# Patient Record
Sex: FEMALE | Race: WHITE | NOT HISPANIC OR LATINO | Employment: OTHER | URBAN - METROPOLITAN AREA
[De-identification: names, ages, dates, MRNs, and addresses within clinical notes are randomized per-mention and may not be internally consistent; named-entity substitution may affect disease eponyms.]

---

## 2017-01-19 ENCOUNTER — HOSPITAL ENCOUNTER (OUTPATIENT)
Dept: RADIOLOGY | Facility: MEDICAL CENTER | Age: 77
End: 2017-01-19
Attending: NEUROLOGICAL SURGERY
Payer: MEDICARE

## 2017-01-19 DIAGNOSIS — C79.31 SECONDARY MALIGNANT NEOPLASM OF BRAIN AND SPINAL CORD (HCC): ICD-10-CM

## 2017-01-19 DIAGNOSIS — C79.49 SECONDARY MALIGNANT NEOPLASM OF BRAIN AND SPINAL CORD (HCC): ICD-10-CM

## 2017-01-19 PROCEDURE — 700117 HCHG RX CONTRAST REV CODE 255: Performed by: NEUROLOGICAL SURGERY

## 2017-01-19 PROCEDURE — 70553 MRI BRAIN STEM W/O & W/DYE: CPT

## 2017-01-19 PROCEDURE — A9579 GAD-BASE MR CONTRAST NOS,1ML: HCPCS | Performed by: NEUROLOGICAL SURGERY

## 2017-01-19 RX ADMIN — GADODIAMIDE 15 ML: 287 INJECTION INTRAVENOUS at 10:26

## 2017-02-27 ENCOUNTER — HOSPITAL ENCOUNTER (OUTPATIENT)
Dept: RADIOLOGY | Facility: MEDICAL CENTER | Age: 77
End: 2017-02-27
Attending: RADIOLOGY
Payer: MEDICARE

## 2017-02-27 DIAGNOSIS — C34.11 MALIGNANT NEOPLASM OF UPPER LOBE OF RIGHT LUNG (HCC): ICD-10-CM

## 2017-02-27 DIAGNOSIS — C79.31 SECONDARY MALIGNANT NEOPLASM OF BRAIN AND SPINAL CORD (HCC): ICD-10-CM

## 2017-02-27 DIAGNOSIS — C79.49 SECONDARY MALIGNANT NEOPLASM OF BRAIN AND SPINAL CORD (HCC): ICD-10-CM

## 2017-02-27 PROCEDURE — 71260 CT THORAX DX C+: CPT

## 2017-02-27 PROCEDURE — 700117 HCHG RX CONTRAST REV CODE 255: Performed by: RADIOLOGY

## 2017-02-27 RX ADMIN — IOHEXOL 100 ML: 350 INJECTION, SOLUTION INTRAVENOUS at 11:07

## 2017-03-03 ENCOUNTER — HOSPITAL ENCOUNTER (OUTPATIENT)
Dept: RADIATION ONCOLOGY | Facility: MEDICAL CENTER | Age: 77
End: 2017-03-31
Attending: RADIOLOGY
Payer: MEDICARE

## 2017-03-03 VITALS
DIASTOLIC BLOOD PRESSURE: 67 MMHG | BODY MASS INDEX: 24.32 KG/M2 | HEART RATE: 69 BPM | OXYGEN SATURATION: 97 % | TEMPERATURE: 97 F | SYSTOLIC BLOOD PRESSURE: 125 MMHG | WEIGHT: 133 LBS

## 2017-03-03 DIAGNOSIS — C79.31 METASTASIS TO BRAIN (HCC): ICD-10-CM

## 2017-03-03 DIAGNOSIS — C34.11 MALIGNANT NEOPLASM OF UPPER LOBE OF RIGHT LUNG (HCC): ICD-10-CM

## 2017-03-03 PROCEDURE — 99214 OFFICE O/P EST MOD 30 MIN: CPT | Performed by: RADIOLOGY

## 2017-03-03 PROCEDURE — 99212 OFFICE O/P EST SF 10 MIN: CPT | Performed by: RADIOLOGY

## 2017-03-03 NOTE — PROGRESS NOTES
RADIATION ONCOLOGY FOLLOW-UP    DATE OF SERVICE: 3/3/2017    IDENTIFICATION:   A 77 y.o. female with history of stage IV lung cancer. She is status post resection of right occipital lobe metastasis followed by stereotactic radiotherapy to the surgical bed receiving 27 Negron in 3 fractions. This was followed by systemic therapy consisting of carboplatin and Alimta with reduction noted in a right upper lobe mass; however, with persistent disease. She subsequently received concurrent carboplatin and radiotherapy 60 gray in 25 fractions completed 5/6/2016. Follow-up CT chest showed possible 2nd lesion adjacent to the manubrium on the right. Plans were made for stereotactic therapy however, there were aborted after PET/CT showed no evidence of metabolic activity in the 2nd nodule. Patient has since been followed..      HISTORY OF PRESENT ILLNESS:   She returns today for follow-up feeling well no significant respiratory complaints. She reports good appetite energy level and exercises daily.    CURRENT MEDICATIONS:  Current Outpatient Prescriptions   Medication Sig Dispense Refill   • TURMERIC PO Take  by mouth.     • cetirizine (ZYRTEC) 10 MG Tab Take 10 mg by mouth every day.     • sodium chloride (OCEAN) 0.65 % Solution Spray 2 Sprays in nose as needed for Congestion.     • Omega-3 Fatty Acids (FISH OIL) 1200 MG Cap Take 1 Cap by mouth every day.     • multivitamin (THERAGRAN) Tab Take 1 Tab by mouth every day.     • Glucosamine-Chondroitin (GLUCOSAMINE CHONDR COMPLEX PO) Take 1 Tab by mouth every day.       No current facility-administered medications for this encounter.       ALLERGIES:  Penicillins    PHYSICAL EXAM:   /67 mmHg  Pulse 69  Temp(Src) 36.1 °C (97 °F)  Wt 60.328 kg (133 lb)  SpO2 97%  GENERAL: Alert and oriented no acute distress  HEENT:  Pupils are equal, round, and reactive to light.  Extraocular muscles   are intact. Sclerae nonicteric.  Conjunctivae pink.  Oral cavity, tongue   protrudes  midline.   NECK:  Supple without evidence of thyromegaly.  NODES:  No peripheral adenopathy of the neck, supraclavicular fossa or axillae   bilaterally.  LUNGS:  Clear to ascultation and resonant to percussion.  HEART:  Regular rate and rhythm.  No murmur appreciated  ABDOMEN:  Soft. No evidence of hepatosplenomegaly.  Positive bowel sounds.  EXTREMITIES:  Without Edema.  NEUROLOGIC:  Cranial nerves II through XII were intact.  Strength is 5/5 in   lower extremities bilaterally.  DTRs were symmetrical.  There was no focal   sensory deficit appreciated.    Pain Scale: 0-10  Pain Assessement: Patient denies pain  Pain Location, Orientation and Scale: 0    RADIOLOGY DATA:  CT-CHEST (THORAX) WITH  2/27/2017  1.  Interval decrease in size of the dominant lobulated right upper lobe mass. 2.  Stable mass along the anterior superior right lung apex and posterior medial right lung apex. 3.  Slight increase density of the groundglass opacity seen posterior to the dominant mass. It is uncertain if this could be related to radiation pneumonitis or disease progression. 4.  Interval increase in thickened linear areas of opacity extending to the anterior lateral right upper lobe pleural surfaces with stable subpleural interstitial opacities. This could be related to pneumonitis as well. 5.  There is underlying emphysema. There are no new suspicious nodules. 6.  There are no new findings in the upper abdomen.     Results for orders placed during the hospital encounter of 01/19/17   MR-BRAIN-WITH & W/O    Impression 1.  Postoperative right occipital craniotomy.  2.  Right occipital encephalomalacic cavity with hemosiderin deposition. Slightly decreased in size from the previous exam. However, there has been increase in peripheral wall enhancement with some nodular thickening. 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 There has also been increase in surrounding FLAIR hyperintensity in the white matter most   consistent with postradiation white matter changes in combination with microcystic encephalomalacic change and/or vasogenic edema.   Results for orders placed during the hospital encounter of 10/19/16   MR-BRAIN-WITH & W/O    Impression 1.  Stable surgical cavity in the right occipital lobe without any tumor recurrence. However there is mild increased surrounding T2 hyperintensity likely representing gliosis. Continued follow-up is recommended.  2.  Mild cerebral atrophy.  3.  Right occipital craniotomy with minimal subdural fluid collection. There has been no significant interval change.   Results for orders placed during the hospital encounter of 06/17/16   MR-BRAIN-WITH & W/O    Impression 1.  Postoperative right parietal-occipital craniotomy.  2.  Stable postoperative cystic encephalomalacic cavity with hemosiderin rim and thin rim enhancement, stable finding. No local tumor recurrence.  3.  No evidence of new enhancing metastasis elsewhere in the cerebral hemispheres.  4.  Minimal supratentorial white matter disease most consistent with microvascular ischemic change. Stable findings.  5.  Tiny chronic CSF signal extra-axial fluid collection underlying the craniotomy site, no significant change.   Results for orders placed during the hospital encounter of 01/30/16   MR-BRAIN-WITH & W/O    Impression 1.  Interval decrease in size of the hematoma in the RIGHT parieto-occipital surgical resection bed without evidence of recurrent or residual tumor  2.  No new intracranial lesions  3.  Remote LEFT basal ganglia lacune   Results for orders placed during the hospital encounter of 12/10/15   MR-BRAIN-WITH & W/O    Impression 1.  Hemorrhage within the right parieto-occipital surgical cavity. Due to the inherent T1 hyperintensity of diffuse hemorrhage, it is difficult evaluate enhancing residual  metastasis. Followup study is recommended.  2.  There are no other enhancing lesions.   Results for orders placed during the hospital encounter of 11/18/15   MR-BRAIN-WITH & W/O    Impression 1.  Status post recent parieto-occipital craniotomy for tumor resection.    2.  Moderate-sized postoperative cavity in the right parietal-occipital region containing internal fluid and hemorrhage and a small nondependent air collection.    3.  No definite MRI evidence of residual neoplasm.    4.  There is a moderate amount of vasogenic edema about the postsurgical cavity which effaces the right occipital horn and causes minimal right to left midline shift the ventricular system.   MR-BRAIN-WITH & W/O    Impression 1.  3.9 cm partially cystic/solid peripherally enhancing mass in the right parietal-occipital region with marked amount of surrounding vasogenic edema. This mass is not changed significantly in size or appearance since previous exam.   MR-BRAIN-WITH & W/O    Impression There is an approximately 3.7x4.4x2.1 cm sized predominantly cystic lesion with solid component noted in the right occipital lobe. Abnormal enhancement and hemorrhage are noted in the solid component. The differential diagnosis includes glioblastoma   multiforme and metastasis. There are no other enhancing lesions in the brain parenchyma.         IMPRESSION:    A 77 y.o. with stage IV lung cancer status post stereotactic radiotherapy surgical bed right occipital lobe brain and hypo-fractionated radiotherapy to right upper lobe lung mass given concurrently with carboplatin.    RECOMMENDATIONS:   Reviewed imaging studies with the patient. The CT chest shows continued regression of the right upper lobe mass. She also shows radiation related lung changes which are more prominent. Brain MRI shows increased thickening of the surgical bed which may also be radiation effect. Will need to follow closely. Recommended repeat CT chest abdomen pelvis, and MRI brain  end of May with follow-up in early June.    25 minutes was spent face-to-face with patient in the office and more than half of that time was spent counseling patient or coordinating care as described above.      Thank you for the opportunity to participate in her care.  If any questions or comments, please do not hesitate in calling.    Cande DE SANTIAGO M.D.  Electronically signed by: Cande Caro V, 3/3/2017 9:04 AM  [phone]

## 2017-03-15 ENCOUNTER — HOSPITAL ENCOUNTER (OUTPATIENT)
Dept: LAB | Facility: MEDICAL CENTER | Age: 77
End: 2017-03-15
Attending: SPECIALIST
Payer: MEDICARE

## 2017-03-15 LAB
BUN SERPL-MCNC: 17 MG/DL (ref 8–22)
CREAT SERPL-MCNC: 0.6 MG/DL (ref 0.5–1.4)

## 2017-03-15 PROCEDURE — 84520 ASSAY OF UREA NITROGEN: CPT

## 2017-03-15 PROCEDURE — 82565 ASSAY OF CREATININE: CPT

## 2017-03-15 PROCEDURE — 36415 COLL VENOUS BLD VENIPUNCTURE: CPT

## 2017-03-16 ENCOUNTER — HOSPITAL ENCOUNTER (OUTPATIENT)
Dept: RADIOLOGY | Facility: MEDICAL CENTER | Age: 77
End: 2017-03-16
Attending: NEUROLOGICAL SURGERY
Payer: MEDICARE

## 2017-03-16 DIAGNOSIS — C79.49 SECONDARY MALIGNANT NEOPLASM OF BRAIN AND SPINAL CORD (HCC): ICD-10-CM

## 2017-03-16 DIAGNOSIS — C79.31 SECONDARY MALIGNANT NEOPLASM OF BRAIN AND SPINAL CORD (HCC): ICD-10-CM

## 2017-03-16 PROCEDURE — 70553 MRI BRAIN STEM W/O & W/DYE: CPT

## 2017-03-16 PROCEDURE — A9579 GAD-BASE MR CONTRAST NOS,1ML: HCPCS | Performed by: NEUROLOGICAL SURGERY

## 2017-03-16 PROCEDURE — 700117 HCHG RX CONTRAST REV CODE 255: Performed by: NEUROLOGICAL SURGERY

## 2017-03-16 RX ADMIN — GADODIAMIDE 15 ML: 287 INJECTION INTRAVENOUS at 13:15

## 2017-05-05 ENCOUNTER — HOSPITAL ENCOUNTER (OUTPATIENT)
Dept: LAB | Facility: MEDICAL CENTER | Age: 77
End: 2017-05-05
Attending: SPECIALIST
Payer: MEDICARE

## 2017-05-05 LAB
ALBUMIN SERPL BCP-MCNC: 4.1 G/DL (ref 3.2–4.9)
ALBUMIN/GLOB SERPL: 1.5 G/DL
ALP SERPL-CCNC: 92 U/L (ref 30–99)
ALT SERPL-CCNC: 19 U/L (ref 2–50)
ANION GAP SERPL CALC-SCNC: 7 MMOL/L (ref 0–11.9)
AST SERPL-CCNC: 20 U/L (ref 12–45)
BASOPHILS # BLD AUTO: 0.8 % (ref 0–1.8)
BASOPHILS # BLD: 0.04 K/UL (ref 0–0.12)
BILIRUB SERPL-MCNC: 0.6 MG/DL (ref 0.1–1.5)
BUN SERPL-MCNC: 21 MG/DL (ref 8–22)
CALCIUM SERPL-MCNC: 9.3 MG/DL (ref 8.5–10.5)
CHLORIDE SERPL-SCNC: 107 MMOL/L (ref 96–112)
CO2 SERPL-SCNC: 25 MMOL/L (ref 20–33)
CREAT SERPL-MCNC: 0.68 MG/DL (ref 0.5–1.4)
EOSINOPHIL # BLD AUTO: 0.2 K/UL (ref 0–0.51)
EOSINOPHIL NFR BLD: 3.8 % (ref 0–6.9)
ERYTHROCYTE [DISTWIDTH] IN BLOOD BY AUTOMATED COUNT: 49.6 FL (ref 35.9–50)
GFR SERPL CREATININE-BSD FRML MDRD: >60 ML/MIN/1.73 M 2
GLOBULIN SER CALC-MCNC: 2.8 G/DL (ref 1.9–3.5)
GLUCOSE SERPL-MCNC: 89 MG/DL (ref 65–99)
HCT VFR BLD AUTO: 42.5 % (ref 37–47)
HGB BLD-MCNC: 14 G/DL (ref 12–16)
IMM GRANULOCYTES # BLD AUTO: 0.01 K/UL (ref 0–0.11)
IMM GRANULOCYTES NFR BLD AUTO: 0.2 % (ref 0–0.9)
LYMPHOCYTES # BLD AUTO: 1.41 K/UL (ref 1–4.8)
LYMPHOCYTES NFR BLD: 27.1 % (ref 22–41)
MCH RBC QN AUTO: 33.6 PG (ref 27–33)
MCHC RBC AUTO-ENTMCNC: 32.9 G/DL (ref 33.6–35)
MCV RBC AUTO: 101.9 FL (ref 81.4–97.8)
MONOCYTES # BLD AUTO: 0.64 K/UL (ref 0–0.85)
MONOCYTES NFR BLD AUTO: 12.3 % (ref 0–13.4)
NEUTROPHILS # BLD AUTO: 2.91 K/UL (ref 2–7.15)
NEUTROPHILS NFR BLD: 55.8 % (ref 44–72)
NRBC # BLD AUTO: 0 K/UL
NRBC BLD AUTO-RTO: 0 /100 WBC
PLATELET # BLD AUTO: 246 K/UL (ref 164–446)
PMV BLD AUTO: 9.2 FL (ref 9–12.9)
POTASSIUM SERPL-SCNC: 4.5 MMOL/L (ref 3.6–5.5)
PROT SERPL-MCNC: 6.9 G/DL (ref 6–8.2)
RBC # BLD AUTO: 4.17 M/UL (ref 4.2–5.4)
SODIUM SERPL-SCNC: 139 MMOL/L (ref 135–145)
WBC # BLD AUTO: 5.2 K/UL (ref 4.8–10.8)

## 2017-05-05 PROCEDURE — 36415 COLL VENOUS BLD VENIPUNCTURE: CPT

## 2017-05-05 PROCEDURE — 80053 COMPREHEN METABOLIC PANEL: CPT

## 2017-05-05 PROCEDURE — 85025 COMPLETE CBC W/AUTO DIFF WBC: CPT

## 2017-05-11 ENCOUNTER — HOSPITAL ENCOUNTER (OUTPATIENT)
Dept: RADIOLOGY | Facility: MEDICAL CENTER | Age: 77
End: 2017-05-11
Attending: RADIOLOGY
Payer: MEDICARE

## 2017-05-11 DIAGNOSIS — M51.16 NEURITIS OR RADICULITIS DUE TO RUPTURE OF LUMBAR INTERVERTEBRAL DISC: ICD-10-CM

## 2017-05-11 DIAGNOSIS — C79.31 METASTASIS TO BRAIN (HCC): ICD-10-CM

## 2017-05-11 PROCEDURE — 70553 MRI BRAIN STEM W/O & W/DYE: CPT

## 2017-05-25 ENCOUNTER — HOSPITAL ENCOUNTER (OUTPATIENT)
Dept: RADIOLOGY | Facility: MEDICAL CENTER | Age: 77
End: 2017-05-25
Attending: RADIOLOGY
Payer: MEDICARE

## 2017-05-25 DIAGNOSIS — C34.11 MALIGNANT NEOPLASM OF UPPER LOBE OF RIGHT LUNG (HCC): ICD-10-CM

## 2017-05-25 PROCEDURE — 700117 HCHG RX CONTRAST REV CODE 255: Performed by: RADIOLOGY

## 2017-05-25 PROCEDURE — 71260 CT THORAX DX C+: CPT

## 2017-05-25 RX ADMIN — IOHEXOL 100 ML: 350 INJECTION, SOLUTION INTRAVENOUS at 10:18

## 2017-06-02 ENCOUNTER — HOSPITAL ENCOUNTER (OUTPATIENT)
Dept: RADIATION ONCOLOGY | Facility: MEDICAL CENTER | Age: 77
End: 2017-06-30
Attending: RADIOLOGY
Payer: MEDICARE

## 2017-06-02 VITALS
DIASTOLIC BLOOD PRESSURE: 67 MMHG | TEMPERATURE: 97.7 F | HEART RATE: 67 BPM | WEIGHT: 133 LBS | BODY MASS INDEX: 24.48 KG/M2 | SYSTOLIC BLOOD PRESSURE: 147 MMHG | HEIGHT: 62 IN | OXYGEN SATURATION: 98 %

## 2017-06-02 DIAGNOSIS — C79.31 METASTASIS TO BRAIN (HCC): ICD-10-CM

## 2017-06-02 DIAGNOSIS — G93.6 CEREBRAL EDEMA (HCC): ICD-10-CM

## 2017-06-02 PROCEDURE — 99212 OFFICE O/P EST SF 10 MIN: CPT | Performed by: RADIOLOGY

## 2017-06-02 PROCEDURE — 99214 OFFICE O/P EST MOD 30 MIN: CPT | Performed by: RADIOLOGY

## 2017-06-02 RX ORDER — DEXAMETHASONE 4 MG/1
4 TABLET ORAL 2 TIMES DAILY
Qty: 20 TAB | Refills: 0 | Status: SHIPPED | OUTPATIENT
Start: 2017-06-02 | End: 2017-06-12

## 2017-06-02 RX ORDER — BUTALBITAL, ACETAMINOPHEN AND CAFFEINE 50; 325; 40 MG/1; MG/1; MG/1
1 TABLET ORAL EVERY 4 HOURS PRN
Refills: 0 | COMMUNITY
Start: 2017-04-05 | End: 2017-11-29

## 2017-06-02 RX ORDER — CYANOCOBALAMIN (VITAMIN B-12) 1000 MCG
5000 TABLET, EXTENDED RELEASE ORAL
COMMUNITY
End: 2018-05-30

## 2017-06-02 ASSESSMENT — PAIN SCALES - GENERAL: PAINLEVEL: NO PAIN

## 2017-06-02 NOTE — PROGRESS NOTES
"RADIATION ONCOLOGY FOLLOW-UP    DATE OF SERVICE: 6/2/2017    IDENTIFICATION:   A 77 y.o. female with history of stage IV lung cancer adenocarcinoma. She is status post resection of a right occipital lobe brain metastasis followed by stereotactic radiotherapy to surgical bed receiving 2700 cGy in 3 fractions completed 12/18/2015. This was followed by systemic therapy consisting of carboplatin and Alimta with reduction of a right upper lobe lung mass however disease was still persistent. She subsequently received concurrent carboplatin and radiotherapy 6000 cGy in 25 fractions completed 5/6/2016..      HISTORY OF PRESENT ILLNESS:   Returns for follow-up after undergoing staging CT chest abdomen pelvis. Her only complaint is been intermittent headaches which began approximately 2-3 months ago. Headaches are described as being significant dull ache located on the right side frontally. She was given medication for migraines and states they did help resolve her headaches.    CURRENT MEDICATIONS:  Current Outpatient Prescriptions   Medication Sig Dispense Refill   • TURMERIC PO Take  by mouth.     • sodium chloride (OCEAN) 0.65 % Solution Spray 2 Sprays in nose as needed for Congestion.     • Omega-3 Fatty Acids (FISH OIL) 1200 MG Cap Take 1 Cap by mouth every day.     • multivitamin (THERAGRAN) Tab Take 1 Tab by mouth every day.     • Glucosamine-Chondroitin (GLUCOSAMINE CHONDR COMPLEX PO) Take 1 Tab by mouth every day.     • vitamin B-12 CR (CYANOCOBALAMIN) 1000 MCG Tab CR tablet Take 5,000 mg by mouth.     • acetaminophen/caffeine/butalbital 325-40-50 mg (FIORICET) -40 MG Tab Take 1 Tab by mouth every four hours as needed.  0   • cetirizine (ZYRTEC) 10 MG Tab Take 10 mg by mouth every day.       No current facility-administered medications for this encounter.       ALLERGIES:  Penicillins    PHYSICAL EXAM:   /67 mmHg  Pulse 67  Temp(Src) 36.5 °C (97.7 °F)  Ht 1.575 m (5' 2\")  Wt 60.328 kg (133 lb)  BMI " 24.32 kg/m2  SpO2 98%  Breastfeeding? No  GENERAL: Alert and oriented no acute distress  HEENT:  Pupils are equal, round, and reactive to light.  Extraocular muscles   are intact. Sclerae nonicteric.  Conjunctivae pink.  Oral cavity, tongue   protrudes midline.   NECK:  Supple without evidence of thyromegaly.  NODES:  No peripheral adenopathy of the neck, supraclavicular fossa or axillae   bilaterally.  LUNGS:  Clear to ascultation and resonant to percussion.  HEART:  Regular rate and rhythm.  No murmur appreciated  ABDOMEN:  Soft. No evidence of hepatosplenomegaly.  Positive bowel sounds.  EXTREMITIES:  Without Edema.  NEUROLOGIC:  Cranial nerves II through XII were intact.  Strength is 5/5 in   lower extremities bilaterally.  DTRs were symmetrical. Some difficulty performing tandem gait. There was no focal   sensory deficit appreciated.      Pain Scale: 0-10  Pain Assessement: 0  Pain Location, Orientation and Scale: no complaints of pain.     LABORATORY DATA:   Lab Results   Component Value Date/Time    SODIUM 139 05/05/2017 10:24 AM    POTASSIUM 4.5 05/05/2017 10:24 AM    CHLORIDE 107 05/05/2017 10:24 AM    CO2 25 05/05/2017 10:24 AM    GLUCOSE 89 05/05/2017 10:24 AM    BUN 21 05/05/2017 10:24 AM    CREATININE 0.68 05/05/2017 10:24 AM     Lab Results   Component Value Date/Time    ALKALINE PHOSPHATASE 92 05/05/2017 10:24 AM    AST(SGOT) 20 05/05/2017 10:24 AM    ALT(SGPT) 19 05/05/2017 10:24 AM    TOTAL BILIRUBIN 0.6 05/05/2017 10:24 AM      Lab Results   Component Value Date/Time    WBC 5.2 05/05/2017 10:24 AM    RBC 4.17* 05/05/2017 10:24 AM    HEMOGLOBIN 14.0 05/05/2017 10:24 AM    HEMATOCRIT 42.5 05/05/2017 10:24 AM    .9* 05/05/2017 10:24 AM    MCH 33.6* 05/05/2017 10:24 AM    MCHC 32.9* 05/05/2017 10:24 AM    MPV 9.2 05/05/2017 10:24 AM    NEUTROPHILS-POLYS 55.80 05/05/2017 10:24 AM    LYMPHOCYTES 27.10 05/05/2017 10:24 AM    MONOCYTES 12.30 05/05/2017 10:24 AM    EOSINOPHILS 3.80 05/05/2017 10:24  AM    BASOPHILS 0.80 05/05/2017 10:24 AM        RADIOLOGY DATA:  CT-CHEST, ABDOMEN, PELVIS WITH  5/25/2017  1.  There is essentially stable disease with no significant internal change in the residual ovoid right upper lobe lung mass or the adjacent anterosuperior and medial subpleural nodules. 2.  Stable areas of pleural-parenchymal scarring in the right upper lobe. 3.  There is underlying centrilobular emphysema. 4.  No change in a tiny subpleural left upper lobe lung nodule which is likely postinflammatory. 5.  There is no evidence of metastatic disease in the abdomen or pelvis. 6.  There are stable hepatic cysts and probable bilateral renal cysts.    MR.-BRAIN WITH & WITHOUT  5/11/2017  1.  Postoperative right occipital-parietal craniotomy, by history resection of lung metastasis. Encephalomalacic cavity underlying the craniotomy site. 2.  Progressive geographic area of enhancement in the adjacent right occipital lobe, right parietal lobe, and right posterior temporal lobe with increasing mass effect and increasing vasogenic edema now effacing the right trigone which is also displaced anteriorly. 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      IMPRESSION:    A 77 y.o. with stage IV lung cancer clinically without evidence of disease. 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    RECOMMENDATIONS:   Reviewed imaging with patient. CT scan shows stable findings there is no evidence of extracranial metastatic disease. The MRI shows fairly large area of radiation necrosis, which would explain patient's headaches. I did recommend 2 week course of dexamethasone with a follow-up in 4 weeks.    25 minutes was spent face-to-face with patient in the office and more than half of that time was spent counseling patient or coordinating care as described  above.    Thank you for the opportunity to participate in her care.  If any questions or comments, please do not hesitate in calling.  Cande DE SANTIAGO M.D.  Electronically signed by: Cande Caro V, 6/2/2017 2:27 PM  [phone]

## 2017-06-02 NOTE — NON-PROVIDER
"Patient was seen today in clinic with Dr. Caro for Follow up.  Vitals signs and weight were obtained and pain assessment was completed.  Allergies and medications were reviewed with the patient.  Review of systems completed.     Vitals/Pain:  Filed Vitals:    06/02/17 1313   BP: 147/67   Pulse: 67   Temp: 36.5 °C (97.7 °F)   Height: 1.575 m (5' 2\")   Weight: 60.328 kg (133 lb)   SpO2: 98%   Pain Score: No pain    Allergies:   Penicillins    Current Medications:  Current Outpatient Prescriptions   Medication Sig Dispense Refill   • TURMERIC PO Take  by mouth.     • sodium chloride (OCEAN) 0.65 % Solution Spray 2 Sprays in nose as needed for Congestion.     • Omega-3 Fatty Acids (FISH OIL) 1200 MG Cap Take 1 Cap by mouth every day.     • multivitamin (THERAGRAN) Tab Take 1 Tab by mouth every day.     • Glucosamine-Chondroitin (GLUCOSAMINE CHONDR COMPLEX PO) Take 1 Tab by mouth every day.     • vitamin B-12 CR (CYANOCOBALAMIN) 1000 MCG Tab CR tablet Take 5,000 mg by mouth.     • acetaminophen/caffeine/butalbital 325-40-50 mg (FIORICET) -40 MG Tab Take 1 Tab by mouth every four hours as needed.  0   • cetirizine (ZYRTEC) 10 MG Tab Take 10 mg by mouth every day.       No current facility-administered medications for this encounter.       Review of Systems:      PCP:  Jay Barrera, Med Ass't  6/2/2017  1:19 PM      "

## 2017-06-27 ENCOUNTER — HOSPITAL ENCOUNTER (OUTPATIENT)
Dept: LAB | Facility: MEDICAL CENTER | Age: 77
End: 2017-06-27
Attending: NEUROLOGICAL SURGERY
Payer: MEDICARE

## 2017-06-27 LAB
BUN SERPL-MCNC: 18 MG/DL (ref 8–22)
CREAT SERPL-MCNC: 0.69 MG/DL (ref 0.5–1.4)
GFR SERPL CREATININE-BSD FRML MDRD: >60 ML/MIN/1.73 M 2

## 2017-06-27 PROCEDURE — 36415 COLL VENOUS BLD VENIPUNCTURE: CPT

## 2017-06-27 PROCEDURE — 82565 ASSAY OF CREATININE: CPT

## 2017-06-27 PROCEDURE — 84520 ASSAY OF UREA NITROGEN: CPT

## 2017-07-06 ENCOUNTER — HOSPITAL ENCOUNTER (OUTPATIENT)
Dept: RADIOLOGY | Facility: MEDICAL CENTER | Age: 77
End: 2017-07-06
Attending: NEUROLOGICAL SURGERY
Payer: MEDICARE

## 2017-07-06 DIAGNOSIS — C79.31 SECONDARY MALIGNANT NEOPLASM OF BRAIN AND SPINAL CORD (HCC): ICD-10-CM

## 2017-07-06 DIAGNOSIS — C79.49 SECONDARY MALIGNANT NEOPLASM OF BRAIN AND SPINAL CORD (HCC): ICD-10-CM

## 2017-07-06 PROCEDURE — 70553 MRI BRAIN STEM W/O & W/DYE: CPT

## 2017-07-06 PROCEDURE — A9579 GAD-BASE MR CONTRAST NOS,1ML: HCPCS | Performed by: NEUROLOGICAL SURGERY

## 2017-07-06 PROCEDURE — 700117 HCHG RX CONTRAST REV CODE 255: Performed by: NEUROLOGICAL SURGERY

## 2017-07-06 RX ADMIN — GADODIAMIDE 10 ML: 287 INJECTION INTRAVENOUS at 11:01

## 2017-07-20 ENCOUNTER — HOSPITAL ENCOUNTER (OUTPATIENT)
Dept: RADIOLOGY | Facility: MEDICAL CENTER | Age: 77
End: 2017-07-20
Attending: SPECIALIST
Payer: MEDICARE

## 2017-07-20 DIAGNOSIS — C34.11 MALIGNANT NEOPLASM OF UPPER LOBE OF RIGHT LUNG (HCC): ICD-10-CM

## 2017-07-20 PROCEDURE — A9503 TC99M MEDRONATE: HCPCS

## 2017-07-28 ENCOUNTER — HOSPITAL ENCOUNTER (OUTPATIENT)
Dept: RADIATION ONCOLOGY | Facility: MEDICAL CENTER | Age: 77
End: 2017-07-31
Attending: RADIOLOGY
Payer: MEDICARE

## 2017-07-28 VITALS
BODY MASS INDEX: 24.5 KG/M2 | OXYGEN SATURATION: 97 % | WEIGHT: 134 LBS | TEMPERATURE: 98.3 F | DIASTOLIC BLOOD PRESSURE: 84 MMHG | SYSTOLIC BLOOD PRESSURE: 133 MMHG | HEART RATE: 62 BPM

## 2017-07-28 DIAGNOSIS — C79.31 METASTASIS TO BRAIN (HCC): ICD-10-CM

## 2017-07-28 DIAGNOSIS — R91.8 LUNG MASS: ICD-10-CM

## 2017-07-28 DIAGNOSIS — C34.11 MALIGNANT NEOPLASM OF UPPER LOBE OF RIGHT LUNG (HCC): ICD-10-CM

## 2017-07-28 PROCEDURE — 99214 OFFICE O/P EST MOD 30 MIN: CPT | Performed by: RADIOLOGY

## 2017-07-28 PROCEDURE — 99212 OFFICE O/P EST SF 10 MIN: CPT | Performed by: RADIOLOGY

## 2017-07-28 NOTE — PROGRESS NOTES
RADIATION ONCOLOGY FOLLOW-UP    DATE OF SERVICE: 7/28/2017    IDENTIFICATION:   A 77 y.o. female with history of stage IV lung cancer adenocarcinoma. She is status post resection of a right occipital lobe brain metastasis followed by stereotactic radiotherapy to surgical bed receiving 2700 cGy in 3 fractions completed 12/18/2015. This was followed by systemic therapy consisting of carboplatin and Alimta with reduction of a right upper lobe lung mass however disease was still persistent. She subsequently received concurrent carboplatin and radiotherapy 6000 cGy in 25 fractions completed 5/6/2016..     HISTORY OF PRESENT ILLNESS:   When last seen June 2 in follow-up she was complaining of headaches and ataxia. She was started on short course of dexamethasone and repeat MRI brain was ordered. Patient is presently off dexamethasone states ataxia and headaches of improved she still has some frontal headaches approximately once per week she went she treats with Fioricet with improvement. MRI brain demonstrates no new metastasis previously treated area shows some radiation necrosis.    She also complained of some rib discomfort to Dr. Thacker. The pain is now resolved. A bone scan ordered by Dr. Thacker demonstrates focal area of uptake involving the 12th rib with no CT correlate.    CURRENT MEDICATIONS:  Current Outpatient Prescriptions   Medication Sig Dispense Refill   • vitamin B-12 CR (CYANOCOBALAMIN) 1000 MCG Tab CR tablet Take 5,000 mg by mouth.     • acetaminophen/caffeine/butalbital 325-40-50 mg (FIORICET) -40 MG Tab Take 1 Tab by mouth every four hours as needed.  0   • TURMERIC PO Take  by mouth.     • cetirizine (ZYRTEC) 10 MG Tab Take 10 mg by mouth every day.     • sodium chloride (OCEAN) 0.65 % Solution Spray 2 Sprays in nose as needed for Congestion.     • Omega-3 Fatty Acids (FISH OIL) 1200 MG Cap Take 1 Cap by mouth every day.     • multivitamin (THERAGRAN) Tab Take 1 Tab by mouth every day.     •  Glucosamine-Chondroitin (GLUCOSAMINE CHONDR COMPLEX PO) Take 1 Tab by mouth every day.       No current facility-administered medications for this encounter.       ALLERGIES:  Penicillins    PHYSICAL EXAM:   /84 mmHg  Pulse 62  Temp(Src) 36.8 °C (98.3 °F)  Wt 60.782 kg (134 lb)  SpO2 97%  GENERAL: Alert and oriented no acute distress  HEENT:  Pupils are equal, round, and reactive to light.  Extraocular muscles   are intact. Sclerae nonicteric.  Conjunctivae pink.  Oral cavity, tongue   protrudes midline.   NECK:  Supple without evidence of thyromegaly.  NODES:  No peripheral adenopathy of the neck, supraclavicular fossa or axillae   bilaterally.  LUNGS:  Clear to ascultation and resonant to percussion.  HEART:  Regular rate and rhythm.  No murmur appreciated  ABDOMEN:  Soft. No evidence of hepatosplenomegaly.  Positive bowel sounds.  EXTREMITIES:  Without Edema.  NEUROLOGIC:  Cranial nerves II through XII were intact.  Strength is 5/5 in   lower extremities bilaterally.  DTRs were symmetrical.  There was no focal   sensory deficit appreciated.    Pain Scale: 0-10  Pain Assessement: 0  Pain Location, Orientation and Scale: no complaints at this time.  (last headache was 7/22)    LABORATORY DATA:   Lab Results   Component Value Date/Time    SODIUM 139 05/05/2017 10:24 AM    POTASSIUM 4.5 05/05/2017 10:24 AM    CHLORIDE 107 05/05/2017 10:24 AM    CO2 25 05/05/2017 10:24 AM    GLUCOSE 89 05/05/2017 10:24 AM    BUN 18 06/27/2017 02:20 PM    CREATININE 0.69 06/27/2017 02:20 PM     Lab Results   Component Value Date/Time    ALKALINE PHOSPHATASE 92 05/05/2017 10:24 AM    AST(SGOT) 20 05/05/2017 10:24 AM    ALT(SGPT) 19 05/05/2017 10:24 AM    TOTAL BILIRUBIN 0.6 05/05/2017 10:24 AM      Lab Results   Component Value Date/Time    WBC 5.2 05/05/2017 10:24 AM    RBC 4.17* 05/05/2017 10:24 AM    HEMOGLOBIN 14.0 05/05/2017 10:24 AM    HEMATOCRIT 42.5 05/05/2017 10:24 AM    .9* 05/05/2017 10:24 AM    MCH 33.6*  05/05/2017 10:24 AM    MCHC 32.9* 05/05/2017 10:24 AM    MPV 9.2 05/05/2017 10:24 AM    NEUTROPHILS-POLYS 55.80 05/05/2017 10:24 AM    LYMPHOCYTES 27.10 05/05/2017 10:24 AM    MONOCYTES 12.30 05/05/2017 10:24 AM    EOSINOPHILS 3.80 05/05/2017 10:24 AM    BASOPHILS 0.80 05/05/2017 10:24 AM        RADIOLOGY DATA:  MR.-BRAIN WITH & WITHOUT  7/6/2017  1.  Findings consistent with radiation necrosis at the margins of operative bed in the RIGHT occipital lobe, with overlying craniotomy defect.  Surrounding vasogenic edema again noted, similar to prior exam. 2.  No new focal abnormal enhancement within the operative bed to indicate recurrent tumor. 3.  No new enhancing mass within the brain.    NM BONE SCAN WHOLE BODY  7/20/2017  1.  There is focal uptake in the left posterior approximate 12th rib without a definite CT correlate. Conceivably this could be due to interval trauma. 2.  There is otherwise no definite evidence of skeletal metastases.      IMPRESSION:    A 77 y.o. with stage IV lung cancer clinical data evidence of disease.     RECOMMENDATIONS:   Reassured her. Recommended follow-up in 6 months with repeat MRI brain and CT of the chest abdomen pelvis.     25 minutes was spent face-to-face with patient in the office and more than half of that time was spent counseling patient or coordinating care as described above.    Thank you for the opportunity to participate in her care.  If any questions or comments, please do not hesitate in calling.  Cande DE SANTIAGO M.D.  Electronically signed by: Cande Caro V, 7/28/2017 1:00 PM  [phone]

## 2017-08-21 DIAGNOSIS — I67.89 RADIATION THERAPY INDUCED BRAIN NECROSIS: ICD-10-CM
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
## 2017-08-21 RX ORDER — DEXAMETHASONE 2 MG/1
2 TABLET ORAL 2 TIMES DAILY
Qty: 100 TAB | Refills: 0 | Status: SHIPPED | OUTPATIENT
Start: 2017-08-21 | End: 2017-11-29

## 2017-09-14 ENCOUNTER — HOSPITAL ENCOUNTER (OUTPATIENT)
Dept: LAB | Facility: MEDICAL CENTER | Age: 77
End: 2017-09-14
Attending: NEUROLOGICAL SURGERY
Payer: MEDICARE

## 2017-09-14 ENCOUNTER — HOSPITAL ENCOUNTER (OUTPATIENT)
Dept: LAB | Facility: MEDICAL CENTER | Age: 77
End: 2017-09-14
Attending: SPECIALIST
Payer: MEDICARE

## 2017-09-14 LAB
ALBUMIN SERPL BCP-MCNC: 3.7 G/DL (ref 3.2–4.9)
ALBUMIN/GLOB SERPL: 1.6 G/DL
ALP SERPL-CCNC: 66 U/L (ref 30–99)
ALT SERPL-CCNC: 32 U/L (ref 2–50)
ANION GAP SERPL CALC-SCNC: 7 MMOL/L (ref 0–11.9)
AST SERPL-CCNC: 17 U/L (ref 12–45)
BASOPHILS # BLD AUTO: 0.1 % (ref 0–1.8)
BASOPHILS # BLD: 0.01 K/UL (ref 0–0.12)
BILIRUB SERPL-MCNC: 0.7 MG/DL (ref 0.1–1.5)
BUN SERPL-MCNC: 31 MG/DL (ref 8–22)
BUN SERPL-MCNC: 31 MG/DL (ref 8–22)
CALCIUM SERPL-MCNC: 9 MG/DL (ref 8.5–10.5)
CHLORIDE SERPL-SCNC: 102 MMOL/L (ref 96–112)
CO2 SERPL-SCNC: 27 MMOL/L (ref 20–33)
CREAT SERPL-MCNC: 0.45 MG/DL (ref 0.5–1.4)
CREAT SERPL-MCNC: 0.48 MG/DL (ref 0.5–1.4)
EOSINOPHIL # BLD AUTO: 0 K/UL (ref 0–0.51)
EOSINOPHIL NFR BLD: 0 % (ref 0–6.9)
ERYTHROCYTE [DISTWIDTH] IN BLOOD BY AUTOMATED COUNT: 49.8 FL (ref 35.9–50)
GFR SERPL CREATININE-BSD FRML MDRD: >60 ML/MIN/1.73 M 2
GFR SERPL CREATININE-BSD FRML MDRD: >60 ML/MIN/1.73 M 2
GLOBULIN SER CALC-MCNC: 2.3 G/DL (ref 1.9–3.5)
GLUCOSE SERPL-MCNC: 122 MG/DL (ref 65–99)
HCT VFR BLD AUTO: 43.1 % (ref 37–47)
HGB BLD-MCNC: 14 G/DL (ref 12–16)
IMM GRANULOCYTES # BLD AUTO: 0.03 K/UL (ref 0–0.11)
IMM GRANULOCYTES NFR BLD AUTO: 0.4 % (ref 0–0.9)
LYMPHOCYTES # BLD AUTO: 0.59 K/UL (ref 1–4.8)
LYMPHOCYTES NFR BLD: 8.4 % (ref 22–41)
MCH RBC QN AUTO: 33 PG (ref 27–33)
MCHC RBC AUTO-ENTMCNC: 32.5 G/DL (ref 33.6–35)
MCV RBC AUTO: 101.7 FL (ref 81.4–97.8)
MONOCYTES # BLD AUTO: 0.39 K/UL (ref 0–0.85)
MONOCYTES NFR BLD AUTO: 5.6 % (ref 0–13.4)
NEUTROPHILS # BLD AUTO: 5.98 K/UL (ref 2–7.15)
NEUTROPHILS NFR BLD: 85.5 % (ref 44–72)
NRBC # BLD AUTO: 0 K/UL
NRBC BLD AUTO-RTO: 0 /100 WBC
PLATELET # BLD AUTO: 206 K/UL (ref 164–446)
PMV BLD AUTO: 8.7 FL (ref 9–12.9)
POTASSIUM SERPL-SCNC: 4.1 MMOL/L (ref 3.6–5.5)
PROT SERPL-MCNC: 6 G/DL (ref 6–8.2)
RBC # BLD AUTO: 4.24 M/UL (ref 4.2–5.4)
SODIUM SERPL-SCNC: 136 MMOL/L (ref 135–145)
WBC # BLD AUTO: 7 K/UL (ref 4.8–10.8)

## 2017-09-14 PROCEDURE — 85025 COMPLETE CBC W/AUTO DIFF WBC: CPT

## 2017-09-14 PROCEDURE — 84520 ASSAY OF UREA NITROGEN: CPT

## 2017-09-14 PROCEDURE — 80053 COMPREHEN METABOLIC PANEL: CPT

## 2017-09-14 PROCEDURE — 36415 COLL VENOUS BLD VENIPUNCTURE: CPT

## 2017-09-14 PROCEDURE — 82565 ASSAY OF CREATININE: CPT

## 2017-09-18 ENCOUNTER — HOSPITAL ENCOUNTER (OUTPATIENT)
Dept: RADIOLOGY | Facility: MEDICAL CENTER | Age: 77
End: 2017-09-18
Attending: SPECIALIST
Payer: MEDICARE

## 2017-09-18 DIAGNOSIS — C34.11 MALIGNANT NEOPLASM OF UPPER LOBE OF RIGHT LUNG (HCC): ICD-10-CM

## 2017-09-18 PROCEDURE — 700117 HCHG RX CONTRAST REV CODE 255: Performed by: SPECIALIST

## 2017-09-18 PROCEDURE — A9579 GAD-BASE MR CONTRAST NOS,1ML: HCPCS | Performed by: SPECIALIST

## 2017-09-18 PROCEDURE — 70553 MRI BRAIN STEM W/O & W/DYE: CPT

## 2017-09-18 RX ADMIN — GADODIAMIDE 15 ML: 287 INJECTION INTRAVENOUS at 15:23

## 2017-10-26 ENCOUNTER — HOSPITAL ENCOUNTER (OUTPATIENT)
Dept: LAB | Facility: MEDICAL CENTER | Age: 77
End: 2017-10-26
Attending: RADIOLOGY
Payer: MEDICARE

## 2017-10-26 DIAGNOSIS — R91.8 LUNG MASS: ICD-10-CM

## 2017-10-26 DIAGNOSIS — C79.31 METASTASIS TO BRAIN (HCC): ICD-10-CM

## 2017-10-26 LAB
ANION GAP SERPL CALC-SCNC: 6 MMOL/L (ref 0–11.9)
BUN SERPL-MCNC: 17 MG/DL (ref 8–22)
CALCIUM SERPL-MCNC: 9.7 MG/DL (ref 8.5–10.5)
CHLORIDE SERPL-SCNC: 106 MMOL/L (ref 96–112)
CO2 SERPL-SCNC: 27 MMOL/L (ref 20–33)
CREAT SERPL-MCNC: 0.44 MG/DL (ref 0.5–1.4)
GFR SERPL CREATININE-BSD FRML MDRD: >60 ML/MIN/1.73 M 2
GLUCOSE SERPL-MCNC: 84 MG/DL (ref 65–99)
POTASSIUM SERPL-SCNC: 4.1 MMOL/L (ref 3.6–5.5)
SODIUM SERPL-SCNC: 139 MMOL/L (ref 135–145)

## 2017-10-26 PROCEDURE — 36415 COLL VENOUS BLD VENIPUNCTURE: CPT

## 2017-10-26 PROCEDURE — 80048 BASIC METABOLIC PNL TOTAL CA: CPT

## 2017-11-03 ENCOUNTER — HOSPITAL ENCOUNTER (OUTPATIENT)
Dept: RADIOLOGY | Facility: MEDICAL CENTER | Age: 77
End: 2017-11-03
Attending: RADIOLOGY
Payer: MEDICARE

## 2017-11-03 DIAGNOSIS — C34.11 MALIGNANT NEOPLASM OF UPPER LOBE OF RIGHT LUNG (HCC): ICD-10-CM

## 2017-11-03 PROCEDURE — 71260 CT THORAX DX C+: CPT

## 2017-11-03 PROCEDURE — 700117 HCHG RX CONTRAST REV CODE 255: Performed by: RADIOLOGY

## 2017-11-03 RX ADMIN — IOHEXOL 50 ML: 240 INJECTION, SOLUTION INTRATHECAL; INTRAVASCULAR; INTRAVENOUS; ORAL at 10:26

## 2017-11-03 RX ADMIN — IOHEXOL 100 ML: 350 INJECTION, SOLUTION INTRAVENOUS at 10:26

## 2017-11-29 ENCOUNTER — RESOLUTE PROFESSIONAL BILLING HOSPITAL PROF FEE (OUTPATIENT)
Dept: HOSPITALIST | Facility: MEDICAL CENTER | Age: 77
End: 2017-11-29
Payer: MEDICARE

## 2017-11-29 ENCOUNTER — HOSPITAL ENCOUNTER (INPATIENT)
Facility: MEDICAL CENTER | Age: 77
LOS: 9 days | DRG: 054 | End: 2017-12-08
Attending: EMERGENCY MEDICINE | Admitting: HOSPITALIST
Payer: MEDICARE

## 2017-11-29 ENCOUNTER — HOSPITAL ENCOUNTER (OUTPATIENT)
Dept: RADIOLOGY | Facility: MEDICAL CENTER | Age: 77
End: 2017-11-29

## 2017-11-29 DIAGNOSIS — C79.31 METASTASIS TO BRAIN (HCC): ICD-10-CM

## 2017-11-29 DIAGNOSIS — R53.1 LEFT-SIDED WEAKNESS: ICD-10-CM

## 2017-11-29 PROBLEM — I63.9 CVA (CEREBRAL VASCULAR ACCIDENT) (HCC): Status: ACTIVE | Noted: 2017-11-29

## 2017-11-29 LAB
ALBUMIN SERPL BCP-MCNC: 3.7 G/DL (ref 3.2–4.9)
ALBUMIN/GLOB SERPL: 1.4 G/DL
ALP SERPL-CCNC: 74 U/L (ref 30–99)
ALT SERPL-CCNC: 19 U/L (ref 2–50)
ANION GAP SERPL CALC-SCNC: 6 MMOL/L (ref 0–11.9)
APPEARANCE UR: CLEAR
APTT PPP: 26.5 SEC (ref 24.7–36)
AST SERPL-CCNC: 19 U/L (ref 12–45)
BACTERIA #/AREA URNS HPF: NEGATIVE /HPF
BASOPHILS # BLD AUTO: 0.4 % (ref 0–1.8)
BASOPHILS # BLD: 0.03 K/UL (ref 0–0.12)
BILIRUB SERPL-MCNC: 0.5 MG/DL (ref 0.1–1.5)
BILIRUB UR QL STRIP.AUTO: NEGATIVE
BUN SERPL-MCNC: 12 MG/DL (ref 8–22)
CALCIUM SERPL-MCNC: 9.4 MG/DL (ref 8.5–10.5)
CHLORIDE SERPL-SCNC: 107 MMOL/L (ref 96–112)
CO2 SERPL-SCNC: 23 MMOL/L (ref 20–33)
COLOR UR: YELLOW
CREAT SERPL-MCNC: 0.52 MG/DL (ref 0.5–1.4)
CULTURE IF INDICATED INDCX: NO UA CULTURE
EOSINOPHIL # BLD AUTO: 0.12 K/UL (ref 0–0.51)
EOSINOPHIL NFR BLD: 1.6 % (ref 0–6.9)
EPI CELLS #/AREA URNS HPF: NEGATIVE /HPF
ERYTHROCYTE [DISTWIDTH] IN BLOOD BY AUTOMATED COUNT: 46.8 FL (ref 35.9–50)
GFR SERPL CREATININE-BSD FRML MDRD: >60 ML/MIN/1.73 M 2
GLOBULIN SER CALC-MCNC: 2.7 G/DL (ref 1.9–3.5)
GLUCOSE SERPL-MCNC: 105 MG/DL (ref 65–99)
GLUCOSE UR STRIP.AUTO-MCNC: NEGATIVE MG/DL
HCT VFR BLD AUTO: 40.4 % (ref 37–47)
HGB BLD-MCNC: 13.1 G/DL (ref 12–16)
HYALINE CASTS #/AREA URNS LPF: ABNORMAL /LPF
IMM GRANULOCYTES # BLD AUTO: 0.01 K/UL (ref 0–0.11)
IMM GRANULOCYTES NFR BLD AUTO: 0.1 % (ref 0–0.9)
INR PPP: 1.09 (ref 0.87–1.13)
KETONES UR STRIP.AUTO-MCNC: 15 MG/DL
LEUKOCYTE ESTERASE UR QL STRIP.AUTO: NEGATIVE
LYMPHOCYTES # BLD AUTO: 0.88 K/UL (ref 1–4.8)
LYMPHOCYTES NFR BLD: 11.7 % (ref 22–41)
MCH RBC QN AUTO: 33 PG (ref 27–33)
MCHC RBC AUTO-ENTMCNC: 32.4 G/DL (ref 33.6–35)
MCV RBC AUTO: 101.8 FL (ref 81.4–97.8)
MICRO URNS: ABNORMAL
MONOCYTES # BLD AUTO: 0.89 K/UL (ref 0–0.85)
MONOCYTES NFR BLD AUTO: 11.9 % (ref 0–13.4)
NEUTROPHILS # BLD AUTO: 5.56 K/UL (ref 2–7.15)
NEUTROPHILS NFR BLD: 74.3 % (ref 44–72)
NITRITE UR QL STRIP.AUTO: NEGATIVE
NRBC # BLD AUTO: 0 K/UL
NRBC BLD AUTO-RTO: 0 /100 WBC
PH UR STRIP.AUTO: 7 [PH]
PLATELET # BLD AUTO: 222 K/UL (ref 164–446)
PMV BLD AUTO: 9 FL (ref 9–12.9)
POTASSIUM SERPL-SCNC: 3.7 MMOL/L (ref 3.6–5.5)
PROT SERPL-MCNC: 6.4 G/DL (ref 6–8.2)
PROT UR QL STRIP: NEGATIVE MG/DL
PROTHROMBIN TIME: 13.8 SEC (ref 12–14.6)
RBC # BLD AUTO: 3.97 M/UL (ref 4.2–5.4)
RBC # URNS HPF: ABNORMAL /HPF
RBC UR QL AUTO: ABNORMAL
SODIUM SERPL-SCNC: 136 MMOL/L (ref 135–145)
SP GR UR REFRACTOMETRY: >1.045
UROBILINOGEN UR STRIP.AUTO-MCNC: 0.2 MG/DL
WBC # BLD AUTO: 7.5 K/UL (ref 4.8–10.8)
WBC #/AREA URNS HPF: ABNORMAL /HPF

## 2017-11-29 PROCEDURE — 85025 COMPLETE CBC W/AUTO DIFF WBC: CPT

## 2017-11-29 PROCEDURE — 93005 ELECTROCARDIOGRAM TRACING: CPT | Performed by: EMERGENCY MEDICINE

## 2017-11-29 PROCEDURE — 770006 HCHG ROOM/CARE - MED/SURG/GYN SEMI*

## 2017-11-29 PROCEDURE — 85610 PROTHROMBIN TIME: CPT

## 2017-11-29 PROCEDURE — 99285 EMERGENCY DEPT VISIT HI MDM: CPT

## 2017-11-29 PROCEDURE — 81001 URINALYSIS AUTO W/SCOPE: CPT

## 2017-11-29 PROCEDURE — 99223 1ST HOSP IP/OBS HIGH 75: CPT | Mod: AI | Performed by: HOSPITALIST

## 2017-11-29 PROCEDURE — 85730 THROMBOPLASTIN TIME PARTIAL: CPT

## 2017-11-29 PROCEDURE — 80053 COMPREHEN METABOLIC PANEL: CPT

## 2017-11-29 RX ORDER — ASPIRIN 325 MG
325 TABLET ORAL DAILY
Status: DISCONTINUED | OUTPATIENT
Start: 2017-11-30 | End: 2017-12-08 | Stop reason: HOSPADM

## 2017-11-29 RX ORDER — CHLORAL HYDRATE 500 MG
1000 CAPSULE ORAL DAILY
Status: DISCONTINUED | OUTPATIENT
Start: 2017-11-30 | End: 2017-12-08 | Stop reason: HOSPADM

## 2017-11-29 RX ORDER — POLYETHYLENE GLYCOL 3350 17 G/17G
1 POWDER, FOR SOLUTION ORAL
Status: DISCONTINUED | OUTPATIENT
Start: 2017-11-29 | End: 2017-12-08 | Stop reason: HOSPADM

## 2017-11-29 RX ORDER — HYDRALAZINE HYDROCHLORIDE 20 MG/ML
20 INJECTION INTRAMUSCULAR; INTRAVENOUS EVERY 4 HOURS PRN
Status: DISCONTINUED | OUTPATIENT
Start: 2017-11-29 | End: 2017-11-29

## 2017-11-29 RX ORDER — HYDRALAZINE HYDROCHLORIDE 20 MG/ML
10 INJECTION INTRAMUSCULAR; INTRAVENOUS
Status: DISCONTINUED | OUTPATIENT
Start: 2017-11-29 | End: 2017-12-08 | Stop reason: HOSPADM

## 2017-11-29 RX ORDER — ACETAMINOPHEN 325 MG/1
650 TABLET ORAL EVERY 6 HOURS PRN
Status: DISCONTINUED | OUTPATIENT
Start: 2017-11-29 | End: 2017-12-08 | Stop reason: HOSPADM

## 2017-11-29 RX ORDER — SODIUM CHLORIDE 9 MG/ML
INJECTION, SOLUTION INTRAVENOUS CONTINUOUS
Status: DISCONTINUED | OUTPATIENT
Start: 2017-11-29 | End: 2017-12-01

## 2017-11-29 RX ORDER — ASPIRIN 300 MG/1
300 SUPPOSITORY RECTAL DAILY
Status: DISCONTINUED | OUTPATIENT
Start: 2017-11-30 | End: 2017-12-08 | Stop reason: HOSPADM

## 2017-11-29 RX ORDER — LABETALOL HYDROCHLORIDE 5 MG/ML
10 INJECTION, SOLUTION INTRAVENOUS
Status: DISCONTINUED | OUTPATIENT
Start: 2017-11-29 | End: 2017-11-29

## 2017-11-29 RX ORDER — ASPIRIN 81 MG/1
324 TABLET, CHEWABLE ORAL DAILY
Status: DISCONTINUED | OUTPATIENT
Start: 2017-11-30 | End: 2017-12-08 | Stop reason: HOSPADM

## 2017-11-29 RX ORDER — AMOXICILLIN 250 MG
2 CAPSULE ORAL 2 TIMES DAILY
Status: DISCONTINUED | OUTPATIENT
Start: 2017-11-29 | End: 2017-12-08 | Stop reason: HOSPADM

## 2017-11-29 RX ORDER — ONDANSETRON 2 MG/ML
4 INJECTION INTRAMUSCULAR; INTRAVENOUS EVERY 4 HOURS PRN
Status: DISCONTINUED | OUTPATIENT
Start: 2017-11-29 | End: 2017-12-08 | Stop reason: HOSPADM

## 2017-11-29 RX ORDER — ONDANSETRON 4 MG/1
4 TABLET, ORALLY DISINTEGRATING ORAL EVERY 4 HOURS PRN
Status: DISCONTINUED | OUTPATIENT
Start: 2017-11-29 | End: 2017-12-08 | Stop reason: HOSPADM

## 2017-11-29 RX ORDER — LABETALOL HYDROCHLORIDE 5 MG/ML
10 INJECTION, SOLUTION INTRAVENOUS EVERY 4 HOURS PRN
Status: DISCONTINUED | OUTPATIENT
Start: 2017-11-29 | End: 2017-12-08 | Stop reason: HOSPADM

## 2017-11-29 RX ORDER — BISACODYL 10 MG
10 SUPPOSITORY, RECTAL RECTAL
Status: DISCONTINUED | OUTPATIENT
Start: 2017-11-29 | End: 2017-12-08 | Stop reason: HOSPADM

## 2017-11-29 RX ORDER — SODIUM CHLORIDE 9 MG/ML
INJECTION, SOLUTION INTRAVENOUS
Status: DISCONTINUED | OUTPATIENT
Start: 2017-11-29 | End: 2017-11-29

## 2017-11-29 RX ORDER — ATORVASTATIN CALCIUM 40 MG/1
40 TABLET, FILM COATED ORAL EVERY EVENING
Status: DISCONTINUED | OUTPATIENT
Start: 2017-11-29 | End: 2017-12-08 | Stop reason: HOSPADM

## 2017-11-29 ASSESSMENT — PAIN SCALES - GENERAL: PAINLEVEL_OUTOF10: 2

## 2017-11-30 ENCOUNTER — APPOINTMENT (OUTPATIENT)
Dept: RADIOLOGY | Facility: MEDICAL CENTER | Age: 77
DRG: 054 | End: 2017-11-30
Attending: PSYCHIATRY & NEUROLOGY
Payer: MEDICARE

## 2017-11-30 LAB
CHOLEST SERPL-MCNC: 133 MG/DL (ref 100–199)
EST. AVERAGE GLUCOSE BLD GHB EST-MCNC: 105 MG/DL
HBA1C MFR BLD: 5.3 % (ref 0–5.6)
HDLC SERPL-MCNC: 43 MG/DL
LDLC SERPL CALC-MCNC: 78 MG/DL
LV EJECT FRACT  99904: 70
LV EJECT FRACT MOD 2C 99903: 74.86
LV EJECT FRACT MOD 4C 99902: 75.74
LV EJECT FRACT MOD BP 99901: 75.77
TRIGL SERPL-MCNC: 58 MG/DL (ref 0–149)

## 2017-11-30 PROCEDURE — 99233 SBSQ HOSP IP/OBS HIGH 50: CPT | Performed by: HOSPITALIST

## 2017-11-30 PROCEDURE — 83036 HEMOGLOBIN GLYCOSYLATED A1C: CPT

## 2017-11-30 PROCEDURE — G8988 SELF CARE GOAL STATUS: HCPCS | Mod: CI

## 2017-11-30 PROCEDURE — 700102 HCHG RX REV CODE 250 W/ 637 OVERRIDE(OP): Performed by: HOSPITALIST

## 2017-11-30 PROCEDURE — 80061 LIPID PANEL: CPT

## 2017-11-30 PROCEDURE — A9579 GAD-BASE MR CONTRAST NOS,1ML: HCPCS | Performed by: PSYCHIATRY & NEUROLOGY

## 2017-11-30 PROCEDURE — 700105 HCHG RX REV CODE 258: Performed by: PSYCHIATRY & NEUROLOGY

## 2017-11-30 PROCEDURE — 93306 TTE W/DOPPLER COMPLETE: CPT

## 2017-11-30 PROCEDURE — 700117 HCHG RX CONTRAST REV CODE 255: Performed by: PSYCHIATRY & NEUROLOGY

## 2017-11-30 PROCEDURE — G8987 SELF CARE CURRENT STATUS: HCPCS | Mod: CK

## 2017-11-30 PROCEDURE — 302135 SEQUENTIAL COMPRESSION MACHINE: Performed by: HOSPITALIST

## 2017-11-30 PROCEDURE — 97166 OT EVAL MOD COMPLEX 45 MIN: CPT

## 2017-11-30 PROCEDURE — 770020 HCHG ROOM/CARE - TELE (206)

## 2017-11-30 PROCEDURE — 700105 HCHG RX REV CODE 258: Performed by: HOSPITALIST

## 2017-11-30 PROCEDURE — 70553 MRI BRAIN STEM W/O & W/DYE: CPT

## 2017-11-30 PROCEDURE — A9270 NON-COVERED ITEM OR SERVICE: HCPCS | Performed by: HOSPITALIST

## 2017-11-30 PROCEDURE — 700111 HCHG RX REV CODE 636 W/ 250 OVERRIDE (IP): Performed by: PSYCHIATRY & NEUROLOGY

## 2017-11-30 PROCEDURE — 93306 TTE W/DOPPLER COMPLETE: CPT | Mod: 26 | Performed by: INTERNAL MEDICINE

## 2017-11-30 PROCEDURE — 36415 COLL VENOUS BLD VENIPUNCTURE: CPT

## 2017-11-30 RX ADMIN — GADODIAMIDE 15 ML: 287 INJECTION INTRAVENOUS at 20:32

## 2017-11-30 RX ADMIN — STANDARDIZED SENNA CONCENTRATE AND DOCUSATE SODIUM 2 TABLET: 8.6; 5 TABLET, FILM COATED ORAL at 08:19

## 2017-11-30 RX ADMIN — BENZOCAINE AND MENTHOL 1 LOZENGE: 15; 3.6 LOZENGE ORAL at 12:01

## 2017-11-30 RX ADMIN — BENZOCAINE AND MENTHOL 1 LOZENGE: 15; 3.6 LOZENGE ORAL at 20:05

## 2017-11-30 RX ADMIN — ASPIRIN 325 MG: 325 TABLET, COATED ORAL at 08:19

## 2017-11-30 RX ADMIN — SODIUM CHLORIDE: 9 INJECTION, SOLUTION INTRAVENOUS at 01:58

## 2017-11-30 RX ADMIN — OMEGA-3 FATTY ACIDS CAP 1000 MG 1000 MG: 1000 CAP at 08:19

## 2017-11-30 RX ADMIN — THERA TABS 1 TABLET: TAB at 08:19

## 2017-11-30 RX ADMIN — STANDARDIZED SENNA CONCENTRATE AND DOCUSATE SODIUM 2 TABLET: 8.6; 5 TABLET, FILM COATED ORAL at 20:05

## 2017-11-30 RX ADMIN — STANDARDIZED SENNA CONCENTRATE AND DOCUSATE SODIUM 2 TABLET: 8.6; 5 TABLET, FILM COATED ORAL at 01:53

## 2017-11-30 RX ADMIN — DEXTROSE MONOHYDRATE 500 MG: 50 INJECTION, SOLUTION INTRAVENOUS at 10:21

## 2017-11-30 RX ADMIN — ATORVASTATIN CALCIUM 40 MG: 40 TABLET, FILM COATED ORAL at 01:53

## 2017-11-30 RX ADMIN — ATORVASTATIN CALCIUM 40 MG: 40 TABLET, FILM COATED ORAL at 20:05

## 2017-11-30 ASSESSMENT — COGNITIVE AND FUNCTIONAL STATUS - GENERAL
SUGGESTED CMS G CODE MODIFIER DAILY ACTIVITY: CK
HELP NEEDED FOR BATHING: A LITTLE
DRESSING REGULAR LOWER BODY CLOTHING: A LITTLE
SUGGESTED CMS G CODE MODIFIER MOBILITY: CK
EATING MEALS: A LITTLE
CLIMB 3 TO 5 STEPS WITH RAILING: A LITTLE
DAILY ACTIVITIY SCORE: 18
SUGGESTED CMS G CODE MODIFIER DAILY ACTIVITY: CK
PERSONAL GROOMING: A LITTLE
STANDING UP FROM CHAIR USING ARMS: A LITTLE
DRESSING REGULAR UPPER BODY CLOTHING: A LITTLE
DAILY ACTIVITIY SCORE: 15
TURNING FROM BACK TO SIDE WHILE IN FLAT BAD: A LITTLE
MOBILITY SCORE: 19
MOVING FROM LYING ON BACK TO SITTING ON SIDE OF FLAT BED: A LITTLE
PERSONAL GROOMING: A LITTLE
HELP NEEDED FOR BATHING: A LOT
TOILETING: A LITTLE
EATING MEALS: A LITTLE
TOILETING: A LOT
WALKING IN HOSPITAL ROOM: A LITTLE
DRESSING REGULAR LOWER BODY CLOTHING: A LOT
DRESSING REGULAR UPPER BODY CLOTHING: A LITTLE

## 2017-11-30 ASSESSMENT — ENCOUNTER SYMPTOMS
FLANK PAIN: 0
NERVOUS/ANXIOUS: 0
GASTROINTESTINAL NEGATIVE: 1
BRUISES/BLEEDS EASILY: 0
SHORTNESS OF BREATH: 0
NAUSEA: 0
CONSTITUTIONAL NEGATIVE: 1
ABDOMINAL PAIN: 0
MUSCULOSKELETAL NEGATIVE: 1
HALLUCINATIONS: 0
DIZZINESS: 0
EYES NEGATIVE: 1
BLURRED VISION: 0
PALPITATIONS: 0
WEIGHT LOSS: 0
DOUBLE VISION: 0
FOCAL WEAKNESS: 1
TINGLING: 0
LOSS OF CONSCIOUSNESS: 0
SENSORY CHANGE: 0
NECK PAIN: 0
BACK PAIN: 0
DEPRESSION: 0
HEADACHES: 0
PSYCHIATRIC NEGATIVE: 1
CHILLS: 0
CARDIOVASCULAR NEGATIVE: 1
SEIZURES: 0
FEVER: 0
MYALGIAS: 0
RESPIRATORY NEGATIVE: 1
COUGH: 0
TREMORS: 0
WEAKNESS: 0
VOMITING: 0
PHOTOPHOBIA: 0
MEMORY LOSS: 0
SPEECH CHANGE: 0

## 2017-11-30 ASSESSMENT — LIFESTYLE VARIABLES
TOTAL SCORE: 0
AVERAGE NUMBER OF DAYS PER WEEK YOU HAVE A DRINK CONTAINING ALCOHOL: 0
HOW MANY TIMES IN THE PAST YEAR HAVE YOU HAD 5 OR MORE DRINKS IN A DAY: 0
EVER FELT BAD OR GUILTY ABOUT YOUR DRINKING: NO
TOTAL SCORE: 0
HAVE PEOPLE ANNOYED YOU BY CRITICIZING YOUR DRINKING: NO
ALCOHOL_USE: YES
ON A TYPICAL DAY WHEN YOU DRINK ALCOHOL HOW MANY DRINKS DO YOU HAVE: 1
CONSUMPTION TOTAL: NEGATIVE
HAVE YOU EVER FELT YOU SHOULD CUT DOWN ON YOUR DRINKING: NO
EVER HAD A DRINK FIRST THING IN THE MORNING TO STEADY YOUR NERVES TO GET RID OF A HANGOVER: NO
TOTAL SCORE: 0
EVER_SMOKED: YES
SUBSTANCE_ABUSE: 0

## 2017-11-30 ASSESSMENT — PATIENT HEALTH QUESTIONNAIRE - PHQ9
2. FEELING DOWN, DEPRESSED, IRRITABLE, OR HOPELESS: NOT AT ALL
SUM OF ALL RESPONSES TO PHQ9 QUESTIONS 1 AND 2: 0
SUM OF ALL RESPONSES TO PHQ QUESTIONS 1-9: 0
1. LITTLE INTEREST OR PLEASURE IN DOING THINGS: NOT AT ALL

## 2017-11-30 ASSESSMENT — PAIN SCALES - GENERAL
PAINLEVEL_OUTOF10: 0

## 2017-11-30 ASSESSMENT — ACTIVITIES OF DAILY LIVING (ADL): TOILETING: INDEPENDENT

## 2017-11-30 NOTE — CARE PLAN
Problem: Safety  Goal: Will remain free from falls  Outcome: PROGRESSING AS EXPECTED  Staff continues to answer call light promptly and patient continues to call for assistance utilizing her call light. Bed alarm on. Will continue to monitor.    Problem: Pain Management  Goal: Pain level will decrease to patient's comfort goal  Outcome: PROGRESSING AS EXPECTED  Patient denies pain. This morning pain level 0/10. No facial grimacing noted. Repositions self when in bed. Patient calls appropriately. Will continue to monitor for s/sx of pain.

## 2017-11-30 NOTE — DISCHARGE PLANNING
[facility] referral received from Dr. Charlse.  [facility] is shown for her medical provider.  MRI is pending.  Would welcome ST/PT evals to further assess need for post acute services.  Dr. López to consult. I do appreciate the referral.

## 2017-11-30 NOTE — PROGRESS NOTES
[place] Fall Risk Assessment:     Last Known Fall: Within the last month  Mobility: Immobilized/requires assist of one person  Medications: No meds  Mental Status/LOC/Awareness: Awake, alert, and oriented to date, place, and person  Toileting Needs: No needs  Volume/Electrolyte Status: Use of IV fluids/tube feeds  Communication/Sensory: Visual (Glasses)/hearing deficit  Behavior: Appropriate behavior  [place] Fall Risk Total: 11  Fall Risk Level: MODERATE RISK    Universal Fall Precautions:  call light/belongings in reach, bed in low position and locked, siderails up x 2, wheelchairs and assistive devices out of sight, clutter free and spill free environment, use non-slip footwear, adequate lighting, educate on level of risk, educate to call for assistance    Fall Risk Level Interventions:    TRIAL (TELE 8, NEURO, MED RONALDO 5) Moderate Fall Risk Interventions  Place yellow fall risk ID band on patient: completed  Provide patient/family education based on risk assessment : completed  Educate patient/family to call staff for assistance when getting out of bed: completed  Place fall precaution signage outside patient door: completed  Utilize bed/chair fall alarm: completed     Patient Specific Interventions:     Medication: review medications with patient and family  Mental Status/LOC/Awareness: reinforce falls education, check on patient hourly, utilize bed/chair fall alarm, reinforce the use of call light and provide activity  Toileting: provide frquent toileting, monitor intake and output/use of appropriate interventions, instruct patient/family on the use of grab bars, instruct patient/family on the need to call for assistance when toileting and do not leave patient unattended in bathroom/refer to toileting scripting  Volume/Electrolyte Status: ensure patient remains hydrated and ensure IV fluids are appropriate  Communication/Sensory: update plan of care on whiteboard and ensure proper positioning when  transferrng/ambulating  Behavioral: encourage patient to voice feelings, engage patient in daily activities, administer medication as ordered and instruct/reinforce fall program rationale  Mobility: schedule physical activity throughout the day, utilize bed/chair fall alarm, ensure bed is locked and in lowest position, provide appropriate assistive device and instruct patient to exit bed on their strongest side

## 2017-11-30 NOTE — ED NOTES
".  Chief Complaint   Patient presents with   • Possible Stroke     Transfer from [facility] for parietal stroke with 12 mm shift and edema, confirmed on CT.  Patient has left sided weakness with facial droop.  Hx of brain cancer and bells palsy (with facial droop).       NIH 7 upon arrival.     ./49   Pulse 86   Resp 20   Ht 1.575 m (5' 2\")   Wt 65 kg (143 lb 4.8 oz)   BMI 26.21 kg/m²         "

## 2017-11-30 NOTE — ED PROVIDER NOTES
ED Provider Note    ER PROVIDER NOTE    Scribed for David Agee M.D.  by David Agee. 11/29/2017 at 8:51 PM.    Primary Care Provider: Sandee Thompson D.O.  Means of Arrival: ems  History obtained from: pt   History limited by: none     CHIEF COMPLAINT  Chief Complaint   Patient presents with   • Possible Stroke       HPI  Ml Chavira is a 77 y.o. female who presents to the emergency department complaining of left-sided weakness. Patient awoke Tuesday morning, 36 hours ago and was feeling weak on her left arm and leg, then while standing at the freezer she had a syncopal event. She describes feeling lightheaded, and then collapsed to the floor, brief LOC, when she awoke she states she was not confused, no tongue biting or incontinence. She does endorse some mild headache but unsure how long this has been present she denies any chest pain or difficulty breathing. She denies any other focal weakness numbness or tingling, other than some right-sided facial droop which she says is chronic from Bell's palsy as well as some visual field deficits which she also states are chronic.  She went to [facility] and was transferred here as a stroke patient    Is history of metastatic breast cancer with brain resection 2 years ago, not undergoing any current treatment, but does see Dr. Thacker.    REVIEW OF SYSTEMS  Pertinent positives include left-sided weakness. Pertinent negatives include no chest pain. See HPI for details. All other systems reviewed and are negative.    PAST MEDICAL HISTORY   breast cancer, neurosurgery    SURGICAL HISTORY   has a past surgical history that includes craniotomy stealth (Right, 11/23/2015).    FAMILY HISTORY  History reviewed. No pertinent family history.    SOCIAL HISTORY  Social History     Social History   • Marital status:      Spouse name: N/A   • Number of children: N/A   • Years of education: N/A     Social History Main Topics   • Smoking status: Former  "Smoker   • Smokeless tobacco: Never Used   • Alcohol use Yes   • Drug use: No   • Sexual activity: Not on file     Other Topics Concern   • Not on file     Social History Narrative   • No narrative on file      History   Drug Use No       CURRENT MEDICATIONS  Home Medications     Reviewed by Lamar Izaguirre (Pharmacy Tech) on 11/29/17 at 2223  Med List Status: Complete   Medication Last Dose Status   Glucosamine-Chondroitin (GLUCOSAMINE CHONDR COMPLEX PO) 11/28/2017 Active   multivitamin (THERAGRAN) Tab 11/28/2017 Active   Omega-3 Fatty Acids (FISH OIL) 1200 MG Cap 11/28/2017 Active   TURMERIC PO 11/28/2017 Active   vitamin B-12 CR (CYANOCOBALAMIN) 1000 MCG Tab CR tablet 11/28/2017 Active                ALLERGIES  Allergies   Allergen Reactions   • Penicillins Itching and Rash       PHYSICAL EXAM  VITAL SIGNS: /49   Pulse 82   Temp 37.8 °C (100.1 °F)   Resp (!) 24   Ht 1.575 m (5' 2\")   Wt 65 kg (143 lb 4.8 oz)   SpO2 99%   BMI 26.21 kg/m²   Pulse ox interpretation: I interpret this pulse ox as normal.    Constitutional: Alert in no apparent distress.  HENT: No signs of trauma, Bilateral external ears normal, Nose normal.   Eyes: Pupils are equal and reactive, Conjunctiva normal, Non-icteric.   Neck: Normal range of motion, No tenderness, Supple, No stridor.   Lymphatic: No lymphadenopathy noted.   Cardiovascular: Regular rate and rhythm, no murmurs.   Thorax & Lungs: Normal breath sounds, No respiratory distress, No wheezing, No chest tenderness.   Abdomen: Bowel sounds normal, Soft, No tenderness, No masses, No pulsatile masses. No peritoneal signs.  Skin: Warm, Dry, No erythema, No rash.   Back: No bony tenderness, No CVA tenderness.   Extremities: Intact distal pulses, No edema, No tenderness, No cyanosis,Negative Katarina's sign.  Musculoskeletal: Good range of motion in all major joints. No tenderness to palpation or major deformities noted.   Neurologic: Alert and oriented ×3, extraocular " movement is intact, she has apparent left-sided visual field cut, right sided facial droop, decreased sensation to right face, right upper extremity strength and sensation are intact, biceps, triceps, , shoulder flexion, extension, abduction and abduction, mild drift with left upper extremity and decreased strength throughout, intact sensation to light touch, dysmetria on left, no dysmetria on right, right lower extremity full strength, no drift, sensation intact to light touch, left lower extremity with mild drift, decreased strength with hip flexion, extension, knee flexion, extension, dorsiflexion, plantarflexion, clonus noted on left  Psychiatric: Affect normal, Judgment normal, Mood normal.     DIAGNOSTIC STUDIES / PROCEDURES    EKG  Interpreted by me    Rhythm:  Normal sinus rhythm   Rate: 82  Axis: normal  Intervals: normal  Ectopy: none  Conduction: normal  ST Segments: no acute change  T Waves: no acute change  Q Waves: none      LABS  Labs Reviewed   CBC WITH DIFFERENTIAL - Abnormal; Notable for the following:        Result Value    RBC 3.97 (*)     .8 (*)     MCHC 32.4 (*)     Neutrophils-Polys 74.30 (*)     Lymphocytes 11.70 (*)     Lymphs (Absolute) 0.88 (*)     Monos (Absolute) 0.89 (*)     All other components within normal limits    Narrative:     Indicate which anticoagulants the patient is on:->NONE   COMP METABOLIC PANEL - Abnormal; Notable for the following:     Glucose 105 (*)     All other components within normal limits    Narrative:     Indicate which anticoagulants the patient is on:->NONE   URINALYSIS,CULTURE IF INDICATED - Abnormal; Notable for the following:     Ketones 15 (*)     Occult Blood Trace (*)     All other components within normal limits   URINE MICROSCOPIC (W/UA) - Abnormal; Notable for the following:     RBC 10-20 (*)     All other components within normal limits   PROTHROMBIN TIME   APTT   REFRACTOMETER SG   ESTIMATED GFR    Narrative:     Indicate which  anticoagulants the patient is on:->NONE   LIPID PROFILE   HEMOGLOBIN A1C       All labs reviewed by me.    RADIOLOGY  CT-FOREIGN FILM CAT SCAN   Final Result      Echocardiogram Comp W/O cont    (Results Pending)     The radiologist's interpretation of all radiological studies have been reviewed by me.    COURSE & MEDICAL DECISION MAKING  Nursing notes, VS, PMSFHx reviewed in chart.    8:51 PM Patient seen and examined at bedside. The total critical care time spent on this patient was 40 minutes, resuscitating patient, speaking with admitting physician, and interpreting test results. This 40 minutes is exclusive of separately billable procedures.      9:13 PM  Discussed case with Dr. Fernandez. He has reviewed case, she is SNG patient, we will contact them    Discussed case with on-call radiology, given her onset of symptoms and unclear etiology not a candidate for interventional radiology    9:36 PM  Discussed case with Dr Corrales, he does not indication for antiepileptics at this time, recommend MRI, will evaluate images and need for further's neurosurgical intervention tomorrow    Discussed case with on-call neurology, the recommend typical post stroke care    10:04 PM  Discussed case with Dr. Fay will admit the patient    The total critical care time spent on this patient was 50 minutes, resuscitating patient, speaking with admitting physician, and interpreting test results. This 50 minutes is exclusive of separately billable procedures.      Decision Making:  This is a 77 y.o. female presenting with left-sided weakness. Unclear exact etiology whether this is subacute CVA from 36 hours ago or potentially related to her remote history of metastatic disease in the brain. Neurosurgical and neurology consultation as above without further intervention tonight.  She has been made nothing by mouth, per post CVA protocol, as well as when necessary orders for blood pressure although it is well controlled currently, and  admitted to the ICU for further care  Patient is not a candidate for alteplase given her onset of symptoms 36 hours ago, and unclear if this is actually stroke versus metastatic disease      Patient admitted in guarded condition    FINAL IMPRESSION  1. Left-sided weakness         The note accurately reflects work and decisions made by me.  David Agee  11/29/2017  11:46 PM

## 2017-11-30 NOTE — PROGRESS NOTES
Progress Note               Author: Carter Holley Date & Time created: 2017  9:46 PM     Interval History:  New onset Lt sided weakness.  CTA c/w Rt MCA stroke.  No abnormal enhancement to suggest neoplastic disease.  Pt had stable MRI brain s/c Gd 17.    Review of Systems:  ROS    Physical Exam:  Physical Exam  Per ER awake, conversant and FC.    Labs:        Invalid input(s): [id number]      No results for input(s): SODIUM, POTASSIUM, CHLORIDE, CO2, BUN, CREATININE, MAGNESIUM, PHOSPHORUS, CALCIUM in the last 72 hours.  No results for input(s): ALTSGPT, ASTSGOT, ALKPHOSPHAT, TBILIRUBIN, DBILIRUBIN, GAMMAGT, AMYLASE, LIPASE, ALB, PREALBUMIN, GLUCOSE in the last 72 hours.  No results for input(s): RBC, HEMOGLOBIN, HEMATOCRIT, PLATELETCT, PROTHROMBTM, APTT, INR, IRON, FERRITIN, TOTIRONBC in the last 72 hours.      Hemodynamics:  Temp (24hrs), Av.8 °C (100.1 °F), Min:37.8 °C (100.1 °F), Max:37.8 °C (100.1 °F)  Temperature: 37.8 °C (100.1 °F)  Pulse  Av  Min: 86  Max: 86   Blood Pressure : 125/49     Respiratory:    Respiration: 20           Fluids:  No intake or output data in the 24 hours ending 17  Weight: 65 kg (143 lb 4.8 oz)  GI/Nutrition:  No orders of the defined types were placed in this encounter.    Medical Decision Making, by Problem:  There are no active hospital problems to display for this patient.      Plan:  Rt MCA infarction.  Mass effect Rt hemisphere.  Rec:  Neurology consultation.  If pt a candidate for life saving craniotomy  in case of neurologic deterioration due to progressive edema/mass effect, then ICU observation.    Quality-Core Measures

## 2017-11-30 NOTE — PROGRESS NOTES
[facility] Hospitalist Progress Note    Date of Service: 2017    Chief Complaint  77 y.o. female admitted 2017 with acute L sided weakness    Interval Problem Update  States her left sided weakness has improved, no headache, tolerating diet. Mild non productive cough which has been present for several days, denies sob.  ROS otherwise negative.    Consultants/Specialty  Brown, neurology  Leydi, neurosurgery    Disposition  Continue neuro monitoring      Review of Systems   Neurological: Positive for focal weakness (mild L sided).      Physical Exam  Laboratory/Imaging   Hemodynamics  Temp (24hrs), Av.1 °C (98.7 °F), Min:36.6 °C (97.8 °F), Max:37.8 °C (100.1 °F)   Temperature: 37 °C (98.6 °F)  Pulse  Av.1  Min: 74  Max: 89 Heart Rate (Monitored): 85  Blood Pressure : 114/55, NIBP: 111/44      Respiratory      Respiration: 16, Pulse Oximetry: 98 %        RUL Breath Sounds: (P) Clear, RML Breath Sounds: (P) Clear, RLL Breath Sounds: (P) Clear, WILLIAMS Breath Sounds: (P) Clear, LLL Breath Sounds: (P) Clear    Fluids  No intake or output data in the 24 hours ending 17 1120    Nutrition  Orders Placed This Encounter   Procedures   • DIET ORDER     Standing Status:   Standing     Number of Occurrences:   1     Order Specific Question:   Diet:     Answer:   Regular [1]     Physical Exam   Constitutional: She is oriented to person, place, and time. She appears well-developed and well-nourished. No distress.   HENT:   Head: Normocephalic and atraumatic.   Mouth/Throat: Oropharynx is clear and moist. No oropharyngeal exudate.   Eyes: Conjunctivae and EOM are normal. Pupils are equal, round, and reactive to light. Right eye exhibits no discharge. Left eye exhibits no discharge. No scleral icterus.   Neck: Normal range of motion. Neck supple. No tracheal deviation present. No thyromegaly present.   Cardiovascular: Normal rate, normal heart sounds and intact distal pulses.  Exam reveals no gallop and no friction  rub.    No murmur heard.  Pulmonary/Chest: Effort normal and breath sounds normal. No stridor. No respiratory distress. She has no wheezes. She has no rales. She exhibits no tenderness.   Abdominal: Soft. Bowel sounds are normal. She exhibits no distension and no mass. There is no tenderness. There is no rebound and no guarding.   Musculoskeletal: Normal range of motion. She exhibits no edema or tenderness.   Lymphadenopathy:     She has no cervical adenopathy.   Neurological: She is alert and oriented to person, place, and time. She has normal reflexes. No cranial nerve deficit. She exhibits normal muscle tone. Coordination normal.   L sided pronator drift with mild left ue weakness   Skin: Skin is warm and dry. No rash noted. She is not diaphoretic. No erythema. No pallor.   Psychiatric: She has a normal mood and affect. Her behavior is normal. Judgment and thought content normal.   Nursing note and vitals reviewed.      Recent Labs      11/29/17 2129   WBC  7.5   RBC  3.97*   HEMOGLOBIN  13.1   HEMATOCRIT  40.4   MCV  101.8*   MCH  33.0   MCHC  32.4*   RDW  46.8   PLATELETCT  222   MPV  9.0     Recent Labs      11/29/17 2129   SODIUM  136   POTASSIUM  3.7   CHLORIDE  107   CO2  23   GLUCOSE  105*   BUN  12   CREATININE  0.52   CALCIUM  9.4     Recent Labs      11/29/17   2202   APTT  26.5   INR  1.09         Recent Labs      11/30/17   0338   TRIGLYCERIDE  58   HDL  43   LDL  78          Assessment/Plan     CVA (cerebral vascular accident) (CMS-HCC)- (present on admission)   Assessment & Plan    Right MCA distribution, per Dr. Holley he does not agree that she has significant midline shift or edema.  Neurology, Dr Nolan consulted  Continue empiric atorvastatin 40 mg daily.  No significant findings on telemetry overnight   Echocardiogram pending, CTA done no carotid duplex needed.  PT/OT/speech evaluations pending  MRI pending  Chronic L facial droop due to Bell's palsy            Metastatic cancer (CMS-HCC)-  (present on admission)   Assessment & Plan    History of breast cancer with mets to brain, prior resection.  Per neurosurgeon he does not think this is a recurrence of tumor but a new stroke.          Quality-Core Measures

## 2017-11-30 NOTE — PROGRESS NOTES
Patient alert and oriented at baseline. Able to make complex needs known. Patient currently up in her chair, talking to boyfriend Silvano at bedside. No c/o pain. Patient reports left sided numbness and RUE tremors. Patient also reports her left eye has no peripheral vision. MD aware. Upon coming on shift patient was on 02 @ 1.5l via nasal cannula. Patient now tolerate of being on room air, 02 94%. No SOB or resp distress. FWW used for mobility. Continent of B/B. Skin kept clean warm and dry. Dr. Charles in house with verbal orders for Cepacol q2hrs for cough. Patient currently eating lunch. No delayed swallowing noted. All needs met, call light within reach, will continue to monitor.

## 2017-11-30 NOTE — PROGRESS NOTES
Subjective:  Pt ambulating with PT.  Denies HA.  States L sided weakness improved.  Ambulating- states previously could not ambulate.  Obj:  Ambulating, squeezes hand with good strength on the left.  PT notes that patient is slightly dragging left side. AAOx4. Tongue ML. Standing in room with FWW at time of exam.         Temp (24hrs), Av.1 °C (98.7 °F), Min:36.6 °C (97.8 °F), Max:37.8 °C (100.1 °F)    Pulse: 79, Heart Rate (Monitored): 85, Respiration: 16, NIBP: 111/44, Blood Pressure : 114/55, Weight: 60 kg (132 lb 4.4 oz), Pulse Oximetry: 98 %, O2 (LPM): 1.5          No intake or output data in the 24 hours ending 17 1211         • Levetiracetam (KEPPRA) IV  500 mg     • benzocaine-menthol  1 Lozenge     • fish oil  1,000 mg     • multivitamin  1 Tab     • Pharmacy       • senna-docusate  2 Tab      And   • polyethylene glycol/lytes  1 Packet      And   • magnesium hydroxide  30 mL      And   • bisacodyl  10 mg     • NS   50 mL/hr at 17 0158   • acetaminophen  650 mg     • labetalol  10 mg      Or   • hydrALAZINE  10 mg     • atorvastatin  40 mg     • aspirin  325 mg      Or   • aspirin  324 mg      Or   • aspirin  300 mg     • ondansetron  4 mg     • ondansetron  4 mg         Recent Labs      17   WBC  7.5   RBC  3.97*   HEMOGLOBIN  13.1   HEMATOCRIT  40.4   MCV  101.8*   MCH  33.0   PLATELETCT  222     Recent Labs      17   SODIUM  136   POTASSIUM  3.7   CHLORIDE  107   CO2  23   GLUCOSE  105*   BUN  12   CREATININE  0.52   CALCIUM  9.4     Recent Labs      17   2202   APTT  26.5   INR  1.09       Assessment:  R MCA stroke    Plan:  DW Dr Holley  Recommends Neuro consult  Patient is at risk for rapid deterioration consider ICU observation with Q2 neuro checks  Please see Dr Clifford consult

## 2017-11-30 NOTE — PROGRESS NOTES
Pt A&Ox4, pt denies pain,  N/V, baseline numbness on left side. Pt states having left side hemianopia and 'blind spots' in center of vision. Per pt, has chronic left lower facial droop. Pt ambulates with 1 person assist with FWW, needs verbal cues, has left side neglect. Pt passed bedside dysphagia. IVF started. SCDs in place. Seizure precautions in place. Bed alarm is on, call light and personal belongings within reach.

## 2017-11-30 NOTE — CONSULTS
Physical Medicine and Rehabilitation Consultation    Date of Consultation: 11/30/2017  Consulting provider: Margarito López M.D./Ph.D.  Reason for consultation: assess for acute inpatient rehab appropriateness  Chief complaint: new left sided weakness    HPI: The patient is a 77 y.o. female with a past medical history of metastatic breast cancer and chronic left facial droop from bell's palsy, admitted on 11/29/2017  8:50 PM with new left sided weakness.  Reportedly she woke up Tuesday, the day prior to admission, and had left arm weakness and leg.  She also had a syncopal event in the freezer. She reportedly had brief LOC without post-ictal symptoms.  Patient reportedly went to [facility] where the CT showed right MCA stroke with prior craniotomy signs.  There was questionable midline shift so patient transferred to [facility].   MRI is currently pending. NSG suggested conservative management as disagreement on amount of midline shift on CT. Echo with EF of 70%.     Patient was evaluated by OT on 11/30/17 and found to be modA for ADLs.  Patient previously in 1 story home with 1 step to enter.  She is still to be evaluated by PT.     The patient currently reports she has no problems swallowing. She reports that she was previously active with [employer].  Patient reports she lives by herself and cannot drive.  She reports she was previously independent otherwise. Reports she has baseline vision loss due to previous metastatic tumor and resection.  She reports recent cough. She reports she has bilateral tremor which has been slowly worsening over months period. Patient denies N/V/D. Denies SOB. Denies syncope prior to this last event.      ROS:   Comprehensive 12 point ROS was reviewed and all were negative except as noted elsewhere in this document.     PMH:  History reviewed. No pertinent past medical history.    PSH:  Past Surgical History:   Procedure Laterality Date   • CRANIOTOMY STEALTH Right 11/23/2015  "   Procedure: CRANIOTOMY STEALTH-right occipital;  Surgeon: Suyapa Schumacher M.D.;  Location: SURGERY [facility];  Service:        FHX:  History reviewed. No pertinent family history.    Medications:  Current Facility-Administered Medications   Medication Dose   • levETIRAcetam (KEPPRA) 500 mg in D5W 40 mL IV syringe  500 mg   • benzocaine-menthol (CEPACOL) lozenge 1 Lozenge  1 Lozenge   • fish oil capsule 1,000 mg  1,000 mg   • multivitamin (THERAGRAN) tablet 1 Tab  1 Tab   • Pharmacy Consult Request     • senna-docusate (PERICOLACE or SENOKOT S) 8.6-50 MG per tablet 2 Tab  2 Tab    And   • polyethylene glycol/lytes (MIRALAX) PACKET 1 Packet  1 Packet    And   • magnesium hydroxide (MILK OF MAGNESIA) suspension 30 mL  30 mL    And   • bisacodyl (DULCOLAX) suppository 10 mg  10 mg   • NS infusion     • acetaminophen (TYLENOL) tablet 650 mg  650 mg   • labetalol (NORMODYNE,TRANDATE) injection 10 mg  10 mg    Or   • hydrALAZINE (APRESOLINE) injection 10 mg  10 mg   • atorvastatin (LIPITOR) tablet 40 mg  40 mg   • aspirin (ASA) tablet 325 mg  325 mg    Or   • aspirin (ASA) chewable tab 324 mg  324 mg    Or   • aspirin (ASA) suppository 300 mg  300 mg   • ondansetron (ZOFRAN) syringe/vial injection 4 mg  4 mg   • ondansetron (ZOFRAN ODT) dispertab 4 mg  4 mg       Allergies:  Allergies   Allergen Reactions   • Penicillins Itching and Rash       Social Hx:  Pre-morbidly, this patient lived in a single level home with One steps to enter, alone and able to care for self.   Employment: Retired  Tobacco: Denies  Drugs: Denies    Prior level of function:   Independent, but cannot     Current level of function:  The patient was evaluated by acute care Occupational Therapy; currently requiring not tested for mobility and moderate assistance for ADLs, also with ongoing cognitive deficits.    Physical Exam:  Vitals: Blood pressure 114/55, pulse 79, temperature 37 °C (98.6 °F), resp. rate 16, height 1.575 m (5' 2\"), " weight 60 kg (132 lb 4.4 oz), SpO2 98 %, not currently breastfeeding.  Gen: NAD  HEENT: NC/AT, PERRLA, moist mucous membranes  Cardio: RRR, no mumurs  Pulm: CTAB, with normal respiratory effort  Abd: Soft NTND, active bowel sounds  Ext: No peripheral edema. No calf tenderness.    Mental status:  A&Ox4 (person, place, date, situation) answers questions appropriately follows commands; may be some neglect  Speech: fluent, no aphasia or dysarthria    CRANIAL NERVES:  2,3: left visual field PERRL  3,4,6: EOMI bilaterally, no nystagmus or diplopia  5: sensation intact to light touch bilaterally and symmetric  7: l facial droop  8: hearing grossly intact  9,10: NT  11: SCM/Trapezius strength 5/5 bilaterally  12: NT    Motor: Bilateral tremor  Patient with poor motor planning and some tone/inability to release fingers when checking finger flexors. When try to test hip flexors at seated position patient continues to try to stand instead of raise knees.  May also be component of left neglect vs field deficits  Right side 5/5 strength  Left side 4+/5 in UE  LE 4/5 in hip flexors and 4+/5 knee extensors, otherwise 5/5     Mild left pronator drift.    Sensory:   intact to light touch through out    DTRs: 2+ in bilateral biceps, triceps, brachioradialis, 2+ in bilateral patellar and achilles tendons    Tone:  no cogwheeling noted, tone after flexing muscles    Coordination:   intact finger to nose bilaterally but weaker and slower on left      Labs:  Recent Labs      11/29/17 2129 11/29/17 2202   RBC  3.97*   --    HEMOGLOBIN  13.1   --    HEMATOCRIT  40.4   --    PLATELETCT  222   --    PROTHROMBTM   --   13.8   APTT   --   26.5   INR   --   1.09     Recent Labs      11/29/17 2129   SODIUM  136   POTASSIUM  3.7   CHLORIDE  107   CO2  23   GLUCOSE  105*   BUN  12   CREATININE  0.52   CALCIUM  9.4     Recent Results (from the past 24 hour(s))   CBC WITH DIFFERENTIAL    Collection Time: 11/29/17  9:29 PM   Result Value Ref  Range    WBC 7.5 4.8 - 10.8 K/uL    RBC 3.97 (L) 4.20 - 5.40 M/uL    Hemoglobin 13.1 12.0 - 16.0 g/dL    Hematocrit 40.4 37.0 - 47.0 %    .8 (H) 81.4 - 97.8 fL    MCH 33.0 27.0 - 33.0 pg    MCHC 32.4 (L) 33.6 - 35.0 g/dL    RDW 46.8 35.9 - 50.0 fL    Platelet Count 222 164 - 446 K/uL    MPV 9.0 9.0 - 12.9 fL    Neutrophils-Polys 74.30 (H) 44.00 - 72.00 %    Lymphocytes 11.70 (L) 22.00 - 41.00 %    Monocytes 11.90 0.00 - 13.40 %    Eosinophils 1.60 0.00 - 6.90 %    Basophils 0.40 0.00 - 1.80 %    Immature Granulocytes 0.10 0.00 - 0.90 %    Nucleated RBC 0.00 /100 WBC    Neutrophils (Absolute) 5.56 2.00 - 7.15 K/uL    Lymphs (Absolute) 0.88 (L) 1.00 - 4.80 K/uL    Monos (Absolute) 0.89 (H) 0.00 - 0.85 K/uL    Eos (Absolute) 0.12 0.00 - 0.51 K/uL    Baso (Absolute) 0.03 0.00 - 0.12 K/uL    Immature Granulocytes (abs) 0.01 0.00 - 0.11 K/uL    NRBC (Absolute) 0.00 K/uL   COMP METABOLIC PANEL    Collection Time: 11/29/17  9:29 PM   Result Value Ref Range    Sodium 136 135 - 145 mmol/L    Potassium 3.7 3.6 - 5.5 mmol/L    Chloride 107 96 - 112 mmol/L    Co2 23 20 - 33 mmol/L    Anion Gap 6.0 0.0 - 11.9    Glucose 105 (H) 65 - 99 mg/dL    Bun 12 8 - 22 mg/dL    Creatinine 0.52 0.50 - 1.40 mg/dL    Calcium 9.4 8.5 - 10.5 mg/dL    AST(SGOT) 19 12 - 45 U/L    ALT(SGPT) 19 2 - 50 U/L    Alkaline Phosphatase 74 30 - 99 U/L    Total Bilirubin 0.5 0.1 - 1.5 mg/dL    Albumin 3.7 3.2 - 4.9 g/dL    Total Protein 6.4 6.0 - 8.2 g/dL    Globulin 2.7 1.9 - 3.5 g/dL    A-G Ratio 1.4 g/dL   URINALYSIS,CULTURE IF INDICATED    Collection Time: 11/29/17  9:29 PM   Result Value Ref Range    Micro Urine Req Microscopic     Color Yellow     Character Clear     Ph 7.0 5.0 - 8.0    Glucose Negative Negative mg/dL    Ketones 15 (A) Negative mg/dL    Protein Negative Negative mg/dL    Bilirubin Negative Negative    Urobilinogen, Urine 0.2 Negative    Nitrite Negative Negative    Leukocyte Esterase Negative Negative    Occult Blood Trace (A)  Negative    Culture Indicated No UA Culture   REFRACTOMETER SG    Collection Time: 17  9:29 PM   Result Value Ref Range    Specific Gravity >1.045    URINE MICROSCOPIC (W/UA)    Collection Time: 17  9:29 PM   Result Value Ref Range    WBC 0-2 /hpf    RBC 10-20 (A) /hpf    Bacteria Negative None /hpf    Epithelial Cells Negative /hpf    Hyaline Cast 0-2 /lpf   ESTIMATED GFR    Collection Time: 17  9:29 PM   Result Value Ref Range    GFR If African American >60 >60 mL/min/1.73 m 2    GFR If Non African American >60 >60 mL/min/1.73 m 2   EKG (NOW)    Collection Time: 17  9:41 PM   Result Value Ref Range    Report       [facility] Emergency Dept[facility]    Test Date:  2017  Pt Name:    SIMRAN BLAS            Department: ER  MRN:        [id number]                      Room:       [facility]  Gender:     F                            Technician: [id number]  :        [id number]                   Requested By:MARY GUZMAN  Order #:    [id number]                    Reading MD:    Measurements  Intervals                                Axis  Rate:       82                           P:          53  MT:         140                          QRS:        30  QRSD:       72                           T:          20  QT:         352  QTc:        411    Interpretive Statements  SINUS RHYTHM  Compared to ECG 2015 15:33:31  T-wave abnormality no longer present     PROTHROMBIN TIME    Collection Time: 17 10:02 PM   Result Value Ref Range    PT 13.8 12.0 - 14.6 sec    INR 1.09 0.87 - 1.13   APTT    Collection Time: 17 10:02 PM   Result Value Ref Range    APTT 26.5 24.7 - 36.0 sec   Lipid Profile    Collection Time: 17  3:38 AM   Result Value Ref Range    Cholesterol,Tot 133 100 - 199 mg/dL    Triglycerides 58 0 - 149 mg/dL    HDL 43 >=40 mg/dL    LDL 78 <100 mg/dL   Echocardiogram Comp W/O cont    Collection Time: 17  9:22 AM   Result Value Ref Range    Eject.Frac. MOD  BP 75.77     Eject.Frac. MOD 4C 75.74     Eject.Frac. MOD 2C 74.86     Left Ventrical Ejection Fraction 70        ASSESSMENT:    Patient is a 77 y.o. female admitted with left sided weakness with concern for R MCA stroke.  MRI Pending     Patient with multiple co-morbidities(including but not limited to metastatic cancer w brain resection); with cognitive/swallowing/speech deficits and functional deficits in mobility/self-care, and Moderate de-conditioning.     Pre-morbidly, this patient lived in a single level home with One steps to enter, alone and able to care for self. The patient was evaluated by acute care Occupational Therapy; currently requiring maximal assistance for mobility and moderate assistance for ADLs, also with ongoing cognitive deficits.     Patient awaiting MRI for further work-up.  Patient needs to be assessed by PT.  As long as there are PT needs once medically cleared, the patient is a(n) Very Good candidate for an acute inpatient rehabilitation program with a coordinated program of care at an intensity and frequency not available at a lower level of care.  Patient lives alone and will need to perform 1 step to get into home.     Note: if patient continues to progress while waiting for medical clearance, and no longer requires 2 of of 3 therapy services (PT/OT/SLP) at a CGA/Minimal assistance level, patient will no longer need acute inpatient rehabilitation.    This recommendation is substantiated by the patient's current medical condition with intervention and assessment of medical issues requiring an acute level of care for patient's safety and maximum outcome. A coordinated program of care will be provided by an interdisciplinary team including physical therapy, occupational therapy, speech language pathology, hospitalist, physiatry, rehab nursing and rehab psychology. Rehab goals include improved cognition, mobility, self-care management, strength and conditioning/endurance, pain  management, bowel and bladder management, mood and affect, and safety with independent home management including caregiver training.     Estimated length of stay is approximately 14-21 days. Rehab potential: Very Good. Disposition: to pre-morbid independent living setting with supportive care of patient's community resources. We will continue to follow with you in anticipation of discharge to acute inpatient rehabilitation when medically stable to do so at the discretion of her  attending physician. Thank you for allowing us to participate in her care. Please call with any questions regarding this recommendation.

## 2017-11-30 NOTE — CARE PLAN
Problem: Safety  Goal: Will remain free from falls    Intervention: Assess risk factors for falls  Assessed pt mobility, ambulated with FWW, pt tolerated with verbal cues      Problem: Knowledge Deficit  Goal: Knowledge of disease process/condition, treatment plan, diagnostic tests, and medications will improve    Intervention: Assess knowledge level of disease process/condition, treatment plan, diagnostic tests, and medications  Provided stroke edu, stroke book, reviewed POC

## 2017-11-30 NOTE — PROGRESS NOTES
[place] Fall Risk Assessment:     Last Known Fall: Within the last month  Mobility: Use of assistive device/requires assist of two people  Medications: Cardiovascular and central nervous system meds  Mental Status/LOC/Awareness: Awake, alert, and oriented to date, place, and person  Toileting Needs: No needs  Volume/Electrolyte Status: Use of IV fluids/tube feeds  Communication/Sensory: Visual (Glasses)/hearing deficit  Behavior: Appropriate behavior  [place] Fall Risk Total: 14  Fall Risk Level: MODERATE RISK    Universal Fall Precautions:  call light/belongings in reach, bed in low position and locked, wheelchairs and assistive devices out of sight, use non-slip footwear, adequate lighting, clutter free and spill free environment, educate on level of risk, educate to call for assistance    Fall Risk Level Interventions:    TRIAL (TELE 8, NEURO, MED RONALDO 5) Moderate Fall Risk Interventions  Place yellow fall risk ID band on patient: verified  Provide patient/family education based on risk assessment : verified  Educate patient/family to call staff for assistance when getting out of bed: verified  Place fall precaution signage outside patient door: verified  Utilize bed/chair fall alarm: verified     Patient Specific Interventions:     Medication: review medications with patient and family  Mental Status/LOC/Awareness: check on patient hourly, utilize bed/chair fall alarm and reinforce the use of call light  Toileting: provide frquent toileting  Volume/Electrolyte Status: ensure patient remains hydrated  Communication/Sensory: update plan of care on whiteboard  Behavioral: not applicable  Mobility: utilize bed/chair fall alarm, ensure bed is locked and in lowest position, provide appropriate assistive device and instruct patient to exit bed on their strongest side

## 2017-11-30 NOTE — H&P
Hospital Medicine History and Physical    Date of Service  11/30/2017    Chief Complaint  Chief Complaint   Patient presents with   • Possible Stroke       History of Presenting Illness  77 y.o. female who presented 11/29/2017 withLeft sided weakness. The patient initially presented to [facility] complaining of 36 hours of left arm and leg weakness. She also passed out while standing in her kitchen. She denies any dizziness or lightheadedness, she wasn't noted to have any twitching or seizure-like activity during the episode and did not lose bowel or bladder control. She has a history of Bell's palsy and has a chronic facial droop that she says is unchanged. The patient has never had a history of stroke.    Primary Care Physician  Sandee Thompson D.O.    Consultants  Neurosurgery    Code Status  Full code    Review of Systems  Review of Systems   Constitutional: Negative.  Negative for chills, fever, malaise/fatigue and weight loss.   HENT: Negative.  Negative for hearing loss and tinnitus.    Eyes: Negative.  Negative for blurred vision, double vision and photophobia.   Respiratory: Negative.  Negative for cough and shortness of breath.    Cardiovascular: Negative.  Negative for chest pain, palpitations and leg swelling.   Gastrointestinal: Negative.  Negative for abdominal pain, nausea and vomiting.   Genitourinary: Negative.  Negative for dysuria and flank pain.   Musculoskeletal: Negative.  Negative for back pain, myalgias and neck pain.   Neurological: Positive for focal weakness. Negative for dizziness, tingling, tremors, sensory change, speech change, seizures, loss of consciousness, weakness and headaches.   Endo/Heme/Allergies: Negative.  Does not bruise/bleed easily.   Psychiatric/Behavioral: Negative.  Negative for depression, hallucinations, memory loss and substance abuse. The patient is not nervous/anxious.    All other systems reviewed and are negative.       Past Medical  History  No past medical history on file.    Surgical History  Past Surgical History:   Procedure Laterality Date   • CRANIOTOMY STEALTH Right 2015    Procedure: CRANIOTOMY STEALTH-right occipital;  Surgeon: Suyapa Schumacher M.D.;  Location: SURGERY [facility];  Service:        Medications  No current facility-administered medications on file prior to encounter.      Current Outpatient Prescriptions on File Prior to Encounter   Medication Sig Dispense Refill   • vitamin B-12 CR (CYANOCOBALAMIN) 1000 MCG Tab CR tablet Take 5,000 mg by mouth.     • TURMERIC PO Take 1 Tab by mouth every day.     • Omega-3 Fatty Acids (FISH OIL) 1200 MG Cap Take 1 Cap by mouth every day.     • multivitamin (THERAGRAN) Tab Take 1 Tab by mouth every day.     • Glucosamine-Chondroitin (GLUCOSAMINE CHONDR COMPLEX PO) Take 1 Tab by mouth every day.         Family History  History reviewed. No pertinent family history.    Social History  Social History   Substance Use Topics   • Smoking status: Former Smoker   • Smokeless tobacco: Never Used   • Alcohol use Yes       Allergies  Allergies   Allergen Reactions   • Penicillins Itching and Rash        Physical Exam  Laboratory   Hemodynamics  Temp (24hrs), Av.8 °C (100.1 °F), Min:37.8 °C (100.1 °F), Max:37.8 °C (100.1 °F)   Temperature: 37.8 °C (100.1 °F)  Pulse  Av.6  Min: 80  Max: 89 Heart Rate (Monitored): 85  Blood Pressure : 125/49, NIBP: 111/44      Respiratory      Respiration: (!) 21, Pulse Oximetry: 99 %             Physical Exam   Constitutional: She appears well-developed and well-nourished. No distress.   HENT:   Nose: Nose normal.   Mouth/Throat: Oropharynx is clear and moist. No oropharyngeal exudate.   Eyes: Conjunctivae are normal. Right eye exhibits no discharge. Left eye exhibits no discharge. No scleral icterus.   Neck: No JVD present. No tracheal deviation present.   Cardiovascular: Normal rate, regular rhythm and normal heart sounds.    Pulmonary/Chest:  Effort normal and breath sounds normal. No stridor. No respiratory distress. She has no wheezes. She has no rales. She exhibits no tenderness.   Abdominal: Soft. Bowel sounds are normal. She exhibits no distension. There is no tenderness.   Musculoskeletal: She exhibits no edema or tenderness.   Neurological: She is alert. A cranial nerve deficit (left facial droop that the patient states is at her baseline.) is present. She exhibits abnormal muscle tone (left pronator drift). Coordination abnormal.   Skin: Skin is warm and dry. She is not diaphoretic. No pallor.   Psychiatric: She has a normal mood and affect. Her behavior is normal.   Nursing note and vitals reviewed.      Recent Labs      11/29/17 2129   WBC  7.5   RBC  3.97*   HEMOGLOBIN  13.1   HEMATOCRIT  40.4   MCV  101.8*   MCH  33.0   MCHC  32.4*   RDW  46.8   PLATELETCT  222   MPV  9.0     Recent Labs      11/29/17 2129   SODIUM  136   POTASSIUM  3.7   CHLORIDE  107   CO2  23   GLUCOSE  105*   BUN  12   CREATININE  0.52   CALCIUM  9.4     Recent Labs      11/29/17 2129   ALTSGPT  19   ASTSGOT  19   ALKPHOSPHAT  74   TBILIRUBIN  0.5   GLUCOSE  105*     Recent Labs      11/29/17 2202   APTT  26.5   INR  1.09             Lab Results   Component Value Date    TROPONINI <0.01 11/18/2015     Urinalysis:    Lab Results  Component Value Date/Time   SPECGRAVITY >1.045 11/29/2017 2129   GLUCOSEUR Negative 11/29/2017 2129   KETONES 15 (A) 11/29/2017 2129   NITRITE Negative 11/29/2017 2129   WBCURINE 0-2 11/29/2017 2129   RBCURINE 10-20 (A) 11/29/2017 2129   BACTERIA Negative 11/29/2017 2129   EPITHELCELL Negative 11/29/2017 2129        Imaging  EKG shows normal sinus rhythm     CT head and CT angiogram report from [facility] state that the patient has a right MCA distribution posterior stroke and evidence of prior craniotomy. The findings show subacute infarction. The radiologist stated that there was 12 mm of midline shift and edema which the  neurosurgeon is in disagreement.    Assessment/Plan     I anticipate this patient will require at least two midnights for appropriate medical management, necessitating inpatient admission.    CVA (cerebral vascular accident) (CMS-[facility])- (present on admission)   Assessment & Plan    Right MCA distribution, per Dr. Holley he does not agree that she has significant midline shift or edema.  Admit to neurology, stroke protocol.  Neurology consult in the morning.  Decadron not recommended she did receive a dose at [facility].  Allow permissive hypertension, hydralazine as needed if blood pressure systolic over 190.  Lipid panel in the morning, and empiric atorvastatin 40 mg daily.  Telemetry monitor per stroke protocol x 48 hours.  Echocardiogram, CTA done no carotid duplex needed.  PT/OT/speech evaluations.  NPO until bedside swallow passed.            Metastatic cancer (CMS-HCC)- (present on admission)   Assessment & Plan    History of breast cancer with mets to brain, prior resection.  Per neurosurgeon he does not think this is a recurrence of tumor but a new stroke.            VTE prophylaxis:Lovenox .

## 2017-11-30 NOTE — ED NOTES
Recollect blue top sent to lab. Patient given mouth swab and lip chap. VSS.  No changes in neuro status.

## 2017-11-30 NOTE — THERAPY
"Occupational Therapy Evaluation completed.   Functional Status:  Pt mod A supine>sit EOB, mod A for LB dressing, mod A for mobility with FWW requiring physical assist at hips and verbal cues to move LLE. Min A for toilet txf and managing hygiene, mod A for managing clothing for toileting. Pt demonstrates functional strength in BUE but demonstrates impaired coordination with finger to finger/finger to nose, delayed initiation, and decreased sensation with LUE. Also has visible BUE tremor. Pt tends to neglect L side of body during tasks and mobility such as leaving L foot behind, leaving L hand off walker, and only donning/doffing pants on R side. Pt has baseline visual deficits such as decreased L peripheral vision and blind spots that could be hindering her as well. Pt is friendly but hyper verbose and tangential with conversation. Left up in chair with chair alarm on, call light and tray table in reach and RN in room.  Plan of Care: Will benefit from Occupational Therapy 5 times per week  Discharge Recommendations:  Equipment: Will Continue to Assess for Equipment Needs. Post-acute therapy Discharge to a transitional care facility for continued skilled therapy services. Pt motivated and can tolerate daily intensive therapy.    76 y/o female admitted for L weakness and neglect. Pt has PMH of Bell's Palsy with residual R facial droop, metastatic breast cancer and a brain resection, leaving her with baseline visual impairments. Pt demonstrates decreased balance, L sided inattention, and impaired initiation and UE coordination on L side. Pt's deficits currently limiting independent completion of functional mobility, transfers, and ADLs. Recommend acute OT and post-acute services prior to d/c home as pt lives alone.    See \"Rehab Therapy-Acute\" Patient Summary Report for complete documentation.    "

## 2017-11-30 NOTE — ASSESSMENT & PLAN NOTE
History of breast cancer with mets to brain  MRI consistent with progression of neoplastic disease or possible radiation necrosis  Continue decadron  Radiation oncology consulted and will follow the patient after discharge for continued treatments

## 2017-12-01 ENCOUNTER — HOSPITAL ENCOUNTER (INPATIENT)
Facility: REHABILITATION | Age: 77
End: 2017-12-01
Attending: PHYSICAL MEDICINE & REHABILITATION | Admitting: PHYSICAL MEDICINE & REHABILITATION
Payer: MEDICARE

## 2017-12-01 PROCEDURE — 97161 PT EVAL LOW COMPLEX 20 MIN: CPT

## 2017-12-01 PROCEDURE — 770020 HCHG ROOM/CARE - TELE (206)

## 2017-12-01 PROCEDURE — 700102 HCHG RX REV CODE 250 W/ 637 OVERRIDE(OP): Performed by: HOSPITALIST

## 2017-12-01 PROCEDURE — G8979 MOBILITY GOAL STATUS: HCPCS | Mod: CI

## 2017-12-01 PROCEDURE — A9270 NON-COVERED ITEM OR SERVICE: HCPCS | Performed by: HOSPITALIST

## 2017-12-01 PROCEDURE — 99233 SBSQ HOSP IP/OBS HIGH 50: CPT | Performed by: HOSPITALIST

## 2017-12-01 PROCEDURE — 700111 HCHG RX REV CODE 636 W/ 250 OVERRIDE (IP): Performed by: RADIOLOGY

## 2017-12-01 PROCEDURE — G8978 MOBILITY CURRENT STATUS: HCPCS | Mod: CJ

## 2017-12-01 RX ORDER — DEXAMETHASONE SODIUM PHOSPHATE 4 MG/ML
4 INJECTION, SOLUTION INTRA-ARTICULAR; INTRALESIONAL; INTRAMUSCULAR; INTRAVENOUS; SOFT TISSUE EVERY 6 HOURS
Status: DISCONTINUED | OUTPATIENT
Start: 2017-12-01 | End: 2017-12-01

## 2017-12-01 RX ORDER — OMEPRAZOLE 20 MG/1
20 CAPSULE, DELAYED RELEASE ORAL DAILY
Status: DISCONTINUED | OUTPATIENT
Start: 2017-12-01 | End: 2017-12-04

## 2017-12-01 RX ORDER — DEXAMETHASONE SODIUM PHOSPHATE 4 MG/ML
4 INJECTION, SOLUTION INTRA-ARTICULAR; INTRALESIONAL; INTRAMUSCULAR; INTRAVENOUS; SOFT TISSUE ONCE
Status: DISCONTINUED | OUTPATIENT
Start: 2017-12-01 | End: 2017-12-01

## 2017-12-01 RX ORDER — DEXAMETHASONE SODIUM PHOSPHATE 10 MG/ML
10 INJECTION, SOLUTION INTRAMUSCULAR; INTRAVENOUS ONCE
Status: DISCONTINUED | OUTPATIENT
Start: 2017-12-01 | End: 2017-12-01

## 2017-12-01 RX ORDER — DEXAMETHASONE SODIUM PHOSPHATE 4 MG/ML
4 INJECTION, SOLUTION INTRA-ARTICULAR; INTRALESIONAL; INTRAMUSCULAR; INTRAVENOUS; SOFT TISSUE DAILY
Status: DISCONTINUED | OUTPATIENT
Start: 2017-12-01 | End: 2017-12-04

## 2017-12-01 RX ADMIN — STANDARDIZED SENNA CONCENTRATE AND DOCUSATE SODIUM 2 TABLET: 8.6; 5 TABLET, FILM COATED ORAL at 07:48

## 2017-12-01 RX ADMIN — BENZOCAINE AND MENTHOL 1 LOZENGE: 15; 3.6 LOZENGE ORAL at 17:37

## 2017-12-01 RX ADMIN — ASPIRIN 325 MG: 325 TABLET, COATED ORAL at 07:48

## 2017-12-01 RX ADMIN — OMEPRAZOLE 20 MG: 20 CAPSULE, DELAYED RELEASE ORAL at 17:37

## 2017-12-01 RX ADMIN — BENZOCAINE AND MENTHOL 1 LOZENGE: 15; 3.6 LOZENGE ORAL at 07:48

## 2017-12-01 RX ADMIN — DEXAMETHASONE SODIUM PHOSPHATE 4 MG: 4 INJECTION, SOLUTION INTRAMUSCULAR; INTRAVENOUS at 17:37

## 2017-12-01 RX ADMIN — OMEGA-3 FATTY ACIDS CAP 1000 MG 1000 MG: 1000 CAP at 07:48

## 2017-12-01 RX ADMIN — THERA TABS 1 TABLET: TAB at 07:48

## 2017-12-01 RX ADMIN — STANDARDIZED SENNA CONCENTRATE AND DOCUSATE SODIUM 2 TABLET: 8.6; 5 TABLET, FILM COATED ORAL at 20:09

## 2017-12-01 RX ADMIN — ATORVASTATIN CALCIUM 40 MG: 40 TABLET, FILM COATED ORAL at 20:09

## 2017-12-01 ASSESSMENT — ENCOUNTER SYMPTOMS
MUSCULOSKELETAL NEGATIVE: 1
LOSS OF CONSCIOUSNESS: 0
BACK PAIN: 0
WEAKNESS: 0
SPEECH CHANGE: 0
ORTHOPNEA: 0
HEARTBURN: 0
DIAPHORESIS: 0
CONSTIPATION: 0
MYALGIAS: 0
DIARRHEA: 0
FLANK PAIN: 0
CONSTITUTIONAL NEGATIVE: 1
EYE REDNESS: 0
GASTROINTESTINAL NEGATIVE: 1
BLOOD IN STOOL: 0
DEPRESSION: 0
SHORTNESS OF BREATH: 0
WHEEZING: 0
INSOMNIA: 0
SEIZURES: 0
TREMORS: 0
NERVOUS/ANXIOUS: 0
SORE THROAT: 0
NECK PAIN: 0
NAUSEA: 0
VOMITING: 0
BLURRED VISION: 0
STRIDOR: 0
PSYCHIATRIC NEGATIVE: 1
MEMORY LOSS: 0
EYE PAIN: 0
COUGH: 0
RESPIRATORY NEGATIVE: 1
HEMOPTYSIS: 0
HALLUCINATIONS: 0
SINUS PAIN: 0
WEIGHT LOSS: 0
FALLS: 0
EYES NEGATIVE: 1
EYE DISCHARGE: 0
DOUBLE VISION: 0
PHOTOPHOBIA: 0
FEVER: 0
HEADACHES: 0
DIZZINESS: 0
BRUISES/BLEEDS EASILY: 0
POLYDIPSIA: 0
TINGLING: 0
CARDIOVASCULAR NEGATIVE: 1
PALPITATIONS: 0
CLAUDICATION: 0
PND: 0
SPUTUM PRODUCTION: 0
SENSORY CHANGE: 0
ABDOMINAL PAIN: 0
CHILLS: 0

## 2017-12-01 ASSESSMENT — GAIT ASSESSMENTS
DISTANCE (FEET): 40
GAIT LEVEL OF ASSIST: CONTACT GUARD ASSIST
DEVIATION: INCREASED BASE OF SUPPORT;DECREASED HEEL STRIKE

## 2017-12-01 ASSESSMENT — PAIN SCALES - GENERAL
PAINLEVEL_OUTOF10: 2
PAINLEVEL_OUTOF10: 0

## 2017-12-01 ASSESSMENT — LIFESTYLE VARIABLES: SUBSTANCE_ABUSE: 0

## 2017-12-01 NOTE — DISCHARGE PLANNING
Medical Social Worker    RAMÓN notes that there is a possibility of IRF for pt. Pt is pending PT/SLP lusi.

## 2017-12-01 NOTE — CONSULTS
"       NEUROLOGY CONSULT         CHIEF COMPLAINT:  Chief Complaint   Patient presents with   • Possible Stroke         Date of Service:  11/30/17    REQUESTING PHYSICIAN: Hanna Charles M.D.,       HISTORY OF PRESENT ILLNESS:  Ml Chavira is a 77 y.o. Female with history of  Metastatic breast cancer with brain mets to the right parietal lobe s-p craniotomy who was transferred from [facility] for left sided weakness.  Pt at baseline can walk independently and has almost no residual deficits from her brain tumor except for some peripheral visual field loss on her left.  She flow home from a trip this Monday, and when she woke-up the next day, she was unable to get up because of left sided weakness.  She went to the kitchen and felt lightheaded and collapsed to the floor with LOC but without any tongue biting or urinary incontinence.  She has had no headache, no numbness and no change in speech.  She went to [facility] where a CT of the head showed a hypodensity throughout the right parietal and occipital lobe. However, this seems similar to her last MRI of the brain done in September 2017 which showed, \" Stable appearance of the right parietal resection cavity with persistent gliosis and/or vasogenic edema and ill-defined peripheral enhancement along the margins of the resection cavity. No definite focal enhancing lesion is identified to suggest residual or recurrent neoplasm.\" CTA showed no obvious  large vessel occlusion except there might be attenuation of her distal right MCA branches.  She was transferred for possible stroke.            ROS:  Positive review of systems are in BOLD  Constitutional: no fevers, chills, night sweats, weight loss  HEENT: no headache, blurred vision, loss of vision, double vision, congestion, sore throat, rhinorrhea, dyssphagia, edema   Chest: no SOB, cough, wheezing,  Heart: no chest pain, palpitation, orthopnea,  Abd: no abdominal pain, " nausea, vomiting, diarrrhea, constipation,   Genitourinary: no hematuria, urinary frequency or urgency, bladder or bowel incontinence  Extremities: no joint pain, muscle pain, back pain, neck pain,   Hematological: no epistaxis, easy bruising, petechia  Neurological: please see history of present illness  Psychiatric: no anxiety, depression  Extremities: no swelling, joint pain,   Endocrine: no polyuria, polydipsea, heat or cold intolerance, weight gain, weight loss  Skin: no lesions, rashes.       PAST MEDICAL HISTORY:  History reviewed. No pertinent past medical history.      ALLERGIES:  Allergies   Allergen Reactions   • Penicillins Itching and Rash         MEDICATIONS:  No current facility-administered medications on file prior to encounter.      Current Outpatient Prescriptions on File Prior to Encounter   Medication Sig Dispense Refill   • vitamin B-12 CR (CYANOCOBALAMIN) 1000 MCG Tab CR tablet Take 5,000 mg by mouth.     • TURMERIC PO Take 1 Tab by mouth every day.     • Omega-3 Fatty Acids (FISH OIL) 1200 MG Cap Take 1 Cap by mouth every day.     • multivitamin (THERAGRAN) Tab Take 1 Tab by mouth every day.     • Glucosamine-Chondroitin (GLUCOSAMINE CHONDR COMPLEX PO) Take 1 Tab by mouth every day.         MEDICATIONS:    Current Facility-Administered Medications:   •  benzocaine-menthol (CEPACOL) lozenge 1 Lozenge, 1 Lozenge, Mouth/Throat, Q2HRS PRN, Hanna Charles M.D., 1 Lozenge at 11/30/17 1201  •  fish oil capsule 1,000 mg, 1,000 mg, Oral, DAILY, Darshana Fay M.D., 1,000 mg at 11/30/17 0819  •  multivitamin (THERAGRAN) tablet 1 Tab, 1 Tab, Oral, DAILY, Darshana Fay M.D., 1 Tab at 11/30/17 0819  •  Pharmacy Consult Request, , Other, PRN, Darshana Fay M.D.  •  senna-docusate (PERICOLACE or SENOKOT S) 8.6-50 MG per tablet 2 Tab, 2 Tab, Oral, BID, 2 Tab at 11/30/17 0819 **AND** polyethylene glycol/lytes (MIRALAX) PACKET 1 Packet, 1 Packet, Oral, QDAY PRN **AND** magnesium hydroxide (MILK OF MAGNESIA)  suspension 30 mL, 30 mL, Oral, QDAY PRN **AND** bisacodyl (DULCOLAX) suppository 10 mg, 10 mg, Rectal, QDAY PRN, Darshana Fay M.D.  •  NS infusion, , Intravenous, Continuous, Darshana Fay M.D., Last Rate: 50 mL/hr at 11/30/17 0158  •  acetaminophen (TYLENOL) tablet 650 mg, 650 mg, Oral, Q6HRS PRN, Darshana Fay M.D.  •  labetalol (NORMODYNE,TRANDATE) injection 10 mg, 10 mg, Intravenous, Q4HRS PRN **OR** hydrALAZINE (APRESOLINE) injection 10 mg, 10 mg, Intravenous, Q2HRS PRN, Darshana Fay M.D.  •  atorvastatin (LIPITOR) tablet 40 mg, 40 mg, Oral, Q EVENING, Darshana Fay M.D., 40 mg at 11/30/17 0153  •  aspirin (ASA) tablet 325 mg, 325 mg, Oral, DAILY, 325 mg at 11/30/17 0819 **OR** aspirin (ASA) chewable tab 324 mg, 324 mg, Oral, DAILY **OR** aspirin (ASA) suppository 300 mg, 300 mg, Rectal, DAILY, Darshana Fay M.D.  •  ondansetron (ZOFRAN) syringe/vial injection 4 mg, 4 mg, Intravenous, Q4HRS PRN, Darshana Fay M.D.  •  ondansetron (ZOFRAN ODT) dispertab 4 mg, 4 mg, Oral, Q4HRS PRN, Darshana Fay M.D.    FAMILY HISTORY:  No family history of stroke.     SOCIAL HISTORY:  Social History     Social History   • Marital status:      Spouse name: N/A   • Number of children: N/A   • Years of education: N/A     Occupational History   • Not on file.     Social History Main Topics   • Smoking status: Former Smoker   • Smokeless tobacco: Never Used   • Alcohol use Yes   • Drug use: No   • Sexual activity: Not on file     Other Topics Concern   • Not on file     Social History Narrative   • No narrative on file       EXAM:  Vitals:    11/30/17 0400 11/30/17 0800 11/30/17 1100 11/30/17 1600   BP: 122/41 114/55 110/46 108/51   Pulse: 74 79 77 77   Resp: 19 16 16 16   Temp: 36.9 °C (98.4 °F) 37 °C (98.6 °F) 36.9 °C (98.5 °F) 36.9 °C (98.5 °F)   SpO2: 99% 98% 95% 95%   Weight:       Height:         General: pt is lying in bed, NAD  HEENT: normocephalic, atraumatic, normal moist oral mucosa  EYes: anicteric,  "PERRLA, pupils midline  Neck: supple, no carotid bruits  Chest: CTA b/l no wheezing or rales  Heart: regular rate and rhythm, no murmurs  Abd: soft, non-tender, non distended  Extremtiies: no edema  Skin: no rashes or lesions  Neuro  General: pt is alert, oriented x 3, there is no aphasia, no dyarthria, no gross evidence of neglect, she answers questions appropriately  CN: visual fields are full to confrotation, EOMI, PERRLA, normal fundoscopic exam with no papilledma, facial sensation is intact b/l, there is no facial asymmetry, symmetrical palatal elevation, tongue is midline, sternocleidomastoid and trapezius strength are intact  Motor: normal tone. There is no drift in all 4 extremities.  Strength is 5/5 throughout in all 4 extremities except she might have some subtle weakness on the left side in the arm and leg. FIne motor movements are intact b/l.   DTR: 1+ throughout  Plantar reflexes: downgoing b/l   Coordination: finger to nose and heel shin show no signs of ataxia b/l  Sensation: intact to soft touch in all 4 extremities with no extinction to double simultaneous stimuli.       LABORATORY TESTING  Lab Results   Component Value Date/Time    WBC 7.5 11/29/2017 09:29 PM    RBC 3.97 (L) 11/29/2017 09:29 PM    HEMOGLOBIN 13.1 11/29/2017 09:29 PM    HEMATOCRIT 40.4 11/29/2017 09:29 PM    .8 (H) 11/29/2017 09:29 PM    MCH 33.0 11/29/2017 09:29 PM    MCHC 32.4 (L) 11/29/2017 09:29 PM    MPV 9.0 11/29/2017 09:29 PM    NEUTSPOLYS 74.30 (H) 11/29/2017 09:29 PM    LYMPHOCYTES 11.70 (L) 11/29/2017 09:29 PM    MONOCYTES 11.90 11/29/2017 09:29 PM    EOSINOPHILS 1.60 11/29/2017 09:29 PM    BASOPHILS 0.40 11/29/2017 09:29 PM        IMAGING:  CT of the head showed a vasogenic edema throughout the right parietal and occipital lobe mainly involving her right occipital and parietal lobe white matter. However, this seems similar to her last MRI of the brain done in September 2017 which showed, \" Stable appearance of the " "right parietal resection cavity with persistent gliosis and/or vasogenic edema and ill-defined peripheral enhancement along the margins of the resection cavity. No definite focal enhancing lesion is identified to suggest residual or recurrent neoplasm.\"     CTA showed no obvious  large vessel occlusion except there might be attenuation of her distal right MCA branches.         IMPRESSION AND PLAN: Ml Chavira is a 77 y.o. Female with history of  Metastatic breast cancer with brain mets to the right parietal lobe s-p craniotomy who was transferred from [facility] for left sided weakness.   T of the head showed a hypodensity throguhout the right parietal and occipital lobe. However, this seems similar to her last MRI of the brain done in September 2017 which showed, \" Stable appearance of the right parietal resection cavity with persistent gliosis and/or vasogenic edema and ill-defined peripheral enhancement along the margins of the resection cavity. No definite focal enhancing lesion is identified to suggest residual or recurrent neoplasm.\"  I do not think this represents an acute ischemic stroke as the findings on the CT of the brain are out of proportion to her exam although I cannot rule out a smaller stroke in addition to the known brain metastasis.     Recommend  --I have ordered an MRI of the brain with and without contrast to r/o tumor reoccurrence vs stroke.  --continue  mg daily for now  --continue statin   --will place on Keppra 500 mg PO BID for now.     Will continue to follow.     Rakel Nolan M.D.    Neurology               "

## 2017-12-01 NOTE — CONSULTS
DATE OF SERVICE:  11/30/2017    REFERRING PHYSICIAN:  [facility] hospitalist.    HISTORY OF PRESENT ILLNESS:  A 77-year-old female had previous craniotomy for   metastatic disease by our former associate, Dr. Schumacher.  Patient presented to   the ER with history of progressive left-sided weakness.  She had a CTA done,   which was reviewed, which shows large MCA infarction, posterior division with   edema, mass effect, and midline shift.  There is no abnormal enhancement to   suggest neoplastic disease.  The patient had an MRI of the brain without and   with contrast in mid September, which did not show any evidence of any   recurrent metastatic disease.    REVIEW OF SYSTEMS:  Currently, the patient denies any headache.    PHYSICAL EXAMINATION:  VITAL SIGNS:  As recorded.  GENERAL:  Patient is awake, alert and conversant.  NEUROLOGIC:  She has right-sided weakness, but can hold up her arm.    IMPRESSION:  Sizable right middle cerebral artery infarction with mass effect   and midline shift.  The patient is at risk for neurologic deterioration.  If   that progresses, if the patient is felt to be a candidate for surgical   intervention, should herniation syndrome occur, patient would ideally be   observed in the intensive care unit with every 2-hour neuro checks.  I would   also recommend neurology consultation.  As long the patient is awake, talking,   following commands, there is no need of surgical intervention.       ____________________________________     MD LISA TERESA / STEPHANIE    DD:  11/30/2017 13:41:38  DT:  11/30/2017 16:14:14    D#:  [id number]  Job#:  [id number]

## 2017-12-01 NOTE — CARE PLAN
Problem: Discharge Barriers/Planning  Goal: Patient's continuum of care needs will be met  Outcome: PROGRESSING AS EXPECTED  Patient is a candidate for rehabilitation and is excited to build her strength and endurance. Physical therapy, occupational therapy and neurology team is following.     Problem: Mobility  Goal: Risk for activity intolerance will decrease  Outcome: PROGRESSING AS EXPECTED  Patient progressing towards goals. Patient now on room air. 02 >90% after ambulation to the restroom. Patient excited about getting up OOB today to walk in the halls.

## 2017-12-01 NOTE — PROGRESS NOTES
[facility]    Date of Service: 12/1/2017    Chief Complaint  77 y.o. female admitted 11/29/2017 with acute L sided weakness    Interval Problem Update  L sided weakness continues to improve, no headache, suspected progression of   Neoplastic disease discussed with patient    Consultants/Specialty  Ovidio, neurology  Leydi, neurosurgery    Disposition  Starting decadron, monitoring      Review of Systems   Constitutional: Negative.  Negative for chills, diaphoresis, fever, malaise/fatigue and weight loss.   HENT: Negative.  Negative for congestion, ear discharge, ear pain, hearing loss, nosebleeds, sinus pain, sore throat and tinnitus.    Eyes: Negative.  Negative for blurred vision, double vision, photophobia, pain, discharge and redness.   Respiratory: Negative.  Negative for cough, hemoptysis, sputum production, shortness of breath, wheezing and stridor.    Cardiovascular: Negative.  Negative for chest pain, palpitations, orthopnea, claudication, leg swelling and PND.   Gastrointestinal: Negative.  Negative for abdominal pain, blood in stool, constipation, diarrhea, heartburn, melena, nausea and vomiting.   Genitourinary: Negative.  Negative for dysuria, flank pain, frequency, hematuria and urgency.   Musculoskeletal: Negative.  Negative for back pain, falls, joint pain, myalgias and neck pain.   Skin: Negative.  Negative for itching and rash.   Neurological: Negative for dizziness, tingling, tremors, sensory change, speech change, seizures, loss of consciousness, weakness and headaches. Focal weakness: mild L sided.   Endo/Heme/Allergies: Negative.  Negative for environmental allergies and polydipsia. Does not bruise/bleed easily.   Psychiatric/Behavioral: Negative.  Negative for depression, hallucinations, memory loss, substance abuse and suicidal ideas. The patient is not nervous/anxious and does not have insomnia.    All other systems reviewed and are negative.     Physical Exam   Laboratory/Imaging   Hemodynamics  Temp (24hrs), Av.8 °C (98.2 °F), Min:36.2 °C (97.2 °F), Max:37.3 °C (99.1 °F)   Temperature: 36.6 °C (97.9 °F)  Pulse  Av.5  Min: 73  Max: 89    Blood Pressure : 120/51 (RN notified)      Respiratory      Respiration: 15, Pulse Oximetry: 93 %        RUL Breath Sounds: Clear, RML Breath Sounds: Clear, RLL Breath Sounds: Clear, WILLIAMS Breath Sounds: Clear, LLL Breath Sounds: Clear    Fluids  No intake or output data in the 24 hours ending 17 1506    Nutrition  Orders Placed This Encounter   Procedures   • DIET ORDER     Standing Status:   Standing     Number of Occurrences:   1     Order Specific Question:   Diet:     Answer:   Regular [1]     Physical Exam   Constitutional: She is oriented to person, place, and time. She appears well-developed and well-nourished. No distress.   HENT:   Head: Normocephalic and atraumatic.   Mouth/Throat: Oropharynx is clear and moist. No oropharyngeal exudate.   Eyes: Conjunctivae and EOM are normal. Pupils are equal, round, and reactive to light. Right eye exhibits no discharge. Left eye exhibits no discharge. No scleral icterus.   Neck: Normal range of motion. Neck supple. No tracheal deviation present. No thyromegaly present.   Cardiovascular: Normal rate, normal heart sounds and intact distal pulses.  Exam reveals no gallop and no friction rub.    No murmur heard.  Pulmonary/Chest: Effort normal and breath sounds normal. No stridor. No respiratory distress. She has no wheezes. She has no rales. She exhibits no tenderness.   Abdominal: Soft. Bowel sounds are normal. She exhibits no distension and no mass. There is no tenderness. There is no rebound and no guarding.   Musculoskeletal: Normal range of motion. She exhibits no edema or tenderness.   Lymphadenopathy:     She has no cervical adenopathy.   Neurological: She is alert and oriented to person, place, and time. She has normal reflexes. No cranial nerve deficit. She exhibits normal  muscle tone. Coordination normal.   L sided pronator drift with mild left ue weakness   Skin: Skin is warm and dry. No rash noted. She is not diaphoretic. No erythema. No pallor.   Psychiatric: She has a normal mood and affect. Her behavior is normal. Judgment and thought content normal.   Nursing note and vitals reviewed.      Recent Labs      11/29/17 2129   WBC  7.5   RBC  3.97*   HEMOGLOBIN  13.1   HEMATOCRIT  40.4   MCV  101.8*   MCH  33.0   MCHC  32.4*   RDW  46.8   PLATELETCT  222   MPV  9.0     Recent Labs      11/29/17 2129   SODIUM  136   POTASSIUM  3.7   CHLORIDE  107   CO2  23   GLUCOSE  105*   BUN  12   CREATININE  0.52   CALCIUM  9.4     Recent Labs      11/29/17 2202   APTT  26.5   INR  1.09         Recent Labs      11/30/17   0338   TRIGLYCERIDE  58   HDL  43   LDL  78          Assessment/Plan     CVA (cerebral vascular accident) (CMS-HCC)- (present on admission)   Assessment & Plan    No evidence of stroke, suspect due to neoplasm and vasogenic edema            Metastatic cancer (CMS-HCC)- (present on admission)   Assessment & Plan    History of breast cancer with mets to brain  Mri consistent with progression of neoplastic disease or possible radiation necrosis  Will start her on decadron  Discussed with dr Daniel radiation oncology who will see patient, possible candidate for clinical trial  Also discussed with neurology and patient's oncologist dr Thacker          Quality-Core Measures

## 2017-12-01 NOTE — PROGRESS NOTES
[place] Fall Risk Assessment:     Last Known Fall: Within the last month  Mobility: Use of assistive device/requires assist of two people  Medications: No meds  Mental Status/LOC/Awareness: Awake, alert, and oriented to date, place, and person  Toileting Needs: No needs  Volume/Electrolyte Status: Use of IV fluids/tube feeds  Communication/Sensory: Visual (Glasses)/hearing deficit  Behavior: Appropriate behavior  [place] Fall Risk Total: 12  Fall Risk Level: MODERATE RISK    Universal Fall Precautions:  call light/belongings in reach, bed in low position and locked, wheelchairs and assistive devices out of sight, siderails up x 2, use non-slip footwear, adequate lighting, educate on level of risk, clutter free and spill free environment, educate to call for assistance    Fall Risk Level Interventions:    TRIAL (TELE 8, NEURO, MED RONALDO 5) Moderate Fall Risk Interventions  Place yellow fall risk ID band on patient: verified  Provide patient/family education based on risk assessment : verified  Educate patient/family to call staff for assistance when getting out of bed: verified  Place fall precaution signage outside patient door: verified  Utilize bed/chair fall alarm: verified     Patient Specific Interventions:     Medication: review medications with patient and family  Mental Status/LOC/Awareness: reinforce falls education and encourage family to stay with patient  Toileting: provide frquent toileting, instruct patient/family on the use of grab bars, instruct patient/family on the need to call for assistance when toileting and do not leave patient unattended in bathroom/refer to toileting scripting  Volume/Electrolyte Status: ensure patient remains hydrated and ensure IV fluids are appropriate  Communication/Sensory: update plan of care on whiteboard and ensure proper positioning when transferrng/ambulating  Behavioral: encourage patient to voice feelings and engage patient in daily  activities  Mobility: dangle prior to standing, utilize bed/chair fall alarm and ensure bed is locked and in lowest position

## 2017-12-01 NOTE — PROGRESS NOTES
Monitor Summary: SR 62-84, CA .16, QRS .08, QT .36 with 1.3 second pause per strip from monitor room

## 2017-12-01 NOTE — PROGRESS NOTES
Pt A&Ox4, denies pain, denies neurological changes from baseline. Pt was able to have MRI done. Family at bedside. Ambulates with FWW and one person assist. Bed alarm is on, call light and personal belongings within reach.

## 2017-12-01 NOTE — PROGRESS NOTES
Patient is alert and oriented x4 at baseline with some forgetfulness. Able to make complex needs known. Currently resting in bed. SCDs in place. Meds given whole one at a time with ease. Son at bedside. HOB up at 90 degrees for meals to decrease the risk of aspiration. MD at bedside with orders to D/C IV fluids. Orders noted and carried out. Patient woke up with a stiff neck. Left side. Warm pack applied. No need for pharmacological intervention. Patient walks with FWW. Steady gait noted. 560 ft ambulated today within. No SOB or resp distress. Lungs CTA. Breathing on room air. Even and unlabored. 02 >92%. PT at bedside evaluating patient. All needs met, call light within reach, will continue to monitor.

## 2017-12-01 NOTE — CARE PLAN
Progressing as expected    Problem: Safety  Goal: Will remain free from falls  Intervention: Assess risk factors for falls  Assessed pt mobility, ambulated with FWW, pt tolerated with verbal cues    Problem: Knowledge Deficit  Goal: Knowledge of disease process/condition, treatment plan, diagnostic tests, and medications will improve  Intervention: Assess knowledge level of disease process/condition, treatment plan, diagnostic tests, and medications  Provided stroke edu, stroke book, reviewed POC

## 2017-12-01 NOTE — PROGRESS NOTES
Progress Note               Author: Carter Holley Date & Time created: 2017  3:12 PM     Interval History:  No HA.  New MRI--No stroke.  Recurrent tumor vs Radiation necrosis    Review of Systems:  ROS    Physical Exam:  Physical Exam  GCS 15  Mild weal Lt arm    Labs:        Invalid input(s): [id number]      Recent Labs      17   SODIUM  136   POTASSIUM  3.7   CHLORIDE  107   CO2  23   BUN  12   CREATININE  0.52   CALCIUM  9.4     Recent Labs      17   ALTSGPT  19   ASTSGOT  19   ALKPHOSPHAT  74   TBILIRUBIN  0.5   GLUCOSE  105*     Recent Labs      17   RBC  3.97*   --    HEMOGLOBIN  13.1   --    HEMATOCRIT  40.4   --    PLATELETCT  222   --    PROTHROMBTM   --   13.8   APTT   --   26.5   INR   --   1.09     Recent Labs      17   WBC  7.5   NEUTSPOLYS  74.30*   LYMPHOCYTES  11.70*   MONOCYTES  11.90   EOSINOPHILS  1.60   BASOPHILS  0.40   ASTSGOT  19   ALTSGPT  19   ALKPHOSPHAT  74   TBILIRUBIN  0.5     Hemodynamics:  Temp (24hrs), Av.8 °C (98.2 °F), Min:36.2 °C (97.2 °F), Max:37.3 °C (99.1 °F)  Temperature: 36.6 °C (97.9 °F)  Pulse  Av.5  Min: 73  Max: 89   Blood Pressure : 120/51 (RN notified)     Respiratory:    Respiration: 15, Pulse Oximetry: 93 %        RUL Breath Sounds: Clear, RML Breath Sounds: Clear, RLL Breath Sounds: Clear, WILLIAMS Breath Sounds: Clear, LLL Breath Sounds: Clear  Fluids:  No intake or output data in the 24 hours ending 17 1512     GI/Nutrition:  Orders Placed This Encounter   Procedures   • DIET ORDER     Standing Status:   Standing     Number of Occurrences:   1     Order Specific Question:   Diet:     Answer:   Regular [1]     Medical Decision Making, by Problem:  Active Hospital Problems    Diagnosis   • CVA (cerebral vascular accident) (CMS-HCC) [I63.9]   • Metastatic cancer (CMS-HCC) [C79.9]       Plan:  Dr Caro to review studies.  I suspect Rad necrosis as tumor recurrence 2 years later  would be unusual.  ? PET    Quality-Core Measures

## 2017-12-02 PROCEDURE — 700102 HCHG RX REV CODE 250 W/ 637 OVERRIDE(OP): Performed by: HOSPITALIST

## 2017-12-02 PROCEDURE — A9270 NON-COVERED ITEM OR SERVICE: HCPCS | Performed by: PSYCHIATRY & NEUROLOGY

## 2017-12-02 PROCEDURE — 700102 HCHG RX REV CODE 250 W/ 637 OVERRIDE(OP): Performed by: PSYCHIATRY & NEUROLOGY

## 2017-12-02 PROCEDURE — 700111 HCHG RX REV CODE 636 W/ 250 OVERRIDE (IP): Performed by: RADIOLOGY

## 2017-12-02 PROCEDURE — 99232 SBSQ HOSP IP/OBS MODERATE 35: CPT | Performed by: HOSPITALIST

## 2017-12-02 PROCEDURE — 770006 HCHG ROOM/CARE - MED/SURG/GYN SEMI*

## 2017-12-02 PROCEDURE — A9270 NON-COVERED ITEM OR SERVICE: HCPCS | Performed by: HOSPITALIST

## 2017-12-02 RX ORDER — LEVETIRACETAM 500 MG/1
500 TABLET ORAL 2 TIMES DAILY
Status: DISCONTINUED | OUTPATIENT
Start: 2017-12-02 | End: 2017-12-08 | Stop reason: HOSPADM

## 2017-12-02 RX ADMIN — OMEGA-3 FATTY ACIDS CAP 1000 MG 1000 MG: 1000 CAP at 09:26

## 2017-12-02 RX ADMIN — ATORVASTATIN CALCIUM 40 MG: 40 TABLET, FILM COATED ORAL at 19:51

## 2017-12-02 RX ADMIN — LEVETIRACETAM 500 MG: 500 TABLET, FILM COATED ORAL at 19:50

## 2017-12-02 RX ADMIN — THERA TABS 1 TABLET: TAB at 09:26

## 2017-12-02 RX ADMIN — LEVETIRACETAM 500 MG: 500 TABLET, FILM COATED ORAL at 09:26

## 2017-12-02 RX ADMIN — DEXAMETHASONE SODIUM PHOSPHATE 4 MG: 4 INJECTION, SOLUTION INTRAMUSCULAR; INTRAVENOUS at 09:31

## 2017-12-02 RX ADMIN — OMEPRAZOLE 20 MG: 20 CAPSULE, DELAYED RELEASE ORAL at 09:26

## 2017-12-02 RX ADMIN — STANDARDIZED SENNA CONCENTRATE AND DOCUSATE SODIUM 2 TABLET: 8.6; 5 TABLET, FILM COATED ORAL at 09:26

## 2017-12-02 RX ADMIN — ASPIRIN 325 MG: 325 TABLET, COATED ORAL at 09:26

## 2017-12-02 ASSESSMENT — ENCOUNTER SYMPTOMS
EYE REDNESS: 0
WEIGHT LOSS: 0
ORTHOPNEA: 0
BACK PAIN: 0
CLAUDICATION: 0
TREMORS: 0
EYE DISCHARGE: 0
MEMORY LOSS: 0
COUGH: 0
CONSTIPATION: 0
RESPIRATORY NEGATIVE: 1
NERVOUS/ANXIOUS: 0
FLANK PAIN: 0
SORE THROAT: 0
DIARRHEA: 0
HEMOPTYSIS: 0
PSYCHIATRIC NEGATIVE: 1
SPEECH CHANGE: 0
POLYDIPSIA: 0
STRIDOR: 0
DEPRESSION: 0
VOMITING: 0
SENSORY CHANGE: 0
CONSTITUTIONAL NEGATIVE: 1
TINGLING: 0
EYES NEGATIVE: 1
BRUISES/BLEEDS EASILY: 0
ABDOMINAL PAIN: 0
SPUTUM PRODUCTION: 0
BLURRED VISION: 0
PND: 0
LOSS OF CONSCIOUSNESS: 0
MUSCULOSKELETAL NEGATIVE: 1
SHORTNESS OF BREATH: 0
FALLS: 0
HEARTBURN: 0
NAUSEA: 0
EYE PAIN: 0
CARDIOVASCULAR NEGATIVE: 1
MYALGIAS: 0
PALPITATIONS: 0
WEAKNESS: 0
DOUBLE VISION: 0
SINUS PAIN: 0
GASTROINTESTINAL NEGATIVE: 1
PHOTOPHOBIA: 0
BLOOD IN STOOL: 0
HALLUCINATIONS: 0
CHILLS: 0
INSOMNIA: 0
WHEEZING: 0
DIAPHORESIS: 0
NECK PAIN: 0
FEVER: 0
DIZZINESS: 0
SEIZURES: 0
HEADACHES: 0

## 2017-12-02 ASSESSMENT — PAIN SCALES - GENERAL
PAINLEVEL_OUTOF10: 0

## 2017-12-02 ASSESSMENT — LIFESTYLE VARIABLES: SUBSTANCE_ABUSE: 0

## 2017-12-02 NOTE — PROGRESS NOTES
NEUROLOGY INPATIENT PROGRESS NOTE      CHIEF COMPLAINT: Left sided weakness    DOS  Pt was seen and examined on 12/1/2017    INTERVAL EVENTS:  MRI of the brain done which showed an increase in the enhancement and vasogenic edema from in the right parietal and temporal lobe. 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      MEDICATIONS    Current Facility-Administered Medications:   •  omeprazole (PRILOSEC) capsule 20 mg, 20 mg, Oral, DAILY, Hanna Charles M.D., 20 mg at 12/01/17 1737  •  dexamethasone (DECADRON) injection 4 mg, 4 mg, Intravenous, DAILY, Cande DE SANTIAGO M.D., 4 mg at 12/01/17 1737  •  benzocaine-menthol (CEPACOL) lozenge 1 Lozenge, 1 Lozenge, Mouth/Throat, Q2HRS PRN, Hanna Charles M.D., 1 Lozenge at 12/01/17 1737  •  fish oil capsule 1,000 mg, 1,000 mg, Oral, DAILY, Darshana Fay M.D., 1,000 mg at 12/01/17 0748  •  multivitamin (THERAGRAN) tablet 1 Tab, 1 Tab, Oral, DAILY, Darshana Fay M.D., 1 Tab at 12/01/17 0748  •  senna-docusate (PERICOLACE or SENOKOT S) 8.6-50 MG per tablet 2 Tab, 2 Tab, Oral, BID, 2 Tab at 12/01/17 2009 **AND** polyethylene glycol/lytes (MIRALAX) PACKET 1 Packet, 1 Packet, Oral, QDAY PRN **AND** magnesium hydroxide (MILK OF MAGNESIA) suspension 30 mL, 30 mL, Oral, QDAY PRN **AND** bisacodyl (DULCOLAX) suppository 10 mg, 10 mg, Rectal, QDAY PRN, Darshana Fay M.D.  •  acetaminophen (TYLENOL) tablet 650 mg, 650 mg, Oral, Q6HRS PRN, Darshana Fay M.D.  •  labetalol (NORMODYNE,TRANDATE) injection 10 mg, 10 mg, Intravenous, Q4HRS PRN **OR** hydrALAZINE (APRESOLINE) injection 10 mg, 10 mg, Intravenous, Q2HRS PRN, Darshana Fay M.D.  •  atorvastatin (LIPITOR) tablet 40 mg, 40 mg, Oral, Q EVENING, Darshana Fay M.D., 40 mg at 12/01/17 2009  •  aspirin (ASA) tablet 325 mg, 325 mg, Oral, DAILY, 325 mg at 12/01/17 0748 **OR** aspirin (ASA) chewable tab 324 mg, 324 mg, Oral, DAILY **OR** aspirin (ASA) suppository 300 mg, 300 mg, Rectal, DAILY, Haven  JESUS Fay  •  ondansetron (ZOFRAN) syringe/vial injection 4 mg, 4 mg, Intravenous, Q4HRS PRN, Darshana Fay M.D.  •  ondansetron (ZOFRAN ODT) dispertab 4 mg, 4 mg, Oral, Q4HRS PRN, Darshana Fay M.D.      PHYSICAL EXAM  Vitals:    12/01/17 1600 12/01/17 2000 12/02/17 0000 12/02/17 0400   BP: 130/67 120/50 145/48 131/64   Pulse: 87 82 73 71   Resp: 15 16 16 17   Temp: 36.3 °C (97.3 °F) 36.2 °C (97.1 °F) 36.3 °C (97.4 °F) 36.8 °C (98.2 °F)   SpO2: 94% 93% 93% 94%   Weight:       Height:         PT is alert, lying in bed, NAD  Neuro: pt is alert, answers questions appropriately. There is no aphasia or dysarthria  CN: PERRLA, EOMI, no facial asymemtry  Motor: mild left sided weakness about 4/5 trhoughout.   Coordination: FNF is intact b/l    Sensation: intact to soft touch in all 4 extremities    LABS:    Lab Results   Component Value Date/Time    WBC 7.5 11/29/2017 09:29 PM    RBC 3.97 (L) 11/29/2017 09:29 PM    HEMOGLOBIN 13.1 11/29/2017 09:29 PM    HEMATOCRIT 40.4 11/29/2017 09:29 PM    .8 (H) 11/29/2017 09:29 PM    MCH 33.0 11/29/2017 09:29 PM    MCHC 32.4 (L) 11/29/2017 09:29 PM    MPV 9.0 11/29/2017 09:29 PM    NEUTSPOLYS 74.30 (H) 11/29/2017 09:29 PM    LYMPHOCYTES 11.70 (L) 11/29/2017 09:29 PM    MONOCYTES 11.90 11/29/2017 09:29 PM    EOSINOPHILS 1.60 11/29/2017 09:29 PM    BASOPHILS 0.40 11/29/2017 09:29 PM        Lab Results   Component Value Date/Time    SODIUM 136 11/29/2017 09:29 PM    POTASSIUM 3.7 11/29/2017 09:29 PM    CHLORIDE 107 11/29/2017 09:29 PM    CO2 23 11/29/2017 09:29 PM    GLUCOSE 105 (H) 11/29/2017 09:29 PM    BUN 12 11/29/2017 09:29 PM    CREATININE 0.52 11/29/2017 09:29 PM        Lab Results   Component Value Date/Time    CHOLSTRLTOT 133 11/30/2017 03:38 AM    LDL 78 11/30/2017 03:38 AM    HDL 43 11/30/2017 03:38 AM    TRIGLYCERIDE 58 11/30/2017 03:38 AM         Lab Results   Component Value Date/Time    ALKPHOSPHAT 74 11/29/2017 09:29 PM    ASTSGOT 19 11/29/2017 09:29 PM     ALTSGPT 19 11/29/2017 09:29 PM    TBILIRUBIN 0.5 11/29/2017 09:29 PM          IMPRESSION: Ml Chavira is a 77 y.o. Female with history of  Metastatic breast cancer with brain mets to the right parietal lobe s-p craniotomy who was transferred from [facility] for left sided weakness. MRI of the brain done which showed an increase in the enhancement and vasogenic edema from in the right parietal and temporal lobe. 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  PT has been seen by radiation oncology.  --continue Decadron  --will start her on Keppra 500 mg PO BID.     Alicia Nolan M.D.    Neurology

## 2017-12-02 NOTE — PROGRESS NOTES
CHIEF COMPLAINT: Left sided weakness     DOS  Pt was seen and examined on 12/1/2017     INTERVAL EVENTS:  MRI of the brain done which showed an increase in the enhancement and vasogenic edema from in the right parietal and temporal lobe. 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       MEDICATIONS     Current Facility-Administered Medications:   •  omeprazole (PRILOSEC) capsule 20 mg, 20 mg, Oral, DAILY, Hanna Charles M.D., 20 mg at 12/01/17 1737  •  dexamethasone (DECADRON) injection 4 mg, 4 mg, Intravenous, DAILY, Cande DE SANTIAGO M.D., 4 mg at 12/01/17 1737  •  benzocaine-menthol (CEPACOL) lozenge 1 Lozenge, 1 Lozenge, Mouth/Throat, Q2HRS PRN, Hanna Charles M.D., 1 Lozenge at 12/01/17 1737  •  fish oil capsule 1,000 mg, 1,000 mg, Oral, DAILY, Darshana Fay M.D., 1,000 mg at 12/01/17 0748  •  multivitamin (THERAGRAN) tablet 1 Tab, 1 Tab, Oral, DAILY, Darshana Fay M.D., 1 Tab at 12/01/17 0748  •  senna-docusate (PERICOLACE or SENOKOT S) 8.6-50 MG per tablet 2 Tab, 2 Tab, Oral, BID, 2 Tab at 12/01/17 2009 **AND** polyethylene glycol/lytes (MIRALAX) PACKET 1 Packet, 1 Packet, Oral, QDAY PRN **AND** magnesium hydroxide (MILK OF MAGNESIA) suspension 30 mL, 30 mL, Oral, QDAY PRN **AND** bisacodyl (DULCOLAX) suppository 10 mg, 10 mg, Rectal, QDAY PRN, Darshana Fay M.D.  •  acetaminophen (TYLENOL) tablet 650 mg, 650 mg, Oral, Q6HRS PRN, Darshana Fay M.D.  •  labetalol (NORMODYNE,TRANDATE) injection 10 mg, 10 mg, Intravenous, Q4HRS PRN **OR** hydrALAZINE (APRESOLINE) injection 10 mg, 10 mg, Intravenous, Q2HRS PRN, Darshana Fay M.D.  •  atorvastatin (LIPITOR) tablet 40 mg, 40 mg, Oral, Q EVENING, Darshana Fay M.D., 40 mg at 12/01/17 2009  •  aspirin (ASA) tablet 325 mg, 325 mg, Oral, DAILY, 325 mg at 12/01/17 0748 **OR** aspirin (ASA) chewable tab 324 mg, 324 mg, Oral, DAILY **OR** aspirin (ASA) suppository 300 mg, 300 mg, Rectal, DAILY, Darshana Fay M.D.  •  ondansetron (ZOFRAN)  syringe/vial injection 4 mg, 4 mg, Intravenous, Q4HRS PRN, Darshana Fay M.D.  •  ondansetron (ZOFRAN ODT) dispertab 4 mg, 4 mg, Oral, Q4HRS PRN, Darshana Fay M.D.        PHYSICAL EXAM  Vitals   Vitals:     12/01/17 1600 12/01/17 2000 12/02/17 0000 12/02/17 0400   BP: 130/67 120/50 145/48 131/64   Pulse: 87 82 73 71   Resp: 15 16 16 17   Temp: 36.3 °C (97.3 °F) 36.2 °C (97.1 °F) 36.3 °C (97.4 °F) 36.8 °C (98.2 °F)   SpO2: 94% 93% 93% 94%   Weight:           Height:                 PT is alert, lying in bed, NAD  Neuro: pt is alert, answers questions appropriately. There is no aphasia or dysarthria  CN: PERRLA, EOMI, no facial asymemtry  Motor: mild left sided weakness about 4/5 trhoughout.   Coordination: FNF is intact b/l    Sensation: intact to soft touch in all 4 extremities     LABS:     Lab Results   Component Value Date/Time     WBC 7.5 11/29/2017 09:29 PM     RBC 3.97 (L) 11/29/2017 09:29 PM     HEMOGLOBIN 13.1 11/29/2017 09:29 PM     HEMATOCRIT 40.4 11/29/2017 09:29 PM     .8 (H) 11/29/2017 09:29 PM     MCH 33.0 11/29/2017 09:29 PM     MCHC 32.4 (L) 11/29/2017 09:29 PM     MPV 9.0 11/29/2017 09:29 PM     NEUTSPOLYS 74.30 (H) 11/29/2017 09:29 PM     LYMPHOCYTES 11.70 (L) 11/29/2017 09:29 PM     MONOCYTES 11.90 11/29/2017 09:29 PM     EOSINOPHILS 1.60 11/29/2017 09:29 PM     BASOPHILS 0.40 11/29/2017 09:29 PM               Lab Results   Component Value Date/Time     SODIUM 136 11/29/2017 09:29 PM     POTASSIUM 3.7 11/29/2017 09:29 PM     CHLORIDE 107 11/29/2017 09:29 PM     CO2 23 11/29/2017 09:29 PM     GLUCOSE 105 (H) 11/29/2017 09:29 PM     BUN 12 11/29/2017 09:29 PM     CREATININE 0.52 11/29/2017 09:29 PM               Lab Results   Component Value Date/Time     CHOLSTRLTOT 133 11/30/2017 03:38 AM     LDL 78 11/30/2017 03:38 AM     HDL 43 11/30/2017 03:38 AM     TRIGLYCERIDE 58 11/30/2017 03:38 AM                Lab Results   Component Value Date/Time     ALKPHOSPHAT 74 11/29/2017 09:29 PM      ASTSGOT 19 11/29/2017 09:29 PM     ALTSGPT 19 11/29/2017 09:29 PM     TBILIRUBIN 0.5 11/29/2017 09:29 PM            IMPRESSION: Ml Chavira is a 77 y.o. Female with history of  Metastatic breast cancer with brain mets to the right parietal lobe s-p craniotomy who was transferred from [facility] for left sided weakness. MRI of the brain done which showed an increase in the enhancement and vasogenic edema from in the right parietal and temporal lobe. 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  PT has been seen by radiation oncology.  --continue Decadron  --will start her on Keppra 500 mg PO BID.   --will sign off.  Please reconsult with any questions.      Alicia Nolan M.D.

## 2017-12-02 NOTE — PROGRESS NOTES
Subjective:  States her strength continues to improve.  Slight HA.     Obj:  AAOx4. Tongue ML. No drfit. FCx4        Temp (24hrs), Av.4 °C (97.6 °F), Min:36.2 °C (97.1 °F), Max:36.8 °C (98.2 °F)    Pulse: 68, Respiration: 16, Blood Pressure : 115/62, Pulse Oximetry: 95 %, O2 (LPM): 0          No intake or output data in the 24 hours ending 17 1211         • levetiracetam  500 mg     • omeprazole  20 mg     • dexamethasone  4 mg     • benzocaine-menthol  1 Lozenge     • fish oil  1,000 mg     • multivitamin  1 Tab     • senna-docusate  2 Tab      And   • polyethylene glycol/lytes  1 Packet      And   • magnesium hydroxide  30 mL      And   • bisacodyl  10 mg     • acetaminophen  650 mg     • labetalol  10 mg      Or   • hydrALAZINE  10 mg     • atorvastatin  40 mg     • aspirin  325 mg      Or   • aspirin  324 mg      Or   • aspirin  300 mg     • ondansetron  4 mg     • ondansetron  4 mg         Recent Labs      17   WBC  7.5   RBC  3.97*   HEMOGLOBIN  13.1   HEMATOCRIT  40.4   MCV  101.8*   MCH  33.0   PLATELETCT  222     Recent Labs      17   SODIUM  136   POTASSIUM  3.7   CHLORIDE  107   CO2  23   GLUCOSE  105*   BUN  12   CREATININE  0.52   CALCIUM  9.4     Recent Labs      17   APTT  26.5   INR  1.09       Assessment:  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

## 2017-12-02 NOTE — CONSULTS
RADIATION ONCOLOGY FOLLOW-UP    DATE OF SERVICE: 12/1/2017    IDENTIFICATION:   A 77 y.o. female with history of stage IV lung cancer adenocarcinoma. She is status post resection of a right occipital lobe brain metastasis followed by stereotactic radiotherapy to surgical bed receiving 2700 cGy in 3 fractions completed 12/18/2015. This was followed by systemic therapy consisting of carboplatin and Alimta with reduction of a right upper lobe lung mass however disease was still persistent. She subsequently received concurrent carboplatin and radiotherapy 6000 cGy in 25 fractions completed 5/6/2016.    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 She's been treated with steroids with improvement in her headaches. Patient states that she has been off steroids now for approximately 4 weeks. She returned from a trip to [place] earlier this week and noticed while on her return that her left foot began to drag. At home earlier in the week she began to have increased difficulty walking because of weakness on the left side and sustained a fall on Wednesday. She was able to use [facility] and contact her daughter who is able to then get help per patient. She was brought to normal in a bad initially and then transferred to [facility].    MRI scan demonstrates marked interval increase in enhancement and vasogenic edema. Differential includes recurrent tumor versus radiation necrosis.    Present time she is not started dexamethasone yet. Her headaches appear to lessened.     CURRENT MEDICATIONS:  Current Facility-Administered Medications   Medication Dose Route Frequency Provider Last Rate Last Dose   • omeprazole (PRILOSEC) capsule 20 mg  20 mg Oral DAILY Hanna Charles M.D.       • dexamethasone (DECADRON) injection 4 mg  4 mg Intravenous DAILY Cande DE SANTIAGO M.D.       • benzocaine-menthol (CEPACOL) lozenge 1 Lozenge  1 Lozenge Mouth/Throat Q2HRS PRN  "Hanna Charles M.D.   1 Lozenge at 12/01/17 0748   • fish oil capsule 1,000 mg  1,000 mg Oral DAILY Darshana Fay M.D.   1,000 mg at 12/01/17 0748   • multivitamin (THERAGRAN) tablet 1 Tab  1 Tab Oral DAILY Darshana Fay M.D.   1 Tab at 12/01/17 0748   • senna-docusate (PERICOLACE or SENOKOT S) 8.6-50 MG per tablet 2 Tab  2 Tab Oral BID Darshana Fay M.D.   2 Tab at 12/01/17 0748    And   • polyethylene glycol/lytes (MIRALAX) PACKET 1 Packet  1 Packet Oral QDAY PRN Darshana Fay M.D.        And   • magnesium hydroxide (MILK OF MAGNESIA) suspension 30 mL  30 mL Oral QDAY PRN Darshana Fay M.D.        And   • bisacodyl (DULCOLAX) suppository 10 mg  10 mg Rectal QDAY PRN Darshana Fay M.D.       • acetaminophen (TYLENOL) tablet 650 mg  650 mg Oral Q6HRS PRN Darshana Fay M.D.       • labetalol (NORMODYNE,TRANDATE) injection 10 mg  10 mg Intravenous Q4HRS PRN Darshana Fay M.D.        Or   • hydrALAZINE (APRESOLINE) injection 10 mg  10 mg Intravenous Q2HRS PRN Darshana Fay M.D.       • atorvastatin (LIPITOR) tablet 40 mg  40 mg Oral Q EVENING Darshana Fay M.D.   40 mg at 11/30/17 2005   • aspirin (ASA) tablet 325 mg  325 mg Oral DAILY Darshana Fay M.D.   325 mg at 12/01/17 0748    Or   • aspirin (ASA) chewable tab 324 mg  324 mg Oral DAILY Darshana Fay M.D.        Or   • aspirin (ASA) suppository 300 mg  300 mg Rectal DAILY Darshana Fay M.D.       • ondansetron (ZOFRAN) syringe/vial injection 4 mg  4 mg Intravenous Q4HRS PRN Darshana Fay M.D.       • ondansetron (ZOFRAN ODT) dispertab 4 mg  4 mg Oral Q4HRS PRN Darshana Fay M.D.           ALLERGIES:  Penicillins    PHYSICAL EXAM:   /51 Comment: RN notified  Pulse 78   Temp 36.6 °C (97.9 °F)   Resp 15   Ht 1.575 m (5' 2\")   Wt 60 kg (132 lb 4.4 oz)   SpO2 93%   Breastfeeding? No   BMI 24.19 kg/m²   GENERAL: Alert and oriented no acute distress  HEENT:  Pupils are equal, round, and reactive to light.  Extraocular muscles   are intact. " Sclerae nonicteric.  Conjunctivae pink.  Oral cavity, tongue   protrudes midline.   NECK:  Supple without evidence of thyromegaly.  NODES:  No peripheral adenopathy of the neck, supraclavicular fossa or axillae   bilaterally.  LUNGS:  Clear to ascultation and resonant to percussion.  HEART:  Regular rate and rhythm.  No murmur appreciated  ABDOMEN:  Soft. No evidence of hepatosplenomegaly.  Positive bowel sounds.  EXTREMITIES:  Without Edema.  NEUROLOGIC:  Cranial nerves II through XII were intact.  Strength is 4/5 in   lower left extremity.   There was no focal  sensory deficit appreciated. Has a shuffling gait. Somewhat unsteady.    LABORATORY DATA:   Lab Results   Component Value Date/Time    SODIUM 136 11/29/2017 09:29 PM    POTASSIUM 3.7 11/29/2017 09:29 PM    CHLORIDE 107 11/29/2017 09:29 PM    CO2 23 11/29/2017 09:29 PM    GLUCOSE 105 (H) 11/29/2017 09:29 PM    BUN 12 11/29/2017 09:29 PM    CREATININE 0.52 11/29/2017 09:29 PM     Lab Results   Component Value Date/Time    ALKPHOSPHAT 74 11/29/2017 09:29 PM    ASTSGOT 19 11/29/2017 09:29 PM    ALTSGPT 19 11/29/2017 09:29 PM    TBILIRUBIN 0.5 11/29/2017 09:29 PM      Lab Results   Component Value Date/Time    WBC 7.5 11/29/2017 09:29 PM    RBC 3.97 (L) 11/29/2017 09:29 PM    HEMOGLOBIN 13.1 11/29/2017 09:29 PM    HEMATOCRIT 40.4 11/29/2017 09:29 PM    .8 (H) 11/29/2017 09:29 PM    MCH 33.0 11/29/2017 09:29 PM    MCHC 32.4 (L) 11/29/2017 09:29 PM    MPV 9.0 11/29/2017 09:29 PM    NEUTSPOLYS 74.30 (H) 11/29/2017 09:29 PM    LYMPHOCYTES 11.70 (L) 11/29/2017 09:29 PM    MONOCYTES 11.90 11/29/2017 09:29 PM    EOSINOPHILS 1.60 11/29/2017 09:29 PM    BASOPHILS 0.40 11/29/2017 09:29 PM        RADIOLOGY DATA:  Ct-chest,abdomen,pelvis With Result Date: 11/3/2017  Right upper lobe mass appears minimally decreased in size compared to prior. Surrounding posttreatment changes are seen.. Nodule in the medial right upper lobe is unchanged. Area of nodularity associated  with linear scarring in the anterior right lung apex is noted with increase in size of the area of nodularity. This may be related to scarring but malignancy is not excluded. Parenchymal scarring/nodularity deep to the first costochondral junction appears improved. Underlying emphysematous changes. Stable subpleural left upper lobe nodule. Atherosclerotic plaque. Hepatic cysts are again seen. Hypodense left renal lesion likely representing a hemorrhagic/proteinaceous cyst seen on prior MRI. Small right renal lesions likely represent small cysts. Hepatic steatosis. Colonic diverticulosis. Bilateral thyroid nodules.     Mr-brain-with & W/o Result Date: 12/1/2017  1.  Previous right occipital craniotomy with underlying irregular postsurgical defect. 2.  Significant interval increase in the amount of peripheral enhancement about the postsurgical cavity involving the right occipital and parietal occipital lobes. Further there is marked interval increase in the amount of vasogenic edema throughout the right frontal, parietal, temporal, and occipital lobes causing marked effacement of the right lateral ventricle especially its posterior body, atrium, and occipital horn with resultant marked right to left midline shift the ventricular system. These findings are consistent with progressive spread of neoplasm versus radiation necrosis. Neuro PET scan may be useful for further evaluation.        IMPRESSION:    A 77 y.o. with stage IV right lung cancer secondary to solitary metastasis involving the brain right occipital lobe. She is status post resection of the occipital lobe metastasis followed by stereotactic radiosurgery to the surgical bed completed 12/18/2015. She also received concurrent chemoradiotherapy to the right upper lobe lung mass 6000 cGy in 25 fractions completed 5/6/2016.    RECOMMENDATIONS:   Reviewed MRI imaging with the patient. 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 I would like to enroll the patient in a clinical trial being offered here at [facility] randomizing patients with radionecrosis to Avastin versus placebo.    To be eligible for the trial, patient has to be symptomatic while on week of dexamethasone 4 mg per day. At that point randomization can start.    I have contacted Haven Pollack clinical trials coordinator. She'll be in touch with patient. Changed  dexamethasone dosing to 4 mg per day. Patient may be discharged and go to rehab whenever hospitalist sees fit. We'll follow up with patient in 1 week's time.    One hour was spent face-to-face with patient in the office and more than half of that time was spent counseling patient or coordinating care as described above.      Cande DE SANTIAGO M.D.  Electronically signed by: Cande Caro V, 12/1/2017 5:06 PM  [phone]

## 2017-12-02 NOTE — PROGRESS NOTES
Pt A&Ox4, denies pain, N/V, or N/t from baseline. Pt ambulates with SBA with one person assist. Bed alarm is on, call light and personal belongings within reach

## 2017-12-02 NOTE — PROGRESS NOTES
[facility]    Date of Service: 12/2/2017    Chief Complaint  77 y.o. female admitted 11/29/2017 with acute L sided weakness due to progression of metastatic disease    Interval Problem Update  Mild L sided weakness, unsteady with ambulation. Agreeable to rehab. Awaiting PT/OT eval. Rehab consult in progress.    Continue decadron per radiation oncology regimen, clinical trial enrollment in process    Consultants/Specialty  Brown, neurology  Leydi, neurosurgery    Disposition  Acute rehab      Review of Systems   Constitutional: Negative.  Negative for chills, diaphoresis, fever, malaise/fatigue and weight loss.   HENT: Negative.  Negative for congestion, ear discharge, ear pain, hearing loss, nosebleeds, sinus pain, sore throat and tinnitus.    Eyes: Negative.  Negative for blurred vision, double vision, photophobia, pain, discharge and redness.   Respiratory: Negative.  Negative for cough, hemoptysis, sputum production, shortness of breath, wheezing and stridor.    Cardiovascular: Negative.  Negative for chest pain, palpitations, orthopnea, claudication, leg swelling and PND.   Gastrointestinal: Negative.  Negative for abdominal pain, blood in stool, constipation, diarrhea, heartburn, melena, nausea and vomiting.   Genitourinary: Negative.  Negative for dysuria, flank pain, frequency, hematuria and urgency.   Musculoskeletal: Negative.  Negative for back pain, falls, joint pain, myalgias and neck pain.   Skin: Negative.  Negative for itching and rash.   Neurological: Negative for dizziness, tingling, tremors, sensory change, speech change, seizures, loss of consciousness, weakness and headaches. Focal weakness: mild L sided.   Endo/Heme/Allergies: Negative.  Negative for environmental allergies and polydipsia. Does not bruise/bleed easily.   Psychiatric/Behavioral: Negative.  Negative for depression, hallucinations, memory loss, substance abuse and suicidal ideas. The patient is not  nervous/anxious and does not have insomnia.    All other systems reviewed and are negative.     Physical Exam  Laboratory/Imaging   Hemodynamics  Temp (24hrs), Av.4 °C (97.6 °F), Min:36.2 °C (97.1 °F), Max:36.8 °C (98.2 °F)   Temperature: 36.7 °C (98 °F)  Pulse  Av.3  Min: 68  Max: 89    Blood Pressure : 115/62      Respiratory      Respiration: 16, Pulse Oximetry: 95 %        RUL Breath Sounds: Clear, RML Breath Sounds: Clear, RLL Breath Sounds: Clear, WILLIAMS Breath Sounds: Clear, LLL Breath Sounds: Clear    Fluids  No intake or output data in the 24 hours ending 17 1206    Nutrition  Orders Placed This Encounter   Procedures   • DIET ORDER     Standing Status:   Standing     Number of Occurrences:   1     Order Specific Question:   Diet:     Answer:   Regular [1]     Physical Exam   Constitutional: She is oriented to person, place, and time. She appears well-developed and well-nourished. No distress.   HENT:   Head: Normocephalic and atraumatic.   Mouth/Throat: Oropharynx is clear and moist. No oropharyngeal exudate.   Eyes: Conjunctivae and EOM are normal. Pupils are equal, round, and reactive to light. Right eye exhibits no discharge. Left eye exhibits no discharge. No scleral icterus.   Neck: Normal range of motion. Neck supple. No tracheal deviation present. No thyromegaly present.   Cardiovascular: Normal rate, normal heart sounds and intact distal pulses.  Exam reveals no gallop and no friction rub.    No murmur heard.  Pulmonary/Chest: Effort normal and breath sounds normal. No stridor. No respiratory distress. She has no wheezes. She has no rales. She exhibits no tenderness.   Abdominal: Soft. Bowel sounds are normal. She exhibits no distension and no mass. There is no tenderness. There is no rebound and no guarding.   Musculoskeletal: Normal range of motion. She exhibits no edema or tenderness.   Lymphadenopathy:     She has no cervical adenopathy.   Neurological: She is alert and oriented to  person, place, and time. She has normal reflexes. No cranial nerve deficit. She exhibits normal muscle tone. Coordination normal.   L sided pronator drift with mild left ue weakness   Skin: Skin is warm and dry. No rash noted. She is not diaphoretic. No erythema. No pallor.   Psychiatric: She has a normal mood and affect. Her behavior is normal. Judgment and thought content normal.   Nursing note and vitals reviewed.      Recent Labs      11/29/17 2129   WBC  7.5   RBC  3.97*   HEMOGLOBIN  13.1   HEMATOCRIT  40.4   MCV  101.8*   MCH  33.0   MCHC  32.4*   RDW  46.8   PLATELETCT  222   MPV  9.0     Recent Labs      11/29/17 2129   SODIUM  136   POTASSIUM  3.7   CHLORIDE  107   CO2  23   GLUCOSE  105*   BUN  12   CREATININE  0.52   CALCIUM  9.4     Recent Labs      11/29/17 2202   APTT  26.5   INR  1.09         Recent Labs      11/30/17   0338   TRIGLYCERIDE  58   HDL  43   LDL  78          Assessment/Plan     CVA (cerebral vascular accident) (CMS-HCC)- (present on admission)   Assessment & Plan    No evidence of stroke, suspect due to neoplasm and vasogenic edema            Metastatic cancer (CMS-HCC)- (present on admission)   Assessment & Plan    History of breast cancer with mets to brain  Mri consistent with progression of neoplastic disease or possible radiation necrosis  Will start her on decadron  Discussed with dr Daniel radiation oncology who will see patient, possible candidate for clinical trial  Also discussed with neurology and patient's oncologist dr Thacker          Quality-Core Measures

## 2017-12-03 PROCEDURE — 700111 HCHG RX REV CODE 636 W/ 250 OVERRIDE (IP): Performed by: RADIOLOGY

## 2017-12-03 PROCEDURE — 99232 SBSQ HOSP IP/OBS MODERATE 35: CPT | Performed by: HOSPITALIST

## 2017-12-03 PROCEDURE — A9270 NON-COVERED ITEM OR SERVICE: HCPCS | Performed by: HOSPITALIST

## 2017-12-03 PROCEDURE — 700102 HCHG RX REV CODE 250 W/ 637 OVERRIDE(OP): Performed by: HOSPITALIST

## 2017-12-03 PROCEDURE — 700102 HCHG RX REV CODE 250 W/ 637 OVERRIDE(OP): Performed by: PSYCHIATRY & NEUROLOGY

## 2017-12-03 PROCEDURE — A9270 NON-COVERED ITEM OR SERVICE: HCPCS | Performed by: PSYCHIATRY & NEUROLOGY

## 2017-12-03 PROCEDURE — 770006 HCHG ROOM/CARE - MED/SURG/GYN SEMI*

## 2017-12-03 RX ADMIN — ASPIRIN 325 MG: 325 TABLET, COATED ORAL at 08:05

## 2017-12-03 RX ADMIN — OMEGA-3 FATTY ACIDS CAP 1000 MG 1000 MG: 1000 CAP at 08:05

## 2017-12-03 RX ADMIN — ATORVASTATIN CALCIUM 40 MG: 40 TABLET, FILM COATED ORAL at 20:50

## 2017-12-03 RX ADMIN — OMEPRAZOLE 20 MG: 20 CAPSULE, DELAYED RELEASE ORAL at 08:05

## 2017-12-03 RX ADMIN — LEVETIRACETAM 500 MG: 500 TABLET, FILM COATED ORAL at 20:51

## 2017-12-03 RX ADMIN — DEXAMETHASONE SODIUM PHOSPHATE 4 MG: 4 INJECTION, SOLUTION INTRAMUSCULAR; INTRAVENOUS at 08:05

## 2017-12-03 RX ADMIN — THERA TABS 1 TABLET: TAB at 08:05

## 2017-12-03 RX ADMIN — LEVETIRACETAM 500 MG: 500 TABLET, FILM COATED ORAL at 08:05

## 2017-12-03 ASSESSMENT — COGNITIVE AND FUNCTIONAL STATUS - GENERAL
DAILY ACTIVITIY SCORE: 15
DRESSING REGULAR LOWER BODY CLOTHING: A LOT
DRESSING REGULAR UPPER BODY CLOTHING: A LITTLE
MOVING FROM LYING ON BACK TO SITTING ON SIDE OF FLAT BED: A LITTLE
PERSONAL GROOMING: A LITTLE
HELP NEEDED FOR BATHING: A LOT
EATING MEALS: A LITTLE
WALKING IN HOSPITAL ROOM: A LITTLE
CLIMB 3 TO 5 STEPS WITH RAILING: A LITTLE
TOILETING: A LOT
STANDING UP FROM CHAIR USING ARMS: A LITTLE
TURNING FROM BACK TO SIDE WHILE IN FLAT BAD: A LITTLE
SUGGESTED CMS G CODE MODIFIER DAILY ACTIVITY: CK
SUGGESTED CMS G CODE MODIFIER MOBILITY: CK
MOBILITY SCORE: 19

## 2017-12-03 ASSESSMENT — ENCOUNTER SYMPTOMS
CARDIOVASCULAR NEGATIVE: 1
PSYCHIATRIC NEGATIVE: 1
WHEEZING: 0
LOSS OF CONSCIOUSNESS: 0
PND: 0
WEAKNESS: 0
TREMORS: 0
WEIGHT LOSS: 0
FALLS: 0
HALLUCINATIONS: 0
DEPRESSION: 0
NAUSEA: 0
SHORTNESS OF BREATH: 0
STRIDOR: 0
GASTROINTESTINAL NEGATIVE: 1
VOMITING: 0
PHOTOPHOBIA: 0
NECK PAIN: 0
MEMORY LOSS: 0
HEADACHES: 0
DIARRHEA: 0
DOUBLE VISION: 0
HEMOPTYSIS: 0
PALPITATIONS: 0
CLAUDICATION: 0
BACK PAIN: 0
COUGH: 0
DIZZINESS: 0
SEIZURES: 0
BRUISES/BLEEDS EASILY: 0
NERVOUS/ANXIOUS: 0
EYE PAIN: 0
INSOMNIA: 0
CONSTITUTIONAL NEGATIVE: 1
ABDOMINAL PAIN: 0
RESPIRATORY NEGATIVE: 1
TINGLING: 0
ORTHOPNEA: 0
SENSORY CHANGE: 0
DIAPHORESIS: 0
POLYDIPSIA: 0
SPUTUM PRODUCTION: 0
MUSCULOSKELETAL NEGATIVE: 1
FLANK PAIN: 0
EYE REDNESS: 0
EYE DISCHARGE: 0
SINUS PAIN: 0
SPEECH CHANGE: 0
BLOOD IN STOOL: 0
SORE THROAT: 0
BLURRED VISION: 0
FEVER: 0
EYES NEGATIVE: 1
CONSTIPATION: 0
HEARTBURN: 0
MYALGIAS: 0
CHILLS: 0

## 2017-12-03 ASSESSMENT — PAIN SCALES - GENERAL
PAINLEVEL_OUTOF10: 0

## 2017-12-03 ASSESSMENT — LIFESTYLE VARIABLES: SUBSTANCE_ABUSE: 0

## 2017-12-03 NOTE — PROGRESS NOTES
[place] Fall Risk Assessment:     Last Known Fall: Within the last month  Mobility: Use of assistive device/requires assist of two people  Medications: No meds  Mental Status/LOC/Awareness: Awake, alert, and oriented to date, place, and person  Toileting Needs: No needs  Volume/Electrolyte Status: No problems  Communication/Sensory: Visual (Glasses)/hearing deficit  Behavior: Appropriate behavior  [place] Fall Risk Total: 10  Fall Risk Level: LOW RISK    Universal Fall Precautions:  call light/belongings in reach, bed in low position and locked, wheelchairs and assistive devices out of sight, siderails up x 2, use non-slip footwear, adequate lighting, clutter free and spill free environment, educate on level of risk, educate to call for assistance    Fall Risk Level Interventions:   TRIAL ([employer], NEURO, MED RONALDO 5) Low Fall Risk Interventions  Place yellow fall risk ID band on patient: verified  Provide patient/family education based on risk assessment: completed  Educate patient/family to call staff for assistance when getting out of bed: completed  Place fall precaution signage outside patient door: verifiedTRIAL ([employer], NEURO, MED RONALDO 5) Moderate Fall Risk Interventions  Place yellow fall risk ID band on patient: verified  Provide patient/family education based on risk assessment : verified  Educate patient/family to call staff for assistance when getting out of bed: verified  Place fall precaution signage outside patient door: verified  Utilize bed/chair fall alarm: verified     Patient Specific Interventions:     Medication: review medications with patient and family  Mental Status/LOC/Awareness: reorient patient  Toileting: provide frquent toileting  Volume/Electrolyte Status: ensure patient remains hydrated  Communication/Sensory: update plan of care on whiteboard  Behavioral: administer medication as ordered and instruct/reinforce fall program rationale  Mobility: utilize bed/chair fall  alarm and ensure bed is locked and in lowest position

## 2017-12-03 NOTE — PROGRESS NOTES
Pt AAOx4, Berenice. Up with FFW and SBA to ambulate, steady gait. Eating and voiding. Denies pain. VSS

## 2017-12-03 NOTE — CARE PLAN
Problem: Safety  Goal: Will remain free from injury  Outcome: PROGRESSING AS EXPECTED  Bed alarm in place    Problem: Mobility  Goal: Risk for activity intolerance will decrease  Outcome: PROGRESSING AS EXPECTED  Up with FWW and SBA to ambulate

## 2017-12-03 NOTE — CARE PLAN
Problem: Safety  Goal: Will remain free from injury  Outcome: PROGRESSING AS EXPECTED  Reviewed patient's mobility status, discussed with care team of patient needs, verifying appropriate safety precautions in place, providing patient education, ensuring call lights are within reach, non-slip socks in use, evaluating needs alarms & monitoring every shift, continuing with current plan of care.    Problem: Knowledge Deficit  Goal: Knowledge of disease process/condition, treatment plan, diagnostic tests, and medications will improve  Outcome: PROGRESSING AS EXPECTED  Educated patient about POC, activities, encouraging patient to ask questions, providing answers to patient's questions, educating patient about medications, encouraging patient involvement in care process. Continuing with current POC.

## 2017-12-03 NOTE — PROGRESS NOTES
[facility] Progress Note    Date of Service: 12/3/2017    Chief Complaint  77 y.o. female admitted 11/29/2017 with acute L sided weakness due to progression of metastatic disease    Interval Problem Update  Mild L sided weakness and unsteady gait continue to improve, Mild headache this am L temporal 2/10, no visual changes no dizziness. Awaiting rehab    Continue decadron per radiation oncology regimen, clinical trial enrollment in process    Consultants/Specialty  Brown, neurology  Leydi, neurosurgery    Disposition  Acute rehab      Review of Systems   Constitutional: Negative.  Negative for chills, diaphoresis, fever, malaise/fatigue and weight loss.   HENT: Negative.  Negative for congestion, ear discharge, ear pain, hearing loss, nosebleeds, sinus pain, sore throat and tinnitus.    Eyes: Negative.  Negative for blurred vision, double vision, photophobia, pain, discharge and redness.   Respiratory: Negative.  Negative for cough, hemoptysis, sputum production, shortness of breath, wheezing and stridor.    Cardiovascular: Negative.  Negative for chest pain, palpitations, orthopnea, claudication, leg swelling and PND.   Gastrointestinal: Negative.  Negative for abdominal pain, blood in stool, constipation, diarrhea, heartburn, melena, nausea and vomiting.   Genitourinary: Negative.  Negative for dysuria, flank pain, frequency, hematuria and urgency.   Musculoskeletal: Negative.  Negative for back pain, falls, joint pain, myalgias and neck pain.   Skin: Negative.  Negative for itching and rash.   Neurological: Negative for dizziness, tingling, tremors, sensory change, speech change, seizures, loss of consciousness, weakness and headaches. Focal weakness: mild L sided.   Endo/Heme/Allergies: Negative.  Negative for environmental allergies and polydipsia. Does not bruise/bleed easily.   Psychiatric/Behavioral: Negative.  Negative for depression, hallucinations, memory loss, substance abuse and suicidal ideas.  The patient is not nervous/anxious and does not have insomnia.    All other systems reviewed and are negative.     Physical Exam  Laboratory/Imaging   Hemodynamics  Temp (24hrs), Av.3 °C (97.3 °F), Min:36.1 °C (97 °F), Max:36.4 °C (97.6 °F)   Temperature: 36.4 °C (97.6 °F)  Pulse  Av.9  Min: 63  Max: 89    Blood Pressure : 127/61      Respiratory      Respiration: 16, Pulse Oximetry: 92 %        RUL Breath Sounds: Clear, RML Breath Sounds: Clear, RLL Breath Sounds: Diminished, WILLIAMS Breath Sounds: Clear, LLL Breath Sounds: Diminished    Fluids  No intake or output data in the 24 hours ending 17 1159    Nutrition  Orders Placed This Encounter   Procedures   • DIET ORDER     Standing Status:   Standing     Number of Occurrences:   1     Order Specific Question:   Diet:     Answer:   Regular [1]     Physical Exam   Constitutional: She is oriented to person, place, and time. She appears well-developed and well-nourished. No distress.   HENT:   Head: Normocephalic and atraumatic.   Mouth/Throat: Oropharynx is clear and moist. No oropharyngeal exudate.   Eyes: Conjunctivae and EOM are normal. Pupils are equal, round, and reactive to light. Right eye exhibits no discharge. Left eye exhibits no discharge. No scleral icterus.   Neck: Normal range of motion. Neck supple. No tracheal deviation present. No thyromegaly present.   Cardiovascular: Normal rate, normal heart sounds and intact distal pulses.  Exam reveals no gallop and no friction rub.    No murmur heard.  Pulmonary/Chest: Effort normal and breath sounds normal. No stridor. No respiratory distress. She has no wheezes. She has no rales. She exhibits no tenderness.   Abdominal: Soft. Bowel sounds are normal. She exhibits no distension and no mass. There is no tenderness. There is no rebound and no guarding.   Musculoskeletal: Normal range of motion. She exhibits no edema or tenderness.   Lymphadenopathy:     She has no cervical adenopathy.   Neurological:  She is alert and oriented to person, place, and time. She has normal reflexes. No cranial nerve deficit. She exhibits normal muscle tone. Coordination normal.   L sided pronator drift with mild left ue weakness   Skin: Skin is warm and dry. No rash noted. She is not diaphoretic. No erythema. No pallor.   Psychiatric: She has a normal mood and affect. Her behavior is normal. Judgment and thought content normal.   Nursing note and vitals reviewed.                               Assessment/Plan     CVA (cerebral vascular accident) (CMS-HCC)- (present on admission)   Assessment & Plan    No evidence of stroke, suspect due to neoplasm and vasogenic edema            Metastatic cancer (CMS-HCC)- (present on admission)   Assessment & Plan    History of breast cancer with mets to brain  Mri consistent with progression of neoplastic disease or possible radiation necrosis  Continue decadron  Seen by Dr. Daniel radiation oncology who is enrolling her in a clinical trial  Awaiting acute rehab        Metastasis to brain (CMS-[facility])- (present on admission)   Assessment & Plan    Secondary mild L weakness continues to improve on steroids          Quality-Core Measures

## 2017-12-03 NOTE — PROGRESS NOTES
Subjective:  States her strength continues to improve and feels much better today.  Slight HA. Denies dizziness    Obj:  AAOx4. Tongue ML. No drfit. FCx4        Temp (24hrs), Av.3 °C (97.3 °F), Min:36.1 °C (97 °F), Max:36.4 °C (97.6 °F)    Pulse: 69, Respiration: 16, Blood Pressure : 127/61, Weight: 60.2 kg (132 lb 11.5 oz), Pulse Oximetry: 92 %, O2 (LPM): 0          No intake or output data in the 24 hours ending 17 1211         • levetiracetam  500 mg     • omeprazole  20 mg     • dexamethasone  4 mg     • benzocaine-menthol  1 Lozenge     • fish oil  1,000 mg     • multivitamin  1 Tab     • senna-docusate  2 Tab      And   • polyethylene glycol/lytes  1 Packet      And   • magnesium hydroxide  30 mL      And   • bisacodyl  10 mg     • acetaminophen  650 mg     • labetalol  10 mg      Or   • hydrALAZINE  10 mg     • atorvastatin  40 mg     • aspirin  325 mg      Or   • aspirin  324 mg      Or   • aspirin  300 mg     • ondansetron  4 mg     • ondansetron  4 mg         No results for input(s): WBC, RBC, HEMOGLOBIN, ISTATHGB, HEMATOCRIT, ISTATHEMCRT, MCV, MCH, PLATELETCT in the last 72 hours.  No results for input(s): SODIUM, ISTATSODIUM, POTASSIUM, ISTATPOTASS, CHLORIDE, ISTATCHLORI, CO2, ISTATCO2, GLUCOSE, ISTATGLUCOS, BUN, ISTATBUN, CREATININE, ISTATCREAT, CALCIUM in the last 72 hours.        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 Saldana later this month

## 2017-12-03 NOTE — PROGRESS NOTES
AOx4, left-sided peripheral vision impairment present, pain is absent. BP was WNL at beginning of shift. Pt remains compliant about using call light and FWW to ambulate to BR. Cough is present; RN educated to perform CDB exercises and pt gives strong effort.

## 2017-12-04 PROCEDURE — 700102 HCHG RX REV CODE 250 W/ 637 OVERRIDE(OP): Performed by: PSYCHIATRY & NEUROLOGY

## 2017-12-04 PROCEDURE — A9270 NON-COVERED ITEM OR SERVICE: HCPCS | Performed by: PSYCHIATRY & NEUROLOGY

## 2017-12-04 PROCEDURE — A9270 NON-COVERED ITEM OR SERVICE: HCPCS | Performed by: HOSPITALIST

## 2017-12-04 PROCEDURE — 700111 HCHG RX REV CODE 636 W/ 250 OVERRIDE (IP): Performed by: RADIOLOGY

## 2017-12-04 PROCEDURE — 700102 HCHG RX REV CODE 250 W/ 637 OVERRIDE(OP): Performed by: HOSPITALIST

## 2017-12-04 PROCEDURE — 770006 HCHG ROOM/CARE - MED/SURG/GYN SEMI*

## 2017-12-04 PROCEDURE — 99232 SBSQ HOSP IP/OBS MODERATE 35: CPT | Performed by: HOSPITALIST

## 2017-12-04 RX ORDER — DEXAMETHASONE 4 MG/1
4 TABLET ORAL DAILY
Status: DISCONTINUED | OUTPATIENT
Start: 2017-12-04 | End: 2017-12-07

## 2017-12-04 RX ADMIN — BENZOCAINE AND MENTHOL 1 LOZENGE: 15; 3.6 LOZENGE ORAL at 14:06

## 2017-12-04 RX ADMIN — OMEPRAZOLE 20 MG: 20 CAPSULE, DELAYED RELEASE ORAL at 08:24

## 2017-12-04 RX ADMIN — ASPIRIN 325 MG: 325 TABLET, COATED ORAL at 08:24

## 2017-12-04 RX ADMIN — DEXAMETHASONE 4 MG: 4 TABLET ORAL at 14:06

## 2017-12-04 RX ADMIN — LEVETIRACETAM 500 MG: 500 TABLET, FILM COATED ORAL at 08:24

## 2017-12-04 RX ADMIN — STANDARDIZED SENNA CONCENTRATE AND DOCUSATE SODIUM 2 TABLET: 8.6; 5 TABLET, FILM COATED ORAL at 08:24

## 2017-12-04 RX ADMIN — OMEGA-3 FATTY ACIDS CAP 1000 MG 1000 MG: 1000 CAP at 08:24

## 2017-12-04 RX ADMIN — LEVETIRACETAM 500 MG: 500 TABLET, FILM COATED ORAL at 20:16

## 2017-12-04 RX ADMIN — ATORVASTATIN CALCIUM 40 MG: 40 TABLET, FILM COATED ORAL at 20:16

## 2017-12-04 RX ADMIN — THERA TABS 1 TABLET: TAB at 08:24

## 2017-12-04 ASSESSMENT — PAIN SCALES - GENERAL
PAINLEVEL_OUTOF10: 0

## 2017-12-04 ASSESSMENT — COGNITIVE AND FUNCTIONAL STATUS - GENERAL
SUGGESTED CMS G CODE MODIFIER MOBILITY: CK
CLIMB 3 TO 5 STEPS WITH RAILING: A LITTLE
WALKING IN HOSPITAL ROOM: A LITTLE
MOBILITY SCORE: 19
DRESSING REGULAR UPPER BODY CLOTHING: A LITTLE
STANDING UP FROM CHAIR USING ARMS: A LITTLE
DAILY ACTIVITIY SCORE: 15
SUGGESTED CMS G CODE MODIFIER DAILY ACTIVITY: CK
EATING MEALS: A LITTLE
MOVING FROM LYING ON BACK TO SITTING ON SIDE OF FLAT BED: A LITTLE
TURNING FROM BACK TO SIDE WHILE IN FLAT BAD: A LITTLE
HELP NEEDED FOR BATHING: A LOT
PERSONAL GROOMING: A LITTLE
TOILETING: A LOT
DRESSING REGULAR LOWER BODY CLOTHING: A LOT

## 2017-12-04 ASSESSMENT — ENCOUNTER SYMPTOMS
HALLUCINATIONS: 0
WEIGHT LOSS: 0
PND: 0
POLYDIPSIA: 0
LOSS OF CONSCIOUSNESS: 0
GASTROINTESTINAL NEGATIVE: 1
DEPRESSION: 0
MYALGIAS: 0
ABDOMINAL PAIN: 0
BLOOD IN STOOL: 0
WEAKNESS: 0
PALPITATIONS: 0
SHORTNESS OF BREATH: 0
SINUS PAIN: 0
NAUSEA: 0
SEIZURES: 0
EYE REDNESS: 0
HEADACHES: 0
CHILLS: 0
PHOTOPHOBIA: 0
TINGLING: 0
FALLS: 0
CLAUDICATION: 0
STRIDOR: 0
EYE PAIN: 0
COUGH: 0
DIARRHEA: 0
SENSORY CHANGE: 0
DIZZINESS: 0
BRUISES/BLEEDS EASILY: 0
PSYCHIATRIC NEGATIVE: 1
FLANK PAIN: 0
TREMORS: 0
BLURRED VISION: 0
INSOMNIA: 0
ORTHOPNEA: 0
RESPIRATORY NEGATIVE: 1
SPEECH CHANGE: 0
HEMOPTYSIS: 0
SORE THROAT: 0
BACK PAIN: 0
SPUTUM PRODUCTION: 0
NERVOUS/ANXIOUS: 0
EYE DISCHARGE: 0
MEMORY LOSS: 0
CONSTITUTIONAL NEGATIVE: 1
HEARTBURN: 0
CARDIOVASCULAR NEGATIVE: 1
WHEEZING: 0
EYES NEGATIVE: 1
VOMITING: 0
CONSTIPATION: 0
DIAPHORESIS: 0
FEVER: 0
MUSCULOSKELETAL NEGATIVE: 1
NECK PAIN: 0
DOUBLE VISION: 0

## 2017-12-04 ASSESSMENT — LIFESTYLE VARIABLES: SUBSTANCE_ABUSE: 0

## 2017-12-04 NOTE — PROGRESS NOTES
"AOx4, pt is very compliant and motivated to use FWW correctly. Pleasant, slept for most of shift. Absent of pain, numbness, tingling. Refused stool softeners b/c stated \"I have been going regularly\".  "

## 2017-12-04 NOTE — PROGRESS NOTES
Patient comfortable all day. Received a shower and ate all meals. Walked the deleon today with standby assist and walker. Reports feeling better. Hope to DC to rehab tomorrow.

## 2017-12-04 NOTE — PROGRESS NOTES
Subjective:  States her strength continues to improve and feels much better today.  Slight HA. Denies dizziness    Obj:  AAOx4. Tongue ML. No drfit. FCx4        Temp (24hrs), Av.4 °C (97.5 °F), Min:36.2 °C (97.1 °F), Max:36.6 °C (97.9 °F)    Pulse: (!) 57 (RN notified), Respiration: 14, Blood Pressure : 108/49 (RN notified), Pulse Oximetry: 95 %, O2 (LPM): 0          No intake or output data in the 24 hours ending 17 1211         • dexamethasone  4 mg     • benzocaine-menthol  1 Lozenge     • levetiracetam  500 mg     • benzocaine-menthol  1 Lozenge     • fish oil  1,000 mg     • multivitamin  1 Tab     • senna-docusate  2 Tab      And   • polyethylene glycol/lytes  1 Packet      And   • magnesium hydroxide  30 mL      And   • bisacodyl  10 mg     • acetaminophen  650 mg     • labetalol  10 mg      Or   • hydrALAZINE  10 mg     • atorvastatin  40 mg     • aspirin  325 mg      Or   • aspirin  324 mg      Or   • aspirin  300 mg     • ondansetron  4 mg     • ondansetron  4 mg         No results for input(s): WBC, RBC, HEMOGLOBIN, ISTATHGB, HEMATOCRIT, ISTATHEMCRT, MCV, MCH, PLATELETCT in the last 72 hours.  No results for input(s): SODIUM, ISTATSODIUM, POTASSIUM, ISTATPOTASS, CHLORIDE, ISTATCHLORI, CO2, ISTATCO2, GLUCOSE, ISTATGLUCOS, BUN, ISTATBUN, CREATININE, ISTATCREAT, CALCIUM in the last 72 hours.        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 Saldana later this month

## 2017-12-04 NOTE — PROGRESS NOTES
[facility] Progress Note    Date of Service: 12/4/2017    Chief Complaint  77 y.o. female admitted 11/29/2017 with acute L sided weakness due to progression of metastatic disease    Interval Problem Update  Mild L sided weakness resolved and unsteady gait continues. No headache today, no visual changes no dizziness. Awaiting rehab    Continue decadron per radiation oncology regimen, clinical trial enrollment in process    Consultants/Specialty  Brown, neurology  Leydi, neurosurgery    Disposition  Acute rehab      Review of Systems   Constitutional: Negative.  Negative for chills, diaphoresis, fever, malaise/fatigue and weight loss.   HENT: Negative.  Negative for congestion, ear discharge, ear pain, hearing loss, nosebleeds, sinus pain, sore throat and tinnitus.    Eyes: Negative.  Negative for blurred vision, double vision, photophobia, pain, discharge and redness.   Respiratory: Negative.  Negative for cough, hemoptysis, sputum production, shortness of breath, wheezing and stridor.    Cardiovascular: Negative.  Negative for chest pain, palpitations, orthopnea, claudication, leg swelling and PND.   Gastrointestinal: Negative.  Negative for abdominal pain, blood in stool, constipation, diarrhea, heartburn, melena, nausea and vomiting.   Genitourinary: Negative.  Negative for dysuria, flank pain, frequency, hematuria and urgency.   Musculoskeletal: Negative.  Negative for back pain, falls, joint pain, myalgias and neck pain.   Skin: Negative.  Negative for itching and rash.   Neurological: Negative for dizziness, tingling, tremors, sensory change, speech change, seizures, loss of consciousness, weakness and headaches. Focal weakness: mild L sided.   Endo/Heme/Allergies: Negative.  Negative for environmental allergies and polydipsia. Does not bruise/bleed easily.   Psychiatric/Behavioral: Negative.  Negative for depression, hallucinations, memory loss, substance abuse and suicidal ideas. The patient is not  nervous/anxious and does not have insomnia.    All other systems reviewed and are negative.     Physical Exam  Laboratory/Imaging   Hemodynamics  Temp (24hrs), Av.4 °C (97.5 °F), Min:36.2 °C (97.1 °F), Max:36.6 °C (97.9 °F)   Temperature: 36.2 °C (97.2 °F)  Pulse  Av.1  Min: 57  Max: 89    Blood Pressure : 108/49 (RN notified)      Respiratory      Respiration: 14, Pulse Oximetry: 95 %        RUL Breath Sounds: Clear, RML Breath Sounds: Clear, RLL Breath Sounds: Diminished, WILLIAMS Breath Sounds: Clear, LLL Breath Sounds: Diminished    Fluids  No intake or output data in the 24 hours ending 17 1138    Nutrition  Orders Placed This Encounter   Procedures   • DIET ORDER     Standing Status:   Standing     Number of Occurrences:   1     Order Specific Question:   Diet:     Answer:   Regular [1]     Physical Exam   Constitutional: She is oriented to person, place, and time. She appears well-developed and well-nourished. No distress.   HENT:   Head: Normocephalic and atraumatic.   Mouth/Throat: Oropharynx is clear and moist. No oropharyngeal exudate.   Eyes: Conjunctivae and EOM are normal. Pupils are equal, round, and reactive to light. Right eye exhibits no discharge. Left eye exhibits no discharge. No scleral icterus.   Neck: Normal range of motion. Neck supple. No tracheal deviation present. No thyromegaly present.   Cardiovascular: Normal rate, normal heart sounds and intact distal pulses.  Exam reveals no gallop and no friction rub.    No murmur heard.  Pulmonary/Chest: Effort normal and breath sounds normal. No stridor. No respiratory distress. She has no wheezes. She has no rales. She exhibits no tenderness.   Abdominal: Soft. Bowel sounds are normal. She exhibits no distension and no mass. There is no tenderness. There is no rebound and no guarding.   Musculoskeletal: Normal range of motion. She exhibits no edema or tenderness.   Lymphadenopathy:     She has no cervical adenopathy.   Neurological: She  is alert and oriented to person, place, and time. She has normal reflexes. No cranial nerve deficit. She exhibits normal muscle tone. Coordination normal.   Skin: Skin is warm and dry. No rash noted. She is not diaphoretic. No erythema. No pallor.   Psychiatric: She has a normal mood and affect. Her behavior is normal. Judgment and thought content normal.   Nursing note and vitals reviewed.                               Assessment/Plan     CVA (cerebral vascular accident) (CMS-HCC)- (present on admission)   Assessment & Plan    No evidence of stroke, suspect due to neoplasm and vasogenic edema            Metastatic cancer (CMS-HCC)- (present on admission)   Assessment & Plan    History of breast cancer with mets to brain  Mri consistent with progression of neoplastic disease or possible radiation necrosis  Continue decadron  Seen by Dr. Daniel radiation oncology who is enrolling her in a clinical trial  Awaiting acute rehab        Metastasis to brain (CMS-HCC)- (present on admission)   Assessment & Plan    Secondary mild L weakness resolved          Quality-Core Measures

## 2017-12-04 NOTE — DISCHARGE PLANNING
Medical Social Worker    RAMÓN left message for [facility] Malaika informing her that per rounds, pt is medically cleared to DC to [facility].

## 2017-12-04 NOTE — CARE PLAN
Problem: Mobility  Goal: Risk for activity intolerance will decrease  Outcome: PROGRESSING AS EXPECTED  Patient mobilized around the deleon. Did very well with a walker.

## 2017-12-04 NOTE — CARE PLAN
Problem: Safety  Goal: Will remain free from injury  Outcome: PROGRESSING AS EXPECTED  Patient rings appropriately for assistance.

## 2017-12-04 NOTE — DISCHARGE PLANNING
Received update from RAMÓN Mckeon regarding medical clearance for IRF level care. TCC reviewed post acute benefits with [facility]. Pt has Medicare/[employer] plan. [facility] is out-of-network for  Ml. If she chooses [facility], she would be responsible for paying $1316.00 copay for IRF as well as any any additional costs incurred during admission that are not covered by Medicare. Recommend referral to [facility] to assist with d/c planning, as believe facility is in-network for pt. RAMÓN Mckeon aware. TCC will follow and remains available to assist as needed. Will monitor for pt choice.

## 2017-12-05 PROCEDURE — A9270 NON-COVERED ITEM OR SERVICE: HCPCS | Performed by: HOSPITALIST

## 2017-12-05 PROCEDURE — 700111 HCHG RX REV CODE 636 W/ 250 OVERRIDE (IP): Performed by: HOSPITALIST

## 2017-12-05 PROCEDURE — 700102 HCHG RX REV CODE 250 W/ 637 OVERRIDE(OP): Performed by: HOSPITALIST

## 2017-12-05 PROCEDURE — 97535 SELF CARE MNGMENT TRAINING: CPT

## 2017-12-05 PROCEDURE — 97530 THERAPEUTIC ACTIVITIES: CPT

## 2017-12-05 PROCEDURE — 700102 HCHG RX REV CODE 250 W/ 637 OVERRIDE(OP): Performed by: PSYCHIATRY & NEUROLOGY

## 2017-12-05 PROCEDURE — 770006 HCHG ROOM/CARE - MED/SURG/GYN SEMI*

## 2017-12-05 PROCEDURE — 99232 SBSQ HOSP IP/OBS MODERATE 35: CPT | Performed by: INTERNAL MEDICINE

## 2017-12-05 PROCEDURE — A9270 NON-COVERED ITEM OR SERVICE: HCPCS | Performed by: PSYCHIATRY & NEUROLOGY

## 2017-12-05 RX ADMIN — LEVETIRACETAM 500 MG: 500 TABLET, FILM COATED ORAL at 21:01

## 2017-12-05 RX ADMIN — ATORVASTATIN CALCIUM 40 MG: 40 TABLET, FILM COATED ORAL at 21:01

## 2017-12-05 RX ADMIN — ONDANSETRON 4 MG: 4 TABLET, ORALLY DISINTEGRATING ORAL at 08:25

## 2017-12-05 RX ADMIN — THERA TABS 1 TABLET: TAB at 08:23

## 2017-12-05 RX ADMIN — OMEGA-3 FATTY ACIDS CAP 1000 MG 1000 MG: 1000 CAP at 08:22

## 2017-12-05 RX ADMIN — ASPIRIN 324 MG: 81 TABLET, CHEWABLE ORAL at 08:21

## 2017-12-05 RX ADMIN — LEVETIRACETAM 500 MG: 500 TABLET, FILM COATED ORAL at 08:22

## 2017-12-05 RX ADMIN — DEXAMETHASONE 4 MG: 4 TABLET ORAL at 08:23

## 2017-12-05 RX ADMIN — STANDARDIZED SENNA CONCENTRATE AND DOCUSATE SODIUM 2 TABLET: 8.6; 5 TABLET, FILM COATED ORAL at 21:01

## 2017-12-05 ASSESSMENT — PAIN SCALES - GENERAL
PAINLEVEL_OUTOF10: 0

## 2017-12-05 ASSESSMENT — ENCOUNTER SYMPTOMS
DIZZINESS: 0
HEADACHES: 0
TINGLING: 0
SHORTNESS OF BREATH: 0
FEVER: 0
CHILLS: 0
CONSTIPATION: 0
ABDOMINAL PAIN: 0
TREMORS: 0
DIAPHORESIS: 0
VOMITING: 0
COUGH: 0

## 2017-12-05 ASSESSMENT — COGNITIVE AND FUNCTIONAL STATUS - GENERAL
TOILETING: A LITTLE
DRESSING REGULAR UPPER BODY CLOTHING: A LITTLE
PERSONAL GROOMING: A LITTLE
HELP NEEDED FOR BATHING: A LOT
DAILY ACTIVITIY SCORE: 17
EATING MEALS: A LITTLE
SUGGESTED CMS G CODE MODIFIER DAILY ACTIVITY: CK
DRESSING REGULAR LOWER BODY CLOTHING: A LITTLE

## 2017-12-05 ASSESSMENT — PAIN SCALES - WONG BAKER: WONGBAKER_NUMERICALRESPONSE: DOESN'T HURT AT ALL

## 2017-12-05 NOTE — CARE PLAN
Problem: Pain Management  Goal: Pain level will decrease to patient's comfort goal  Patient pain being managed well

## 2017-12-05 NOTE — PROGRESS NOTES
Subjective:  States her strength continues to improve and feels much better today.  Slight HA. Denies dizziness    Obj:  AAOx4. Tongue ML. No drfit. FCx4        Temp (24hrs), Av.3 °C (97.4 °F), Min:36.2 °C (97.1 °F), Max:36.6 °C (97.8 °F)    Pulse: 61, Respiration: 14, Blood Pressure : 119/64, Pulse Oximetry: 93 %, O2 (LPM): 0          No intake or output data in the 24 hours ending 17 1211         • dexamethasone  4 mg     • benzocaine-menthol  1 Lozenge     • levetiracetam  500 mg     • benzocaine-menthol  1 Lozenge     • fish oil  1,000 mg     • multivitamin  1 Tab     • senna-docusate  2 Tab      And   • polyethylene glycol/lytes  1 Packet      And   • magnesium hydroxide  30 mL      And   • bisacodyl  10 mg     • acetaminophen  650 mg     • labetalol  10 mg      Or   • hydrALAZINE  10 mg     • atorvastatin  40 mg     • aspirin  325 mg      Or   • aspirin  324 mg      Or   • aspirin  300 mg     • ondansetron  4 mg     • ondansetron  4 mg         No results for input(s): WBC, RBC, HEMOGLOBIN, ISTATHGB, HEMATOCRIT, ISTATHEMCRT, MCV, MCH, PLATELETCT in the last 72 hours.  No results for input(s): SODIUM, ISTATSODIUM, POTASSIUM, ISTATPOTASS, CHLORIDE, ISTATCHLORI, CO2, ISTATCO2, GLUCOSE, ISTATGLUCOS, BUN, ISTATBUN, CREATININE, ISTATCREAT, CALCIUM in the last 72 hours.        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 Saldana later this month

## 2017-12-05 NOTE — PROGRESS NOTES
[facility] Hospitalist Progress Note    Date of Service: 2017    Chief Complaint  77 y.o. female admitted 2017 with difficulty ambulating and has known metastatic breast cancer.    Interval Problem Update  Stroke has been ruled out  Decadron was started for brain edema and her mobility is more stable on decadron therapy    Consultants/Specialty  Neurosurgery  Radiation oncology  neurology    Disposition  Rehab vs snf        Review of Systems   Constitutional: Negative for chills, diaphoresis and fever.   Respiratory: Negative for cough and shortness of breath.    Cardiovascular: Negative for chest pain and leg swelling.   Gastrointestinal: Negative for abdominal pain, constipation and vomiting.   Genitourinary: Negative for dysuria and urgency.   Skin: Negative for rash.   Neurological: Negative for dizziness, tingling, tremors and headaches.      Physical Exam  Laboratory/Imaging   Hemodynamics  Temp (24hrs), Av.3 °C (97.4 °F), Min:36.2 °C (97.1 °F), Max:36.6 °C (97.8 °F)   Temperature: 36.2 °C (97.2 °F)  Pulse  Av  Min: 57  Max: 89    Blood Pressure : 119/64      Respiratory      Respiration: 14, Pulse Oximetry: 93 %        RUL Breath Sounds: Clear, RML Breath Sounds: Clear, RLL Breath Sounds: Diminished, WILLIAMS Breath Sounds: Clear, LLL Breath Sounds: Diminished    Fluids  No intake or output data in the 24 hours ending 17 1101    Nutrition  Orders Placed This Encounter   Procedures   • DIET ORDER     Standing Status:   Standing     Number of Occurrences:   1     Order Specific Question:   Diet:     Answer:   Regular [1]     Physical Exam   Constitutional: No distress.   HENT:   Mouth/Throat: Oropharynx is clear and moist.   Eyes: Conjunctivae are normal.   Cardiovascular: Normal rate and regular rhythm.    Pulmonary/Chest: Effort normal and breath sounds normal.   Abdominal: Soft. Bowel sounds are normal.   Musculoskeletal: She exhibits no edema.   Neurological: She is alert. Coordination  abnormal.   Skin: Skin is warm and dry. She is not diaphoretic.   Nursing note and vitals reviewed.                               Assessment/Plan     CVA (cerebral vascular accident) (CMS-HCC)- (present on admission)   Assessment & Plan    Ruled out            Metastatic cancer (CMS-HCC)- (present on admission)   Assessment & Plan    History of breast cancer with mets to brain  Mri consistent with progression of neoplastic disease or possible radiation necrosis  Continue decadron  Radiation oncology consulted and will follow the patient after discharge        Metastasis to brain (CMS-HCC)- (present on admission)   Assessment & Plan    Weakness related to this is currently improved  She does have some coordination difficulty            Reviewed items::  Labs reviewed and Medications reviewed  Van catheter::  No Van  DVT prophylaxis pharmacological::  Not indicated at this time, ambulatory  DVT prophylaxis - mechanical:  Not indicated at this time, ambulatory  Ulcer Prophylaxis::  Not indicated

## 2017-12-05 NOTE — PROGRESS NOTES
aox4 doing well. Having little trouble moving to bathroom. Awaiting placement with rehab. Patient ambulated without trouble today. No longer has IV steroids. Everything now PO

## 2017-12-05 NOTE — DISCHARGE PLANNING
Transitional Care Navigator:    TCN met with patient to discuss transitional care services for discharge planning.  IRF level of care has been recommended. TCN explained that [facility] is not contracted with patient's secondary insurance.  Although pt would prefer [facility] she is in agreement with sending a referral to [facility] due to insurance.  Choice form completed and faxed to [facility]. IMM also completed.  SW aware.  TCN will follow-up as needed.

## 2017-12-05 NOTE — CARE PLAN
Problem: Safety  Goal: Will remain free from injury  Outcome: PROGRESSING AS EXPECTED  Patient rings appropriately when needing to move from the bed.

## 2017-12-06 PROCEDURE — A9270 NON-COVERED ITEM OR SERVICE: HCPCS | Performed by: PSYCHIATRY & NEUROLOGY

## 2017-12-06 PROCEDURE — A9270 NON-COVERED ITEM OR SERVICE: HCPCS | Performed by: HOSPITALIST

## 2017-12-06 PROCEDURE — 700102 HCHG RX REV CODE 250 W/ 637 OVERRIDE(OP): Performed by: HOSPITALIST

## 2017-12-06 PROCEDURE — 99231 SBSQ HOSP IP/OBS SF/LOW 25: CPT | Performed by: INTERNAL MEDICINE

## 2017-12-06 PROCEDURE — 700102 HCHG RX REV CODE 250 W/ 637 OVERRIDE(OP): Performed by: PSYCHIATRY & NEUROLOGY

## 2017-12-06 PROCEDURE — 770006 HCHG ROOM/CARE - MED/SURG/GYN SEMI*

## 2017-12-06 PROCEDURE — 97116 GAIT TRAINING THERAPY: CPT

## 2017-12-06 RX ADMIN — LEVETIRACETAM 500 MG: 500 TABLET, FILM COATED ORAL at 08:00

## 2017-12-06 RX ADMIN — STANDARDIZED SENNA CONCENTRATE AND DOCUSATE SODIUM 2 TABLET: 8.6; 5 TABLET, FILM COATED ORAL at 20:49

## 2017-12-06 RX ADMIN — ASPIRIN 325 MG: 325 TABLET, COATED ORAL at 08:00

## 2017-12-06 RX ADMIN — ATORVASTATIN CALCIUM 40 MG: 40 TABLET, FILM COATED ORAL at 20:48

## 2017-12-06 RX ADMIN — OMEGA-3 FATTY ACIDS CAP 1000 MG 1000 MG: 1000 CAP at 08:00

## 2017-12-06 RX ADMIN — STANDARDIZED SENNA CONCENTRATE AND DOCUSATE SODIUM 2 TABLET: 8.6; 5 TABLET, FILM COATED ORAL at 08:00

## 2017-12-06 RX ADMIN — LEVETIRACETAM 500 MG: 500 TABLET, FILM COATED ORAL at 20:49

## 2017-12-06 RX ADMIN — DEXAMETHASONE 4 MG: 4 TABLET ORAL at 08:00

## 2017-12-06 RX ADMIN — THERA TABS 1 TABLET: TAB at 08:00

## 2017-12-06 ASSESSMENT — ENCOUNTER SYMPTOMS
SHORTNESS OF BREATH: 0
VOMITING: 0
TREMORS: 0
DIAPHORESIS: 0
DIZZINESS: 0
WHEEZING: 0
NAUSEA: 0
TINGLING: 0
PALPITATIONS: 0
COUGH: 0
FEVER: 0
HEADACHES: 0
ABDOMINAL PAIN: 0
CONSTIPATION: 0

## 2017-12-06 ASSESSMENT — COGNITIVE AND FUNCTIONAL STATUS - GENERAL
SUGGESTED CMS G CODE MODIFIER MOBILITY: CK
MOBILITY SCORE: 18
MOVING TO AND FROM BED TO CHAIR: A LITTLE
WALKING IN HOSPITAL ROOM: A LITTLE
MOVING FROM LYING ON BACK TO SITTING ON SIDE OF FLAT BED: A LITTLE
TURNING FROM BACK TO SIDE WHILE IN FLAT BAD: A LITTLE
STANDING UP FROM CHAIR USING ARMS: A LITTLE
CLIMB 3 TO 5 STEPS WITH RAILING: A LITTLE

## 2017-12-06 ASSESSMENT — PAIN SCALES - GENERAL
PAINLEVEL_OUTOF10: 0

## 2017-12-06 ASSESSMENT — GAIT ASSESSMENTS
ASSISTIVE DEVICE: FRONT WHEEL WALKER
DISTANCE (FEET): 250
DEVIATION: INCREASED BASE OF SUPPORT;DECREASED HEEL STRIKE
GAIT LEVEL OF ASSIST: SUPERVISED

## 2017-12-06 NOTE — CARE PLAN
Problem: Pain Management  Goal: Pain level will decrease to patient's comfort goal  Patient denies pain today. Will continue to assess.

## 2017-12-06 NOTE — DISCHARGE PLANNING
Medical Social Worker    RAMÓN faxed Acute Rehab Referral to  Rehab.    P: [phone]  F: [phone]

## 2017-12-06 NOTE — PROGRESS NOTES
Assumed pt care at 1900. Received bedside report. Call light and all personal belongings within reach. Pt alert and oriented X 4.

## 2017-12-06 NOTE — PROGRESS NOTES
Pt with no IV access and no order to have no IV access. Discussed with the patient the importance of IV access in case of emergency situation. Pt declined and does not want to have an IV placed at this time despite education. Gave pt options to have IV placed in the morning and pt declined.

## 2017-12-06 NOTE — THERAPY
"Pt agreeable to tx today. Pt gait has improved and is more functional. Pt demonstrated L sided deviation and poor safety awarenss, 2/2 L visual deficits. Discussed deficits w/pt and safety awareness. When an obstcle is placed in front of pt, pt able to process and demonstrate ability to navigate it. Pt cont o demonstrate funcitonal deficits and would benefit from further acute PT txs to progress towards goals and independence.    Physical Therapy Treatment completed.   Bed Mobility:  Supine to Sit:  (found up in chair)  Transfers: Sit to Stand: Supervised  Gait: Level Of Assist: Supervised with Front-Wheel Walker       Plan of Care: Will benefit from Physical Therapy 3 times per week      See \"Rehab Therapy-Acute\" Patient Summary Report for complete documentation.       "

## 2017-12-06 NOTE — PROGRESS NOTES
Subjective:  States her strength continues to improve and feels much better today. Eating, amb, voiding    Obj:  AAOx4. Tongue ML. Unable to perform drift testing as she gets dizzy with eyes closed , left arm 4/5- improved per patient, RUE and BLE 5/5,  FCx4, has superior and left lateral visual field cuts        Temp (24hrs), Av.5 °C (97.7 °F), Min:36.2 °C (97.1 °F), Max:36.7 °C (98 °F)    Pulse: 69, Respiration: 18, Blood Pressure : 126/62, Pulse Oximetry: 95 %, O2 (LPM): 0          No intake or output data in the 24 hours ending 17 1211         • dexamethasone  4 mg     • benzocaine-menthol  1 Lozenge     • levetiracetam  500 mg     • benzocaine-menthol  1 Lozenge     • fish oil  1,000 mg     • multivitamin  1 Tab     • senna-docusate  2 Tab      And   • polyethylene glycol/lytes  1 Packet      And   • magnesium hydroxide  30 mL      And   • bisacodyl  10 mg     • acetaminophen  650 mg     • labetalol  10 mg      Or   • hydrALAZINE  10 mg     • atorvastatin  40 mg     • aspirin  325 mg      Or   • aspirin  324 mg      Or   • aspirin  300 mg     • ondansetron  4 mg     • ondansetron  4 mg         No results for input(s): WBC, RBC, HEMOGLOBIN, ISTATHGB, HEMATOCRIT, ISTATHEMCRT, MCV, MCH, PLATELETCT in the last 72 hours.  No results for input(s): SODIUM, ISTATSODIUM, POTASSIUM, ISTATPOTASS, CHLORIDE, ISTATCHLORI, CO2, ISTATCO2, GLUCOSE, ISTATGLUCOS, BUN, ISTATBUN, CREATININE, ISTATCREAT, CALCIUM in the last 72 hours.        Assessment:  Tumor vs radiation nec  Doing well    Plan:  Neuro and XRT seeing  Pt to be treated in clincial trial- dex  Ok to Rehab from Nsx perspective  Has appointment with Dr. Saldana later this month

## 2017-12-06 NOTE — DISCHARGE PLANNING
Medical Social Worker    RAMÓN spoke with [facility] who stated they never received the referral. RAMÓN asked [facility] to please resend the referral.

## 2017-12-06 NOTE — CARE PLAN
Problem: Communication  Goal: The ability to communicate needs accurately and effectively will improve    Intervention: [place] patient and significant other/support system to call light to alert staff of needs  Able to make needs known, instructed to call for assistance       Problem: Mobility  Goal: Risk for activity intolerance will decrease    Intervention: Assess and monitor signs of activity intolerance  1 assist with FWW, steady gait

## 2017-12-06 NOTE — CARE PLAN
Problem: Safety  Goal: Will remain free from falls  Outcome: PROGRESSING AS EXPECTED      Problem: Mobility  Goal: Risk for activity intolerance will decrease  Outcome: PROGRESSING AS EXPECTED

## 2017-12-06 NOTE — PROGRESS NOTES
[place] Fall Risk Assessment:     Last Known Fall: Within the last month  Mobility: Immobilized/requires assist of one person  Medications: Cardiovascular or central nervous system meds  Mental Status/LOC/Awareness: Awake, alert, and oriented to date, place, and person  Toileting Needs: No needs  Volume/Electrolyte Status: No problems  Communication/Sensory: Visual (Glasses)/hearing deficit  Behavior: Appropriate behavior  [place] Fall Risk Total: 10  Fall Risk Level: LOW RISK    Universal Fall Precautions:  call light/belongings in reach, bed in low position and locked, wheelchairs and assistive devices out of sight, siderails up x 2, use non-slip footwear, adequate lighting, clutter free and spill free environment, educate on level of risk, educate to call for assistance    Fall Risk Level Interventions:   TRIAL ([employer], NEURO, MED RONALDO 5) Low Fall Risk Interventions  Place yellow fall risk ID band on patient: completed  Provide patient/family education based on risk assessment: completed  Educate patient/family to call staff for assistance when getting out of bed: completed  Place fall precaution signage outside patient door: verifiedTRIAL ([employer], NEURO, MED RONALDO 5) Moderate Fall Risk Interventions  Place yellow fall risk ID band on patient: verified  Provide patient/family education based on risk assessment : verified  Educate patient/family to call staff for assistance when getting out of bed: verified  Place fall precaution signage outside patient door: verified  Utilize bed/chair fall alarm: verified     Patient Specific Interventions:     Medication: review medications with patient and family  Mental Status/LOC/Awareness: reinforce falls education, check on patient hourly, utilize bed/chair fall alarm and reinforce the use of call light  Toileting: provide frquent toileting  Volume/Electrolyte Status: ensure patient remains hydrated  Communication/Sensory: update plan of care on  whiteboard  Behavioral: engage patient in daily activities  Mobility: utilize bed/chair fall alarm, ensure bed is locked and in lowest position and provide appropriate assistive device

## 2017-12-06 NOTE — PROGRESS NOTES
[place] Fall Risk Assessment:     Last Known Fall: Within the last month  Mobility: Use of assistive device/requires assist of two people  Medications: No meds  Mental Status/LOC/Awareness: Awake, alert, and oriented to date, place, and person  Toileting Needs: No needs  Volume/Electrolyte Status: No problems  Communication/Sensory: Visual (Glasses)/hearing deficit  Behavior: Appropriate behavior  [place] Fall Risk Total: 10  Fall Risk Level: LOW RISK    Universal Fall Precautions:  call light/belongings in reach, bed in low position and locked, wheelchairs and assistive devices out of sight, siderails up x 2, use non-slip footwear, adequate lighting, clutter free and spill free environment, educate on level of risk, educate to call for assistance    Fall Risk Level Interventions:   TRIAL ([employer], NEURO, MED RONALDO 5) Low Fall Risk Interventions  Place yellow fall risk ID band on patient: completed  Provide patient/family education based on risk assessment: completed  Educate patient/family to call staff for assistance when getting out of bed: completed  Place fall precaution signage outside patient door: completedTRIAL ([employer], NEURO, MED RONALDO 5) Moderate Fall Risk Interventions  Place yellow fall risk ID band on patient: verified  Provide patient/family education based on risk assessment : verified  Educate patient/family to call staff for assistance when getting out of bed: verified  Place fall precaution signage outside patient door: verified  Utilize bed/chair fall alarm: verified     Patient Specific Interventions:     Medication: review medications with patient and family  Mental Status/LOC/Awareness: reinforce falls education, check on patient hourly, utilize bed/chair fall alarm, reinforce the use of call light and provide activity  Toileting: provide frquent toileting and instruct patient/family on the use of grab bars  Volume/Electrolyte Status: ensure patient remains  hydrated  Communication/Sensory: update plan of care on whiteboard  Behavioral: encourage patient to voice feelings, engage patient in daily activities and instruct/reinforce fall program rationale  Mobility: schedule physical activity throughout the day, dangle prior to standing, utilize bed/chair fall alarm, ensure bed is locked and in lowest position, provide appropriate assistive device and instruct patient to exit bed on their strongest side

## 2017-12-06 NOTE — PROGRESS NOTES
[place] Fall Risk Assessment:     Last Known Fall: Within the last month  Mobility: Immobilized/requires assist of one person  Medications: Cardiovascular or central nervous system meds  Mental Status/LOC/Awareness: Awake, alert, and oriented to date, place, and person  Toileting Needs: No needs  Volume/Electrolyte Status: No problems  Communication/Sensory: Visual (Glasses)/hearing deficit  Behavior: Appropriate behavior  [place] Fall Risk Total: 10  Fall Risk Level: LOW RISK    Universal Fall Precautions:  call light/belongings in reach, bed in low position and locked, wheelchairs and assistive devices out of sight, siderails up x 2, use non-slip footwear, adequate lighting, clutter free and spill free environment, educate on level of risk, educate to call for assistance    Fall Risk Level Interventions:   TRIAL ("[employer] NEURO, MED RONALDO 5) Low Fall Risk Interventions  Place yellow fall risk ID band on patient: completed  Provide patient/family education based on risk assessment: completed  Educate patient/family to call staff for assistance when getting out of bed: completed  Place fall precaution signage outside patient door: verifiedTRIAL ("[employer] NEURO, MED RONALDO 5) Moderate Fall Risk Interventions  Place yellow fall risk ID band on patient: verified  Provide patient/family education based on risk assessment : verified  Educate patient/family to call staff for assistance when getting out of bed: verified  Place fall precaution signage outside patient door: verified  Utilize bed/chair fall alarm: verified     Patient Specific Interventions:     Medication: review medications with patient and family  Mental Status/LOC/Awareness: check on patient hourly, utilize bed/chair fall alarm and reinforce the use of call light  Toileting: provide frquent toileting  Volume/Electrolyte Status: ensure patient remains hydrated  Communication/Sensory: update plan of care on whiteboard  Behavioral: engage patient in  daily activities  Mobility: dangle prior to standing, utilize bed/chair fall alarm, ensure bed is locked and in lowest position and provide appropriate assistive device

## 2017-12-06 NOTE — DISCHARGE PLANNING
Medical Social Worker    RAMÓN spoke to [facility]. They stated they need updated pt/ot notes. RAMÓN faxed updated OT note. Pt is still pending updated PT.     Add:  RAMÓN faxed updated PT note to  Rehab.

## 2017-12-06 NOTE — PROGRESS NOTES
Assumed care of pt. A/Ox4. Able to make needs known. 1 assist with FWW. Regular diet. POC pending rehab placement. Bed alarm in use. Call light in reach, bed in low position, will continue hourly rounding.

## 2017-12-06 NOTE — PROGRESS NOTES
Pt alert and oriented X 4. Pt denies chest pain, sob, numbness and tingling, nausea and vomiting, headache, and blurry or double vision. Pt declines pain. Pt ambulating stand by assist with a walker. Pt declines use of SCD's and is ambulating frequently, education provided but okay per Dr. Campos. POC discussed and education provided on administered medications. All questions and concerns addressed. Fall precautions, hourly rounding and Q4 hour neuro checks in place.

## 2017-12-06 NOTE — THERAPY
"Occupational Therapy Treatment completed with focus on ADLs, ADL transfers, patient education and upper extremity function.  Functional Status:  Pt was seen for Occupational Therapy treatment today, see Therapy Kardex for details. Treatment included education in breath control with activity and at rest, self pacing techs and energy conservation for pain management. Educated pt in safety awareness techs as well. Fall prevention education reviewed. Pt c/o blind spots in both eyes and Left peripheral vision is \"gone\". Pt encouraged to visually scan with ambulating ADL's with FWW. Pt did run into the door frame and some objects not seen with ambulation while distracted with talking. Pt required CGA for sit to stand from chair and toilet with FWW. Toilet transfers CGA with verbal cues for safety. Toilet hygiene seated base with set up. Pt demonstrated SBA for UB dressing, SBA/CGA for LB dressing standing unaware that pants had falling down around feet pt tried to begin an ambulation step. Verbal cues needed stop and pull up pants, tie them before walking. Pt laughed. \"Oh of course'; 'I didn't feel them fall down\". Pt stood at sink for oral hygiene and grooming with SBA/CGA. Pt's LUE is increasing in strength as demo with ADL's. Slight left neglect is still present.  Pt stated she does the cooking at home.Discussed and educated pt in the need for a walker tray for FWW at home for safety in the kitchen with hot meal prep.  Pt will need to be more Indep to safely return home to Indep living.  Pt also demonstrated  CGA/Min A for Ambulating ADL's with FWW. Pt is very motivated but will need assist for seated showers, shower transfers and other self care, I ADL's. Pt was left up in a chair , call light in reach, neighbor visiting and nursing is aware. RN updated on OT treatment findings and recommendations.  Pt gives good effort, is motivated and wants further therapy before returning home to return to PLOF and Indep living. . " "Continue Occupational Therapy services as per plan.    Plan of Care: Will benefit from Occupational Therapy 5 times per week  Discharge Recommendations:  Equipment Will Continue to Assess for Equipment Needs. Post-acute therapy Discharge to a transitional care facility for continued skilled therapy services.    See \"Rehab Therapy-Acute\" Patient Summary Report for complete documentation.   "

## 2017-12-06 NOTE — CARE PLAN
Problem: Safety  Goal: Will remain free from injury  Outcome: PROGRESSING AS EXPECTED  Patient remains safe up walking with walker with one SBA. Will continue to assess.

## 2017-12-06 NOTE — PROGRESS NOTES
[facility] Hospitalist Progress Note    Date of Service: 2017    Chief Complaint  77 y.o. female admitted 2017 with difficulty ambulating and has known metastatic breast cancer.    Interval Problem Update  Stroke has been ruled out  Decadron was started for brain edema and her mobility is more stable on decadron therapy  Today she is waiting for insurance approval for rehab vs skilled level of care  Consultants/Specialty  Neurosurgery  Radiation oncology  neurology    Disposition  Rehab vs snf        Review of Systems   Constitutional: Negative for diaphoresis and fever.   Respiratory: Negative for cough, shortness of breath and wheezing.    Cardiovascular: Negative for chest pain and palpitations.   Gastrointestinal: Negative for abdominal pain, constipation, nausea and vomiting.   Genitourinary: Negative for dysuria and urgency.   Skin: Negative for rash.   Neurological: Negative for dizziness, tingling, tremors and headaches.      Physical Exam  Laboratory/Imaging   Hemodynamics  Temp (24hrs), Av.5 °C (97.7 °F), Min:36.2 °C (97.1 °F), Max:36.7 °C (98 °F)   Temperature: 36.5 °C (97.7 °F)  Pulse  Av.9  Min: 57  Max: 89    Blood Pressure : 146/71      Respiratory      Respiration: 16, Pulse Oximetry: 95 %        RUL Breath Sounds: Clear, RML Breath Sounds: Clear, RLL Breath Sounds: Diminished, WILLIAMS Breath Sounds: Clear, LLL Breath Sounds: Diminished    Fluids    Intake/Output Summary (Last 24 hours) at 17 0937  Last data filed at 17 0400   Gross per 24 hour   Intake              120 ml   Output                0 ml   Net              120 ml       Nutrition  Orders Placed This Encounter   Procedures   • DIET ORDER     Standing Status:   Standing     Number of Occurrences:   1     Order Specific Question:   Diet:     Answer:   Regular [1]     Physical Exam   Constitutional: No distress.   Eyes: Conjunctivae are normal. No scleral icterus.   Neck: No JVD present.   Cardiovascular: Normal rate  and regular rhythm.    Pulmonary/Chest: Effort normal and breath sounds normal.   Abdominal: Soft. There is no tenderness.   Musculoskeletal: She exhibits no edema.   Neurological: She is alert. Coordination abnormal.   Skin: Skin is warm. She is not diaphoretic.   Nursing note and vitals reviewed.                               Assessment/Plan     CVA (cerebral vascular accident) (CMS-HCC)- (present on admission)   Assessment & Plan    Ruled out            Metastatic cancer (CMS-HCC)- (present on admission)   Assessment & Plan    History of breast cancer with mets to brain  MRI consistent with progression of neoplastic disease or possible radiation necrosis  Continue decadron  Radiation oncology consulted and will follow the patient after discharge        Metastasis to brain (CMS-HCC)- (present on admission)   Assessment & Plan    Weakness related to this is improved  She does have some coordination difficulty            Reviewed items::  Labs reviewed and Medications reviewed  Van catheter::  No Van  DVT prophylaxis pharmacological::  Not indicated at this time, ambulatory  DVT prophylaxis - mechanical:  Not indicated at this time, ambulatory  Ulcer Prophylaxis::  Not indicated

## 2017-12-07 PROCEDURE — 700102 HCHG RX REV CODE 250 W/ 637 OVERRIDE(OP): Performed by: PSYCHIATRY & NEUROLOGY

## 2017-12-07 PROCEDURE — 700102 HCHG RX REV CODE 250 W/ 637 OVERRIDE(OP): Performed by: HOSPITALIST

## 2017-12-07 PROCEDURE — 97535 SELF CARE MNGMENT TRAINING: CPT

## 2017-12-07 PROCEDURE — 770006 HCHG ROOM/CARE - MED/SURG/GYN SEMI*

## 2017-12-07 PROCEDURE — 99232 SBSQ HOSP IP/OBS MODERATE 35: CPT | Performed by: INTERNAL MEDICINE

## 2017-12-07 PROCEDURE — A9270 NON-COVERED ITEM OR SERVICE: HCPCS | Performed by: HOSPITALIST

## 2017-12-07 PROCEDURE — A9270 NON-COVERED ITEM OR SERVICE: HCPCS | Performed by: PSYCHIATRY & NEUROLOGY

## 2017-12-07 RX ORDER — DEXAMETHASONE 1 MG
2 TABLET ORAL DAILY
Status: DISCONTINUED | OUTPATIENT
Start: 2017-12-08 | End: 2017-12-07

## 2017-12-07 RX ORDER — DEXAMETHASONE 4 MG/1
4 TABLET ORAL DAILY
Status: DISCONTINUED | OUTPATIENT
Start: 2017-12-08 | End: 2017-12-08 | Stop reason: HOSPADM

## 2017-12-07 RX ADMIN — THERA TABS 1 TABLET: TAB at 07:50

## 2017-12-07 RX ADMIN — DEXAMETHASONE 4 MG: 4 TABLET ORAL at 07:49

## 2017-12-07 RX ADMIN — LEVETIRACETAM 500 MG: 500 TABLET, FILM COATED ORAL at 07:50

## 2017-12-07 RX ADMIN — LEVETIRACETAM 500 MG: 500 TABLET, FILM COATED ORAL at 22:12

## 2017-12-07 RX ADMIN — ASPIRIN 325 MG: 325 TABLET, COATED ORAL at 07:49

## 2017-12-07 RX ADMIN — ATORVASTATIN CALCIUM 40 MG: 40 TABLET, FILM COATED ORAL at 22:12

## 2017-12-07 RX ADMIN — OMEGA-3 FATTY ACIDS CAP 1000 MG 1000 MG: 1000 CAP at 07:49

## 2017-12-07 RX ADMIN — STANDARDIZED SENNA CONCENTRATE AND DOCUSATE SODIUM 2 TABLET: 8.6; 5 TABLET, FILM COATED ORAL at 22:13

## 2017-12-07 ASSESSMENT — COGNITIVE AND FUNCTIONAL STATUS - GENERAL
EATING MEALS: A LITTLE
DAILY ACTIVITIY SCORE: 18
SUGGESTED CMS G CODE MODIFIER DAILY ACTIVITY: CK
TOILETING: A LITTLE
DRESSING REGULAR UPPER BODY CLOTHING: A LITTLE
PERSONAL GROOMING: A LITTLE
DRESSING REGULAR LOWER BODY CLOTHING: A LITTLE
HELP NEEDED FOR BATHING: A LITTLE

## 2017-12-07 ASSESSMENT — ENCOUNTER SYMPTOMS
NAUSEA: 0
COUGH: 0
TREMORS: 0
FOCAL WEAKNESS: 0
VOMITING: 0
SPEECH CHANGE: 0
DIAPHORESIS: 0
MYALGIAS: 0
DIZZINESS: 0
CONSTIPATION: 0
SHORTNESS OF BREATH: 0
TINGLING: 0
SORE THROAT: 0
ABDOMINAL PAIN: 0
CHILLS: 0
HEADACHES: 0
FEVER: 0

## 2017-12-07 ASSESSMENT — PAIN SCALES - GENERAL
PAINLEVEL_OUTOF10: 0
PAINLEVEL_OUTOF10: 0

## 2017-12-07 NOTE — DISCHARGE PLANNING
Medical Social Worker    SW spoke to [facility] admissions. The director is still pending a decision. SW asked to please inform CCS and SW once there is a decision.

## 2017-12-07 NOTE — PROGRESS NOTES
Assumed care of pt. A/Ox4. Able to make needs known. No IV access. Regular diet. SBA with FWW due to vision loss, some left side weakness. POC [facility] rehab pending. Bed alarm in place. Call light in reach, bed in low position, will continue hourly rounding.

## 2017-12-07 NOTE — THERAPY
"Occupational Therapy Treatment completed with focus on ADLs, ADL transfers and patient education.  Functional Status:  Pt seen for OT tx. Pt up in chair at beginning of session. Pt continues to have L side inattention and requires cueing for scanning to attend to L side. Pt completed tolieting and toilet transfer w/ SBA. Pt able to complete standing at the sink w/ SBA and setup of ADL tools on L side. Pt required extra time to find objects on L side w/ min cueing to scan thoroughly. Pt educated on keeping objects on L side to increase attention. Pt accepting to education. She is motivated to regain strength and endurance to regain independence in ADLs and ADL transfers.   Plan of Care: Will benefit from Occupational Therapy 5 times per week  Discharge Recommendations:  Equipment Will Continue to Assess for Equipment Needs.     See \"Rehab Therapy-Acute\" Patient Summary Report for complete documentation.   "

## 2017-12-07 NOTE — PROGRESS NOTES
[place] Fall Risk Assessment:     Last Known Fall: Within the last month  Mobility: Immobilized/requires assist of one person  Medications: Cardiovascular or central nervous system meds  Mental Status/LOC/Awareness: Awake, alert, and oriented to date, place, and person  Toileting Needs: No needs  Volume/Electrolyte Status: No problems  Communication/Sensory: Visual (Glasses)/hearing deficit  Behavior: Appropriate behavior  [place] Fall Risk Total: 10  Fall Risk Level: LOW RISK    Universal Fall Precautions:  call light/belongings in reach, bed in low position and locked, wheelchairs and assistive devices out of sight, siderails up x 2, use non-slip footwear, adequate lighting, clutter free and spill free environment, educate on level of risk, educate to call for assistance    Fall Risk Level Interventions:   TRIAL ([email or web address], NEURO, MED RONALDO 5) Low Fall Risk Interventions  Place yellow fall risk ID band on patient: completed  Provide patient/family education based on risk assessment: completed  Educate patient/family to call staff for assistance when getting out of bed: completed  Place fall precaution signage outside patient door: verifiedTRIAL ([email or web address], NEURO, MED RONALDO 5) Moderate Fall Risk Interventions  Place yellow fall risk ID band on patient: verified  Provide patient/family education based on risk assessment : verified  Educate patient/family to call staff for assistance when getting out of bed: verified  Place fall precaution signage outside patient door: verified  Utilize bed/chair fall alarm: verified     Patient Specific Interventions:     Medication: review medications with patient and family  Mental Status/LOC/Awareness: reinforce falls education, check on patient hourly, utilize bed/chair fall alarm and reinforce the use of call light  Toileting: provide frquent toileting, instruct patient/family on the use of grab bars, instruct patient/family on the need to call for assistance when  toileting and do not leave patient unattended in bathroom/refer to toileting scripting  Volume/Electrolyte Status: monitor abnormal lab values  Communication/Sensory: update plan of care on whiteboard and ensure proper positioning when transferrng/ambulating  Behavioral: encourage patient to voice feelings and administer medication as ordered  Mobility: dangle prior to standing, utilize bed/chair fall alarm and ensure bed is locked and in lowest position

## 2017-12-07 NOTE — PROGRESS NOTES
Irena [place] Fall Risk Assessment:     Last Known Fall: Within the last month  Mobility: Immobilized/requires assist of one person  Medications: Cardiovascular or central nervous system meds  Mental Status/LOC/Awareness: Awake, alert, and oriented to date, place, and person  Toileting Needs: No needs  Volume/Electrolyte Status: No problems  Communication/Sensory: Visual (Glasses)/hearing deficit  Behavior: Appropriate behavior  [place]rena [place] Fall Risk Total: 10  Fall Risk Level: LOW RISK    Universal Fall Precautions:  call light/belongings in reach, bed in low position and locked, wheelchairs and assistive devices out of sight, siderails up x 2, use non-slip footwear, adequate lighting, clutter free and spill free environment, educate on level of risk, educate to call for assistance    Fall Risk Level Interventions:   TRIAL ([employer], NEURO, MED RONALDO 5) Low Fall Risk Interventions  Place yellow fall risk ID band on patient: verified  Provide patient/family education based on risk assessment: completed  Educate patient/family to call staff for assistance when getting out of bed: completed  Place fall precaution signage outside patient door: verifiedTRIAL ([employer], NEURO, MED RONALDO 5) Moderate Fall Risk Interventions  Place yellow fall risk ID band on patient: verified  Provide patient/family education based on risk assessment : verified  Educate patient/family to call staff for assistance when getting out of bed: verified  Place fall precaution signage outside patient door: verified  Utilize bed/chair fall alarm: verified     Patient Specific Interventions:     Medication: review medications with patient and family  Mental Status/LOC/Awareness: check on patient hourly, utilize bed/chair fall alarm and reinforce the use of call light  Toileting: provide frquent toileting  Volume/Electrolyte Status: ensure patient remains hydrated  Communication/Sensory: update plan of care on whiteboard  Behavioral: engage patient in  daily activities  Mobility: dangle prior to standing, utilize bed/chair fall alarm, ensure bed is locked and in lowest position and provide appropriate assistive device

## 2017-12-07 NOTE — CARE PLAN
Problem: Communication  Goal: The ability to communicate needs accurately and effectively will improve    Intervention: [place] patient and significant other/support system to call light to alert staff of needs  Able to make needs known, instructed to call for assistance      Problem: Safety  Goal: Will remain free from falls    Intervention: Assess risk factors for falls  Risk to fall, bed alarm in place, ambulates with FWW

## 2017-12-07 NOTE — PROGRESS NOTES
[facility] Hospitalist Progress Note    Date of Service: 2017    Chief Complaint  77 y.o. female admitted 2017 with difficulty ambulating and has known metastatic breast cancer.    Interval Problem Update  Stroke has been ruled out  Decadron was started for brain edema and her mobility is more stable on decadron therapy  Today she is waiting for insurance approval for discharge for ongoing therapies    Consultants/Specialty  Neurosurgery  Radiation oncology  neurology    Disposition  Rehab vs snf        Review of Systems   Constitutional: Negative for chills, diaphoresis and fever.   HENT: Negative for congestion and sore throat.    Respiratory: Negative for cough and shortness of breath.    Cardiovascular: Negative for chest pain.   Gastrointestinal: Negative for abdominal pain, constipation, nausea and vomiting.   Genitourinary: Negative for dysuria, frequency, hematuria and urgency.   Musculoskeletal: Negative for myalgias.   Neurological: Negative for dizziness, tingling, tremors, speech change, focal weakness and headaches.      Physical Exam  Laboratory/Imaging   Hemodynamics  Temp (24hrs), Av.4 °C (97.5 °F), Min:36.2 °C (97.1 °F), Max:36.5 °C (97.7 °F)   Temperature: 36.4 °C (97.5 °F)  Pulse  Av  Min: 57  Max: 89    Blood Pressure : 114/60      Respiratory      Respiration: 16, Pulse Oximetry: 94 %        RUL Breath Sounds: Clear, RML Breath Sounds: Clear, RLL Breath Sounds: Diminished, WILLIAMS Breath Sounds: Clear, LLL Breath Sounds: Diminished    Fluids  No intake or output data in the 24 hours ending 17 1400    Nutrition  Orders Placed This Encounter   Procedures   • DIET ORDER     Standing Status:   Standing     Number of Occurrences:   1     Order Specific Question:   Diet:     Answer:   Regular [1]     Physical Exam   Constitutional: No distress.   HENT:   Mouth/Throat: Oropharynx is clear and moist.   Eyes: No scleral icterus.   Neck: Neck supple. No JVD present.   Cardiovascular:  Normal rate and regular rhythm.    Pulmonary/Chest: Effort normal and breath sounds normal.   Abdominal: Soft. Bowel sounds are normal. She exhibits no distension. There is no tenderness.   Musculoskeletal: She exhibits no edema.   Neurological: She is alert. Coordination abnormal.   Skin: Skin is warm and dry. She is not diaphoretic.   Nursing note and vitals reviewed.                               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

## 2017-12-08 VITALS
HEART RATE: 77 BPM | HEIGHT: 62 IN | SYSTOLIC BLOOD PRESSURE: 113 MMHG | BODY MASS INDEX: 24.42 KG/M2 | DIASTOLIC BLOOD PRESSURE: 51 MMHG | OXYGEN SATURATION: 95 % | RESPIRATION RATE: 16 BRPM | WEIGHT: 132.72 LBS | TEMPERATURE: 98.6 F

## 2017-12-08 PROCEDURE — 99239 HOSP IP/OBS DSCHRG MGMT >30: CPT | Performed by: INTERNAL MEDICINE

## 2017-12-08 PROCEDURE — 700102 HCHG RX REV CODE 250 W/ 637 OVERRIDE(OP): Performed by: INTERNAL MEDICINE

## 2017-12-08 PROCEDURE — A9270 NON-COVERED ITEM OR SERVICE: HCPCS | Performed by: HOSPITALIST

## 2017-12-08 PROCEDURE — A9270 NON-COVERED ITEM OR SERVICE: HCPCS | Performed by: PSYCHIATRY & NEUROLOGY

## 2017-12-08 PROCEDURE — 700102 HCHG RX REV CODE 250 W/ 637 OVERRIDE(OP): Performed by: HOSPITALIST

## 2017-12-08 PROCEDURE — 700102 HCHG RX REV CODE 250 W/ 637 OVERRIDE(OP): Performed by: PSYCHIATRY & NEUROLOGY

## 2017-12-08 PROCEDURE — A9270 NON-COVERED ITEM OR SERVICE: HCPCS | Performed by: INTERNAL MEDICINE

## 2017-12-08 RX ORDER — ASPIRIN 325 MG
325 TABLET ORAL DAILY
Qty: 100 TAB
Start: 2017-12-09 | End: 2018-05-30

## 2017-12-08 RX ORDER — ATORVASTATIN CALCIUM 40 MG/1
40 TABLET, FILM COATED ORAL EVERY EVENING
Qty: 30 TAB
Start: 2017-12-08 | End: 2018-05-25

## 2017-12-08 RX ORDER — DEXAMETHASONE 4 MG/1
4 TABLET ORAL DAILY
Qty: 30 TAB | Refills: 0
Start: 2017-12-09 | End: 2017-12-27 | Stop reason: SDUPTHER

## 2017-12-08 RX ORDER — LEVETIRACETAM 500 MG/1
500 TABLET ORAL 2 TIMES DAILY
Qty: 60 TAB
Start: 2017-12-08 | End: 2018-09-05 | Stop reason: SDUPTHER

## 2017-12-08 RX ADMIN — OMEGA-3 FATTY ACIDS CAP 1000 MG 1000 MG: 1000 CAP at 08:22

## 2017-12-08 RX ADMIN — LEVETIRACETAM 500 MG: 500 TABLET, FILM COATED ORAL at 08:22

## 2017-12-08 RX ADMIN — ASPIRIN 325 MG: 325 TABLET, COATED ORAL at 08:22

## 2017-12-08 RX ADMIN — DEXAMETHASONE 4 MG: 4 TABLET ORAL at 08:22

## 2017-12-08 RX ADMIN — THERA TABS 1 TABLET: TAB at 08:22

## 2017-12-08 ASSESSMENT — PAIN SCALES - GENERAL: PAINLEVEL_OUTOF10: 0

## 2017-12-08 NOTE — DISCHARGE PLANNING
Received call from Kanika with [facility]. Per Kanika, they are unable to provide transport for patient until tomorrow due to van issues. Transport through [facility] van canceled.    Contacted [employer] and received quote of $40. Received Approved Services Form from Mike(RAMÓN). Form faxed to [employer]. Arranged patient's transport to [facility] at 1600 via [facility]. Mike() and Cherrie(Rehab) notified of transport time.

## 2017-12-08 NOTE — PROGRESS NOTES
Irena [place] Fall Risk Assessment:     Last Known Fall: Within the last month  Mobility: Immobilized/requires assist of one person  Medications: Cardiovascular or central nervous system meds  Mental Status/LOC/Awareness: Awake, alert, and oriented to date, place, and person  Toileting Needs: No needs  Volume/Electrolyte Status: No problems  Communication/Sensory: Visual (Glasses)/hearing deficit  Behavior: Appropriate behavior  [place]rena [place] Fall Risk Total: 10  Fall Risk Level: LOW RISK    Universal Fall Precautions:  call light/belongings in reach, bed in low position and locked, wheelchairs and assistive devices out of sight, siderails up x 2, use non-slip footwear, adequate lighting, clutter free and spill free environment, educate on level of risk, educate to call for assistance    Fall Risk Level Interventions:   TRIAL ([employer], NEURO, MED RONALDO 5) Low Fall Risk Interventions  Place yellow fall risk ID band on patient: verified  Provide patient/family education based on risk assessment: completed  Educate patient/family to call staff for assistance when getting out of bed: completed  Place fall precaution signage outside patient door: verifiedTRIAL ([employer]brain, NEURO, MED RONALDO 5) Moderate Fall Risk Interventions  Place yellow fall risk ID band on patient: verified  Provide patient/family education based on risk assessment : verified  Educate patient/family to call staff for assistance when getting out of bed: verified  Place fall precaution signage outside patient door: verified  Utilize bed/chair fall alarm: verified     Patient Specific Interventions:     Medication: review medications with patient and family  Mental Status/LOC/Awareness: reinforce falls education, check on patient hourly, utilize bed/chair fall alarm and reinforce the use of call light  Toileting: instruct patient/family on the use of grab bars, instruct patient/family on the need to call for assistance when toileting and do not leave patient  unattended in bathroom/refer to toileting scripting  Volume/Electrolyte Status: monitor abnormal lab values  Communication/Sensory: update plan of care on whiteboard  Behavioral: encourage patient to voice feelings and administer medication as ordered  Mobility: dangle prior to standing, utilize bed/chair fall alarm, ensure bed is locked and in lowest position, provide appropriate assistive device and instruct patient to exit bed on their strongest side

## 2017-12-08 NOTE — PROGRESS NOTES
DC instructions reviewed with pt and Daisy Brito, all questions addressed. Pt is ready for DC to [facility].      1430 Transport delayed until 1600.     1550 Pt Dc with Billy from [facility] via wheelchair with all belongings.

## 2017-12-08 NOTE — DISCHARGE PLANNING
[facility] has accepted patient and has bed available today. Mike(RAMÓN) notified.

## 2017-12-08 NOTE — DISCHARGE PLANNING
Received transport form from Mike(RAÓMN). Arranged patient's transport to [facility] at 1430 via [facility] van. Mike() and Cherrie(Rehab) notified of transport time.

## 2017-12-08 NOTE — DISCHARGE PLANNING
Medical Social Worker    Pt has [facility]

## 2017-12-08 NOTE — CARE PLAN
Problem: Safety  Goal: Will remain free from injury    Intervention: Provide assistance with mobility  One person stand by assist to chair and bathroom.

## 2017-12-08 NOTE — DISCHARGE PLANNING
Contacted [facility] for update on referral status, however, no answer. Voicemail left.

## 2017-12-08 NOTE — DISCHARGE SUMMARY
CHIEF COMPLAINT ON ADMISSION  Chief Complaint   Patient presents with   • Possible Stroke       CODE STATUS  Full Code    HPI & HOSPITAL COURSE  This is a 77 y.o. year old female here with increased difficulty walking due to imbalance. She has no stroke on MRI and has known metastatic squamous cell cancer. Oncology was consulted as well as neurology who agreed that it was likely a paraneoplastic process. Radiation oncology agreed to enroll the patient in a treatment trial and she was started on decadron 4mg daily for this. Her mobility improved but she continues to require physical therapy.    Therefore, she is discharged in fair and stable condition for further post-acute management.     SPECIFIC OUTPATIENT FOLLOW-UP  Radiation oncology in one week    DISCHARGE PROBLEM LIST  Active Problems:    Metastasis to brain (CMS-HCC) POA: Yes    Metastatic cancer (CMS-HCC) POA: Yes    CVA (cerebral vascular accident) (CMS-HCC) POA: Yes  Resolved Problems:    * No resolved hospital problems. *      FOLLOW UP  Future Appointments  Date Time Provider Department Center   12/14/2017 10:00 AM [facility]   12/21/2017 2:00 PM Cande DE SANTIAGO M.D. RADT None   1/9/2018 9:30 AM [facility]ISTA CT 1 [facility]     Sandee Thompson D.O.  [address]  [facility] [address]  [phone]    Schedule an appointment as soon as possible for a visit in 1 week  Hospital follow-up appointment with PCP      MEDICATIONS ON DISCHARGE   Ml Chavira   Home Medication Instructions [id number]    Printed on:12/08/17 1034   Medication Information                      aspirin (ASA) 325 MG Tab  Take 1 Tab by mouth every day.             atorvastatin (LIPITOR) 40 MG Tab  Take 1 Tab by mouth every evening.             dexamethasone (DECADRON) 4 MG Tab  Take 1 Tab by mouth every day.             levetiracetam (KEPPRA) 500 MG Tab  Take 1 Tab by mouth 2 Times a Day.             multivitamin (THERAGRAN) Tab  Take 1 Tab by mouth every day.              Omega-3 Fatty Acids (FISH OIL) 1200 MG Cap  Take 1 Cap by mouth every day.             vitamin B-12 CR (CYANOCOBALAMIN) 1000 MCG Tab CR tablet  Take 5,000 mg by mouth.                 DIET  Orders Placed This Encounter   Procedures   • DIET ORDER     Standing Status:   Standing     Number of Occurrences:   1     Order Specific Question:   Diet:     Answer:   Regular [1]       ACTIVITY  As tolerated and directed by skilled nursing.  Class 1 - no symptoms of any kind, and for whom ordinary physical activity does not cause fatigue, palpitations, dyspnea, or anginal pain.    LINES, DRAINS, AND WOUNDS  This is an automated list. Peripheral IVs will be removed prior to discharge.                     MENTAL STATUS ON TRANSFER  Level of Consciousness: Alert  Orientation : Oriented x 4  Speech: Speech Clear    CONSULTATIONS  Neurosurgery Dr. Holley  Radiation oncology Dr. Caro  Neurology Dr. Nolan    PROCEDURES  none    LABORATORY  Lab Results   Component Value Date/Time    SODIUM 136 11/29/2017 09:29 PM    POTASSIUM 3.7 11/29/2017 09:29 PM    CHLORIDE 107 11/29/2017 09:29 PM    CO2 23 11/29/2017 09:29 PM    GLUCOSE 105 (H) 11/29/2017 09:29 PM    BUN 12 11/29/2017 09:29 PM    CREATININE 0.52 11/29/2017 09:29 PM        Lab Results   Component Value Date/Time    WBC 7.5 11/29/2017 09:29 PM    HEMOGLOBIN 13.1 11/29/2017 09:29 PM    HEMATOCRIT 40.4 11/29/2017 09:29 PM    PLATELETCT 222 11/29/2017 09:29 PM        Total time of the discharge process exceeds 45 minutes.

## 2017-12-08 NOTE — CARE PLAN
Problem: Discharge Barriers/Planning  Goal: Patient's continuum of care needs will be met    Intervention: Involve patient and significant other/support system in setting and prioritizing goals for hospital stay and discharge  Pt informed of all updates for DC plan.

## 2017-12-08 NOTE — DISCHARGE PLANNING
Medical Social Worker    RAMÓN informed pt and bedside/charge RN that pt will DC to  Rehab by [facility] at 1430. RAMÓN gave completed DC Pack to bedside RN.

## 2017-12-08 NOTE — DISCHARGE INSTRUCTIONS
Discharge Instructions    Discharged to other by [facility] with escort. Discharged via wheelchair, hospital escort: Refused.  Special equipment needed: Not Applicable    Be sure to schedule a follow-up appointment with your primary care doctor or any specialists as instructed.     Discharge Plan:   Influenza Vaccine Indication: Not indicated: Previously immunized this influenza season and > 8 years of age    I understand that a diet low in cholesterol, fat, and sodium is recommended for good health. Unless I have been given specific instructions below for another diet, I accept this instruction as my diet prescription.   Other diet: Regular    Special Instructions: None    · Is patient discharged on Warfarin / Coumadin?   No     · Is patient Post Blood Transfusion?  No    Depression / Suicide Risk    As you are discharged from this [facility], it is important to learn how to keep safe from harming yourself.    Recognize the warning signs:  · Abrupt changes in personality, positive or negative- including increase in energy   · Giving away possessions  · Change in eating patterns- significant weight changes-  positive or negative  · Change in sleeping patterns- unable to sleep or sleeping all the time   · Unwillingness or inability to communicate  · Depression  · Unusual sadness, discouragement and loneliness  · Talk of wanting to die  · Neglect of personal appearance   · Rebelliousness- reckless behavior  · Withdrawal from people/activities they love  · Confusion- inability to concentrate     If you or a loved one observes any of these behaviors or has concerns about self-harm, here's what you can do:  · Talk about it- your feelings and reasons for harming yourself  · Remove any means that you might use to hurt yourself (examples: pills, rope, extension cords, firearm)  · Get professional help from the community (Mental Health, Substance Abuse, psychological counseling)  · Do not be alone:Call your Safe  Contact- someone whom you trust who will be there for you.  · Call your local CRISIS HOTLINE 626-8900 or 910-368-3064  · Call your local Children's Mobile Crisis Response Team [place] (332) 750-1101 or www.TOBESOFT  · Call the toll free National Suicide Prevention Hotlines   · National Suicide Prevention Lifeline 242-139-VSHO (7149)  · National [employer] Line Network 800-SUICIDE (950-1573)

## 2017-12-14 ENCOUNTER — APPOINTMENT (OUTPATIENT)
Dept: RADIOLOGY | Facility: MEDICAL CENTER | Age: 77
End: 2017-12-14
Attending: RADIOLOGY
Payer: MEDICARE

## 2017-12-14 NOTE — DOCUMENTATION QUERY
"DOCUMENTATION QUERY    PROVIDERS: Please select “Cosign w/ note”to reply to query.    To better represent the severity of illness of your patient, please review the following information and exercise your independent professional judgment in responding to this query.     Patient presented with stroke-like symptoms. There was some confusion as to whether the patient had a large infarct int he R MCA or radiation necrosis vs metastatic process.    Neurology consult originally documented stroke with edema, then it seems they ruled it out.    Discharge Summary documents, \"She has no stroke on MRI and has known metastatic squamous cell cancer. Oncology was consulted as well as neurology who agreed that it was likely a paraneoplastic process.\" Patient was enrolled in a clinical trial and began Decadron.    Discharge Diagnoses include CVA.    Please select all that apply:    1. Patient's symptoms related to stroke.  2. Patient's symptoms related to re-occurrence of brain metastasis.  3. Patient's symtpoms related to radiation necrosis.  4. Patient's symptoms related to a combination of the above (please document the combination)  5. Other explanation of clinical presentation (please document)  6. Unable to determine      The medical record reflects the following:   Clinical Findings  CVA   metastatic cancer   radiation necrosis   vasogenic edema   Treatment  Decadron   Risk Factors     Location within medical record  History and Physical, Progress Notes, Discharge Summary and Consult Notes     Thank you,   Lizzy Chamberlain          "

## 2017-12-18 NOTE — ADDENDUM NOTE
Encounter addended by: Daisy Pantoja R.N. on: 12/18/2017  2:42 PM<BR>    Actions taken: Visit diagnoses modified, Order list changed, Diagnosis association updated

## 2017-12-22 ENCOUNTER — HOSPITAL ENCOUNTER (OUTPATIENT)
Dept: RADIOLOGY | Facility: MEDICAL CENTER | Age: 77
End: 2017-12-22
Attending: RADIOLOGY
Payer: MEDICARE

## 2017-12-22 DIAGNOSIS — C79.31 METASTASIS TO BRAIN (HCC): ICD-10-CM

## 2017-12-22 PROCEDURE — 700117 HCHG RX CONTRAST REV CODE 255: Performed by: RADIOLOGY

## 2017-12-22 PROCEDURE — A9577 INJ MULTIHANCE: HCPCS | Performed by: RADIOLOGY

## 2017-12-22 PROCEDURE — 70553 MRI BRAIN STEM W/O & W/DYE: CPT

## 2017-12-22 RX ADMIN — GADOBENATE DIMEGLUMINE 30 ML: 529 INJECTION, SOLUTION INTRAVENOUS at 18:54

## 2017-12-27 ENCOUNTER — HOSPITAL ENCOUNTER (OUTPATIENT)
Dept: RADIATION ONCOLOGY | Facility: MEDICAL CENTER | Age: 77
End: 2017-12-31
Attending: RADIOLOGY
Payer: MEDICARE

## 2017-12-27 VITALS
BODY MASS INDEX: 25.35 KG/M2 | OXYGEN SATURATION: 95 % | DIASTOLIC BLOOD PRESSURE: 63 MMHG | TEMPERATURE: 97.7 F | HEART RATE: 80 BPM | SYSTOLIC BLOOD PRESSURE: 133 MMHG | WEIGHT: 138.6 LBS

## 2017-12-27 DIAGNOSIS — I67.89 RADIATION THERAPY INDUCED BRAIN NECROSIS: ICD-10-CM
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
## 2017-12-27 DIAGNOSIS — K29.60 STEROID-INDUCED GASTRITIS: ICD-10-CM

## 2017-12-27 DIAGNOSIS — T38.0X5A STEROID-INDUCED GASTRITIS: ICD-10-CM

## 2017-12-27 RX ORDER — DEXAMETHASONE 4 MG/1
4 TABLET ORAL DAILY
Qty: 30 TAB | Refills: 1 | Status: SHIPPED | OUTPATIENT
Start: 2017-12-27 | End: 2018-03-27

## 2017-12-27 RX ORDER — FAMOTIDINE 40 MG/1
40 TABLET, FILM COATED ORAL DAILY
Qty: 30 TAB | Refills: 1 | Status: SHIPPED | OUTPATIENT
Start: 2017-12-27 | End: 2018-05-25

## 2017-12-27 NOTE — NON-PROVIDER
Patient was seen today in clinic with Dr. Caro for follow up.  Vitals signs and weight were obtained and pain assessment was completed.  Allergies and medications were reviewed with the patient.  Review of systems completed.     Vitals/Pain:  Vitals:    12/27/17 1304   BP: 133/63   Pulse: 80   Temp: 36.5 °C (97.7 °F)   SpO2: 95%   Weight: 62.9 kg (138 lb 9.6 oz)            Allergies:   Penicillins    Current Medications:  Current Outpatient Prescriptions   Medication Sig Dispense Refill   • levetiracetam (KEPPRA) 500 MG Tab Take 1 Tab by mouth 2 Times a Day. 60 Tab    • atorvastatin (LIPITOR) 40 MG Tab Take 1 Tab by mouth every evening. 30 Tab    • aspirin (ASA) 325 MG Tab Take 1 Tab by mouth every day. 100 Tab    • dexamethasone (DECADRON) 4 MG Tab Take 1 Tab by mouth every day. 30 Tab 0   • vitamin B-12 CR (CYANOCOBALAMIN) 1000 MCG Tab CR tablet Take 5,000 mg by mouth.     • Omega-3 Fatty Acids (FISH OIL) 1200 MG Cap Take 1 Cap by mouth every day.     • multivitamin (THERAGRAN) Tab Take 1 Tab by mouth every day.       No current facility-administered medications for this encounter.          PCP:  Jay Owen, Med Ass't

## 2017-12-27 NOTE — PROGRESS NOTES
RADIATION ONCOLOGY FOLLOW-UP    DATE OF SERVICE: 12/27/2017    IDENTIFICATION:   A 77 y.o. female with history of stage IV lung cancer adenocarcinoma. She is status post resection of a right occipital lobe brain metastasis followed by stereotactic radiotherapy to surgical bed receiving 2700 cGy in 3 fractions completed 12/18/2015. This was followed by systemic therapy consisting of carboplatin and Alimta with reduction of a right upper lobe lung mass however disease was still persistent. She subsequently received concurrent carboplatin and radiotherapy 6000 cGy in 25 fractions completed 5/6/2016.    HISTORY OF PRESENT ILLNESS:   Admitted to the hospital early December with increasing ataxia. 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 No other site of disease was noted in the brain.    Patient started on 4 mg of dexamethasone with improvement but persistent headaches. She underwent physical therapy.    She returns today after completing physical therapy and undergoing special MRI sequence and consideration of clinical trial using Avastin for radiation necrosis. Her primary complaint at this time is mild headaches. She is requesting additional dexamethasone.    CURRENT MEDICATIONS:  Current Outpatient Prescriptions   Medication Sig Dispense Refill   • levetiracetam (KEPPRA) 500 MG Tab Take 1 Tab by mouth 2 Times a Day. 60 Tab    • atorvastatin (LIPITOR) 40 MG Tab Take 1 Tab by mouth every evening. 30 Tab    • aspirin (ASA) 325 MG Tab Take 1 Tab by mouth every day. 100 Tab    • dexamethasone (DECADRON) 4 MG Tab Take 1 Tab by mouth every day. 30 Tab 0   • vitamin B-12 CR (CYANOCOBALAMIN) 1000 MCG Tab CR tablet Take 5,000 mg by mouth.     • Omega-3 Fatty Acids (FISH OIL) 1200 MG Cap Take 1 Cap by mouth every day.     • multivitamin (THERAGRAN) Tab Take 1 Tab by mouth every day.       No current facility-administered medications for this encounter.         ALLERGIES:  Penicillins    REVIEW OF SYSTEMS:  A review of systems for today's date of service was reviewed and uploaded into the electronic medical record.    PHYSICAL EXAM:   /63   Pulse 80   Temp 36.5 °C (97.7 °F)   Wt 62.9 kg (138 lb 9.6 oz)   SpO2 95%   BMI 25.35 kg/m²   GENERAL: Alert and oriented no acute distress  NEUROLOGIC:  Cranial nerves II through XII were intact.  Strength is 5/5 in   lower extremities bilaterally.  There was no focal   sensory deficit appreciated.        LABORATORY DATA:   Lab Results   Component Value Date/Time    SODIUM 136 11/29/2017 09:29 PM    POTASSIUM 3.7 11/29/2017 09:29 PM    CHLORIDE 107 11/29/2017 09:29 PM    CO2 23 11/29/2017 09:29 PM    GLUCOSE 105 (H) 11/29/2017 09:29 PM    BUN 12 11/29/2017 09:29 PM    CREATININE 0.52 11/29/2017 09:29 PM     Lab Results   Component Value Date/Time    ALKPHOSPHAT 74 11/29/2017 09:29 PM    ASTSGOT 19 11/29/2017 09:29 PM    ALTSGPT 19 11/29/2017 09:29 PM    TBILIRUBIN 0.5 11/29/2017 09:29 PM      Lab Results   Component Value Date/Time    WBC 7.5 11/29/2017 09:29 PM    RBC 3.97 (L) 11/29/2017 09:29 PM    HEMOGLOBIN 13.1 11/29/2017 09:29 PM    HEMATOCRIT 40.4 11/29/2017 09:29 PM    .8 (H) 11/29/2017 09:29 PM    MCH 33.0 11/29/2017 09:29 PM    MCHC 32.4 (L) 11/29/2017 09:29 PM    MPV 9.0 11/29/2017 09:29 PM    NEUTSPOLYS 74.30 (H) 11/29/2017 09:29 PM    LYMPHOCYTES 11.70 (L) 11/29/2017 09:29 PM    MONOCYTES 11.90 11/29/2017 09:29 PM    EOSINOPHILS 1.60 11/29/2017 09:29 PM    BASOPHILS 0.40 11/29/2017 09:29 PM        RADIOLOGY DATA:  Mr-brain-with & W/o    Result Date: 12/26/2017 12/22/2017 5:06 PM HISTORY/REASON FOR EXAM:  Brain metastasis. Lung adenocarcinoma. Scott [id number] radiation necrosis study protocol. TECHNIQUE/EXAM DESCRIPTION:   T1-weighted 3-D TFE sagittal, T1-weighted 3-D TFE axial, T2 axial postcontrast, FLAIR axial, and post contrast 3-D TFE sagittal, axial, and coronal images were obtained. DCE and  DSC perfusion imaging was performed. The study was performed on a [employer]io 3.0 Phyllis MRI scanner. 15 mL MultiHance gadolinium contrast was administered intravenously x2. COMPARISON:  MRI brain 11/30/2017 FINDINGS: Again noted, patient is status post right occipital-parietal craniotomy. There is a negligible tiny rim of extra-axial fluid underlying craniotomy site which is unchanged. The right posterior temporal lobe, occipital lobe, parietal lobe, there is a large  geographic and gyriform area of enhancement with central hypoenhancement. Again seen is marked vasogenic edema which extends anteriorly into the temporal lobe, subinsular white matter, and posterior limb right internal capsule. FLAIR hyperintense edema signal also extends across the splenium of corpus callosum. There is anterior displacement of the right trigone which is partially effaced. There is also mass effect in the posterior body right lateral ventricle. Overall vasogenic edema and mass effect has considerably decreased since the previous exam. In the right high frontal lobe at the superior frontal gyrus, there is a 3 mm punctate focus of FLAIR hyperintensity. (FLAIR axial image 49, series 501). There is no corresponding enhancement. There are similar punctate foci in the left frontal lobe (FLAIR axial image 47, series 501). There are no new enhancing lesions. Incidentally noted are prominent perivascular spaces in the basal ganglia especially on the left. The brainstem and cerebellum are unremarkable. No evidence of metastatic lesion in the posterior fossa. The major vessels including the dural venous sinuses appear intact. The distal left vertebral artery is dominant. The right transverse -- sigmoid sinus and right internal jugular vein are dominant.     1.  Postoperative right occipital-parietal craniotomy. 2.  Extensive geographic and gyriform enhancing lesion with central necrosis involving the right occipital lobe, parietal lobe, and posterior  temporal lobe most consistent with radiation necrosis. 3.  Definite interval decrease in mass effect and vasogenic edema consistent with favorable response to treatment. 4.  No evidence of new enhancing metastatic lesion.    Mr-brain-with & W/o    Result Date: 12/1/2017 11/30/2017 8:25 PM HISTORY/REASON FOR EXAM:  Status post resection of right occipital brain metastasis and radiation therapy. TECHNIQUE/EXAM DESCRIPTION:   MRI of the brain without and with contrast. T1 sagittal, T2 fast spin-echo axial, T1 coronal, FLAIR coronal, diffusion-weighted and apparent diffusion coefficient (ADC map) axial images were obtained of the whole brain. T1 postcontrast axial and T1 postcontrast coronal images were obtained. The study was performed on a [employer] 1.5 Phyllis MRI scanner. 13 mL Omniscan contrast was administered intravenously. COMPARISON:  9/18/2017 FINDINGS: Previous right occipital craniotomy for resection of large cystic brain metastasis. There is an irregular postsurgical cavity in the right occipital lobe adjacent to the craniotomy site. There is been significant interval increase in the amount of irregular enhancement about the periphery of the surgical cavity in the right occipital lobe which also extends into the right parietal-occipital region. Significantly there is marked increased T2 signal intensity throughout the right posterior frontal, parietal, temporal, and occipital white matter and there is also marked increasing effacement of the right lateral ventricle including the right occipital horn and atrium. Further there is marked increase in right to left midline shift of the ventricular  system which measures approximately 11 mm. There are no extra-axial fluid collections. There are no hemorrhagic lesions. The diffusion-weighted axial images show no evidence of acute cerebral infarction. The brainstem and posterior fossa structures are unremarkable. Vascular flow voids in the vertebrobasilar and  carotid arteries, [place], and dural venous sinuses are intact. The paranasal sinuses in the field of view demonstrate mild mucosal inflammatory change.     1.  Previous right occipital craniotomy with underlying irregular postsurgical defect. 2.  Significant interval increase in the amount of peripheral enhancement about the postsurgical cavity involving the right occipital and parietal occipital lobes. Further there is marked interval increase in the amount of vasogenic edema throughout the right frontal, parietal, temporal, and occipital lobes causing marked effacement of the right lateral ventricle especially its posterior body, atrium, and occipital horn with resultant marked right to left midline shift the ventricular system. These findings are consistent with progressive spread of neoplasm versus radiation necrosis. Neuro PET scan may be useful for further evaluation.    IMPRESSION:    A 77 y.o. with stage IV lung cancer with solitary brain metastasis status post resection of brain metastasis followed by stereotactic radiotherapy. 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    RECOMMENDATIONS:   Reviewed MRI patient. Got word from study Center after patient left that she did not qualify for clinical trial based on the perfusion criteria. At this point will discuss with Dr. Thacker to see if he'll consider Avastin off label as there are several small trials of traction benefit in the setting of radiation necrosis. We'll see patient back in 1 month's time.    25 minutes was spent face-to-face with patient in the office and more than half of that time was spent counseling patient or coordinating care as described above.    Thank you for the opportunity to participate in her care.  If any questions or comments, please do not hesitate in calling.    Cande DE SANTIAGO M.D.  Electronically signed by: Cande Caro V, 12/27/2017 4:09 PM  [phone]

## 2017-12-29 LAB — EKG IMPRESSION: NORMAL

## 2018-01-08 ENCOUNTER — HOSPITAL ENCOUNTER (OUTPATIENT)
Dept: LAB | Facility: MEDICAL CENTER | Age: 78
End: 2018-01-08
Attending: SPECIALIST
Payer: MEDICARE

## 2018-01-08 LAB
ALBUMIN SERPL BCP-MCNC: 3.7 G/DL (ref 3.2–4.9)
ALBUMIN/GLOB SERPL: 1.4 G/DL
ALP SERPL-CCNC: 72 U/L (ref 30–99)
ALT SERPL-CCNC: 37 U/L (ref 2–50)
ANION GAP SERPL CALC-SCNC: 7 MMOL/L (ref 0–11.9)
AST SERPL-CCNC: 19 U/L (ref 12–45)
BASOPHILS # BLD AUTO: 0.2 % (ref 0–1.8)
BASOPHILS # BLD: 0.02 K/UL (ref 0–0.12)
BILIRUB SERPL-MCNC: 0.6 MG/DL (ref 0.1–1.5)
BUN SERPL-MCNC: 23 MG/DL (ref 8–22)
CALCIUM SERPL-MCNC: 9.2 MG/DL (ref 8.5–10.5)
CHLORIDE SERPL-SCNC: 103 MMOL/L (ref 96–112)
CO2 SERPL-SCNC: 27 MMOL/L (ref 20–33)
CREAT SERPL-MCNC: 0.49 MG/DL (ref 0.5–1.4)
EOSINOPHIL # BLD AUTO: 0.01 K/UL (ref 0–0.51)
EOSINOPHIL NFR BLD: 0.1 % (ref 0–6.9)
ERYTHROCYTE [DISTWIDTH] IN BLOOD BY AUTOMATED COUNT: 50.3 FL (ref 35.9–50)
GLOBULIN SER CALC-MCNC: 2.7 G/DL (ref 1.9–3.5)
GLUCOSE SERPL-MCNC: 83 MG/DL (ref 65–99)
HCT VFR BLD AUTO: 44 % (ref 37–47)
HGB BLD-MCNC: 14.3 G/DL (ref 12–16)
IMM GRANULOCYTES # BLD AUTO: 0.11 K/UL (ref 0–0.11)
IMM GRANULOCYTES NFR BLD AUTO: 1.1 % (ref 0–0.9)
LYMPHOCYTES # BLD AUTO: 0.68 K/UL (ref 1–4.8)
LYMPHOCYTES NFR BLD: 6.7 % (ref 22–41)
MCH RBC QN AUTO: 32.3 PG (ref 27–33)
MCHC RBC AUTO-ENTMCNC: 32.5 G/DL (ref 33.6–35)
MCV RBC AUTO: 99.3 FL (ref 81.4–97.8)
MONOCYTES # BLD AUTO: 0.7 K/UL (ref 0–0.85)
MONOCYTES NFR BLD AUTO: 6.9 % (ref 0–13.4)
NEUTROPHILS # BLD AUTO: 8.57 K/UL (ref 2–7.15)
NEUTROPHILS NFR BLD: 85 % (ref 44–72)
NRBC # BLD AUTO: 0 K/UL
NRBC BLD-RTO: 0 /100 WBC
PLATELET # BLD AUTO: 204 K/UL (ref 164–446)
PMV BLD AUTO: 8.9 FL (ref 9–12.9)
POTASSIUM SERPL-SCNC: 3.9 MMOL/L (ref 3.6–5.5)
PROT SERPL-MCNC: 6.4 G/DL (ref 6–8.2)
RBC # BLD AUTO: 4.43 M/UL (ref 4.2–5.4)
SODIUM SERPL-SCNC: 137 MMOL/L (ref 135–145)
WBC # BLD AUTO: 10.1 K/UL (ref 4.8–10.8)

## 2018-01-08 PROCEDURE — 80053 COMPREHEN METABOLIC PANEL: CPT

## 2018-01-08 PROCEDURE — 85025 COMPLETE CBC W/AUTO DIFF WBC: CPT

## 2018-01-08 PROCEDURE — 36415 COLL VENOUS BLD VENIPUNCTURE: CPT

## 2018-01-09 ENCOUNTER — HOSPITAL ENCOUNTER (OUTPATIENT)
Dept: RADIOLOGY | Facility: MEDICAL CENTER | Age: 78
End: 2018-01-09
Attending: SPECIALIST
Payer: MEDICARE

## 2018-01-09 DIAGNOSIS — C34.11 MALIGNANT NEOPLASM OF UPPER LOBE OF RIGHT LUNG (HCC): ICD-10-CM

## 2018-01-09 PROCEDURE — 71260 CT THORAX DX C+: CPT

## 2018-01-09 PROCEDURE — 700117 HCHG RX CONTRAST REV CODE 255: Performed by: SPECIALIST

## 2018-01-09 RX ADMIN — IOHEXOL 75 ML: 350 INJECTION, SOLUTION INTRAVENOUS at 09:50

## 2018-01-11 ENCOUNTER — HOSPITAL ENCOUNTER (OUTPATIENT)
Dept: LAB | Facility: MEDICAL CENTER | Age: 78
End: 2018-01-11
Attending: FAMILY MEDICINE
Payer: MEDICARE

## 2018-01-11 LAB
ALBUMIN SERPL BCP-MCNC: 3.9 G/DL (ref 3.2–4.9)
ALBUMIN/GLOB SERPL: 1.4 G/DL
ALP SERPL-CCNC: 90 U/L (ref 30–99)
ALT SERPL-CCNC: 41 U/L (ref 2–50)
ANION GAP SERPL CALC-SCNC: 8 MMOL/L (ref 0–11.9)
AST SERPL-CCNC: 17 U/L (ref 12–45)
BILIRUB SERPL-MCNC: 0.5 MG/DL (ref 0.1–1.5)
BUN SERPL-MCNC: 18 MG/DL (ref 8–22)
CALCIUM SERPL-MCNC: 9.1 MG/DL (ref 8.5–10.5)
CHLORIDE SERPL-SCNC: 104 MMOL/L (ref 96–112)
CHOLEST SERPL-MCNC: 120 MG/DL (ref 100–199)
CO2 SERPL-SCNC: 29 MMOL/L (ref 20–33)
CREAT SERPL-MCNC: 0.47 MG/DL (ref 0.5–1.4)
GLOBULIN SER CALC-MCNC: 2.7 G/DL (ref 1.9–3.5)
GLUCOSE SERPL-MCNC: 149 MG/DL (ref 65–99)
HDLC SERPL-MCNC: 76 MG/DL
LDLC SERPL CALC-MCNC: 34 MG/DL
POTASSIUM SERPL-SCNC: 4.3 MMOL/L (ref 3.6–5.5)
PROT SERPL-MCNC: 6.6 G/DL (ref 6–8.2)
SODIUM SERPL-SCNC: 141 MMOL/L (ref 135–145)
TRIGL SERPL-MCNC: 48 MG/DL (ref 0–149)

## 2018-01-11 PROCEDURE — 80053 COMPREHEN METABOLIC PANEL: CPT

## 2018-01-11 PROCEDURE — 80061 LIPID PANEL: CPT

## 2018-01-11 PROCEDURE — 36415 COLL VENOUS BLD VENIPUNCTURE: CPT

## 2018-02-08 ENCOUNTER — HOSPITAL ENCOUNTER (OUTPATIENT)
Dept: RADIATION ONCOLOGY | Facility: MEDICAL CENTER | Age: 78
End: 2018-02-28
Attending: RADIOLOGY
Payer: MEDICARE

## 2018-02-08 VITALS
TEMPERATURE: 98 F | HEART RATE: 80 BPM | OXYGEN SATURATION: 95 % | BODY MASS INDEX: 25.99 KG/M2 | SYSTOLIC BLOOD PRESSURE: 146 MMHG | WEIGHT: 142.1 LBS | DIASTOLIC BLOOD PRESSURE: 64 MMHG

## 2018-02-08 DIAGNOSIS — C79.31 METASTASIS TO BRAIN (HCC): ICD-10-CM

## 2018-02-08 PROCEDURE — 99212 OFFICE O/P EST SF 10 MIN: CPT | Performed by: RADIOLOGY

## 2018-02-08 PROCEDURE — 99214 OFFICE O/P EST MOD 30 MIN: CPT | Performed by: RADIOLOGY

## 2018-02-08 ASSESSMENT — PAIN SCALES - GENERAL: PAINLEVEL: NO PAIN

## 2018-02-08 NOTE — PROGRESS NOTES
RADIATION ONCOLOGY FOLLOW-UP    DATE OF SERVICE: 2/8/2018    IDENTIFICATION:   A 78 y.o. female with history of stage IV lung cancer adenocarcinoma. She is status post resection of a right occipital lobe brain metastasis followed by stereotactic radiotherapy to surgical bed receiving 2700 cGy in 3 fractions completed 12/18/2015. This was followed by systemic therapy consisting of carboplatin and Alimta with reduction of a right upper lobe lung mass however disease was still persistent. She subsequently received concurrent carboplatin and radiotherapy 6000 cGy in 25 fractions completed 5/6/2016.       HISTORY OF PRESENT ILLNESS:   Returns today for follow-up presently on 2 mg of dexamethasone for radiation necrosis. We attempted to enroll her in a trial using Avastin for radiation necrosis that patient failed to qualify for the strict trial criteria.    At this time she still complains of visual symptoms secondary to dexamethasone. She also appears quite cushingoid. However, she doesn't have the neurologic complaints of ataxia and confusion, which developed after discontinuation of dexamethasone.    CURRENT MEDICATIONS:  Current Outpatient Prescriptions   Medication Sig Dispense Refill   • dexamethasone (DECADRON) 4 MG Tab Take 1 Tab by mouth every day. 30 Tab 1   • famotidine (PEPCID) 40 MG Tab Take 1 Tab by mouth every day. 30 Tab 1   • levetiracetam (KEPPRA) 500 MG Tab Take 1 Tab by mouth 2 Times a Day. 60 Tab    • atorvastatin (LIPITOR) 40 MG Tab Take 1 Tab by mouth every evening. 30 Tab    • aspirin (ASA) 325 MG Tab Take 1 Tab by mouth every day. 100 Tab    • vitamin B-12 CR (CYANOCOBALAMIN) 1000 MCG Tab CR tablet Take 5,000 mg by mouth.     • Omega-3 Fatty Acids (FISH OIL) 1200 MG Cap Take 1 Cap by mouth every day.     • multivitamin (THERAGRAN) Tab Take 1 Tab by mouth every day.       No current facility-administered medications for this encounter.        ALLERGIES:  Penicillins    PHYSICAL EXAM:   /64    Pulse 80   Temp 36.7 °C (98 °F)   Wt 64.5 kg (142 lb 1.6 oz)   SpO2 95%   BMI 25.99 kg/m²   GENERAL: Alert and oriented no acute distress  HEENT:  Pupils are equal, round, and reactive to light.  Extraocular muscles   are intact. Sclerae nonicteric.  Conjunctivae pink.  Oral cavity, tongue   protrudes midline.   NECK:  Supple without evidence of thyromegaly.  NODES:  No peripheral adenopathy of the neck, supraclavicular fossa or axillae   bilaterally.  NEUROLOGIC:  Cranial nerves II through XII were intact.  Strength is 5/5 in   lower extremities bilaterally.  DTRs were symmetrical.  There was no focal   sensory deficit appreciated. Unable to perform tandem gait.    LABORATORY DATA:   Lab Results   Component Value Date/Time    SODIUM 141 01/11/2018 07:40 AM    POTASSIUM 4.3 01/11/2018 07:40 AM    CHLORIDE 104 01/11/2018 07:40 AM    CO2 29 01/11/2018 07:40 AM    GLUCOSE 149 (H) 01/11/2018 07:40 AM    BUN 18 01/11/2018 07:40 AM    CREATININE 0.47 (L) 01/11/2018 07:40 AM     Lab Results   Component Value Date/Time    ALKPHOSPHAT 90 01/11/2018 07:40 AM    ASTSGOT 17 01/11/2018 07:40 AM    ALTSGPT 41 01/11/2018 07:40 AM    TBILIRUBIN 0.5 01/11/2018 07:40 AM      Lab Results   Component Value Date/Time    WBC 10.1 01/08/2018 01:06 PM    RBC 4.43 01/08/2018 01:06 PM    HEMOGLOBIN 14.3 01/08/2018 01:06 PM    HEMATOCRIT 44.0 01/08/2018 01:06 PM    MCV 99.3 (H) 01/08/2018 01:06 PM    MCH 32.3 01/08/2018 01:06 PM    MCHC 32.5 (L) 01/08/2018 01:06 PM    MPV 8.9 (L) 01/08/2018 01:06 PM    NEUTSPOLYS 85.00 (H) 01/08/2018 01:06 PM    LYMPHOCYTES 6.70 (L) 01/08/2018 01:06 PM    MONOCYTES 6.90 01/08/2018 01:06 PM    EOSINOPHILS 0.10 01/08/2018 01:06 PM    BASOPHILS 0.20 01/08/2018 01:06 PM        RADIOLOGY DATA:  Results for orders placed during the hospital encounter of 12/22/17   MR-BRAIN-WITH & W/O    Impression 1.  Postoperative right occipital-parietal craniotomy.  2.  Extensive geographic and gyriform enhancing lesion  with central necrosis involving the right occipital lobe, parietal lobe, and posterior temporal lobe most consistent with radiation necrosis.  3.  Definite interval decrease in mass effect and vasogenic edema consistent with favorable response to treatment.  4.  No evidence of new enhancing metastatic lesion.     IMPRESSION:    A 78 y.o. with stage IV lung cancer. She is free of disease now approaching almost 2 years. 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    RECOMMENDATIONS:   Still feel the most appropriate treatment for this patient at this point is course of Avastin. Response rates are in excess of 60% with both clinical and radiographic improvement as well as steroid withdrawal. Will refer her to Dr. Aguilar for Avastin therapy. Discussed with Dr. Thacker and he is agreement.    25 minutes was spent face-to-face with patient in the office and more than half of that time was spent counseling patient or coordinating care as described above.    Thank you for the opportunity to participate in her care.  If any questions or comments, please do not hesitate in calling.    Cande DE SANTIAGO M.D.  Electronically signed by: Cande Caro V, 2/8/2018 2:32 PM  [phone]

## 2018-02-08 NOTE — NON-PROVIDER
Patient was seen today in clinic with Dr. Caro for follow up.  Vitals signs and weight were obtained and pain assessment was completed.  Allergies and medications were reviewed with the patient.    Vitals/Pain:  Vitals:    02/08/18 1343   BP: 146/64   Pulse: 80   Temp: 36.7 °C (98 °F)   SpO2: 95%   Weight: 64.5 kg (142 lb 1.6 oz)   Pain Score: No pain        Allergies:   Penicillins    Current Medications:  Current Outpatient Prescriptions   Medication Sig Dispense Refill   • dexamethasone (DECADRON) 4 MG Tab Take 1 Tab by mouth every day. 30 Tab 1   • famotidine (PEPCID) 40 MG Tab Take 1 Tab by mouth every day. 30 Tab 1   • levetiracetam (KEPPRA) 500 MG Tab Take 1 Tab by mouth 2 Times a Day. 60 Tab    • atorvastatin (LIPITOR) 40 MG Tab Take 1 Tab by mouth every evening. 30 Tab    • aspirin (ASA) 325 MG Tab Take 1 Tab by mouth every day. 100 Tab    • vitamin B-12 CR (CYANOCOBALAMIN) 1000 MCG Tab CR tablet Take 5,000 mg by mouth.     • Omega-3 Fatty Acids (FISH OIL) 1200 MG Cap Take 1 Cap by mouth every day.     • multivitamin (THERAGRAN) Tab Take 1 Tab by mouth every day.       No current facility-administered medications for this encounter.          PCP:  Jay Owen, Med Ass't

## 2018-02-09 ENCOUNTER — PATIENT OUTREACH (OUTPATIENT)
Dept: OTHER | Facility: MEDICAL CENTER | Age: 78
End: 2018-02-09

## 2018-02-09 NOTE — LETTER
[facility]   [address]  [place] [address]  Phone: [phone] - Fax: [phone]              Ml Chavira  [address] [address]     Date: 02/09/18      Dear Ml,      I am your Cancer Nurse Navigator, a certified oncology nurse.  I am sending this letter as a reminder of our available services. My role is to address any needs you may have with education, guidance and support. I am available to you and your family through treatment.       I am available to address your needs during your journey with the following services:     Care Coordination  I can assist you in facilitating communication between your cancer care treatment team to ensure timely treatment and follow-up.  I can also assist with transition of care back to your primary care provider, or other specialist, as needed.  My goal is to bridge gaps for you throughout the course of your active treatment.       Education Services  Understanding the recommended treatment course by your physician is key. I can provide educational resources personalized to your cancer diagnosis to help you understand treatment. Please let me know if you would like to receive information about your diagnosis and treatment plan.  I am here to help.     Support Services/Resource Information  [facility] we offer a full scope of support services.  I can assist you with referral information to:  Cancer Clinical Trials & Research  Nutrition counseling  Support groups  Complementary Therapies such as Healing Touch and Mind-Body Techniques Meditation  Patient Financial Advocates    [facility], an [employer] affiliate office, our volunteers can assist you with accessing our [place] library, support services information, head coverings and comfort items  Community and national resources, included eligibility based joan assistance and pharmaceutical access programs,  if you are in need of additional information.     [facility] offers services that include:  Behavioral Health  Genetic counseling & testing  Acupuncture  Lymphedema prevention/treatment program  Palliative care services.       Please contact me if you have questions, barriers or needs.     I hope you have an excellent patient experience.  Please feel free to share with me your comments regarding the care you have received- we value your feedback.    Sincerely,      Hilary Summers R.N.  Cancer Nurse Navigator    Main: [phone]   Office: [phone]  Email:  [email or web address]

## 2018-02-14 ENCOUNTER — OFFICE VISIT (OUTPATIENT)
Dept: HEMATOLOGY ONCOLOGY | Facility: MEDICAL CENTER | Age: 78
End: 2018-02-14
Payer: MEDICARE

## 2018-02-14 VITALS
HEIGHT: 62 IN | OXYGEN SATURATION: 94 % | TEMPERATURE: 99.2 F | SYSTOLIC BLOOD PRESSURE: 138 MMHG | WEIGHT: 141.76 LBS | RESPIRATION RATE: 14 BRPM | DIASTOLIC BLOOD PRESSURE: 74 MMHG | BODY MASS INDEX: 26.09 KG/M2 | HEART RATE: 78 BPM

## 2018-02-14 DIAGNOSIS — C79.31 METASTASIS TO BRAIN (HCC): ICD-10-CM

## 2018-02-14 PROCEDURE — 99203 OFFICE O/P NEW LOW 30 MIN: CPT | Performed by: INTERNAL MEDICINE

## 2018-02-14 ASSESSMENT — PAIN SCALES - GENERAL: PAINLEVEL: 2=MINIMAL-SLIGHT

## 2018-02-14 NOTE — PROGRESS NOTES
Consult Note: Hematology    Date of consultation: 2/14/2018 10:11 AM    Referring provider: Cande Caro     Reason for consultation:     History of presenting illness:     Dear   Ab,    Thank you very much for allowing me to see  Ml Chavira today . As you know she  is a 78 y.o. year old female     Past Medical History:  No past medical history on file.    Past surgical history:    Past Surgical History:   Procedure Laterality Date   • CRANIOTOMY STEALTH Right 11/23/2015    Procedure: CRANIOTOMY STEALTH-right occipital;  Surgeon: Suyapa Schumacher M.D.;  Location: SURGERY [facility];  Service:        Allergies:  Penicillins    Medications:    Current Outpatient Prescriptions   Medication Sig Dispense Refill   • dexamethasone (DECADRON) 4 MG Tab Take 1 Tab by mouth every day. 30 Tab 1   • famotidine (PEPCID) 40 MG Tab Take 1 Tab by mouth every day. 30 Tab 1   • levetiracetam (KEPPRA) 500 MG Tab Take 1 Tab by mouth 2 Times a Day. 60 Tab    • aspirin (ASA) 325 MG Tab Take 1 Tab by mouth every day. 100 Tab    • Omega-3 Fatty Acids (FISH OIL) 1200 MG Cap Take 1 Cap by mouth every day.     • multivitamin (THERAGRAN) Tab Take 1 Tab by mouth every day.     • atorvastatin (LIPITOR) 40 MG Tab Take 1 Tab by mouth every evening. 30 Tab    • vitamin B-12 CR (CYANOCOBALAMIN) 1000 MCG Tab CR tablet Take 5,000 mg by mouth.       No current facility-administered medications for this visit.        Social History:     Social History     Social History   • Marital status:      Spouse name: N/A   • Number of children: N/A   • Years of education: N/A     Occupational History   • Not on file.     Social History Main Topics   • Smoking status: Former Smoker   • Smokeless tobacco: Never Used   • Alcohol use Yes   • Drug use: No   • Sexual activity: Not on file     Other Topics Concern   • Not on file     Social History Narrative   • No narrative on file       Family History:   No family history on file.    Review of  "Systems:  All other review of systems are negative except what was mentioned above in the HPI.    Constitutional: No fever, chills, weight loss ,malaise/fatigue.    HEENT: No new auditory or visual complaints. No sore throat and neck pain.     Respiratory:No new cough, sputum production, shortness of breath and wheezing.    Cardiovascular: No new chest pain, palpitations, orthopnea and leg swelling.    Gastrointestinal: No heartburn, nausea, vomiting ,abdominal pain, hematochezia or melena     Genitourinary: Negative for dysuria, hematuria    Musculoskeletal: No new arthralgias or myalgias   Skin: Negative for rash and itching.    Neurological: Negative for focal weakness or headaches.    Endo/Heme/Allergies: No abnormal bleed/bruise.    Psychiatric/Behavioral: No new depression/anxiety.    Physical Exam:  Vitals:   /74   Pulse 78   Temp 37.3 °C (99.2 °F)   Resp 14   Ht 1.575 m (5' 2\")   Wt 64.3 kg (141 lb 12.1 oz)   SpO2 94%   BMI 25.93 kg/m²     General: Not in acute distress, alert and oriented x 3  HEENT: No pallor, icterus. Oropharynx clear.   Neck: Supple, no palpable masses.  Lymph nodes: No palpable cervical, supraclavicular, axillary or inguinal lymphadenopathy.    CVS: regular rate and rhythm, no rubs or gallops  RESP: Clear to auscultate bilaterally, no wheezing or crackles.   ABD: Soft, non tender, non distended, positive bowel sounds, no palpable organomegaly  EXT: No edema or cyanosis  CNS: Alert and oriented x3, No focal deficits.  Skin- No rash      Labs:   No results for input(s): RBC, HEMOGLOBIN, HEMATOCRIT, PLATELETCT, PROTHROMBTM, APTT, INR, IRON, FERRITIN, TOTIRONBC in the last 72 hours.  Lab Results   Component Value Date/Time    SODIUM 141 01/11/2018 07:40 AM    POTASSIUM 4.3 01/11/2018 07:40 AM    CHLORIDE 104 01/11/2018 07:40 AM    CO2 29 01/11/2018 07:40 AM    GLUCOSE 149 (H) 01/11/2018 07:40 AM    BUN 18 01/11/2018 07:40 AM    CREATININE 0.47 (L) 01/11/2018 07:40 AM    "     Assessment and Plan:      She agreed and verbalized her agreement and understanding with the current plan.  I answered all questions and concerns she has at this time.              Thank you for allowing me to participate in her care.    Please note that this dictation was created using voice recognition software. I have made every reasonable attempt to correct obvious errors, but I expect that there are errors of grammar and possibly content that I did not discover before finalizing the note.      SIGNATURES:  Mariano Aguilar    CC:  ALINA Hart, TALAT Turpin*

## 2018-02-14 NOTE — PROGRESS NOTES
Consult Note: Oncology    Date of consultation: 2/14/2018 10:23 AM    Referring provider: Cande Caro M.     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  History of presenting illness:    -According to available records-  11/21/15: 5.4cm RUL lung mass with brain mets? 11/18/15: Craniotomy: squamous cell ca 12/18/15:  XRT to brain [place]? R shoulder skin lesion squamous cell 1/6/16: Carbo and Abraxane x 4 4/19/16: low dose carbo with lung XRT 5/24/16: last chemo 11/3/16: stable PET? RUL activity probably radiation related?  2/27/17: chest CT: Interval decrease in size of RUL mass? stable mass along R lung apex? increase density of groundglass opacity posterior to dominant mass (?radiation effect) 5/11/17: MRI brain: increasing mass effect and displacement? Start Decadron by Vania 5/25/17: Chest and abdomen CAT: stable   7/6/17: MRI brain? unchanged from previous 7/7/17:   7/20/17: bone scan: uptake left posterior 12th rib without a definite CT correlate. Conceivably this could be due to interval trauma.   9/18/17: MRI: stable R parietal area with probable edema 9/20/17: Previous dizziness and headache have gone away with decadron?started taper, had to be restarted for worsening neuroo symptoms 11/3/17: Chest and abdomen CAT scan: RUL mass minimally decreased in size? increase in size of nodularity at R lung apex ? scarring or malignancy? no SOB, no pain or neurologic problems   12/22/17: Brain MRI? radiation necrosis with decrease in mass effect and necrosis  1/9/18: Chest CT stable 1/15/18: apparently can't get on study for Avastin and radiation necrosis?    . She is here to see whether she can get off label Avastin for her radionecrosis. She is currently on dexamethasone    Past Medical History: Past Medical History: Breast cancer 20 years ago? lumpectomy and XRT with tamoxifen Hx of Osteoarthritis Past Surgical History: Appendectomy  Left knee surgery R lumpectomy Craniotomy     11/23/15 Tobacco Use and Cessation Counseling Former smoker. Smoking Cessation Intervention Cessation  Not applicable. Social History Patient is . Number of children: 3. Patient advises social use of alcohol.    No past medical history on file.    Past surgical history:    Past Surgical History:   Procedure Laterality Date   • CRANIOTOMY STEALTH Right 11/23/2015    Procedure: CRANIOTOMY STEALTH-right occipital;  Surgeon: Suyapa Schumacher M.D.;  Location: SURGERY [facility];  Service:        Allergies:  Penicillins    Medications:    Current Outpatient Prescriptions   Medication Sig Dispense Refill   • dexamethasone (DECADRON) 4 MG Tab Take 1 Tab by mouth every day. 30 Tab 1   • famotidine (PEPCID) 40 MG Tab Take 1 Tab by mouth every day. 30 Tab 1   • levetiracetam (KEPPRA) 500 MG Tab Take 1 Tab by mouth 2 Times a Day. 60 Tab    • aspirin (ASA) 325 MG Tab Take 1 Tab by mouth every day. 100 Tab    • Omega-3 Fatty Acids (FISH OIL) 1200 MG Cap Take 1 Cap by mouth every day.     • multivitamin (THERAGRAN) Tab Take 1 Tab by mouth every day.     • atorvastatin (LIPITOR) 40 MG Tab Take 1 Tab by mouth every evening. 30 Tab    • vitamin B-12 CR (CYANOCOBALAMIN) 1000 MCG Tab CR tablet Take 5,000 mg by mouth.       No current facility-administered medications for this visit.        Social History:     Social History     Social History   • Marital status:      Spouse name: N/A   • Number of children: N/A   • Years of education: N/A     Occupational History   • Not on file.     Social History Main Topics   • Smoking status: Former Smoker   • Smokeless tobacco: Never Used   • Alcohol use Yes   • Drug use: No   • Sexual activity: Not on file     Other Topics Concern   • Not on file     Social History Narrative   • No narrative on file       Family History:   No family history on file.    Review of Systems:  All other review of systems are negative except what was  "mentioned above in the HPI.    Constitutional: No fever, chills, weight loss ,malaise/fatigue.   steroid associated weight gain  HEENT: No new auditory. She has chronic visual complaints. No sore throat and neck pain.     Respiratory:No new cough, sputum production, shortness of breath and wheezing.    Cardiovascular: No new chest pain, palpitations, orthopnea and leg swelling.    Gastrointestinal: No heartburn, nausea, vomiting ,abdominal pain, hematochezia or melena     Genitourinary: Negative for dysuria, hematuria    Musculoskeletal: No new arthralgias or myalgias   Skin: Negative for rash and itching.    Neurological: Negative for focal weakness or headaches. . She has generalized wobbliness    Endo/Heme/Allergies: No abnormal bleed/bruise.    Psychiatric/Behavioral: No new depression/anxiety.    Physical Exam:  Vitals:   /74   Pulse 78   Temp 37.3 °C (99.2 °F)   Resp 14   Ht 1.575 m (5' 2\")   Wt 64.3 kg (141 lb 12.1 oz)   SpO2 94%   BMI 25.93 kg/m²     General: Not in acute distress, alert and oriented x 3, puffy facies   HEENT: No pallor, icterus. Oropharynx clear.   Neck: Supple, no palpable masses.  Lymph nodes: No palpable cervical, supraclavicular, axillary or inguinal lymphadenopathy.    CVS: regular rate and rhythm, no rubs or gallops  RESP: Clear to auscultate bilaterally, no wheezing or crackles.   ABD: Soft, non tender, non distended, positive bowel sounds, no palpable organomegaly  EXT: No edema or cyanosis  CNS: Alert and oriented x3, No focal deficits.  Skin- No rash      Labs:   No results for input(s): RBC, HEMOGLOBIN, HEMATOCRIT, PLATELETCT, PROTHROMBTM, APTT, INR, IRON, FERRITIN, TOTIRONBC in the last 72 hours.  Lab Results   Component Value Date/Time    SODIUM 141 01/11/2018 07:40 AM    POTASSIUM 4.3 01/11/2018 07:40 AM    CHLORIDE 104 01/11/2018 07:40 AM    CO2 29 01/11/2018 07:40 AM    GLUCOSE 149 (H) 01/11/2018 07:40 AM    BUN 18 01/11/2018 07:40 AM    CREATININE 0.47 (L) " 01/11/2018 07:40 AM        Assessment and Plan:    Metastatic squamous cell lung carcinoma-. She was treated with carboplatin and Abraxane and received chemoradiation afterwards. She has done very well since then with no clear-cut evidence of relapse or progression. Recent images with the patient. We will continue restaging studies every 4-6 months. Hopefully, she will have long-term remission/cure.      Radionecrosis-she has been dealing with this for the past year. She has been on and off of steroids and could never go off of it completely for any extended period of time. She is currently on steroid taper. However, she still has some wobbliness. Review the last MRI, which shows significant radionecrosis with surrounding edema despite improving from before.    Apparently, she does not qualify for the Avastin trial for radionecrosis. We will try to see whether Avastin can be approved off label for her. She will receive Avastin at 10 mg/kg every 2 weeks. Informed her that Avastin is usually well tolerated, however, patients can develop hypertension, bleeding and clotting, proteinuria, thrombotic events like stroke, heart attack, rare case of bowel perforation, etc. She voiced understanding. Informed her that this is an off label use and she has concerns about having to pay out of pocket.    Instructed her to continue Decadron 4 mg daily. After 2 doses of Avastin. We will order to 2 mg daily and subsequently taper off of it. Informed her that some people can develop rebound symptoms, which needs to be monitored closely. She will continue continue Keppra.      Complex patient requiring complex decision making. Reviewed the laboratory data and images with the patient. Extensively reviewed her prior medical records and clinical course and formulated the plan.      Please note that this dictation was created using voice recognition software. I have made every reasonable attempt to correct obvious errors, but I expect that  there are errors of grammar and possibly content that I did not discover before finalizing the note.      SIGNATURES:  Mariano Aguilar    CC:  ALINA Hart, TALAT Turpin*

## 2018-02-23 DIAGNOSIS — C79.9 METASTATIC CANCER (HCC): ICD-10-CM

## 2018-02-23 DIAGNOSIS — R91.8 LUNG MASS: ICD-10-CM

## 2018-02-23 DIAGNOSIS — C79.31 METASTASIS TO BRAIN (HCC): ICD-10-CM

## 2018-02-24 ENCOUNTER — HOSPITAL ENCOUNTER (OUTPATIENT)
Dept: LAB | Facility: MEDICAL CENTER | Age: 78
End: 2018-02-24
Attending: INTERNAL MEDICINE
Payer: MEDICARE

## 2018-02-24 ENCOUNTER — HOSPITAL ENCOUNTER (OUTPATIENT)
Dept: LAB | Facility: MEDICAL CENTER | Age: 78
End: 2018-02-24
Attending: SPECIALIST
Payer: MEDICARE

## 2018-02-24 DIAGNOSIS — C79.31 METASTASIS TO BRAIN (HCC): ICD-10-CM

## 2018-02-24 DIAGNOSIS — C79.9 METASTATIC CANCER (HCC): ICD-10-CM

## 2018-02-24 DIAGNOSIS — R91.8 LUNG MASS: ICD-10-CM

## 2018-02-24 LAB
ALBUMIN SERPL BCP-MCNC: 4.1 G/DL (ref 3.2–4.9)
ALBUMIN SERPL BCP-MCNC: 4.3 G/DL (ref 3.2–4.9)
ALBUMIN/GLOB SERPL: 1.6 G/DL
ALBUMIN/GLOB SERPL: 1.7 G/DL
ALP SERPL-CCNC: 50 U/L (ref 30–99)
ALP SERPL-CCNC: 54 U/L (ref 30–99)
ALT SERPL-CCNC: 33 U/L (ref 2–50)
ALT SERPL-CCNC: 33 U/L (ref 2–50)
ANION GAP SERPL CALC-SCNC: 8 MMOL/L (ref 0–11.9)
ANION GAP SERPL CALC-SCNC: 9 MMOL/L (ref 0–11.9)
APPEARANCE UR: CLEAR
AST SERPL-CCNC: 18 U/L (ref 12–45)
AST SERPL-CCNC: 18 U/L (ref 12–45)
BACTERIA #/AREA URNS HPF: NEGATIVE /HPF
BASOPHILS # BLD AUTO: 0.4 % (ref 0–1.8)
BASOPHILS # BLD AUTO: 0.5 % (ref 0–1.8)
BASOPHILS # BLD: 0.04 K/UL (ref 0–0.12)
BASOPHILS # BLD: 0.05 K/UL (ref 0–0.12)
BILIRUB SERPL-MCNC: 0.6 MG/DL (ref 0.1–1.5)
BILIRUB SERPL-MCNC: 0.6 MG/DL (ref 0.1–1.5)
BILIRUB UR QL STRIP.AUTO: NEGATIVE
BUN SERPL-MCNC: 19 MG/DL (ref 8–22)
BUN SERPL-MCNC: 19 MG/DL (ref 8–22)
CALCIUM SERPL-MCNC: 9.8 MG/DL (ref 8.5–10.5)
CALCIUM SERPL-MCNC: 9.8 MG/DL (ref 8.5–10.5)
CHLORIDE SERPL-SCNC: 104 MMOL/L (ref 96–112)
CHLORIDE SERPL-SCNC: 105 MMOL/L (ref 96–112)
CO2 SERPL-SCNC: 24 MMOL/L (ref 20–33)
CO2 SERPL-SCNC: 25 MMOL/L (ref 20–33)
COLOR UR: YELLOW
CREAT SERPL-MCNC: 0.48 MG/DL (ref 0.5–1.4)
CREAT SERPL-MCNC: 0.54 MG/DL (ref 0.5–1.4)
EOSINOPHIL # BLD AUTO: 0 K/UL (ref 0–0.51)
EOSINOPHIL # BLD AUTO: 0.02 K/UL (ref 0–0.51)
EOSINOPHIL NFR BLD: 0 % (ref 0–6.9)
EOSINOPHIL NFR BLD: 0.2 % (ref 0–6.9)
EPI CELLS #/AREA URNS HPF: NEGATIVE /HPF
ERYTHROCYTE [DISTWIDTH] IN BLOOD BY AUTOMATED COUNT: 62.8 FL (ref 35.9–50)
ERYTHROCYTE [DISTWIDTH] IN BLOOD BY AUTOMATED COUNT: 62.9 FL (ref 35.9–50)
GLOBULIN SER CALC-MCNC: 2.6 G/DL (ref 1.9–3.5)
GLOBULIN SER CALC-MCNC: 2.6 G/DL (ref 1.9–3.5)
GLUCOSE SERPL-MCNC: 89 MG/DL (ref 65–99)
GLUCOSE SERPL-MCNC: 91 MG/DL (ref 65–99)
GLUCOSE UR STRIP.AUTO-MCNC: NEGATIVE MG/DL
HCT VFR BLD AUTO: 41.7 % (ref 37–47)
HCT VFR BLD AUTO: 42.4 % (ref 37–47)
HGB BLD-MCNC: 13.6 G/DL (ref 12–16)
HGB BLD-MCNC: 13.7 G/DL (ref 12–16)
HYALINE CASTS #/AREA URNS LPF: ABNORMAL /LPF
IMM GRANULOCYTES # BLD AUTO: 0.07 K/UL (ref 0–0.11)
IMM GRANULOCYTES # BLD AUTO: 0.11 K/UL (ref 0–0.11)
IMM GRANULOCYTES NFR BLD AUTO: 0.7 % (ref 0–0.9)
IMM GRANULOCYTES NFR BLD AUTO: 1.1 % (ref 0–0.9)
KETONES UR STRIP.AUTO-MCNC: NEGATIVE MG/DL
LEUKOCYTE ESTERASE UR QL STRIP.AUTO: NEGATIVE
LYMPHOCYTES # BLD AUTO: 0.95 K/UL (ref 1–4.8)
LYMPHOCYTES # BLD AUTO: 1 K/UL (ref 1–4.8)
LYMPHOCYTES NFR BLD: 9.2 % (ref 22–41)
LYMPHOCYTES NFR BLD: 9.9 % (ref 22–41)
MCH RBC QN AUTO: 32.8 PG (ref 27–33)
MCH RBC QN AUTO: 33.2 PG (ref 27–33)
MCHC RBC AUTO-ENTMCNC: 32.3 G/DL (ref 33.6–35)
MCHC RBC AUTO-ENTMCNC: 32.6 G/DL (ref 33.6–35)
MCV RBC AUTO: 101.4 FL (ref 81.4–97.8)
MCV RBC AUTO: 101.7 FL (ref 81.4–97.8)
MICRO URNS: ABNORMAL
MONOCYTES # BLD AUTO: 0.87 K/UL (ref 0–0.85)
MONOCYTES # BLD AUTO: 0.95 K/UL (ref 0–0.85)
MONOCYTES NFR BLD AUTO: 8.4 % (ref 0–13.4)
MONOCYTES NFR BLD AUTO: 9.4 % (ref 0–13.4)
NEUTROPHILS # BLD AUTO: 8.08 K/UL (ref 2–7.15)
NEUTROPHILS # BLD AUTO: 8.38 K/UL (ref 2–7.15)
NEUTROPHILS NFR BLD: 79.6 % (ref 44–72)
NEUTROPHILS NFR BLD: 80.6 % (ref 44–72)
NITRITE UR QL STRIP.AUTO: NEGATIVE
NRBC # BLD AUTO: 0 K/UL
NRBC # BLD AUTO: 0 K/UL
NRBC BLD-RTO: 0 /100 WBC
NRBC BLD-RTO: 0 /100 WBC
PH UR STRIP.AUTO: 5 [PH]
PLATELET # BLD AUTO: 267 K/UL (ref 164–446)
PLATELET # BLD AUTO: 268 K/UL (ref 164–446)
PMV BLD AUTO: 8.8 FL (ref 9–12.9)
PMV BLD AUTO: 9 FL (ref 9–12.9)
POTASSIUM SERPL-SCNC: 3.8 MMOL/L (ref 3.6–5.5)
POTASSIUM SERPL-SCNC: 3.8 MMOL/L (ref 3.6–5.5)
PROT SERPL-MCNC: 6.7 G/DL (ref 6–8.2)
PROT SERPL-MCNC: 6.9 G/DL (ref 6–8.2)
PROT UR QL STRIP: NEGATIVE MG/DL
RBC # BLD AUTO: 4.1 M/UL (ref 4.2–5.4)
RBC # BLD AUTO: 4.18 M/UL (ref 4.2–5.4)
RBC # URNS HPF: ABNORMAL /HPF
RBC UR QL AUTO: ABNORMAL
SODIUM SERPL-SCNC: 137 MMOL/L (ref 135–145)
SODIUM SERPL-SCNC: 138 MMOL/L (ref 135–145)
SP GR UR STRIP.AUTO: 1.02
UROBILINOGEN UR STRIP.AUTO-MCNC: 0.2 MG/DL
WBC # BLD AUTO: 10.1 K/UL (ref 4.8–10.8)
WBC # BLD AUTO: 10.4 K/UL (ref 4.8–10.8)
WBC #/AREA URNS HPF: ABNORMAL /HPF

## 2018-02-24 PROCEDURE — 80053 COMPREHEN METABOLIC PANEL: CPT | Mod: 91

## 2018-02-24 PROCEDURE — 85025 COMPLETE CBC W/AUTO DIFF WBC: CPT | Mod: 91

## 2018-02-24 PROCEDURE — 36415 COLL VENOUS BLD VENIPUNCTURE: CPT

## 2018-02-24 PROCEDURE — 80053 COMPREHEN METABOLIC PANEL: CPT

## 2018-02-24 PROCEDURE — 85025 COMPLETE CBC W/AUTO DIFF WBC: CPT

## 2018-02-24 PROCEDURE — 81001 URINALYSIS AUTO W/SCOPE: CPT

## 2018-02-26 ENCOUNTER — OUTPATIENT INFUSION SERVICES (OUTPATIENT)
Dept: ONCOLOGY | Facility: MEDICAL CENTER | Age: 78
End: 2018-02-26
Attending: INTERNAL MEDICINE
Payer: MEDICARE

## 2018-02-26 ENCOUNTER — DOCUMENTATION (OUTPATIENT)
Dept: HEMATOLOGY ONCOLOGY | Facility: MEDICAL CENTER | Age: 78
End: 2018-02-26

## 2018-02-26 VITALS
HEIGHT: 61 IN | WEIGHT: 141.76 LBS | RESPIRATION RATE: 18 BRPM | BODY MASS INDEX: 26.76 KG/M2 | SYSTOLIC BLOOD PRESSURE: 145 MMHG | HEART RATE: 73 BPM | OXYGEN SATURATION: 98 % | DIASTOLIC BLOOD PRESSURE: 74 MMHG | TEMPERATURE: 98.4 F

## 2018-02-26 PROCEDURE — 96415 CHEMO IV INFUSION ADDL HR: CPT

## 2018-02-26 PROCEDURE — 700111 HCHG RX REV CODE 636 W/ 250 OVERRIDE (IP): Mod: JG

## 2018-02-26 PROCEDURE — 96413 CHEMO IV INFUSION 1 HR: CPT

## 2018-02-26 PROCEDURE — 700105 HCHG RX REV CODE 258: Performed by: INTERNAL MEDICINE

## 2018-02-26 PROCEDURE — 700105 HCHG RX REV CODE 258

## 2018-02-26 RX ADMIN — SODIUM CHLORIDE 600 MG: 9 INJECTION, SOLUTION INTRAVENOUS at 10:50

## 2018-02-26 ASSESSMENT — PAIN SCALES - GENERAL: PAINLEVEL: NO PAIN

## 2018-02-26 NOTE — PROGRESS NOTES
"Pharmacy Chemotherapy calculation:    DX: NSCLC - radionecrosis   Cycle 1- PAPER ORDERS IN CHART  Previous treatment = gemcitabine/abraxane followd by XRT /carboplatin 2016    Regimen: bevacizumab  bevacizumab 5-10mg/kg q2 weeks until DP/UT  -- 10mg/kg per Dr. Lauren MCKEON, et al - Neuro-Oncology 15(9): 5821-4558     /74   Pulse 73   Temp 36.9 °C (98.4 °F)   Resp 18   Ht 1.55 m (5' 1.02\")   Wt 64.3 kg (141 lb 12.1 oz)   SpO2 98%   BMI 26.76 kg/m²   Body surface area is 1.66 meters squared.     2/24/18-  ANC~ 8380 Plt = 267k   Hgb = 13.7     SCr = 0.48mg/dL CrCl ~ 65mL/min   LFT's = WNl TBili = 0.6   Urine protein- negative      bevacizumab 10mg/kg x 64.3kg = 643mg    Rounded to vial size(within 10%) per dose rounding protocol.    ok to treat with final dose = 600mg IV over 90 min, 1st dose      JAKE Little, Pharm.D.      "

## 2018-02-26 NOTE — PROGRESS NOTES
"Pharmacy chemotherapy calculation verification:    Patient Name: Ml Chavira   Dx: NSCLC Stage IV/Radionecrosis        Protocol: Bevacizumab        *Dosing Reference*  Bevacizumab 5-10 mg/kg   Q2 weeks until disease progression or unacceptable toxicity  Ramiro MCKEON et al. Bevacizumab as a treatment for radiation necrosis of brain metastases post stereotactic radiosurgery. Neuro-Oncology. 2013;15(9):8583-9840.     Allergies:  Penicillins       /74   Pulse 73   Temp 36.9 °C (98.4 °F)   Resp 18   Ht 1.55 m (5' 1.02\")   Wt 64.3 kg (141 lb 12.1 oz)   SpO2 98%   BMI 26.76 kg/m²  Body surface area is 1.66 meters squared.    Labs 02/24/18  ANC~ 8400 Plt = 267 k Hgb = 13.7 SCr = 0.48 mg/dL CrCl = 65 ml/min (min SCr 0.7 mg/dL) LFTs = WNL  Tbili = 0.6 Urine protein = Negative      Drug Order   (Drug name, dose, route, IV Fluid & volume, frequency, number of doses) Cycle 1      Previous treatment: s/p occipital craniotomy, XRT to brain followed by Carbo/Abraxane x 4 cycles in 2015; low dose Carbo +XRT to lung in 2016, Decadron in 2017 for worsening neuro symptoms      Medication = Bevacizumab (Avastin)  Base Dose= 10 mg/kg  Calc Dose: Base Dose x 64.3 kg = 643 mg  Final Dose = 600 mg   Route = IV  Fluid & Volume =  mL  Admin Duration = Over 90 min           <10% difference, dose rounded to nearest vial size (within 10%) per protocol      By my signature below, I confirm this process was performed independently with the BSA and all final chemotherapy dosing calculations congruent. I have reviewed the above chemotherapy order and that my calculation of the final dose and BSA (when applicable) corroborate those calculations of the  pharmacist. Discrepancies of 5% or greater in the written dose have been addressed and documented within the [facility] Progress notes.    Shawn Noel, PharmD, BCOP        "

## 2018-02-26 NOTE — PROGRESS NOTES
Chemotherapy Verification - PRIMARY RN      Height = 155cm  Weight = 64.3kg  BSA = 1.66m2       Medication: Avastin  Dose: 10mg/kg  Calculated Dose: 643mg (rounded to nearest vial size per pharmacy 600mg)                             (In mg/m2, AUC, mg/kg)       I confirm this process was performed independently with the BSA and all final chemotherapy dosing calculations congruent.  Any discrepancies of 5% or greater have been addressed with the chemotherapy pharmacist. The resolution of the discrepancy has been documented in the EPIC progress notes.

## 2018-02-26 NOTE — PROGRESS NOTES
Chemotherapy Verification - SECONDARY RN       Height = 61.02 inches  Weight = 64.3 kg  BSA = 1.66 m2       Medication: Avastin  Dose: 10 mg/kg  Calculated Dose: 643 mg (rounded to 600 mg)                               (In mg/m2, AUC, mg/kg)     I confirm that this process was performed independently.

## 2018-02-26 NOTE — PROGRESS NOTES
Pt presents to infusion center for first time Avastin.  Pt with history of metastatic lung cancer and had carboplatin and alimta at Dr. Thacker's office in 2015.  Pt oriented to infusion services and plan of care for the day.  PIV established, brisk blood return noted.  Pt tolerated Avastin infusion over 90 minutes with no reaction noted.  PIV flushed with saline per policy, removed, gauze dressing placed.  Pt left infusion center ambulatory and in good condition.  Returns in two weeks, appointment scheduled.  Appointment printout also given.

## 2018-02-26 NOTE — PROGRESS NOTES
"Nutrition services: RD consultation/new chemo start  Ml Chavira is a 78 y.o. female with diagnosis of lung mass w/ mets to brain is a new chemo start this date w/ no significant past medical hx at this time per H&P. Therefore, met w/ pt this date to discuss current intake and appetite. Pt reports that she has \"gained\" wt and her appetite is \"too\" good w/ no current c/o GI distress, taste changes, nor swallowing difficulty at this time. Pt states that she has been attempting to \"lose\" weight; therefore, attempted to follow the \"ketogenic diet\" as well as reduce her intake of carbohydrates. Pt has been unsuccessful with her attempts 2' her increased appetite which is d/t the current steroid treatment she is currently on.     PMHx: n/a  Labs (2/24): glucose: 91 Na: 137 K: 3.8 albumin: 4.1     Assessment:  Ht: 62\" Weight: 64.6kg (142#) BMI: 26  Recent wt change: 8# (6%) wt gain x 4 months     Recommendations/Plan:  1. Provided pt w/ tips to ensure adequate intake throughout the course of her treatment.    2. Also, provided pt w/ healthy snack ideas that are high in kcals/protein to promote wt maintenance and preservation of lean body mass. As well as general healthy nutrition guidelines for wt maintenance.   3. Pt to benefit from referral to [facility] wt management counseling to further induce healthy wt loss post cancer treatment.     Pt receptive and reports understanding, RD to continue to monitor PO adequacy and wt status to leave further recommendations accordingly.     Nutrition Needs Assessment:     Total Daily Calorie Needs per MSJ x1.4 1100 1500   Total Daily Protein Needs (1.2-1.4 g/kg) 78 90   Daily Fluids 30 mL per Act BW) 2000          "

## 2018-02-27 ENCOUNTER — PATIENT OUTREACH (OUTPATIENT)
Dept: OTHER | Facility: MEDICAL CENTER | Age: 78
End: 2018-02-27

## 2018-02-27 NOTE — PROGRESS NOTES
Pt reported was on another call and would call navigator back    Pt never returned call.  Will reach back out

## 2018-02-28 DIAGNOSIS — C79.9 METASTATIC CANCER (HCC): ICD-10-CM

## 2018-02-28 DIAGNOSIS — R91.8 LUNG MASS: ICD-10-CM

## 2018-03-01 ENCOUNTER — APPOINTMENT (OUTPATIENT)
Dept: HEMATOLOGY ONCOLOGY | Facility: MEDICAL CENTER | Age: 78
End: 2018-03-01
Payer: MEDICARE

## 2018-03-01 ENCOUNTER — HOSPITAL ENCOUNTER (OUTPATIENT)
Facility: MEDICAL CENTER | Age: 78
End: 2018-03-01
Attending: INTERNAL MEDICINE
Payer: MEDICARE

## 2018-03-01 DIAGNOSIS — R91.8 LUNG MASS: ICD-10-CM

## 2018-03-01 DIAGNOSIS — C79.9 METASTATIC CANCER (HCC): ICD-10-CM

## 2018-03-02 DIAGNOSIS — C79.31 METASTASIS TO BRAIN (HCC): ICD-10-CM

## 2018-03-02 DIAGNOSIS — R91.8 LUNG MASS: ICD-10-CM

## 2018-03-05 ENCOUNTER — NON-PROVIDER VISIT (OUTPATIENT)
Dept: HEMATOLOGY ONCOLOGY | Facility: MEDICAL CENTER | Age: 78
End: 2018-03-05
Payer: MEDICARE

## 2018-03-05 ENCOUNTER — HOSPITAL ENCOUNTER (OUTPATIENT)
Facility: MEDICAL CENTER | Age: 78
End: 2018-03-05
Attending: INTERNAL MEDICINE
Payer: MEDICARE

## 2018-03-05 ENCOUNTER — OFFICE VISIT (OUTPATIENT)
Dept: HEMATOLOGY ONCOLOGY | Facility: MEDICAL CENTER | Age: 78
End: 2018-03-05
Payer: MEDICARE

## 2018-03-05 VITALS
HEART RATE: 70 BPM | SYSTOLIC BLOOD PRESSURE: 122 MMHG | TEMPERATURE: 98.2 F | WEIGHT: 143.85 LBS | BODY MASS INDEX: 27.16 KG/M2 | DIASTOLIC BLOOD PRESSURE: 68 MMHG | OXYGEN SATURATION: 94 % | HEIGHT: 61 IN | RESPIRATION RATE: 18 BRPM

## 2018-03-05 VITALS
DIASTOLIC BLOOD PRESSURE: 68 MMHG | BODY MASS INDEX: 27 KG/M2 | RESPIRATION RATE: 18 BRPM | WEIGHT: 143 LBS | TEMPERATURE: 98.2 F | HEIGHT: 61 IN | OXYGEN SATURATION: 94 % | SYSTOLIC BLOOD PRESSURE: 122 MMHG | HEART RATE: 70 BPM

## 2018-03-05 DIAGNOSIS — R91.8 LUNG MASS: ICD-10-CM

## 2018-03-05 DIAGNOSIS — C34.11 MALIGNANT NEOPLASM OF UPPER LOBE OF RIGHT LUNG (HCC): ICD-10-CM

## 2018-03-05 DIAGNOSIS — C79.31 METASTASIS TO BRAIN (HCC): ICD-10-CM

## 2018-03-05 DIAGNOSIS — C79.9 METASTATIC CANCER (HCC): ICD-10-CM

## 2018-03-05 LAB
ALBUMIN SERPL BCP-MCNC: 4 G/DL (ref 3.2–4.9)
ALBUMIN/GLOB SERPL: 1.6 G/DL
ALP SERPL-CCNC: 72 U/L (ref 30–99)
ALT SERPL-CCNC: 34 U/L (ref 2–50)
ANION GAP SERPL CALC-SCNC: 7 MMOL/L (ref 0–11.9)
AST SERPL-CCNC: 18 U/L (ref 12–45)
BASOPHILS # BLD AUTO: 0.3 % (ref 0–1.8)
BASOPHILS # BLD: 0.03 K/UL (ref 0–0.12)
BILIRUB SERPL-MCNC: 0.5 MG/DL (ref 0.1–1.5)
BUN SERPL-MCNC: 20 MG/DL (ref 8–22)
CALCIUM SERPL-MCNC: 9.1 MG/DL (ref 8.5–10.5)
CHLORIDE SERPL-SCNC: 106 MMOL/L (ref 96–112)
CO2 SERPL-SCNC: 26 MMOL/L (ref 20–33)
CREAT SERPL-MCNC: 0.53 MG/DL (ref 0.5–1.4)
EOSINOPHIL # BLD AUTO: 0.02 K/UL (ref 0–0.51)
EOSINOPHIL NFR BLD: 0.2 % (ref 0–6.9)
ERYTHROCYTE [DISTWIDTH] IN BLOOD BY AUTOMATED COUNT: 59.4 FL (ref 35.9–50)
GLOBULIN SER CALC-MCNC: 2.5 G/DL (ref 1.9–3.5)
GLUCOSE SERPL-MCNC: 80 MG/DL (ref 65–99)
HCT VFR BLD AUTO: 41.3 % (ref 37–47)
HGB BLD-MCNC: 13.3 G/DL (ref 12–16)
IMM GRANULOCYTES # BLD AUTO: 0.1 K/UL (ref 0–0.11)
IMM GRANULOCYTES NFR BLD AUTO: 1.1 % (ref 0–0.9)
LYMPHOCYTES # BLD AUTO: 1.48 K/UL (ref 1–4.8)
LYMPHOCYTES NFR BLD: 16.7 % (ref 22–41)
MCH RBC QN AUTO: 32.7 PG (ref 27–33)
MCHC RBC AUTO-ENTMCNC: 32.2 G/DL (ref 33.6–35)
MCV RBC AUTO: 101.5 FL (ref 81.4–97.8)
MONOCYTES # BLD AUTO: 0.9 K/UL (ref 0–0.85)
MONOCYTES NFR BLD AUTO: 10.2 % (ref 0–13.4)
NEUTROPHILS # BLD AUTO: 6.32 K/UL (ref 2–7.15)
NEUTROPHILS NFR BLD: 71.5 % (ref 44–72)
NRBC # BLD AUTO: 0 K/UL
NRBC BLD-RTO: 0 /100 WBC
PLATELET # BLD AUTO: 250 K/UL (ref 164–446)
PMV BLD AUTO: 8.3 FL (ref 9–12.9)
POTASSIUM SERPL-SCNC: 4 MMOL/L (ref 3.6–5.5)
PROT SERPL-MCNC: 6.5 G/DL (ref 6–8.2)
RBC # BLD AUTO: 4.07 M/UL (ref 4.2–5.4)
SODIUM SERPL-SCNC: 139 MMOL/L (ref 135–145)
WBC # BLD AUTO: 8.9 K/UL (ref 4.8–10.8)

## 2018-03-05 PROCEDURE — 85025 COMPLETE CBC W/AUTO DIFF WBC: CPT

## 2018-03-05 PROCEDURE — 80053 COMPREHEN METABOLIC PANEL: CPT

## 2018-03-05 PROCEDURE — 36415 COLL VENOUS BLD VENIPUNCTURE: CPT | Performed by: INTERNAL MEDICINE

## 2018-03-05 PROCEDURE — 99213 OFFICE O/P EST LOW 20 MIN: CPT | Performed by: NURSE PRACTITIONER

## 2018-03-05 ASSESSMENT — ENCOUNTER SYMPTOMS
WEIGHT LOSS: 0
DIZZINESS: 1
PALPITATIONS: 0
VOMITING: 0
INSOMNIA: 0
DIARRHEA: 0
MYALGIAS: 0
WHEEZING: 0
CONSTIPATION: 0
SHORTNESS OF BREATH: 0
HEADACHES: 0
NAUSEA: 0
FEVER: 0
BLURRED VISION: 1
CHILLS: 0
COUGH: 0

## 2018-03-05 ASSESSMENT — PAIN SCALES - GENERAL
PAINLEVEL: NO PAIN
PAINLEVEL: NO PAIN

## 2018-03-05 NOTE — PROGRESS NOTES
Ml Chavira is a 78 y.o. female here for a non-provider visit for: Lab Draws  on 3/5/2018 at 12:44 PM    Procedure Performed: Venipuncture     Anatomical site: Right Antecubital Area (AC)    Equipment used: 21g    Labs drawn: CBC w/diff and CMP    Ordering Provider: Dr. Mariano Portillo By: Heath Rouse, Med Ass't    Dr. Duvall in office,    Successful lab draw, no complications.

## 2018-03-05 NOTE — PROGRESS NOTES
Subjective:      Ml Chavira is a 78 y.o. female who presents for a Follow-Up (Tox check (Lauren)) for metastatic cancer to brain.         HPI    Patient seen today in follow-up for metastatic cancer to brain. She is status post cycle #1, day #8 of Avastin. She is here for a toxicity check and presents unaccompanied for today's visit.    Patient tolerated her first dose of Avastin very well. She stated for the first few days after treatment she experienced some fatigue but that has resolved. She denies any fevers, chills or weight loss. Appetite has been stable. Patient did state that she does have blurred vision intermittently and it is slightly worse on steroids. She currently is on dexamethasone 1 mg at night only. I did review Dr. Lauren Alfaroone he stated he would like to taper off after her first 2 doses of Avastin. She denies any coughing, wheezing or shortness of breath. She denies any chest pain, heart palpitations or swelling in her legs. She denies any nausea, vomiting, diarrhea or constipation. Her last bowel movement was today. She is voiding without difficulty and denies any pain. She denies any headaches, but does state she has some mild dizziness at times. She does not always need to use a cane for walking but does have it with her at all times for stability. She stated she has not had a headache for the last few weeks.    Patient stated she is undergoing physical therapy approximately 2 times per week and stating it is helping her a lot.    Allergies   Allergen Reactions   • Penicillins Itching and Rash     Current Outpatient Prescriptions on File Prior to Visit   Medication Sig Dispense Refill   • dexamethasone (DECADRON) 4 MG Tab Take 1 Tab by mouth every day. 30 Tab 1   • famotidine (PEPCID) 40 MG Tab Take 1 Tab by mouth every day. 30 Tab 1   • levetiracetam (KEPPRA) 500 MG Tab Take 1 Tab by mouth 2 Times a Day. 60 Tab    • atorvastatin (LIPITOR) 40 MG Tab Take 1 Tab by mouth every  "evening. 30 Tab    • aspirin (ASA) 325 MG Tab Take 1 Tab by mouth every day. 100 Tab    • vitamin B-12 CR (CYANOCOBALAMIN) 1000 MCG Tab CR tablet Take 5,000 mg by mouth.     • Omega-3 Fatty Acids (FISH OIL) 1200 MG Cap Take 1 Cap by mouth every day.     • multivitamin (THERAGRAN) Tab Take 1 Tab by mouth every day.       No current facility-administered medications on file prior to visit.        Review of Systems   Constitutional: Positive for malaise/fatigue (for the first few days after treatment). Negative for chills, fever and weight loss.   Eyes: Positive for blurred vision (intermittently and slightly worse with steroids).   Respiratory: Negative for cough, shortness of breath and wheezing.    Cardiovascular: Negative for chest pain, palpitations and leg swelling.   Gastrointestinal: Negative for constipation, diarrhea, nausea and vomiting.        Last BM was today   Genitourinary: Negative for dysuria.   Musculoskeletal: Negative for myalgias.   Skin: Negative for itching and rash.   Neurological: Positive for dizziness (every once in a while. Ambulates with a cane for stability.). Negative for headaches (not for a few weeks).   Psychiatric/Behavioral: The patient does not have insomnia.           Objective:     /68   Pulse 70   Temp 36.8 °C (98.2 °F)   Resp 18   Ht 1.55 m (5' 1.02\")   Wt 64.9 kg (143 lb)   SpO2 94%   Breastfeeding? No   BMI 27.00 kg/m²      Physical Exam   Constitutional: She appears well-developed and well-nourished. No distress.   HENT:   Head: Normocephalic and atraumatic.   Mouth/Throat: Oropharynx is clear and moist. No oropharyngeal exudate.   Eyes: Conjunctivae and EOM are normal. Pupils are equal, round, and reactive to light. Right eye exhibits no discharge. Left eye exhibits no discharge. No scleral icterus.   Cardiovascular: Normal rate, regular rhythm and normal heart sounds.  Exam reveals no gallop and no friction rub.    No murmur heard.  Pulmonary/Chest: Effort " normal and breath sounds normal. No respiratory distress. She has no wheezes.   Abdominal: Soft. Bowel sounds are normal. She exhibits no distension. There is no tenderness.   Musculoskeletal: She exhibits no edema or tenderness.   Mild instability at times, but ambulates with a cane as needed.   Neurological: She is alert.   Skin: Skin is warm and dry. No rash noted. She is not diaphoretic. No erythema. No pallor.   Psychiatric: She has a normal mood and affect. Her behavior is normal.   Vitals reviewed.      Hospital Outpatient Visit on 03/05/2018   Component Date Value Ref Range Status   • WBC 03/05/2018 8.9  4.8 - 10.8 K/uL Final   • RBC 03/05/2018 4.07* 4.20 - 5.40 M/uL Final   • Hemoglobin 03/05/2018 13.3  12.0 - 16.0 g/dL Final   • Hematocrit 03/05/2018 41.3  37.0 - 47.0 % Final   • MCV 03/05/2018 101.5* 81.4 - 97.8 fL Final   • MCH 03/05/2018 32.7  27.0 - 33.0 pg Final   • MCHC 03/05/2018 32.2* 33.6 - 35.0 g/dL Final   • RDW 03/05/2018 59.4* 35.9 - 50.0 fL Final   • Platelet Count 03/05/2018 250  164 - 446 K/uL Final   • MPV 03/05/2018 8.3* 9.0 - 12.9 fL Final   • Neutrophils-Polys 03/05/2018 71.50  44.00 - 72.00 % Final   • Lymphocytes 03/05/2018 16.70* 22.00 - 41.00 % Final   • Monocytes 03/05/2018 10.20  0.00 - 13.40 % Final   • Eosinophils 03/05/2018 0.20  0.00 - 6.90 % Final   • Basophils 03/05/2018 0.30  0.00 - 1.80 % Final   • Immature Granulocytes 03/05/2018 1.10* 0.00 - 0.90 % Final   • Nucleated RBC 03/05/2018 0.00  /100 WBC Final   • Neutrophils (Absolute) 03/05/2018 6.32  2.00 - 7.15 K/uL Final   • Lymphs (Absolute) 03/05/2018 1.48  1.00 - 4.80 K/uL Final   • Monos (Absolute) 03/05/2018 0.90* 0.00 - 0.85 K/uL Final   • Eos (Absolute) 03/05/2018 0.02  0.00 - 0.51 K/uL Final   • Baso (Absolute) 03/05/2018 0.03  0.00 - 0.12 K/uL Final   • Immature Granulocytes (abs) 03/05/2018 0.10  0.00 - 0.11 K/uL Final   • NRBC (Absolute) 03/05/2018 0.00  K/uL Final   • Sodium 03/05/2018 139  135 - 145 mmol/L  Final   • Potassium 03/05/2018 4.0  3.6 - 5.5 mmol/L Final   • Chloride 03/05/2018 106  96 - 112 mmol/L Final   • Co2 03/05/2018 26  20 - 33 mmol/L Final   • Anion Gap 03/05/2018 7.0  0.0 - 11.9 Final   • Glucose 03/05/2018 80  65 - 99 mg/dL Final   • Bun 03/05/2018 20  8 - 22 mg/dL Final   • Creatinine 03/05/2018 0.53  0.50 - 1.40 mg/dL Final   • Calcium 03/05/2018 9.1  8.5 - 10.5 mg/dL Final   • AST(SGOT) 03/05/2018 18  12 - 45 U/L Final   • ALT(SGPT) 03/05/2018 34  2 - 50 U/L Final   • Alkaline Phosphatase 03/05/2018 72  30 - 99 U/L Final   • Total Bilirubin 03/05/2018 0.5  0.1 - 1.5 mg/dL Final   • Albumin 03/05/2018 4.0  3.2 - 4.9 g/dL Final   • Total Protein 03/05/2018 6.5  6.0 - 8.2 g/dL Final   • Globulin 03/05/2018 2.5  1.9 - 3.5 g/dL Final   • A-G Ratio 03/05/2018 1.6  g/dL Final   • GFR If  03/05/2018 >60  >60 mL/min/1.73 m 2 Final   • GFR If Non  03/05/2018 >60  >60 mL/min/1.73 m 2 Final          Assessment/Plan:       1. Malignant neoplasm of upper lobe of right lung (CMS-HCC)     2. Metastasis to brain (CMS-HCC)       Plan  1. Patient with metastatic lung cancer to brain currently started on single agent Avastin and is status post day #8, cycle 1. She is here for toxicity check. She is doing very well and tolerated her first dose well. Her blood pressure is stable today. She did have some mild fatigue after the first day after treatment but that has resolved. Overall she is doing very well.    2. Patient does have metastatic cancer to her brain. She is currently on dexamethasone 1 mg at night and tolerating it well. I did review Dr. Aguilar's last note he would like to start tapering off the steroid after 2 cycles of Avastin. We'll continue on current dose as prescribed.    3. Patient is to follow-up again in one week or sooner if needed prior cycle #2 of Avastin.

## 2018-03-09 DIAGNOSIS — C34.11 MALIGNANT NEOPLASM OF UPPER LOBE OF RIGHT LUNG (HCC): ICD-10-CM

## 2018-03-09 DIAGNOSIS — C79.31 METASTASIS TO BRAIN (HCC): ICD-10-CM

## 2018-03-11 NOTE — PROGRESS NOTES
"Pharmacy Chemotherapy calculation:    DX: NSCLC - radionecrosis   Cycle 2- PAPER ORDERS IN CHART  Previous treatment = 2/26/18    Regimen: bevacizumab  bevacizumab 5-10mg/kg q2 weeks until DP/UT  -- 10mg/kg per Dr. Lauren MCKEON, et al - Neuro-Oncology 15(9): 1757-5176     /72   Pulse 80   Temp 36.8 °C (98.2 °F)   Resp 18   Ht 1.55 m (5' 1.02\")   Wt 67.1 kg (147 lb 14.9 oz)   SpO2 93%   BMI 27.93 kg/m²   Body surface area is 1.7 meters squared.     3/5/18  ANC~ 6320 Plt = 250k   Hgb = 13.3    3/12/18- q4 weeks  SCr = 0.55 mg/dL CrCl ~ 68 mL/min (min Cr 0.7)  LFT's = WNL TBili = 0.5    3/12/18 - [id number]  Urine protein = negative    Bevacizumab 10mg/kg x 67.1 kg = 671mg    Rounded to vial size(within 10%) per dose rounding protocol.    ok to treat with final dose = 700 mg IV over 60 min, second dose    Dom Robledo, PharmD    "

## 2018-03-12 ENCOUNTER — NON-PROVIDER VISIT (OUTPATIENT)
Dept: HEMATOLOGY ONCOLOGY | Facility: MEDICAL CENTER | Age: 78
End: 2018-03-12
Payer: MEDICARE

## 2018-03-12 ENCOUNTER — OFFICE VISIT (OUTPATIENT)
Dept: HEMATOLOGY ONCOLOGY | Facility: MEDICAL CENTER | Age: 78
End: 2018-03-12
Payer: MEDICARE

## 2018-03-12 ENCOUNTER — HOSPITAL ENCOUNTER (OUTPATIENT)
Facility: MEDICAL CENTER | Age: 78
End: 2018-03-12
Attending: NURSE PRACTITIONER
Payer: MEDICARE

## 2018-03-12 ENCOUNTER — TELEPHONE (OUTPATIENT)
Dept: HEMATOLOGY ONCOLOGY | Facility: MEDICAL CENTER | Age: 78
End: 2018-03-12

## 2018-03-12 ENCOUNTER — DOCUMENTATION (OUTPATIENT)
Dept: HEMATOLOGY ONCOLOGY | Facility: MEDICAL CENTER | Age: 78
End: 2018-03-12

## 2018-03-12 ENCOUNTER — APPOINTMENT (OUTPATIENT)
Dept: HEMATOLOGY ONCOLOGY | Facility: MEDICAL CENTER | Age: 78
End: 2018-03-12
Payer: MEDICARE

## 2018-03-12 ENCOUNTER — OUTPATIENT INFUSION SERVICES (OUTPATIENT)
Dept: ONCOLOGY | Facility: MEDICAL CENTER | Age: 78
End: 2018-03-12
Attending: INTERNAL MEDICINE
Payer: MEDICARE

## 2018-03-12 VITALS
BODY MASS INDEX: 27.87 KG/M2 | DIASTOLIC BLOOD PRESSURE: 76 MMHG | HEIGHT: 61 IN | SYSTOLIC BLOOD PRESSURE: 122 MMHG | TEMPERATURE: 98.1 F | HEART RATE: 79 BPM | WEIGHT: 147.6 LBS | RESPIRATION RATE: 14 BRPM | OXYGEN SATURATION: 92 %

## 2018-03-12 VITALS
OXYGEN SATURATION: 93 % | SYSTOLIC BLOOD PRESSURE: 122 MMHG | DIASTOLIC BLOOD PRESSURE: 56 MMHG | TEMPERATURE: 98.2 F | HEIGHT: 61 IN | BODY MASS INDEX: 27.93 KG/M2 | RESPIRATION RATE: 18 BRPM | WEIGHT: 147.93 LBS | HEART RATE: 80 BPM

## 2018-03-12 VITALS
BODY MASS INDEX: 27.89 KG/M2 | WEIGHT: 147.71 LBS | DIASTOLIC BLOOD PRESSURE: 76 MMHG | RESPIRATION RATE: 14 BRPM | SYSTOLIC BLOOD PRESSURE: 122 MMHG | OXYGEN SATURATION: 92 % | TEMPERATURE: 98.1 F | HEART RATE: 79 BPM | HEIGHT: 61 IN

## 2018-03-12 DIAGNOSIS — C79.31 METASTASIS TO BRAIN (HCC): ICD-10-CM

## 2018-03-12 DIAGNOSIS — C34.11 MALIGNANT NEOPLASM OF UPPER LOBE OF RIGHT LUNG (HCC): ICD-10-CM

## 2018-03-12 DIAGNOSIS — R91.8 LUNG MASS: ICD-10-CM

## 2018-03-12 LAB
ALBUMIN SERPL BCP-MCNC: 4 G/DL (ref 3.2–4.9)
ALBUMIN/GLOB SERPL: 1.6 G/DL
ALP SERPL-CCNC: 92 U/L (ref 30–99)
ALT SERPL-CCNC: 40 U/L (ref 2–50)
ANION GAP SERPL CALC-SCNC: 5 MMOL/L (ref 0–11.9)
APPEARANCE UR: CLEAR
AST SERPL-CCNC: 20 U/L (ref 12–45)
BACTERIA #/AREA URNS HPF: NEGATIVE /HPF
BILIRUB SERPL-MCNC: 0.5 MG/DL (ref 0.1–1.5)
BILIRUB UR QL STRIP.AUTO: NEGATIVE
BUN SERPL-MCNC: 21 MG/DL (ref 8–22)
CALCIUM SERPL-MCNC: 9.3 MG/DL (ref 8.5–10.5)
CHLORIDE SERPL-SCNC: 105 MMOL/L (ref 96–112)
CO2 SERPL-SCNC: 26 MMOL/L (ref 20–33)
COLOR UR: ABNORMAL
CREAT SERPL-MCNC: 0.55 MG/DL (ref 0.5–1.4)
EPI CELLS #/AREA URNS HPF: NEGATIVE /HPF
GLOBULIN SER CALC-MCNC: 2.5 G/DL (ref 1.9–3.5)
GLUCOSE SERPL-MCNC: 85 MG/DL (ref 65–99)
GLUCOSE UR STRIP.AUTO-MCNC: NEGATIVE MG/DL
HYALINE CASTS #/AREA URNS LPF: ABNORMAL /LPF
KETONES UR STRIP.AUTO-MCNC: NEGATIVE MG/DL
LEUKOCYTE ESTERASE UR QL STRIP.AUTO: NEGATIVE
MICRO URNS: ABNORMAL
NITRITE UR QL STRIP.AUTO: NEGATIVE
PH UR STRIP.AUTO: 5 [PH]
POTASSIUM SERPL-SCNC: 4 MMOL/L (ref 3.6–5.5)
PROT SERPL-MCNC: 6.5 G/DL (ref 6–8.2)
PROT UR QL STRIP: NEGATIVE MG/DL
RBC # URNS HPF: ABNORMAL /HPF
RBC UR QL AUTO: ABNORMAL
SODIUM SERPL-SCNC: 136 MMOL/L (ref 135–145)
SP GR UR STRIP.AUTO: 1.02
UROBILINOGEN UR STRIP.AUTO-MCNC: 0.2 MG/DL
WBC #/AREA URNS HPF: ABNORMAL /HPF

## 2018-03-12 PROCEDURE — 700105 HCHG RX REV CODE 258

## 2018-03-12 PROCEDURE — 81001 URINALYSIS AUTO W/SCOPE: CPT

## 2018-03-12 PROCEDURE — 80053 COMPREHEN METABOLIC PANEL: CPT

## 2018-03-12 PROCEDURE — 700105 HCHG RX REV CODE 258: Performed by: INTERNAL MEDICINE

## 2018-03-12 PROCEDURE — 36000 PLACE NEEDLE IN VEIN: CPT | Performed by: NURSE PRACTITIONER

## 2018-03-12 PROCEDURE — 99213 OFFICE O/P EST LOW 20 MIN: CPT | Performed by: NURSE PRACTITIONER

## 2018-03-12 PROCEDURE — 700111 HCHG RX REV CODE 636 W/ 250 OVERRIDE (IP): Mod: JW,JG

## 2018-03-12 PROCEDURE — 96413 CHEMO IV INFUSION 1 HR: CPT

## 2018-03-12 RX ORDER — FAMOTIDINE 20 MG/1
TABLET, FILM COATED ORAL
COMMUNITY
Start: 2018-01-02 | End: 2018-03-27

## 2018-03-12 RX ADMIN — SODIUM CHLORIDE 700 MG: 9 INJECTION, SOLUTION INTRAVENOUS at 14:13

## 2018-03-12 ASSESSMENT — ENCOUNTER SYMPTOMS
WHEEZING: 0
CONSTIPATION: 0
FEVER: 0
INSOMNIA: 0
DIZZINESS: 1
BLURRED VISION: 1
WEIGHT LOSS: 0
DIARRHEA: 0
SINUS PAIN: 1
COUGH: 0
HEADACHES: 0
SHORTNESS OF BREATH: 0
VOMITING: 0
MYALGIAS: 0
PALPITATIONS: 0
CHILLS: 0
NAUSEA: 0

## 2018-03-12 ASSESSMENT — PAIN SCALES - GENERAL
PAINLEVEL: NO PAIN
PAINLEVEL: 4=SLIGHT-MODERATE PAIN
PAINLEVEL: NO PAIN

## 2018-03-12 NOTE — PROGRESS NOTES
"Pharmacy chemotherapy calculation verification:    Patient Name: Ml Chavira   Dx: NSCLC Stage IV/Radionecrosis        Protocol: Bevacizumab        *Dosing Reference*  Bevacizumab 5-10 mg/kg   Q2 weeks until disease progression or unacceptable toxicity  Ramiro MCKEON et al. Bevacizumab as a treatment for radiation necrosis of brain metastases post stereotactic radiosurgery. Neuro-Oncology. 2013;15(9):1936-0196.     Allergies:  Penicillins       /72   Pulse 80   Temp 36.8 °C (98.2 °F)   Resp 18   Ht 1.55 m (5' 1.02\")   Wt 67.1 kg (147 lb 14.9 oz)   SpO2 93%   BMI 27.93 kg/m²  Body surface area is 1.7 meters squared.    SCr = 0.55mg/dL CrCl ~ 68mL/min (min Scr = 0.7)   LFT's = WNL TBili = 0.5   Urine protein = negative     Drug Order   (Drug name, dose, route, IV Fluid & volume, frequency, number of doses) Cycle 2  - PAPER ORDERS IN CHART  Previous treatment: 2/26/18   Medication = Bevacizumab (Avastin)  Base Dose= 10 mg/kg  Calc Dose: Base Dose x 67.1kg = 671mg  Final Dose = 700 mg   Route = IV  Fluid & Volume =  mL  Admin Duration = Over 60 min           Rounded to vial size(within 10%) per dose rounding protocol.   ok to treat with final dose     By my signature below, I confirm this process was performed independently with the BSA and all final chemotherapy dosing calculations congruent. I have reviewed the above chemotherapy order and that my calculation of the final dose and BSA (when applicable) corroborate those calculations of the  pharmacist. Discrepancies of 5% or greater in the written dose have been addressed and documented within the [facility] Progress notes.    JAKE Little Pharm.D.          "

## 2018-03-12 NOTE — PROGRESS NOTES
Chemotherapy Verification - PRIMARY RN      Height = 155 cm  Weight = 67.1 kg  BSA = 1.7 m2       Medication: Avastin  Dose: 10 mg/kg  Calculated Dose: 670.1 mg  (round to vial)                            (In mg/m2, AUC, mg/kg)     I confirm this process was performed independently with the BSA and all final chemotherapy dosing calculations congruent.  Any discrepancies of 5% or greater have been addressed with the chemotherapy pharmacist. The resolution of the discrepancy has been documented in the EPIC progress notes.

## 2018-03-12 NOTE — PROGRESS NOTES
Chemotherapy Verification - SECONDARY RN       Height = 155cm  Weight = 67.1kg  BSA = 1.7m2       Medication: avastin  Dose: 10mg/m2  Calculated Dose: 671mg    I confirm that this process was performed independently.

## 2018-03-12 NOTE — PROGRESS NOTES
Subjective:      Ml Chavira is a 78 y.o. female who presents with Follow-Up (prechemo) for metastatic lung cancer to brain.         HPI    Patient seen today in follow-up for metastatic lung cancer to brain. She presents unaccompanied for today's visit. Patient is scheduled to receive cycle #2 of Avastin today.    Patient did very well with her first cycle of treatment. She denies any significant symptoms including fevers, chills fatigue or weight loss. She stated she did experience some fatigue for the first few days after treatment but that has resolved. Patient denies any chest pain, heart palpitations or swelling in her legs. She denies any coughing, wheezing or shortness of breath. She denies any vomiting, however she does states she has very mild nausea at times when she takes her steroid, she also denies any diarrhea or constipation. Her last bowel movement was today. She is voiding without difficulty and denies any pain. Patient did mention that she expresses dizziness sometimes when she stands up too fast or when she is standing in one place for a long period of time. However patient stated she denies any headaches most recently.    Patient is currently on dexamethasone 1 mg at night only. She does complain of some blurred vision which is intermittent that time. She states that she notices her vision to be more blurred while on dexamethasone.    Patient had been seeing physical therapy but according to the patient she stated the physical therapist informed her that she was not making much progress with her balance. He has held the physical therapy at this time and stated that she should resume physical therapy when she is not on some medications. Patient does utilize a cane for stability when walking but states she uses it more for depth issues.    Patient states she has some mild pain in the right upper sinus area. She did inform me today that she underwent a root canal on February  "19.    Allergies   Allergen Reactions   • Penicillins Itching and Rash     Current Outpatient Prescriptions on File Prior to Visit   Medication Sig Dispense Refill   • famotidine (PEPCID) 20 MG Tab      • dexamethasone (DECADRON) 4 MG Tab Take 1 Tab by mouth every day. 30 Tab 1   • famotidine (PEPCID) 40 MG Tab Take 1 Tab by mouth every day. 30 Tab 1   • levetiracetam (KEPPRA) 500 MG Tab Take 1 Tab by mouth 2 Times a Day. 60 Tab    • atorvastatin (LIPITOR) 40 MG Tab Take 1 Tab by mouth every evening. 30 Tab    • aspirin (ASA) 325 MG Tab Take 1 Tab by mouth every day. 100 Tab    • vitamin B-12 CR (CYANOCOBALAMIN) 1000 MCG Tab CR tablet Take 5,000 mg by mouth.     • Omega-3 Fatty Acids (FISH OIL) 1200 MG Cap Take 1 Cap by mouth every day.     • multivitamin (THERAGRAN) Tab Take 1 Tab by mouth every day.       No current facility-administered medications on file prior to visit.        Review of Systems   Constitutional: Negative for chills, fever, malaise/fatigue (only few days after treatment) and weight loss.   HENT: Positive for sinus pain (right side from recent root canal).    Eyes: Positive for blurred vision (present today and intermittent at times).   Respiratory: Negative for cough, shortness of breath and wheezing.    Cardiovascular: Negative for chest pain, palpitations and leg swelling.   Gastrointestinal: Negative for constipation, diarrhea, nausea and vomiting.   Genitourinary: Negative for dysuria.   Musculoskeletal: Negative for myalgias.   Neurological: Positive for dizziness (sometimes when she stands or standing in one place for a long time). Negative for headaches.   Psychiatric/Behavioral: The patient does not have insomnia.           Objective:     /76   Pulse 79   Temp 36.7 °C (98.1 °F)   Resp 14   Ht 1.549 m (5' 0.98\")   Wt 67 kg (147 lb 11.3 oz)   SpO2 92%   Breastfeeding? No   BMI 27.92 kg/m²      Physical Exam   Constitutional: She is oriented to person, place, and time. She " appears well-developed and well-nourished. No distress.   HENT:   Head: Normocephalic and atraumatic.   Mouth/Throat: Oropharynx is clear and moist. No oropharyngeal exudate.   Cardiovascular: Normal rate, regular rhythm, normal heart sounds and intact distal pulses.  Exam reveals no gallop and no friction rub.    No murmur heard.  Pulmonary/Chest: Effort normal and breath sounds normal. No respiratory distress. She has no wheezes.   Abdominal: Soft. Bowel sounds are normal. She exhibits no distension. There is no tenderness.   Musculoskeletal: She exhibits no edema or tenderness.   Unstable with ambulation, but utilizes a cane for stability.   Neurological: She is alert and oriented to person, place, and time.   Skin: Skin is warm and dry. No rash noted. She is not diaphoretic. No erythema. No pallor.   Psychiatric: She has a normal mood and affect. Her behavior is normal.   Vitals reviewed.        Hospital Outpatient Visit on 03/12/2018   Component Date Value Ref Range Status   • Sodium 03/12/2018 136  135 - 145 mmol/L Final   • Potassium 03/12/2018 4.0  3.6 - 5.5 mmol/L Final   • Chloride 03/12/2018 105  96 - 112 mmol/L Final   • Co2 03/12/2018 26  20 - 33 mmol/L Final   • Anion Gap 03/12/2018 5.0  0.0 - 11.9 Final   • Glucose 03/12/2018 85  65 - 99 mg/dL Final   • Bun 03/12/2018 21  8 - 22 mg/dL Final   • Creatinine 03/12/2018 0.55  0.50 - 1.40 mg/dL Final   • Calcium 03/12/2018 9.3  8.5 - 10.5 mg/dL Final   • AST(SGOT) 03/12/2018 20  12 - 45 U/L Final   • ALT(SGPT) 03/12/2018 40  2 - 50 U/L Final   • Alkaline Phosphatase 03/12/2018 92  30 - 99 U/L Final   • Total Bilirubin 03/12/2018 0.5  0.1 - 1.5 mg/dL Final   • Albumin 03/12/2018 4.0  3.2 - 4.9 g/dL Final   • Total Protein 03/12/2018 6.5  6.0 - 8.2 g/dL Final   • Globulin 03/12/2018 2.5  1.9 - 3.5 g/dL Final   • A-G Ratio 03/12/2018 1.6  g/dL Final   • GFR If  03/12/2018 >60  >60 mL/min/1.73 m 2 Final   • GFR If Non   03/12/2018 >60  >60 mL/min/1.73 m 2 Final            Assessment/Plan:     1. Malignant neoplasm of upper lobe of right lung (CMS-HCC)     2. Metastasis to brain (CMS-HCC)       Plan  1. Patient is doing very well and tolerating treatment well. She is currently due to receive cycle #2 of Avastin today. I did review her CMP today and labs are appropriate to proceed with treatment as planned.    2. Patient is status post root canal on February 19. I did discuss this with Dr. Aguilar who has agreed to allow patient to continue to receive Avastin as planned. I did speak to the patient and recommend that she follow up with the physician who performed a root canal due to the increase in pain at that location.    3. I did speak with Dr. Aguilar as well today regarding patient's steroid use. She is currently taking 1 mg by mouth daily at bedtime. We will have patient discontinue dexamethasone at this time and see how she tolerates being off the steroids altogether. I did ask that patient monitor her symptoms closely and if she develops significant issues with dizziness, headaches or any other neurological changes she is to start back on the dexamethasone at 1 mg at night. Patient verbalized understanding.    4. Patient is to follow up again in 2 weeks or sooner if needed.

## 2018-03-12 NOTE — PROGRESS NOTES
Ml Chavira is a 78 y.o. female here for a non-provider visit for:labs on 3/12/2018 at 10:30 AM    Procedure Performed: Peripheral IV start with labs    Anatomical site: Right Antecubital Area (AC)    Equipment used: 24g    Labs drawn: CMP and urinalysis collected    Ordering Provider: NICKY Hobbs    Bandar By: Praveen Belle R.N.     Patient tolerated well.  IV flushed with NS with no s/s of infiltration. IV secured with coban and remains intact for appt with infusion services today at 1:30pm.

## 2018-03-12 NOTE — PROGRESS NOTES
"Nutrition Progress Note:  Patient's weight per stand up scale in infusion = 147 lbs (weight gain since initial assessment done on 2/6).      Per interview with dietitian, patient states appetite is very good during chemotherapy, likely r/t patient taking steroid medication.  She reports \"overindulging\" on cookies and chocolate as her food \"vices\" and requested information on healthy snacking to avoid sugary cravings.   -RD reviewed list of healthy snack ideas and encouraged patient to opt for fruits, vegetables, whole grain cereals topped onto low-fat/non-fat yogurt .  Also discussed dark chocolate or 4-5 chocolate covered nuts as appropriate dessert serving size.  Pt verbalizes very good understanding.     "

## 2018-03-13 NOTE — PROGRESS NOTES
Returns for Avastin.  Just saw MD.  Had labs and reports feels well.  Voices no c/o.  IV already in place.  Tx infused without rx.  DC to care of .

## 2018-03-25 NOTE — PROGRESS NOTES
"Pharmacy chemotherapy calculation verification:    Patient Name: Ml Chavira   Dx: NSCLC Stage IV/Radionecrosis        Protocol: Bevacizumab        *Dosing Reference*  Bevacizumab 5-10 mg/kg   Q2 weeks until disease progression or unacceptable toxicity  Ramiro MCKEON et al. Bevacizumab as a treatment for radiation necrosis of brain metastases post stereotactic radiosurgery. Neuro-Oncology. 2013;15(9):1949-7370.     Allergies:  Penicillins       /58   Pulse 83   Temp 36.2 °C (97.2 °F)   Resp 18   Ht 1.55 m (5' 1.02\")   Wt 68.3 kg (150 lb 9.2 oz)   SpO2 92%   BMI 28.43 kg/m²  Body surface area is 1.71 meters squared.    LABS 3/26/2018:  SCr: 0.54mg/dL CrCl: 68.9mL/min (minimum SCr 0.7)    K: 4.4  Na: 138          Glu: 93    LFT's: 26/29/60 TBili = 0.6   Urine protein = negative     LABS 3/5/2018:  ANC: 6320      WBC: 8.9    Plt: 250k   Hgb/Hct: 13.3/41.3       Drug Order   (Drug name, dose, route, IV Fluid & volume, frequency, number of doses) Cycle 3    Previous treatment: 3/12/2018   Medication = Bevacizumab (Avastin)  Base Dose= 10 mg/kg  Calc Dose: Base Dose x 68.3 kg = 683mg  Final Dose = 700 mg   Route = IV  Fluid & Volume =  mL  Admin Duration = Over 30 min           Rounded to vial size(within 10%) per dose rounding protocol.   ok to treat with final dose     By my signature below, I confirm this process was performed independently with the BSA and all final chemotherapy dosing calculations congruent. I have reviewed the above chemotherapy order and that my calculation of the final dose and BSA (when applicable) corroborate those calculations of the  pharmacist. Discrepancies of 5% or greater in the written dose have been addressed and documented within the [facility] Progress notes.    FLORENCIA Argueta, PharmD          "

## 2018-03-26 ENCOUNTER — OFFICE VISIT (OUTPATIENT)
Dept: HEMATOLOGY ONCOLOGY | Facility: MEDICAL CENTER | Age: 78
End: 2018-03-26
Payer: MEDICARE

## 2018-03-26 ENCOUNTER — DOCUMENTATION (OUTPATIENT)
Dept: HEMATOLOGY ONCOLOGY | Facility: MEDICAL CENTER | Age: 78
End: 2018-03-26

## 2018-03-26 ENCOUNTER — OUTPATIENT INFUSION SERVICES (OUTPATIENT)
Dept: ONCOLOGY | Facility: MEDICAL CENTER | Age: 78
End: 2018-03-26
Attending: INTERNAL MEDICINE
Payer: MEDICARE

## 2018-03-26 ENCOUNTER — NON-PROVIDER VISIT (OUTPATIENT)
Dept: HEMATOLOGY ONCOLOGY | Facility: MEDICAL CENTER | Age: 78
End: 2018-03-26
Payer: MEDICARE

## 2018-03-26 ENCOUNTER — HOSPITAL ENCOUNTER (OUTPATIENT)
Facility: MEDICAL CENTER | Age: 78
End: 2018-03-26
Attending: NURSE PRACTITIONER
Payer: MEDICARE

## 2018-03-26 VITALS
HEART RATE: 87 BPM | DIASTOLIC BLOOD PRESSURE: 68 MMHG | OXYGEN SATURATION: 89 % | BODY MASS INDEX: 28.24 KG/M2 | TEMPERATURE: 98.6 F | HEIGHT: 61 IN | WEIGHT: 149.58 LBS | RESPIRATION RATE: 16 BRPM | SYSTOLIC BLOOD PRESSURE: 132 MMHG

## 2018-03-26 VITALS
BODY MASS INDEX: 28.13 KG/M2 | HEIGHT: 61 IN | SYSTOLIC BLOOD PRESSURE: 132 MMHG | HEART RATE: 87 BPM | WEIGHT: 149 LBS | TEMPERATURE: 98.6 F | DIASTOLIC BLOOD PRESSURE: 68 MMHG | RESPIRATION RATE: 16 BRPM | OXYGEN SATURATION: 89 %

## 2018-03-26 VITALS
TEMPERATURE: 97.2 F | BODY MASS INDEX: 28.43 KG/M2 | HEART RATE: 83 BPM | OXYGEN SATURATION: 92 % | WEIGHT: 150.57 LBS | DIASTOLIC BLOOD PRESSURE: 58 MMHG | HEIGHT: 61 IN | SYSTOLIC BLOOD PRESSURE: 140 MMHG | RESPIRATION RATE: 18 BRPM

## 2018-03-26 DIAGNOSIS — C34.11 MALIGNANT NEOPLASM OF UPPER LOBE OF RIGHT LUNG (HCC): ICD-10-CM

## 2018-03-26 DIAGNOSIS — R53.0 NEOPLASTIC MALIGNANT RELATED FATIGUE: ICD-10-CM

## 2018-03-26 DIAGNOSIS — C79.31 METASTASIS TO BRAIN (HCC): ICD-10-CM

## 2018-03-26 DIAGNOSIS — C79.9 METASTATIC CANCER (HCC): ICD-10-CM

## 2018-03-26 DIAGNOSIS — Z51.12 ENCOUNTER FOR ANTINEOPLASTIC IMMUNOTHERAPY: ICD-10-CM

## 2018-03-26 LAB
ALBUMIN SERPL BCP-MCNC: 4.1 G/DL (ref 3.2–4.9)
ALBUMIN/GLOB SERPL: 1.7 G/DL
ALP SERPL-CCNC: 60 U/L (ref 30–99)
ALT SERPL-CCNC: 29 U/L (ref 2–50)
ANION GAP SERPL CALC-SCNC: 6 MMOL/L (ref 0–11.9)
APPEARANCE UR: CLEAR
APPEARANCE UR: CLEAR
AST SERPL-CCNC: 26 U/L (ref 12–45)
BACTERIA #/AREA URNS HPF: NEGATIVE /HPF
BACTERIA #/AREA URNS HPF: NEGATIVE /HPF
BILIRUB SERPL-MCNC: 0.6 MG/DL (ref 0.1–1.5)
BILIRUB UR QL STRIP.AUTO: NEGATIVE
BILIRUB UR QL STRIP.AUTO: NEGATIVE
BUN SERPL-MCNC: 15 MG/DL (ref 8–22)
CALCIUM SERPL-MCNC: 9.6 MG/DL (ref 8.5–10.5)
CHLORIDE SERPL-SCNC: 107 MMOL/L (ref 96–112)
CO2 SERPL-SCNC: 25 MMOL/L (ref 20–33)
COLOR UR: ABNORMAL
COLOR UR: YELLOW
CREAT SERPL-MCNC: 0.54 MG/DL (ref 0.5–1.4)
EPI CELLS #/AREA URNS HPF: ABNORMAL /HPF
EPI CELLS #/AREA URNS HPF: NEGATIVE /HPF
GLOBULIN SER CALC-MCNC: 2.4 G/DL (ref 1.9–3.5)
GLUCOSE SERPL-MCNC: 93 MG/DL (ref 65–99)
GLUCOSE UR STRIP.AUTO-MCNC: NEGATIVE MG/DL
GLUCOSE UR STRIP.AUTO-MCNC: NEGATIVE MG/DL
HYALINE CASTS #/AREA URNS LPF: ABNORMAL /LPF
HYALINE CASTS #/AREA URNS LPF: ABNORMAL /LPF
KETONES UR STRIP.AUTO-MCNC: ABNORMAL MG/DL
KETONES UR STRIP.AUTO-MCNC: NEGATIVE MG/DL
LEUKOCYTE ESTERASE UR QL STRIP.AUTO: NEGATIVE
LEUKOCYTE ESTERASE UR QL STRIP.AUTO: NEGATIVE
MICRO URNS: ABNORMAL
MICRO URNS: ABNORMAL
NITRITE UR QL STRIP.AUTO: NEGATIVE
NITRITE UR QL STRIP.AUTO: NEGATIVE
PH UR STRIP.AUTO: 5 [PH]
PH UR STRIP.AUTO: 5 [PH]
POTASSIUM SERPL-SCNC: 4.4 MMOL/L (ref 3.6–5.5)
PROT SERPL-MCNC: 6.5 G/DL (ref 6–8.2)
PROT UR QL STRIP: NEGATIVE MG/DL
PROT UR QL STRIP: NEGATIVE MG/DL
RBC # URNS HPF: ABNORMAL /HPF
RBC # URNS HPF: ABNORMAL /HPF
RBC UR QL AUTO: ABNORMAL
RBC UR QL AUTO: ABNORMAL
SODIUM SERPL-SCNC: 138 MMOL/L (ref 135–145)
SP GR UR STRIP.AUTO: 1.02
SP GR UR STRIP.AUTO: 1.03
UROBILINOGEN UR STRIP.AUTO-MCNC: 0.2 MG/DL
UROBILINOGEN UR STRIP.AUTO-MCNC: 0.2 MG/DL
WBC #/AREA URNS HPF: ABNORMAL /HPF
WBC #/AREA URNS HPF: ABNORMAL /HPF

## 2018-03-26 PROCEDURE — 96413 CHEMO IV INFUSION 1 HR: CPT

## 2018-03-26 PROCEDURE — 700105 HCHG RX REV CODE 258: Performed by: INTERNAL MEDICINE

## 2018-03-26 PROCEDURE — 36000 PLACE NEEDLE IN VEIN: CPT | Performed by: NURSE PRACTITIONER

## 2018-03-26 PROCEDURE — 80053 COMPREHEN METABOLIC PANEL: CPT

## 2018-03-26 PROCEDURE — 99214 OFFICE O/P EST MOD 30 MIN: CPT | Performed by: NURSE PRACTITIONER

## 2018-03-26 PROCEDURE — 81001 URINALYSIS AUTO W/SCOPE: CPT | Mod: 91

## 2018-03-26 PROCEDURE — 81001 URINALYSIS AUTO W/SCOPE: CPT

## 2018-03-26 PROCEDURE — 700111 HCHG RX REV CODE 636 W/ 250 OVERRIDE (IP): Mod: JG

## 2018-03-26 PROCEDURE — 700105 HCHG RX REV CODE 258

## 2018-03-26 RX ADMIN — SODIUM CHLORIDE 700 MG: 9 INJECTION, SOLUTION INTRAVENOUS at 15:32

## 2018-03-26 ASSESSMENT — ENCOUNTER SYMPTOMS
MYALGIAS: 0
CHILLS: 0
CONSTIPATION: 0
VOMITING: 0
BLURRED VISION: 0
SHORTNESS OF BREATH: 0
DOUBLE VISION: 0
FEVER: 0
NERVOUS/ANXIOUS: 0
NAUSEA: 0
HEADACHES: 1
WEIGHT LOSS: 0
WHEEZING: 0
WEAKNESS: 1
SEIZURES: 0
INSOMNIA: 0
DIZZINESS: 1
COUGH: 0
TINGLING: 0
DIARRHEA: 0
PALPITATIONS: 0

## 2018-03-26 ASSESSMENT — PAIN SCALES - GENERAL
PAINLEVEL: 2=MINIMAL-SLIGHT
PAINLEVEL: NO PAIN
PAINLEVEL: NO PAIN

## 2018-03-26 NOTE — PROGRESS NOTES
"Nutrition Services: Brief Update  Met w/ pt this date to follow-up on current intake and appetite. Pt reports that her intake and appetite remains good and that she is attempting to eat better; however, she has been trying diets such as \"[place].\" Pt's weight has been maintained ~149# since previous RD assessment and pt reports that she is now off steroid treatment.     Therefore, encouraged pt to not attempt to go on any \"diet fads\" and focus on weight maintenance throughout the course of treatment rather than wt loss. Pt to benefit from wt loss once treatment completed. Encouraged pt to continue w/ adequate PO of healthy meals/snacks to stay well nourished.   "

## 2018-03-26 NOTE — PROGRESS NOTES
Pt to infusion services ambulatory per self with cane.  Pt here for scheduled q 2 week Avastin infusion.  Plan of care reviewed.  Pt verbalizes understanding.  Pt seen by MD prior to appointment today and labs drawn in MD office.  UA not resulted.  Telephone call to lab and spoke with Surinder in sampling.  Per Surinder, lab unable to find urine sample and RN asked to recollect.  Pt up to restroom to provided urine sample.  Urine sample collected and sent to lab.  Pt with PIV previously established to RAC, #24G.  IV flushes easily; + blood return verified.  Awaiting lab results.

## 2018-03-26 NOTE — PROGRESS NOTES
"Pharmacy Chemotherapy calculation:    DX: NSCLC - radionecrosis   Cycle 3- PAPER ORDERS IN CHART  Previous treatment = 3/12/18    Regimen: bevacizumab  bevacizumab 5-10mg/kg q2 weeks until DP/UT  -- 10mg/kg per Dr. Lauren MCKEON, et al - Neuro-Oncology 15(9): 9339-5770     /58   Pulse 83   Temp 36.2 °C (97.2 °F)   Resp 18   Ht 1.55 m (5' 1.02\")   Wt 68.3 kg (150 lb 9.2 oz)   SpO2 92%   BMI 28.43 kg/m²   Body surface area is 1.71 meters squared.       3/12/18- q4 weeks  SCr = 0.55 mg/dL CrCl ~ 68 mL/min (min Cr 0.7)  LFT's = WNL TBili = 0.5    3/26/18 - [id number]  Urine protein = negative    BP = 140/58      Bevacizumab 10mg/kg x 68.3kg = 683mg    Rounded to vial size(within 10%) per dose rounding protocol.    ok to treat with final dose = 700 mg IV over 30min, 3rd dose and beyond    Carrie Little, PharmD    "

## 2018-03-26 NOTE — PROGRESS NOTES
Chemotherapy Verification - SECONDARY RN       Height = 155cm  Weight = 68.3kg  BSA = 1.71m2       Medication: Avastin  Dose: 10mg/kg  Calculated Dose: 683mg                             (In mg/m2, AUC, mg/kg)       I confirm that this process was performed independently.

## 2018-03-26 NOTE — PROGRESS NOTES
Chemotherapy Verification - PRIMARY RN      Height = 61.02 inches  Weight = 68.3 kg  BSA = 1.71 m2       Medication: Avastin (Bevacizumab)   Dose: 10 mg/kg  Calculated Dose: 683 mg <10 % difference, round to vial (ordered dose = 700 mg)                            (In mg/m2, AUC, mg/kg)         I confirm this process was performed independently with the BSA and all final chemotherapy dosing calculations congruent.  Any discrepancies of 5% or greater have been addressed with the chemotherapy pharmacist. The resolution of the discrepancy has been documented in the EPIC progress notes.

## 2018-03-26 NOTE — PROGRESS NOTES
Ml Chavira is a 78 y.o. female here for a non-provider visit for: Lab Draws  on 3/26/2018 at 11:00 AM    Procedure Performed: Peripheral IV start with labs    Anatomical site: Right Antecubital Area (AC)    Equipment used: 24g    Labs drawn: CMP and UA cpllected    Ordering Provider: NICKY Carl By: AMY Wyatt PIV in right AC for Avastin appt at 2pm with infusion services. IV flushed with NS with no s/s of infiltration.  IV wrapped with coban.

## 2018-03-26 NOTE — PROGRESS NOTES
Lab results available and reviewed.  UA negative for protein and CMP results WNL.  Chart to pharmacy.

## 2018-03-27 NOTE — PROGRESS NOTES
Late entry for 1620:  UA results reviewed; negative for urine protein.  Avastin administered per MD orders.  Avastin infusion completed without incident.  No adverse effects observed or expressed.  PIV flushed with normal saline; continued + blood return verified.  PIV d/c'd; catheter tip intact.  Pressure dressing applied.  Pt released to self care in no apparent distress after completion of treatment, ambulatory with cane.  Pt to return in 2 weeks; next appointment scheduled.

## 2018-04-06 DIAGNOSIS — C34.11 MALIGNANT NEOPLASM OF UPPER LOBE OF RIGHT LUNG (HCC): ICD-10-CM

## 2018-04-08 NOTE — PROGRESS NOTES
"Pharmacy chemotherapy calculation verification:    Patient Name: Ml Chavira   Dx: NSCLC Stage IV/Radionecrosis        Protocol: Bevacizumab        *Dosing Reference*  Bevacizumab 5-10 mg/kg   Q2 weeks until disease progression or unacceptable toxicity  Ramiro MCKEON et al. Bevacizumab as a treatment for radiation necrosis of brain metastases post stereotactic radiosurgery. Neuro-Oncology. 2013;15(9):1028-2679.     Allergies:  Penicillins       /64   Pulse 67   Temp 36.3 °C (97.4 °F)   Resp 18   Ht 1.55 m (5' 1.02\")   Wt 67.6 kg (149 lb 0.5 oz)   SpO2 94%   BMI 28.14 kg/m²  Body surface area is 1.71 meters squared.    ANC~ 4050 Plt = 282k   Hgb = 13.6     SCr = 0.48mg/dL CrCl ~ 68mL/min   LFT's = WNL TBili = 0.5   Urine protein = negative     Drug Order   (Drug name, dose, route, IV Fluid & volume, frequency, number of doses) Cycle 4  - PAPER ORDERS IN CHART  Previous treatment: 3/26/18   Medication = Bevacizumab (Avastin)  Base Dose= 10 mg/kg  Calc Dose: Base Dose x 67.6kg = 676mg  Final Dose = 700 mg   Route = IV  Fluid & Volume =  mL  Admin Duration = Over 60 min           Rounded to vial size(within 10%) per dose rounding protocol.   ok to treat with final dose     By my signature below, I confirm this process was performed independently with the BSA and all final chemotherapy dosing calculations congruent. I have reviewed the above chemotherapy order and that my calculation of the final dose and BSA (when applicable) corroborate those calculations of the  pharmacist. Discrepancies of 5% or greater in the written dose have been addressed and documented within the [facility] Progress notes.    JAKE Little, Pharm.D.          "

## 2018-04-09 ENCOUNTER — NON-PROVIDER VISIT (OUTPATIENT)
Dept: HEMATOLOGY ONCOLOGY | Facility: MEDICAL CENTER | Age: 78
End: 2018-04-09
Payer: MEDICARE

## 2018-04-09 ENCOUNTER — HOSPITAL ENCOUNTER (OUTPATIENT)
Facility: MEDICAL CENTER | Age: 78
End: 2018-04-09
Attending: INTERNAL MEDICINE
Payer: MEDICARE

## 2018-04-09 ENCOUNTER — OFFICE VISIT (OUTPATIENT)
Dept: HEMATOLOGY ONCOLOGY | Facility: MEDICAL CENTER | Age: 78
End: 2018-04-09
Payer: MEDICARE

## 2018-04-09 ENCOUNTER — OUTPATIENT INFUSION SERVICES (OUTPATIENT)
Dept: ONCOLOGY | Facility: MEDICAL CENTER | Age: 78
End: 2018-04-09
Attending: INTERNAL MEDICINE
Payer: MEDICARE

## 2018-04-09 VITALS
HEART RATE: 78 BPM | OXYGEN SATURATION: 90 % | SYSTOLIC BLOOD PRESSURE: 106 MMHG | WEIGHT: 147.93 LBS | TEMPERATURE: 97.3 F | HEIGHT: 61 IN | DIASTOLIC BLOOD PRESSURE: 60 MMHG | BODY MASS INDEX: 27.93 KG/M2 | RESPIRATION RATE: 18 BRPM

## 2018-04-09 VITALS
OXYGEN SATURATION: 94 % | HEART RATE: 67 BPM | WEIGHT: 149.03 LBS | RESPIRATION RATE: 18 BRPM | TEMPERATURE: 97.4 F | DIASTOLIC BLOOD PRESSURE: 64 MMHG | BODY MASS INDEX: 28.14 KG/M2 | SYSTOLIC BLOOD PRESSURE: 139 MMHG | HEIGHT: 61 IN

## 2018-04-09 VITALS
HEART RATE: 78 BPM | OXYGEN SATURATION: 90 % | WEIGHT: 148.04 LBS | BODY MASS INDEX: 27.95 KG/M2 | RESPIRATION RATE: 18 BRPM | HEIGHT: 61 IN | TEMPERATURE: 97.3 F | DIASTOLIC BLOOD PRESSURE: 60 MMHG | SYSTOLIC BLOOD PRESSURE: 106 MMHG

## 2018-04-09 DIAGNOSIS — C34.11 MALIGNANT NEOPLASM OF UPPER LOBE OF RIGHT LUNG (HCC): ICD-10-CM

## 2018-04-09 DIAGNOSIS — L59.8 RADIONECROSIS: ICD-10-CM
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
## 2018-04-09 LAB
ALBUMIN SERPL BCP-MCNC: 4.1 G/DL (ref 3.2–4.9)
ALBUMIN/GLOB SERPL: 1.6 G/DL
ALP SERPL-CCNC: 73 U/L (ref 30–99)
ALT SERPL-CCNC: 34 U/L (ref 2–50)
ANION GAP SERPL CALC-SCNC: 8 MMOL/L (ref 0–11.9)
APPEARANCE UR: CLEAR
AST SERPL-CCNC: 28 U/L (ref 12–45)
BACTERIA #/AREA URNS HPF: NEGATIVE /HPF
BASOPHILS # BLD AUTO: 0.8 % (ref 0–1.8)
BASOPHILS # BLD: 0.05 K/UL (ref 0–0.12)
BILIRUB SERPL-MCNC: 0.5 MG/DL (ref 0.1–1.5)
BILIRUB UR QL STRIP.AUTO: NEGATIVE
BUN SERPL-MCNC: 18 MG/DL (ref 8–22)
CALCIUM SERPL-MCNC: 9.3 MG/DL (ref 8.5–10.5)
CHLORIDE SERPL-SCNC: 106 MMOL/L (ref 96–112)
CO2 SERPL-SCNC: 24 MMOL/L (ref 20–33)
COLOR UR: ABNORMAL
CREAT SERPL-MCNC: 0.48 MG/DL (ref 0.5–1.4)
EOSINOPHIL # BLD AUTO: 0.17 K/UL (ref 0–0.51)
EOSINOPHIL NFR BLD: 2.8 % (ref 0–6.9)
EPI CELLS #/AREA URNS HPF: ABNORMAL /HPF
ERYTHROCYTE [DISTWIDTH] IN BLOOD BY AUTOMATED COUNT: 52.1 FL (ref 35.9–50)
GLOBULIN SER CALC-MCNC: 2.5 G/DL (ref 1.9–3.5)
GLUCOSE SERPL-MCNC: 97 MG/DL (ref 65–99)
GLUCOSE UR STRIP.AUTO-MCNC: NEGATIVE MG/DL
HCT VFR BLD AUTO: 42.1 % (ref 37–47)
HGB BLD-MCNC: 13.6 G/DL (ref 12–16)
HYALINE CASTS #/AREA URNS LPF: ABNORMAL /LPF
IMM GRANULOCYTES # BLD AUTO: 0.02 K/UL (ref 0–0.11)
IMM GRANULOCYTES NFR BLD AUTO: 0.3 % (ref 0–0.9)
KETONES UR STRIP.AUTO-MCNC: ABNORMAL MG/DL
LEUKOCYTE ESTERASE UR QL STRIP.AUTO: ABNORMAL
LYMPHOCYTES # BLD AUTO: 1.02 K/UL (ref 1–4.8)
LYMPHOCYTES NFR BLD: 17 % (ref 22–41)
MCH RBC QN AUTO: 33.7 PG (ref 27–33)
MCHC RBC AUTO-ENTMCNC: 32.3 G/DL (ref 33.6–35)
MCV RBC AUTO: 104.2 FL (ref 81.4–97.8)
MICRO URNS: ABNORMAL
MONOCYTES # BLD AUTO: 0.68 K/UL (ref 0–0.85)
MONOCYTES NFR BLD AUTO: 11.4 % (ref 0–13.4)
NEUTROPHILS # BLD AUTO: 4.05 K/UL (ref 2–7.15)
NEUTROPHILS NFR BLD: 67.7 % (ref 44–72)
NITRITE UR QL STRIP.AUTO: NEGATIVE
NRBC # BLD AUTO: 0 K/UL
NRBC BLD-RTO: 0 /100 WBC
PH UR STRIP.AUTO: 5 [PH]
PLATELET # BLD AUTO: 282 K/UL (ref 164–446)
PMV BLD AUTO: 8.9 FL (ref 9–12.9)
POTASSIUM SERPL-SCNC: 3.9 MMOL/L (ref 3.6–5.5)
PROT SERPL-MCNC: 6.6 G/DL (ref 6–8.2)
PROT UR QL STRIP: NEGATIVE MG/DL
RBC # BLD AUTO: 4.04 M/UL (ref 4.2–5.4)
RBC # URNS HPF: ABNORMAL /HPF
RBC UR QL AUTO: ABNORMAL
SODIUM SERPL-SCNC: 138 MMOL/L (ref 135–145)
SP GR UR STRIP.AUTO: 1.03
UROBILINOGEN UR STRIP.AUTO-MCNC: 0.2 MG/DL
WBC # BLD AUTO: 6 K/UL (ref 4.8–10.8)
WBC #/AREA URNS HPF: ABNORMAL /HPF

## 2018-04-09 PROCEDURE — 36000 PLACE NEEDLE IN VEIN: CPT | Performed by: INTERNAL MEDICINE

## 2018-04-09 PROCEDURE — 700105 HCHG RX REV CODE 258

## 2018-04-09 PROCEDURE — 80053 COMPREHEN METABOLIC PANEL: CPT

## 2018-04-09 PROCEDURE — 700111 HCHG RX REV CODE 636 W/ 250 OVERRIDE (IP): Mod: JG

## 2018-04-09 PROCEDURE — 96413 CHEMO IV INFUSION 1 HR: CPT

## 2018-04-09 PROCEDURE — 700105 HCHG RX REV CODE 258: Performed by: INTERNAL MEDICINE

## 2018-04-09 PROCEDURE — 81001 URINALYSIS AUTO W/SCOPE: CPT

## 2018-04-09 PROCEDURE — 85025 COMPLETE CBC W/AUTO DIFF WBC: CPT

## 2018-04-09 PROCEDURE — 99213 OFFICE O/P EST LOW 20 MIN: CPT | Performed by: INTERNAL MEDICINE

## 2018-04-09 RX ORDER — FLUCONAZOLE 150 MG/1
TABLET ORAL
Qty: 2 TAB | Refills: 0 | Status: SHIPPED | OUTPATIENT
Start: 2018-04-09 | End: 2018-05-30

## 2018-04-09 RX ADMIN — SODIUM CHLORIDE 700 MG: 9 INJECTION, SOLUTION INTRAVENOUS at 15:33

## 2018-04-09 ASSESSMENT — PAIN SCALES - GENERAL
PAINLEVEL: NO PAIN
PAINLEVEL: NO PAIN
PAINLEVEL: 8=MODERATE-SEVERE PAIN

## 2018-04-09 NOTE — PROGRESS NOTES
Chemotherapy Verification - SECONDARY RN       Height = 155 cm  Weight = 67.6 kg  BSA = 1.71 m2       Medication: Avastin  Dose: 10 mg/kg  Calculated Dose: 670.6 mg round to vial                             (In mg/m2, AUC, mg/kg)       I confirm that this process was performed independently.

## 2018-04-09 NOTE — PROGRESS NOTES
Ml Chavira is a 78 y.o. female here for a non-provider visit for: IV start with labs at 10am    Procedure Performed: Peripheral IV start with labs    Anatomical site: Right Antecubital Area (AC)    Equipment used: 24g    Labs drawn: CBC w/diff, CMP and Urinalysis    Ordering Provider: Dr. Mariano Portillo By: Praveen Belle R.N.      2:35pm- Araceli RN called stating pt completed course of antibiotics for root canal and now has symptoms of a yeast infection (itching/discomfort).  Per NICKY Carl, Diflucan 150mg ordered.  DAMI Charles, notified and will educate the pt to take one tab and if symptoms do not improve after 3 days pt to take the second tab.

## 2018-04-09 NOTE — PROGRESS NOTES
Chemotherapy Verification - PRIMARY RN      Height = 155 cm  Weight = 67.6 kg  BSA = 1.71 m2       Medication: Avastin  Dose: 10 mg/kg  Calculated Dose: 676 mg                             (In mg/m2, AUC, mg/kg)     I confirm this process was performed independently with the BSA and all final chemotherapy dosing calculations congruent.  Any discrepancies of 5% or greater have been addressed with the chemotherapy pharmacist. The resolution of the discrepancy has been documented in the EPIC progress notes.

## 2018-04-09 NOTE — PROGRESS NOTES
Pt presents ambulatory with cane for cycle 4 of Avastin.  POC discussed, [facility]  Lab results reviewed with patient, she reports vaginal itching.  She has completed two courses of antibiotics from a recent root canal.  She does not have pain or burning with urination.  Pt also states that she is have an ingrown toe-nail surgically removed next week.  Call placed to MD office, Reyna SAUNDERS and Dr. Lauren andrade.  Prescription sent to pt's pharmacy for Diflucan.  Also received order to proceed with treatment today.  Instructed pt on use.  She verbalized understanding.      PIV placed in MD office, blood return confirmed.  Avastin given over 30 minutes without adverse reaction.  PIV flushed, removed, and site covered with gauze and coban.  Next appointment confirmed.  Pt discharged from [facility] in NAD under self care.

## 2018-04-09 NOTE — PROGRESS NOTES
"Pharmacy Chemotherapy calculation:    Patient Name: Ml Chavira  DX: NSCLC - radionecrosis     Cycle 4 - PAPER ORDERS IN CHART Previous treatment: 03/26/18    Protocol: Bevacizumab    *Dosing Reference*  Bevacizumab 5-10mg/kg q2 weeks until DP/UT  -- 10mg/kg per Dr. Lauren MCKEON et al - Neuro-Oncology 15(9): 1943-1772     /64   Pulse 67   Temp 36.3 °C (97.4 °F)   Resp 18   Ht 1.55 m (5' 1.02\")   Wt 67.6 kg (149 lb 0.5 oz)   SpO2 94%   BMI 28.14 kg/m²   Body surface area is 1.71 meters squared.     Labs 04/09/18- q4 weeks  ANC ~ 4000 Plt = 282k Hgb - 13.6 SCr = 0.48 mg/dL CrCl ~ 68 mL/min (min Cr 0.7)  LFT's = WNL TBili = 0.5 BP = 139/64     Labs 04/09/18 - [id number]  Urine protein = negative - Dr. Aguilar is aware of UA, ok to proceed with Avastin today       Bevacizumab (Avastin) 10mg/kg x 67.6kg = 676mg    Rounded to vial size(within 10%) per dose rounding protocol.    ok to treat with final dose = 700 mg IV over 30min, 3rd dose and beyond      Shawn Noel, PharmD, BCOP    "
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
## 2018-04-14 PROBLEM — L59.8 RADIONECROSIS: Status: ACTIVE | Noted: 2018-04-14

## 2018-04-18 ENCOUNTER — HOSPITAL ENCOUNTER (OUTPATIENT)
Dept: RADIOLOGY | Facility: MEDICAL CENTER | Age: 78
End: 2018-04-18
Attending: SPECIALIST
Payer: MEDICARE

## 2018-04-18 DIAGNOSIS — C34.11 MALIGNANT NEOPLASM OF UPPER LOBE OF RIGHT LUNG (HCC): ICD-10-CM

## 2018-04-18 PROCEDURE — 71260 CT THORAX DX C+: CPT

## 2018-04-18 PROCEDURE — 700117 HCHG RX CONTRAST REV CODE 255: Performed by: SPECIALIST

## 2018-04-18 PROCEDURE — 70553 MRI BRAIN STEM W/O & W/DYE: CPT

## 2018-04-18 PROCEDURE — A9579 GAD-BASE MR CONTRAST NOS,1ML: HCPCS | Performed by: SPECIALIST

## 2018-04-18 RX ADMIN — GADODIAMIDE 15 ML: 287 INJECTION INTRAVENOUS at 16:02

## 2018-04-18 RX ADMIN — IOHEXOL 75 ML: 350 INJECTION, SOLUTION INTRAVENOUS at 15:46

## 2018-04-20 DIAGNOSIS — C34.11 MALIGNANT NEOPLASM OF UPPER LOBE OF RIGHT LUNG (HCC): ICD-10-CM

## 2018-04-23 ENCOUNTER — OFFICE VISIT (OUTPATIENT)
Dept: HEMATOLOGY ONCOLOGY | Facility: MEDICAL CENTER | Age: 78
End: 2018-04-23
Payer: MEDICARE

## 2018-04-23 ENCOUNTER — NON-PROVIDER VISIT (OUTPATIENT)
Dept: HEMATOLOGY ONCOLOGY | Facility: MEDICAL CENTER | Age: 78
End: 2018-04-23
Payer: MEDICARE

## 2018-04-23 ENCOUNTER — HOSPITAL ENCOUNTER (OUTPATIENT)
Facility: MEDICAL CENTER | Age: 78
End: 2018-04-23
Attending: NURSE PRACTITIONER
Payer: MEDICARE

## 2018-04-23 ENCOUNTER — APPOINTMENT (OUTPATIENT)
Dept: ONCOLOGY | Facility: MEDICAL CENTER | Age: 78
End: 2018-04-23
Attending: INTERNAL MEDICINE
Payer: MEDICARE

## 2018-04-23 VITALS
BODY MASS INDEX: 27.95 KG/M2 | DIASTOLIC BLOOD PRESSURE: 70 MMHG | WEIGHT: 151.9 LBS | HEART RATE: 82 BPM | OXYGEN SATURATION: 94 % | RESPIRATION RATE: 18 BRPM | HEIGHT: 62 IN | TEMPERATURE: 98.2 F | SYSTOLIC BLOOD PRESSURE: 110 MMHG

## 2018-04-23 VITALS
DIASTOLIC BLOOD PRESSURE: 70 MMHG | RESPIRATION RATE: 18 BRPM | HEART RATE: 82 BPM | TEMPERATURE: 98.2 F | BODY MASS INDEX: 27.93 KG/M2 | OXYGEN SATURATION: 94 % | SYSTOLIC BLOOD PRESSURE: 110 MMHG | HEIGHT: 62 IN | WEIGHT: 151.79 LBS

## 2018-04-23 VITALS
TEMPERATURE: 98.2 F | SYSTOLIC BLOOD PRESSURE: 110 MMHG | RESPIRATION RATE: 18 BRPM | HEIGHT: 62 IN | WEIGHT: 151.9 LBS | HEART RATE: 82 BPM | BODY MASS INDEX: 27.95 KG/M2 | OXYGEN SATURATION: 94 % | DIASTOLIC BLOOD PRESSURE: 70 MMHG

## 2018-04-23 DIAGNOSIS — C79.31 METASTASIS TO BRAIN (HCC): ICD-10-CM

## 2018-04-23 DIAGNOSIS — C34.11 MALIGNANT NEOPLASM OF UPPER LOBE OF RIGHT LUNG (HCC): ICD-10-CM

## 2018-04-23 DIAGNOSIS — Z51.11 ENCOUNTER FOR ANTINEOPLASTIC CHEMOTHERAPY: ICD-10-CM

## 2018-04-23 DIAGNOSIS — E87.5 HYPERKALEMIA: ICD-10-CM

## 2018-04-23 DIAGNOSIS — C79.9 METASTATIC CANCER (HCC): ICD-10-CM

## 2018-04-23 LAB
ALBUMIN SERPL BCP-MCNC: 4.3 G/DL (ref 3.2–4.9)
ALBUMIN/GLOB SERPL: 1.5 G/DL
ALP SERPL-CCNC: 64 U/L (ref 30–99)
ALT SERPL-CCNC: 37 U/L (ref 2–50)
ANION GAP SERPL CALC-SCNC: 4 MMOL/L (ref 0–11.9)
ANION GAP SERPL CALC-SCNC: 7 MMOL/L (ref 0–11.9)
APPEARANCE UR: CLEAR
AST SERPL-CCNC: 62 U/L (ref 12–45)
BACTERIA #/AREA URNS HPF: NEGATIVE /HPF
BASOPHILS # BLD AUTO: 1.1 % (ref 0–1.8)
BASOPHILS # BLD: 0.06 K/UL (ref 0–0.12)
BILIRUB SERPL-MCNC: 0.5 MG/DL (ref 0.1–1.5)
BILIRUB UR QL STRIP.AUTO: NEGATIVE
BUN SERPL-MCNC: 17 MG/DL (ref 8–22)
BUN SERPL-MCNC: 18 MG/DL (ref 8–22)
CALCIUM SERPL-MCNC: 9.3 MG/DL (ref 8.5–10.5)
CALCIUM SERPL-MCNC: 9.5 MG/DL (ref 8.5–10.5)
CHLORIDE SERPL-SCNC: 104 MMOL/L (ref 96–112)
CHLORIDE SERPL-SCNC: 105 MMOL/L (ref 96–112)
CO2 SERPL-SCNC: 23 MMOL/L (ref 20–33)
CO2 SERPL-SCNC: 25 MMOL/L (ref 20–33)
COLOR UR: YELLOW
CREAT SERPL-MCNC: 0.52 MG/DL (ref 0.5–1.4)
CREAT SERPL-MCNC: 0.54 MG/DL (ref 0.5–1.4)
EOSINOPHIL # BLD AUTO: 0.39 K/UL (ref 0–0.51)
EOSINOPHIL NFR BLD: 7.2 % (ref 0–6.9)
EPI CELLS #/AREA URNS HPF: NEGATIVE /HPF
ERYTHROCYTE [DISTWIDTH] IN BLOOD BY AUTOMATED COUNT: 49.3 FL (ref 35.9–50)
GLOBULIN SER CALC-MCNC: 2.8 G/DL (ref 1.9–3.5)
GLUCOSE SERPL-MCNC: 84 MG/DL (ref 65–99)
GLUCOSE SERPL-MCNC: 93 MG/DL (ref 65–99)
GLUCOSE UR STRIP.AUTO-MCNC: NEGATIVE MG/DL
HCT VFR BLD AUTO: 44.5 % (ref 37–47)
HGB BLD-MCNC: 14.5 G/DL (ref 12–16)
HYALINE CASTS #/AREA URNS LPF: ABNORMAL /LPF
IMM GRANULOCYTES # BLD AUTO: 0.02 K/UL (ref 0–0.11)
IMM GRANULOCYTES NFR BLD AUTO: 0.4 % (ref 0–0.9)
KETONES UR STRIP.AUTO-MCNC: NEGATIVE MG/DL
LEUKOCYTE ESTERASE UR QL STRIP.AUTO: NEGATIVE
LYMPHOCYTES # BLD AUTO: 1 K/UL (ref 1–4.8)
LYMPHOCYTES NFR BLD: 18.3 % (ref 22–41)
MCH RBC QN AUTO: 33 PG (ref 27–33)
MCHC RBC AUTO-ENTMCNC: 32.6 G/DL (ref 33.6–35)
MCV RBC AUTO: 101.1 FL (ref 81.4–97.8)
MICRO URNS: ABNORMAL
MONOCYTES # BLD AUTO: 0.6 K/UL (ref 0–0.85)
MONOCYTES NFR BLD AUTO: 11 % (ref 0–13.4)
NEUTROPHILS # BLD AUTO: 3.38 K/UL (ref 2–7.15)
NEUTROPHILS NFR BLD: 62 % (ref 44–72)
NITRITE UR QL STRIP.AUTO: NEGATIVE
NRBC # BLD AUTO: 0 K/UL
NRBC BLD-RTO: 0 /100 WBC
PH UR STRIP.AUTO: 5 [PH]
PLATELET # BLD AUTO: 239 K/UL (ref 164–446)
PMV BLD AUTO: 9.1 FL (ref 9–12.9)
POTASSIUM SERPL-SCNC: 4.3 MMOL/L (ref 3.6–5.5)
POTASSIUM SERPL-SCNC: 7 MMOL/L (ref 3.6–5.5)
PROT SERPL-MCNC: 7.1 G/DL (ref 6–8.2)
PROT UR QL STRIP: NEGATIVE MG/DL
RBC # BLD AUTO: 4.4 M/UL (ref 4.2–5.4)
RBC # URNS HPF: ABNORMAL /HPF
RBC UR QL AUTO: ABNORMAL
SODIUM SERPL-SCNC: 132 MMOL/L (ref 135–145)
SODIUM SERPL-SCNC: 136 MMOL/L (ref 135–145)
SP GR UR STRIP.AUTO: 1.02
UROBILINOGEN UR STRIP.AUTO-MCNC: 0.2 MG/DL
WBC # BLD AUTO: 5.5 K/UL (ref 4.8–10.8)
WBC #/AREA URNS HPF: ABNORMAL /HPF

## 2018-04-23 PROCEDURE — 81001 URINALYSIS AUTO W/SCOPE: CPT

## 2018-04-23 PROCEDURE — 36592 COLLECT BLOOD FROM PICC: CPT | Performed by: NURSE PRACTITIONER

## 2018-04-23 PROCEDURE — 80053 COMPREHEN METABOLIC PANEL: CPT

## 2018-04-23 PROCEDURE — 36415 COLL VENOUS BLD VENIPUNCTURE: CPT | Performed by: NURSE PRACTITIONER

## 2018-04-23 PROCEDURE — 99214 OFFICE O/P EST MOD 30 MIN: CPT | Performed by: NURSE PRACTITIONER

## 2018-04-23 PROCEDURE — 80048 BASIC METABOLIC PNL TOTAL CA: CPT

## 2018-04-23 PROCEDURE — 85025 COMPLETE CBC W/AUTO DIFF WBC: CPT

## 2018-04-23 ASSESSMENT — ENCOUNTER SYMPTOMS
SHORTNESS OF BREATH: 1
FEVER: 0
PALPITATIONS: 0
INSOMNIA: 0
WHEEZING: 0
DIZZINESS: 1
WEIGHT LOSS: 0
NAUSEA: 0
COUGH: 0
VOMITING: 0
DIARRHEA: 0
CONSTIPATION: 0
HEADACHES: 0
MYALGIAS: 0
SORE THROAT: 0
SPUTUM PRODUCTION: 0
CHILLS: 0

## 2018-04-23 ASSESSMENT — PAIN SCALES - GENERAL
PAINLEVEL: NO PAIN

## 2018-04-23 NOTE — PROGRESS NOTES
Ml Chavira is a 78 y.o. female here for a non-provider visit for: Lab Draws  on 4/23/2018 at 11:19 AM    Procedure Performed: Venipuncture     Anatomical site: Left Antecubital Area (AC)    Equipment used: 21g Butterfly     Labs drawn: BMP    Ordering Provider: NICKY Hobbs    Bandar By: Paulette Beckford, Wesley Ass't      Without incident.

## 2018-04-23 NOTE — PROGRESS NOTES
Subjective:      Ml Chavira is a 78 y.o. female who presents with Follow-Up (prechemo) metastatic lung cancer to brain.         HPI    Patient seen today in follow up for metastatic lung cancer to brain. She is scheduled to receive cycle 5 of Avastin today. She present unaccompanied for today.     Patient is doing very well and tolerating treatment well. She has been off steroids for some time and states she feels much better. She denies any headaches and very mild dizziness at times. She is able to ambulate with a cane with stability but sometimes she is able to ambulate without the use of a cane as well around the home.    Patient denies any fevers or chills. She denies weight loss. She states her appetite has been well. She does have some fatigue but it is tolerable. She does note some itching to the back of her neck sometimes. She does have some mild nasal drainage in the morning but denies any sore throat. She denies chest pain, heart palpitations. She does note some mild leg swelling a few days after receiving Avastin but they do resolve. She denies any coughing, wheezing or sputum production. She does have some shortness of breath when walking up multiple flights of stairs. She denies any nausea, vomiting, diarrhea or constipation. Her last bowel movement was this morning. She is voiding without difficulty and denies any pain.    Patient did just have an ingrown toenail removal last week and is doing well. She does have some purulent discharge at times.    Patient did have a repeat MRI and CT scan of the chest which was ordered by Dr. Thacker as patient is still being followed by Dr. Thacker as well. The MRI of the brain showed some evidence of curvilinear and gyriform enhancement in the right occipital lobe surrounding the area of post surgical change. According to the reading radiologist was enhancement may be postsurgical or postirradiation change however small amount of recurrent neoplasm could  not be excluded with absolute certainty. I have contacted the imaging department to ask for an addendum to be placed with regards to edema noted in the brain. Dr. Aguilar would like to know if there is any residual edema from comparison back to December 2017. Patient did have a CT scan which shows to be stable since January 19, 2018. There is a right upper lobe architectural distortion with a central 2.2 x 0.7 cm nodule consolidation. I confirmed with patient that she is not having any symptoms concerning for infection. I did speak to Dr. Aguilar's would like patient to follow-up with Dr. Thacker regarding CT findings. Patient did confirm she is being seen by Dr. Thacker on April 26, 2018.    Allergies   Allergen Reactions   • Penicillins Itching and Rash     Current Outpatient Prescriptions on File Prior to Visit   Medication Sig Dispense Refill   • aspirin (ASA) 325 MG Tab Take 1 Tab by mouth every day. 100 Tab    • fluconazole (DIFLUCAN) 150 MG tablet Take one tab for yeast infection, if symptoms do not improve after 3 days take second tab. 2 Tab 0   • famotidine (PEPCID) 40 MG Tab Take 1 Tab by mouth every day. 30 Tab 1   • levetiracetam (KEPPRA) 500 MG Tab Take 1 Tab by mouth 2 Times a Day. 60 Tab    • atorvastatin (LIPITOR) 40 MG Tab Take 1 Tab by mouth every evening. 30 Tab    • vitamin B-12 CR (CYANOCOBALAMIN) 1000 MCG Tab CR tablet Take 5,000 mg by mouth.     • Omega-3 Fatty Acids (FISH OIL) 1200 MG Cap Take 1 Cap by mouth every day.     • multivitamin (THERAGRAN) Tab Take 1 Tab by mouth every day.       No current facility-administered medications on file prior to visit.          Review of Systems   Constitutional: Positive for malaise/fatigue (present and tolerable and stable. ). Negative for chills, fever and weight loss.   HENT: Positive for congestion (nasal drainage in the AM). Negative for sore throat.    Respiratory: Positive for shortness of breath (with walking up stairs but it resolved afterwards).  "Negative for cough, sputum production and wheezing.    Cardiovascular: Positive for leg swelling (initially after chemo). Negative for chest pain and palpitations.   Gastrointestinal: Negative for constipation, diarrhea, nausea and vomiting.   Genitourinary: Negative for dysuria.   Musculoskeletal: Negative for myalgias.   Skin: Positive for itching (back of her neck sometimes). Negative for rash.   Neurological: Positive for dizziness (on occasion). Negative for headaches.   Psychiatric/Behavioral: The patient does not have insomnia.           Objective:     /70   Pulse 82   Temp 36.8 °C (98.2 °F)   Resp 18   Ht 1.575 m (5' 2.01\")   Wt 68.9 kg (151 lb 14.4 oz)   SpO2 94%   Breastfeeding? No   BMI 27.78 kg/m²      Physical Exam   Constitutional: She is oriented to person, place, and time. She appears well-developed and well-nourished. No distress.   HENT:   Head: Normocephalic and atraumatic.   Mouth/Throat: Oropharynx is clear and moist. No oropharyngeal exudate.   Eyes: Conjunctivae and EOM are normal. Pupils are equal, round, and reactive to light. Right eye exhibits no discharge. Left eye exhibits no discharge. No scleral icterus.   Cardiovascular: Normal rate, regular rhythm, normal heart sounds and intact distal pulses.  Exam reveals no gallop and no friction rub.    No murmur heard.  Pulmonary/Chest: Effort normal and breath sounds normal. No respiratory distress. She has no wheezes.   Abdominal: Soft. Bowel sounds are normal. She exhibits no distension. There is no tenderness.   Musculoskeletal: Normal range of motion. She exhibits no edema or tenderness.   Ambulates with a cane for stability    Neurological: She is alert and oriented to person, place, and time.   Skin: Skin is warm and dry. No rash noted. She is not diaphoretic. No erythema. No pallor.   Psychiatric: She has a normal mood and affect. Her behavior is normal.   Vitals reviewed.      Hospital Outpatient Visit on 04/23/2018 "   Component Date Value Ref Range Status   • WBC 04/23/2018 5.5  4.8 - 10.8 K/uL Final   • RBC 04/23/2018 4.40  4.20 - 5.40 M/uL Final   • Hemoglobin 04/23/2018 14.5  12.0 - 16.0 g/dL Final   • Hematocrit 04/23/2018 44.5  37.0 - 47.0 % Final   • MCV 04/23/2018 101.1* 81.4 - 97.8 fL Final   • MCH 04/23/2018 33.0  27.0 - 33.0 pg Final   • MCHC 04/23/2018 32.6* 33.6 - 35.0 g/dL Final   • RDW 04/23/2018 49.3  35.9 - 50.0 fL Final   • Platelet Count 04/23/2018 239  164 - 446 K/uL Final   • MPV 04/23/2018 9.1  9.0 - 12.9 fL Final   • Neutrophils-Polys 04/23/2018 62.00  44.00 - 72.00 % Final   • Lymphocytes 04/23/2018 18.30* 22.00 - 41.00 % Final   • Monocytes 04/23/2018 11.00  0.00 - 13.40 % Final   • Eosinophils 04/23/2018 7.20* 0.00 - 6.90 % Final   • Basophils 04/23/2018 1.10  0.00 - 1.80 % Final   • Immature Granulocytes 04/23/2018 0.40  0.00 - 0.90 % Final   • Nucleated RBC 04/23/2018 0.00  /100 WBC Final   • Neutrophils (Absolute) 04/23/2018 3.38  2.00 - 7.15 K/uL Final   • Lymphs (Absolute) 04/23/2018 1.00  1.00 - 4.80 K/uL Final   • Monos (Absolute) 04/23/2018 0.60  0.00 - 0.85 K/uL Final   • Eos (Absolute) 04/23/2018 0.39  0.00 - 0.51 K/uL Final   • Baso (Absolute) 04/23/2018 0.06  0.00 - 0.12 K/uL Final   • Immature Granulocytes (abs) 04/23/2018 0.02  0.00 - 0.11 K/uL Final   • NRBC (Absolute) 04/23/2018 0.00  K/uL Final   • Sodium 04/23/2018 132* 135 - 145 mmol/L Final   • Potassium 04/23/2018 7.0* 3.6 - 5.5 mmol/L Final   • Chloride 04/23/2018 105  96 - 112 mmol/L Final   • Co2 04/23/2018 23  20 - 33 mmol/L Final   • Anion Gap 04/23/2018 4.0  0.0 - 11.9 Final   • Glucose 04/23/2018 93  65 - 99 mg/dL Final   • Bun 04/23/2018 18  8 - 22 mg/dL Final   • Creatinine 04/23/2018 0.54  0.50 - 1.40 mg/dL Final   • Calcium 04/23/2018 9.3  8.5 - 10.5 mg/dL Final   • AST(SGOT) 04/23/2018 62* 12 - 45 U/L Final   • ALT(SGPT) 04/23/2018 37  2 - 50 U/L Final   • Alkaline Phosphatase 04/23/2018 64  30 - 99 U/L Final   • Total  Bilirubin 04/23/2018 0.5  0.1 - 1.5 mg/dL Final   • Albumin 04/23/2018 4.3  3.2 - 4.9 g/dL Final   • Total Protein 04/23/2018 7.1  6.0 - 8.2 g/dL Final   • Globulin 04/23/2018 2.8  1.9 - 3.5 g/dL Final   • A-G Ratio 04/23/2018 1.5  g/dL Final   • Color 04/23/2018 Yellow   Final   • Character 04/23/2018 Clear   Final   • Specific Gravity 04/23/2018 1.024  <1.035 Final   • Ph 04/23/2018 5.0  5.0 - 8.0 Final   • Glucose 04/23/2018 Negative  Negative mg/dL Final   • Ketones 04/23/2018 Negative  Negative mg/dL Final   • Protein 04/23/2018 Negative  Negative mg/dL Final   • Bilirubin 04/23/2018 Negative  Negative Final   • Urobilinogen, Urine 04/23/2018 0.2  Negative Final   • Nitrite 04/23/2018 Negative  Negative Final   • Leukocyte Esterase 04/23/2018 Negative  Negative Final   • Occult Blood 04/23/2018 Trace* Negative Final   • Micro Urine Req 04/23/2018 Microscopic   Final   • WBC 04/23/2018 0-2  /hpf Final    Comment: Female  <12 Yr 0-2  >12 Yr 0-5  Male   None     • RBC 04/23/2018 5-10* /hpf Final    Comment: Female  >12 Yr 0-2  Male   None     • Bacteria 04/23/2018 Negative  None /hpf Final   • Epithelial Cells 04/23/2018 Negative  /hpf Final   • Hyaline Cast 04/23/2018 0-2  /lpf Final   • GFR If  04/23/2018 >60  >60 mL/min/1.73 m 2 Final   • GFR If Non  04/23/2018 >60  >60 mL/min/1.73 m 2 Final   • Sodium 04/23/2018 136  135 - 145 mmol/L Final   • Potassium 04/23/2018 4.3  3.6 - 5.5 mmol/L Final   • Chloride 04/23/2018 104  96 - 112 mmol/L Final   • Co2 04/23/2018 25  20 - 33 mmol/L Final   • Glucose 04/23/2018 84  65 - 99 mg/dL Final   • Bun 04/23/2018 17  8 - 22 mg/dL Final   • Creatinine 04/23/2018 0.52  0.50 - 1.40 mg/dL Final   • Calcium 04/23/2018 9.5  8.5 - 10.5 mg/dL Final   • Anion Gap 04/23/2018 7.0  0.0 - 11.9 Final   • GFR If  04/23/2018 >60  >60 mL/min/1.73 m 2 Final   • GFR If Non  04/23/2018 >60  >60 mL/min/1.73 m 2 Final               Assessment/Plan:     1. Malignant neoplasm of upper lobe of right lung (CMS-HCC)  REFERRAL TO ONCOLOGY NURSE NAVIGATOR This patient has a screening score of (must be 6 or above): 5 (Patient had an ingrown tonail that was recently removed. )    BASIC METABOLIC PANEL   2. Metastasis to brain (CMS-HCC)  REFERRAL TO ONCOLOGY NURSE NAVIGATOR This patient has a screening score of (must be 6 or above): 5 (Patient had an ingrown tonail that was recently removed. )    BASIC METABOLIC PANEL   3. Hyperkalemia  BASIC METABOLIC PANEL       Plan  1. Patient is scheduled to receive cycle #5 of Avastin today. I did speak with Dr. Aguilar who would like to hold off chemotherapy ×1 week due to recent ingrown toenail procedure she had last week. This will give patient time for healing with regards to the procedure. We will reschedule chemotherapy next weeks with labs and a prechemotherapy appointment.    2. Patient's initial blood work showed a potassium level of 7. I repeat BMP was completed stat today in the clinic and came back at 4.3. Patient was sent home after results of the tests were received.    3. After discussing with Dr. Aguilar he would like for patient to follow up with Dr. Thacker regarding both MRI and CT scan results. Patient did confirm she is seeing Dr. Thacker on April 26. I did discuss the results with the patient today, however Dr. Aguilar would like for MRI results to discuss edema in the brain. Request for an addendum to be made with regards to comparison on edema in the brain as patient is currently off steroids and doing very well. Patient did verbally agreed to follow-up with Dr. Thacker with regards to CT scan results as well.    4. Patient is to follow-up again in one week or sooner if needed prior to the next dose of Avastin.

## 2018-04-24 ENCOUNTER — TELEPHONE (OUTPATIENT)
Dept: HEMATOLOGY ONCOLOGY | Facility: MEDICAL CENTER | Age: 78
End: 2018-04-24

## 2018-04-24 NOTE — TELEPHONE ENCOUNTER
An addendum was made to the MRI brain regarding vasogenic edema. According to reading radiologist the vasogenic edema extending into the right temporal lobe and splenium of the corpus callosum has decreased significantly since prior exam in December 2017. Also noted postsurgical changes in the right occipital lobe are unchanged since previous exam. I also discussed the case and the MRI results with Dr. Caro, radiation oncologist. He also reviewed the MRI images and was pleased with the results. Dr. Caro has requested patient follow-up with him in the clinic which I will discuss with patient next week.

## 2018-04-27 DIAGNOSIS — C34.11 MALIGNANT NEOPLASM OF UPPER LOBE OF RIGHT LUNG (HCC): ICD-10-CM

## 2018-04-30 ENCOUNTER — DOCUMENTATION (OUTPATIENT)
Dept: HEMATOLOGY ONCOLOGY | Facility: MEDICAL CENTER | Age: 78
End: 2018-04-30

## 2018-04-30 ENCOUNTER — OFFICE VISIT (OUTPATIENT)
Dept: HEMATOLOGY ONCOLOGY | Facility: MEDICAL CENTER | Age: 78
End: 2018-04-30
Payer: MEDICARE

## 2018-04-30 ENCOUNTER — NON-PROVIDER VISIT (OUTPATIENT)
Dept: HEMATOLOGY ONCOLOGY | Facility: MEDICAL CENTER | Age: 78
End: 2018-04-30
Payer: MEDICARE

## 2018-04-30 ENCOUNTER — HOSPITAL ENCOUNTER (OUTPATIENT)
Facility: MEDICAL CENTER | Age: 78
End: 2018-04-30
Attending: NURSE PRACTITIONER
Payer: MEDICARE

## 2018-04-30 ENCOUNTER — OUTPATIENT INFUSION SERVICES (OUTPATIENT)
Dept: ONCOLOGY | Facility: MEDICAL CENTER | Age: 78
End: 2018-04-30
Attending: INTERNAL MEDICINE
Payer: MEDICARE

## 2018-04-30 VITALS
WEIGHT: 141.98 LBS | HEIGHT: 61 IN | BODY MASS INDEX: 26.81 KG/M2 | HEART RATE: 73 BPM | OXYGEN SATURATION: 97 % | DIASTOLIC BLOOD PRESSURE: 68 MMHG | SYSTOLIC BLOOD PRESSURE: 133 MMHG | TEMPERATURE: 97.5 F | RESPIRATION RATE: 18 BRPM

## 2018-04-30 VITALS
SYSTOLIC BLOOD PRESSURE: 108 MMHG | WEIGHT: 142.31 LBS | HEART RATE: 79 BPM | DIASTOLIC BLOOD PRESSURE: 70 MMHG | BODY MASS INDEX: 26.19 KG/M2 | RESPIRATION RATE: 18 BRPM | HEIGHT: 62 IN | TEMPERATURE: 97.9 F | OXYGEN SATURATION: 91 %

## 2018-04-30 VITALS
HEART RATE: 79 BPM | HEIGHT: 62 IN | TEMPERATURE: 97.9 F | DIASTOLIC BLOOD PRESSURE: 70 MMHG | RESPIRATION RATE: 18 BRPM | BODY MASS INDEX: 26.17 KG/M2 | SYSTOLIC BLOOD PRESSURE: 108 MMHG | WEIGHT: 142.2 LBS | OXYGEN SATURATION: 91 %

## 2018-04-30 DIAGNOSIS — C34.11 MALIGNANT NEOPLASM OF UPPER LOBE OF RIGHT LUNG (HCC): ICD-10-CM

## 2018-04-30 DIAGNOSIS — R91.8 LUNG MASS: ICD-10-CM

## 2018-04-30 LAB
ALBUMIN SERPL BCP-MCNC: 4.3 G/DL (ref 3.2–4.9)
ALBUMIN/GLOB SERPL: 1.5 G/DL
ALP SERPL-CCNC: 75 U/L (ref 30–99)
ALT SERPL-CCNC: 34 U/L (ref 2–50)
ANION GAP SERPL CALC-SCNC: 6 MMOL/L (ref 0–11.9)
APPEARANCE UR: CLEAR
AST SERPL-CCNC: 39 U/L (ref 12–45)
BACTERIA #/AREA URNS HPF: NEGATIVE /HPF
BILIRUB SERPL-MCNC: 0.5 MG/DL (ref 0.1–1.5)
BILIRUB UR QL STRIP.AUTO: NEGATIVE
BUN SERPL-MCNC: 17 MG/DL (ref 8–22)
CALCIUM SERPL-MCNC: 9.6 MG/DL (ref 8.5–10.5)
CHLORIDE SERPL-SCNC: 109 MMOL/L (ref 96–112)
CO2 SERPL-SCNC: 23 MMOL/L (ref 20–33)
COLOR UR: YELLOW
CREAT SERPL-MCNC: 0.56 MG/DL (ref 0.5–1.4)
EPI CELLS #/AREA URNS HPF: NEGATIVE /HPF
GLOBULIN SER CALC-MCNC: 2.8 G/DL (ref 1.9–3.5)
GLUCOSE SERPL-MCNC: 123 MG/DL (ref 65–99)
GLUCOSE UR STRIP.AUTO-MCNC: NEGATIVE MG/DL
HYALINE CASTS #/AREA URNS LPF: ABNORMAL /LPF
KETONES UR STRIP.AUTO-MCNC: ABNORMAL MG/DL
LEUKOCYTE ESTERASE UR QL STRIP.AUTO: ABNORMAL
MICRO URNS: ABNORMAL
NITRITE UR QL STRIP.AUTO: NEGATIVE
PH UR STRIP.AUTO: 5 [PH]
POTASSIUM SERPL-SCNC: 5.3 MMOL/L (ref 3.6–5.5)
PROT SERPL-MCNC: 7.1 G/DL (ref 6–8.2)
PROT UR QL STRIP: NEGATIVE MG/DL
RBC # URNS HPF: ABNORMAL /HPF
RBC UR QL AUTO: ABNORMAL
SODIUM SERPL-SCNC: 138 MMOL/L (ref 135–145)
SP GR UR STRIP.AUTO: 1.02
UROBILINOGEN UR STRIP.AUTO-MCNC: 0.2 MG/DL
WBC #/AREA URNS HPF: ABNORMAL /HPF

## 2018-04-30 PROCEDURE — 96413 CHEMO IV INFUSION 1 HR: CPT

## 2018-04-30 PROCEDURE — 36000 PLACE NEEDLE IN VEIN: CPT | Performed by: NURSE PRACTITIONER

## 2018-04-30 PROCEDURE — 99213 OFFICE O/P EST LOW 20 MIN: CPT | Performed by: NURSE PRACTITIONER

## 2018-04-30 PROCEDURE — 700105 HCHG RX REV CODE 258: Performed by: INTERNAL MEDICINE

## 2018-04-30 PROCEDURE — 80053 COMPREHEN METABOLIC PANEL: CPT

## 2018-04-30 PROCEDURE — 700111 HCHG RX REV CODE 636 W/ 250 OVERRIDE (IP): Mod: JG

## 2018-04-30 PROCEDURE — 700105 HCHG RX REV CODE 258

## 2018-04-30 PROCEDURE — 81001 URINALYSIS AUTO W/SCOPE: CPT

## 2018-04-30 RX ADMIN — SODIUM CHLORIDE 700 MG: 9 INJECTION, SOLUTION INTRAVENOUS at 09:48

## 2018-04-30 ASSESSMENT — ENCOUNTER SYMPTOMS
FEVER: 0
COUGH: 1
CONSTIPATION: 0
DOUBLE VISION: 0
HEADACHES: 0
WEIGHT LOSS: 1
WHEEZING: 0
INSOMNIA: 0
VOMITING: 0
DIARRHEA: 0
CHILLS: 0
DIZZINESS: 0
BLURRED VISION: 1
PALPITATIONS: 0
SHORTNESS OF BREATH: 0
TINGLING: 1
BACK PAIN: 1
NAUSEA: 0

## 2018-04-30 ASSESSMENT — PAIN SCALES - GENERAL
PAINLEVEL: 3=SLIGHT PAIN
PAINLEVEL: 3=SLIGHT PAIN
PAINLEVEL_OUTOF10: 3

## 2018-04-30 NOTE — PROGRESS NOTES
Subjective:      Ml Chavira is a 78 y.o. female who presents for Follow-Up for metastatic lung cancer to brain.         HPI    Patient seen today in follow-up for metastatic lung cancer to brain. She is currently scheduled to receive cycle #5 of Avastin today. She presents unaccompanied for today's appointment.    Patient was scheduled to complete Avastin last week, however after recent ingrown toenail removal procedure her treatment was held by one week. Patient is doing very well and is tolerating treatment well. She denies any fevers or chills. She does have some mild fatigue but it is very tolerable. She has had some weight loss in the last week, but however patient stated she is on the [employer] diet and attempt to lose weight after gaining significant amount of weight being on steroids. Since she has been off the steroids she feels much better and is doing well. Her appetite is great. She does have some nasal congestion in which she often coughs first thing in the morning. However she denies any significant coughing, wheezing or shortness of breath. She denies any chest pain or heart palpitations. Patient has always had blurred vision according to her prior to start of treatment but states this has significantly improved since being off of the steroids. She denies any nausea, vomiting, diarrhea or constipation. She did have her last formed bowel movement this morning. There is no blood noted in the stool. She does take Dulcolax at night daily for consistent bowel movements. She does have some mild back pain but states that is related to how she is walking with the ingrown toenail. She does have tingling in her hands and feet present but is chronic in nature.    Patient does have a bandage over the right big toenail. No erythema or drainage noted. She is being seen tomorrow by the surgeon who performed the procedure. She stated she is soaking it 3 times a day and keeping it clean and covered as  directed.    Patient did see Dr. Thacker last week to review imaging. She stated she will be seeing him one last time this coming June as he is soon to be retiring. She also scheduled to be seen by Dr. Caro soon as well following her imaging.      Allergies   Allergen Reactions   • Penicillins Itching and Rash     Current Outpatient Prescriptions on File Prior to Visit   Medication Sig Dispense Refill   • fluconazole (DIFLUCAN) 150 MG tablet Take one tab for yeast infection, if symptoms do not improve after 3 days take second tab. 2 Tab 0   • famotidine (PEPCID) 40 MG Tab Take 1 Tab by mouth every day. 30 Tab 1   • levetiracetam (KEPPRA) 500 MG Tab Take 1 Tab by mouth 2 Times a Day. 60 Tab    • atorvastatin (LIPITOR) 40 MG Tab Take 1 Tab by mouth every evening. 30 Tab    • aspirin (ASA) 325 MG Tab Take 1 Tab by mouth every day. 100 Tab    • vitamin B-12 CR (CYANOCOBALAMIN) 1000 MCG Tab CR tablet Take 5,000 mg by mouth.     • Omega-3 Fatty Acids (FISH OIL) 1200 MG Cap Take 1 Cap by mouth every day.     • multivitamin (THERAGRAN) Tab Take 1 Tab by mouth every day.       No current facility-administered medications on file prior to visit.        Review of Systems   Constitutional: Positive for malaise/fatigue (still present but very tolerable. More so for about 1-2 days after treatment) and weight loss. Negative for chills and fever.   HENT: Positive for congestion (nasal drainage).    Eyes: Positive for blurred vision (improved since off of steroids - this is chronic). Negative for double vision.   Respiratory: Positive for cough (every morning - likely d/t allergies per patient with nasal drainage as well). Negative for shortness of breath and wheezing.    Cardiovascular: Negative for chest pain and palpitations.   Gastrointestinal: Negative for constipation, diarrhea, nausea and vomiting.   Genitourinary: Negative for dysuria.   Musculoskeletal: Positive for back pain (very mild in her back).   Skin: Positive for  "itching (back of her head and ears). Negative for rash.   Neurological: Positive for tingling (always present but chronic in nature). Negative for dizziness and headaches.   Psychiatric/Behavioral: The patient does not have insomnia.           Objective:     /70   Pulse 79   Temp 36.6 °C (97.9 °F)   Resp 18   Ht 1.575 m (5' 2.01\")   Wt 64.5 kg (142 lb 3.2 oz)   SpO2 91%   Breastfeeding? No   BMI 26.00 kg/m²      Physical Exam   Constitutional: She is oriented to person, place, and time. She appears well-developed and well-nourished. No distress.   HENT:   Head: Normocephalic and atraumatic.   Mouth/Throat: Oropharynx is clear and moist. No oropharyngeal exudate.   Cardiovascular: Normal rate, regular rhythm, normal heart sounds and intact distal pulses.  Exam reveals no gallop and no friction rub.    No murmur heard.  Pulmonary/Chest: Effort normal and breath sounds normal. No respiratory distress.   Abdominal: Soft. Bowel sounds are normal. She exhibits no distension. There is no tenderness.   Musculoskeletal: She exhibits no edema or tenderness.   Ambulates with a cane for stability. Mild tenderness with walking from ingrown toenail removal.    Neurological: She is alert and oriented to person, place, and time.   Skin: Skin is warm and dry. No rash noted. She is not diaphoretic. No erythema. No pallor.   Psychiatric: She has a normal mood and affect. Her behavior is normal.   Vitals reviewed.            Assessment/Plan:     1. Malignant neoplasm of upper lobe of right lung (HCC)         Plan  1. Patient is doing very well and has tolerated treatment well. According to most recent MRI she does have improvement in scans and improvement in significant vasogenic edema. She is no longer taking steroids and feeling much better off of the steroids. Patient does have some weight loss but stated she is attempting to lose weight after gaining a significant amount of weight while on steroids. Patient is " scheduled for cycle #5 of Avastin today. According to Dr. Aguilar's notes he has plan to have patient receive 6 cycles total. Clinically patient is doing very well. If her labs meet parameters okay to proceed with treatment as planned.    2. Patient is scheduled to be seen in the clinic in 2 weeks with Dr. Aguilar or sooner if needed.

## 2018-04-30 NOTE — PROGRESS NOTES
"Nutrition Services: Brief Update  Met w/ pt this date to follow-up on current intake and appetite. Pt reports that her intake remains good; however, reports that it is not as great as when she was on steroids. Otherwise, pt reports no current c/o GI distress, nor swallowing difficulty. Pt only reports taste changes towards \"sweets\" such as they are not as appealing to her at this time. Pt's weight fluctuates from 142-151# with the greatest increase during when she was on steroid treatment. Therefore, pt appears well nourished at this time w/ no questions or concerns. RD to sign off, please re-consult us if any changes in weight or intake occur. RD available PRN.   "

## 2018-04-30 NOTE — PROGRESS NOTES
Ml Chavira is a 78 y.o. female here for a non-provider visit for: Lab Draws  on 4/30/2018 at 7:50 AM    Procedure Performed: Peripheral IV start with labs    Anatomical site: Right Antecubital Area (AC)    Equipment used: 24g    Labs drawn: CMP  Urinalysis collected    Ordering Provider: NICKY Hobbs    Bandar By: Praveen Belle R.N.    Patient tolerated IV start well.  IV flushed with NS with no s/s infiltration.  IV remains intact for appointment with infusion services.

## 2018-05-07 ENCOUNTER — APPOINTMENT (OUTPATIENT)
Dept: ONCOLOGY | Facility: MEDICAL CENTER | Age: 78
End: 2018-05-07
Attending: INTERNAL MEDICINE
Payer: MEDICARE

## 2018-05-14 DIAGNOSIS — C34.11 MALIGNANT NEOPLASM OF UPPER LOBE OF RIGHT LUNG (HCC): ICD-10-CM

## 2018-05-15 ENCOUNTER — OFFICE VISIT (OUTPATIENT)
Dept: HEMATOLOGY ONCOLOGY | Facility: MEDICAL CENTER | Age: 78
End: 2018-05-15
Payer: MEDICARE

## 2018-05-15 ENCOUNTER — APPOINTMENT (OUTPATIENT)
Dept: ONCOLOGY | Facility: MEDICAL CENTER | Age: 78
End: 2018-05-15
Attending: INTERNAL MEDICINE
Payer: MEDICARE

## 2018-05-15 ENCOUNTER — HOSPITAL ENCOUNTER (OUTPATIENT)
Facility: MEDICAL CENTER | Age: 78
End: 2018-05-15
Attending: INTERNAL MEDICINE
Payer: MEDICARE

## 2018-05-15 ENCOUNTER — NON-PROVIDER VISIT (OUTPATIENT)
Dept: HEMATOLOGY ONCOLOGY | Facility: MEDICAL CENTER | Age: 78
End: 2018-05-15
Payer: MEDICARE

## 2018-05-15 VITALS
DIASTOLIC BLOOD PRESSURE: 76 MMHG | HEART RATE: 77 BPM | TEMPERATURE: 97.6 F | HEIGHT: 61 IN | SYSTOLIC BLOOD PRESSURE: 122 MMHG | OXYGEN SATURATION: 93 % | RESPIRATION RATE: 18 BRPM | WEIGHT: 140.87 LBS | BODY MASS INDEX: 26.6 KG/M2

## 2018-05-15 DIAGNOSIS — C34.11 MALIGNANT NEOPLASM OF UPPER LOBE OF RIGHT LUNG (HCC): ICD-10-CM

## 2018-05-15 LAB
ALBUMIN SERPL BCP-MCNC: 4.2 G/DL (ref 3.2–4.9)
ALBUMIN/GLOB SERPL: 1.8 G/DL
ALP SERPL-CCNC: 81 U/L (ref 30–99)
ALT SERPL-CCNC: 27 U/L (ref 2–50)
ANION GAP SERPL CALC-SCNC: 8 MMOL/L (ref 0–11.9)
APPEARANCE UR: CLEAR
AST SERPL-CCNC: 25 U/L (ref 12–45)
BACTERIA #/AREA URNS HPF: NEGATIVE /HPF
BASOPHILS # BLD AUTO: 0.7 % (ref 0–1.8)
BASOPHILS # BLD: 0.04 K/UL (ref 0–0.12)
BILIRUB SERPL-MCNC: 0.5 MG/DL (ref 0.1–1.5)
BILIRUB UR QL STRIP.AUTO: NEGATIVE
BUN SERPL-MCNC: 18 MG/DL (ref 8–22)
CALCIUM SERPL-MCNC: 10.1 MG/DL (ref 8.5–10.5)
CHLORIDE SERPL-SCNC: 107 MMOL/L (ref 96–112)
CO2 SERPL-SCNC: 25 MMOL/L (ref 20–33)
COLOR UR: YELLOW
CREAT SERPL-MCNC: 0.57 MG/DL (ref 0.5–1.4)
EOSINOPHIL # BLD AUTO: 0.21 K/UL (ref 0–0.51)
EOSINOPHIL NFR BLD: 3.8 % (ref 0–6.9)
EPI CELLS #/AREA URNS HPF: NEGATIVE /HPF
ERYTHROCYTE [DISTWIDTH] IN BLOOD BY AUTOMATED COUNT: 47.7 FL (ref 35.9–50)
GLOBULIN SER CALC-MCNC: 2.4 G/DL (ref 1.9–3.5)
GLUCOSE SERPL-MCNC: 92 MG/DL (ref 65–99)
GLUCOSE UR STRIP.AUTO-MCNC: NEGATIVE MG/DL
HCT VFR BLD AUTO: 47.3 % (ref 37–47)
HGB BLD-MCNC: 15 G/DL (ref 12–16)
HYALINE CASTS #/AREA URNS LPF: ABNORMAL /LPF
IMM GRANULOCYTES # BLD AUTO: 0.01 K/UL (ref 0–0.11)
IMM GRANULOCYTES NFR BLD AUTO: 0.2 % (ref 0–0.9)
KETONES UR STRIP.AUTO-MCNC: NEGATIVE MG/DL
LEUKOCYTE ESTERASE UR QL STRIP.AUTO: ABNORMAL
LYMPHOCYTES # BLD AUTO: 0.96 K/UL (ref 1–4.8)
LYMPHOCYTES NFR BLD: 17.4 % (ref 22–41)
MCH RBC QN AUTO: 31.6 PG (ref 27–33)
MCHC RBC AUTO-ENTMCNC: 31.7 G/DL (ref 33.6–35)
MCV RBC AUTO: 99.6 FL (ref 81.4–97.8)
MICRO URNS: ABNORMAL
MONOCYTES # BLD AUTO: 0.54 K/UL (ref 0–0.85)
MONOCYTES NFR BLD AUTO: 9.8 % (ref 0–13.4)
NEUTROPHILS # BLD AUTO: 3.77 K/UL (ref 2–7.15)
NEUTROPHILS NFR BLD: 68.1 % (ref 44–72)
NITRITE UR QL STRIP.AUTO: NEGATIVE
NRBC # BLD AUTO: 0 K/UL
NRBC BLD-RTO: 0 /100 WBC
PH UR STRIP.AUTO: 5 [PH]
PLATELET # BLD AUTO: 226 K/UL (ref 164–446)
PMV BLD AUTO: 9 FL (ref 9–12.9)
POTASSIUM SERPL-SCNC: 4 MMOL/L (ref 3.6–5.5)
PROT SERPL-MCNC: 6.6 G/DL (ref 6–8.2)
PROT UR QL STRIP: NEGATIVE MG/DL
RBC # BLD AUTO: 4.75 M/UL (ref 4.2–5.4)
RBC # URNS HPF: ABNORMAL /HPF
RBC UR QL AUTO: ABNORMAL
SODIUM SERPL-SCNC: 140 MMOL/L (ref 135–145)
SP GR UR STRIP.AUTO: 1.01
UROBILINOGEN UR STRIP.AUTO-MCNC: 0.2 MG/DL
WBC # BLD AUTO: 5.5 K/UL (ref 4.8–10.8)
WBC #/AREA URNS HPF: ABNORMAL /HPF

## 2018-05-15 PROCEDURE — 81001 URINALYSIS AUTO W/SCOPE: CPT

## 2018-05-15 PROCEDURE — 85025 COMPLETE CBC W/AUTO DIFF WBC: CPT

## 2018-05-15 PROCEDURE — 80053 COMPREHEN METABOLIC PANEL: CPT

## 2018-05-15 PROCEDURE — 36000 PLACE NEEDLE IN VEIN: CPT | Performed by: INTERNAL MEDICINE

## 2018-05-15 PROCEDURE — 99214 OFFICE O/P EST MOD 30 MIN: CPT | Performed by: INTERNAL MEDICINE

## 2018-05-15 ASSESSMENT — PAIN SCALES - GENERAL: PAINLEVEL: 6=MODERATE PAIN

## 2018-05-15 NOTE — PROGRESS NOTES
Patient arrived to medical oncology for IV start with labs prior to her appointment with Dr. Aguilar.  Established 24g PIV in right AC.  Labs drawn.  UA collected by MA.  Labs taken to infusion services to tube to main lab by MARCOS Caldwell.  Pt released to lobby for followup appt with Dr. Aguilar.

## 2018-05-15 NOTE — PROGRESS NOTES
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      Identification/Prior relevant history: 11/21/15: 5.4cm RUL lung mass with brain mets? 11/18/15: Craniotomy: squamous cell ca 12/18/15:  XRT to brain Peddada? R shoulder skin lesion squamous cell 1/6/16: Carbo and Abraxane x 4 4/19/16: low dose carbo with lung XRT 5/24/16: last chemo 11/3/16: stable PET? RUL activity probably radiation related?  2/27/17: chest CT: Interval decrease in size of RUL mass? stable mass along R lung apex? increase density of groundglass opacity posterior to dominant mass (?radiation effect) 5/11/17: MRI brain: increasing mass effect and displacement? Start Decadron by Peddada 5/25/17: Chest and abdomen CAT: stable   7/6/17: MRI brain? unchanged from previous 7/7/17:   7/20/17: bone scan: uptake left posterior 12th rib without a definite CT correlate. Conceivably this could be due to interval trauma.   9/18/17: MRI: stable R parietal area with probable edema 9/20/17: Previous dizziness and headache have gone away with decadron?started taper, had to be restarted for worsening neuroo symptoms 11/3/17: Chest and abdomen CAT scan: RUL mass minimally decreased in size? increase in size of nodularity at R lung apex ? scarring or malignancy? no SOB, no pain or neurologic problems   12/22/17: Brain MRI? radiation necrosis with decrease in mass effect and necrosis  1/9/18: Chest CT stable 1/15/18: apparently can't get on study for Avastin and radiation necrosis?  Interim history-she started Avastin and was weaned off of Decadron. She tolerated the Avastin very well. Her ataxia. Symptoms have improved significantly. She has some insurance related co-pay issues coming to our office    4/18/80-MRI brain-The amount of vasogenic edema extending into the right temporal lobe and splenium of the corpus callosum has decreased significantly since exam of 12/22/2017.  Postsurgical changes in the right occipital lobe are unchanged since previous exam  CT chest-CT chest stable since 1/9/2018  2.  Right upper lobe architecture distortion with central 2.2 x 0.7 cm nodule or consolidation  3.  These findings could be radiation therapy changes versus unchanged neoplasm    Past Medical History:      Past Medical History:   Diagnosis Date   • Radionecrosis 4/14/2018       Past surgical history:       Past Surgical History:   Procedure Laterality Date   • CRANIOTOMY STEALTH Right 11/23/2015    Procedure: CRANIOTOMY STEALTH-right occipital;  Surgeon: Suyapa Schumacher M.D.;  Location: SURGERY [facility];  Service:        Allergies:       Penicillins    Medications:         Current Outpatient Prescriptions   Medication Sig Dispense Refill   • fluconazole (DIFLUCAN) 150 MG tablet Take one tab for yeast infection, if symptoms do not improve after 3 days take second tab. 2 Tab 0   • famotidine (PEPCID) 40 MG Tab Take 1 Tab by mouth every day. 30 Tab 1   • levetiracetam (KEPPRA) 500 MG Tab Take 1 Tab by mouth 2 Times a Day. 60 Tab    • atorvastatin (LIPITOR) 40 MG Tab Take 1 Tab by mouth every evening. 30 Tab    • aspirin (ASA) 325 MG Tab Take 1 Tab by mouth every day. 100 Tab    • vitamin B-12 CR (CYANOCOBALAMIN) 1000 MCG Tab CR tablet Take 5,000 mg by mouth.     • Omega-3 Fatty Acids (FISH OIL) 1200 MG Cap Take 1 Cap by mouth every day.     • multivitamin (THERAGRAN) Tab Take 1 Tab by mouth every day.       No current facility-administered medications for this visit.          Social History:     Social History     Social History   • Marital status:      Spouse name: N/A   • Number of children: N/A   • Years of education: N/A     Occupational History   • Not on file.     Social History Main Topics   • Smoking status: Former Smoker   • Smokeless tobacco: Never Used   • Alcohol use Yes   • Drug use: No   • Sexual activity: Not on file     Other Topics Concern   • Not on file  "    Social History Narrative   • No narrative on file       Family History:    No family history on file.    Review of Systems:  All other review of systems are negative except what was mentioned above in the HPI.    Constitutional: Negative for fever, chills, weight loss and malaise/fatigue.    HEENT: No new auditory or visual complaints. No sore throat and neck pain.     Respiratory: Negative for cough, sputum production, shortness of breath and wheezing.    Cardiovascular: Negative for chest pain, palpitations, orthopnea and leg swelling.    Gastrointestinal: Negative for heartburn, nausea, vomiting and abdominal pain.    Genitourinary: Negative for dysuria, hematuria    Musculoskeletal: No new arthralgias or myalgias   Skin: Negative for rash and itching.    Neurological: Negative for focal weakness and headaches.    Endo/Heme/Allergies: No abnormal bleed/bruise.    Psychiatric/Behavioral: No new depression/anxiety.    Physical Exam:  Vitals: /76   Pulse 77   Temp 36.4 °C (97.6 °F)   Resp 18   Ht 1.55 m (5' 1.02\")   Wt 63.9 kg (140 lb 14 oz)   SpO2 93%   Breastfeeding? No   BMI 26.60 kg/m²     General: Not in acute distress, alert and oriented x 3  HEENT: No pallor, icterus. Oropharynx clear.   Neck: Supple, no palpable masses.  Lymph nodes: No palpable cervical, supraclavicular, axillary or inguinal lymphadenopathy.    CVS: regular rate and rhythm, no rubs or gallops  RESP: Clear to auscultate bilaterally, no wheezing or crackles.   ABD: Soft, non tender, non distended, positive bowel sounds, no palpable organomegaly  EXT: No edema or cyanosis  CNS: Alert and oriented x3, No focal deficits. Improved symptoms. Otherwise   Skin- No rash      Labs:   No visits with results within 1 Week(s) from this visit.   Latest known visit with results is:   Hospital Outpatient Visit on 04/30/2018   Component Date Value Ref Range Status   • Sodium 04/30/2018 138  135 - 145 mmol/L Final   • Potassium 04/30/2018 " 5.3  3.6 - 5.5 mmol/L Final    Specimen slightly hemolyzed.   • Chloride 04/30/2018 109  96 - 112 mmol/L Final   • Co2 04/30/2018 23  20 - 33 mmol/L Final   • Anion Gap 04/30/2018 6.0  0.0 - 11.9 Final   • Glucose 04/30/2018 123* 65 - 99 mg/dL Final   • Bun 04/30/2018 17  8 - 22 mg/dL Final   • Creatinine 04/30/2018 0.56  0.50 - 1.40 mg/dL Final   • Calcium 04/30/2018 9.6  8.5 - 10.5 mg/dL Final   • AST(SGOT) 04/30/2018 39  12 - 45 U/L Final   • ALT(SGPT) 04/30/2018 34  2 - 50 U/L Final   • Alkaline Phosphatase 04/30/2018 75  30 - 99 U/L Final   • Total Bilirubin 04/30/2018 0.5  0.1 - 1.5 mg/dL Final   • Albumin 04/30/2018 4.3  3.2 - 4.9 g/dL Final   • Total Protein 04/30/2018 7.1  6.0 - 8.2 g/dL Final   • Globulin 04/30/2018 2.8  1.9 - 3.5 g/dL Final   • A-G Ratio 04/30/2018 1.5  g/dL Final   • Color 04/30/2018 Yellow   Final   • Character 04/30/2018 Clear   Final   • Specific Gravity 04/30/2018 1.025  <1.035 Final   • Ph 04/30/2018 5.0  5.0 - 8.0 Final   • Glucose 04/30/2018 Negative  Negative mg/dL Final   • Ketones 04/30/2018 Trace* Negative mg/dL Final   • Protein 04/30/2018 Negative  Negative mg/dL Final   • Bilirubin 04/30/2018 Negative  Negative Final   • Urobilinogen, Urine 04/30/2018 0.2  Negative Final   • Nitrite 04/30/2018 Negative  Negative Final   • Leukocyte Esterase 04/30/2018 Trace* Negative Final   • Occult Blood 04/30/2018 Small* Negative Final   • Micro Urine Req 04/30/2018 Microscopic   Final   • WBC 04/30/2018 0-2  /hpf Final    Comment: Female  <12 Yr 0-2  >12 Yr 0-5  Male   None     • RBC 04/30/2018 5-10* /hpf Final    Comment: Female  >12 Yr 0-2  Male   None     • Bacteria 04/30/2018 Negative  None /hpf Final   • Epithelial Cells 04/30/2018 Negative  /hpf Final   • Hyaline Cast 04/30/2018 3-5* /lpf Final   • GFR If  04/30/2018 >60  >60 mL/min/1.73 m 2 Final   • GFR If Non  04/30/2018 >60  >60 mL/min/1.73 m 2 Final       Assessment and Plan:  Metastatic  squamous cell lung carcinoma-. She was treated with carboplatin and Abraxane and received chemoradiation afterwards. She has done very well since then with no clear-cut evidence of relapse or progression. Reviewed recent CT images which showed stable finding consistent with scarring and no active malignancy. She will follow up with Dr. Thacker. If there is no insurance problems, we will be glad to continue surveillance once Dr. Thacker retires. She will discuss this with Dr. Thacker.    Radionecrosis-appears to be responding to Avastin clinically and by imaging. She is off of Decadron. She is status post 5 doses. She has some insurance/co-pay issues coming to our office. Given the significant improvement in her symptoms., We will hold off on further Avastin.  She will follow with Dr. Caro with follow-up MRI.    She agreed and verbalized  agreement and understanding with the current plan.  I answered all questions and concerns at this time         Please note that this dictation was created using voice recognition software. I have made every reasonable attempt to correct obvious errors, but I expect that there are errors of grammar and possibly content that I did not discover before finalizing the note.      SIGNATURES:  Marinao Aguilar    CC:  Sandee Thompson D.O.  No ref. provider found

## 2018-05-25 ENCOUNTER — HOSPITAL ENCOUNTER (OUTPATIENT)
Dept: RADIATION ONCOLOGY | Facility: MEDICAL CENTER | Age: 78
End: 2018-05-31
Attending: RADIOLOGY
Payer: MEDICARE

## 2018-05-25 VITALS
DIASTOLIC BLOOD PRESSURE: 59 MMHG | HEART RATE: 78 BPM | OXYGEN SATURATION: 95 % | SYSTOLIC BLOOD PRESSURE: 139 MMHG | TEMPERATURE: 98.5 F

## 2018-05-25 DIAGNOSIS — K29.60 STEROID-INDUCED GASTRITIS: ICD-10-CM

## 2018-05-25 DIAGNOSIS — T38.0X5A STEROID-INDUCED GASTRITIS: ICD-10-CM

## 2018-05-25 PROCEDURE — 99214 OFFICE O/P EST MOD 30 MIN: CPT | Performed by: RADIOLOGY

## 2018-05-25 PROCEDURE — 99212 OFFICE O/P EST SF 10 MIN: CPT | Performed by: RADIOLOGY

## 2018-05-25 ASSESSMENT — PAIN SCALES - GENERAL: PAINLEVEL: 4=SLIGHT-MODERATE PAIN

## 2018-05-25 NOTE — PROGRESS NOTES
RADIATION ONCOLOGY FOLLOW-UP    DATE OF SERVICE: 5/25/2018    IDENTIFICATION:   A 78 y.o. female with history of stage IV lung cancer adenocarcinoma. She is status post resection of a right occipital lobe brain metastasis followed by stereotactic radiotherapy to surgical bed receiving 2700 cGy in 3 fractions completed 12/18/2015. This was followed by systemic therapy consisting of carboplatin and Alimta with reduction of a right upper lobe lung mass however disease was still persistent. She subsequently received concurrent carboplatin and radiotherapy 6000 cGy in 25 fractions completed 5/6/2016.  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 Managed with steroids. Unfortunately were able unable to get patient off steroids. She became cushingoid. Attempted to enroll her in a study for radiation necrosis using Avastin. Unfortunately patient did not qualify for the trial.    Separately sent her to Dr. Aguilar for off trial Avastin. She had 3 cycles and returns today for a follow-up.    HISTORY OF PRESENT ILLNESS:   Patient has had restaging MRI brain and CT chest. She states that she is off all dexamethasone. She denies any headaches, nausea, vomiting,and  seizures. She was resumed exercise. Her only problem is osteoarthritis in her left knee.    CURRENT MEDICATIONS:  Current Outpatient Prescriptions   Medication Sig Dispense Refill   • famotidine (PEPCID) 40 MG Tab Take 1 Tab by mouth every day. 30 Tab 1   • levetiracetam (KEPPRA) 500 MG Tab Take 1 Tab by mouth 2 Times a Day. 60 Tab    • aspirin (ASA) 325 MG Tab Take 1 Tab by mouth every day. 100 Tab    • Omega-3 Fatty Acids (FISH OIL) 1200 MG Cap Take 1 Cap by mouth every day.     • multivitamin (THERAGRAN) Tab Take 1 Tab by mouth every day.     • fluconazole (DIFLUCAN) 150 MG tablet Take one tab for yeast infection, if symptoms do not improve after 3 days take second tab. 2 Tab 0   • atorvastatin (LIPITOR) 40 MG Tab Take 1 Tab by mouth  every evening. (Patient not taking: Reported on 5/25/2018) 30 Tab    • vitamin B-12 CR (CYANOCOBALAMIN) 1000 MCG Tab CR tablet Take 5,000 mg by mouth.       No current facility-administered medications for this encounter.        ALLERGIES:  Penicillins    PHYSICAL EXAM:   /59   Pulse 78   Temp 36.9 °C (98.5 °F)   SpO2 95%   GENERAL: Alert oriented no acute distress. Very mild facial edema from previous steroid use.  HEENT:  Pupils are equal, round, and reactive to light.  Extraocular muscles   are intact. Sclerae nonicteric.  Conjunctivae pink.  Oral cavity, tongue   protrudes midline.   NECK:  Supple without evidence of thyromegaly.  NODES:  No peripheral adenopathy of the neck, supraclavicular fossa or axillae   bilaterally.  LUNGS:  Clear to ascultation and resonant to percussion.  HEART:  Regular rate and rhythm.  No murmur appreciated  ABDOMEN:  Soft. No evidence of hepatosplenomegaly.  Positive bowel sounds.  EXTREMITIES:  Without Edema.  NEUROLOGIC:  Cranial nerves II through XII were intact.  Strength is 5/5 in   lower extremities bilaterally.  DTRs were symmetrical.  There was no focal   sensory deficit appreciated. Unable to perform tandem gait. Can serially subtract sevens from 100.    LABORATORY DATA:   Lab Results   Component Value Date/Time    SODIUM 140 05/15/2018 09:35 AM    POTASSIUM 4.0 05/15/2018 09:35 AM    CHLORIDE 107 05/15/2018 09:35 AM    CO2 25 05/15/2018 09:35 AM    GLUCOSE 92 05/15/2018 09:35 AM    BUN 18 05/15/2018 09:35 AM    CREATININE 0.57 05/15/2018 09:35 AM     Lab Results   Component Value Date/Time    ALKPHOSPHAT 81 05/15/2018 09:35 AM    ASTSGOT 25 05/15/2018 09:35 AM    ALTSGPT 27 05/15/2018 09:35 AM    TBILIRUBIN 0.5 05/15/2018 09:35 AM      Lab Results   Component Value Date/Time    WBC 5.5 05/15/2018 09:35 AM    RBC 4.75 05/15/2018 09:35 AM    HEMOGLOBIN 15.0 05/15/2018 09:35 AM    HEMATOCRIT 47.3 (H) 05/15/2018 09:35 AM    MCV 99.6 (H) 05/15/2018 09:35 AM    MCH 31.6  05/15/2018 09:35 AM    MCHC 31.7 (L) 05/15/2018 09:35 AM    MPV 9.0 05/15/2018 09:35 AM    NEUTSPOLYS 68.10 05/15/2018 09:35 AM    LYMPHOCYTES 17.40 (L) 05/15/2018 09:35 AM    MONOCYTES 9.80 05/15/2018 09:35 AM    EOSINOPHILS 3.80 05/15/2018 09:35 AM    BASOPHILS 0.70 05/15/2018 09:35 AM        RADIOLOGY DATA:  Results for orders placed during the hospital encounter of 04/18/18   MR-BRAIN-WITH & W/O    Addendum ADDENDUM: The amount of vasogenic edema extending into the right temporal  lobe and splenium of the corpus callosum has decreased significantly since  exam of 12/22/2017. Postsurgical changes in the right occipital lobe are  unchanged since previous exam.  An Emergent Document Only message has been documented for OMID DALE  in the [facility]  Critical Result system on 4/23/2018 1:51 PM,  Message ID [id number].      Garth Dang M.D. 4/23/2018  1:53 PM          Impression 1.  Status post right occipital craniotomy for resection of a brain metastasis.    2.  There is a postsurgical defect in the right occipital lobe with extensive surrounding increased T2 signal intensity involving the right parietal-occipital, occipital, and posterior temporal lobes. This abnormal signal intensity may be secondary to   postsurgical and/or postirradiation change.    3.  There is evidence of curvilinear and gyriform enhancement in the right occipital lobe surrounding the area of postsurgical change. This enhancement may be postsurgical or post irradiation change, however small amount of recurrent neoplasm cannot be   excluded with absolute certainty.    4.  Age-related cerebral atrophy.       Results for orders placed during the hospital encounter of 04/18/18   CT-CHEST (THORAX) WITH    Impression 1.  CT chest stable since 1/9/2018    2.  Right upper lobe architecture distortion with central 2.2 x 0.7 cm nodule or consolidation    3.  These findings could be radiation therapy changes versus unchanged neoplasm    4.   No adenopathy    5.  Emphysema    6.  Aortic and coronary atherosclerotic plaque    7.  Fatty filtration of the liver    8.  Simple hepatic cyst             IMPRESSION:    A 78 y.o. with stage IV lung cancer clinically without evidence of disease.    RECOMMENDATIONS:   Reviewed imaging with patient and reassured her. She's had a dramatic response to Avastin with near complete resolution of enhancement in the surgical bed as well as decrease in cerebral edema. Lung right upper lobe shows improvement in radiation effect using image fusion technique.    At this point I like her to be followed closely by Dr. Aguilar and suggested that she make a follow-up appointment with him. Also recommended that she obtain a primary care physician to help with the osteoarthritis in her left knee.    I'll see her on an as-needed basis.    25 minutes was spent face-to-face with patient in the office and more than half of that time was spent counseling patient.     Thank you for the opportunity to participate in her care.  If any questions or comments, please do not hesitate in calling.    Cande DE SANTIAGO M.D.  Electronically signed by: Cande Caro V, 5/25/2018 4:20 PM  [phone]

## 2018-05-30 ENCOUNTER — APPOINTMENT (OUTPATIENT)
Dept: RADIOLOGY | Facility: MEDICAL CENTER | Age: 78
DRG: 907 | End: 2018-05-30
Attending: EMERGENCY MEDICINE
Payer: MEDICARE

## 2018-05-30 ENCOUNTER — APPOINTMENT (OUTPATIENT)
Dept: RADIOLOGY | Facility: MEDICAL CENTER | Age: 78
DRG: 907 | End: 2018-05-30
Payer: MEDICARE

## 2018-05-30 ENCOUNTER — OFFICE VISIT (OUTPATIENT)
Dept: URGENT CARE | Facility: PHYSICIAN GROUP | Age: 78
End: 2018-05-30
Payer: MEDICARE

## 2018-05-30 ENCOUNTER — HOSPITAL ENCOUNTER (INPATIENT)
Facility: MEDICAL CENTER | Age: 78
LOS: 1 days | DRG: 907 | End: 2018-06-03
Attending: EMERGENCY MEDICINE | Admitting: HOSPITALIST
Payer: MEDICARE

## 2018-05-30 VITALS
TEMPERATURE: 98.2 F | HEIGHT: 62 IN | SYSTOLIC BLOOD PRESSURE: 138 MMHG | WEIGHT: 136 LBS | HEART RATE: 86 BPM | BODY MASS INDEX: 25.03 KG/M2 | RESPIRATION RATE: 18 BRPM | DIASTOLIC BLOOD PRESSURE: 70 MMHG | OXYGEN SATURATION: 92 %

## 2018-05-30 DIAGNOSIS — R06.00 DYSPNEA, UNSPECIFIED TYPE: ICD-10-CM

## 2018-05-30 DIAGNOSIS — C34.11 MALIGNANT NEOPLASM OF UPPER LOBE OF RIGHT LUNG (HCC): ICD-10-CM

## 2018-05-30 DIAGNOSIS — C79.9 METASTATIC CANCER (HCC): ICD-10-CM

## 2018-05-30 DIAGNOSIS — C79.31 METASTASIS TO BRAIN (HCC): ICD-10-CM

## 2018-05-30 DIAGNOSIS — R07.9 CHEST PAIN, UNSPECIFIED TYPE: ICD-10-CM

## 2018-05-30 DIAGNOSIS — R91.8 LUNG MASS: ICD-10-CM

## 2018-05-30 DIAGNOSIS — K29.20 ACUTE ALCOHOLIC GASTRITIS WITHOUT HEMORRHAGE: ICD-10-CM

## 2018-05-30 DIAGNOSIS — I49.5 SICK SINUS SYNDROME (HCC): ICD-10-CM

## 2018-05-30 LAB
ALBUMIN SERPL BCP-MCNC: 4.1 G/DL (ref 3.2–4.9)
ALBUMIN/GLOB SERPL: 1.5 G/DL
ALP SERPL-CCNC: 74 U/L (ref 30–99)
ALT SERPL-CCNC: 25 U/L (ref 2–50)
ANION GAP SERPL CALC-SCNC: 11 MMOL/L (ref 0–11.9)
APTT PPP: 27.6 SEC (ref 24.7–36)
AST SERPL-CCNC: 21 U/L (ref 12–45)
BASOPHILS # BLD AUTO: 0.3 % (ref 0–1.8)
BASOPHILS # BLD: 0.03 K/UL (ref 0–0.12)
BILIRUB SERPL-MCNC: 0.7 MG/DL (ref 0.1–1.5)
BNP SERPL-MCNC: 27 PG/ML (ref 0–100)
BUN SERPL-MCNC: 17 MG/DL (ref 8–22)
CALCIUM SERPL-MCNC: 9.9 MG/DL (ref 8.5–10.5)
CHLORIDE SERPL-SCNC: 104 MMOL/L (ref 96–112)
CO2 SERPL-SCNC: 24 MMOL/L (ref 20–33)
CREAT SERPL-MCNC: 0.58 MG/DL (ref 0.5–1.4)
EOSINOPHIL # BLD AUTO: 0.03 K/UL (ref 0–0.51)
EOSINOPHIL NFR BLD: 0.3 % (ref 0–6.9)
ERYTHROCYTE [DISTWIDTH] IN BLOOD BY AUTOMATED COUNT: 46.7 FL (ref 35.9–50)
GLOBULIN SER CALC-MCNC: 2.7 G/DL (ref 1.9–3.5)
GLUCOSE SERPL-MCNC: 115 MG/DL (ref 65–99)
HCT VFR BLD AUTO: 45.1 % (ref 37–47)
HGB BLD-MCNC: 15 G/DL (ref 12–16)
IMM GRANULOCYTES # BLD AUTO: 0.04 K/UL (ref 0–0.11)
IMM GRANULOCYTES NFR BLD AUTO: 0.4 % (ref 0–0.9)
INR PPP: 1 (ref 0.87–1.13)
LIPASE SERPL-CCNC: 21 U/L (ref 11–82)
LYMPHOCYTES # BLD AUTO: 0.91 K/UL (ref 1–4.8)
LYMPHOCYTES NFR BLD: 9.4 % (ref 22–41)
MCH RBC QN AUTO: 32.3 PG (ref 27–33)
MCHC RBC AUTO-ENTMCNC: 33.3 G/DL (ref 33.6–35)
MCV RBC AUTO: 97.2 FL (ref 81.4–97.8)
MONOCYTES # BLD AUTO: 0.87 K/UL (ref 0–0.85)
MONOCYTES NFR BLD AUTO: 9 % (ref 0–13.4)
NEUTROPHILS # BLD AUTO: 7.82 K/UL (ref 2–7.15)
NEUTROPHILS NFR BLD: 80.6 % (ref 44–72)
NRBC # BLD AUTO: 0 K/UL
NRBC BLD-RTO: 0 /100 WBC
PLATELET # BLD AUTO: 217 K/UL (ref 164–446)
PMV BLD AUTO: 8.6 FL (ref 9–12.9)
POTASSIUM SERPL-SCNC: 4.3 MMOL/L (ref 3.6–5.5)
PROT SERPL-MCNC: 6.8 G/DL (ref 6–8.2)
PROTHROMBIN TIME: 12.9 SEC (ref 12–14.6)
RBC # BLD AUTO: 4.64 M/UL (ref 4.2–5.4)
SODIUM SERPL-SCNC: 139 MMOL/L (ref 135–145)
TROPONIN I SERPL-MCNC: <0.01 NG/ML (ref 0–0.04)
WBC # BLD AUTO: 9.7 K/UL (ref 4.8–10.8)

## 2018-05-30 PROCEDURE — 99214 OFFICE O/P EST MOD 30 MIN: CPT | Performed by: PHYSICIAN ASSISTANT

## 2018-05-30 PROCEDURE — 85610 PROTHROMBIN TIME: CPT

## 2018-05-30 PROCEDURE — 84484 ASSAY OF TROPONIN QUANT: CPT

## 2018-05-30 PROCEDURE — 700111 HCHG RX REV CODE 636 W/ 250 OVERRIDE (IP): Performed by: EMERGENCY MEDICINE

## 2018-05-30 PROCEDURE — 85730 THROMBOPLASTIN TIME PARTIAL: CPT

## 2018-05-30 PROCEDURE — 85025 COMPLETE CBC W/AUTO DIFF WBC: CPT

## 2018-05-30 PROCEDURE — 71045 X-RAY EXAM CHEST 1 VIEW: CPT

## 2018-05-30 PROCEDURE — 80053 COMPREHEN METABOLIC PANEL: CPT

## 2018-05-30 PROCEDURE — 96375 TX/PRO/DX INJ NEW DRUG ADDON: CPT

## 2018-05-30 PROCEDURE — 71275 CT ANGIOGRAPHY CHEST: CPT

## 2018-05-30 PROCEDURE — 96374 THER/PROPH/DIAG INJ IV PUSH: CPT

## 2018-05-30 PROCEDURE — 700117 HCHG RX CONTRAST REV CODE 255: Performed by: EMERGENCY MEDICINE

## 2018-05-30 PROCEDURE — 99220 PR INITIAL OBSERVATION CARE,LEVL III: CPT | Performed by: HOSPITALIST

## 2018-05-30 PROCEDURE — 99285 EMERGENCY DEPT VISIT HI MDM: CPT

## 2018-05-30 PROCEDURE — 83690 ASSAY OF LIPASE: CPT

## 2018-05-30 PROCEDURE — A9270 NON-COVERED ITEM OR SERVICE: HCPCS | Performed by: EMERGENCY MEDICINE

## 2018-05-30 PROCEDURE — 700102 HCHG RX REV CODE 250 W/ 637 OVERRIDE(OP): Performed by: EMERGENCY MEDICINE

## 2018-05-30 PROCEDURE — 83880 ASSAY OF NATRIURETIC PEPTIDE: CPT

## 2018-05-30 PROCEDURE — 96376 TX/PRO/DX INJ SAME DRUG ADON: CPT

## 2018-05-30 RX ORDER — ASPIRIN 81 MG/1
81 TABLET, CHEWABLE ORAL EVERY EVENING
COMMUNITY
End: 2018-09-05 | Stop reason: SDUPTHER

## 2018-05-30 RX ORDER — ONDANSETRON 2 MG/ML
4 INJECTION INTRAMUSCULAR; INTRAVENOUS ONCE
Status: COMPLETED | OUTPATIENT
Start: 2018-05-30 | End: 2018-05-30

## 2018-05-30 RX ORDER — ASPIRIN 300 MG/1
300 SUPPOSITORY RECTAL ONCE
Status: COMPLETED | OUTPATIENT
Start: 2018-05-30 | End: 2018-05-30

## 2018-05-30 RX ORDER — MORPHINE SULFATE 4 MG/ML
4 INJECTION, SOLUTION INTRAMUSCULAR; INTRAVENOUS ONCE
Status: COMPLETED | OUTPATIENT
Start: 2018-05-30 | End: 2018-05-30

## 2018-05-30 RX ORDER — MORPHINE SULFATE 4 MG/ML
4 INJECTION, SOLUTION INTRAMUSCULAR; INTRAVENOUS ONCE
Status: COMPLETED | OUTPATIENT
Start: 2018-05-31 | End: 2018-05-30

## 2018-05-30 RX ORDER — ASPIRIN 81 MG/1
324 TABLET, CHEWABLE ORAL ONCE
Status: COMPLETED | OUTPATIENT
Start: 2018-05-30 | End: 2018-05-30

## 2018-05-30 RX ADMIN — ASPIRIN 324 MG: 81 TABLET, CHEWABLE ORAL at 13:19

## 2018-05-30 RX ADMIN — MORPHINE SULFATE 4 MG: 4 INJECTION INTRAVENOUS at 16:46

## 2018-05-30 RX ADMIN — FENTANYL CITRATE 50 MCG: 50 INJECTION, SOLUTION INTRAMUSCULAR; INTRAVENOUS at 13:20

## 2018-05-30 RX ADMIN — MORPHINE SULFATE 4 MG: 4 INJECTION INTRAVENOUS at 23:44

## 2018-05-30 RX ADMIN — ONDANSETRON HYDROCHLORIDE 4 MG: 2 INJECTION, SOLUTION INTRAMUSCULAR; INTRAVENOUS at 18:15

## 2018-05-30 RX ADMIN — IOHEXOL 65 ML: 350 INJECTION, SOLUTION INTRAVENOUS at 13:52

## 2018-05-30 ASSESSMENT — ENCOUNTER SYMPTOMS
SPUTUM PRODUCTION: 0
EYE DISCHARGE: 0
BACK PAIN: 0
CONSTIPATION: 0
BLOOD IN STOOL: 0
NAUSEA: 0
FOCAL WEAKNESS: 0
NECK PAIN: 0
PALPITATIONS: 0
CHILLS: 0
FEVER: 0
BRUISES/BLEEDS EASILY: 0
PND: 0
DIARRHEA: 0
EYE REDNESS: 0
SENSORY CHANGE: 0
SHORTNESS OF BREATH: 1
CLAUDICATION: 0
MYALGIAS: 0
COUGH: 0
DIZZINESS: 0
VOMITING: 0
WHEEZING: 0
ABDOMINAL PAIN: 0
ORTHOPNEA: 0
HEADACHES: 0

## 2018-05-30 ASSESSMENT — PAIN SCALES - GENERAL
PAINLEVEL: 7=MODERATE-SEVERE PAIN
PAINLEVEL_OUTOF10: 7
PAINLEVEL_OUTOF10: 5

## 2018-05-30 NOTE — ED NOTES
"Med rec complete per pt at bedside  Allergies reviewed  No ABX in last 30 days  Pt reports she was taking Keppra for what she believes was \"brain swelling\" but stopped taking last week because her doctor did not tell her to continue taking it after she saw him last week   "

## 2018-05-30 NOTE — ED PROVIDER NOTES
"ED Provider Note    CHIEF COMPLAINT  Chief Complaint   Patient presents with   • Chest Pain     pt c/o mid chest pain radiating to right arm started last night. skin pwd. describes pain as shooting/constant and worse during deep inspiration.       HPI  Ml Chavira is a 78 y.o. female who presents with chest pain. Patient states the pain started last night. She states initially was the right upper chest region and was pleuritic in nature. She describes it as sharp. She does have some slight dyspnea. She says now the pain seems to more centralized as well as in the right upper lobe region. She does have a history of lung cancer with radiation therapy. She does not have any cardiac risk factors. She typically takes are aspirin at night. She does not have any pain or swelling to her lower extremities. She has not had any fevers. She also denies any recent upper respiratory symptoms.    REVIEW OF SYSTEMS  See HPI for further details. All other systems are negative.     PAST MEDICAL HISTORY  Past Medical History:   Diagnosis Date   • Radionecrosis 4/14/2018       SOCIAL HISTORY  Social History     Social History   • Marital status:      Spouse name: N/A   • Number of children: N/A   • Years of education: N/A     Social History Main Topics   • Smoking status: Former Smoker   • Smokeless tobacco: Never Used   • Alcohol use Yes   • Drug use: No   • Sexual activity: Not on file     Other Topics Concern   • Not on file     Social History Narrative   • No narrative on file           PHYSICAL EXAM  VITAL SIGNS: /58   Pulse 86   Temp 36.9 °C (98.5 °F) (Temporal)   Resp 16   Ht 1.575 m (5' 2\")   Wt 63 kg (138 lb 14.2 oz)   SpO2 93%   BMI 25.40 kg/m²   Constitutional: Mild acute distress, Non-toxic appearance.   HENT: Normocephalic, Atraumatic, tympanic membranes are intact and nonerythematous bilaterally, Oropharynx moist without exudates or erythema, Nose normal.   Eyes: PERRLA, EOMI, Conjunctiva " normal.  Neck: Supple without meningismus  Lymphatic: No lymphadenopathy noted.   Cardiovascular: Normal heart rate, Normal rhythm, No murmurs, No rubs, No gallops.   Thorax & Lungs: Normal breath sounds, No respiratory distress, No wheezing, No chest tenderness.   Abdomen: Bowel sounds normal, Soft, No tenderness, no rebound, no guarding, no distention, No masses, No pulsatile masses.   Skin: Warm, Dry, No erythema, No rash.   Back: No tenderness, No CVA tenderness.   Extremities: Atraumatic with symmetric distal pulses, No edema, No tenderness, No cyanosis, No clubbing.   Neurologic: Alert & oriented x 3, cranial nerves II through XII are intact, Normal motor function, Normal sensory function, No focal deficits noted.   Psychiatric: Affect normal, Judgment normal, Mood normal.     EKG  12-lead EKG interpreted by myself shows a normal sinus rhythm with a ventricular rate of 85, normal QRS, normal intervals, no ST segment elevation or depression, normal T waves.    RADIOLOGY/PROCEDURES  CT-CTA CHEST PULMONARY ARTERY W/ RECONS   Final Result      No pulmonary emboli.   Ill-defined right upper lobe infiltration including nodular infiltration unchanged since previous examination.   Hyperexpanded and emphysematous lungs.   Hepatic steatosis and hepatic cysts.   Calcified nodules within the thyroid gland.            DX-CHEST-LIMITED (1 VIEW)   Final Result      Post treatment changes right upper lobe. Lung base atelectasis.            COURSE & MEDICAL DECISION MAKING  Pertinent Labs & Imaging studies reviewed. (See chart for details)  Is an 78-year-old female who presents to the emergency department with chest discomfort. At this time I don't have a clear source. The CT scan of the chest does not show any evidence of a pulmonary embolus as the patient would be at risk with her cancer. Furthermore there does not appear to be any significant pulmonary infiltrate that could cause her pain. The ill-defined lesion is chronic  most likely from scarring from her radiation therapy from her lung cancer. The patient has a benign abdominal examination therefore referred pain to be unlikely. This could be from esophagitis. I cannot rule out a cardiac source and therefore that the patient to the hospital for further observation and treatment. The patient did receive fentanyl for pain control and on repeat examination she is resting comfortably. She is here now stable at this time.    FINAL IMPRESSION  1. Chest pain   Disposition  The patient will be admitted in stable condition    Electronically signed by: Félix Landaverde, 5/30/2018 12:21 PM

## 2018-05-30 NOTE — ED TRIAGE NOTES
Chief Complaint   Patient presents with   • Chest Pain     pt c/o mid chest pain radiating to right arm started last night. skin pwd. describes pain as shooting/constant and worse during deep inspiration.     Denies any hx if MI/cardiac stents. States has hx of lungs cancer last radiation therapy was 3-4 weeks ago. Afebrile. Able to speak in full sentences. NAD. Skin pwd.  Triage process explained to pt and instructed to notify staff for any worsening symptoms. Pt placed in senior longue room.

## 2018-05-31 ENCOUNTER — APPOINTMENT (OUTPATIENT)
Dept: RADIOLOGY | Facility: MEDICAL CENTER | Age: 78
DRG: 907 | End: 2018-05-31
Attending: INTERNAL MEDICINE
Payer: MEDICARE

## 2018-05-31 PROBLEM — I48.91 NEW ONSET A-FIB (HCC): Status: ACTIVE | Noted: 2018-05-31

## 2018-05-31 PROBLEM — K29.20 ACUTE ALCOHOLIC GASTRITIS WITHOUT HEMORRHAGE: Status: ACTIVE | Noted: 2018-05-31

## 2018-05-31 PROBLEM — R07.81 PLEURITIC CHEST PAIN: Status: ACTIVE | Noted: 2018-05-31

## 2018-05-31 LAB
EKG IMPRESSION: NORMAL
HEMOCCULT STL QL: NEGATIVE

## 2018-05-31 PROCEDURE — 94760 N-INVAS EAR/PLS OXIMETRY 1: CPT

## 2018-05-31 PROCEDURE — 304853 HCHG PPM TEST CABLE

## 2018-05-31 PROCEDURE — 99152 MOD SED SAME PHYS/QHP 5/>YRS: CPT

## 2018-05-31 PROCEDURE — 92950 HEART/LUNG RESUSCITATION CPR: CPT

## 2018-05-31 PROCEDURE — 93005 ELECTROCARDIOGRAM TRACING: CPT | Performed by: INTERNAL MEDICINE

## 2018-05-31 PROCEDURE — 306588 SLEEVE,VASO CALF MED: Performed by: HOSPITALIST

## 2018-05-31 PROCEDURE — 700101 HCHG RX REV CODE 250

## 2018-05-31 PROCEDURE — 99226 PR SUBSEQUENT OBSERVATION CARE,LEVEL III: CPT | Performed by: INTERNAL MEDICINE

## 2018-05-31 PROCEDURE — 93010 ELECTROCARDIOGRAM REPORT: CPT | Mod: 76 | Performed by: INTERNAL MEDICINE

## 2018-05-31 PROCEDURE — G0378 HOSPITAL OBSERVATION PER HR: HCPCS

## 2018-05-31 PROCEDURE — 93010 ELECTROCARDIOGRAM REPORT: CPT | Performed by: INTERNAL MEDICINE

## 2018-05-31 PROCEDURE — 33210 INSERT ELECTRD/PM CATH SNGL: CPT

## 2018-05-31 PROCEDURE — 303746 HCHG EXTERNAL PACEMAKER PAD: Performed by: INTERNAL MEDICINE

## 2018-05-31 PROCEDURE — 700102 HCHG RX REV CODE 250 W/ 637 OVERRIDE(OP): Performed by: HOSPITALIST

## 2018-05-31 PROCEDURE — 71045 X-RAY EXAM CHEST 1 VIEW: CPT

## 2018-05-31 PROCEDURE — A9270 NON-COVERED ITEM OR SERVICE: HCPCS | Performed by: HOSPITALIST

## 2018-05-31 PROCEDURE — 305387 HCHG SUTURES

## 2018-05-31 PROCEDURE — 700105 HCHG RX REV CODE 258: Performed by: INTERNAL MEDICINE

## 2018-05-31 PROCEDURE — 82272 OCCULT BLD FECES 1-3 TESTS: CPT

## 2018-05-31 PROCEDURE — 700111 HCHG RX REV CODE 636 W/ 250 OVERRIDE (IP)

## 2018-05-31 PROCEDURE — C1898 LEAD, PMKR, OTHER THAN TRANS: HCPCS

## 2018-05-31 PROCEDURE — C1894 INTRO/SHEATH, NON-LASER: HCPCS

## 2018-05-31 PROCEDURE — 5A1223Z PERFORMANCE OF CARDIAC PACING, CONTINUOUS: ICD-10-PCS | Performed by: INTERNAL MEDICINE

## 2018-05-31 RX ORDER — DILTIAZEM HYDROCHLORIDE 5 MG/ML
15 INJECTION INTRAVENOUS ONCE
Status: DISCONTINUED | OUTPATIENT
Start: 2018-05-31 | End: 2018-05-31

## 2018-05-31 RX ORDER — LORAZEPAM 2 MG/ML
0.5 INJECTION INTRAMUSCULAR ONCE
Status: COMPLETED | OUTPATIENT
Start: 2018-05-31 | End: 2018-05-31

## 2018-05-31 RX ORDER — BISACODYL 10 MG
10 SUPPOSITORY, RECTAL RECTAL
Status: DISCONTINUED | OUTPATIENT
Start: 2018-05-31 | End: 2018-06-03 | Stop reason: HOSPADM

## 2018-05-31 RX ORDER — METOPROLOL TARTRATE 1 MG/ML
INJECTION, SOLUTION INTRAVENOUS
Status: COMPLETED
Start: 2018-05-31 | End: 2018-05-31

## 2018-05-31 RX ORDER — POLYETHYLENE GLYCOL 3350 17 G/17G
1 POWDER, FOR SOLUTION ORAL
Status: DISCONTINUED | OUTPATIENT
Start: 2018-05-31 | End: 2018-06-03 | Stop reason: HOSPADM

## 2018-05-31 RX ORDER — MORPHINE SULFATE 4 MG/ML
2-5 INJECTION, SOLUTION INTRAMUSCULAR; INTRAVENOUS EVERY 4 HOURS PRN
Status: DISCONTINUED | OUTPATIENT
Start: 2018-05-31 | End: 2018-06-03 | Stop reason: HOSPADM

## 2018-05-31 RX ORDER — AMOXICILLIN 250 MG
2 CAPSULE ORAL 2 TIMES DAILY
Status: DISCONTINUED | OUTPATIENT
Start: 2018-05-31 | End: 2018-06-03 | Stop reason: HOSPADM

## 2018-05-31 RX ORDER — LEVETIRACETAM 500 MG/1
500 TABLET ORAL 2 TIMES DAILY
Status: DISCONTINUED | OUTPATIENT
Start: 2018-05-31 | End: 2018-06-03 | Stop reason: HOSPADM

## 2018-05-31 RX ORDER — ONDANSETRON 4 MG/1
4 TABLET, ORALLY DISINTEGRATING ORAL EVERY 4 HOURS PRN
Status: DISCONTINUED | OUTPATIENT
Start: 2018-05-31 | End: 2018-06-03 | Stop reason: HOSPADM

## 2018-05-31 RX ORDER — LIDOCAINE HYDROCHLORIDE 20 MG/ML
INJECTION, SOLUTION INFILTRATION; PERINEURAL
Status: COMPLETED
Start: 2018-05-31 | End: 2018-05-31

## 2018-05-31 RX ORDER — METOPROLOL TARTRATE 1 MG/ML
5 INJECTION, SOLUTION INTRAVENOUS ONCE
Status: COMPLETED | OUTPATIENT
Start: 2018-05-31 | End: 2018-05-31

## 2018-05-31 RX ORDER — ACETAMINOPHEN 325 MG/1
650 TABLET ORAL EVERY 6 HOURS PRN
Status: DISCONTINUED | OUTPATIENT
Start: 2018-05-31 | End: 2018-06-03 | Stop reason: HOSPADM

## 2018-05-31 RX ORDER — SODIUM CHLORIDE 9 MG/ML
1000 INJECTION, SOLUTION INTRAVENOUS ONCE
Status: COMPLETED | OUTPATIENT
Start: 2018-05-31 | End: 2018-05-31

## 2018-05-31 RX ORDER — ONDANSETRON 2 MG/ML
4 INJECTION INTRAMUSCULAR; INTRAVENOUS EVERY 4 HOURS PRN
Status: DISCONTINUED | OUTPATIENT
Start: 2018-05-31 | End: 2018-06-03 | Stop reason: HOSPADM

## 2018-05-31 RX ORDER — MIDAZOLAM HYDROCHLORIDE 1 MG/ML
INJECTION INTRAMUSCULAR; INTRAVENOUS
Status: COMPLETED
Start: 2018-05-31 | End: 2018-05-31

## 2018-05-31 RX ORDER — SUCRALFATE ORAL 1 G/10ML
1 SUSPENSION ORAL EVERY 6 HOURS
Status: DISCONTINUED | OUTPATIENT
Start: 2018-05-31 | End: 2018-06-03 | Stop reason: HOSPADM

## 2018-05-31 RX ORDER — CHLORAL HYDRATE 500 MG
2000 CAPSULE ORAL DAILY
Status: DISCONTINUED | OUTPATIENT
Start: 2018-05-31 | End: 2018-06-03 | Stop reason: HOSPADM

## 2018-05-31 RX ORDER — HEPARIN SODIUM,PORCINE 1000/ML
VIAL (ML) INJECTION
Status: COMPLETED
Start: 2018-05-31 | End: 2018-05-31

## 2018-05-31 RX ORDER — LORAZEPAM 2 MG/ML
INJECTION INTRAMUSCULAR
Status: COMPLETED
Start: 2018-05-31 | End: 2018-05-31

## 2018-05-31 RX ORDER — OMEPRAZOLE 20 MG/1
20 CAPSULE, DELAYED RELEASE ORAL 2 TIMES DAILY
Status: DISCONTINUED | OUTPATIENT
Start: 2018-05-31 | End: 2018-06-03 | Stop reason: HOSPADM

## 2018-05-31 RX ADMIN — LEVETIRACETAM 500 MG: 500 TABLET ORAL at 18:13

## 2018-05-31 RX ADMIN — STANDARDIZED SENNA CONCENTRATE AND DOCUSATE SODIUM 2 TABLET: 8.6; 5 TABLET, FILM COATED ORAL at 18:12

## 2018-05-31 RX ADMIN — FENTANYL CITRATE 25 MCG: 50 INJECTION, SOLUTION INTRAMUSCULAR; INTRAVENOUS at 14:58

## 2018-05-31 RX ADMIN — HEPARIN SODIUM: 1000 INJECTION, SOLUTION INTRAVENOUS; SUBCUTANEOUS at 14:52

## 2018-05-31 RX ADMIN — SUCRALFATE 1 G: 1 SUSPENSION ORAL at 05:13

## 2018-05-31 RX ADMIN — SUCRALFATE 1 G: 1 SUSPENSION ORAL at 11:36

## 2018-05-31 RX ADMIN — MIDAZOLAM 1.5 MG: 1 INJECTION INTRAMUSCULAR; INTRAVENOUS at 14:57

## 2018-05-31 RX ADMIN — ACETAMINOPHEN 650 MG: 325 TABLET, FILM COATED ORAL at 22:13

## 2018-05-31 RX ADMIN — LIDOCAINE HYDROCHLORIDE: 20 INJECTION, SOLUTION INFILTRATION; PERINEURAL at 14:52

## 2018-05-31 RX ADMIN — OMEPRAZOLE 20 MG: 20 CAPSULE, DELAYED RELEASE ORAL at 05:13

## 2018-05-31 RX ADMIN — THERA TABS 1 TABLET: TAB at 05:14

## 2018-05-31 RX ADMIN — ACETAMINOPHEN 650 MG: 325 TABLET, FILM COATED ORAL at 11:36

## 2018-05-31 RX ADMIN — METOROPROLOL TARTRATE 5 MG: 5 INJECTION, SOLUTION INTRAVENOUS at 12:35

## 2018-05-31 RX ADMIN — METOPROLOL TARTRATE 5 MG: 1 INJECTION, SOLUTION INTRAVENOUS at 12:35

## 2018-05-31 RX ADMIN — OMEPRAZOLE 20 MG: 20 CAPSULE, DELAYED RELEASE ORAL at 18:12

## 2018-05-31 RX ADMIN — SODIUM CHLORIDE 1000 ML: 9 INJECTION, SOLUTION INTRAVENOUS at 13:00

## 2018-05-31 RX ADMIN — SUCRALFATE 1 G: 1 SUSPENSION ORAL at 18:12

## 2018-05-31 RX ADMIN — STANDARDIZED SENNA CONCENTRATE AND DOCUSATE SODIUM 2 TABLET: 8.6; 5 TABLET, FILM COATED ORAL at 05:14

## 2018-05-31 RX ADMIN — ACETAMINOPHEN 650 MG: 325 TABLET, FILM COATED ORAL at 05:13

## 2018-05-31 RX ADMIN — OMEGA-3 FATTY ACIDS CAP 1000 MG 2000 MG: 1000 CAP at 05:13

## 2018-05-31 ASSESSMENT — COPD QUESTIONNAIRES
DURING THE PAST 4 WEEKS HOW MUCH DID YOU FEEL SHORT OF BREATH: SOME OF THE TIME
DO YOU EVER COUGH UP ANY MUCUS OR PHLEGM?: NO/ONLY WITH OCCASIONAL COLDS OR INFECTIONS
HAVE YOU SMOKED AT LEAST 100 CIGARETTES IN YOUR ENTIRE LIFE: YES
IN THE PAST 12 MONTHS DO YOU DO LESS THAN YOU USED TO BECAUSE OF YOUR BREATHING PROBLEMS: AGREE
DO YOU EVER COUGH UP ANY MUCUS OR PHLEGM?: NO/ONLY WITH OCCASIONAL COLDS OR INFECTIONS
COPD SCREENING SCORE: 4
COPD SCREENING SCORE: 6
DURING THE PAST 4 WEEKS HOW MUCH DID YOU FEEL SHORT OF BREATH: NONE/LITTLE OF THE TIME
HAVE YOU SMOKED AT LEAST 100 CIGARETTES IN YOUR ENTIRE LIFE: YES

## 2018-05-31 ASSESSMENT — ENCOUNTER SYMPTOMS
BRUISES/BLEEDS EASILY: 0
BLOOD IN STOOL: 0
VOMITING: 0
FEVER: 0
WEAKNESS: 0
HEADACHES: 0
DIAPHORESIS: 0
BLURRED VISION: 0
NAUSEA: 0
CONSTIPATION: 0
HEARTBURN: 0
CLAUDICATION: 0
NECK PAIN: 0
EYE PAIN: 0
DIARRHEA: 0
SPUTUM PRODUCTION: 0
CHILLS: 0
ABDOMINAL PAIN: 1
WHEEZING: 0
SORE THROAT: 0
DEPRESSION: 0
BACK PAIN: 0
DIZZINESS: 0
SENSORY CHANGE: 0
DIZZINESS: 1
FOCAL WEAKNESS: 0
EYE DISCHARGE: 0
HEMOPTYSIS: 0
MYALGIAS: 0
SHORTNESS OF BREATH: 0
COUGH: 0
LOSS OF CONSCIOUSNESS: 0
PALPITATIONS: 0
SPEECH CHANGE: 0

## 2018-05-31 ASSESSMENT — LIFESTYLE VARIABLES
ON A TYPICAL DAY WHEN YOU DRINK ALCOHOL HOW MANY DRINKS DO YOU HAVE: 1
TOTAL SCORE: 0
SUBSTANCE_ABUSE: 0
AVERAGE NUMBER OF DAYS PER WEEK YOU HAVE A DRINK CONTAINING ALCOHOL: 3
HOW MANY TIMES IN THE PAST YEAR HAVE YOU HAD 5 OR MORE DRINKS IN A DAY: 0
HAVE YOU EVER FELT YOU SHOULD CUT DOWN ON YOUR DRINKING: NO
ALCOHOL_USE: YES
EVER FELT BAD OR GUILTY ABOUT YOUR DRINKING: NO
EVER_SMOKED: YES
HAVE PEOPLE ANNOYED YOU BY CRITICIZING YOUR DRINKING: NO
TOTAL SCORE: 0
EVER_SMOKED: YES
CONSUMPTION TOTAL: NEGATIVE
EVER HAD A DRINK FIRST THING IN THE MORNING TO STEADY YOUR NERVES TO GET RID OF A HANGOVER: NO
TOTAL SCORE: 0

## 2018-05-31 ASSESSMENT — PAIN SCALES - GENERAL
PAINLEVEL_OUTOF10: 0
PAINLEVEL_OUTOF10: 5
PAINLEVEL_OUTOF10: 6
PAINLEVEL_OUTOF10: 1
PAINLEVEL_OUTOF10: 4
PAINLEVEL_OUTOF10: 6
PAINLEVEL_OUTOF10: 0
PAINLEVEL_OUTOF10: 3
PAINLEVEL_OUTOF10: 2
PAINLEVEL_OUTOF10: 0

## 2018-05-31 ASSESSMENT — PATIENT HEALTH QUESTIONNAIRE - PHQ9
2. FEELING DOWN, DEPRESSED, IRRITABLE, OR HOPELESS: NOT AT ALL
SUM OF ALL RESPONSES TO PHQ9 QUESTIONS 1 AND 2: 0
1. LITTLE INTEREST OR PLEASURE IN DOING THINGS: NOT AT ALL

## 2018-05-31 NOTE — PROGRESS NOTES
Call received from monitor room  At 1145 stating that pt's HR is tachycardic in the 160s. RN in room at the time getting pt up to the bathroom. Pt denies any new chest pain, or any shortness of breath. Asymptomatic. Pt + void and ambulated back to bed, sitting up talking to her friend.

## 2018-05-31 NOTE — PROGRESS NOTES
2 RN SKIN CHECK  INTACT.   BEHIND LT EAR SMALL INTACT SCAB  CIRCUMFERENTIAL WAIST REDNESS FOR ELASTIC NOTED- BLANCHABLE   BILATERAL DRY HEELS INTACT

## 2018-05-31 NOTE — PROGRESS NOTES
Pt noted to still be tachycardic in the 160s on the monitor at 1219. Pt continues to be sitting up, asymptomatic. RN into monitor room to see tele strips. Pt appears to be in a.fib in the 150s - 160s on the monitor. Pt also noted to have multiple 2 second pauses at this time. MD Quezada on unit and notified of change in pt rhythm. Monitor strips shown to MD Quezada. Per MD, give 15mg of cardizem IV. Order placed and pharmacy called to dispense. Per pharmacy, there is a national shortage of this medication, recommending IV metoprolol instead. MD Quezada in room assessing pt and orders received for IV metoprolol 5mg instead. MD also notified that pt stating this morning that she wishes to be a DNR and has an advance directive saying so. Pt still noted to be a Full code. MD addressing this with pt.

## 2018-05-31 NOTE — DISCHARGE PLANNING
Anticipated Discharge Disposition: Pending medical team collaboration .    Action: LSW responded to Code Blue at 1254. LSW observed pt boyfriend Silvano Hogue in room. Silvano escorted out of room. Hospitalist initiated contact with pt's son Natanael. At time of code pt was DNR.     Barriers to Discharge: None    Plan: Pt transferred to [address]

## 2018-05-31 NOTE — H&P
Hospital Medicine History and Physical    Date of Service  5/30/2018    Chief Complaint  Chief Complaint   Patient presents with   • Chest Pain     pt c/o mid chest pain radiating to right arm started last night. skin pwd. describes pain as shooting/constant and worse during deep inspiration.       History of Presenting Illness  78 y.o. female who presented 5/30/2018 with history of right sided lung cancer metastasized to brain s/p craniotomy, chemo and radiation in 2015 presents with right sided anterior chest pain that awoken her in the night.  She states she awoke this a.m. And noted epigastric pain that felt deep and penetrating.  She takes ASA 81mg po daily, drinks wine daily and was recently on prednisone for increased edema to her brain lesion.  She denies NSAID use, denies hematemesis or melena.  Lives with her .  Followed by Dr. Aguilar, on avastin chemo x 4-5 treatments recently.    Primary Care Physician  Pcp Pt States None    Consultants  none    Code Status  Full code    Review of Systems  Review of Systems   Constitutional: Negative for chills, diaphoresis, fever and malaise/fatigue.   HENT: Negative for congestion and sore throat.    Eyes: Negative for pain and discharge.   Respiratory: Negative for cough, hemoptysis, sputum production, shortness of breath and wheezing.    Cardiovascular: Positive for chest pain. Negative for palpitations, claudication and leg swelling.   Gastrointestinal: Positive for abdominal pain. Negative for blood in stool, constipation, diarrhea, melena, nausea and vomiting.   Genitourinary: Negative for dysuria, frequency and urgency.   Musculoskeletal: Negative for back pain, joint pain, myalgias and neck pain.   Skin: Negative for itching and rash.   Neurological: Negative for dizziness, sensory change, speech change, focal weakness, loss of consciousness, weakness and headaches.   Endo/Heme/Allergies: Does not bruise/bleed easily.   Psychiatric/Behavioral: Negative  "for depression, substance abuse and suicidal ideas.        Past Medical History  Past Medical History:   Diagnosis Date   • Radionecrosis 2018       Surgical History  Past Surgical History:   Procedure Laterality Date   • CRANIOTOMY STEALTH Right 2015    Procedure: CRANIOTOMY STEALTH-right occipital;  Surgeon: Suyapa Schumacher M.D.;  Location: SURGERY [facility];  Service:        Medications  No current facility-administered medications on file prior to encounter.      Current Outpatient Prescriptions on File Prior to Encounter   Medication Sig Dispense Refill   • aspirin (ASA) 81 MG Chew Tab chewable tablet Take 81 mg by mouth every evening.     • levetiracetam (KEPPRA) 500 MG Tab Take 1 Tab by mouth 2 Times a Day. 60 Tab    • Omega-3 Fatty Acids (FISH OIL) 1200 MG Cap Take 1 Cap by mouth every day.     • multivitamin (THERAGRAN) Tab Take 1 Tab by mouth every day.         Family History  Family History   Problem Relation Age of Onset   • Dementia Mother    • Diabetes Mother    • Asthma Mother    • Autoimmune Disease Father      Rheumatoid arthritis       Social History  Social History   Substance Use Topics   • Smoking status: Former Smoker   • Smokeless tobacco: Never Used   • Alcohol use Yes       Allergies  Allergies   Allergen Reactions   • Penicillins Rash and Itching     RXN \"a long time ago\"        Physical Exam  Laboratory   Hemodynamics  Temp (24hrs), Av.9 °C (98.5 °F), Min:36.9 °C (98.5 °F), Max:36.9 °C (98.5 °F)   Temperature: 36.9 °C (98.5 °F)  Pulse  Av.8  Min: 73  Max: 87 Heart Rate (Monitored): 73  Blood Pressure : 153/58, NIBP: 117/50      Respiratory      Respiration: 16, Pulse Oximetry: 93 %, O2 Daily Delivery Respiratory : Silicone Nasal Cannula             Physical Exam   Constitutional: She is oriented to person, place, and time. She appears well-developed and well-nourished. No distress.   HENT:   Head: Normocephalic and atraumatic.   Nose: Nose normal.   Mouth/Throat: " Oropharynx is clear and moist. No oropharyngeal exudate.   Eyes: Conjunctivae and EOM are normal. Pupils are equal, round, and reactive to light. Right eye exhibits no discharge. Left eye exhibits no discharge. No scleral icterus.   Neck: Normal range of motion. Neck supple. No JVD present. No tracheal deviation present. No thyromegaly present.   Cardiovascular: Normal rate, regular rhythm, normal heart sounds and intact distal pulses.  Exam reveals no gallop and no friction rub.    No murmur heard.  Pulmonary/Chest: Effort normal and breath sounds normal. No stridor. No respiratory distress. She has no wheezes. She has no rales. She exhibits no tenderness.   Lungs CTA b/l.   Abdominal: Soft. Bowel sounds are normal. She exhibits no distension and no mass. There is tenderness (epigastric with palpation). There is no rebound and no guarding.   Musculoskeletal: Normal range of motion. She exhibits no edema or tenderness.   Lymphadenopathy:     She has no cervical adenopathy.   Neurological: She is alert and oriented to person, place, and time. No cranial nerve deficit. She exhibits normal muscle tone. Coordination normal.   Skin: Skin is warm and dry. No rash noted. She is not diaphoretic. No erythema.   Psychiatric: She has a normal mood and affect. Her behavior is normal. Judgment and thought content normal.   Nursing note and vitals reviewed.      Recent Labs      05/30/18   1234   WBC  9.7   RBC  4.64   HEMOGLOBIN  15.0   HEMATOCRIT  45.1   MCV  97.2   MCH  32.3   MCHC  33.3*   RDW  46.7   PLATELETCT  217   MPV  8.6*     Recent Labs      05/30/18   1234   SODIUM  139   POTASSIUM  4.3   CHLORIDE  104   CO2  24   GLUCOSE  115*   BUN  17   CREATININE  0.58   CALCIUM  9.9     Recent Labs      05/30/18   1234   ALTSGPT  25   ASTSGOT  21   ALKPHOSPHAT  74   TBILIRUBIN  0.7   LIPASE  21   GLUCOSE  115*     Recent Labs      05/30/18   1234   APTT  27.6   INR  1.00     Recent Labs      05/30/18   1234   BNPBTYPENAT  27          Lab Results   Component Value Date    TROPONINI <0.01 05/30/2018     Urinalysis:    Lab Results  Component Value Date/Time   SPECGRAVITY 1.013 05/15/2018 0840   GLUCOSEUR Negative 05/15/2018 0840   KETONES Negative 05/15/2018 0840   NITRITE Negative 05/15/2018 0840   WBCURINE 0-2 05/15/2018 0840   RBCURINE 2-5 (A) 05/15/2018 0840   BACTERIA Negative 05/15/2018 0840   EPITHELCELL Negative 05/15/2018 0840        Imaging    No pulmonary emboli.  Ill-defined right upper lobe infiltration including nodular infiltration unchanged since previous examination.  Hyperexpanded and emphysematous lungs.  Hepatic steatosis and hepatic cysts.  Calcified nodules within the thyroid gland.     Reading Provider Reading Date   Charles Redd M.D. May 30, 2018        Assessment/Plan     I anticipate this patient is appropriate for observation status at this time.    * Acute alcoholic gastritis without hemorrhage- (present on admission)   Assessment & Plan    Severe epigastric pain with palpation of abdomen, preventing patient from taking deep breath.  2/2 recent prednisone use, daily wine use.  Stopped ASA 81mg daily.  Told to stop alcohol.  prilosec 20mg bid  carafate.  Monitor Hg.  Check occult stool.  MS IV prn severe pain.        Pleuritic chest pain- (present on admission)   Assessment & Plan    Secondary to lung cancer mass and radionecrosis, unchanged amss on CTA chest 7.7mm x 21 mm.  Pain in RUL.        Radionecrosis- (present on admission)   Assessment & Plan    History of to RUL        Malignant neoplasm of upper lobe of right lung (HCC)- (present on admission)   Assessment & Plan    Lung cancer treatment with Dr. Aguilar  Recently on avastin 4-5 treatments.        Metastasis to brain (HCC)- (present on admission)   Assessment & Plan    Had been on recent prednisone            VTE prophylaxis: SCDs.

## 2018-05-31 NOTE — CODE DOCUMENTATION
Pt given 5mg of metoprolol, given over 5 minutes per bedside RN.  Pt then went sinus ritesh with a 13 second pause.  Code called, pads applied, palpable pulse returned.  Upon RRT arrival, MD at bedside, pt awake with pulse.  No medical intervention needed,  returned to bedside.      Prior to pause, MD discussed code status with pt. Pt requested to be made DNR. MD was actively putting in DNR order when pause occurred. No medications or compressions were given.  Pt is now DNR.

## 2018-05-31 NOTE — PROGRESS NOTES
Hospital Medicine Daily Progress Note    Date of Service  5/31/2018    Chief Complaint  78 y.o. female admitted 5/30/2018 with new onset severe R upper chest pain.    Hospital Course    R chest pain better here, no sob, no LSCP; denies epigastric pain today; noted to have afib RVR this am; patient feels a little dizzy but otherwise ok.  Denies known afib prior.      Interval Problem Update  As above    Consultants/Specialty  card    Disposition  home    Review of Systems  Review of Systems   Constitutional: Negative for fever.   HENT: Negative for hearing loss.    Eyes: Negative for blurred vision.   Respiratory: Negative for cough.    Cardiovascular: Positive for chest pain.   Gastrointestinal: Negative for heartburn.   Genitourinary: Negative for dysuria.   Musculoskeletal: Negative for myalgias.   Skin: Negative for rash.   Neurological: Positive for dizziness.   Psychiatric/Behavioral: Negative for depression.        Physical Exam  Blood Pressure : 117/58   Temperature: 36.8 °C (98.2 °F)   Pulse: (!) 120   Respiration: 18   Pulse Oximetry: 94 %     Physical Exam   Constitutional: She is oriented to person, place, and time. No distress.   HENT:   Head: Atraumatic.   Eyes: EOM are normal.   Neck: Neck supple.   Cardiovascular:   irreg irreg   Pulmonary/Chest: Effort normal and breath sounds normal.   Abdominal: Soft. Bowel sounds are normal. She exhibits no distension. There is no tenderness.   Musculoskeletal: She exhibits no edema.   Neurological: She is alert and oriented to person, place, and time.   Skin: Skin is warm.   Psychiatric: Her behavior is normal.       Fluids  No intake or output data in the 24 hours ending 05/31/18 1238    Laboratory  Recent Labs      05/30/18   1234   WBC  9.7   RBC  4.64   HEMOGLOBIN  15.0   HEMATOCRIT  45.1   MCV  97.2   MCH  32.3   MCHC  33.3*   RDW  46.7   PLATELETCT  217   MPV  8.6*     Recent Labs      05/30/18   1234   SODIUM  139   POTASSIUM  4.3   CHLORIDE  104   CO2  24    GLUCOSE  115*   BUN  17   CREATININE  0.58   CALCIUM  9.9     Recent Labs      05/30/18   1234   APTT  27.6   INR  1.00     Recent Labs      05/30/18   1234   BNPBTYPENAT  27           Imaging  CT-CTA CHEST PULMONARY ARTERY W/ RECONS   Final Result      No pulmonary emboli.   Ill-defined right upper lobe infiltration including nodular infiltration unchanged since previous examination.   Hyperexpanded and emphysematous lungs.   Hepatic steatosis and hepatic cysts.   Calcified nodules within the thyroid gland.            DX-CHEST-LIMITED (1 VIEW)   Final Result      Post treatment changes right upper lobe. Lung base atelectasis.           Assessment/Plan  New onset a-fib (HCC)   Assessment & Plan    With RVR, newly dx'ed here; patient denies h/o afib;  Give Metoprolol IV (Cardizem shortage per pharmacy)/Digoxin loading and monitor; discuss anticoag options.  Check TSH/Echo/troponin; Card consult        Acute alcoholic gastritis without hemorrhage- (present on admission)   Assessment & Plan    Not much pain today and tolerated diet; cont PPI  Patient denies excessive alcohol use (drinks about twice/week, 2 drinks at a time)        Pleuritic chest pain- (present on admission)   Assessment & Plan    Secondary to lung cancer mass and radionecrosis, unchanged mass on CTA chest 7.7mm x 21 mm.  Pain in RUL.  Pain better controlled here.  monitor        Radionecrosis- (present on admission)   Assessment & Plan    History of to RUL        Malignant neoplasm of upper lobe of right lung (HCC)- (present on admission)   Assessment & Plan    Lung cancer treatment with Dr. Aguilar  Recently on avastin 4-5 treatments.        Metastasis to brain (HCC)- (present on admission)   Assessment & Plan    Had been on recent prednisone; stable neuro status

## 2018-05-31 NOTE — PROCEDURES
INDICATION: New onset of atrial fibrillation with rapid ventricular response with severe prolonged AV block and pauses.    PROCEDURE: After informed consent was obtained the patient was brought to the cardiac catheterization laboratory where she was prepped, draped and anesthetized in usual manner.  Using ultrasound guidance and a modified Seldinger technique a 6 Hungarian by 10 cm introducer sheath was inserted into the right internal jugular vein.  A 5 Hungarian balloon tip temporary pacemaker wire was carefully advanced to the right ventricular apex.  Pacing threshold 0.7 mA.  The pacemaker and introducer surgery was secured.  The patient tolerated procedure well.  Post placement chest x-ray pending.

## 2018-05-31 NOTE — PROGRESS NOTES
Pt IV flushed and noted to be leaking. IV removed and new IV placed. Metoprolol 5mg given slowly over 6 minutes.

## 2018-05-31 NOTE — PROGRESS NOTES
Report given to ICU RN and cardiology continuing to review strips and pt with anticipated transfer to ICU. Per cardiology, may take pt straight to cath lab for pacemaker.

## 2018-05-31 NOTE — ED NOTES
Received report from day DAMI Murry at bedside. All needs met. Family and patient updated on POC.

## 2018-05-31 NOTE — CARE PLAN
Problem: Safety  Goal: Will remain free from injury  Outcome: PROGRESSING AS EXPECTED  Pt A&O x 4, up SBA with FWW, BA in place.    Problem: Pain Management  Goal: Pain level will decrease to patient's comfort goal  Outcome: PROGRESSING AS EXPECTED  Pain controlled with PRN medications at this time

## 2018-05-31 NOTE — PROGRESS NOTES
Patient developed 13 sec pause on tele around 12:42 pm when given Metoprolol 5 mg IV for afib RVR.  Had brief LOC but regained consciousness spontaneously with cardiac rhythm afib rate of 90's now, sbp 104.  Patient still feel a little dizzy with nausea but denies sob/new chest pain.  External pacer started as patient continues to have recurrent pauses.  I have asked cardiology to consult.  I spoke with patient about her code status:  She now wishes no intubation but ok with CPR/pacer/ACLS meds.  I've spoken with patient' son, Natanael, about her critical condition.  Transfer to ICU.

## 2018-05-31 NOTE — RESPIRATORY CARE
Cardiopulmonary Resuscitation    Events/Summary/Plan: RT x 2 responded to code blue activation.  Patient awake and alert on arrival.  Per report, patient with cardiac pause that did not require compressions.  No ETCO2 required, no RT intervention needed.   (05/31/18 8101)

## 2018-05-31 NOTE — PROGRESS NOTES
Spoke to [facility] cardiology regarding plan for permanent pacemaker. Orders to keep patient NPO until 1800 with hope permanent pacemaker will be put in tonight. If unable to place tonight okay to feed patient until 0100 and then go back to NPO with plans to place permanent pacemaker in AM.

## 2018-05-31 NOTE — PROGRESS NOTES
RN staying in room to monitor pt's BP after IV metoprolol administration. Pt talking to RN and then appearing to have a seizure, eyes open and arms and legs convulsing at 12:40. Call received from monitor room stating pt had a 13 second arrest. Pt responsive after episode and stating that she felt dizzy and tired. MD Quezada outside of room and MD notified. Second RN [place] into room and crash cart grabbed. Pt having multiple episodes of pauses and arrests and pacer pads placed. Code blue called at 1254 due to this. BP cycling at every 5 minutes, monitoring and noted to drop down into the 80s systolic. One litre bolus of saline given per MD Quezada.     Pt noted to have another 17 sec arrest a 1309, followed by a 9.5 sec arrest and multiple 3 sec pauses. Per MD request, external pacer activated. MD discussing code status with pt after this and pt expressing that she would like to continue the pacing and be a DNI only. Cardiology called by MD Quezada and at bedside assessing pt. Plan for transfer to ICU and for a pacemaker placement.

## 2018-05-31 NOTE — PROGRESS NOTES
Pt A&O x 4, pt states pain 4/10 to back, states baseline numbness and tingling to bilateral hands and feet. Up SBA with FWW. Pt on 1L oxygen at this time, monitoring O2 sats. Plan of care discussed and pt resting at this time.

## 2018-05-31 NOTE — CARE PLAN
Problem: Safety  Goal: Will remain free from injury    Intervention: Educate patient and significant other/support system about adaptive mobility strategies and safe transfers  Reviewed use of call bell, had pt return demonstrate, reviewed rationales for calling for assistance

## 2018-06-01 ENCOUNTER — APPOINTMENT (OUTPATIENT)
Dept: RADIOLOGY | Facility: MEDICAL CENTER | Age: 78
DRG: 907 | End: 2018-06-01
Attending: INTERNAL MEDICINE
Payer: MEDICARE

## 2018-06-01 PROBLEM — I49.5 SICK SINUS SYNDROME (HCC): Status: ACTIVE | Noted: 2018-05-31

## 2018-06-01 LAB
ANION GAP SERPL CALC-SCNC: 9 MMOL/L (ref 0–11.9)
BASOPHILS # BLD AUTO: 0.4 % (ref 0–1.8)
BASOPHILS # BLD: 0.03 K/UL (ref 0–0.12)
BUN SERPL-MCNC: 11 MG/DL (ref 8–22)
CALCIUM SERPL-MCNC: 9.3 MG/DL (ref 8.5–10.5)
CHLORIDE SERPL-SCNC: 109 MMOL/L (ref 96–112)
CO2 SERPL-SCNC: 25 MMOL/L (ref 20–33)
CREAT SERPL-MCNC: 0.54 MG/DL (ref 0.5–1.4)
EKG IMPRESSION: NORMAL
EOSINOPHIL # BLD AUTO: 0.22 K/UL (ref 0–0.51)
EOSINOPHIL NFR BLD: 3.2 % (ref 0–6.9)
ERYTHROCYTE [DISTWIDTH] IN BLOOD BY AUTOMATED COUNT: 48.5 FL (ref 35.9–50)
GLUCOSE SERPL-MCNC: 96 MG/DL (ref 65–99)
HCT VFR BLD AUTO: 43.3 % (ref 37–47)
HGB BLD-MCNC: 13.9 G/DL (ref 12–16)
IMM GRANULOCYTES # BLD AUTO: 0.01 K/UL (ref 0–0.11)
IMM GRANULOCYTES NFR BLD AUTO: 0.1 % (ref 0–0.9)
LV EJECT FRACT MOD 2C 99903: 61.1
LV EJECT FRACT MOD 4C 99902: 66.27
LV EJECT FRACT MOD BP 99901: 65.8
LYMPHOCYTES # BLD AUTO: 1.1 K/UL (ref 1–4.8)
LYMPHOCYTES NFR BLD: 16.2 % (ref 22–41)
MCH RBC QN AUTO: 32 PG (ref 27–33)
MCHC RBC AUTO-ENTMCNC: 32.1 G/DL (ref 33.6–35)
MCV RBC AUTO: 99.8 FL (ref 81.4–97.8)
MONOCYTES # BLD AUTO: 0.94 K/UL (ref 0–0.85)
MONOCYTES NFR BLD AUTO: 13.8 % (ref 0–13.4)
NEUTROPHILS # BLD AUTO: 4.49 K/UL (ref 2–7.15)
NEUTROPHILS NFR BLD: 66.3 % (ref 44–72)
NRBC # BLD AUTO: 0 K/UL
NRBC BLD-RTO: 0 /100 WBC
PLATELET # BLD AUTO: 170 K/UL (ref 164–446)
PMV BLD AUTO: 8.9 FL (ref 9–12.9)
POTASSIUM SERPL-SCNC: 4.2 MMOL/L (ref 3.6–5.5)
RBC # BLD AUTO: 4.34 M/UL (ref 4.2–5.4)
SODIUM SERPL-SCNC: 143 MMOL/L (ref 135–145)
WBC # BLD AUTO: 6.8 K/UL (ref 4.8–10.8)

## 2018-06-01 PROCEDURE — 33208 INSRT HEART PM ATRIAL & VENT: CPT

## 2018-06-01 PROCEDURE — 02HK3JZ INSERTION OF PACEMAKER LEAD INTO RIGHT VENTRICLE, PERCUTANEOUS APPROACH: ICD-10-PCS | Performed by: INTERNAL MEDICINE

## 2018-06-01 PROCEDURE — 304853 HCHG PPM TEST CABLE

## 2018-06-01 PROCEDURE — C1892 INTRO/SHEATH,FIXED,PEEL-AWAY: HCPCS

## 2018-06-01 PROCEDURE — 99226 PR SUBSEQUENT OBSERVATION CARE,LEVEL III: CPT | Performed by: INTERNAL MEDICINE

## 2018-06-01 PROCEDURE — 304952 HCHG R 2 PADS

## 2018-06-01 PROCEDURE — 93306 TTE W/DOPPLER COMPLETE: CPT | Mod: 26 | Performed by: INTERNAL MEDICINE

## 2018-06-01 PROCEDURE — A9270 NON-COVERED ITEM OR SERVICE: HCPCS | Performed by: HOSPITALIST

## 2018-06-01 PROCEDURE — 93306 TTE W/DOPPLER COMPLETE: CPT

## 2018-06-01 PROCEDURE — C1898 LEAD, PMKR, OTHER THAN TRANS: HCPCS

## 2018-06-01 PROCEDURE — 80048 BASIC METABOLIC PNL TOTAL CA: CPT

## 2018-06-01 PROCEDURE — 85025 COMPLETE CBC W/AUTO DIFF WBC: CPT

## 2018-06-01 PROCEDURE — C1785 PMKR, DUAL, RATE-RESP: HCPCS

## 2018-06-01 PROCEDURE — A9270 NON-COVERED ITEM OR SERVICE: HCPCS | Performed by: INTERNAL MEDICINE

## 2018-06-01 PROCEDURE — 93010 ELECTROCARDIOGRAM REPORT: CPT | Performed by: INTERNAL MEDICINE

## 2018-06-01 PROCEDURE — 99153 MOD SED SAME PHYS/QHP EA: CPT

## 2018-06-01 PROCEDURE — G0378 HOSPITAL OBSERVATION PER HR: HCPCS

## 2018-06-01 PROCEDURE — 700101 HCHG RX REV CODE 250

## 2018-06-01 PROCEDURE — 71045 X-RAY EXAM CHEST 1 VIEW: CPT

## 2018-06-01 PROCEDURE — 0JH606Z INSERTION OF PACEMAKER, DUAL CHAMBER INTO CHEST SUBCUTANEOUS TISSUE AND FASCIA, OPEN APPROACH: ICD-10-PCS | Performed by: INTERNAL MEDICINE

## 2018-06-01 PROCEDURE — 305387 HCHG SUTURES

## 2018-06-01 PROCEDURE — 700102 HCHG RX REV CODE 250 W/ 637 OVERRIDE(OP): Performed by: HOSPITALIST

## 2018-06-01 PROCEDURE — 700102 HCHG RX REV CODE 250 W/ 637 OVERRIDE(OP): Performed by: INTERNAL MEDICINE

## 2018-06-01 PROCEDURE — 02H63JZ INSERTION OF PACEMAKER LEAD INTO RIGHT ATRIUM, PERCUTANEOUS APPROACH: ICD-10-PCS | Performed by: INTERNAL MEDICINE

## 2018-06-01 PROCEDURE — 700111 HCHG RX REV CODE 636 W/ 250 OVERRIDE (IP)

## 2018-06-01 PROCEDURE — 99152 MOD SED SAME PHYS/QHP 5/>YRS: CPT

## 2018-06-01 PROCEDURE — 93005 ELECTROCARDIOGRAM TRACING: CPT | Performed by: INTERNAL MEDICINE

## 2018-06-01 RX ORDER — DILTIAZEM HYDROCHLORIDE 120 MG/1
120 CAPSULE, COATED, EXTENDED RELEASE ORAL
Status: DISCONTINUED | OUTPATIENT
Start: 2018-06-01 | End: 2018-06-03 | Stop reason: HOSPADM

## 2018-06-01 RX ORDER — LIDOCAINE HYDROCHLORIDE 20 MG/ML
INJECTION, SOLUTION INFILTRATION; PERINEURAL
Status: COMPLETED
Start: 2018-06-01 | End: 2018-06-01

## 2018-06-01 RX ORDER — MIDAZOLAM HYDROCHLORIDE 1 MG/ML
INJECTION INTRAMUSCULAR; INTRAVENOUS
Status: COMPLETED
Start: 2018-06-01 | End: 2018-06-01

## 2018-06-01 RX ORDER — FLECAINIDE ACETATE 100 MG/1
75 TABLET ORAL TWICE DAILY
Status: DISCONTINUED | OUTPATIENT
Start: 2018-06-01 | End: 2018-06-03 | Stop reason: HOSPADM

## 2018-06-01 RX ADMIN — MIDAZOLAM 2 MG: 1 INJECTION INTRAMUSCULAR; INTRAVENOUS at 14:31

## 2018-06-01 RX ADMIN — SUCRALFATE 1 G: 1 SUSPENSION ORAL at 11:37

## 2018-06-01 RX ADMIN — OMEGA-3 FATTY ACIDS CAP 1000 MG 2000 MG: 1000 CAP at 11:36

## 2018-06-01 RX ADMIN — VANCOMYCIN HYDROCHLORIDE 1950 MG: 1 INJECTION, POWDER, LYOPHILIZED, FOR SOLUTION INTRAVENOUS at 14:00

## 2018-06-01 RX ADMIN — THERA TABS 1 TABLET: TAB at 11:36

## 2018-06-01 RX ADMIN — LIDOCAINE HYDROCHLORIDE: 20 INJECTION, SOLUTION INFILTRATION; PERINEURAL at 14:30

## 2018-06-01 RX ADMIN — STANDARDIZED SENNA CONCENTRATE AND DOCUSATE SODIUM 2 TABLET: 8.6; 5 TABLET, FILM COATED ORAL at 19:15

## 2018-06-01 RX ADMIN — DILTIAZEM HYDROCHLORIDE 120 MG: 120 CAPSULE, COATED, EXTENDED RELEASE ORAL at 17:14

## 2018-06-01 RX ADMIN — FENTANYL CITRATE 100 MCG: 50 INJECTION, SOLUTION INTRAMUSCULAR; INTRAVENOUS at 14:31

## 2018-06-01 RX ADMIN — OMEPRAZOLE 20 MG: 20 CAPSULE, DELAYED RELEASE ORAL at 11:37

## 2018-06-01 RX ADMIN — SUCRALFATE 1 G: 1 SUSPENSION ORAL at 00:12

## 2018-06-01 RX ADMIN — LEVETIRACETAM 500 MG: 500 TABLET ORAL at 11:37

## 2018-06-01 RX ADMIN — ACETAMINOPHEN 650 MG: 325 TABLET, FILM COATED ORAL at 19:15

## 2018-06-01 RX ADMIN — FLECAINIDE ACETATE 75 MG: 150 TABLET ORAL at 17:14

## 2018-06-01 RX ADMIN — SUCRALFATE 1 G: 1 SUSPENSION ORAL at 17:13

## 2018-06-01 RX ADMIN — LEVETIRACETAM 500 MG: 500 TABLET ORAL at 19:15

## 2018-06-01 RX ADMIN — OMEPRAZOLE 20 MG: 20 CAPSULE, DELAYED RELEASE ORAL at 19:15

## 2018-06-01 ASSESSMENT — PAIN SCALES - GENERAL
PAINLEVEL_OUTOF10: 0
PAINLEVEL_OUTOF10: 3
PAINLEVEL_OUTOF10: 1
PAINLEVEL_OUTOF10: 0
PAINLEVEL_OUTOF10: 1
PAINLEVEL_OUTOF10: 0
PAINLEVEL_OUTOF10: 2
PAINLEVEL_OUTOF10: 1
PAINLEVEL_OUTOF10: 5
PAINLEVEL_OUTOF10: 2
PAINLEVEL_OUTOF10: 2
PAINLEVEL_OUTOF10: 5
PAINLEVEL_OUTOF10: 1

## 2018-06-01 ASSESSMENT — ENCOUNTER SYMPTOMS
VOMITING: 0
HEADACHES: 0
DEPRESSION: 0
CONSTIPATION: 0
PALPITATIONS: 0
DIZZINESS: 0
WEAKNESS: 0
COUGH: 0
FEVER: 0
DIARRHEA: 0
NAUSEA: 0
STRIDOR: 0
SPUTUM PRODUCTION: 0
MYALGIAS: 0
TINGLING: 0
CHILLS: 0
FALLS: 0
SHORTNESS OF BREATH: 0
ABDOMINAL PAIN: 0
LOSS OF CONSCIOUSNESS: 0

## 2018-06-01 NOTE — PROGRESS NOTES
[facility] [place]     Name Ml Chavira     1940   Age/Sex 78 y.o. female   MRN [id number]         Chief complaint/ reason for interval visit (Primary Diagnosis)   Afib/heart pauses   78 year old female with hx of lung cancer came with chest pain, later she developed Afib and pauses 17sec.     Interval Problem Daily Status Update     - SR last night with some pacing    - Permanent pacemaker to be done today.       Review of Systems   All other systems reviewed and are negative.      Quality Measures  Quality-Core Measures        Physical Exam       Vitals:    18 0800 18 0900 18 1000 18 1100   BP:       Pulse: 75 72 72 73   Resp: (!)    Temp: 36.1 °C (96.9 °F)  36.1 °C (97 °F)    TempSrc:       SpO2: 98% 97% 98% 97%   Weight:       Height:         Body mass index is 25.48 kg/m². Weight: 63.2 kg (139 lb 5.3 oz)  Oxygen Therapy:  Pulse Oximetry: 97 %, O2 (LPM): 2, O2 Delivery: Silicone Nasal Cannula    Physical Exam   Constitutional: No distress.   Neck: No JVD present.   Cardiovascular: Normal rate.    No murmur heard.  Pulmonary/Chest: No respiratory distress. She has no wheezes. She has no rales.   Abdominal: She exhibits no distension. There is no tenderness.   Musculoskeletal: She exhibits no edema.   Skin: No rash noted. No erythema.         Lab Data Review:      2018  2:28 PM    Recent Labs      18   1234  18   0425   SODIUM  139  143   POTASSIUM  4.3  4.2   CHLORIDE  104  109   CO2  24  25   BUN  17  11   CREATININE  0.58  0.54   CALCIUM  9.9  9.3       Recent Labs      18   1234  18   0425   ALTSGPT  25   --    ASTSGOT  21   --    ALKPHOSPHAT  74   --    TBILIRUBIN  0.7   --    LIPASE  21   --    GLUCOSE  115*  96       Recent Labs      18   1234  18   0425   RBC  4.64  4.34   HEMOGLOBIN  15.0  13.9   HEMATOCRIT  45.1  43.3   PLATELETCT  217  170   PROTHROMBTM  12.9   --    APTT   27.6   --    INR  1.00   --        Recent Labs      05/30/18   1234  06/01/18   0425   WBC  9.7  6.8   NEUTSPOLYS  80.60*  66.30   LYMPHOCYTES  9.40*  16.20*   MONOCYTES  9.00  13.80*   EOSINOPHILS  0.30  3.20   BASOPHILS  0.30  0.40   ASTSGOT  21   --    ALTSGPT  25   --    ALKPHOSPHAT  74   --    TBILIRUBIN  0.7   --            Assessment/Plan     78 year old female with hx of lung cancer she came with chest pain and developed Afib with RVR with pause:     # New Afib/heart pauses:   - no hx of Afib but she has some episodes of dizziness or lightheadedness.   - Puases 13 to 16 sec of pause.   - permanent  pacemaker to be done today by EP   - could be related to radiation therapy before.    - [id number]: 3 and HAS-BLE os 2 but since she has metastasis to brain and hx of brain surgery that may put her on higher risk.    - Discuss about anticoagulation risk/benefit after the pacemaker.    - new echo is pending         # Chest pain:   - chest wall pain likely related to lung mass(pleuritic chest pain)   - CTA: no PE   - trop normal.    - pain management.    - PPI        # Lung cancer:   - was treated with chemo and radiation    - Recently on avastin 4-5 treatments.   - mets to brain with hx of seizure.       Thank You for this consult, we will continue following the patient.

## 2018-06-01 NOTE — PROGRESS NOTES
Dr. Maldonado with [facility] cardiology paged. Patient not on cath lab schedule for permanent pacemaker placement. Orders last night from [facility] cards to keep patient NPO for permanent pacemaker placement today.

## 2018-06-01 NOTE — PROGRESS NOTES
Tele summary  Rhythm: SR. Partially paced  Rate: 60's-80's  DC: 0.12  QRS: 0.08  QT: 0.36    Ectopy: Frequent PVC, coup, trip. Up to approx 7 beats of VT. Pt asymptomatic     Telemetry strips placed in pt chart.

## 2018-06-01 NOTE — PROGRESS NOTES
[facility]    Date of Service: 2018    Chief Complaint  78 y.o. female admitted 2018 with chest pain.    Interval Problem Update  Patient had improvement in her chest pain then went into atrial fibrillation with rapid ventricular rate.  Patient then noted to be bradycardic.  Patient had been experiencing dizziness for weeks.  Cardiologist saw the patient and expressed the need for pacemaker placement.  Patient does have a history of lung cancer that metastasized to the brain, status post craniotomy chemo and radiation in .  Patient seen and examined in the ICU, ICU care given.  Discussed patient condition and plan with Intensivist, RN, RT and charge nurse / hot rounds.    Yesterday noted pauses after iv metoprolol  Temporary pacer placed  No overnight events  NSR to partially paced  Afebrile  SBP stable  Adequate uop  Having bm    Consultants/Specialty  Cardiology    Disposition  Patient requires additional treatment in the hospital, we'll likely be able to go home at time of discharge        Review of Systems   Constitutional: Negative for chills, fever and malaise/fatigue.   HENT: Negative for congestion.    Respiratory: Negative for cough, sputum production, shortness of breath and stridor.    Cardiovascular: Positive for chest pain. Negative for palpitations and leg swelling.   Gastrointestinal: Negative for abdominal pain, constipation, diarrhea, nausea and vomiting.   Genitourinary: Negative for dysuria and urgency.   Musculoskeletal: Negative for falls and myalgias.   Neurological: Negative for dizziness, tingling, loss of consciousness, weakness and headaches.   Psychiatric/Behavioral: Negative for depression and suicidal ideas.   All other systems reviewed and are negative.     Physical Exam  Laboratory/Imaging   Hemodynamics  Temp (24hrs), Av.4 °C (97.5 °F), Min:35.8 °C (96.5 °F), Max:37 °C (98.6 °F)   Temperature: 36.1 °C (97 °F)  Pulse  Av.3  Min: 60  Max: 126  Heart Rate (Monitored): 71  Blood Pressure : 117/58, NIBP: 114/52      Respiratory      Respiration: 16, Pulse Oximetry: 98 %     Work Of Breathing / Effort: Mild  RUL Breath Sounds: Clear, RML Breath Sounds: Clear, RLL Breath Sounds: Diminished, WILLIAMS Breath Sounds: Clear, LLL Breath Sounds: Diminished    Fluids    Intake/Output Summary (Last 24 hours) at 06/01/18 0727  Last data filed at 06/01/18 0400   Gross per 24 hour   Intake              420 ml   Output              650 ml   Net             -230 ml       Nutrition  Orders Placed This Encounter   Procedures   • DIET NPO     Standing Status:   Standing     Number of Occurrences:   8     Order Specific Question:   Restrict to:     Answer:   Strict [1]     Physical Exam   Constitutional: She is oriented to person, place, and time. She appears well-developed. No distress.   HENT:   Head: Normocephalic and atraumatic.   Mouth/Throat: Oropharynx is clear and moist. No oropharyngeal exudate.   Eyes: Right eye exhibits no discharge. Left eye exhibits no discharge.   Neck: Neck supple. No tracheal deviation present.   Cardiovascular: Normal rate and regular rhythm.  Exam reveals no gallop and no friction rub.    No murmur heard.  Pulmonary/Chest: Effort normal and breath sounds normal. No stridor. No respiratory distress. She has no wheezes. She has no rales. She exhibits no tenderness.   Abdominal: Soft. Bowel sounds are normal. She exhibits no distension. There is no tenderness.   Musculoskeletal: Normal range of motion. She exhibits no edema or tenderness.   Lymphadenopathy:     She has no cervical adenopathy.   Neurological: She is alert and oriented to person, place, and time. No cranial nerve deficit.   Skin: Skin is warm and dry. No rash noted. She is not diaphoretic. No erythema.   Psychiatric: She has a normal mood and affect. Her behavior is normal. Judgment and thought content normal.   Nursing note and vitals reviewed.      Recent Labs      05/30/18   1234   06/01/18   0425   WBC  9.7  6.8   RBC  4.64  4.34   HEMOGLOBIN  15.0  13.9   HEMATOCRIT  45.1  43.3   MCV  97.2  99.8*   MCH  32.3  32.0   MCHC  33.3*  32.1*   RDW  46.7  48.5   PLATELETCT  217  170   MPV  8.6*  8.9*     Recent Labs      05/30/18   1234  06/01/18   0425   SODIUM  139  143   POTASSIUM  4.3  4.2   CHLORIDE  104  109   CO2  24  25   GLUCOSE  115*  96   BUN  17  11   CREATININE  0.58  0.54   CALCIUM  9.9  9.3     Recent Labs      05/30/18   1234   APTT  27.6   INR  1.00     Recent Labs      05/30/18   1234   BNPBTYPENAT  27              Assessment/Plan     Sick sinus syndrome (HCC)   Assessment & Plan    - New diagnosis, was newly diagnosed with atrial fibrillation during this hospital stay, was in RVR  - Then became bradycardic, now with sick sinus syndrome, deemed to need a pacemaker by cardiology  - Discuss case with Dr. Trejo, EP, for pacemaker today        Acute alcoholic gastritis without hemorrhage- (present on admission)   Assessment & Plan    - Abdominal pain improved with PPI, continue        Pleuritic chest pain- (present on admission)   Assessment & Plan    - Possibly due to gastritis, possibly due to lung cancer mass and radiation changes   - Pain currently resolved         Radionecrosis- (present on admission)   Assessment & Plan    - Postradiation, chronic        Malignant neoplasm of upper lobe of right lung (HCC)- (present on admission)   Assessment & Plan    - Continue outpatient follow-up        Metastasis to brain (HCC)- (present on admission)   Assessment & Plan    - No neuro changes, continue outpatient follow-up          Quality-Core Measures   Reviewed items::  Labs reviewed, Medications reviewed and Radiology images reviewed  Ulcer Prophylaxis::  Yes

## 2018-06-01 NOTE — CARE PLAN
Problem: Safety  Goal: Will remain free from falls  Outcome: PROGRESSING AS EXPECTED  High fall risk precautions in place. Pt encouraged to use call light before getting out of bed. Pt verbalized understanding and able to return demonstrate how to use call light.     Problem: Infection  Goal: Will remain free from infection  Outcome: PROGRESSING AS EXPECTED  Standard precautions in place with every patient interaction. Frequent handwashing and foaming utilized to prevent spread of infection to patient or staff members.

## 2018-06-01 NOTE — PROGRESS NOTES
Patient with Tachy/ritesh with PAF and significant pauses on conversion.  Lung Cancer with brain met.  Functional status good.  The risks, benefits, and alternatives to permanent pacemaker placement were discussed in great detail.  Specific risks mentioned to the patient including bleeding, cardiac perforation with possible tamponade possibly requiring pericardiocentesis or open heart surgery.  In addition the possibility of lead dislodgment (2-3%), pneumothorax (3%), hemothorax, infection were discussed.  Also, mentioned were the risk of death, stroke, and myocardial infarction.  The patient verbalized understanding of the potential complications and wishes to proceed with the procedure.

## 2018-06-01 NOTE — PROGRESS NOTES
Cath called to check in on status of pacemaker placement today. Dr. Trejo has not heard from [facility] cardiology per cath lab. Paged [facility] cardiology to confirm placement and referral to Dr. Trejo.

## 2018-06-01 NOTE — CONSULTS
Cardiology Consult Note:    Paul Nava  Date & Time note created:    5/31/2018   5:18 PM     Referring MD:  Dr. Galindo    Patient ID:   Name:             Ml Chavira   YOB: 1940  Age:                 78 y.o.  female   MRN:               [id number]                                                             Chief Complaint / Reason for consult:  New Afib     History of Present Illness:    78 year old female with hx of lung cancer came with chest pain.  The patient has lung cancer with mets to brain and treated with chemo and radiation, came with chest wall pain on the R side constant pain  Increases with breathing or pressure, CT chest showed no PE and no changes on her RUL mass, the patient was admitted to [facility] for that pain and next day she developed Afib with RVR she was given IV metoprolol and after that she was appearing to have seizure and and then she had episodes of dizziness and foggy, monitor showed multiple episodes  of pauses for 13 seconds, 17 sec and 9.5 sec the patient was given atropine and pacing was put oin chest and the was transferred to the lab for temporarily pacemaker.   The patient denies any palpitation or hx of Afib or hx of CAD, but she does have some episodes of dizziness and lightheadedness.   She states she has some SOB but no hemoptysis and no coughing no fever or chills, she quit smoking more than 15 years and she does drink every other day not more than 2 beers or glass of wine, she lives with ehr  and she is able to do ADls by herself.       Review of Systems:      Constitutional: Denies fevers, Denies weight changes  Eyes: Denies changes in vision, no eye pain  Ears/Nose/Throat/Mouth: Denies nasal congestion or sore throat   Cardiovascular: yes chest pain, yes palpitations   Respiratory: yes shortness of breath , Denies cough  Gastrointestinal/Hepatic: Denies abdominal pain, nausea, vomiting, diarrhea, constipation or GI bleeding   Genitourinary:  Denies dysuria or frequency  Musculoskeletal/Rheum: Denies  joint pain and swelling   Skin: Denies rash  Neurological: Denies headache, confusion, memory loss or focal weakness/parasthesias  Psychiatric: denies mood disorder   Endocrine: Estee thyroid problems  Heme/Oncology/Lymph Nodes: Denies enlarged lymph nodes, denies brusing or known bleeding disorder  All other systems were reviewed and are negative (AMA/CMS criteria)                Past Medical History:   Past Medical History:   Diagnosis Date   • Radionecrosis 4/14/2018     Active Hospital Problems    Diagnosis   • New onset a-fib (HCC) [I48.91]     Priority: High   • Acute alcoholic gastritis without hemorrhage [K29.20]     Priority: Medium   • Pleuritic chest pain [R07.81]   • Radionecrosis [L59.8, Y84.2]   • Malignant neoplasm of upper lobe of right lung (HCC) [C34.11]   • Metastasis to brain (HCC) [C79.31]       Past Surgical History:  Past Surgical History:   Procedure Laterality Date   • CRANIOTOMY STEALTH Right 11/23/2015    Procedure: CRANIOTOMY STEALTH-right occipital;  Surgeon: Suyapa Schumacher M.D.;  Location: SURGERY [facility];  Service:        Hospital Medications:    Current Facility-Administered Medications:   •  levETIRAcetam (KEPPRA) tablet 500 mg, 500 mg, Oral, BID, Alysha Pablo M.D.  •  fish oil capsule 2,000 mg, 2,000 mg, Oral, DAILY, Alysha Pablo M.D., 2,000 mg at 05/31/18 0513  •  multivitamin (THERAGRAN) tablet 1 Tab, 1 Tab, Oral, DAILY, Alysha Pablo M.D., 1 Tab at 05/31/18 0514  •  senna-docusate (PERICOLACE or SENOKOT S) 8.6-50 MG per tablet 2 Tab, 2 Tab, Oral, BID, 2 Tab at 05/31/18 0514 **AND** polyethylene glycol/lytes (MIRALAX) PACKET 1 Packet, 1 Packet, Oral, QDAY PRN **AND** magnesium hydroxide (MILK OF MAGNESIA) suspension 30 mL, 30 mL, Oral, QDAY PRN **AND** bisacodyl (DULCOLAX) suppository 10 mg, 10 mg, Rectal, QDAY PRN, Alysha Pablo M.D.  •  Respiratory Care per Protocol, , Nebulization, Continuous RT, Alysha  "JESUS Pablo  •  ondansetron (ZOFRAN) syringe/vial injection 4 mg, 4 mg, Intravenous, Q4HRS PRN, Alysha Pablo M.D.  •  ondansetron (ZOFRAN ODT) dispertab 4 mg, 4 mg, Oral, Q4HRS PRN, Alysha Pablo M.D.  •  acetaminophen (TYLENOL) tablet 650 mg, 650 mg, Oral, Q6HRS PRN, Alysha Pablo M.D., 650 mg at 05/31/18 1136  •  morphine (pf) 4 mg/ml injection 2-5 mg, 2-5 mg, Intravenous, Q4HRS PRN, Alysha Pablo M.D.  •  omeprazole (PRILOSEC) capsule 20 mg, 20 mg, Oral, BID, Alysha Pablo M.D., 20 mg at 05/31/18 0513  •  sucralfate (CARAFATE) 1 GM/10ML suspension 1 g, 1 g, Oral, Q6HRS, Alysha Pablo M.D., 1 g at 05/31/18 1136    Current Outpatient Medications:  Prescriptions Prior to Admission   Medication Sig Dispense Refill Last Dose   • aspirin (ASA) 81 MG Chew Tab chewable tablet Take 81 mg by mouth every evening.   5/29/2018 at pm   • levetiracetam (KEPPRA) 500 MG Tab Take 1 Tab by mouth 2 Times a Day. 60 Tab  5/24/2018 at STOPPED   • Omega-3 Fatty Acids (FISH OIL) 1200 MG Cap Take 1 Cap by mouth every day.   5/29/2018 at am   • multivitamin (THERAGRAN) Tab Take 1 Tab by mouth every day.   5/29/2018 at am       Medication Allergy:  Allergies   Allergen Reactions   • Penicillins Rash and Itching     RXN \"a long time ago\"       Family History:  Family History   Problem Relation Age of Onset   • Dementia Mother    • Diabetes Mother    • Asthma Mother    • Autoimmune Disease Father      Rheumatoid arthritis       Social History:  Social History     Social History   • Marital status:      Spouse name: N/A   • Number of children: N/A   • Years of education: N/A     Occupational History   • Not on file.     Social History Main Topics   • Smoking status: Former Smoker   • Smokeless tobacco: Never Used   • Alcohol use Yes   • Drug use: No   • Sexual activity: Not on file     Other Topics Concern   • Not on file     Social History Narrative   • No narrative on file         Physical Exam:  Vitals/ General Appearance: " "  Weight/BMI: Body mass index is 26.17 kg/m².  Blood pressure 117/58, pulse (!) 120, temperature 35.8 °C (96.5 °F), resp. rate 18, height 1.575 m (5' 2\"), weight 64.9 kg (143 lb 1.3 oz), SpO2 94 %, not currently breastfeeding.  Vitals:    05/31/18 0400 05/31/18 0740 05/31/18 1203 05/31/18 1530   BP: (!) 99/46 (!) 95/48 117/58    Pulse: 82 72 (!) 120    Resp: 16 18 18    Temp: 36.7 °C (98.1 °F) 36.7 °C (98 °F) 36.8 °C (98.2 °F) 35.8 °C (96.5 °F)   TempSrc:       SpO2: 90% 90% 94%    Weight:    64.9 kg (143 lb 1.3 oz)   Height:         Oxygen Therapy:  Pulse Oximetry: 94 %, O2 (LPM): 1, O2 Delivery: Silicone Nasal Cannula    Constitutional:   Well developed, Well nourished, No acute distress  HENMT:  Normocephalic, Atraumatic, Oropharynx moist mucous membranes, No oral exudates, Nose normal.  No thyromegaly.  Eyes:  EOMI, Conjunctiva normal, No discharge.  Neck:  Normal range of motion, No cervical tenderness,  no JVD.  Cardiovascular:  irregular heart rate, Normal rhythm, No murmurs, No rubs, No gallops.   Extremitites with intact distal pulses, no cyanosis, or edema.  Lungs:  Normal breath sounds, breath sounds clear to auscultation bilaterally,  no rales, no rhonchi, no wheezing.   Abdomen: Bowel sounds normal, Soft, No tenderness, No guarding, No rebound, No masses, No hepatosplenomegaly.  Skin: Warm, Dry, No erythema, No rash, no induration.  Neurologic: Alert & oriented x 3, No focal deficits noted, cranial nerves II through X are intact.  Psychiatric: Affect normal, Judgment normal, Mood normal.      MDM (Data Review):     Records reviewed and summarized in current documentation    Lab Data Review:  Recent Results (from the past 24 hour(s))   EKG    Collection Time: 05/31/18 12:19 PM   Result Value Ref Range    Report       [facility]    Test Date:  2018-05-31  Pt Name:    SIMRAN BLAS            Department: 171  MRN:        [id number]                      Room:       T633  Gender:     Female           "             Technician: [facility]  :        1940                   Requested By:ISSAC ANGELO  Order #:    [id number]                    Reading MD: Effie Wilson MD    Measurements  Intervals                                Axis  Rate:       145                          P:  KY:                                      QRS:        25  QRSD:       76                           T:          16  QT:         280  QTc:        435    Interpretive Statements  ATRIAL FIBRILLATION, V-RATE    BASELINE WANDER IN LEAD(S) V4,V5  Compared to ECG 2018 11:32:28  Sinus rhythm no longer present    Electronically Signed On 2018 15:56:26 PDT by Effie Wilson MD     EKG    Collection Time: 18 12:50 PM   Result Value Ref Range    Report       [facility]    Test Date:  2018  Pt Name:    SIMRAN BLAS            Department: 161  MRN:        [id number]                      Room:       33  Gender:     Female                       Technician: PERCY  :        1940                   Requested By:MARION YANEZ  Order #:    [id number]                    Reading MD: Effie Wilson MD    Measurements  Intervals                                Axis  Rate:       101                          P:  KY:                                      QRS:        41  QRSD:       72                           T:          38  QT:         332  QTc:        431    Interpretive Statements  ATRIAL FIBRILLATION, V-RATE    PROBABLE POSTERIOR INFARCT  Compared to ECG 2018 12:19:18  Myocardial infarct finding now present    Electronically Signed On 2018 16:00:54 PDT by Effie Wilson MD     EKG    Collection Time: 18  3:35 PM   Result Value Ref Range    Report       [facility]    Test Date:  2018  Pt Name:    SIMRAN ROOPA            Department: 161  MRN:        [id number]                      Room:       33  Gender:     Female                       Technician: ROBBIE  :         1940                   Requested By:MARION YANEZ  Order #:    [id number]                    Reading MD: Effie Wilson MD    Measurements  Intervals                                Axis  Rate:       78                           P:          68  ME:         132                          QRS:        31  QRSD:       68                           T:          17  QT:         316  QTc:        360    Interpretive Statements  VENTRICULAR-PACED COMPLEXES  UNDERLYING ATRIAL FIBRILLATION SEEN  BORDERLINE LOW VOLTAGE IN FRONTAL LEADS      Electronically Signed On 5- 16:00:13 PDT by Effie Wilson MD         Imaging/Procedures Review:    Chest Xray:  Reviewed    EKG:   As in HPI.     MDM (Assessment and Plan):     Active Hospital Problems    Diagnosis   • New onset a-fib (HCC) [I48.91]     Priority: High   • Acute alcoholic gastritis without hemorrhage [K29.20]     Priority: Medium   • Pleuritic chest pain [R07.81]   • Radionecrosis [L59.8, Y84.2]   • Malignant neoplasm of upper lobe of right lung (HCC) [C34.11]   • Metastasis to brain (HCC) [C79.31]       78 year old female with hx of lung cancer she came with chest pain and developed Afib with RVR with pause:    # New Afib:  - no hx of Afib but she has some episodes of dizziness or lightheadedness.  - she has some pause before giving BB but after administration BB she has 13 to 16 sec of pause.  - TSH: normal   - temperate pacemaker inserted.  - echo is pending  - could be related to radiation therapy before.   - [id number]: 3 and HAS-BLE os 2 but since she has metastasis to brain and hx of brain surgery that may put her on higher risk.   - Most likely the patient will need permanent pacemaker, we will keep her NPO and EP on board.   - Discuss about anticoagulation risk/benefit after the pacemaker.       # Chest pain:  - chest wall pain likely related to lung mass(pleuritic chest pain)  - CTA: no PE  - trop normal.   - pain management.   - PPI      # Lung  cancer:  - was treated with chemo and radiation   - Recently on avastin 4-5 treatments.  - mets to brain with hx of seizure.      Thank You for this consult, we will continue following the patient.

## 2018-06-01 NOTE — PROGRESS NOTES
Dr. Nava with [facility] cardiology present at bedside. Updated on overnight events. Will discuss with team and EP about pacemaker today. Orders to keep patient NPO at this time.

## 2018-06-01 NOTE — OP REPORT
Electrophysiology Procedure Note  [facility]    PROCEDURE PERFORMED: DDDR PPM, moderate sedation administered by CRNA and supervised by physician    : Hansel Trejo MD    ASSISTANT: none    ANESTHESIA: Moderate sedation,  start time 14:11, stop time 14:47  the moderate sedation document has been reviewed, signed and scanned into media     EBL: 30 cc    SPECIMENS: None    INDICATION: Tachy ritesh with pauses and a fib    PRE PROCEDURE ECG: SR    POST PROCEDURE ECG: SR     COMPLICATIONS:  none     DESCRIPTION OF PROCEDURE:  After informed written consent, the patient was brought to the electrophysiology lab in the fasting, unsedated state. The patient was prepped and draped in the usual sterile fashion. The procedure was performed under moderate sedation with local anesthetic.   A left infraclavicular incision was made with a scalpel and the pectoral device pocket was created using a combination of blunt dissection and electrocautery. The modified Seldinger technique was used to gain access to the left axillary vein. A peel-away hemostasis sheath was placed in the vein. Under fluoroscopic guidance, the pacemaker leads were introduced into the heart. The ventricular lead was advanced to the RVOT and then lowered into position at the RV apex. The atrial lead was positioned in the Right atrial appendage. The leads were tested and had satisfactory sensing and pacing parameters. High output ventricular pacing did not produce extracardiac stimulation. The leads were sutured to the underlying pectoral muscle with interrupted non absorbable suture over a silastic suture sleeve. The device pocket was irrigated with antibiotic solution, inspected, and no bleeding was seen. The leads were connected to the DDDR pulse generator and the device was inserted into the pocket The wound was closed with three layers of absorbable sutures.  I personally supervised the administration of moderate sedation by the  CRNA and observed the level of consciousness and physiologic status throughout the procedure.   Following recovery from sedation, the patient was transferred to a monitored bed in good condition.     IMPLANTED DEVICE INFORMATION:  Pulse generator is a Medtronic model A2DR01  Serial # [id number]    LEAD INFORMATION:  1)Right atrial lead is a Medtronic model # 5076-45 , serial # [id number] ,P wave 6 millivolts, threshold 2 Volts, pacing impedance 1007 Ohms.    2)Right ventricular lead is a Medtronic model # 5076-52 , serial # [id number] ,R wave 9 millivolts, threshold 1159 Volts, pacing impedance 0.75 Ohms.    DEVICE PROGRAMMING:  AAIR to DDDR    FLUOROSCOPY TIME: 1.7 min    IMPRESSIONS:  1. Successful Dual chamber PPM implantation    RECOMMENDATIONS:  1. Transfer to monitored bed  2. PA and lateral chest x-ray  3. Device interrogation prior to hospital discharge  4. Followup in device clinic

## 2018-06-01 NOTE — CONSULTS
DATE OF SERVICE:  06/01/2018    ELECTROPHYSIOLOGY CONSULTATION    REFERRING PHYSICIAN:  Dr IKE Delarosa    REASON FOR REFERRAL:  Syncope, tachybrady syndrome, paroxysmal atrial   fibrillation with significant pauses.    HISTORY OF PRESENT ILLNESS:  This is a pleasant 78-year-old white female with   history of metastatic right lung cancer with mets to the right occipital lobe   diagnosed in 2015, treated with chemotherapy and radiation and in a fairly   quiescent state.  She came in with some chest pain, some epigastric pain and   also had some dizziness and possible near passing out and was noted to have   atrial fibrillation with rapid ventricular response with significant pauses   greater than 8 seconds.    PAST MEDICAL HISTORY:  Includes the lung cancer with mets to the brain,   history of breast cancer, status post lumpectomy on the left side.  She had a   craniotomy in the past.    SOCIAL HISTORY:  Former smoker.  Some alcohol use.    ALLERGIES:  TO PENICILLIN.    FAMILY HISTORY:  Some dementia in the  mother.    OUTPATIENT MEDICATIONS:  Include multivitamin, omega-3, Keppra and aspirin.    REVIEW OF SYSTEMS:  CONSTITUTIONAL:  Negative.  ENT:  Negative for congestion or sore throat.  EYES:  Negative for pain.  RESPIRATORY:  Negative for cough.  CARDIOVASCULAR:  With the chest pain.  GASTROINTESTINAL:  Some abdominal pain, no blood in the stool, no   constipation.  GENITOURINARY:  Negative.  MUSCULOSKELETAL:  Negative.  Rest of review of systems negative.    PHYSICAL EXAMINATION:  VITAL SIGNS:  Blood pressure is 117/50, pulse is 93, respirations 16.  CONSTITUTIONAL:  She is alert and oriented times person and time.  HEENT:  Normocephalic, atraumatic.  Nose is normal.  Mouth and throat:    Oropharynx is clear.  Eyes:  Extraocular movements are intact.  NECK:  No JVD noted.  CARDIOVASCULAR:  Regular rate and rhythm.  Normal S1, S2 without murmurs or   gallops.  PULMONARY:  Normal breath sounds.  ABDOMEN:  Soft,  nontender.  NEUROLOGIC:  Alert and oriented x3.  Mood and affect are appropriate.  PSYCHIATRIC:  Normal mood.  SKIN:  Turgor is normal.    LABORATORY DATA:  Labs show normal CBC.  Chemistries unremarkable.  Troponin   0.01.  BNP of 27.  INR 1.    IMAGING:  Did have a CT scan of the lungs, ill-defined right upper lobe   infiltrate and nodular infiltration unchanged.  Chest x-ray, left basilar   atelectasis.  Echocardiogram from 11/30/2017 unremarkable.  PA pressures of   30, EF of 70%.  EKGs show atrial fibrillation with RVR with some EKG showing   sinus rhythm.  Rhythm strips show atrial fibrillation with significant pauses.    ASSESSMENT AND PLAN:  1.  Tachybrady syndrome with paroxysmal atrial fibrillation with significant   pauses in a patient with metastatic lung cancer with metastases to the brain,   but excellent functional status and good quality of life.  It had been   recommended by the cardiology service placement of permanent pacemaker.  I   agree with this.  Risks and benefits of placement of permanent pacemaker   explained to patient.  Patient understands and elected to proceed with the   procedure.  2.  Metastatic lung cancer.  3.  History of breast cancer.  4.  Atrial fibrillation.  We will start flecainide and diltiazem following   placement of permanent pacemaker.  Anticoagulation should be considered, but   may be too high risk with her history of brain metastases.       ____________________________________     MD JULIEN GASTON / NTS    DD:  06/01/2018 15:26:41  DT:  06/01/2018 16:46:46    D#:  [id number]  Job#:  [id number]

## 2018-06-01 NOTE — PROGRESS NOTES
Dr. Sahu called back. Updated him that per cath lab Dr. Trejo was awaiting final verdict. Because [facility] cards and patient agree to pacemaker cath lab is putting patient on transport for placement of pacemaker.

## 2018-06-02 ENCOUNTER — APPOINTMENT (OUTPATIENT)
Dept: RADIOLOGY | Facility: MEDICAL CENTER | Age: 78
DRG: 907 | End: 2018-06-02
Attending: INTERNAL MEDICINE
Payer: MEDICARE

## 2018-06-02 LAB — EKG IMPRESSION: NORMAL

## 2018-06-02 PROCEDURE — 71045 X-RAY EXAM CHEST 1 VIEW: CPT

## 2018-06-02 PROCEDURE — 700111 HCHG RX REV CODE 636 W/ 250 OVERRIDE (IP): Performed by: INTERNAL MEDICINE

## 2018-06-02 PROCEDURE — 93005 ELECTROCARDIOGRAM TRACING: CPT | Performed by: INTERNAL MEDICINE

## 2018-06-02 PROCEDURE — 700102 HCHG RX REV CODE 250 W/ 637 OVERRIDE(OP): Performed by: INTERNAL MEDICINE

## 2018-06-02 PROCEDURE — 99233 SBSQ HOSP IP/OBS HIGH 50: CPT | Performed by: INTERNAL MEDICINE

## 2018-06-02 PROCEDURE — 700102 HCHG RX REV CODE 250 W/ 637 OVERRIDE(OP): Performed by: HOSPITALIST

## 2018-06-02 PROCEDURE — A9270 NON-COVERED ITEM OR SERVICE: HCPCS | Performed by: INTERNAL MEDICINE

## 2018-06-02 PROCEDURE — 93010 ELECTROCARDIOGRAM REPORT: CPT | Performed by: INTERNAL MEDICINE

## 2018-06-02 PROCEDURE — 770020 HCHG ROOM/CARE - TELE (206)

## 2018-06-02 PROCEDURE — A9270 NON-COVERED ITEM OR SERVICE: HCPCS | Performed by: HOSPITALIST

## 2018-06-02 PROCEDURE — 700111 HCHG RX REV CODE 636 W/ 250 OVERRIDE (IP): Performed by: HOSPITALIST

## 2018-06-02 PROCEDURE — 700105 HCHG RX REV CODE 258: Performed by: INTERNAL MEDICINE

## 2018-06-02 RX ORDER — TRAMADOL HYDROCHLORIDE 50 MG/1
50 TABLET ORAL EVERY 6 HOURS PRN
Status: DISCONTINUED | OUTPATIENT
Start: 2018-06-02 | End: 2018-06-03 | Stop reason: HOSPADM

## 2018-06-02 RX ADMIN — SUCRALFATE 1 G: 1 SUSPENSION ORAL at 01:24

## 2018-06-02 RX ADMIN — FLECAINIDE ACETATE 75 MG: 150 TABLET ORAL at 08:53

## 2018-06-02 RX ADMIN — SUCRALFATE 1 G: 1 SUSPENSION ORAL at 12:53

## 2018-06-02 RX ADMIN — LEVETIRACETAM 500 MG: 500 TABLET ORAL at 18:02

## 2018-06-02 RX ADMIN — STANDARDIZED SENNA CONCENTRATE AND DOCUSATE SODIUM 2 TABLET: 8.6; 5 TABLET, FILM COATED ORAL at 18:01

## 2018-06-02 RX ADMIN — OMEGA-3 FATTY ACIDS CAP 1000 MG 2000 MG: 1000 CAP at 08:53

## 2018-06-02 RX ADMIN — SUCRALFATE 1 G: 1 SUSPENSION ORAL at 05:24

## 2018-06-02 RX ADMIN — OMEPRAZOLE 20 MG: 20 CAPSULE, DELAYED RELEASE ORAL at 08:53

## 2018-06-02 RX ADMIN — VANCOMYCIN HYDROCHLORIDE 900 MG: 100 INJECTION, POWDER, LYOPHILIZED, FOR SOLUTION INTRAVENOUS at 01:29

## 2018-06-02 RX ADMIN — TRAMADOL HYDROCHLORIDE 50 MG: 50 TABLET, COATED ORAL at 21:46

## 2018-06-02 RX ADMIN — OMEPRAZOLE 20 MG: 20 CAPSULE, DELAYED RELEASE ORAL at 18:01

## 2018-06-02 RX ADMIN — THERA TABS 1 TABLET: TAB at 08:54

## 2018-06-02 RX ADMIN — TRAMADOL HYDROCHLORIDE 50 MG: 50 TABLET, COATED ORAL at 12:53

## 2018-06-02 RX ADMIN — LEVETIRACETAM 500 MG: 500 TABLET ORAL at 08:53

## 2018-06-02 RX ADMIN — STANDARDIZED SENNA CONCENTRATE AND DOCUSATE SODIUM 2 TABLET: 8.6; 5 TABLET, FILM COATED ORAL at 08:54

## 2018-06-02 RX ADMIN — MORPHINE SULFATE 2 MG: 4 INJECTION INTRAVENOUS at 08:54

## 2018-06-02 RX ADMIN — SUCRALFATE 1 G: 1 SUSPENSION ORAL at 18:01

## 2018-06-02 RX ADMIN — SUCRALFATE 1 G: 1 SUSPENSION ORAL at 23:15

## 2018-06-02 RX ADMIN — FLECAINIDE ACETATE 75 MG: 150 TABLET ORAL at 18:02

## 2018-06-02 RX ADMIN — ACETAMINOPHEN 650 MG: 325 TABLET, FILM COATED ORAL at 01:27

## 2018-06-02 RX ADMIN — MORPHINE SULFATE 2 MG: 4 INJECTION INTRAVENOUS at 14:44

## 2018-06-02 RX ADMIN — ACETAMINOPHEN 650 MG: 325 TABLET, FILM COATED ORAL at 06:00

## 2018-06-02 RX ADMIN — DILTIAZEM HYDROCHLORIDE 120 MG: 120 CAPSULE, COATED, EXTENDED RELEASE ORAL at 08:54

## 2018-06-02 ASSESSMENT — ENCOUNTER SYMPTOMS
PND: 0
STRIDOR: 0
NAUSEA: 0
CLAUDICATION: 0
SPUTUM PRODUCTION: 0
LOSS OF CONSCIOUSNESS: 0
DIZZINESS: 0
WHEEZING: 0
DEPRESSION: 0
CONSTIPATION: 0
SHORTNESS OF BREATH: 0
VOMITING: 0
CHILLS: 0
COUGH: 0
ABDOMINAL PAIN: 0
TINGLING: 0
FEVER: 0
WEAKNESS: 0
DIARRHEA: 0
HEADACHES: 0
MYALGIAS: 0
ORTHOPNEA: 0
HEMOPTYSIS: 0
PALPITATIONS: 0
FALLS: 0

## 2018-06-02 ASSESSMENT — PAIN SCALES - GENERAL
PAINLEVEL_OUTOF10: 5
PAINLEVEL_OUTOF10: 4
PAINLEVEL_OUTOF10: 7
PAINLEVEL_OUTOF10: 7
PAINLEVEL_OUTOF10: 2
PAINLEVEL_OUTOF10: 5
PAINLEVEL_OUTOF10: 7
PAINLEVEL_OUTOF10: 2
PAINLEVEL_OUTOF10: 3
PAINLEVEL_OUTOF10: 4
PAINLEVEL_OUTOF10: 3
PAINLEVEL_OUTOF10: 6
PAINLEVEL_OUTOF10: 0
PAINLEVEL_OUTOF10: 5
PAINLEVEL_OUTOF10: 3

## 2018-06-02 NOTE — PROGRESS NOTES
12 hour chart check     12 hour monitor summery:    Rhythm: SR 14/.08/.36  Rate: 60's-70's Not paced   Ectopy: not noted

## 2018-06-02 NOTE — PROGRESS NOTES
[facility]    Date of Service: 2018    Chief Complaint  78 y.o. female admitted 2018 with chest pain.    Interval Problem Update  Patient had improvement in her chest pain then went into atrial fibrillation with rapid ventricular rate.  Patient then noted to be bradycardic.  Patient had been experiencing dizziness for weeks.  Cardiologist saw the patient and expressed the need for pacemaker placement.  Patient does have a history of lung cancer that metastasized to the brain, status post craniotomy chemo and radiation in .  Patient seen and examined in the ICU, ICU care given.  Discussed patient condition and plan with Intensivist, RN, RT and charge nurse / hot rounds.    No overnight events  NSR  Afebrile  SBP stable  Adequate uop  bm yesterday  No hunt  Mobilizing  RA    Consultants/Specialty  Cardiology    Disposition  Patient requires additional treatment in the hospital, we'll likely be able to go home at time of discharge        Review of Systems   Constitutional: Negative for chills, fever and malaise/fatigue.   HENT: Negative for congestion.    Respiratory: Negative for cough, sputum production, shortness of breath and stridor.    Cardiovascular: Positive for chest pain. Negative for palpitations and leg swelling.   Gastrointestinal: Negative for abdominal pain, constipation, diarrhea, nausea and vomiting.   Genitourinary: Negative for dysuria and urgency.   Musculoskeletal: Negative for falls and myalgias.   Neurological: Negative for dizziness, tingling, loss of consciousness, weakness and headaches.   Psychiatric/Behavioral: Negative for depression and suicidal ideas.   All other systems reviewed and are negative.     Physical Exam  Laboratory/Imaging   Hemodynamics  Temp (24hrs), Av.4 °C (97.5 °F), Min:36 °C (96.8 °F), Max:36.7 °C (98.1 °F)   Temperature: 36.7 °C (98.1 °F)  Pulse  Av.6  Min: 57  Max: 126 Heart Rate (Monitored): 70  NIBP: 120/55       Respiratory      Respiration: (!) 10, Pulse Oximetry: 98 %     Work Of Breathing / Effort: Mild  RUL Breath Sounds: Clear, RML Breath Sounds: Clear, RLL Breath Sounds: Clear, WILLIAMS Breath Sounds: Clear, LLL Breath Sounds: Diminished    Fluids    Intake/Output Summary (Last 24 hours) at 06/02/18 0743  Last data filed at 06/02/18 0600   Gross per 24 hour   Intake             1000 ml   Output             1070 ml   Net              -70 ml       Nutrition  Orders Placed This Encounter   Procedures   • Diet Order     Standing Status:   Standing     Number of Occurrences:   1     Order Specific Question:   Diet:     Answer:   Cardiac [6]     Physical Exam   Constitutional: She is oriented to person, place, and time. She appears well-developed. No distress.   HENT:   Head: Normocephalic and atraumatic.   Mouth/Throat: Oropharynx is clear and moist. No oropharyngeal exudate.   Eyes: Right eye exhibits no discharge. Left eye exhibits no discharge.   Neck: Neck supple. No tracheal deviation present.   Cardiovascular: Normal rate and regular rhythm.  Exam reveals no gallop and no friction rub.    No murmur heard.  Pulmonary/Chest: Effort normal and breath sounds normal. No stridor. No respiratory distress. She has no wheezes. She has no rales. She exhibits no tenderness.   Abdominal: Soft. Bowel sounds are normal. She exhibits no distension. There is no tenderness.   Musculoskeletal: Normal range of motion. She exhibits no edema or tenderness.   Lymphadenopathy:     She has no cervical adenopathy.   Neurological: She is alert and oriented to person, place, and time. No cranial nerve deficit.   Skin: Skin is warm and dry. No rash noted. She is not diaphoretic. No erythema.   Psychiatric: She has a normal mood and affect. Her behavior is normal. Judgment and thought content normal.   Nursing note and vitals reviewed.      Recent Labs      05/30/18   1234  06/01/18   0425   WBC  9.7  6.8   RBC  4.64  4.34   HEMOGLOBIN  15.0   13.9   HEMATOCRIT  45.1  43.3   MCV  97.2  99.8*   MCH  32.3  32.0   MCHC  33.3*  32.1*   RDW  46.7  48.5   PLATELETCT  217  170   MPV  8.6*  8.9*     Recent Labs      05/30/18   1234  06/01/18   0425   SODIUM  139  143   POTASSIUM  4.3  4.2   CHLORIDE  104  109   CO2  24  25   GLUCOSE  115*  96   BUN  17  11   CREATININE  0.58  0.54   CALCIUM  9.9  9.3     Recent Labs      05/30/18   1234   APTT  27.6   INR  1.00     Recent Labs      05/30/18   1234   BNPBTYPENAT  27              Assessment/Plan     Sick sinus syndrome (HCC)   Assessment & Plan    - New diagnosis, was newly diagnosed with atrial fibrillation during this hospital stay, was in RVR  - Then became bradycardic, now with sick sinus syndrome, deemed to need a pacemaker by cardiology  -Pacemaker has been placed, patient is having pain at the site, start tramadol  - Patient is also worried about her ability to perform activities of daily living, asked nurse to have her be more active        Acute alcoholic gastritis without hemorrhage- (present on admission)   Assessment & Plan    - Abdominal pain improved with PPI, continue        Pleuritic chest pain- (present on admission)   Assessment & Plan    - Possibly due to gastritis, possibly due to lung cancer mass and radiation changes   - Pain currently resolved         Radionecrosis- (present on admission)   Assessment & Plan    - Postradiation, chronic        Malignant neoplasm of upper lobe of right lung (HCC)- (present on admission)   Assessment & Plan    - Continue outpatient follow-up        Metastasis to brain (HCC)- (present on admission)   Assessment & Plan    - No neuro changes, continue outpatient follow-up          Quality-Core Measures   Reviewed items::  Labs reviewed, Medications reviewed and Radiology images reviewed  Ulcer Prophylaxis::  Yes

## 2018-06-02 NOTE — PROGRESS NOTES
Received report from DAMI Corrigan and assumed care of pt. VSS. Spoke with pt and family reinds about concerns and safety measurers.  Went over plan of care and wife and answered any questions. Verified lines and ensured patency. Safety measurers implemented. Call light and personal belonging within reach. Pt medicated for pain. No other needs at this time.

## 2018-06-02 NOTE — CARE PLAN
Problem: Venous Thromboembolism (VTW)/Deep Vein Thrombosis (DVT) Prevention:  Goal: Patient will participate in Venous Thrombosis (VTE)/Deep Vein Thrombosis (DVT)Prevention Measures   06/01/18 0800 06/01/18 1600   Mechanical/VTE Prophylaxis   Mechanical Prophylaxis  --  SCDs, Sequential Compression Device   SCDs, Sequential Compression Device --  On   OTHER   Risk Assessment Score 1 --    VTE RISK Moderate --      Pharmacologic prophylaxis contraindicated by MD at this time.    Problem: Bowel/Gastric:  Goal: Normal bowel function is maintained or improved   06/01/18 1600   OTHER   Last BM 06/01/18  (Per report)   Number of Times Stooled 0

## 2018-06-02 NOTE — CARE PLAN
"Problem: Venous Thromboembolism (VTW)/Deep Vein Thrombosis (DVT) Prevention:  Goal: Patient will participate in Venous Thrombosis (VTE)/Deep Vein Thrombosis (DVT)Prevention Measures  Outcome: PROGRESSING AS EXPECTED  Assessed and monitored for anticoagulation medication complication/ contraindications. None apparent at this time.   Pt is able to preform active ROM. Mobilized to bathroom. Educated pt about importance of Q 2 turning, foot exercises every hour while awake since she is refusing her SCDs. Education provided.      Problem: Pain Management  Goal: Pain level will decrease to patient's comfort goal  Outcome: PROGRESSING AS EXPECTED  Assessed pt self report as we as S &S of pain. Pt has medicated for pain with tylenol and oxi makes her \"loopy\" . Will continue to monitor.         "

## 2018-06-02 NOTE — PROGRESS NOTES
Monitor Summary: .14/.10/.34. Rhythm: SR-Partially paced-A.Fib. Rate: 50's-70's.     Prior to permanent pacemaker placement patient was going in and out of SR-A.Fib-partially paced. Bigem and trigem PVC's were noted frequently. After pacemaker placement patient has been SR 70's.     12 hour chart check.

## 2018-06-02 NOTE — PROGRESS NOTES
Cardiology Progress Note               Author: Rob Crum Date & Time created: 6/2/2018  8:17 AM     EPS rounds     Interval History:  78-year-old female who presented with syncope tachybradycardia syndrome.  She was noted to have significant pauses greater than 8 seconds and atrial fibrillation.  History of metastatic right lung cancer with metastases to the right occipital lobe diagnosed in 2013 treated with chemo and radiation.    Chief Complaint:  Syncope    6/2/18: tender at the site. Dressing changed, scant dry sanguineous noted on the dressing. No active drainage or edema noted     6/1/2018 patient underwent unsuccessful dual-chamber pacemaker implantation with Dr. Trejo.      Procedure note as noted:   IMPLANTED DEVICE INFORMATION:  Pulse generator is a Medtronic model A2DR01  Serial # [id number]     LEAD INFORMATION:  1)Right atrial lead is a Medtronic model # 5076-45 , serial # [id number] ,P wave 6 millivolts, threshold 2 Volts, pacing impedance 1007 Ohms.     2)Right ventricular lead is a Medtronic model # 5076-52 , serial # [id number] ,R wave 9 millivolts, threshold 1159 Volts, pacing impedance 0.75 Ohms.     DEVICE PROGRAMMING:  AAIR to DDDR      Review of Systems   Constitutional: Negative for malaise/fatigue.   Respiratory: Negative for cough, hemoptysis, sputum production, shortness of breath and wheezing.    Cardiovascular: Negative for chest pain, palpitations, orthopnea, claudication, leg swelling and PND.        Chest tenderness at the left upper chest where PPM present    Gastrointestinal: Negative for nausea and vomiting.   Neurological: Negative for dizziness and weakness.       Physical Exam   Constitutional: She is oriented to person, place, and time. She appears well-developed and well-nourished. No distress.   HENT:   Head: Normocephalic.   Neck: Normal range of motion. No JVD present.   Cardiovascular: Normal rate, regular rhythm, normal heart sounds and intact distal pulses.    No  murmur heard.  Pulmonary/Chest: Effort normal and breath sounds normal. No respiratory distress. She has no wheezes.   Abdominal: She exhibits no distension. There is no tenderness.   Musculoskeletal: She exhibits no edema or tenderness.   Neurological: She is alert and oriented to person, place, and time.   Skin: Skin is warm and dry. No rash noted. She is not diaphoretic.   Psychiatric: Her behavior is normal. Judgment normal.   Nursing note and vitals reviewed.      Hemodynamics:  Temp (24hrs), Av.4 °C (97.5 °F), Min:36 °C (96.8 °F), Max:36.7 °C (98.1 °F)  Temperature: 36.7 °C (98.1 °F)  Pulse  Av.6  Min: 57  Max: 126Heart Rate (Monitored): 70  NIBP: 120/55     Respiratory:    Respiration: (!) 10, Pulse Oximetry: 98 %     Work Of Breathing / Effort: Mild  RUL Breath Sounds: Clear, RML Breath Sounds: Clear, RLL Breath Sounds: Clear, WILLIAMS Breath Sounds: Clear, LLL Breath Sounds: Diminished  Fluids:     Weight: 64.9 kg (143 lb 1.3 oz)  GI/Nutrition:  Orders Placed This Encounter   Procedures   • Diet Order     Standing Status:   Standing     Number of Occurrences:   1     Order Specific Question:   Diet:     Answer:   Cardiac [6]     Lab Results:  Recent Labs      18   1234  18   0425   WBC  9.7  6.8   RBC  4.64  4.34   HEMOGLOBIN  15.0  13.9   HEMATOCRIT  45.1  43.3   MCV  97.2  99.8*   MCH  32.3  32.0   MCHC  33.3*  32.1*   RDW  46.7  48.5   PLATELETCT  217  170   MPV  8.6*  8.9*     Recent Labs      18   1234  18   0425   SODIUM  139  143   POTASSIUM  4.3  4.2   CHLORIDE  104  109   CO2  24  25   GLUCOSE  115*  96   BUN  17  11   CREATININE  0.58  0.54   CALCIUM  9.9  9.3     Recent Labs      18   1234   APTT  27.6   INR  1.00     Recent Labs      18   1234   BNPBTYPENAT  27     Recent Labs      18   1234   TROPONINI  <0.01   BNPBTYPENAT  27             Medical Decision Making, by Problem:  Active Hospital Problems    Diagnosis   • Sick sinus syndrome (HCC)  [I49.5]   • Acute alcoholic gastritis without hemorrhage [K29.20]   • Pleuritic chest pain [R07.81]   • Radionecrosis [L59.8, Y84.2]   • Malignant neoplasm of upper lobe of right lung (HCC) [C34.11]   • Metastasis to brain (HCC) [C79.31]       Plan:  1. SSS:  - Dual chamber Medtronic PPM with normal function.  - Device site is uncomplicated.    - CXR without PTX.  - EP will sign off, cleared to discharge from our standpoint.  Follow up is arranged in device clinic. Please call with any questions.     Pacer restrictions reviewed with patient. Do not raise affected arm above head or behind back for six weeks. May remove arm sling after 24 hrs, please wear if trouble remembering arm limitation and prn at night. No heavy lifting/pushing with L arm for six weeks. No driving for first week. No showers first week. Keep dressing on, clean, and dry until seen follow up. Wound care reviewed. Instructed to report s/s of infection such as warmth/redness/drainage/swelling at site or fever/chills.      Patient verbalizes understanding.    Future Appointments  Date Time Provider Department Center   6/4/2018 10:00 AM [facility] None   6/12/2018 3:45 PM PACER CHECK-[facility] None   7/12/2018 10:40 AM [facility]         Quality-Core Measures

## 2018-06-02 NOTE — DISCHARGE PLANNING
Upon utilization review, patient noted to be on the following medications that could potentially require prior authorization if prescribed at discharge: Flecainide.  If it is anticipated that patient will require these medications at discharge, beginning the prescription prior auth process in advance to anticipated discharge could assist in preventing delays when patient is medically cleared to be discharged from the hospital.

## 2018-06-02 NOTE — PROGRESS NOTES
[facility] [place]     Name Ml Chavira     1940   Age/Sex 78 y.o. female   MRN [id number]         Chief complaint/ reason for interval visit (Primary Diagnosis)   Afib/heart pauses   78 year old female with hx of lung cancer came with chest pain, later she developed Afib and pauses 17sec.     Interval Problem Daily Status Update     - SR last night with some pacing    - started with dilt and flecainide per EP   - stable last night no chest pain but some swelling on the pacemaker site and CXR no abnormalities.       Review of Systems   All other systems reviewed and are negative.      Quality Measures  Quality-Core Measures        Physical Exam       Vitals:    18 0800 18 0900 18 1000 18 1100   BP:       Pulse: 68 70 69 71   Resp: 16 (!) 27 (!) 29 (!) 23   Temp: 36.2 °C (97.1 °F)  36.3 °C (97.3 °F)    TempSrc:       SpO2: 91%  94% 93%   Weight:       Height:         Body mass index is 26.17 kg/m². Weight: 64.9 kg (143 lb 1.3 oz)  Oxygen Therapy:  Pulse Oximetry: 93 %, O2 (LPM): 2, O2 Delivery: None (Room Air)    Physical Exam   Constitutional: No distress.   Neck: No JVD present.   Cardiovascular: Normal rate.    No murmur heard.  Pulmonary/Chest: No respiratory distress. She has no wheezes. She has no rales.   Abdominal: She exhibits no distension. There is no tenderness.   Musculoskeletal: She exhibits no edema.   Skin: No rash noted. No erythema.         Lab Data Review:      2018  2:28 PM    Recent Labs      18   1234  18   0425   SODIUM  139  143   POTASSIUM  4.3  4.2   CHLORIDE  104  109   CO2  24  25   BUN  17  11   CREATININE  0.58  0.54   CALCIUM  9.9  9.3       Recent Labs      18   1234  18   0425   ALTSGPT  25   --    ASTSGOT  21   --    ALKPHOSPHAT  74   --    TBILIRUBIN  0.7   --    LIPASE  21   --    GLUCOSE  115*  96       Recent Labs      18   1234  18   0425   RBC  4.64  4.34      HEMOGLOBIN  15.0  13.9   HEMATOCRIT  45.1  43.3   PLATELETCT  217  170   PROTHROMBTM  12.9   --    APTT  27.6   --    INR  1.00   --        Recent Labs      05/30/18   1234  06/01/18   0425   WBC  9.7  6.8   NEUTSPOLYS  80.60*  66.30   LYMPHOCYTES  9.40*  16.20*   MONOCYTES  9.00  13.80*   EOSINOPHILS  0.30  3.20   BASOPHILS  0.30  0.40   ASTSGOT  21   --    ALTSGPT  25   --    ALKPHOSPHAT  74   --    TBILIRUBIN  0.7   --            Assessment/Plan     78 year old female with hx of lung cancer she came with chest pain and developed Afib with RVR with pause:     # New Afib/heart pauses:   - no hx of Afib but she has some episodes of dizziness or lightheadedness.   - Puases 13 to 16 sec of pause.   - permanent  pacemaker to be done on 6/1 by EP   - Echo: EF: 65% Hypokineis basal posterior wall, no changes comparing to last echo.   - Started with dilt and flecainide per EP   - Dual chamber Medtronic PPM with normal function.   - [id number]: 3 and HAS-BLE os 2 but since she has metastasis to brain and hx of brain surgery that may put her on higher risk.    - Discuss about anticoagulation risk/benefit after the pacemaker, no anticoagulation at this time and follow up with clinic in 1-2 weeks.    - Needs to be seen by device clinic for follow up.           # Chest pain:   - chest wall pain likely related to lung mass(pleuritic chest pain)   - CTA: no PE   - trop normal.    - pain management.    - PPI        # Lung cancer:   - was treated with chemo and radiation    - Recently on avastin 4-5 treatments.   - mets to brain with hx of seizure.       Thank You for this consult, we will sign off

## 2018-06-03 VITALS
BODY MASS INDEX: 26.49 KG/M2 | OXYGEN SATURATION: 92 % | RESPIRATION RATE: 17 BRPM | HEART RATE: 63 BPM | DIASTOLIC BLOOD PRESSURE: 58 MMHG | SYSTOLIC BLOOD PRESSURE: 144 MMHG | TEMPERATURE: 98.2 F | HEIGHT: 62 IN | WEIGHT: 143.96 LBS

## 2018-06-03 PROBLEM — R07.81 PLEURITIC CHEST PAIN: Status: RESOLVED | Noted: 2018-05-31 | Resolved: 2018-06-03

## 2018-06-03 PROCEDURE — G8980 MOBILITY D/C STATUS: HCPCS | Mod: CI

## 2018-06-03 PROCEDURE — 700102 HCHG RX REV CODE 250 W/ 637 OVERRIDE(OP): Performed by: INTERNAL MEDICINE

## 2018-06-03 PROCEDURE — G8988 SELF CARE GOAL STATUS: HCPCS | Mod: CI

## 2018-06-03 PROCEDURE — 99239 HOSP IP/OBS DSCHRG MGMT >30: CPT | Performed by: HOSPITALIST

## 2018-06-03 PROCEDURE — 700102 HCHG RX REV CODE 250 W/ 637 OVERRIDE(OP): Performed by: HOSPITALIST

## 2018-06-03 PROCEDURE — A9270 NON-COVERED ITEM OR SERVICE: HCPCS | Performed by: INTERNAL MEDICINE

## 2018-06-03 PROCEDURE — A9270 NON-COVERED ITEM OR SERVICE: HCPCS | Performed by: HOSPITALIST

## 2018-06-03 PROCEDURE — 97535 SELF CARE MNGMENT TRAINING: CPT

## 2018-06-03 PROCEDURE — 97165 OT EVAL LOW COMPLEX 30 MIN: CPT

## 2018-06-03 PROCEDURE — G8989 SELF CARE D/C STATUS: HCPCS | Mod: CI

## 2018-06-03 PROCEDURE — G8979 MOBILITY GOAL STATUS: HCPCS | Mod: CI

## 2018-06-03 PROCEDURE — 97161 PT EVAL LOW COMPLEX 20 MIN: CPT

## 2018-06-03 PROCEDURE — G8978 MOBILITY CURRENT STATUS: HCPCS | Mod: CI

## 2018-06-03 PROCEDURE — G8987 SELF CARE CURRENT STATUS: HCPCS | Mod: CI

## 2018-06-03 RX ORDER — DILTIAZEM HYDROCHLORIDE 120 MG/1
120 CAPSULE, COATED, EXTENDED RELEASE ORAL DAILY
Qty: 30 CAP | Refills: 0 | Status: SHIPPED | OUTPATIENT
Start: 2018-06-04 | End: 2018-07-23

## 2018-06-03 RX ORDER — SUCRALFATE ORAL 1 G/10ML
1 SUSPENSION ORAL EVERY 6 HOURS
Qty: 50 ML | Refills: 3 | Status: SHIPPED | OUTPATIENT
Start: 2018-06-03 | End: 2018-07-23

## 2018-06-03 RX ORDER — OMEPRAZOLE 20 MG/1
20 CAPSULE, DELAYED RELEASE ORAL 2 TIMES DAILY
Qty: 30 CAP | Refills: 0 | Status: SHIPPED | OUTPATIENT
Start: 2018-06-03 | End: 2018-07-23

## 2018-06-03 RX ORDER — TRAMADOL HYDROCHLORIDE 50 MG/1
50 TABLET ORAL EVERY 6 HOURS PRN
Qty: 30 TAB | Refills: 0 | Status: SHIPPED | OUTPATIENT
Start: 2018-06-03 | End: 2018-06-19

## 2018-06-03 RX ORDER — FLECAINIDE ACETATE 150 MG/1
75 TABLET ORAL 2 TIMES DAILY
Qty: 60 TAB | Refills: 0 | Status: SHIPPED | OUTPATIENT
Start: 2018-06-03 | End: 2018-09-05 | Stop reason: SDUPTHER

## 2018-06-03 RX ADMIN — TRAMADOL HYDROCHLORIDE 50 MG: 50 TABLET, COATED ORAL at 11:35

## 2018-06-03 RX ADMIN — STANDARDIZED SENNA CONCENTRATE AND DOCUSATE SODIUM 2 TABLET: 8.6; 5 TABLET, FILM COATED ORAL at 05:08

## 2018-06-03 RX ADMIN — DILTIAZEM HYDROCHLORIDE 120 MG: 120 CAPSULE, COATED, EXTENDED RELEASE ORAL at 05:09

## 2018-06-03 RX ADMIN — THERA TABS 1 TABLET: TAB at 05:09

## 2018-06-03 RX ADMIN — FLECAINIDE ACETATE 75 MG: 150 TABLET ORAL at 05:09

## 2018-06-03 RX ADMIN — SUCRALFATE 1 G: 1 SUSPENSION ORAL at 05:08

## 2018-06-03 RX ADMIN — OMEGA-3 FATTY ACIDS CAP 1000 MG 2000 MG: 1000 CAP at 05:08

## 2018-06-03 RX ADMIN — LEVETIRACETAM 500 MG: 500 TABLET ORAL at 05:09

## 2018-06-03 RX ADMIN — OMEPRAZOLE 20 MG: 20 CAPSULE, DELAYED RELEASE ORAL at 05:08

## 2018-06-03 RX ADMIN — SUCRALFATE 1 G: 1 SUSPENSION ORAL at 11:25

## 2018-06-03 ASSESSMENT — COGNITIVE AND FUNCTIONAL STATUS - GENERAL
CLIMB 3 TO 5 STEPS WITH RAILING: A LITTLE
SUGGESTED CMS G CODE MODIFIER MOBILITY: CI
HELP NEEDED FOR BATHING: A LITTLE
DRESSING REGULAR LOWER BODY CLOTHING: A LITTLE
DRESSING REGULAR UPPER BODY CLOTHING: A LITTLE
MOBILITY SCORE: 23
DAILY ACTIVITIY SCORE: 21
SUGGESTED CMS G CODE MODIFIER DAILY ACTIVITY: CJ

## 2018-06-03 ASSESSMENT — GAIT ASSESSMENTS
GAIT LEVEL OF ASSIST: SUPERVISED
DISTANCE (FEET): 150
ASSISTIVE DEVICE: FRONT WHEEL WALKER

## 2018-06-03 ASSESSMENT — ACTIVITIES OF DAILY LIVING (ADL): TOILETING: INDEPENDENT

## 2018-06-03 ASSESSMENT — PAIN SCALES - GENERAL: PAINLEVEL_OUTOF10: 0

## 2018-06-03 NOTE — PROGRESS NOTES
Discharge Summary    Educated patient on new medications, up coming appointments, after care for pacemaker placement, and s/s to look for when returning to the emergency room. Patient verbalized understanding. Removed PIV X2 with no s/s of bleeding or infection at site. Vitals WNL.  Escorted patient downstairs via wheelchair to meet son for discharge home.

## 2018-06-03 NOTE — FACE TO FACE
Face to Face Supporting Documentation - Home Health    The encounter with this patient was in whole or in part the primary reason for home health admission.    Date of encounter:   Patient:                    MRN:                       YOB: 2020  Ml Chavira  [id number]  1940     Home health to see patient for:  Skilled Nursing care for assessment, interventions & education    Skilled need for:  Exacerbation of Chronic Disease State sick sinus syndrome    Skilled nursing interventions to include:  Comment: vitals and exam    Homebound status evidenced by:  Needs the assistance of another person in order to leave the home. Leaving home requires a considerable and taxing effort. There is a normal inability to leave the home.    Community Physician to provide follow up care: Pcp Pt States None     Optional Interventions? No      I certify the face to face encounter for this home health care referral meets the CMS requirements and the encounter/clinical assessment with the patient was, in whole, or in part, for the medical condition(s) listed above, which is the primary reason for home health care. Based on my clinical findings: the service(s) are medically necessary, support the need for home health care, and the homebound criteria are met.  I certify that this patient has had a face to face encounter by myself.  Hanna Charles M.D. - NPI: [id number]

## 2018-06-03 NOTE — DISCHARGE INSTRUCTIONS
Discharge Instructions    Discharged to home by car with relative. Discharged via wheelchair, hospital escort: Yes.  Special equipment needed: Not Applicable and Immobilizer    Be sure to schedule a follow-up appointment with your primary care doctor or any specialists as instructed.     Discharge Plan:   Pneumococcal Vaccine Administered/Refused: Not given - Patient refused pneumococcal vaccine  Influenza Vaccine Indication: Not indicated: Previously immunized this influenza season and > 8 years of age    I understand that a diet low in cholesterol, fat, and sodium is recommended for good health. Unless I have been given specific instructions below for another diet, I accept this instruction as my diet prescription.   Other diet: low sodium    Special Instructions: Do not raise affected arm above shoulder level for 6 weeks. Avoid excessive pushing, pulling or lifting for 6 weeks. May use walker or cane for balance. Do not place cell phones or mobile devices directly over implanted device. No shower. Maintain dressing for 7 days, may sponge bath for 7 days.       Pacemaker Implantation, Adult  Pacemaker implantation is a procedure to place a pacemaker inside your chest. A pacemaker is a small computer that sends electrical signals to the heart and helps your heart beat normally. A pacemaker also stores information about your heart rhythms. You may need pacemaker implantation if you:  · Have a slow heartbeat (bradycardia).  · Faint (syncope).  · Have shortness of breath (dyspnea) due to heart problems.  The pacemaker attaches to your heart through a wire, called a lead. Sometimes just one lead is needed. Other times, there will be two leads. There are two types of pacemakers:  · Transvenous pacemaker. This type is placed under the skin or muscle of your chest. The lead goes through a vein in the chest area to reach the inside of the heart.  · Epicardial pacemaker. This type is placed under the skin or muscle of your  chest or belly. The lead goes through your chest to the outside of the heart.  Tell a health care provider about:  · Any allergies you have.  · All medicines you are taking, including vitamins, herbs, eye drops, creams, and over-the-counter medicines.  · Any problems you or family members have had with anesthetic medicines.  · Any blood or bone disorders you have.  · Any surgeries you have had.  · Any medical conditions you have.  · Whether you are pregnant or may be pregnant.  What are the risks?  Generally, this is a safe procedure. However, problems may occur, including:  · Infection.  · Bleeding.  · Failure of the pacemaker or the lead.  · Collapse of a lung or bleeding into a lung.  · Blood clot inside a blood vessel with a lead.  · Damage to the heart.  · Infection inside the heart (endocarditis).  · Allergic reactions to medicines.  What happens before the procedure?  Staying hydrated   Follow instructions from your health care provider about hydration, which may include:  · Up to 2 hours before the procedure - you may continue to drink clear liquids, such as water, clear fruit juice, black coffee, and plain tea.  Eating and drinking restrictions   Follow instructions from your health care provider about eating and drinking, which may include:  · 8 hours before the procedure - stop eating heavy meals or foods such as meat, fried foods, or fatty foods.  · 6 hours before the procedure - stop eating light meals or foods, such as toast or cereal.  · 6 hours before the procedure - stop drinking milk or drinks that contain milk.  · 2 hours before the procedure - stop drinking clear liquids.  Medicines  · Ask your health care provider about:  ¨ Changing or stopping your regular medicines. This is especially important if you are taking diabetes medicines or blood thinners.  ¨ Taking medicines such as aspirin and ibuprofen. These medicines can thin your blood. Do not take these medicines before your procedure if your  health care provider instructs you not to.  · You may be given antibiotic medicine to help prevent infection.  General instructions  · You will have a heart evaluation. This may include an electrocardiogram (ECG), chest X-ray, and heart imaging (echocardiogram,  or echo) tests.  · You will have blood tests.  · Do not use any products that contain nicotine or tobacco, such as cigarettes and e-cigarettes. If you need help quitting, ask your health care provider.  · Plan to have someone take you home from the hospital or clinic.  · If you will be going home right after the procedure, plan to have someone with you for 24 hours.  · Ask your health care provider how your surgical site will be marked or identified.  What happens during the procedure?  · To reduce your risk of infection:  ¨ Your health care team will wash or sanitize their hands.  ¨ Your skin will be washed with soap.  ¨ Hair may be removed from the surgical area.  · An IV tube will be inserted into one of your veins.  · You will be given one or more of the following:  ¨ A medicine to help you relax (sedative).  ¨ A medicine to numb the area (local anesthetic).  ¨ A medicine to make you fall asleep (general anesthetic).  · If you are getting a transvenous pacemaker:  ¨ An incision will be made in your upper chest.  ¨ A pocket will be made for the pacemaker. It may be placed under the skin or between layers of muscle.  ¨ The lead will be inserted into a blood vessel that returns to the heart.  ¨ While X-rays are taken by an imaging machine (fluoroscopy), the lead will be advanced through the vein to the inside of your heart.  ¨ The other end of the lead will be tunneled under the skin and attached to the pacemaker.  · If you are getting an epicardial pacemaker:  ¨ An incision will be made near your ribs or breastbone (sternum) for the lead.  ¨ The lead will be attached to the outside of your heart.  ¨ Another incision will be made in your chest or upper  belly to create a pocket for the pacemaker.  ¨ The free end of the lead will be tunneled under the skin and attached to the pacemaker.  · The transvenous or epicardial pacemaker will be tested. Imaging studies may be done to check the lead position.  · The incisions will be closed with stitches (sutures), adhesive strips, or skin glue.  · Bandages (dressing) will be placed over the incisions.  The procedure may vary among health care providers and hospitals.  What happens after the procedure?  · Your blood pressure, heart rate, breathing rate, and blood oxygen level will be monitored until the medicines you were given have worn off.  · You will be given antibiotics and pain medicine.  · ECG and chest x-rays will be done.  · You will wear a continuous type of ECG (Holter monitor) to check your heart rhythm.  · Your health care provider will program the pacemaker.  · Do not drive for 24 hours if you received a sedative.  This information is not intended to replace advice given to you by your health care provider. Make sure you discuss any questions you have with your health care provider.  Document Released: 12/08/2003 Document Revised: 07/07/2017 Document Reviewed: 05/31/2017  [employer] Patient Education © 2017 [employer]  Tramadol tablets  What is this medicine?  TRAMADOL (TRA ma dole) is a pain reliever. It is used to treat moderate to severe pain in adults.  This medicine may be used for other purposes; ask your health care provider or pharmacist if you have questions.  COMMON BRAND NAME(S): Ultram  What should I tell my health care provider before I take this medicine?  They need to know if you have any of these conditions:  -brain tumor  -depression  -drug abuse or addiction  -head injury  -if you frequently drink alcohol containing drinks  -kidney disease or trouble passing urine  -liver disease  -lung disease, asthma, or breathing problems  -seizures or epilepsy  -suicidal thoughts, plans, or  attempt; a previous suicide attempt by you or a family member  -an unusual or allergic reaction to tramadol, codeine, other medicines, foods, dyes, or preservatives  -pregnant or trying to get pregnant  -breast-feeding  How should I use this medicine?  Take this medicine by mouth with a full glass of water. Follow the directions on the prescription label. You can take it with or without food. If it upsets your stomach, take it with food. Do not take your medicine more often than directed.  A special MedGuide will be given to you by the pharmacist with each prescription and refill. Be sure to read this information carefully each time.  Talk to your pediatrician regarding the use of this medicine in children. Special care may be needed.  Overdosage: If you think you have taken too much of this medicine contact a poison control center or emergency room at once.  NOTE: This medicine is only for you. Do not share this medicine with others.  What if I miss a dose?  If you miss a dose, take it as soon as you can. If it is almost time for your next dose, take only that dose. Do not take double or extra doses.  What may interact with this medicine?  Do not take this medication with any of the following medicines:  -MAOIs like Carbex, Eldepryl, Marplan, Nardil, and Parnate  This medicine may also interact with the following medications:  -alcohol  -antihistamines for allergy, cough and cold  -certain medicines for anxiety or sleep  -certain medicines for depression like amitriptyline, fluoxetine, sertraline  -certain medicines for migraine headache like almotriptan, eletriptan, frovatriptan, naratriptan, rizatriptan, sumatriptan, zolmitriptan  -certain medicines for seizures like carbamazepine, oxcarbazepine, phenobarbital, primidone  -certain medicines that treat or prevent blood clots like warfarin  -digoxin  -furazolidone  -general anesthetics like halothane, isoflurane, methoxyflurane, propofol  -linezolid  -local  anesthetics like lidocaine, pramoxine, tetracaine  -medicines that relax muscles for surgery  -other narcotic medicines for pain or cough  -phenothiazines like chlorpromazine, mesoridazine, prochlorperazine, thioridazine  -procarbazine  This list may not describe all possible interactions. Give your health care provider a list of all the medicines, herbs, non-prescription drugs, or dietary supplements you use. Also tell them if you smoke, drink alcohol, or use illegal drugs. Some items may interact with your medicine.  What should I watch for while using this medicine?  Tell your doctor or health care professional if your pain does not go away, if it gets worse, or if you have new or a different type of pain. You may develop tolerance to the medicine. Tolerance means that you will need a higher dose of the medicine for pain relief. Tolerance is normal and is expected if you take this medicine for a long time.  Do not suddenly stop taking your medicine because you may develop a severe reaction. Your body becomes used to the medicine. This does NOT mean you are addicted. Addiction is a behavior related to getting and using a drug for a non-medical reason. If you have pain, you have a medical reason to take pain medicine. Your doctor will tell you how much medicine to take. If your doctor wants you to stop the medicine, the dose will be slowly lowered over time to avoid any side effects.  There are different types of narcotic medicines (opiates). If you take more than one type at the same time or if you are taking another medicine that also causes drowsiness, you may have more side effects. Give your health care provider a list of all medicines you use. Your doctor will tell you how much medicine to take. Do not take more medicine than directed. Call emergency for help if you have problems breathing or unusual sleepiness.  You may get drowsy or dizzy. Do not drive, use machinery, or do anything that needs mental  alertness until you know how this medicine affects you. Do not stand or sit up quickly, especially if you are an older patient. This reduces the risk of dizzy or fainting spells. Alcohol can increase or decrease the effects of this medicine. Avoid alcoholic drinks.  You may have constipation. Try to have a bowel movement at least every 2 to 3 days. If you do not have a bowel movement for 3 days, call your doctor or health care professional.  Your mouth may get dry. Chewing sugarless gum or sucking hard candy, and drinking plenty of water may help. Contact your doctor if the problem does not go away or is severe.  What side effects may I notice from receiving this medicine?  Side effects that you should report to your doctor or health care professional as soon as possible:  -allergic reactions like skin rash, itching or hives, swelling of the face, lips, or tongue  -breathing problems  -confusion  -seizures  -signs and symptoms of low blood pressure like dizziness; feeling faint or lightheaded, falls; unusually weak or tired  -trouble passing urine or change in the amount of urine  Side effects that usually do not require medical attention (report to your doctor or health care professional if they continue or are bothersome):  -constipation  -dry mouth  -nausea, vomiting  -tiredness  This list may not describe all possible side effects. Call your doctor for medical advice about side effects. You may report side effects to FDA at 2-858-FDA-7107.  Where should I keep my medicine?  Keep out of the reach of children.  This medicine may cause accidental overdose and death if it taken by other adults, children, or pets. Mix any unused medicine with a substance like cat litter or coffee grounds. Then throw the medicine away in a sealed container like a sealed bag or a coffee can with a lid. Do not use the medicine after the expiration date.  Store at room temperature between 15 and 30 degrees C (59 and 86 degrees F).  NOTE:  This sheet is a summary. It may not cover all possible information. If you have questions about this medicine, talk to your doctor, pharmacist, or health care provider.  © 2018 [employer] (2016-09-11 09:00:04)  Sucralfate oral suspension  What is this medicine?  SUCRALFATE (AMRIT sukhdev fate) helps to treat ulcers of the intestine.  This medicine may be used for other purposes; ask your health care provider or pharmacist if you have questions.  COMMON BRAND NAME(S): Carafate  What should I tell my health care provider before I take this medicine?  They need to know if you have any of these conditions:  -kidney disease  -an unusual or allergic reaction to sucralfate, other medicines, foods, dyes, or preservatives  -pregnant or trying to get pregnant  -breast-feeding  How should I use this medicine?  Take this medicine by mouth with a glass of water. Follow the directions on the prescription label. Shake well before using. Use a specially marked spoon or container to measure your medicine. Ask your pharmacist if you do not have one. Household spoons are not accurate. This medicine works best if you take it on an empty stomach, 1 hour before meals. Take your doses at regular intervals. Do not take your medicine more often than directed. Do not stop taking except on your doctor's advice.  Talk to your pediatrician regarding the use of this medicine in children. Special care may be needed.  Overdosage: If you think you have taken too much of this medicine contact a poison control center or emergency room at once.  NOTE: This medicine is only for you. Do not share this medicine with others.  What if I miss a dose?  If you miss a dose, take it as soon as you can. If it is almost time for your next dose, take only that dose. Do not take double or extra doses.  What may interact with this medicine?  -antacid  -cimetidine  -digoxin  -ketoconazole  -phenytoin  -quinidine  -ranitidine  -some antibiotics like  ciprofloxacin, norfloxacin, and ofloxacin  -theophylline  -thyroid hormones  -warfarin  This list may not describe all possible interactions. Give your health care provider a list of all the medicines, herbs, non-prescription drugs, or dietary supplements you use. Also tell them if you smoke, drink alcohol, or use illegal drugs. Some items may interact with your medicine.  What should I watch for while using this medicine?  Visit your doctor or health care professional for regular check ups. Let your doctor know if your symptoms do not improve or if you feel worse.  Antacids should not be taken within one half hour before or after this medicine.  What side effects may I notice from receiving this medicine?  Side effects that you should report to your doctor or health care professional as soon as possible:  -allergic reactions like skin rash, itching or hives, swelling of the face, lips, or tongue  -difficulty breathing  Side effects that usually do not require medical attention (report to your doctor or health care professional if they continue or are bothersome):  -back pain  -constipation  -drowsy, dizzy  -dry mouth  -headache  -stomach upset, gas  -trouble sleeping  This list may not describe all possible side effects. Call your doctor for medical advice about side effects. You may report side effects to FDA at 9-689-FDA-5067.  Where should I keep my medicine?  Keep out of the reach of children.  Store at room temperature between 20 and 25 degrees C (68 and 77 degrees F). Keep container tightly closed. Throw away any unused medicine after the expiration date.  NOTE: This sheet is a summary. It may not cover all possible information. If you have questions about this medicine, talk to your doctor, pharmacist, or health care provider.  © 2018 [employer] Standard (2009-08-19 15:47:18)  Omeprazole capsules (sprinkle caps) - Rx  What is this medicine?  OMEPRAZOLE (oh ME pray zol) prevents the production of acid in  the stomach. It is used to treat gastroesophageal reflux disease (GERD), ulcers, certain bacteria in the stomach, inflammation of the esophagus, and Zollinger-Cruz Syndrome. It is also used to treat other conditions that cause too much stomach acid.  This medicine may be used for other purposes; ask your health care provider or pharmacist if you have questions.  COMMON BRAND NAME(S): Prilosec  What should I tell my health care provider before I take this medicine?  They need to know if you have any of these conditions:  -liver disease  -low levels of magnesium in the blood  -lupus  -an unusual or allergic reaction to omeprazole, other medicines, foods, dyes, or preservatives  -pregnant or trying to get pregnant  -breast-feeding  How should I use this medicine?  Take this medicine by mouth with a glass of water. Follow the directions on the prescription label. Do not crush, break or chew the capsules. They can be opened and the contents sprinkled on a small amount of applesauce or yogurt, given with fruit juices, or swallowed immediately with water. This medicine works best if taken on an empty stomach 30 to 60 minutes before breakfast. Take your doses at regular intervals. Do not take your medicine more often than directed.  Talk to your pediatrician regarding the use of this medicine in children. Special care may be needed.  Overdosage: If you think you have taken too much of this medicine contact a poison control center or emergency room at once.  NOTE: This medicine is only for you. Do not share this medicine with others.  What if I miss a dose?  If you miss a dose, take it as soon as you can. If it is almost time for your next dose, take only that dose. Do not take double or extra doses.  What may interact with this medicine?  Do not take this medicine with any of the following medications:  -atazanavir  -clopidogrel  -nelfinavir  This medicine may also interact with the following  medications:  -ampicillin  -certain medicines for anxiety or sleep  -certain medicines that treat or prevent blood clots like warfarin  -cyclosporine  -diazepam  -digoxin  -disulfiram  -diuretics  -iron salts  -methotrexate  -mycophenolate mofetil  -phenytoin  -prescription medicine for fungal or yeast infection like itraconazole, ketoconazole, voriconazole  -saquinavir  -tacrolimus  This list may not describe all possible interactions. Give your health care provider a list of all the medicines, herbs, non-prescription drugs, or dietary supplements you use. Also tell them if you smoke, drink alcohol, or use illegal drugs. Some items may interact with your medicine.  What should I watch for while using this medicine?  It can take several days before your stomach pain gets better. Check with your doctor or health care professional if your condition does not start to get better, or if it gets worse.  You may need blood work done while you are taking this medicine.  What side effects may I notice from receiving this medicine?  Side effects that you should report to your doctor or health care professional as soon as possible:  -allergic reactions like skin rash, itching or hives, swelling of the face, lips, or tongue  -bone, muscle or joint pain  -breathing problems  -chest pain or chest tightness  -dark yellow or brown urine  -dizziness  -fast, irregular heartbeat  -feeling faint or lightheaded  -fever or sore throat  -muscle spasm  -palpitations  -rash on cheeks or arms that gets worse in the sun  -redness, blistering, peeling or loosening of the skin, including inside the mouth  -seizures  -tremors  -unusual bleeding or bruising  -unusually weak or tired  -yellowing of the eyes or skin  Side effects that usually do not require medical attention (report to your doctor or health care professional if they continue or are bothersome):  -constipation  -diarrhea  -dry mouth  -headache  -nausea  This list may not describe  all possible side effects. Call your doctor for medical advice about side effects. You may report side effects to FDA at 9-634-BGT-7354.  Where should I keep my medicine?  Keep out of the reach of children.  Store at room temperature between 15 and 30 degrees C (59 and 86 degrees F). Protect from light and moisture. Throw away any unused medicine after the expiration date.  NOTE: This sheet is a summary. It may not cover all possible information. If you have questions about this medicine, talk to your doctor, pharmacist, or health care provider.  © 2018 [employer] Standard (2017-01-19 12:18:47)  Flecainide tablets  What is this medicine?  FLECAINIDE (FLEK a nide) is an antiarrhythmic drug. This medicine is used to prevent irregular heart rhythm. It can also slow down fast heartbeats called tachycardia.  This medicine may be used for other purposes; ask your health care provider or pharmacist if you have questions.  COMMON BRAND NAME(S): Joseph  What should I tell my health care provider before I take this medicine?  They need to know if you have any of these conditions:  -abnormal levels of potassium in the blood  -heart disease including heart rhythm and heart rate problems  -kidney or liver disease  -recent heart attack  -an unusual or allergic reaction to flecainide, local anesthetics, other medicines, foods, dyes, or preservatives  -pregnant or trying to get pregnant  -breast-feeding  How should I use this medicine?  Take this medicine by mouth with a glass of water. Follow the directions on the prescription label. You can take this medicine with or without food. Take your doses at regular intervals. Do not take your medicine more often than directed. Do not stop taking this medicine suddenly. This may cause serious, heart-related side effects. If your doctor wants you to stop the medicine, the dose may be slowly lowered over time to avoid any side effects.  Talk to your pediatrician regarding the use of  this medicine in children. While this drug may be prescribed for children as young as 1 year of age for selected conditions, precautions do apply.  Overdosage: If you think you have taken too much of this medicine contact a poison control center or emergency room at once.  NOTE: This medicine is only for you. Do not share this medicine with others.  What if I miss a dose?  If you miss a dose, take it as soon as you can. If it is almost time for your next dose, take only that dose. Do not take double or extra doses.  What may interact with this medicine?  Do not take this medicine with any of the following medications:  -amoxapine  -arsenic trioxide  -certain antibiotics like clarithromycin, erythromycin, gatifloxacin, gemifloxacin, levofloxacin, moxifloxacin, sparfloxacin, or troleandomycin  -certain antidepressants called tricyclic antidepressants like amitriptyline, imipramine, or nortriptyline  -certain medicines to control heart rhythm like disopyramide, dofetilide, encainide, moricizine, procainamide, propafenone, and quinidine  -cisapride  -cyclobenzaprine  -delavirdine  -droperidol  -haloperidol  -hawthorn  -imatinib  -levomethadyl  -maprotiline  -medicines for malaria like chloroquine and halofantrine  -pentamidine  -phenothiazines like chlorpromazine, mesoridazine, prochlorperazine, thioridazine  -pimozide  -quinine  -ranolazine  -ritonavir  -sertindole  -ziprasidone  This medicine may also interact with the following medications:  -cimetidine  -medicines for angina or high blood pressure  -medicines to control heart rhythm like amiodarone and digoxin  This list may not describe all possible interactions. Give your health care provider a list of all the medicines, herbs, non-prescription drugs, or dietary supplements you use. Also tell them if you smoke, drink alcohol, or use illegal drugs. Some items may interact with your medicine.  What should I watch for while using this medicine?  Visit your doctor or  health care professional for regular checks on your progress. Because your condition and the use of this medicine carries some risk, it is a good idea to carry an identification card, necklace or bracelet with details of your condition, medications and doctor or health care professional.  Check your blood pressure and pulse rate regularly. Ask your health care professional what your blood pressure and pulse rate should be, and when you should contact him or her. Your doctor or health care professional also may schedule regular blood tests and electrocardiograms to check your progress.  You may get drowsy or dizzy. Do not drive, use machinery, or do anything that needs mental alertness until you know how this medicine affects you. Do not stand or sit up quickly, especially if you are an older patient. This reduces the risk of dizzy or fainting spells. Alcohol can make you more dizzy, increase flushing and rapid heartbeats. Avoid alcoholic drinks.  What side effects may I notice from receiving this medicine?  Side effects that you should report to your doctor or health care professional as soon as possible:  -chest pain, continued irregular heartbeats  -difficulty breathing  -swelling of the legs or feet  -trembling, shaking  -unusually weak or tired  Side effects that usually do not require medical attention (report to your doctor or health care professional if they continue or are bothersome):  -blurred vision  -constipation  -headache  -nausea, vomiting  -stomach pain  This list may not describe all possible side effects. Call your doctor for medical advice about side effects. You may report side effects to FDA at 3-778-FDA-1368.  Where should I keep my medicine?  Keep out of the reach of children.  Store at room temperature between 15 and 30 degrees C (59 and 86 degrees F). Protect from light. Keep container tightly closed. Throw away any unused medicine after the expiration date.  NOTE: This sheet is a summary.  It may not cover all possible information. If you have questions about this medicine, talk to your doctor, pharmacist, or health care provider.  © 2018 Elsevier/Gold Standard (2009-04-22 16:46:09)  Diltiazem tablets  What is this medicine?  DILTIAZEM (dil MARTY a zem) is a calcium-channel blocker. It affects the amount of calcium found in your heart and muscle cells. This relaxes your blood vessels, which can reduce the amount of work the heart has to do. This medicine is used to treat chest pain caused by angina.  This medicine may be used for other purposes; ask your health care provider or pharmacist if you have questions.  COMMON BRAND NAME(S): Cardizem  What should I tell my health care provider before I take this medicine?  They need to know if you have any of these conditions:  -heart problems, low blood pressure, irregular heartbeat  -liver disease  -previous heart attack  -an unusual or allergic reaction to diltiazem, other medicines, foods, dyes, or preservatives  -pregnant or trying to get pregnant  -breast-feeding  How should I use this medicine?  Take this medicine by mouth with a glass of water. Follow the directions on the prescription label. Do not cut, crush or chew this medicine. This medicine is usually taken before meals and at bedtime. Take your doses at regular intervals. Do not take your medicine more often then directed. Do not stop taking except on the advice of your doctor or health care professional.  Talk to your pediatrician regarding the use of this medicine in children. Special care may be needed.  Overdosage: If you think you have taken too much of this medicine contact a poison control center or emergency room at once.  NOTE: This medicine is only for you. Do not share this medicine with others.  What if I miss a dose?  If you miss a dose, take it as soon as you can. If it is almost time for your next dose, take only that dose. Do not take double or extra doses.  What may interact with  this medicine?  Do not take this medicine with any of the following:  -cisapride  -hawthorn  -pimozide  -ranolazine  -red yeast rice  This medicine may also interact with the following medications:  -buspirone  -carbamazepine  -cimetidine  -cyclosporine  -digoxin  -local anesthetics or general anesthetics  -lovastatin  -medicines for anxiety or difficulty sleeping like midazolam and triazolam  -medicines for high blood pressure or heart problems  -quinidine  -rifampin, rifabutin, or rifapentine  This list may not describe all possible interactions. Give your health care provider a list of all the medicines, herbs, non-prescription drugs, or dietary supplements you use. Also tell them if you smoke, drink alcohol, or use illegal drugs. Some items may interact with your medicine.  What should I watch for while using this medicine?  Check your blood pressure and pulse rate regularly. Ask your doctor or health care professional what your blood pressure and pulse rate should be and when you should contact him or her.  You may feel dizzy or lightheaded. Do not drive, use machinery, or do anything that needs mental alertness until you know how this medicine affects you. To reduce the risk of dizzy or fainting spells, do not sit or stand up quickly, especially if you are an older patient. Alcohol can make you more dizzy or increase flushing and rapid heartbeats. Avoid alcoholic drinks.  What side effects may I notice from receiving this medicine?  Side effects that you should report to your doctor or health care professional as soon as possible:  -allergic reactions like skin rash, itching or hives, swelling of the face, lips, or tongue  -confusion, mental depression  -feeling faint or lightheaded, falls  -pinpoint red spots on the skin  -redness, blistering, peeling or loosening of the skin, including inside the mouth  -slow, irregular heartbeat  -swelling of the ankles, feet  -unusual bleeding or bruising  Side effects  that usually do not require medical attention (report to your doctor or health care professional if they continue or are bothersome):  -change in sex drive or performance  -constipation or diarrhea  -flushing of the face  -headache  -nausea, vomiting  -tired or weak  -trouble sleeping  This list may not describe all possible side effects. Call your doctor for medical advice about side effects. You may report side effects to FDA at 3-818-HQV-8582.  Where should I keep my medicine?  Keep out of the reach of children.  Store at room temperature between 20 and 25 degrees C (68 and 77 degrees F). Protect from light. Keep container tightly closed. Throw away any unused medicine after the expiration date.  NOTE: This sheet is a summary. It may not cover all possible information. If you have questions about this medicine, talk to your doctor, pharmacist, or health care provider.  © 2018 Elsevier/Gold Standard (2014-12-01 10:54:31)  Alcoholic Gastritis  You have alcoholic gastritis. This is an inflammation of the lining of the stomach. It is caused by drinking alcohol. The symptoms may include: burning abdominal pain, nausea, vomiting or even vomiting blood. It can be made worse by a poor diet. People who drink frequently often do not eat well. Taking aspirin or other anti-inflammatory medications increases stomach irritation and bleeding. These medicines should be avoided.  Treatment is aimed at the cause. You have to stop drinking alcohol if you want to get better. Eat a healthy, well-balanced diet. You may take liquid antacids as needed. Your caregiver may prescribe medications to help heal the stomach lining. Take these as prescribed.  PREVENTION   · Anyone who has experienced alcoholic gastritis should consider that alcoholism may be an issue. Professional evaluation is highly recommended.   · Although some people can recover without help, most need assistance. With treatment and support, many are able to stop drinking  and rebuild their lives. Long-term recovery is possible.   · Alcohol Addiction cannot be cured, but it can be treated successfully. Treatment centers are listed in telephone listings under:   · Alcoholism and Addiction Treatment; Substance Abuse Treatment or Cocaine, Narcotics and Alcoholics Anonymous. Most hospitals and clinics can refer you to a specialized care center.   · The [place] government maintains a toll-free number for treatment referrals: 1-381.593.9133 or 1-686.544.9836 (TDD). They also maintain a website: http://findtreatment.[facility].gov. Other websites for more information are: www.mentalhealth.[facility].gov and www.[place].gov.   · In [place], treatment resources are listed in each province. Listings are available under:   · [employer] or similar titles.   SEEK IMMEDIATE MEDICAL CARE IF:   · You develop severe abdominal pain, uncontrolled vomiting, or vomiting blood.   · You blackout or have fainting spells.   · You develop seizures (this could be life threatening).   · You develop bloody stools or stools that appear black or tarry.   Document Released: 01/25/2006 Document Revised: 03/11/2013 Document Reviewed: 12/22/2010  ExitCare® Patient Information ©2013 [employer].  Sick Sinus Syndrome  Sick sinus syndrome is a group of conditions that affect heart rhythm and how quickly the heart beats (heart rate). When you have sick sinus syndrome, your heart rate may be too fast (tachycardia) or too slow (bradycardia), or it may switch between fast and slow.  What are the causes?  Your heartbeat is controlled by a structure in the heart called the sinoatrial (SA) node. Sick sinus syndrome happens when the SA node does not work properly (SA node dysfunction). It is not known what causes this.  What increases the risk?  This syndrome is more likely to develop in people who:  · Are age 65 or older.  · Have a family history of the syndrome.  · Have had a heart attack or heart surgery.  · Have  sleep apnea.  · Have coronary artery disease.  · Have inflammation of the heart muscle (myocarditis) or the sac that surrounds the heart (pericarditis).  · Use medicines such as beta blockers, some calcium channel blockers, or digoxin.  · Have an abnormal level of thyroid hormone or electrolytes.  · Have high blood pressure (hypertension).  · Have had infections that affect the heart, like rheumatic fever or diphtheria.  · Are born with heart defects (congenital heart disease).  What are the signs or symptoms?  Symptom of this syndrome include:  · Fainting or feeling like you are going to faint.  · Chest pain.  · Shortness of breath.  · Dizziness.  · Feeling like your heart skips beats.  · Feeling like your heart beats very quickly.  · Tiredness.  · Fatigue.  · Confusion.  Some people with this condition do not have any symptoms. Most people have few symptoms or very mild symptoms.  How is this diagnosed?  This syndrome may be diagnosed with tests, such as:  · A test that measures electrical activity in the heart (electrocardiogram, or ECG).  · A test in which you wear a device called a Holter monitor for 1-2 days. The device will record your heart’s electrical signals.  · Placing a device to record the electrical activity of your heart over a longer period of time (implantable loop recorder).  · An test to look at the electrical system of your heart (electrophysiology study).  · Blood tests.  How is this treated?  Treatment for this syndrome may include:  · Surgery to put a small, electrical device in your chest to correct your heartbeat (pacemaker).  · Medicines to keep your heart from beating too quickly.  · Stopping taking certain medicines as told by your health care provider.  · Treatment of the underlying condition.  If the syndrome is not causing any symptoms, you may not need treatment.  Follow these instructions at home:  · Take over-the-counter and prescription medicines only as told by your health care  provider.  · Stay active and stay at a healthy weight. Ask your health care provider what level of activity is safe for you.  · Eat a heart-healthy diet that includes fruits, vegetables, whole grains, low-fat dairy products, and lean proteins like poultry and eggs. Ask your health care provider if you should:  ¨ Avoid any foods or drinks.  ¨ Limit your salt (sodium) intake.  · Limit alcohol intake to no more than 1 drink a day for nonpregnant women and 2 drinks a day for men. One drink equals 12 oz of beer, 5 oz of wine, or 1½ oz of hard liquor.  · Do not use any products that contain nicotine or tobacco, such as cigarettes and e-cigarettes. If you need help quitting, ask your health care provider.  · Keep all follow-up visits as told by your health care provider. This is important.  Contact a health care provider if:  · You have a cough that does not go away.  · You have swelling in your feet or ankles.  · You feel short of breath with activity.  · You feel fatigued.  Get help right away if:  · You have chest pain or difficulty breathing.  · You suddenly have weakness, numbness, or loss of movement on one side of your body.  · You suddenly become very confused.  · You suddenly lose the ability to speak or understand speech.  · You feel like your heart is skipping beats.  · You feel like your heart is beating very quickly.  · You faint or feel like you are going to faint.  These symptoms may represent a serious problem that is an emergency. Do not wait to see if the symptoms will go away. Get medical help right away. Call your local emergency services (911 in the [place] Do not drive yourself to the hospital.   Summary  · When you have sick sinus syndrome, your heart rate may be too fast (tachycardia) or too slow (bradycardia), or it may switch between fast and slow.  · Treatment for this syndrome may include taking medicines and having a pacemaker placed in your chest.  · If you have chest pain or difficulty  breathing, get help right away. Do not drive yourself to the hospital.  This information is not intended to replace advice given to you by your health care provider. Make sure you discuss any questions you have with your health care provider.  Document Released: 12/06/2010 Document Revised: 08/24/2017 Document Reviewed: 08/15/2017  [employer] Interactive Patient Education © 2017 [employer]      · Is patient discharged on Warfarin / Coumadin?   No     Depression / Suicide Risk    As you are discharged from this [facility] facility, it is important to learn how to keep safe from harming yourself.    Recognize the warning signs:  · Abrupt changes in personality, positive or negative- including increase in energy   · Giving away possessions  · Change in eating patterns- significant weight changes-  positive or negative  · Change in sleeping patterns- unable to sleep or sleeping all the time   · Unwillingness or inability to communicate  · Depression  · Unusual sadness, discouragement and loneliness  · Talk of wanting to die  · Neglect of personal appearance   · Rebelliousness- reckless behavior  · Withdrawal from people/activities they love  · Confusion- inability to concentrate     If you or a loved one observes any of these behaviors or has concerns about self-harm, here's what you can do:  · Talk about it- your feelings and reasons for harming yourself  · Remove any means that you might use to hurt yourself (examples: pills, rope, extension cords, firearm)  · Get professional help from the community (Mental Health, Substance Abuse, psychological counseling)  · Do not be alone:Call your Safe Contact- someone whom you trust who will be there for you.  · Call your local CRISIS HOTLINE 330-6166 or 574-621-8178  · Call your local Children's Mobile Crisis Response Team [place] (715) 371-6435 or www.[place]  · Call the toll free National Suicide Prevention Hotlines   · National Suicide Prevention Lifeline  363-548-WXRW (5099)  · National [place] Line Network 800-SUICIDE (396-7186)

## 2018-06-03 NOTE — DISCHARGE SUMMARY
Discharge Summary    CHIEF COMPLAINT ON ADMISSION  Chief Complaint   Patient presents with   • Chest Pain     pt c/o mid chest pain radiating to right arm started last night. skin pwd. describes pain as shooting/constant and worse during deep inspiration.       Reason for Admission  sent by ; chest pain/abd EKG     Admission Date  5/30/2018    CODE STATUS  Limited Attempt    HPI & HOSPITAL COURSE  Please see history and physical dictated by Dr. Pablo on 5/30/18 for further details.  This is a 78 y.o. Female with history lung cancer with metastasis to the brain for which she was recently on prednisone.  She initially presented with chest pain. This has resolved with carafate and ultram and is likely related to acute gastritis from steroids and alcohol use and related to her lung cancer.    She was also found to have sick sinus syndrome.  She was followed closely by cardiology and had a pace maker placed.  She was also placed on diltiazem and flecainide.  She is tolerating these medications well and has mild pacer site pain at time of discharge.  She is not a candidate for anticoagulation due to the brain metastasis and recent avastin treatment.    Therefore, she is discharged in fair and stable condition to home with close outpatient follow-up.    The patient met 2-midnight criteria for an inpatient stay at the time of discharge.    Discharge Date  6/3/18    FOLLOW UP ITEMS POST DISCHARGE  EP Cardiology  Oncology    DISCHARGE DIAGNOSES  Active Problems:    Sick sinus syndrome (HCC) POA: Unknown    Acute alcoholic gastritis without hemorrhage POA: Yes    Metastasis to brain (HCC) POA: Yes    Malignant neoplasm of upper lobe of right lung (HCC) POA: Yes    Radionecrosis POA: Yes  Resolved Problems:    Pleuritic chest pain POA: Yes    Physical Exam   Constitutional: She is oriented to person, place, and time. She appears well-developed. No distress.   HENT:   Head: Normocephalic and atraumatic.   Mouth/Throat:  Oropharynx is clear and moist. No oropharyngeal exudate.   Eyes: Right eye exhibits no discharge. Left eye exhibits no discharge.   Neck: Neck supple. No tracheal deviation present.   Cardiovascular: NSR, no m/r/g, pacer site with small amount of dried blood, L arm in sling.  Exam reveals no gallop and no friction rub.    No murmur heard.  Pulmonary/Chest: Effort normal and breath sounds normal. No stridor. No respiratory distress. She has no wheezes. She has no rales. She exhibits no tenderness.   Abdominal: Soft. Bowel sounds are normal. She exhibits no distension. There is no tenderness.   Musculoskeletal: Normal range of motion. She exhibits no edema or tenderness.   Lymphadenopathy:     She has no cervical adenopathy.   Neurological: She is alert and oriented to person, place, and time. No cranial nerve deficit.   Skin: Skin is warm and dry. No rash noted. She is not diaphoretic. No erythema.   Psychiatric: She has a normal mood and affect. Her behavior is normal. Judgment and thought content normal.   Nursing note and vitals reviewed.    FOLLOW UP  Future Appointments  Date Time Provider Department Center   6/4/2018 10:00 AM [facility] None   6/12/2018 3:45 PM PACER CHECK[facility] None   7/12/2018 10:40 AM [facility]     Pcp Pt States None          Hansel Trejo M.D.  [address]  [address]  [facility] [id number]  [phone]    In 1 week        MEDICATIONS ON DISCHARGE     Medication List      START taking these medications      Instructions   DILTIAZem  MG Cp24  Start taking on:  6/4/2018  Commonly known as:  CARDIZEM CD   Take 1 Cap by mouth every day.  Dose:  120 mg     flecainide 150 MG Tabs  Commonly known as:  TAMBOCOR   Take 0.5 Tabs by mouth 2 Times a Day.  Dose:  75 mg     omeprazole 20 MG delayed-release capsule  Commonly known as:  PRILOSEC   Take 1 Cap by mouth 2 Times a Day.  Dose:  20 mg     sucralfate 1 GM/10ML Susp  Commonly known as:  CARAFATE   Take 10 mL  "by mouth every 6 hours.  Dose:  1 g     tramadol 50 MG Tabs  Commonly known as:  ULTRAM   Take 1 Tab by mouth every 6 hours as needed for up to 16 days.  Dose:  50 mg        CONTINUE taking these medications      Instructions   aspirin 81 MG Chew chewable tablet  Commonly known as:  ASA   Take 81 mg by mouth every evening.  Dose:  81 mg     Fish Oil 1200 MG Caps   Take 1 Cap by mouth every day.  Dose:  1 Cap     levETIRAcetam 500 MG Tabs  Commonly known as:  KEPPRA   Take 1 Tab by mouth 2 Times a Day.  Dose:  500 mg     multivitamin Tabs   Take 1 Tab by mouth every day.  Dose:  1 Tab            Allergies  Allergies   Allergen Reactions   • Penicillins Rash and Itching     RXN \"a long time ago\"       DIET  Orders Placed This Encounter   Procedures   • Diet Order     Standing Status:   Standing     Number of Occurrences:   1     Order Specific Question:   Diet:     Answer:   Cardiac [6]       ACTIVITY  As tolerated.  Left arm restrictions as reviewed    CONSULTATIONS  cardiology    PROCEDURES  DX-CHEST-PORTABLE (1 VIEW)   Final Result         1.  No acute cardiopulmonary disease.      ECHOCARDIOGRAM COMP W/O CONT   Final Result      DX-CHEST-PORTABLE (1 VIEW)   Final Result      1.  Placement of cardiac pacemaker      2.  No pneumothorax      DX-CHEST-PORTABLE (1 VIEW)   Final Result      1.  Right internal jugular pacemaker lead projects over the right ventricle.      2.  Left basilar atelectasis. Persistent blunting of the left costophrenic angle is likely related to scarring and is unchanged.      3.  No change in right upper lobe opacity.      CT-CTA CHEST PULMONARY ARTERY W/ RECONS   Final Result      No pulmonary emboli.   Ill-defined right upper lobe infiltration including nodular infiltration unchanged since previous examination.   Hyperexpanded and emphysematous lungs.   Hepatic steatosis and hepatic cysts.   Calcified nodules within the thyroid gland.            DX-CHEST-LIMITED (1 VIEW)   Final Result    "   Post treatment changes right upper lobe. Lung base atelectasis.        6/1/18: Pacer implantation by Dr. Trejo  Pulse generator is a [employer] model A2DR01  Serial # [id number]    LABORATORY  Lab Results   Component Value Date    SODIUM 143 06/01/2018    POTASSIUM 4.2 06/01/2018    CHLORIDE 109 06/01/2018    CO2 25 06/01/2018    GLUCOSE 96 06/01/2018    BUN 11 06/01/2018    CREATININE 0.54 06/01/2018        Lab Results   Component Value Date    WBC 6.8 06/01/2018    HEMOGLOBIN 13.9 06/01/2018    HEMATOCRIT 43.3 06/01/2018    PLATELETCT 170 06/01/2018        Total time of the discharge process exceeds 45 minutes.

## 2018-06-03 NOTE — PROGRESS NOTES
Report received from dayshiblair.  Patient sitting in bed, sling to left arm readjusted. Family at bedside. Patient alert and oriented x4  . Call light within reach, bed locked and in low position. No needs voiced at this time.

## 2018-06-03 NOTE — THERAPY
"Physical Therapy Evaluation completed.   Bed Mobility:  Supine to Sit: Modified Independent  Transfers: Sit to Stand: Modified Independent  Gait: Level Of Assist: Supervised with Front-Wheel Walker       Plan of Care: Patient demonstrates safety with mobility in this environment at this time.   Discharge Recommendations: Equipment: Front-Wheel Walker. Post-acute therapy Currently anticipate no further skilled therapy needs once patient is discharged from the inpatient setting.    See \"Rehab Therapy-Acute\" Patient Summary Report for complete documentation.     "

## 2018-06-03 NOTE — CARE PLAN
Problem: Safety  Goal: Will remain free from falls  Outcome: PROGRESSING AS EXPECTED    Intervention: Implement fall precautions   06/03/18 0101   OTHER   Environmental Precautions Treaded Slipper Socks on Patient;Personal Belongings, Wastebasket, Call Bell etc. in Easy Reach;Transferred to Stronger Side;Report Given to Other Health Care Providers Regarding Fall Risk;Bed in Low Position;Communication Sign for Patients & Families;Mobility Assessed & Appropriate Sign Placed

## 2018-06-03 NOTE — THERAPY
"Occupational Therapy Evaluation completed.   Functional Status:  Pt very pleasant & co-operative, son present.  Pt was SBA for supine to sit EOB.  Pt amb to bathroom with FWW & close SBA.  Pt did have a few balance checks but corrected with CGA.  Son instructed to stay close to pt during functional mobility.  Pt was SBA for toilet transfer using grab bar.  Pt dressed with CGA for both UE & LE in her own clothes.  Pt able to groom standing at the sink with SBA.  Pt & son provided with written handout on pacemaker precautions.  Pt instructed she no longer needed to wear the sling but pt preferred to keep it in place until she got home.  Pt left sitting up in chair for lunch.  Plan of Care: Patient with no further skilled OT needs in the acute care setting at this time  Discharge Recommendations:  Equipment: No Equipment Needed. Post-acute therapy Discharge to home with outpatient or home health for additional skilled therapy services.    Pt would benefit from HH PT/OT upon D/C home.  Pt's son will be present for 1 week but then pt will be alone.  Pt reported that she felt she really benefited from HH therapy in the past especially given her complex medical history.    See \"Rehab Therapy-Acute\" Patient Summary Report for complete documentation.    "

## 2018-06-03 NOTE — PROGRESS NOTES
Two RN skin assessment completed by  and DAMI Noel. Noted left chest PM site with c/d/I dressing. Bilateral heels dry with callous otherwise skin intact.

## 2018-06-03 NOTE — DISCHARGE PLANNING
Medical Social Work    LSW checked pt's prescription formulary. Flecainide is on formulary, so it will not require PA.

## 2018-06-04 ENCOUNTER — APPOINTMENT (OUTPATIENT)
Dept: LAB | Facility: MEDICAL CENTER | Age: 78
End: 2018-06-04
Payer: MEDICARE

## 2018-06-04 NOTE — DISCHARGE PLANNING
Medical Social Work    LSW received call from pt's son, Natanael. Per Natanael, he would like his mother to go to SNF or rehab. LSW informed Natanael that the hospitalist is likely unable to order this post-discharge. Pt's son upset asking why this wasn't addressed prior to d/c. LSW explained that per PT/OT luis, there was no need for placement. HH was ordered after pt had discharged, however, pt has no PCP. Per Natanael, pt has an appt for June 21st. LSW advised that pt may need to wait until this appt for HH to be arranged. Pt's son not okay with this. LSW spoke with CM supervisor. She suggested this LSW call hospitalist who was following. LSW spoke with Dr. Charles, who stated she is unable to put SNF/rehab orders in after pt has discharged. LSW to update pt's son.

## 2018-06-05 ENCOUNTER — PATIENT OUTREACH (OUTPATIENT)
Dept: OTHER | Facility: MEDICAL CENTER | Age: 78
End: 2018-06-05

## 2018-06-05 ENCOUNTER — PATIENT OUTREACH (OUTPATIENT)
Dept: HEALTH INFORMATION MANAGEMENT | Facility: OTHER | Age: 78
End: 2018-06-05

## 2018-06-05 NOTE — PROGRESS NOTES
Received message from son, Natanael, yesterday with questions regarding next step in plan of care.  Navigator was out of office.  Returned call and left message.

## 2018-06-07 ENCOUNTER — OFFICE VISIT (OUTPATIENT)
Dept: MEDICAL GROUP | Facility: CLINIC | Age: 78
End: 2018-06-07
Payer: MEDICARE

## 2018-06-07 VITALS
BODY MASS INDEX: 26.41 KG/M2 | TEMPERATURE: 98.6 F | OXYGEN SATURATION: 95 % | WEIGHT: 143.5 LBS | SYSTOLIC BLOOD PRESSURE: 134 MMHG | DIASTOLIC BLOOD PRESSURE: 76 MMHG | HEIGHT: 62 IN | HEART RATE: 78 BPM

## 2018-06-07 DIAGNOSIS — R91.8 LUNG MASS: ICD-10-CM

## 2018-06-07 DIAGNOSIS — C79.31 METASTASIS TO BRAIN (HCC): ICD-10-CM

## 2018-06-07 DIAGNOSIS — Z09 HOSPITAL DISCHARGE FOLLOW-UP: ICD-10-CM

## 2018-06-07 DIAGNOSIS — I49.5 SICK SINUS SYNDROME (HCC): ICD-10-CM

## 2018-06-07 PROCEDURE — 99496 TRANSJ CARE MGMT HIGH F2F 7D: CPT | Performed by: NURSE PRACTITIONER

## 2018-06-07 ASSESSMENT — ENCOUNTER SYMPTOMS
NAUSEA: 0
FALLS: 0
ABDOMINAL PAIN: 0
HEARTBURN: 0
VOMITING: 0
SENSORY CHANGE: 1
WHEEZING: 0
MYALGIAS: 0
HEADACHES: 0
SPUTUM PRODUCTION: 0
DIZZINESS: 1
FOCAL WEAKNESS: 0
FEVER: 0
CHILLS: 0
COUGH: 0
PALPITATIONS: 0
WEAKNESS: 0
DEPRESSION: 0
NERVOUS/ANXIOUS: 0
SHORTNESS OF BREATH: 0

## 2018-06-07 NOTE — PROGRESS NOTES
Subjective:     Ml Chavira is a 78 y.o. female who presents for Hospital Follow-up.  Chart reviewed. Discharge summary available for review: Yes   Date of discharge 6/3/2018.  48- hour post discharge RN call completed on 6/5/2018 and documented in the medical record by Isabel Perea.    HPI: Recently hospitalized for chest pain, found to have SSS status post pacer on 6/1. She was found to have afib first then bradycardia and pause. She is also placed on cardizem and flecainide. AC not considered at this time because she is thought to have bleeding risk outweigh benefit.     She is scheduled with [facility] pacer check and cardiologist robbie but she prefers to find one that closer to her so that she has set up the appointment with [facility] cardiologist Dr. Singleton next Tuesday and will have pacer check at [facility] next Monday. She would like to cancel the appointment with [facility] one but keep new PCP appointment at [facility] on 6/21 until she find someone else closer to her.     Since returning home, patient reports feeling good. She requests to have referral to wound care because she thought the dressing on pacer surgical site need to be changed by wound care. She is also wondering why no one from  has ever contacted her. She has no new symptoms since discharge. She is hanging sling on her shoulder as reminder. She reports she does not really have to use it. She denied any fever or chills. Not much tenderness to surgical site. She reports no more chest pain.     The patient denied increased weakness but she did have difficulty taking care of self at home given her underlying lung cancer with brain mets. She has balance issue sometimes. She ambulate with cane with no difficulty at this time. No fall incident since discharge. She was living by herself. Her daughter in law visits her from [place] and will temporary stay with her.     Her oncologist is Dr. Ulloa and she has follow up appointment Next  "Tuesday.  Patient reports taking medications as instructed.    Patient Active Problem List    Diagnosis Date Noted   • Sick sinus syndrome (HCC) 05/31/2018     Priority: High   • Acute alcoholic gastritis without hemorrhage 05/31/2018     Priority: Medium   • Radionecrosis 04/14/2018   • Malignant neoplasm of upper lobe of right lung (HCC) 03/05/2018   • CVA (cerebral vascular accident) (HCC) 11/29/2017   • Lung mass 11/19/2015   • Blurry vision, left eye 11/19/2015   • Metastasis to brain (HCC) 11/18/2015   • Metastatic cancer (CMS-HCC) 11/18/2015         Allergies:   Penicillins    Social History:  Social History   Substance Use Topics   • Smoking status: Former Smoker   • Smokeless tobacco: Never Used   • Alcohol use Yes        ROS:  Review of Systems   Constitutional: Positive for malaise/fatigue. Negative for chills and fever.   Respiratory: Negative for cough, sputum production, shortness of breath and wheezing.    Cardiovascular: Positive for leg swelling (mild, on both feets ). Negative for chest pain and palpitations.   Gastrointestinal: Negative for abdominal pain, heartburn, nausea and vomiting.   Genitourinary: Negative for dysuria, frequency and urgency.   Musculoskeletal: Negative for falls and myalgias.   Skin: Negative for rash.   Neurological: Positive for dizziness (sometimes, balance issue sometimes due to her undelrying brain meta) and sensory change. Negative for focal weakness, weakness and headaches.   Psychiatric/Behavioral: Negative for depression. The patient is not nervous/anxious.         Objective:     Blood pressure 134/76, pulse 78, temperature 37 °C (98.6 °F), height 1.575 m (5' 2\"), weight 65.1 kg (143 lb 8 oz), SpO2 95 %, not currently breastfeeding.     Physical Exam:  Physical Exam   Constitutional: She is oriented to person, place, and time and well-developed, well-nourished, and in no distress.   HENT:   Head: Normocephalic and atraumatic.   Eyes: Conjunctivae are normal. "   Neck: Neck supple. No JVD present. No thyromegaly present.   Cardiovascular: Normal rate and regular rhythm.    Pulmonary/Chest: Effort normal and breath sounds normal. No respiratory distress. She has no wheezes.   Abdominal: Soft. Bowel sounds are normal. She exhibits no distension. There is no tenderness.   Musculoskeletal: Normal range of motion. She exhibits edema (trace, non pitting, bilateral foot).   Neurological: She is alert and oriented to person, place, and time.   Skin: Skin is warm. There is erythema (mild).   Surgical site and surrounding area look good, no sign of infection. Sterile strip in place, dressing is changed today, covered with new gauze and tegaderm.     Some bruise at the surrounding area, getting better, no sign of active bleeding or hematoma   Nursing note and vitals reviewed.        Assessment and Plan:     1. Hospital discharge follow-up  Hospitalization and results reviewed with patient. High risk conditions requiring teaching or care coordination were identified and addressed.The patient demonstrate understanding of admission and underlying conditions. The patient understands discharge instructions and when to seek medical attention. Medications reviewed including instructions regarding high risk medications, dosing and side effects.    The patient is able to safely adhere to ADL/IADL, treatment and medication regimen, self-manage of high-risk conditions? No   The patient requires physical therapy/home health/DME referral? Yes   The patient requires referral to care coordination/behavioral health/social work?  No   Patient requires referral for pharmacy consult? No   Advance directive/POLST on file?  Yes   Required counseled on advance directive?  No     - Follow up with new PCP at [facility] on 6/21  - resent  order: REFERRAL TO HOME HEALTH. Face to Face is done in the hospital.     2. Sick sinus syndrome (HCC)  s/p pacer  Dressing change today  Follow up with [facility] pacer  clinic next Monday and cardiologist Dr. Singleton next Tuesday. [facility] cardiologist appointment cancelled per request.   - REFERRAL TO HOME HEALTH    3. Metastasis to brain (HCC)  s/p chemo and radiation, follow up with oncologist Dr. Ulloa next Tuesday.  - REFERRAL TO HOME HEALTH    4. Lung mass  s/p chemo and radiation, follow up with oncologist Dr. Ulloa next Tuesday  - REFERRAL TO HOME HEALTH      Medication Reconciliation  Medication list at end of encounter:   Current Outpatient Prescriptions   Medication Sig Dispense Refill   • tramadol (ULTRAM) 50 MG Tab Take 1 Tab by mouth every 6 hours as needed for up to 16 days. 30 Tab 0   • flecainide (TAMBOCOR) 150 MG Tab Take 0.5 Tabs by mouth 2 Times a Day. 60 Tab 0   • DILTIAZem CD (CARDIZEM CD) 120 MG CAPSULE SR 24 HR Take 1 Cap by mouth every day. 30 Cap 0   • omeprazole (PRILOSEC) 20 MG delayed-release capsule Take 1 Cap by mouth 2 Times a Day. 30 Cap 0   • sucralfate (CARAFATE) 1 GM/10ML Suspension Take 10 mL by mouth every 6 hours. 50 mL 3   • aspirin (ASA) 81 MG Chew Tab chewable tablet Take 81 mg by mouth every evening.     • levetiracetam (KEPPRA) 500 MG Tab Take 1 Tab by mouth 2 Times a Day. 60 Tab    • Omega-3 Fatty Acids (FISH OIL) 1200 MG Cap Take 1 Cap by mouth every day.     • multivitamin (THERAGRAN) Tab Take 1 Tab by mouth every day.       No current facility-administered medications for this visit.        Primary care follow-up:  New health conditions identified during hospitalization? Yes   Labs/pathology/imaging requires future PCP follow-up?  No   Changes to medications during hospitalization or today? Yes during hospitalization    Recommended followup: Return in about 4 weeks (around 7/5/2018), or if symptoms worsen or fail to improve, for establish with new PCP. with Pcp Pt States None   Future Appointments       Provider Department Center    6/12/2018 3:45 PM PACER CHECK-[facility]     6/21/2018  3:45 PM Geovanny Small M.D. [facility] - Internal Medicine     9/28/2018 2:00 PM Hansel Trejo M.D. [facility]           Patient Instruction  Patient offered educational material on discharge diagnosis and management of symptoms/red flags. Patient instructed to keep follow-up appointments and to bring written questions and and actual medications to each office visit. Patient instructed to call PCP/specialist with any problems/questions/concerns. Patient verbalizes understanding and has no further questions at this time.    Face-to-face transitional care management services with high complexity medical decision making.

## 2018-06-07 NOTE — PATIENT INSTRUCTIONS
If you need further assistance, or have any questions; concerns or lingering symptoms before seeing your Primary Care Provider, do not hesitate to contact us.     Please contact us at the Post-Hospital Follow Up Program at [phone].   Our offices hours are Monday-Friday 8 am-5 pm.      Sick Sinus Syndrome  Sick sinus syndrome is a group of conditions that affect heart rhythm and how quickly the heart beats (heart rate). When you have sick sinus syndrome, your heart rate may be too fast (tachycardia) or too slow (bradycardia), or it may switch between fast and slow.  What are the causes?  Your heartbeat is controlled by a structure in the heart called the sinoatrial (SA) node. Sick sinus syndrome happens when the SA node does not work properly (SA node dysfunction). It is not known what causes this.  What increases the risk?  This syndrome is more likely to develop in people who:  · Are age 65 or older.  · Have a family history of the syndrome.  · Have had a heart attack or heart surgery.  · Have sleep apnea.  · Have coronary artery disease.  · Have inflammation of the heart muscle (myocarditis) or the sac that surrounds the heart (pericarditis).  · Use medicines such as beta blockers, some calcium channel blockers, or digoxin.  · Have an abnormal level of thyroid hormone or electrolytes.  · Have high blood pressure (hypertension).  · Have had infections that affect the heart, like rheumatic fever or diphtheria.  · Are born with heart defects (congenital heart disease).  What are the signs or symptoms?  Symptom of this syndrome include:  · Fainting or feeling like you are going to faint.  · Chest pain.  · Shortness of breath.  · Dizziness.  · Feeling like your heart skips beats.  · Feeling like your heart beats very quickly.  · Tiredness.  · Fatigue.  · Confusion.  Some people with this condition do not have any symptoms. Most people have few symptoms or very mild symptoms.  How is this diagnosed?  This  syndrome may be diagnosed with tests, such as:  · A test that measures electrical activity in the heart (electrocardiogram, or ECG).  · A test in which you wear a device called a Holter monitor for 1-2 days. The device will record your heart’s electrical signals.  · Placing a device to record the electrical activity of your heart over a longer period of time (implantable loop recorder).  · An test to look at the electrical system of your heart (electrophysiology study).  · Blood tests.  How is this treated?  Treatment for this syndrome may include:  · Surgery to put a small, electrical device in your chest to correct your heartbeat (pacemaker).  · Medicines to keep your heart from beating too quickly.  · Stopping taking certain medicines as told by your health care provider.  · Treatment of the underlying condition.  If the syndrome is not causing any symptoms, you may not need treatment.  Follow these instructions at home:  · Take over-the-counter and prescription medicines only as told by your health care provider.  · Stay active and stay at a healthy weight. Ask your health care provider what level of activity is safe for you.  · Eat a heart-healthy diet that includes fruits, vegetables, whole grains, low-fat dairy products, and lean proteins like poultry and eggs. Ask your health care provider if you should:  ¨ Avoid any foods or drinks.  ¨ Limit your salt (sodium) intake.  · Limit alcohol intake to no more than 1 drink a day for nonpregnant women and 2 drinks a day for men. One drink equals 12 oz of beer, 5 oz of wine, or 1½ oz of hard liquor.  · Do not use any products that contain nicotine or tobacco, such as cigarettes and e-cigarettes. If you need help quitting, ask your health care provider.  · Keep all follow-up visits as told by your health care provider. This is important.  Contact a health care provider if:  · You have a cough that does not go away.  · You have swelling in your feet or ankles.  · You  feel short of breath with activity.  · You feel fatigued.  Get help right away if:  · You have chest pain or difficulty breathing.  · You suddenly have weakness, numbness, or loss of movement on one side of your body.  · You suddenly become very confused.  · You suddenly lose the ability to speak or understand speech.  · You feel like your heart is skipping beats.  · You feel like your heart is beating very quickly.  · You faint or feel like you are going to faint.  These symptoms may represent a serious problem that is an emergency. Do not wait to see if the symptoms will go away. Get medical help right away. Call your local emergency services (911 in the [place] Do not drive yourself to the hospital.   Summary  · When you have sick sinus syndrome, your heart rate may be too fast (tachycardia) or too slow (bradycardia), or it may switch between fast and slow.  · Treatment for this syndrome may include taking medicines and having a pacemaker placed in your chest.  · If you have chest pain or difficulty breathing, get help right away. Do not drive yourself to the hospital.  This information is not intended to replace advice given to you by your health care provider. Make sure you discuss any questions you have with your health care provider.  Document Released: 12/06/2010 Document Revised: 08/24/2017 Document Reviewed: 08/15/2017  [employer] Interactive Patient Education © 2017 [employer]

## 2018-06-07 NOTE — PROGRESS NOTES
POST DISCHARGE CALL MADE BY Isabel Perea  Discharge Date:6/3/2018   Date of Outreach Call: 6/5/2018  9:18 AM  Now that you're home, how are you doing? Fair  Comment:Patient reports she is feeling better.  States  she is using a walker to assist with mobility.  Do you have questions about your medications? No    Did you fill your medications? Yes    Do you have a follow-up appointment scheduled?Yes  Comment:Post discharge clinic on 6/7/18. Family will  bring patient to appt.    Discharging Department: Telemetry 8    Number of Attempts: 1  Current or previous attempts completed within two business days of discharge? Yes  Provided education regarding treatment plan, medication, self-management, ADLs? Yes  Comment:Patient states she would like home care order.   Patient states home care has not been contacted by home care  since discharge. Pt will discuss with provider at discharge  clinic appt.  Has patient completed Advance Directive? If yes, advise them to bring to appointment. Yes  Care Manager phone number provided? Yes  Is there anything else I can help you with? No

## 2018-06-08 ENCOUNTER — HOME HEALTH ADMISSION (OUTPATIENT)
Dept: HOME HEALTH SERVICES | Facility: HOME HEALTHCARE | Age: 78
End: 2018-06-08
Payer: MEDICARE

## 2018-06-10 ENCOUNTER — HOME CARE VISIT (OUTPATIENT)
Dept: HOME HEALTH SERVICES | Facility: HOME HEALTHCARE | Age: 78
End: 2018-06-10
Payer: MEDICARE

## 2018-06-10 VITALS
OXYGEN SATURATION: 94 % | SYSTOLIC BLOOD PRESSURE: 142 MMHG | HEART RATE: 77 BPM | WEIGHT: 142.5 LBS | RESPIRATION RATE: 14 BRPM | HEIGHT: 62 IN | DIASTOLIC BLOOD PRESSURE: 78 MMHG | BODY MASS INDEX: 26.22 KG/M2 | TEMPERATURE: 98.1 F

## 2018-06-10 PROCEDURE — A6252 ABSORPT DRG >16 <=48 W/O BDR: HCPCS

## 2018-06-10 PROCEDURE — 665998 HH PPS REVENUE CREDIT

## 2018-06-10 PROCEDURE — 665001 SOC-HOME HEALTH

## 2018-06-10 PROCEDURE — A6214 FOAM DRG > 48 SQ IN W/BORDER: HCPCS

## 2018-06-10 PROCEDURE — A4216 STERILE WATER/SALINE, 10 ML: HCPCS

## 2018-06-10 PROCEDURE — 665999 HH PPS REVENUE DEBIT

## 2018-06-10 PROCEDURE — G0493 RN CARE EA 15 MIN HH/HOSPICE: HCPCS

## 2018-06-10 SDOH — ECONOMIC STABILITY: HOUSING INSECURITY: UNSAFE APPLIANCES: 0

## 2018-06-10 SDOH — ECONOMIC STABILITY: HOUSING INSECURITY: UNSAFE COOKING RANGE AREA: 0

## 2018-06-10 ASSESSMENT — ENCOUNTER SYMPTOMS
VOMITING: DENIES
NAUSEA: DENIES
SHORTNESS OF BREATH: T

## 2018-06-10 ASSESSMENT — PATIENT HEALTH QUESTIONNAIRE - PHQ9
2. FEELING DOWN, DEPRESSED, IRRITABLE, OR HOPELESS: 00
1. LITTLE INTEREST OR PLEASURE IN DOING THINGS: 00

## 2018-06-10 ASSESSMENT — ACTIVITIES OF DAILY LIVING (ADL): HOME_HEALTH_OASIS: 01

## 2018-06-11 ENCOUNTER — TELEPHONE (OUTPATIENT)
Dept: CARDIOLOGY | Facility: MEDICAL CENTER | Age: 78
End: 2018-06-11

## 2018-06-11 ENCOUNTER — TELEPHONE (OUTPATIENT)
Dept: HEALTH INFORMATION MANAGEMENT | Facility: OTHER | Age: 78
End: 2018-06-11

## 2018-06-11 PROCEDURE — 665998 HH PPS REVENUE CREDIT

## 2018-06-11 PROCEDURE — 665999 HH PPS REVENUE DEBIT

## 2018-06-11 NOTE — TELEPHONE ENCOUNTER
Lm to call back, ok to be seen either place but needs to be seen asap by them to make sure new PM is working well and not infected. Please call back to discuss

## 2018-06-11 NOTE — TELEPHONE ENCOUNTER
----- Message from Adelina Vasquez, Med Ass't sent at 6/11/2018 11:51 AM PDT -----  Regarding: New Pace Maker   Contact: [phone]  Hi Marissa,     Patient's daughter in law Estefany called in regarding Ms. Pastor's new pace maker; she said she cancelled her appointment for a pacer check with us (she is unsure why) and that she was scheduled to see a cardiologist at [facility]; I did look at the patient's chart and saw it may be because one of her insurances is out of network with our office. Estefany would like a call back to discuss whether we feel patient should stay with a [facility] provider (DS placed pacer) or if its okay that she follow up with Cardio at [facility].     Thank you,     Adelina

## 2018-06-12 ENCOUNTER — HOME CARE VISIT (OUTPATIENT)
Dept: HOME HEALTH SERVICES | Facility: HOME HEALTHCARE | Age: 78
End: 2018-06-12
Payer: MEDICARE

## 2018-06-12 ENCOUNTER — HOSPITAL ENCOUNTER (OUTPATIENT)
Facility: MEDICAL CENTER | Age: 78
End: 2018-06-12
Attending: FAMILY MEDICINE
Payer: MEDICARE

## 2018-06-12 VITALS
HEART RATE: 66 BPM | RESPIRATION RATE: 16 BRPM | SYSTOLIC BLOOD PRESSURE: 138 MMHG | TEMPERATURE: 98 F | DIASTOLIC BLOOD PRESSURE: 62 MMHG | OXYGEN SATURATION: 96 %

## 2018-06-12 LAB
APPEARANCE UR: CLEAR
BACTERIA #/AREA URNS HPF: NEGATIVE /HPF
BILIRUB UR QL STRIP.AUTO: NEGATIVE
CAOX CRY #/AREA URNS HPF: ABNORMAL /HPF
COLOR UR: YELLOW
EPI CELLS #/AREA URNS HPF: NEGATIVE /HPF
GLUCOSE UR STRIP.AUTO-MCNC: NEGATIVE MG/DL
HYALINE CASTS #/AREA URNS LPF: ABNORMAL /LPF
KETONES UR STRIP.AUTO-MCNC: ABNORMAL MG/DL
LEUKOCYTE ESTERASE UR QL STRIP.AUTO: ABNORMAL
MICRO URNS: ABNORMAL
NITRITE UR QL STRIP.AUTO: NEGATIVE
PH UR STRIP.AUTO: 5.5 [PH]
PROT UR QL STRIP: NEGATIVE MG/DL
RBC # URNS HPF: ABNORMAL /HPF
RBC UR QL AUTO: ABNORMAL
SP GR UR STRIP.AUTO: 1.03
UROBILINOGEN UR STRIP.AUTO-MCNC: 0.2 MG/DL
WBC #/AREA URNS HPF: ABNORMAL /HPF

## 2018-06-12 PROCEDURE — 87186 SC STD MICRODIL/AGAR DIL: CPT

## 2018-06-12 PROCEDURE — 87086 URINE CULTURE/COLONY COUNT: CPT

## 2018-06-12 PROCEDURE — 665998 HH PPS REVENUE CREDIT

## 2018-06-12 PROCEDURE — 665999 HH PPS REVENUE DEBIT

## 2018-06-12 PROCEDURE — 87077 CULTURE AEROBIC IDENTIFY: CPT

## 2018-06-12 PROCEDURE — 81001 URINALYSIS AUTO W/SCOPE: CPT

## 2018-06-12 PROCEDURE — G0299 HHS/HOSPICE OF RN EA 15 MIN: HCPCS

## 2018-06-12 SDOH — ECONOMIC STABILITY: HOUSING INSECURITY: UNSAFE COOKING RANGE AREA: 0

## 2018-06-12 SDOH — ECONOMIC STABILITY: HOUSING INSECURITY: UNSAFE APPLIANCES: 0

## 2018-06-12 ASSESSMENT — ENCOUNTER SYMPTOMS
NAUSEA: DENIES
VOMITING: DENIES

## 2018-06-13 PROCEDURE — 665999 HH PPS REVENUE DEBIT

## 2018-06-13 PROCEDURE — 665998 HH PPS REVENUE CREDIT

## 2018-06-13 ASSESSMENT — ACTIVITIES OF DAILY LIVING (ADL): OASIS_M1830: 03

## 2018-06-14 DIAGNOSIS — N30.00 ACUTE CYSTITIS WITHOUT HEMATURIA: ICD-10-CM

## 2018-06-14 LAB
BACTERIA UR CULT: ABNORMAL
BACTERIA UR CULT: ABNORMAL
SIGNIFICANT IND 70042: ABNORMAL
SITE SITE: ABNORMAL
SOURCE SOURCE: ABNORMAL

## 2018-06-14 PROCEDURE — 665998 HH PPS REVENUE CREDIT

## 2018-06-14 PROCEDURE — 665999 HH PPS REVENUE DEBIT

## 2018-06-14 RX ORDER — SULFAMETHOXAZOLE AND TRIMETHOPRIM 800; 160 MG/1; MG/1
1 TABLET ORAL 2 TIMES DAILY
Qty: 6 TAB | Refills: 0 | Status: SHIPPED | OUTPATIENT
Start: 2018-06-14 | End: 2018-07-23

## 2018-06-15 ENCOUNTER — TELEPHONE (OUTPATIENT)
Dept: MEDICAL GROUP | Facility: CLINIC | Age: 78
End: 2018-06-15

## 2018-06-15 PROCEDURE — 665998 HH PPS REVENUE CREDIT

## 2018-06-15 PROCEDURE — 665999 HH PPS REVENUE DEBIT

## 2018-06-15 NOTE — TELEPHONE ENCOUNTER
----- Message from Malaika Patel M.D. sent at 6/14/2018  8:58 PM PDT -----  Notify patient that Rx submitted to pharmacy for treatment of UTI.

## 2018-06-16 ENCOUNTER — HOME CARE VISIT (OUTPATIENT)
Dept: HOME HEALTH SERVICES | Facility: HOME HEALTHCARE | Age: 78
End: 2018-06-16
Payer: MEDICARE

## 2018-06-16 PROCEDURE — 665999 HH PPS REVENUE DEBIT

## 2018-06-16 PROCEDURE — 665998 HH PPS REVENUE CREDIT

## 2018-06-17 PROCEDURE — 665998 HH PPS REVENUE CREDIT

## 2018-06-17 PROCEDURE — 665999 HH PPS REVENUE DEBIT

## 2018-06-18 PROCEDURE — 665999 HH PPS REVENUE DEBIT

## 2018-06-18 PROCEDURE — 665998 HH PPS REVENUE CREDIT

## 2018-06-19 ENCOUNTER — HOME CARE VISIT (OUTPATIENT)
Dept: HOME HEALTH SERVICES | Facility: HOME HEALTHCARE | Age: 78
End: 2018-06-19
Payer: MEDICARE

## 2018-06-19 VITALS
TEMPERATURE: 98.8 F | DIASTOLIC BLOOD PRESSURE: 52 MMHG | SYSTOLIC BLOOD PRESSURE: 114 MMHG | HEART RATE: 62 BPM | OXYGEN SATURATION: 96 % | RESPIRATION RATE: 16 BRPM

## 2018-06-19 PROCEDURE — 665999 HH PPS REVENUE DEBIT

## 2018-06-19 PROCEDURE — G0299 HHS/HOSPICE OF RN EA 15 MIN: HCPCS

## 2018-06-19 PROCEDURE — 665998 HH PPS REVENUE CREDIT

## 2018-06-19 SDOH — ECONOMIC STABILITY: HOUSING INSECURITY: UNSAFE COOKING RANGE AREA: 0

## 2018-06-19 SDOH — ECONOMIC STABILITY: HOUSING INSECURITY: UNSAFE APPLIANCES: 0

## 2018-06-20 ENCOUNTER — HOME CARE VISIT (OUTPATIENT)
Dept: HOME HEALTH SERVICES | Facility: HOME HEALTHCARE | Age: 78
End: 2018-06-20
Payer: MEDICARE

## 2018-06-20 VITALS
TEMPERATURE: 98.7 F | HEART RATE: 67 BPM | DIASTOLIC BLOOD PRESSURE: 58 MMHG | SYSTOLIC BLOOD PRESSURE: 122 MMHG | RESPIRATION RATE: 17 BRPM | OXYGEN SATURATION: 93 %

## 2018-06-20 PROCEDURE — 665999 HH PPS REVENUE DEBIT

## 2018-06-20 PROCEDURE — G0151 HHCP-SERV OF PT,EA 15 MIN: HCPCS

## 2018-06-20 PROCEDURE — G0180 MD CERTIFICATION HHA PATIENT: HCPCS | Performed by: FAMILY MEDICINE

## 2018-06-20 PROCEDURE — 665998 HH PPS REVENUE CREDIT

## 2018-06-20 SDOH — ECONOMIC STABILITY: HOUSING INSECURITY: UNSAFE COOKING RANGE AREA: 0

## 2018-06-20 SDOH — ECONOMIC STABILITY: HOUSING INSECURITY: UNSAFE APPLIANCES: 0

## 2018-06-20 ASSESSMENT — ACTIVITIES OF DAILY LIVING (ADL)
ADLS_COMMENTS: <!--EPICS-->SEE O.T EVAL<!--EPICE-->
IADLS_COMMENTS: <!--EPICS-->SEE O.T EVALUATION<!--EPICE-->

## 2018-06-21 ENCOUNTER — OFFICE VISIT (OUTPATIENT)
Dept: INTERNAL MEDICINE | Facility: MEDICAL CENTER | Age: 78
End: 2018-06-21
Payer: MEDICARE

## 2018-06-21 ENCOUNTER — HOME CARE VISIT (OUTPATIENT)
Dept: HOME HEALTH SERVICES | Facility: HOME HEALTHCARE | Age: 78
End: 2018-06-21
Payer: MEDICARE

## 2018-06-21 VITALS
DIASTOLIC BLOOD PRESSURE: 74 MMHG | SYSTOLIC BLOOD PRESSURE: 134 MMHG | HEART RATE: 90 BPM | BODY MASS INDEX: 25.69 KG/M2 | WEIGHT: 139.6 LBS | RESPIRATION RATE: 18 BRPM | TEMPERATURE: 98.1 F | HEIGHT: 62 IN | OXYGEN SATURATION: 90 %

## 2018-06-21 VITALS
HEART RATE: 68 BPM | OXYGEN SATURATION: 94 % | RESPIRATION RATE: 16 BRPM | SYSTOLIC BLOOD PRESSURE: 132 MMHG | TEMPERATURE: 98.8 F | DIASTOLIC BLOOD PRESSURE: 60 MMHG

## 2018-06-21 DIAGNOSIS — N30.00 ACUTE CYSTITIS WITHOUT HEMATURIA: ICD-10-CM

## 2018-06-21 DIAGNOSIS — M25.561 ARTHRALGIA OF BOTH KNEES: ICD-10-CM

## 2018-06-21 DIAGNOSIS — H53.47 BITEMPORAL HEMIANOPIA: ICD-10-CM

## 2018-06-21 DIAGNOSIS — M25.562 ARTHRALGIA OF BOTH KNEES: ICD-10-CM

## 2018-06-21 DIAGNOSIS — M79.644 FINGER PAIN, RIGHT: ICD-10-CM

## 2018-06-21 DIAGNOSIS — C34.11 MALIGNANT NEOPLASM OF UPPER LOBE OF RIGHT LUNG (HCC): ICD-10-CM

## 2018-06-21 DIAGNOSIS — C79.31 METASTASIS TO BRAIN (HCC): ICD-10-CM

## 2018-06-21 DIAGNOSIS — I49.5 SICK SINUS SYNDROME (HCC): ICD-10-CM

## 2018-06-21 DIAGNOSIS — R26.89 BALANCE DISORDER: Primary | ICD-10-CM

## 2018-06-21 PROCEDURE — 665998 HH PPS REVENUE CREDIT

## 2018-06-21 PROCEDURE — G0152 HHCP-SERV OF OT,EA 15 MIN: HCPCS

## 2018-06-21 PROCEDURE — 99204 OFFICE O/P NEW MOD 45 MIN: CPT | Mod: GC | Performed by: INTERNAL MEDICINE

## 2018-06-21 PROCEDURE — 665999 HH PPS REVENUE DEBIT

## 2018-06-21 ASSESSMENT — ENCOUNTER SYMPTOMS: GENERAL WELL-BEING: GOOD

## 2018-06-21 ASSESSMENT — ACTIVITIES OF DAILY LIVING (ADL)
BATHING_ASSISTIVE_EQUIPMENT_USED: SHOWER CHAIR, GRAB BARS, HANDHELD SHOWER
BATHING_REQUIRES_ASSISTANCE: 0

## 2018-06-21 ASSESSMENT — PATIENT HEALTH QUESTIONNAIRE - PHQ9: CLINICAL INTERPRETATION OF PHQ2 SCORE: 0

## 2018-06-21 ASSESSMENT — PAIN SCALES - GENERAL: PAINLEVEL: 1=MINIMAL PAIN

## 2018-06-22 ENCOUNTER — HOME CARE VISIT (OUTPATIENT)
Dept: HOME HEALTH SERVICES | Facility: HOME HEALTHCARE | Age: 78
End: 2018-06-22
Payer: MEDICARE

## 2018-06-22 PROCEDURE — G0156 HHCP-SVS OF AIDE,EA 15 MIN: HCPCS

## 2018-06-22 PROCEDURE — G0299 HHS/HOSPICE OF RN EA 15 MIN: HCPCS

## 2018-06-22 PROCEDURE — 665999 HH PPS REVENUE DEBIT

## 2018-06-22 PROCEDURE — 665998 HH PPS REVENUE CREDIT

## 2018-06-22 ASSESSMENT — ACTIVITIES OF DAILY LIVING (ADL)
DRESSING_UB_ASSISTANCE: 0
DRESSING_LB_ASSISTANCE: 0
HOUSEKEEPING_ASSISTANCE: 6
BATHING_ASSISTANCE: 1
LAUNDRY_ASSISTANCE: 5
MEAL_PREP_ASSISTANCE: 0
TELEPHONE_ASSISTANCE: 0
TRANSPORTATION_ASSISTANCE: 6
EATING_ASSISTANCE: 0
GROOMING_ASSISTANCE: 0
ORAL_CARE_ASSISTANCE: 0
TOILETING_ASSISTANCE: 0
SHOPPING_ASSISTANCE: 6

## 2018-06-22 NOTE — PROGRESS NOTES
New Patient to Establish    Reason to establish: New patient to establish    CC: to establish with PCP    HPI:   Ms. 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   -previously f/u with Dr Thacker, currently seen by Dr. Aguilar and Dr. Caro (for radiation).       Sick sinus syndrome  New Atrial fibrillation  -recently hospitalized for the above dx  -had  13 to 16 sec of sinus pause  -s/p permanent  pacemaker to be done on 6/1 by EP  -Echo 6/2018: EF: 65% Hypokineis basal posterior wall, Pacer/ICD wire seen in right ventricle  -on diltiazem and flecainide per EP.  - [id number]: 3 and HAS-BLE of 2, but becasue she has metastasis to brain and hx of brain surgery that may put her on higher risk of bleeding   -no anticoagulation at this time  -f/u with cardiology Dr. Singleton and [facility]      Bilateral knee joint pain  Right middle and index finger pain  -reported b/l knee joint pain, started one month ago, stable, worse on standing and walking. St hurt at night. Associated with mild swelling of the left knee. Denies recent trauma/injury, weakness, tingling of LE. Has 2+ swelling of the LLE. Pt has hx of left knee pattellar injery at age of 40s while playing tennis s/p surgery.   -reported right middle and index finger pain, at PIP joints, also for a month, associated with mild swelling, tenderness to touch, worse on picking up an object, associated with morning stifness for like 2 hours per pt. Denies trauma, weakness, sensation changes, skin changes, fever, chills, personal hx of joint problems. Denies other joint pain. Has family hx of RA of the father and OA of the mother.  -reported started having these  joint pain after started on Avastin chemotherapy 4-5 rounds.        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   • Arthralgia of both knees 06/21/2018   • Finger pain, right 06/21/2018   • Balance disorder 06/21/2018   • Acute cystitis 06/21/2018   • Radionecrosis 04/14/2018   • Malignant neoplasm of upper lobe of right lung (HCC) 03/05/2018   • CVA (cerebral vascular accident) (HCC) 11/29/2017   • Lung mass 11/19/2015   • Blurry vision, left eye 11/19/2015   • Metastasis to brain (HCC) 11/18/2015   • Metastatic cancer (CMS-HCC) 11/18/2015       Past Medical History:   Diagnosis Date   • Cancer (HCC)     lung cancer with brain mts   • Radionecrosis 4/14/2018   • Sick sinus syndrome (HCC)     with AFIB, s/p pacemaker       Current Outpatient Prescriptions   Medication Sig Dispense Refill   • sulfamethoxazole-trimethoprim (BACTRIM DS) 800-160 MG tablet Take 1 Tab by mouth 2 times a day. 6 Tab 0   • Docusate Calcium (STOOL SOFTENER PO) Take 1 Tab by mouth every day.     • flecainide (TAMBOCOR) 150 MG Tab Take 0.5 Tabs by mouth 2 Times a Day. 60 Tab 0   • DILTIAZem CD (CARDIZEM CD) 120 MG CAPSULE SR 24 HR Take 1 Cap by mouth every day. 30 Cap 0   • omeprazole (PRILOSEC) 20 MG delayed-release capsule Take 1 Cap by mouth 2 Times a Day. 30 Cap 0   • sucralfate (CARAFATE) 1 GM/10ML Suspension Take 10 mL by mouth every 6 hours. 50 mL 3   • aspirin (ASA) 81 MG Chew Tab chewable tablet Take 81 mg by mouth every evening.     • levetiracetam (KEPPRA) 500 MG Tab Take 1 Tab by mouth 2 Times a Day. 60 Tab    • Omega-3 Fatty Acids (FISH OIL) 1200 MG Cap Take 1 Cap by mouth every day.     • multivitamin (THERAGRAN) Tab Take 1 Tab by mouth every day.       No current facility-administered medications for this visit.        Allergies as of 06/21/2018 -  "Reviewed 06/21/2018   Allergen Reaction Noted   • Penicillins Rash and Itching 08/12/2012       Social History     Social History   • Marital status:      Spouse name: N/A   • Number of children: N/A   • Years of education: N/A     Occupational History   • Not on file.     Social History Main Topics   • Smoking status: Former Smoker     Packs/day: 2.00     Years: 12.00   • Smokeless tobacco: Never Used   • Alcohol use 0.6 oz/week     1 Glasses of wine per week   • Drug use: No   • Sexual activity: Not on file     Other Topics Concern   • Not on file     Social History Narrative   • No narrative on file       Family History   Problem Relation Age of Onset   • Dementia Mother    • Diabetes Mother    • Asthma Mother    • Other Mother      osteoarthritis   • Autoimmune Disease Father      Rheumatoid arthritis       Past Surgical History:   Procedure Laterality Date   • CRANIOTOMY STEALTH Right 11/23/2015    Procedure: CRANIOTOMY STEALTH-right occipital;  Surgeon: Suyapa Schumacher M.D.;  Location: SURGERY [facility];  Service:    • OTHER      left knee surgery       ROS: As per HPI.   Constitutional: Denies fevers, Denies weight changes  Eyes: reported peripheral vision defect from brain cancer   Ears/Nose/Throat/Mouth: Denies nasal congestion or sore throat   Cardiovascular: Denies chest pain or palpitations   Respiratory: reported shortness of breath on long distance ambulation , Denies cough  Gastrointestinal/Hepatic: Denies abdominal pain, nausea, vomiting, diarrhea, constipation or GI bleeding   Genitourinary: Denies bladder dysfunction, dysuria or frequency  Musculoskeletal/Rheum: per HPI  Skin: Denies rash  Neurological: Denies headache, confusion, memory loss or focal weakness/paraesthesias. reported balance problem 2/2 brain mts  Psychiatric: denies mood disorder       /74   Pulse 90   Temp 36.7 °C (98.1 °F)   Resp 18   Ht 1.562 m (5' 1.5\")   Wt 63.3 kg (139 lb 9.6 oz)   SpO2 90%   " Breastfeeding? No   BMI 25.95 kg/m²     Physical Exam  General:  Alert and oriented, No apparent distress.    Eyes: Pupils equal and reactive. No scleral icterus.    Throat: Clear no erythema or exudates noted.    Neck: Supple. No lymphadenopathy noted. Thyroid not enlarged.    Lungs: Clear to auscultation and percussion bilaterally.    Cardiovascular: Regular rate and rhythm. No murmurs, rubs or gallops.    Abdomen:  Benign. No rebound or guarding noted.    Extremities: left leg 2+ edema, pitting, Rt leg 1+ pitting edema    Skin: s/p pacemaker placement on her left upper chest, Surgical site and surrounding area look good, no sign of infection. Sterile strip in place      Assessment and Plan    Sick sinus syndrome  New Atrial fibrillation  -recently hospitalized for the above dx  -had  13 to 16 sec of sinus pause  -s/p permanent  pacemaker to be done on 6/1 by EP  -Echo 6/2018: EF: 65% Hypokineis basal posterior wall, Pacer/ICD wire seen in right ventricle  -on diltiazem and flecainide per EP.  - [id number]: 3 and HAS-BLE of 2, but becasue she has metastasis to brain and hx of brain surgery that may put her on higher risk of bleeding   -no anticoagulation at this time  -f/u with cardiology Dr. 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   -previously f/u with Dr Thacker, currently seen by Dr. Aguilar and Dr. Caro (for radiation).       Bilateral knee joint pain  Right middle and index finger pain  -DDX: OA vs RA vs other inflammatory joint pain vs medication side effect ( Avastin, Arthralgia (28% to 45%),   -family hx of RA of the father and OA of the mother.  -reported having these joint pain after started on Avastin chemotherapy 4-5 rounds.   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  -see by ophthalmologist  - VITAMIN B12  - REFERRAL TO PHYSICAL THERAPY Reason for Therapy: Eval/Treat/Report  -reported moving to senior living facility, filled the form     Acute cystitis  -denies symptoms currently  -UA 6/12: +ve for infection, urine CX: E coli growth  -on bactrim  -ctm         Followup: Return in about 5 weeks (around 7/26/2018) for follow up.      Signed by: Geovanny Small M.D.

## 2018-06-22 NOTE — PATIENT INSTRUCTIONS
Knee Pain  Knee pain is a very common symptom and can have many causes. Knee pain often goes away when you follow your health care provider's instructions for relieving pain and discomfort at home. However, knee pain can develop into a condition that needs treatment. Some conditions may include:  · Arthritis caused by wear and tear (osteoarthritis).  · Arthritis caused by swelling and irritation (rheumatoid arthritis or gout).  · A cyst or growth in your knee.  · An infection in your knee joint.  · An injury that will not heal.  · Damage, swelling, or irritation of the tissues that support your knee (torn ligaments or tendinitis).  If your knee pain continues, additional tests may be ordered to diagnose your condition. Tests may include X-rays or other imaging studies of your knee. You may also need to have fluid removed from your knee. Treatment for ongoing knee pain depends on the cause, but treatment may include:  · Medicines to relieve pain or swelling.  · Steroid injections in your knee.  · Physical therapy.  · Surgery.  HOME CARE INSTRUCTIONS  · Take medicines only as directed by your health care provider.  · Rest your knee and keep it raised (elevated) while you are resting.  · Do not do things that cause or worsen pain.  · Avoid high-impact activities or exercises, such as running, jumping rope, or doing jumping jacks.  · Apply ice to the knee area:  ¨ Put ice in a plastic bag.  ¨ Place a towel between your skin and the bag.  ¨ Leave the ice on for 20 minutes, 2-3 times a day.  · Ask your health care provider if you should wear an elastic knee support.  · Keep a pillow under your knee when you sleep.  · Lose weight if you are overweight. Extra weight can put pressure on your knee.  · Do not use any tobacco products, including cigarettes, chewing tobacco, or electronic cigarettes. If you need help quitting, ask your health care provider. Smoking may slow the healing of any bone and joint problems that you may  have.  SEEK MEDICAL CARE IF:  · Your knee pain continues, changes, or gets worse.  · You have a fever along with knee pain.  · Your knee johanna or locks up.  · Your knee becomes more swollen.  SEEK IMMEDIATE MEDICAL CARE IF:   · Your knee joint feels hot to the touch.  · You have chest pain or trouble breathing.  This information is not intended to replace advice given to you by your health care provider. Make sure you discuss any questions you have with your health care provider.  Document Released: 10/14/2008 Document Revised: 01/08/2016 Document Reviewed: 08/03/2015  Elsevier Interactive Patient Education © 2017 [employer]

## 2018-06-23 PROCEDURE — 665999 HH PPS REVENUE DEBIT

## 2018-06-23 PROCEDURE — 665998 HH PPS REVENUE CREDIT

## 2018-06-24 VITALS
TEMPERATURE: 99 F | SYSTOLIC BLOOD PRESSURE: 110 MMHG | OXYGEN SATURATION: 94 % | HEART RATE: 74 BPM | BODY MASS INDEX: 25.36 KG/M2 | WEIGHT: 136.4 LBS | RESPIRATION RATE: 18 BRPM | DIASTOLIC BLOOD PRESSURE: 70 MMHG

## 2018-06-24 VITALS
HEART RATE: 74 BPM | RESPIRATION RATE: 18 BRPM | WEIGHT: 136.4 LBS | SYSTOLIC BLOOD PRESSURE: 110 MMHG | BODY MASS INDEX: 25.36 KG/M2 | DIASTOLIC BLOOD PRESSURE: 70 MMHG | OXYGEN SATURATION: 94 % | TEMPERATURE: 99 F

## 2018-06-24 PROCEDURE — 665998 HH PPS REVENUE CREDIT

## 2018-06-24 PROCEDURE — 665999 HH PPS REVENUE DEBIT

## 2018-06-24 SDOH — ECONOMIC STABILITY: HOUSING INSECURITY: UNSAFE COOKING RANGE AREA: 0

## 2018-06-24 SDOH — ECONOMIC STABILITY: HOUSING INSECURITY: UNSAFE APPLIANCES: 0

## 2018-06-24 ASSESSMENT — ENCOUNTER SYMPTOMS
RESPIRATORY SYMPTOMS COMMENTS: NO S/S OF RESP DISTRESS
DEBILITATING PAIN: 1

## 2018-06-25 ENCOUNTER — HOME CARE VISIT (OUTPATIENT)
Dept: HOME HEALTH SERVICES | Facility: HOME HEALTHCARE | Age: 78
End: 2018-06-25
Payer: MEDICARE

## 2018-06-25 PROCEDURE — 665999 HH PPS REVENUE DEBIT

## 2018-06-25 PROCEDURE — 665998 HH PPS REVENUE CREDIT

## 2018-06-26 ENCOUNTER — HOME CARE VISIT (OUTPATIENT)
Dept: HOME HEALTH SERVICES | Facility: HOME HEALTHCARE | Age: 78
End: 2018-06-26
Payer: MEDICARE

## 2018-06-26 VITALS
WEIGHT: 134.2 LBS | OXYGEN SATURATION: 94 % | SYSTOLIC BLOOD PRESSURE: 115 MMHG | BODY MASS INDEX: 24.95 KG/M2 | DIASTOLIC BLOOD PRESSURE: 60 MMHG | RESPIRATION RATE: 18 BRPM | TEMPERATURE: 98.8 F | HEART RATE: 82 BPM

## 2018-06-26 PROCEDURE — 665998 HH PPS REVENUE CREDIT

## 2018-06-26 PROCEDURE — G0299 HHS/HOSPICE OF RN EA 15 MIN: HCPCS

## 2018-06-26 PROCEDURE — 665999 HH PPS REVENUE DEBIT

## 2018-06-26 ASSESSMENT — ENCOUNTER SYMPTOMS
NAUSEA: NO
VOMITING: NO

## 2018-06-27 ENCOUNTER — NON-PROVIDER VISIT (OUTPATIENT)
Dept: URGENT CARE | Facility: PHYSICIAN GROUP | Age: 78
End: 2018-06-27

## 2018-06-27 ENCOUNTER — HOSPITAL ENCOUNTER (OUTPATIENT)
Dept: LAB | Facility: MEDICAL CENTER | Age: 78
End: 2018-06-27
Attending: INTERNAL MEDICINE
Payer: MEDICARE

## 2018-06-27 ENCOUNTER — HOSPITAL ENCOUNTER (OUTPATIENT)
Dept: LAB | Facility: MEDICAL CENTER | Age: 78
End: 2018-06-27
Attending: SPECIALIST
Payer: MEDICARE

## 2018-06-27 DIAGNOSIS — M79.644 FINGER PAIN, RIGHT: ICD-10-CM

## 2018-06-27 DIAGNOSIS — C34.11 MALIGNANT NEOPLASM OF UPPER LOBE OF RIGHT LUNG (HCC): ICD-10-CM

## 2018-06-27 DIAGNOSIS — M25.561 ARTHRALGIA OF BOTH KNEES: ICD-10-CM

## 2018-06-27 DIAGNOSIS — Z11.1 ENCOUNTER FOR PPD TEST: ICD-10-CM

## 2018-06-27 DIAGNOSIS — I49.5 SICK SINUS SYNDROME (HCC): ICD-10-CM

## 2018-06-27 DIAGNOSIS — C79.31 METASTASIS TO BRAIN (HCC): ICD-10-CM

## 2018-06-27 DIAGNOSIS — M25.562 ARTHRALGIA OF BOTH KNEES: ICD-10-CM

## 2018-06-27 LAB
25(OH)D3 SERPL-MCNC: 22 NG/ML (ref 30–100)
ALBUMIN SERPL BCP-MCNC: 4.3 G/DL (ref 3.2–4.9)
ALBUMIN/GLOB SERPL: 1.3 G/DL
ALP SERPL-CCNC: 83 U/L (ref 30–99)
ALT SERPL-CCNC: 20 U/L (ref 2–50)
ANION GAP SERPL CALC-SCNC: 9 MMOL/L (ref 0–11.9)
APPEARANCE UR: CLEAR
AST SERPL-CCNC: 25 U/L (ref 12–45)
BACTERIA #/AREA URNS HPF: NEGATIVE /HPF
BASOPHILS # BLD AUTO: 1.2 % (ref 0–1.8)
BASOPHILS # BLD: 0.08 K/UL (ref 0–0.12)
BILIRUB SERPL-MCNC: 0.6 MG/DL (ref 0.1–1.5)
BILIRUB UR QL STRIP.AUTO: NEGATIVE
BUN SERPL-MCNC: 20 MG/DL (ref 8–22)
CALCIUM SERPL-MCNC: 10 MG/DL (ref 8.5–10.5)
CHLORIDE SERPL-SCNC: 106 MMOL/L (ref 96–112)
CO2 SERPL-SCNC: 25 MMOL/L (ref 20–33)
COLOR UR: YELLOW
CREAT SERPL-MCNC: 0.63 MG/DL (ref 0.5–1.4)
CRP SERPL HS-MCNC: 0.44 MG/DL (ref 0–0.75)
EOSINOPHIL # BLD AUTO: 0.48 K/UL (ref 0–0.51)
EOSINOPHIL NFR BLD: 7.3 % (ref 0–6.9)
EPI CELLS #/AREA URNS HPF: NEGATIVE /HPF
ERYTHROCYTE [DISTWIDTH] IN BLOOD BY AUTOMATED COUNT: 51.1 FL (ref 35.9–50)
ERYTHROCYTE [SEDIMENTATION RATE] IN BLOOD BY WESTERGREN METHOD: 19 MM/HOUR (ref 0–30)
GLOBULIN SER CALC-MCNC: 3.3 G/DL (ref 1.9–3.5)
GLUCOSE SERPL-MCNC: 78 MG/DL (ref 65–99)
GLUCOSE UR STRIP.AUTO-MCNC: NEGATIVE MG/DL
HCT VFR BLD AUTO: 46.8 % (ref 37–47)
HGB BLD-MCNC: 14.7 G/DL (ref 12–16)
HYALINE CASTS #/AREA URNS LPF: ABNORMAL /LPF
IMM GRANULOCYTES # BLD AUTO: 0.01 K/UL (ref 0–0.11)
IMM GRANULOCYTES NFR BLD AUTO: 0.2 % (ref 0–0.9)
KETONES UR STRIP.AUTO-MCNC: NEGATIVE MG/DL
LEUKOCYTE ESTERASE UR QL STRIP.AUTO: ABNORMAL
LYMPHOCYTES # BLD AUTO: 1.4 K/UL (ref 1–4.8)
LYMPHOCYTES NFR BLD: 21.4 % (ref 22–41)
MCH RBC QN AUTO: 31.2 PG (ref 27–33)
MCHC RBC AUTO-ENTMCNC: 31.4 G/DL (ref 33.6–35)
MCV RBC AUTO: 99.4 FL (ref 81.4–97.8)
MICRO URNS: ABNORMAL
MONOCYTES # BLD AUTO: 0.67 K/UL (ref 0–0.85)
MONOCYTES NFR BLD AUTO: 10.2 % (ref 0–13.4)
NEUTROPHILS # BLD AUTO: 3.9 K/UL (ref 2–7.15)
NEUTROPHILS NFR BLD: 59.7 % (ref 44–72)
NITRITE UR QL STRIP.AUTO: NEGATIVE
NRBC # BLD AUTO: 0 K/UL
NRBC BLD-RTO: 0 /100 WBC
PH UR STRIP.AUTO: 5 [PH]
PLATELET # BLD AUTO: 234 K/UL (ref 164–446)
PMV BLD AUTO: 9.4 FL (ref 9–12.9)
POTASSIUM SERPL-SCNC: 4.6 MMOL/L (ref 3.6–5.5)
PROT SERPL-MCNC: 7.6 G/DL (ref 6–8.2)
PROT UR QL STRIP: NEGATIVE MG/DL
RBC # BLD AUTO: 4.71 M/UL (ref 4.2–5.4)
RBC # URNS HPF: ABNORMAL /HPF
RBC UR QL AUTO: NEGATIVE
RHEUMATOID FACT SER IA-ACNC: <10 IU/ML (ref 0–14)
SODIUM SERPL-SCNC: 140 MMOL/L (ref 135–145)
SP GR UR STRIP.AUTO: 1.03
T4 FREE SERPL-MCNC: 0.98 NG/DL (ref 0.53–1.43)
TSH SERPL DL<=0.005 MIU/L-ACNC: 0.01 UIU/ML (ref 0.38–5.33)
UROBILINOGEN UR STRIP.AUTO-MCNC: 0.2 MG/DL
VIT B12 SERPL-MCNC: 450 PG/ML (ref 211–911)
WBC # BLD AUTO: 6.5 K/UL (ref 4.8–10.8)
WBC #/AREA URNS HPF: ABNORMAL /HPF

## 2018-06-27 PROCEDURE — 86580 TB INTRADERMAL TEST: CPT | Performed by: PHYSICIAN ASSISTANT

## 2018-06-27 PROCEDURE — 80053 COMPREHEN METABOLIC PANEL: CPT

## 2018-06-27 PROCEDURE — 82607 VITAMIN B-12: CPT

## 2018-06-27 PROCEDURE — 84439 ASSAY OF FREE THYROXINE: CPT

## 2018-06-27 PROCEDURE — 36415 COLL VENOUS BLD VENIPUNCTURE: CPT

## 2018-06-27 PROCEDURE — 86140 C-REACTIVE PROTEIN: CPT

## 2018-06-27 PROCEDURE — 86431 RHEUMATOID FACTOR QUANT: CPT

## 2018-06-27 PROCEDURE — 85652 RBC SED RATE AUTOMATED: CPT

## 2018-06-27 PROCEDURE — 665999 HH PPS REVENUE DEBIT

## 2018-06-27 PROCEDURE — 85025 COMPLETE CBC W/AUTO DIFF WBC: CPT

## 2018-06-27 PROCEDURE — 81001 URINALYSIS AUTO W/SCOPE: CPT

## 2018-06-27 PROCEDURE — 82306 VITAMIN D 25 HYDROXY: CPT | Mod: GA

## 2018-06-27 PROCEDURE — 665998 HH PPS REVENUE CREDIT

## 2018-06-27 PROCEDURE — 84443 ASSAY THYROID STIM HORMONE: CPT | Mod: GA

## 2018-06-27 NOTE — PROGRESS NOTES
Ml Chavira is a 78 y.o. female here for a non-provider visit for PPD placement -- Step 1 of 2    Reason for PPD:  work requirement    1. TB evaluation questionnaire completed by patient? Yes      -  If any answers marked yes did you contact a provider prior to placing? Not Indicated  2.  Patient notified to return to clinic for reading on: 6/29/18 - 6/30/18  3.  PPD Placement documentation completed on TB evaluation questionnaire? Yes  4.  Location of TB evaluation questionnaire filed:

## 2018-06-28 ENCOUNTER — TELEPHONE (OUTPATIENT)
Dept: INTERNAL MEDICINE | Facility: MEDICAL CENTER | Age: 78
End: 2018-06-28

## 2018-06-28 DIAGNOSIS — E05.90 HYPERTHYROIDISM: Primary | ICD-10-CM

## 2018-06-28 DIAGNOSIS — E55.9 VITAMIN D DEFICIENCY: Primary | ICD-10-CM

## 2018-06-28 PROCEDURE — 665999 HH PPS REVENUE DEBIT

## 2018-06-28 PROCEDURE — 665998 HH PPS REVENUE CREDIT

## 2018-06-29 ENCOUNTER — HOME CARE VISIT (OUTPATIENT)
Dept: HOME HEALTH SERVICES | Facility: HOME HEALTHCARE | Age: 78
End: 2018-06-29
Payer: MEDICARE

## 2018-06-29 VITALS
OXYGEN SATURATION: 97 % | DIASTOLIC BLOOD PRESSURE: 62 MMHG | TEMPERATURE: 97.6 F | SYSTOLIC BLOOD PRESSURE: 128 MMHG | HEART RATE: 70 BPM | RESPIRATION RATE: 16 BRPM

## 2018-06-29 VITALS
DIASTOLIC BLOOD PRESSURE: 62 MMHG | OXYGEN SATURATION: 97 % | HEART RATE: 70 BPM | SYSTOLIC BLOOD PRESSURE: 128 MMHG | TEMPERATURE: 97.6 F | RESPIRATION RATE: 16 BRPM

## 2018-06-29 VITALS
SYSTOLIC BLOOD PRESSURE: 128 MMHG | HEART RATE: 70 BPM | OXYGEN SATURATION: 97 % | TEMPERATURE: 97.6 F | DIASTOLIC BLOOD PRESSURE: 62 MMHG | RESPIRATION RATE: 16 BRPM

## 2018-06-29 PROCEDURE — 665999 HH PPS REVENUE DEBIT

## 2018-06-29 PROCEDURE — G0299 HHS/HOSPICE OF RN EA 15 MIN: HCPCS

## 2018-06-29 PROCEDURE — G0156 HHCP-SVS OF AIDE,EA 15 MIN: HCPCS

## 2018-06-29 PROCEDURE — 665998 HH PPS REVENUE CREDIT

## 2018-06-29 PROCEDURE — G0151 HHCP-SERV OF PT,EA 15 MIN: HCPCS

## 2018-06-29 SDOH — ECONOMIC STABILITY: HOUSING INSECURITY: UNSAFE APPLIANCES: 0

## 2018-06-29 SDOH — ECONOMIC STABILITY: HOUSING INSECURITY: UNSAFE COOKING RANGE AREA: 0

## 2018-06-29 ASSESSMENT — ENCOUNTER SYMPTOMS
RESPIRATORY SYMPTOMS COMMENTS: PT IS NOT EXPERIENCING ANY RESPIRATORY DISTRESS AT THIS TIME
NAUSEA: PT DENIES ANY NAUSEA AT THIS TIME
VOMITING: PT DENIES ANY EMESIS AT THIS TIME

## 2018-06-29 ASSESSMENT — ACTIVITIES OF DAILY LIVING (ADL): TRANSPORTATION COMMENTS: VISUAL DEFICITS

## 2018-06-29 NOTE — TELEPHONE ENCOUNTER
Called pt and informed about her TSH ( very low ) with normal T4. Possible subclinical hyperthyroidism. Denies hx of thyroid issue. I will repeat TSH again.  -her Vit D is low. Ordered vit d supplement    Thanks

## 2018-06-30 ENCOUNTER — HOSPITAL ENCOUNTER (OUTPATIENT)
Dept: RADIOLOGY | Facility: MEDICAL CENTER | Age: 78
End: 2018-06-30
Attending: INTERNAL MEDICINE
Payer: MEDICARE

## 2018-06-30 ENCOUNTER — NON-PROVIDER VISIT (OUTPATIENT)
Dept: URGENT CARE | Facility: PHYSICIAN GROUP | Age: 78
End: 2018-06-30
Payer: COMMERCIAL

## 2018-06-30 DIAGNOSIS — M25.562 LEFT KNEE PAIN, UNSPECIFIED CHRONICITY: ICD-10-CM

## 2018-06-30 DIAGNOSIS — M25.561 RIGHT KNEE PAIN, UNSPECIFIED CHRONICITY: ICD-10-CM

## 2018-06-30 DIAGNOSIS — M79.644 FINGER PAIN, RIGHT: ICD-10-CM

## 2018-06-30 LAB — TB WHEAL 3D P 5 TU DIAM: NORMAL MM

## 2018-06-30 PROCEDURE — 665999 HH PPS REVENUE DEBIT

## 2018-06-30 PROCEDURE — 73560 X-RAY EXAM OF KNEE 1 OR 2: CPT | Mod: RT

## 2018-06-30 PROCEDURE — 73560 X-RAY EXAM OF KNEE 1 OR 2: CPT | Mod: LT

## 2018-06-30 PROCEDURE — 665998 HH PPS REVENUE CREDIT

## 2018-06-30 PROCEDURE — 73120 X-RAY EXAM OF HAND: CPT | Mod: RT

## 2018-07-01 PROCEDURE — 665998 HH PPS REVENUE CREDIT

## 2018-07-01 PROCEDURE — 665999 HH PPS REVENUE DEBIT

## 2018-07-02 PROCEDURE — 665998 HH PPS REVENUE CREDIT

## 2018-07-02 PROCEDURE — 665999 HH PPS REVENUE DEBIT

## 2018-07-03 ENCOUNTER — HOME CARE VISIT (OUTPATIENT)
Dept: HOME HEALTH SERVICES | Facility: HOME HEALTHCARE | Age: 78
End: 2018-07-03
Payer: MEDICARE

## 2018-07-03 VITALS
TEMPERATURE: 98.4 F | SYSTOLIC BLOOD PRESSURE: 120 MMHG | DIASTOLIC BLOOD PRESSURE: 60 MMHG | RESPIRATION RATE: 17 BRPM | HEART RATE: 61 BPM | OXYGEN SATURATION: 97 %

## 2018-07-03 PROCEDURE — G0156 HHCP-SVS OF AIDE,EA 15 MIN: HCPCS

## 2018-07-03 PROCEDURE — 665998 HH PPS REVENUE CREDIT

## 2018-07-03 PROCEDURE — 665999 HH PPS REVENUE DEBIT

## 2018-07-04 PROCEDURE — 665999 HH PPS REVENUE DEBIT

## 2018-07-04 PROCEDURE — 665998 HH PPS REVENUE CREDIT

## 2018-07-05 PROCEDURE — 665999 HH PPS REVENUE DEBIT

## 2018-07-05 PROCEDURE — 665998 HH PPS REVENUE CREDIT

## 2018-07-06 ENCOUNTER — NON-PROVIDER VISIT (OUTPATIENT)
Dept: URGENT CARE | Facility: PHYSICIAN GROUP | Age: 78
End: 2018-07-06

## 2018-07-06 ENCOUNTER — HOME CARE VISIT (OUTPATIENT)
Dept: HOME HEALTH SERVICES | Facility: HOME HEALTHCARE | Age: 78
End: 2018-07-06
Payer: MEDICARE

## 2018-07-06 VITALS
SYSTOLIC BLOOD PRESSURE: 122 MMHG | DIASTOLIC BLOOD PRESSURE: 64 MMHG | HEART RATE: 78 BPM | BODY MASS INDEX: 25.24 KG/M2 | WEIGHT: 135.8 LBS | OXYGEN SATURATION: 95 % | TEMPERATURE: 97.4 F | RESPIRATION RATE: 16 BRPM

## 2018-07-06 VITALS
HEART RATE: 78 BPM | DIASTOLIC BLOOD PRESSURE: 64 MMHG | TEMPERATURE: 97.4 F | SYSTOLIC BLOOD PRESSURE: 122 MMHG | OXYGEN SATURATION: 97 % | RESPIRATION RATE: 16 BRPM

## 2018-07-06 DIAGNOSIS — Z11.1 PPD SCREENING TEST: Primary | ICD-10-CM

## 2018-07-06 PROCEDURE — G0151 HHCP-SERV OF PT,EA 15 MIN: HCPCS

## 2018-07-06 PROCEDURE — 665999 HH PPS REVENUE DEBIT

## 2018-07-06 PROCEDURE — G0156 HHCP-SVS OF AIDE,EA 15 MIN: HCPCS

## 2018-07-06 PROCEDURE — 665998 HH PPS REVENUE CREDIT

## 2018-07-06 PROCEDURE — 86580 TB INTRADERMAL TEST: CPT | Performed by: PHYSICIAN ASSISTANT

## 2018-07-06 PROCEDURE — G0493 RN CARE EA 15 MIN HH/HOSPICE: HCPCS

## 2018-07-06 SDOH — ECONOMIC STABILITY: HOUSING INSECURITY: UNSAFE APPLIANCES: 0

## 2018-07-06 SDOH — ECONOMIC STABILITY: HOUSING INSECURITY: UNSAFE COOKING RANGE AREA: 0

## 2018-07-06 ASSESSMENT — ENCOUNTER SYMPTOMS
VOMITING: DENIES
NAUSEA: DENIES

## 2018-07-07 VITALS
OXYGEN SATURATION: 96 % | DIASTOLIC BLOOD PRESSURE: 64 MMHG | SYSTOLIC BLOOD PRESSURE: 122 MMHG | RESPIRATION RATE: 16 BRPM | HEART RATE: 78 BPM | TEMPERATURE: 97.4 F

## 2018-07-07 PROCEDURE — 665998 HH PPS REVENUE CREDIT

## 2018-07-07 PROCEDURE — 665999 HH PPS REVENUE DEBIT

## 2018-07-07 SDOH — ECONOMIC STABILITY: HOUSING INSECURITY: UNSAFE COOKING RANGE AREA: 0

## 2018-07-07 SDOH — ECONOMIC STABILITY: HOUSING INSECURITY: UNSAFE APPLIANCES: 0

## 2018-07-08 PROCEDURE — 665999 HH PPS REVENUE DEBIT

## 2018-07-08 PROCEDURE — 665998 HH PPS REVENUE CREDIT

## 2018-07-09 ENCOUNTER — NON-PROVIDER VISIT (OUTPATIENT)
Dept: URGENT CARE | Facility: PHYSICIAN GROUP | Age: 78
End: 2018-07-09

## 2018-07-09 LAB — TB WHEAL 3D P 5 TU DIAM: 0 MM

## 2018-07-09 PROCEDURE — 665999 HH PPS REVENUE DEBIT

## 2018-07-09 PROCEDURE — 665998 HH PPS REVENUE CREDIT

## 2018-07-09 NOTE — CARE PLAN
Problem: Safety  Goal: Will remain free from falls  Outcome: PROGRESSING AS EXPECTED  Bed in lowest position with call light and bedside table within reach. Instructed patient to use call light for assistance,verbalized understanding.        [place] Lips Filler Volume In Cc: 1

## 2018-07-09 NOTE — PROGRESS NOTES
Ml Chavira is a 78 y.o. female here for a non-provider visit for PPD reading -- Step 2 of 2.      1.  Resulted in Epic under enter/edit results? Yes   2.  TB evaluation questionnaire scanned into chart and original given to patient?Yes      3. Was induration greater than 0 mm? No.    If Step 1 of 2, when is patient returning for second step (delete if N/A):     Routed to PCP? Yes

## 2018-07-10 PROCEDURE — 665999 HH PPS REVENUE DEBIT

## 2018-07-10 PROCEDURE — 665998 HH PPS REVENUE CREDIT

## 2018-07-11 PROCEDURE — 665998 HH PPS REVENUE CREDIT

## 2018-07-11 PROCEDURE — 665999 HH PPS REVENUE DEBIT

## 2018-07-12 PROCEDURE — 665999 HH PPS REVENUE DEBIT

## 2018-07-12 PROCEDURE — 665998 HH PPS REVENUE CREDIT

## 2018-07-13 ENCOUNTER — HOME CARE VISIT (OUTPATIENT)
Dept: HOME HEALTH SERVICES | Facility: HOME HEALTHCARE | Age: 78
End: 2018-07-13
Payer: MEDICARE

## 2018-07-13 VITALS
HEART RATE: 65 BPM | TEMPERATURE: 98.8 F | DIASTOLIC BLOOD PRESSURE: 54 MMHG | SYSTOLIC BLOOD PRESSURE: 118 MMHG | RESPIRATION RATE: 17 BRPM | OXYGEN SATURATION: 95 %

## 2018-07-13 PROCEDURE — G0493 RN CARE EA 15 MIN HH/HOSPICE: HCPCS

## 2018-07-13 PROCEDURE — 665997 HH PPS REVENUE ADJ

## 2018-07-13 PROCEDURE — 665998 HH PPS REVENUE CREDIT

## 2018-07-13 PROCEDURE — 665999 HH PPS REVENUE DEBIT

## 2018-07-13 PROCEDURE — G0156 HHCP-SVS OF AIDE,EA 15 MIN: HCPCS

## 2018-07-14 PROCEDURE — 665999 HH PPS REVENUE DEBIT

## 2018-07-14 PROCEDURE — 665998 HH PPS REVENUE CREDIT

## 2018-07-15 VITALS
OXYGEN SATURATION: 97 % | WEIGHT: 135.5 LBS | TEMPERATURE: 98.8 F | BODY MASS INDEX: 25.19 KG/M2 | DIASTOLIC BLOOD PRESSURE: 54 MMHG | HEART RATE: 65 BPM | RESPIRATION RATE: 17 BRPM | SYSTOLIC BLOOD PRESSURE: 118 MMHG

## 2018-07-15 PROCEDURE — 665998 HH PPS REVENUE CREDIT

## 2018-07-15 PROCEDURE — 665999 HH PPS REVENUE DEBIT

## 2018-07-15 SDOH — ECONOMIC STABILITY: HOUSING INSECURITY: UNSAFE COOKING RANGE AREA: 0

## 2018-07-15 SDOH — ECONOMIC STABILITY: HOUSING INSECURITY: UNSAFE APPLIANCES: 0

## 2018-07-15 ASSESSMENT — ACTIVITIES OF DAILY LIVING (ADL)
HOME_HEALTH_OASIS: 00
OASIS_M1830: 01

## 2018-07-16 PROCEDURE — 665998 HH PPS REVENUE CREDIT

## 2018-07-16 PROCEDURE — 665999 HH PPS REVENUE DEBIT

## 2018-07-17 PROCEDURE — 665998 HH PPS REVENUE CREDIT

## 2018-07-17 PROCEDURE — 665999 HH PPS REVENUE DEBIT

## 2018-07-18 PROCEDURE — 665999 HH PPS REVENUE DEBIT

## 2018-07-18 PROCEDURE — 665998 HH PPS REVENUE CREDIT

## 2018-07-19 ENCOUNTER — HOSPITAL ENCOUNTER (OUTPATIENT)
Dept: RADIOLOGY | Facility: MEDICAL CENTER | Age: 78
End: 2018-07-19
Attending: SPECIALIST
Payer: MEDICARE

## 2018-07-19 DIAGNOSIS — C34.11 MALIGNANT NEOPLASM OF UPPER LOBE OF RIGHT LUNG (HCC): ICD-10-CM

## 2018-07-19 PROCEDURE — 71260 CT THORAX DX C+: CPT

## 2018-07-19 PROCEDURE — 665999 HH PPS REVENUE DEBIT

## 2018-07-19 PROCEDURE — 700117 HCHG RX CONTRAST REV CODE 255: Performed by: SPECIALIST

## 2018-07-19 PROCEDURE — 665998 HH PPS REVENUE CREDIT

## 2018-07-19 PROCEDURE — 70553 MRI BRAIN STEM W/O & W/DYE: CPT

## 2018-07-19 PROCEDURE — A9585 GADOBUTROL INJECTION: HCPCS | Performed by: SPECIALIST

## 2018-07-19 RX ORDER — GADOBUTROL 604.72 MG/ML
10 INJECTION INTRAVENOUS ONCE
Status: COMPLETED | OUTPATIENT
Start: 2018-07-19 | End: 2018-07-19

## 2018-07-19 RX ADMIN — IOHEXOL 75 ML: 350 INJECTION, SOLUTION INTRAVENOUS at 14:48

## 2018-07-19 RX ADMIN — GADOBUTROL 10 ML: 604.72 INJECTION INTRAVENOUS at 16:21

## 2018-07-19 NOTE — PROGRESS NOTES
Pt's PPM set to MRI safe mode by rep prior to MRI. During MRI scan, pt monitored with ECG and SPO2. Pt tolerated scan well. Discharged ambulatory

## 2018-07-20 PROCEDURE — 665998 HH PPS REVENUE CREDIT

## 2018-07-20 PROCEDURE — 665999 HH PPS REVENUE DEBIT

## 2018-07-21 PROCEDURE — 665999 HH PPS REVENUE DEBIT

## 2018-07-21 PROCEDURE — 665998 HH PPS REVENUE CREDIT

## 2018-07-22 PROCEDURE — 665999 HH PPS REVENUE DEBIT

## 2018-07-22 PROCEDURE — 665998 HH PPS REVENUE CREDIT

## 2018-07-23 ENCOUNTER — OFFICE VISIT (OUTPATIENT)
Dept: INTERNAL MEDICINE | Facility: MEDICAL CENTER | Age: 78
End: 2018-07-23
Payer: MEDICARE

## 2018-07-23 VITALS
WEIGHT: 137.4 LBS | DIASTOLIC BLOOD PRESSURE: 84 MMHG | SYSTOLIC BLOOD PRESSURE: 134 MMHG | OXYGEN SATURATION: 96 % | HEART RATE: 67 BPM | HEIGHT: 62 IN | BODY MASS INDEX: 25.28 KG/M2 | TEMPERATURE: 97.2 F

## 2018-07-23 DIAGNOSIS — Z13.820 SCREENING FOR OSTEOPOROSIS: ICD-10-CM

## 2018-07-23 DIAGNOSIS — Z00.00 HEALTHCARE MAINTENANCE: ICD-10-CM

## 2018-07-23 DIAGNOSIS — R79.89 LOW VITAMIN D LEVEL: ICD-10-CM

## 2018-07-23 DIAGNOSIS — E04.1 THYROID NODULE: ICD-10-CM

## 2018-07-23 DIAGNOSIS — R79.89 HIGH SERUM THYROID STIMULATING HORMONE (TSH): ICD-10-CM

## 2018-07-23 PROCEDURE — 665998 HH PPS REVENUE CREDIT

## 2018-07-23 PROCEDURE — 99214 OFFICE O/P EST MOD 30 MIN: CPT | Mod: GC | Performed by: INTERNAL MEDICINE

## 2018-07-23 PROCEDURE — 665999 HH PPS REVENUE DEBIT

## 2018-07-23 RX ORDER — CALCIUM CARBONATE 500(1250)
500 TABLET ORAL 2 TIMES DAILY WITH MEALS
Qty: 60 TAB | Refills: 6 | Status: SHIPPED | OUTPATIENT
Start: 2018-07-23 | End: 2018-09-05 | Stop reason: SDUPTHER

## 2018-07-23 RX ORDER — FLECAINIDE ACETATE 50 MG/1
75 TABLET ORAL
Refills: 0 | COMMUNITY
Start: 2018-06-04 | End: 2018-07-23

## 2018-07-23 RX ORDER — DILTIAZEM HYDROCHLORIDE 120 MG/1
120 CAPSULE, COATED, EXTENDED RELEASE ORAL
Refills: 0 | COMMUNITY
Start: 2018-06-03 | End: 2018-07-23

## 2018-07-23 RX ORDER — DILTIAZEM HYDROCHLORIDE 240 MG/1
240 CAPSULE, COATED, EXTENDED RELEASE ORAL
Refills: 3 | COMMUNITY
Start: 2018-06-29 | End: 2018-09-05 | Stop reason: SDUPTHER

## 2018-07-24 PROCEDURE — 665999 HH PPS REVENUE DEBIT

## 2018-07-24 PROCEDURE — 665998 HH PPS REVENUE CREDIT

## 2018-07-24 NOTE — PATIENT INSTRUCTIONS
Thyroid Nodule  A thyroid nodule is an isolated growth of thyroid cells that forms a lump in your thyroid gland. The thyroid gland is a butterfly-shaped gland. It is found in the lower front of your neck. This gland sends chemical messengers (hormones) through your blood to all parts of your body. These hormones are important in regulating your body temperature and helping your body to use energy. Thyroid nodules are common. Most are not cancerous (are benign). You may have one nodule or several nodules.  Different types of thyroid nodules include:  · Nodules that grow and fill with fluid (thyroid cysts).  · Nodules that produce too much thyroid hormone (hot nodules or hyperthyroid).  · Nodules that produce no thyroid hormone (cold nodules or hypothyroid).  · Nodules that form from cancer cells (thyroid cancers).  What are the causes?  Usually, the cause of this condition is not known.  What increases the risk?  Factors that make this condition more likely to develop include:  · Increasing age. Thyroid nodules become more common in people who are older than 45 years of age.  · Gender.  ¨ Benign thyroid nodules are more common in women.  ¨ Cancerous (malignant) thyroid nodules are more common in men.  · A family history that includes:  ¨ Thyroid nodules.  ¨ Pheochromocytoma.  ¨ Thyroid carcinoma.  ¨ Hyperparathyroidism.  · Certain kinds of thyroid diseases, such as Hashimoto thyroiditis.  · Lack of iodine.  · A history of head and neck radiation, such as from X-rays.  What are the signs or symptoms?  It is common for this condition to cause no symptoms. If you have symptoms, they may include:  · A lump in your lower neck.  · Feeling a lump or tickle in your throat.  · Pain in your neck, jaw, or ear.  · Having trouble swallowing.  Hot nodules may cause symptoms that include:  · Weight loss.  · Warm, flushed skin.  · Feeling hot.  · Feeling nervous.  · A racing heartbeat.  Cold nodules may cause symptoms that  include:  · Weight gain.  · Dry skin.  · Brittle hair. This may also occur with hair loss.  · Feeling cold.  · Fatigue.  Thyroid cancer nodules may cause symptoms that include:  · Hard nodules that feel stuck to the thyroid gland.  · Hoarseness.  · Lumps in the glands near your thyroid (lymph nodes).  How is this diagnosed?  A thyroid nodule may be felt by your health care provider during a physical exam. This condition may also be diagnosed based on your symptoms. You may also have tests, including:  · An ultrasound. This may be done to confirm the diagnosis.  · A biopsy. This involves taking a sample from the nodule and looking at it under a microscope to see if the nodule is benign.  · Blood tests to make sure that your thyroid is working properly.  · Imaging tests such as MRI or CT scan may be done if:  ¨ Your nodule is large.  ¨ Your nodule is blocking your airway.  ¨ Cancer is suspected.  How is this treated?  Treatment depends on the cause and size of your nodule or nodules. If the nodule is benign, treatment may not be necessary. Your health care provider may monitor the nodule to see if it goes away without treatment. If the nodule continues to grow, is cancerous, or does not go away:  · It may need to be drained with a needle.  · It may need to be removed with surgery.  If you have surgery, part or all of your thyroid gland may need to be removed as well.  Follow these instructions at home:  · Pay attention to any changes in your nodule.  · Take over-the-counter and prescription medicines only as told by your health care provider.  · Keep all follow-up visits as told by your health care provider. This is important.  Contact a health care provider if:  · Your voice changes.  · You have trouble swallowing.  · You have pain in your neck, ear, or jaw that is getting worse.  · Your nodule gets bigger.  · Your nodule starts to make it harder for you to breathe.  Get help right away if:  · You have a sudden  fever.  · You feel very weak.  · Your muscles look like they are shrinking (muscle wasting).  · You have mood swings.  · You feel very restless.  · You feel confused.  · You are seeing or hearing things that other people do not see or hear (having hallucinations).  · You feel suddenly nauseous or throw up.  · You suddenly have diarrhea.  · You have chest pain.  · There is a loss of consciousness.  This information is not intended to replace advice given to you by your health care provider. Make sure you discuss any questions you have with your health care provider.  Document Released: 11/10/2005 Document Revised: 08/20/2017 Document Reviewed: 03/30/2016  Elsevier Interactive Patient Education © 2017 [employer]

## 2018-07-24 NOTE — PROGRESS NOTES
Established Patient    Ml presents today with the following:    CC: follow up    HPI:   Ms. Chavira is a 77 yo F with pmhx of lung cancer with brain metastasis s/p chemotherapy and radiation and sick sinus syndrome is here to establish with PCP       Bilateral knee joint pain  Right middle and index finger pain  -stable, ESR and CRP wnl, imaging of knees and hands showed evidence of OA  -denies worsening of pain, denies joint swelling, skin rash, trauma.    Subclinical hyperthyroidism?  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  3   • calcium carbonate (OS-SOURAV 500) 500 MG Tab Take 1 Tab by mouth 2 times a day, with meals. 60 Tab 6   • Cholecalciferol 1000 UNIT Cap Take 1 Cap by mouth every day at 6 PM. 60 Cap 0   • flecainide (TAMBOCOR) 150 MG Tab Take 0.5  "Tabs by mouth 2 Times a Day. (Patient taking differently: Take 150 mg by mouth 2 Times a Day.) 60 Tab 0   • aspirin (ASA) 81 MG Chew Tab chewable tablet Take 81 mg by mouth every evening.     • levetiracetam (KEPPRA) 500 MG Tab Take 1 Tab by mouth 2 Times a Day. 60 Tab    • Omega-3 Fatty Acids (FISH OIL) 1200 MG Cap Take 1 Cap by mouth every day.     • multivitamin (THERAGRAN) Tab Take 1 Tab by mouth every day.       No current facility-administered medications for this visit.      Past Medical History:   Diagnosis Date   • Cancer (HCC)     lung cancer with brain mts   • Radionecrosis 4/14/2018   • Sick sinus syndrome (HCC)     with AFIB, s/p pacemaker     Past Surgical History:   Procedure Laterality Date   • CRANIOTOMY STEALTH Right 11/23/2015    Procedure: CRANIOTOMY STEALTH-right occipital;  Surgeon: Suyapa Schumacher M.D.;  Location: SURGERY [facility];  Service:    • OTHER      left knee surgery     Social History     Social History   • Marital status:      Spouse name: N/A   • Number of children: N/A   • Years of education: N/A     Occupational History   • Not on file.     Social History Main Topics   • Smoking status: Former Smoker     Packs/day: 2.00     Years: 12.00   • Smokeless tobacco: Never Used   • Alcohol use 0.6 oz/week     1 Glasses of wine per week   • Drug use: No   • Sexual activity: Not on file     Other Topics Concern   • Not on file     Social History Narrative   • No narrative on file     Family History   Problem Relation Age of Onset   • Dementia Mother    • Diabetes Mother    • Asthma Mother    • Other Mother      osteoarthritis   • Autoimmune Disease Father      Rheumatoid arthritis         ROS: As per HPI. Otherwise unremarkable       /84   Pulse 67   Temp 36.2 °C (97.2 °F)   Ht 1.562 m (5' 1.5\")   Wt 62.3 kg (137 lb 6.4 oz)   SpO2 96%   BMI 25.54 kg/m²     Physical Exam   Constitutional:  oriented to person, place, and time. No distress.   Eyes: Pupils are equal, " round, and reactive to light. No scleral icterus.  Neck: Neck supple. No thyromegaly present.   Cardiovascular: Normal rate, regular rhythm and normal heart sounds.  Exam reveals no gallop and no friction rub.  No murmur heard.  Pulmonary/Chest: Breath sounds normal. Chest wall is not dull to percussion.   Musculoskeletal:  left leg 2+ edema, pitting, Rt leg 1+ pitting edema  Lymphadenopathy: no cervical adenopathy  Neurological: alert and oriented to person, place, and time.   Skin: No rash    Assessment and Plan    Subclinical hyperthyroidism?  Thyroid nodule  -TSH of 0.01,very Low, FT4: 0.98 N,   -CT chest 7/2018:   10.4 mm rim-enhancing nodule anterior margin right lobe of the thyroid gland>>DDX: thyroid adenoma vs toxic nodule?  -Denies hx of thyroid issue, denies symptoms  Orders:   - TSH WITH REFLEX TO FT4; Future>> to repeat TSH level again and make sure is not lab error  - US-SOFT TISSUES OF HEAD - NECK; Future  -treatment for hyperthyroidism is warranted in pt with increase risk of complication from low TSH such as age > 65, comorbid conditions such as Afib  ( this pt has) or osteoporosis.   -if repeated TSH is very low, will refer to endocrine specialist         Bilateral knee joint pain  Right middle and index finger pain  - ESR and CRP wnl  -x ray imaging of knees and hands 7/2018 showed evidence of OA  -denies worsening of pain  -ctm     Low vitamin D level  -on vit D supplelment  - calcium carbonate (OS-SOURAV 500) 500 MG Tab; Take 1 Tab by mouth 2 times a day, with meals.  Dispense: 60 Tab; Refill: 6      Preventive care  Flu - 10/2017  TD- need to be done next visit  Pneumococcal - prevnar: 10/2016, Pneumovax: 10/2017  Colonoscopy -reported 3 years ago and was normal. No need more screening ( up to age of 76 yo)  Pap - reported last one 4 yrs ago, was normal, no need more screening ( upto age of 66 yo)  Mammogram - reported last one was normal in 2 years, no need more screening ( upto age of 74  yo)  Dexa - ordered this visit  - DS-BONE DENSITY STUDY (DEXA); Future    #################################################  Chronic issues:    Sick sinus syndrome  New Atrial fibrillation  -recently hospitalized for the above dx  -had  13 to 16 sec of sinus pause  -s/p permanent  pacemaker to be done on 6/1 by EP  -Echo 6/2018: EF: 65% Hypokineis basal posterior wall, Pacer/ICD wire seen in right ventricle  -on diltiazem and flecainide per EP.  - [id number]: 3 and HAS-BLE of 2, but becasue she has metastasis to brain and hx of brain surgery that may put her on higher risk of bleeding   -no anticoagulation at this time  -f/u with cardiology Dr. 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   -previously f/u with Dr Thacker, currently seen by Dr. Aguilar and Dr. Caro (for radiation).   -CT chest 7/2018:  Ill-defined right upper lobe nodular infiltration and contiguous spiculated opacity are the same or slightly larger compared to previous. No new infiltrates identified.  No mediastinal adenopathy.  -MRI brain 7/2018:  1.  Postoperative right parietal-occipital craniotomy, by history for resection of metastatic squamous cell carcinoma. The residual ring-enhancing mass is seen on MRI study 11/18/2015.  2.  Extensive encephalomalacic change in the right occipital lobe, parietal lobe, and right posterior temporal lobe with stable appearing ex vacuo dilatation of the posterior body right temporal horn and trigone. No new mass effect or evidence of local   tumor recurrence. Surrounding confluent T2 and FLAIR hyperintensity may represent a combination of microcystic encephalomalacic change, postradiation white matter change, or less likely vasogenic edema as there  is no acute mass effect appreciated.    3.  Although this is a noncontrast enhanced exam, no new areas of vasogenic edema or mass effect to suggest new brain metastasis elsewhere in the supratentorial compartment or posterior fossa.       Balance disorder  Bitemporal hemianopia? from brain mts?  -see by ophthalmologist  -B12 wnl   - REFERRAL TO PHYSICAL THERAPY Reason for Therapy: Eval/Treat/Report  -reported moving to senior living facility, filled the form        Signed by: Geovanny Small M.D.

## 2018-07-25 PROCEDURE — 665998 HH PPS REVENUE CREDIT

## 2018-07-25 PROCEDURE — 665999 HH PPS REVENUE DEBIT

## 2018-07-26 PROCEDURE — 665999 HH PPS REVENUE DEBIT

## 2018-07-26 PROCEDURE — 665998 HH PPS REVENUE CREDIT

## 2018-08-03 ENCOUNTER — TELEPHONE (OUTPATIENT)
Dept: INTERNAL MEDICINE | Facility: MEDICAL CENTER | Age: 78
End: 2018-08-03

## 2018-08-03 DIAGNOSIS — C34.90 LUNG CANCER METASTATIC TO BRAIN (HCC): ICD-10-CM

## 2018-08-03 DIAGNOSIS — C79.31 LUNG CANCER METASTATIC TO BRAIN (HCC): ICD-10-CM

## 2018-08-03 DIAGNOSIS — R79.89 LOW VITAMIN D LEVEL: ICD-10-CM

## 2018-08-03 DIAGNOSIS — R79.89 LOW TSH LEVEL: Primary | ICD-10-CM

## 2018-08-03 NOTE — TELEPHONE ENCOUNTER
1. Caller Name: Francisco Javier from [facility]                      Call Back Number: [phone]    2. Message: Bone density not cover under medicare try diferent diagnosis and re-clemente thru epic.    3. Patient approves office to leave a detailed voicemail/MyChart message: yes

## 2018-08-07 PROBLEM — R79.89 LOW TSH LEVEL: Status: ACTIVE | Noted: 2018-08-07

## 2018-08-07 PROBLEM — R79.89 HIGH SERUM THYROID STIMULATING HORMONE (TSH): Status: RESOLVED | Noted: 2018-07-23 | Resolved: 2018-08-07

## 2018-08-08 ENCOUNTER — TELEPHONE (OUTPATIENT)
Dept: INTERNAL MEDICINE | Facility: MEDICAL CENTER | Age: 78
End: 2018-08-08

## 2018-08-08 DIAGNOSIS — E05.90 SUBCLINICAL HYPERTHYROIDISM: ICD-10-CM

## 2018-08-08 NOTE — TELEPHONE ENCOUNTER
Ordered dexa scan again with reasoning for low vit d, low TSH level ( hyperthyroidism?), lung cancer with brain mts    Thanks

## 2018-08-08 NOTE — TELEPHONE ENCOUNTER
1. Caller Name: Jaun Scanlon                     Call Back Number: [phone] ext [phone]    2. Message: Bone density with new codes still not cover under Medicare. Please call Ghislaine at [employer] for assistance [phone]. Please place new order in Epic.Thank you    3. Patient approves office to leave a detailed voicemail/MyChart message: yes

## 2018-08-09 NOTE — TELEPHONE ENCOUNTER
Called radiology department. Could not hold of them. Called pt, left VM. Informed pt and advised to perform TSH/FT4 and anti-TPO to repeat thyroid function again for subclinical hyperthyroidism      Thanks

## 2018-08-14 NOTE — TELEPHONE ENCOUNTER
I called Dr Small notify patient called will not have bone density if it is not cover under Medicare. Dr Herrera agree patient only will have lab orders for tsh/ft4 and anti-tpo for  now . Patient agree.

## 2018-08-16 ENCOUNTER — APPOINTMENT (OUTPATIENT)
Dept: RADIOLOGY | Facility: MEDICAL CENTER | Age: 78
End: 2018-08-16
Attending: INTERNAL MEDICINE
Payer: MEDICARE

## 2018-08-16 ENCOUNTER — TELEPHONE (OUTPATIENT)
Dept: INTERNAL MEDICINE | Facility: MEDICAL CENTER | Age: 78
End: 2018-08-16

## 2018-08-16 DIAGNOSIS — E05.90 SUBCLINICAL HYPERTHYROIDISM: ICD-10-CM

## 2018-08-16 DIAGNOSIS — E04.1 THYROID NODULE: ICD-10-CM

## 2018-08-16 DIAGNOSIS — R79.89 HIGH SERUM THYROID STIMULATING HORMONE (TSH): ICD-10-CM

## 2018-08-16 DIAGNOSIS — E04.2 MULTIPLE THYROID NODULES: Primary | ICD-10-CM

## 2018-08-16 PROCEDURE — 76536 US EXAM OF HEAD AND NECK: CPT

## 2018-08-16 NOTE — TELEPHONE ENCOUNTER
Called pt, left VM. Pt US thyroid showed multiple nodules, b/l, large upto 4 cm b/l, hypoechoic, complex cystic/solid, with vascularization as well as focal calcification. There are some criteria for thyroid cancer. However, pt has subclinical hyperthyroidism. These nodules could be from autonomous hyperthyroidism.    Ordered Thyroid scintigraphy ( I -123 scan) to look for nodules ( whether hyper/hypo functioning). Will need to have FNA if hypo-functioning.   Of note: pt has hx of lung cancer s/p radiation.   Also referred to endocrinologist for further management.

## 2018-08-24 ENCOUNTER — HOSPITAL ENCOUNTER (OUTPATIENT)
Dept: LAB | Facility: MEDICAL CENTER | Age: 78
End: 2018-08-24
Attending: SPECIALIST
Payer: MEDICARE

## 2018-08-24 ENCOUNTER — HOSPITAL ENCOUNTER (OUTPATIENT)
Dept: LAB | Facility: MEDICAL CENTER | Age: 78
End: 2018-08-24
Attending: INTERNAL MEDICINE
Payer: MEDICARE

## 2018-08-24 DIAGNOSIS — R79.89 HIGH SERUM THYROID STIMULATING HORMONE (TSH): ICD-10-CM

## 2018-08-24 DIAGNOSIS — E04.1 THYROID NODULE: ICD-10-CM

## 2018-08-24 DIAGNOSIS — E05.90 SUBCLINICAL HYPERTHYROIDISM: ICD-10-CM

## 2018-08-24 LAB
ALBUMIN SERPL BCP-MCNC: 4.3 G/DL (ref 3.2–4.9)
ALBUMIN/GLOB SERPL: 1.5 G/DL
ALP SERPL-CCNC: 84 U/L (ref 30–99)
ALT SERPL-CCNC: 23 U/L (ref 2–50)
ANION GAP SERPL CALC-SCNC: 5 MMOL/L (ref 0–11.9)
AST SERPL-CCNC: 21 U/L (ref 12–45)
BASOPHILS # BLD AUTO: 0.9 % (ref 0–1.8)
BASOPHILS # BLD: 0.05 K/UL (ref 0–0.12)
BILIRUB SERPL-MCNC: 0.7 MG/DL (ref 0.1–1.5)
BUN SERPL-MCNC: 15 MG/DL (ref 8–22)
CALCIUM SERPL-MCNC: 9.8 MG/DL (ref 8.5–10.5)
CHLORIDE SERPL-SCNC: 103 MMOL/L (ref 96–112)
CO2 SERPL-SCNC: 31 MMOL/L (ref 20–33)
CREAT SERPL-MCNC: 0.69 MG/DL (ref 0.5–1.4)
EOSINOPHIL # BLD AUTO: 0.27 K/UL (ref 0–0.51)
EOSINOPHIL NFR BLD: 4.7 % (ref 0–6.9)
ERYTHROCYTE [DISTWIDTH] IN BLOOD BY AUTOMATED COUNT: 55.2 FL (ref 35.9–50)
GLOBULIN SER CALC-MCNC: 2.8 G/DL (ref 1.9–3.5)
GLUCOSE SERPL-MCNC: 82 MG/DL (ref 65–99)
HCT VFR BLD AUTO: 45.8 % (ref 37–47)
HGB BLD-MCNC: 15.1 G/DL (ref 12–16)
IMM GRANULOCYTES # BLD AUTO: 0.02 K/UL (ref 0–0.11)
IMM GRANULOCYTES NFR BLD AUTO: 0.4 % (ref 0–0.9)
LYMPHOCYTES # BLD AUTO: 1.12 K/UL (ref 1–4.8)
LYMPHOCYTES NFR BLD: 19.6 % (ref 22–41)
MCH RBC QN AUTO: 32.9 PG (ref 27–33)
MCHC RBC AUTO-ENTMCNC: 33 G/DL (ref 33.6–35)
MCV RBC AUTO: 99.8 FL (ref 81.4–97.8)
MONOCYTES # BLD AUTO: 0.54 K/UL (ref 0–0.85)
MONOCYTES NFR BLD AUTO: 9.5 % (ref 0–13.4)
NEUTROPHILS # BLD AUTO: 3.7 K/UL (ref 2–7.15)
NEUTROPHILS NFR BLD: 64.9 % (ref 44–72)
NRBC # BLD AUTO: 0 K/UL
NRBC BLD-RTO: 0 /100 WBC
PLATELET # BLD AUTO: 247 K/UL (ref 164–446)
PMV BLD AUTO: 9 FL (ref 9–12.9)
POTASSIUM SERPL-SCNC: 4.5 MMOL/L (ref 3.6–5.5)
PROT SERPL-MCNC: 7.1 G/DL (ref 6–8.2)
RBC # BLD AUTO: 4.59 M/UL (ref 4.2–5.4)
SODIUM SERPL-SCNC: 139 MMOL/L (ref 135–145)
T3 SERPL-MCNC: 115.6 NG/DL (ref 60–181)
T4 FREE SERPL-MCNC: 1.09 NG/DL (ref 0.53–1.43)
THYROPEROXIDASE AB SERPL-ACNC: 16.8 IU/ML (ref 0–9)
TSH SERPL DL<=0.005 MIU/L-ACNC: 0.03 UIU/ML (ref 0.38–5.33)
WBC # BLD AUTO: 5.7 K/UL (ref 4.8–10.8)

## 2018-08-24 PROCEDURE — 84443 ASSAY THYROID STIM HORMONE: CPT

## 2018-08-24 PROCEDURE — 36415 COLL VENOUS BLD VENIPUNCTURE: CPT

## 2018-08-24 PROCEDURE — 84480 ASSAY TRIIODOTHYRONINE (T3): CPT

## 2018-08-24 PROCEDURE — 84439 ASSAY OF FREE THYROXINE: CPT

## 2018-08-24 PROCEDURE — 86376 MICROSOMAL ANTIBODY EACH: CPT

## 2018-08-24 PROCEDURE — 85025 COMPLETE CBC W/AUTO DIFF WBC: CPT

## 2018-08-24 PROCEDURE — 80053 COMPREHEN METABOLIC PANEL: CPT

## 2018-08-27 ENCOUNTER — HOSPITAL ENCOUNTER (OUTPATIENT)
Dept: RADIOLOGY | Facility: MEDICAL CENTER | Age: 78
End: 2018-08-27
Attending: SPECIALIST
Payer: MEDICARE

## 2018-08-27 ENCOUNTER — TELEPHONE (OUTPATIENT)
Dept: INTERNAL MEDICINE | Facility: MEDICAL CENTER | Age: 78
End: 2018-08-27

## 2018-08-27 DIAGNOSIS — R79.89 LOW VITAMIN D LEVEL: ICD-10-CM

## 2018-08-27 DIAGNOSIS — E04.1 THYROID NODULE: ICD-10-CM

## 2018-08-27 DIAGNOSIS — Z13.820 SCREENING FOR OSTEOPOROSIS: ICD-10-CM

## 2018-08-27 DIAGNOSIS — C34.11 MALIGNANT NEOPLASM OF UPPER LOBE OF RIGHT LUNG (HCC): ICD-10-CM

## 2018-08-27 DIAGNOSIS — Z78.0 POSTMENOPAUSAL: ICD-10-CM

## 2018-08-27 PROCEDURE — A9552 F18 FDG: HCPCS

## 2018-08-27 NOTE — TELEPHONE ENCOUNTER
Called pt, informed about referral to endocrinology as well as RICHARDSON thyroid scan. Ordered DEXA scan for screening of osteoporosis again 2/2 risk factors ( subclinical hyperthyroidism, postmenopausal, low vit D).    Thanks

## 2018-09-05 DIAGNOSIS — R79.89 LOW VITAMIN D LEVEL: ICD-10-CM

## 2018-09-05 DIAGNOSIS — E55.9 VITAMIN D DEFICIENCY: ICD-10-CM

## 2018-09-05 RX ORDER — ASPIRIN 81 MG/1
81 TABLET, CHEWABLE ORAL EVERY EVENING
Qty: 90 TAB | Refills: 0 | Status: SHIPPED | OUTPATIENT
Start: 2018-09-05 | End: 2019-08-27

## 2018-09-05 RX ORDER — CALCIUM CARBONATE 500(1250)
500 TABLET ORAL 2 TIMES DAILY WITH MEALS
Qty: 180 TAB | Refills: 0 | Status: SHIPPED | OUTPATIENT
Start: 2018-09-05 | End: 2018-11-15

## 2018-09-05 RX ORDER — AMOXICILLIN 500 MG
1 CAPSULE ORAL DAILY
Qty: 90 CAP | Refills: 0 | Status: SHIPPED | OUTPATIENT
Start: 2018-09-05 | End: 2019-08-12

## 2018-09-05 RX ORDER — LEVETIRACETAM 500 MG/1
500 TABLET ORAL 2 TIMES DAILY
Qty: 180 TAB | Refills: 0 | Status: SHIPPED | OUTPATIENT
Start: 2018-09-05 | End: 2018-11-09 | Stop reason: SDUPTHER

## 2018-09-05 RX ORDER — FLECAINIDE ACETATE 150 MG/1
75 TABLET ORAL 2 TIMES DAILY
Qty: 90 TAB | Refills: 0 | Status: SHIPPED | OUTPATIENT
Start: 2018-09-05 | End: 2019-02-04 | Stop reason: SDUPTHER

## 2018-09-05 RX ORDER — DILTIAZEM HYDROCHLORIDE 240 MG/1
240 CAPSULE, COATED, EXTENDED RELEASE ORAL
Qty: 90 CAP | Refills: 0 | Status: SHIPPED | OUTPATIENT
Start: 2018-09-05 | End: 2019-06-19

## 2018-09-05 NOTE — TELEPHONE ENCOUNTER
Will refill Keppra and Flecainide for now. But future refills need to come from neurology and cardiology respectively.

## 2018-09-05 NOTE — TELEPHONE ENCOUNTER
Last seen: 07/23/18 by Dr. Small  Next appt: 11/05/18 with Dr. Small-also asking for refills on Biotin 5000 mcg     Was the patient seen in the last year in this department? Yes   Does patient have an active prescription for medications requested? No   Received Request Via: Pharmacy

## 2018-09-06 ENCOUNTER — OFFICE VISIT (OUTPATIENT)
Dept: ENDOCRINOLOGY | Facility: MEDICAL CENTER | Age: 78
End: 2018-09-06
Payer: MEDICARE

## 2018-09-06 VITALS
HEIGHT: 61 IN | WEIGHT: 136 LBS | SYSTOLIC BLOOD PRESSURE: 126 MMHG | OXYGEN SATURATION: 91 % | BODY MASS INDEX: 25.68 KG/M2 | DIASTOLIC BLOOD PRESSURE: 74 MMHG | HEART RATE: 79 BPM

## 2018-09-06 DIAGNOSIS — E04.2 MULTIPLE THYROID NODULES: ICD-10-CM

## 2018-09-06 DIAGNOSIS — R79.89 LOW VITAMIN D LEVEL: ICD-10-CM

## 2018-09-06 DIAGNOSIS — E06.9 HYPERTHYROIDITIS: ICD-10-CM

## 2018-09-06 PROCEDURE — 99204 OFFICE O/P NEW MOD 45 MIN: CPT | Performed by: PHYSICIAN ASSISTANT

## 2018-09-06 RX ORDER — BIOTIN 1 MG
TABLET ORAL
COMMUNITY
End: 2018-09-06

## 2018-09-06 NOTE — PROGRESS NOTES
"New Patient Consult Note  Referred by: Geovanny Small M.D.      HPI:  Ml Chavira is a  78 y.o. old patient who comes in today for evaluation and management of the followin. Multiple thyroid nodules-   Patient was found to have thyroid nodules on thyroid ultrasound of 2018.  She states that these were found incidentally during follow-up scans for her metastatic squamous cell carcinoma of the lung.  She is relatively asymptomatic other than having an occasional hair loss.  She admits to having a tremor of her upper extremities a couple of years ago but has not had anything since 2015 or 16.  She did undergo radiation of her head and neck including her lungs for lung cancer.  She does complain of fatigue at times but overall feels her energy level is \"good\".  She states that she is above average energy level for her age.    Patient  denies symptoms of sensation of a throat mass, voice changes, hoarseness, neck pain, or difficulty swallowing, dysphonia, cough, enlarged cervical lymph nodes, sweating, tremors, heat intolerance or weight loss/gain.     Patient denies any a family history of thyroid cancer.     2. Low vitamin D level-on 2018 patient was diagnosed with vitamin D deficiency of 22.  She was started on oral vitamin D replacement.  She has been taking it since this time.  She has not had a repeat check of vitamin D.      3. Hyperthyroiditis- labs from 2018 displayed a TSH of 0.010 with a free T4 of 0.9 8 repeat laboratory results of 2018 rubella TSH of 0.030 free T4 of 1.09 and a total T3 of 115.6.     Patient denies ever being treated with amiodarone for her atrial fibrillation.    Labs:   2018   Vitamin D 22, TSH 0.010 free T4 0.98  2018   TSH 0.030 free T4 1.09 T3 115.6 TPO antibody 16.8      US:   Thyroid ultrasound 2018: Numerous hypoechoic nodules in the right thyroid lobe the largest of which is present in the lower pole measuring 1.9 x 1.6 x 1.2 cm " the largest is present in the midportion of the right lobe measuring 1.1 x 1.0 x 0.7 cm.  There are numerous nodules located in the left lobe the largest was identified in the left lower pole measuring 2.8 x 1.8 x 1.9 cm it contains focal calcifications.  The next largest is medial slightly inferior to the left thyroid lobe which measures 1.0 x 1.0 x 0.9 cm    PET: Follow-up metastatic squamous cell carcinoma of the lung under findings of the head and neck there were no abnormal FDG uptake.  There is postoperative changes consistent with right parietal occipital craniotomy.      ROS:  Constitutional: No weight loss or gain,  fever  HEENT: No difficulty with swallowing, change in voice, or swelling in throat area   Cardiac: No chest pain, palpitations, or racing heart.  Patient has a pacemaker for sick sinus syndrome.  She recently followed up with a cardiologist in [facility].  Resp: No shortness of breath  GI: No abdominal pain, nausea, vomiting, or diarrhea   Neuro: No numbness or tinging in feet.  She ambulates with a cane.  Endo: No heat or cold intolerance, no polyuria or polydipsia  All other systems were reviewed and were negative.    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   • Arthralgia of both knees 06/21/2018   • Finger pain, right 06/21/2018   • Balance disorder 06/21/2018   • Acute cystitis 06/21/2018   • Radionecrosis 04/14/2018   • Malignant neoplasm of upper lobe of right lung (HCC) 03/05/2018   • CVA (cerebral vascular accident) (HCC) 11/29/2017   • Lung mass 11/19/2015   • Blurry  vision, left eye 11/19/2015   • Metastasis to brain (HCC) 11/18/2015   • Metastatic cancer (CMS-HCC) 11/18/2015       Past Surgical History:  Past Surgical History:   Procedure Laterality Date   • CRANIOTOMY STEALTH Right 11/23/2015    Procedure: CRANIOTOMY STEALTH-right occipital;  Surgeon: Suyapa Schumacher M.D.;  Location: SURGERY [facility];  Service:    • APPENDECTOMY     • CRANIOTOMY     • KNEE ARTHROSCOPY      left    • OTHER      left knee surgery       Allergies:  Penicillins    Social History:   Social History     Social History   • Marital status:      Spouse name: N/A   • Number of children: N/A   • Years of education: N/A     Occupational History   • Not on file.     Social History Main Topics   • Smoking status: Former Smoker     Packs/day: 2.00     Years: 12.00   • Smokeless tobacco: Never Used   • Alcohol use 0.6 oz/week     1 Glasses of wine per week   • Drug use: No   • Sexual activity: Not on file     Other Topics Concern   • Not on file     Social History Narrative   • No narrative on file       Family History:  Family History   Problem Relation Age of Onset   • Dementia Mother    • Diabetes Mother    • Asthma Mother    • Other Mother         osteoarthritis   • Arthritis Mother    • Autoimmune Disease Father         Rheumatoid arthritis   • Arthritis Father    • Cancer Brother         prostate        Medications:    Current Outpatient Prescriptions:   •  Biotin 1000 MCG Tab, Take  by mouth., Disp: , Rfl:   •  levETIRAcetam (KEPPRA) 500 MG Tab, Take 1 Tab by mouth 2 Times a Day., Disp: 180 Tab, Rfl: 0  •  Cholecalciferol 1000 UNIT Cap, Take 1 Cap by mouth every day at 6 PM., Disp: 90 Cap, Rfl: 0  •  flecainide (TAMBOCOR) 150 MG Tab, Take 0.5 Tabs by mouth 2 Times a Day., Disp: 90 Tab, Rfl: 0  •  Omega-3 Fatty Acids (FISH OIL) 1200 MG Cap, Take 1 Cap by mouth every day., Disp: 90 Cap, Rfl: 0  •  DILTIAZem CD (CARDIZEM CD) 240 MG CAPSULE SR 24 HR, Take 1 Cap by mouth every day., Disp: 90  "Cap, Rfl: 0  •  calcium carbonate (OS-SOURAV 500) 500 MG Tab, Take 1 Tab by mouth 2 times a day, with meals., Disp: 180 Tab, Rfl: 0  •  aspirin (ASA) 81 MG Chew Tab chewable tablet, Take 1 Tab by mouth every evening., Disp: 90 Tab, Rfl: 0  •  multivitamin (THERAGRAN) Tab, Take 1 Tab by mouth every day., Disp: , Rfl:     Labs: Reviewed (as above)     Physical Examination:  Vital signs: /74   Pulse 79   Ht 1.549 m (5' 1\")   Wt 61.7 kg (136 lb)   SpO2 91%   BMI 25.70 kg/m²  Body mass index is 25.7 kg/m².  General: No apparent distress, cooperative, no facial hair    Eyes: No scleral icterus or discharge, no nystagmus  ENMT: Normal on external inspection of nose, lips, normal thyroid exam  Neck: No abnormal masses on inspection or palpations. no bruits noted   Resp: Normal effort, clear to auscultation bilaterally without wheezes, rales or rhonchi  CVS: Regular rate and rhythm, S1 S2 normal, no murmur, rubs or gallops    Extremities: No edema. Monofilament intact bilateral, Vibratory sense intact bilateral   Abdomen: abdominal obesity present, No Hepatosplenomegaly, no striae   Neuro: Alert and oriented  Skin: No rash  Psych: Normal mood and affect, intact memory and able to make informed decisions    Assessment and Plan:    1. Multiple thyroid nodules-we reviewed the etiology of thyroid nodules today.  She is asymptomatic and therefore we will proceed with the following.    - THYROID PEROXIDASE  (TPO) AB; Future  - THYROTROPIN RECEP AB; Future  - FREE THYROXINE; Future  - TRIIDOTHYRONINE; Future  - TSH; Future  - T3 FREE; Future    2. Low vitamin D level-she has been on replacement vitamin D for the last several months.  I would like to recheck her vitamin D levels with her next blood draw.    - VITAMIN 1,25 DIHYDROXY; Future    3. Hyperthyroid- at this time I would like to proceed with a high 1-123 thyroid uptake and scan.  Order has previously been placed by her primary care doctor and she is scheduled for " October 29, 2018.  I have asked her obtain labs 1-2 days prior to her scan.  She will follow-up up with me in approximately 1 week post scan.  She is without any complaints with regards to her hyperthyroidism at this time.  We reviewed symptomatology related to hyperthyroidism and long-term complications today.    4.  Hashimoto's thyroiditis we discussed that she has elevated thyroid peroxidase antibodies, so she is at risk for developing hypothyroidism in the future.    I have asked her to discontinue the use of biotin until her next evaluation.    Return in about 2 months (around 11/6/2018).     Thank you for allowing me to participate in the care of this patient.  If you have any questions or concerns please do not hesitate to contact me.    Le Rdz P.A.-C.  09/06/18    CC:   Geovanny Small M.D.    This note was created using voice recognition software (Dragon). The accuracy of the dictation is limited by the abilities of the software. I have reviewed the note prior to signing, however some errors in grammar and context are still possible. If you have any questions related to this note please do not hesitate to contact our office.

## 2018-09-06 NOTE — PATIENT INSTRUCTIONS
After scan (scheduled on 10/29/2018) follow up in 1 week after      Around 10/29/2018 (1-2 days) Prior to scan get labs done.     Return 1-2 week of November

## 2018-09-20 ENCOUNTER — TELEPHONE (OUTPATIENT)
Dept: INTERNAL MEDICINE | Facility: MEDICAL CENTER | Age: 78
End: 2018-09-20

## 2018-09-20 ENCOUNTER — HOSPITAL ENCOUNTER (OUTPATIENT)
Dept: RADIOLOGY | Facility: MEDICAL CENTER | Age: 78
End: 2018-09-20
Attending: INTERNAL MEDICINE
Payer: MEDICARE

## 2018-09-20 DIAGNOSIS — R79.89 LOW VITAMIN D LEVEL: ICD-10-CM

## 2018-09-20 DIAGNOSIS — Z78.0 POSTMENOPAUSAL: ICD-10-CM

## 2018-09-20 PROCEDURE — 77080 DXA BONE DENSITY AXIAL: CPT

## 2018-09-21 NOTE — ADDENDUM NOTE
Encounter addended by: Geovanny Small M.D. on: 9/20/2018  6:13 PM<BR>    Actions taken: SmartForm saved

## 2018-09-21 NOTE — ADDENDUM NOTE
Encounter addended by: Geovanny Small M.D. on: 9/20/2018  6:15 PM<BR>    Actions taken: Results reviewed in IB

## 2018-10-19 ENCOUNTER — HOSPITAL ENCOUNTER (OUTPATIENT)
Dept: LAB | Facility: MEDICAL CENTER | Age: 78
End: 2018-10-19
Attending: SPECIALIST
Payer: MEDICARE

## 2018-10-19 ENCOUNTER — HOSPITAL ENCOUNTER (OUTPATIENT)
Dept: LAB | Facility: MEDICAL CENTER | Age: 78
End: 2018-10-19
Attending: PHYSICIAN ASSISTANT
Payer: MEDICARE

## 2018-10-19 DIAGNOSIS — E06.9 HYPERTHYROIDITIS: ICD-10-CM

## 2018-10-19 DIAGNOSIS — E04.2 MULTIPLE THYROID NODULES: ICD-10-CM

## 2018-10-19 DIAGNOSIS — R79.89 LOW VITAMIN D LEVEL: ICD-10-CM

## 2018-10-19 LAB
BASOPHILS # BLD AUTO: 0.6 % (ref 0–1.8)
BASOPHILS # BLD: 0.04 K/UL (ref 0–0.12)
EOSINOPHIL # BLD AUTO: 0.14 K/UL (ref 0–0.51)
EOSINOPHIL NFR BLD: 2.2 % (ref 0–6.9)
ERYTHROCYTE [DISTWIDTH] IN BLOOD BY AUTOMATED COUNT: 51.9 FL (ref 35.9–50)
HCT VFR BLD AUTO: 45 % (ref 37–47)
HGB BLD-MCNC: 14.8 G/DL (ref 12–16)
IMM GRANULOCYTES # BLD AUTO: 0.02 K/UL (ref 0–0.11)
IMM GRANULOCYTES NFR BLD AUTO: 0.3 % (ref 0–0.9)
LYMPHOCYTES # BLD AUTO: 1.21 K/UL (ref 1–4.8)
LYMPHOCYTES NFR BLD: 19.3 % (ref 22–41)
MCH RBC QN AUTO: 33.5 PG (ref 27–33)
MCHC RBC AUTO-ENTMCNC: 32.9 G/DL (ref 33.6–35)
MCV RBC AUTO: 101.8 FL (ref 81.4–97.8)
MONOCYTES # BLD AUTO: 0.56 K/UL (ref 0–0.85)
MONOCYTES NFR BLD AUTO: 8.9 % (ref 0–13.4)
NEUTROPHILS # BLD AUTO: 4.3 K/UL (ref 2–7.15)
NEUTROPHILS NFR BLD: 68.7 % (ref 44–72)
NRBC # BLD AUTO: 0 K/UL
NRBC BLD-RTO: 0 /100 WBC
PLATELET # BLD AUTO: 248 K/UL (ref 164–446)
PMV BLD AUTO: 9.2 FL (ref 9–12.9)
RBC # BLD AUTO: 4.42 M/UL (ref 4.2–5.4)
T3 SERPL-MCNC: 78.4 NG/DL (ref 60–181)
T3FREE SERPL-MCNC: 3.63 PG/ML (ref 2.4–4.2)
T4 FREE SERPL-MCNC: 0.99 NG/DL (ref 0.53–1.43)
THYROPEROXIDASE AB SERPL-ACNC: 17.5 IU/ML (ref 0–9)
TSH SERPL DL<=0.005 MIU/L-ACNC: 0.03 UIU/ML (ref 0.38–5.33)
WBC # BLD AUTO: 6.3 K/UL (ref 4.8–10.8)

## 2018-10-19 PROCEDURE — 84481 FREE ASSAY (FT-3): CPT

## 2018-10-19 PROCEDURE — 85025 COMPLETE CBC W/AUTO DIFF WBC: CPT

## 2018-10-19 PROCEDURE — 84480 ASSAY TRIIODOTHYRONINE (T3): CPT

## 2018-10-19 PROCEDURE — 83520 IMMUNOASSAY QUANT NOS NONAB: CPT

## 2018-10-19 PROCEDURE — 84439 ASSAY OF FREE THYROXINE: CPT

## 2018-10-19 PROCEDURE — 86376 MICROSOMAL ANTIBODY EACH: CPT

## 2018-10-19 PROCEDURE — 80053 COMPREHEN METABOLIC PANEL: CPT

## 2018-10-19 PROCEDURE — 84443 ASSAY THYROID STIM HORMONE: CPT

## 2018-10-19 PROCEDURE — 36415 COLL VENOUS BLD VENIPUNCTURE: CPT

## 2018-10-19 PROCEDURE — 82652 VIT D 1 25-DIHYDROXY: CPT | Mod: GA

## 2018-10-20 LAB
ALBUMIN SERPL BCP-MCNC: 4.5 G/DL (ref 3.2–4.9)
ALBUMIN/GLOB SERPL: 1.7 G/DL
ALP SERPL-CCNC: 74 U/L (ref 30–99)
ALT SERPL-CCNC: 23 U/L (ref 2–50)
ANION GAP SERPL CALC-SCNC: 8 MMOL/L (ref 0–11.9)
AST SERPL-CCNC: 21 U/L (ref 12–45)
BILIRUB SERPL-MCNC: 0.5 MG/DL (ref 0.1–1.5)
BUN SERPL-MCNC: 16 MG/DL (ref 8–22)
CALCIUM SERPL-MCNC: 10 MG/DL (ref 8.5–10.5)
CHLORIDE SERPL-SCNC: 105 MMOL/L (ref 96–112)
CO2 SERPL-SCNC: 27 MMOL/L (ref 20–33)
CREAT SERPL-MCNC: 0.59 MG/DL (ref 0.5–1.4)
GLOBULIN SER CALC-MCNC: 2.7 G/DL (ref 1.9–3.5)
GLUCOSE SERPL-MCNC: 82 MG/DL (ref 65–99)
POTASSIUM SERPL-SCNC: 4 MMOL/L (ref 3.6–5.5)
PROT SERPL-MCNC: 7.2 G/DL (ref 6–8.2)
SODIUM SERPL-SCNC: 140 MMOL/L (ref 135–145)

## 2018-10-21 LAB
1,25(OH)2D3 SERPL-MCNC: 45.7 PG/ML (ref 19.9–79.3)
TSH RECEP AB SER-ACNC: <0.9 IU/L

## 2018-10-29 ENCOUNTER — HOSPITAL ENCOUNTER (OUTPATIENT)
Dept: RADIOLOGY | Facility: MEDICAL CENTER | Age: 78
End: 2018-10-29
Attending: INTERNAL MEDICINE
Payer: MEDICARE

## 2018-10-29 DIAGNOSIS — E04.2 MULTIPLE THYROID NODULES: ICD-10-CM

## 2018-10-29 DIAGNOSIS — E05.90 SUBCLINICAL HYPERTHYROIDISM: ICD-10-CM

## 2018-10-29 PROCEDURE — A9516 IODINE I-123 SOD IODIDE MIC: HCPCS

## 2018-10-31 ENCOUNTER — TELEPHONE (OUTPATIENT)
Dept: INTERNAL MEDICINE | Facility: MEDICAL CENTER | Age: 78
End: 2018-10-31

## 2018-11-09 RX ORDER — LEVETIRACETAM 500 MG/1
500 TABLET ORAL 2 TIMES DAILY
Qty: 180 TAB | Refills: 0 | Status: SHIPPED | OUTPATIENT
Start: 2018-11-09 | End: 2019-03-28 | Stop reason: SDUPTHER

## 2018-11-09 NOTE — TELEPHONE ENCOUNTER
Was the patient seen in the last year in this department? Yes    Does patient have an active prescription for medications requested? No     Received Request Via: Pharmacy Pt last seen on: 06/07/2018 by Dr. Small Next appt on: 12/13/2018 insurance only pays 90 day supply

## 2018-11-14 ENCOUNTER — OFFICE VISIT (OUTPATIENT)
Dept: ENDOCRINOLOGY | Facility: MEDICAL CENTER | Age: 78
End: 2018-11-14
Payer: MEDICARE

## 2018-11-14 DIAGNOSIS — E05.90 HYPERTHYROIDISM: ICD-10-CM

## 2018-11-14 DIAGNOSIS — R79.89 LOW TSH LEVEL: ICD-10-CM

## 2018-11-14 DIAGNOSIS — Z13.820 SCREENING FOR OSTEOPOROSIS: ICD-10-CM

## 2018-11-14 DIAGNOSIS — E04.1 THYROID NODULE: ICD-10-CM

## 2018-11-14 DIAGNOSIS — R79.89 LOW VITAMIN D LEVEL: ICD-10-CM

## 2018-11-14 PROCEDURE — 99213 OFFICE O/P EST LOW 20 MIN: CPT | Performed by: PHYSICIAN ASSISTANT

## 2018-11-14 RX ORDER — METHIMAZOLE 5 MG/1
5 TABLET ORAL 2 TIMES DAILY
Qty: 60 TAB | Refills: 3 | OUTPATIENT
Start: 2018-11-14 | End: 2019-03-07 | Stop reason: SDUPTHER

## 2018-11-15 VITALS
BODY MASS INDEX: 26.64 KG/M2 | WEIGHT: 141 LBS | HEART RATE: 77 BPM | OXYGEN SATURATION: 93 % | DIASTOLIC BLOOD PRESSURE: 76 MMHG | SYSTOLIC BLOOD PRESSURE: 130 MMHG

## 2018-11-15 RX ORDER — METHIMAZOLE 5 MG/1
5 TABLET ORAL
Qty: 60 TAB | Refills: 3 | OUTPATIENT
Start: 2018-11-15 | End: 2019-04-16

## 2018-11-16 NOTE — PROGRESS NOTES
Endocrinology Clinic Progress Note  PCP: Geovanny Small M.D.    HPI:  Ml Chavira is a 78 y.o. old patient who comes in today for review of endocrine problems.    1. Hyperthyroidism  2. Low vitamin D level  3. Thyroid nodule    Patient is here to review her labs and her nuclear medicine thyroid uptake and scan.  Overall she is feeling well however she does notice some swelling in her lower extremities more so on her left than her right.  She denies any pain or discomfort.  She is leaving for [place] in the next several weeks.      Endocrine history to date:   1. Multiple thyroid nodules-   Found on thyroid ultrasound of 8/16/2018 secondary to abnormal PET      2. Low vitamin D level-diagnosed 6/27/2018 vitamin D deficiency of 22   Currently on Vitamin D replacement  Vitamin D 1, 25-45.7 will need to check regular vitamin D 25 in the future     3. Hyperthyroiditis-  10/19/2018 TSH 0.030 free T4 0.99, T3 78.4, free T3 3.63, TPO antibody 17.5, thyroid receptor antibodies less than 0.90  6/27/2018  TSH 0.010 free T4 0.98  8/24/2018 TSH 0.030 free T4 1.09 T3 115.6 TPO antibody 16.8    Thyroid uptake and scan 10/29/2018   borderline elevated 5 hour radioactive iodine uptake measured at 15.4%.  Area of focal increased uptake involving the lower pole of the left thyroid lobe corresponding to a solid nodule in that area likely representing a hyperactive nodule.        US:   Thyroid ultrasound 8/16/2018: Numerous hypoechoic nodules in the right thyroid lobe the largest of which is present in the lower pole measuring 1.9 x 1.6 x 1.2 cm the largest is present in the midportion of the right lobe measuring 1.1 x 1.0 x 0.7 cm.  There are numerous nodules located in the left lobe the largest was identified in the left lower pole measuring 2.8 x 1.8 x 1.9 cm it contains focal calcifications.  The next largest is medial slightly inferior to the left thyroid lobe which measures 1.0 x 1.0 x 0.9 cm     PET: Follow-up  metastatic squamous cell carcinoma of the lung under findings of the head and neck there were no abnormal FDG uptake.  There is postoperative changes consistent with right parietal occipital craniotomy.    3. Screening for osteoporosis  She is currently not taking any medications for her osteoporosis.  She has not been on any other medications for her osteoporosis.  She is currently only taking vitamin D and calcium.  DEXA 9/20/2018  The mean bone mineral density for the lumbar spine is 0.914 g/cm2, which corresponds to a T score of -2.1 and a Z score of -0.1.    The proximal left femur has a mean bone mineral density of 0.616 g/cm2, with a T score of -3.1 and a Z score of -1.0.  Findings are consistent with osteoporosis with a high risk of fracture.    ROS:  Constitutional: No weight loss or gain,  fever  HEENT: No difficulty with swallowing, change in voice, or swelling in throat area   Cardiac: No chest pain, palpitations, or racing heart  Resp: No shortness of breath  GI: No abdominal pain, nausea, vomiting, or diarrhea   Neuro: No numbness or tinging in feet  Endo: No heat or cold intolerance, no polyuria or polydipsia      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   • Arthralgia of both knees 06/21/2018   • Finger pain, right 06/21/2018   • Balance disorder 06/21/2018   • Acute cystitis 06/21/2018   • Radionecrosis 04/14/2018   • Malignant neoplasm of upper lobe of right lung ([facility]) 03/05/2018   • CVA (cerebral vascular accident) ([facility]) 11/29/2017   • Lung mass 11/19/2015   • Blurry vision, left eye  11/19/2015   • Metastasis to brain (HCC) 11/18/2015   • Metastatic cancer (CMS-HCC) 11/18/2015       Medications:    Current Outpatient Prescriptions:   •  methimazole (TAPAZOLE) 5 MG Tab, Take 1 Tab by mouth 2 Times a Day., Disp: 60 Tab, Rfl: 3  •  levETIRAcetam (KEPPRA) 500 MG Tab, Take 1 Tab by mouth 2 Times a Day., Disp: 180 Tab, Rfl: 0  •  Cholecalciferol 1000 UNIT Cap, Take 1 Cap by mouth every day at 6 PM., Disp: 90 Cap, Rfl: 0  •  flecainide (TAMBOCOR) 150 MG Tab, Take 0.5 Tabs by mouth 2 Times a Day., Disp: 90 Tab, Rfl: 0  •  Omega-3 Fatty Acids (FISH OIL) 1200 MG Cap, Take 1 Cap by mouth every day., Disp: 90 Cap, Rfl: 0  •  DILTIAZem CD (CARDIZEM CD) 240 MG CAPSULE SR 24 HR, Take 1 Cap by mouth every day., Disp: 90 Cap, Rfl: 0  •  aspirin (ASA) 81 MG Chew Tab chewable tablet, Take 1 Tab by mouth every evening., Disp: 90 Tab, Rfl: 0  •  multivitamin (THERAGRAN) Tab, Take 1 Tab by mouth every day., Disp: , Rfl:     Labs: Reviewed as above     Physical Examination:  Vital signs: There were no vitals taken for this visit. There is no height or weight on file to calculate BMI.  General: No apparent distress, cooperative  Eyes: No scleral icterus, no discharge, normal eyelids  Neck: No abnormal masses on inspection, normal thyroid exam  Resp: Normal effort, clear to auscultation bilaterally  CVS: Regular rate and rhythm, S1 S2 normal, no murmur  Extremities: No lower extremity edema  Abdomen: abdominal obesity present  Musculoskeletal: Normal digits and nails  Skin: No rash on visible skin  Psych: Alert and oriented, normal mood and affect, intact memory and able to make informed decisions.    Assessment and Plan:    1. Hyperthyroidism related to Hot nodule   At the present time we have reviewed her labs and also her thyroid uptake and scan.  It does appear that she does have a hot nodule located in the left thyroid lobe which is most likely responsible for her hyperthyroidism.  At the present time I would like  to go ahead and start methimazole.  We have discussed the side effect profile and risks with regards to methimazole.  I will call in a prescription as the computers are down and cannot be E scribed.    She understands that she will need lab work in approximately 4 weeks time in addition to a follow-up appointment.    2. Low vitamin D level-   Currently on Vitamin D replacement  Vitamin D 1, 25-45.7 will need to check regular vitamin D 25 in the future  Patient also would like us to follow her for her osteoporosis.  She is previously been followed by her primary care physician.      3. Screening for osteoporosis  She is currently not taking any medications for her osteoporosis.  She has not been on any other medications for her osteoporosis.  She is currently only taking vitamin D and calcium.    DEXA 9/20/2018  The mean bone mineral density for the lumbar spine is 0.914 g/cm2, which corresponds to a T score of -2.1 and a Z score of -0.1.    The proximal left femur has a mean bone mineral density of 0.616 g/cm2, with a T score of -3.1 and a Z score of -1.0.  Findings are consistent with osteoporosis with a high risk of fracture.-We will discuss at future visits.    Return in about 4 weeks (around 12/12/2018).    Thank you for allowing me to participate in the care of this patient.  If you have any questions or concerns please do not hesitate to contact me.    Le Rdz P.A.-C.    CC:   Geovanny Small M.D.    This note was created using voice recognition software (Dragon). The accuracy of the dictation is limited by the abilities of the software. I have reviewed the note prior to signing, however some errors in grammar and context are still possible. If you have any questions related to this note please do not hesitate to contact our office.

## 2018-12-07 ENCOUNTER — TELEPHONE (OUTPATIENT)
Dept: INTERNAL MEDICINE | Facility: MEDICAL CENTER | Age: 78
End: 2018-12-07

## 2018-12-08 NOTE — TELEPHONE ENCOUNTER
1. Caller Name: Ml Chavira                      Call Back Number: [phone] (home)       2. Message: Patient needs to change her appointment with Dr Small, please call patient.    3. Patient approves office to leave a detailed voicemail/MyChart message: yes

## 2018-12-12 ENCOUNTER — OFFICE VISIT (OUTPATIENT)
Dept: HEMATOLOGY ONCOLOGY | Facility: MEDICAL CENTER | Age: 78
End: 2018-12-12
Payer: MEDICARE

## 2018-12-12 VITALS
BODY MASS INDEX: 26.64 KG/M2 | HEART RATE: 86 BPM | OXYGEN SATURATION: 94 % | RESPIRATION RATE: 16 BRPM | WEIGHT: 141.09 LBS | SYSTOLIC BLOOD PRESSURE: 122 MMHG | DIASTOLIC BLOOD PRESSURE: 60 MMHG | HEIGHT: 61 IN | TEMPERATURE: 97.4 F
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
## 2018-12-12 DIAGNOSIS — L59.8 RADIONECROSIS: ICD-10-CM

## 2018-12-12 DIAGNOSIS — C34.11 MALIGNANT NEOPLASM OF UPPER LOBE OF RIGHT LUNG (HCC): ICD-10-CM

## 2018-12-12 DIAGNOSIS — C79.31 METASTASIS TO BRAIN (HCC): ICD-10-CM

## 2018-12-12 PROCEDURE — 99214 OFFICE O/P EST MOD 30 MIN: CPT | Performed by: INTERNAL MEDICINE

## 2018-12-12 ASSESSMENT — PAIN SCALES - GENERAL: PAINLEVEL: NO PAIN

## 2018-12-12 NOTE — PROGRESS NOTES
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        Identification/Prior relevant history: 11/21/15: 5.4cm RUL lung mass with brain mets? 11/18/15: Craniotomy: squamous cell ca 12/18/15:  XRT to brain Peddada? R shoulder skin lesion squamous cell 1/6/16: Carbo and Abraxane x 4 4/19/16: low dose carbo with lung XRT 5/24/16: last chemo 11/3/16: stable PET? RUL activity probably radiation related?  2/27/17: chest CT: Interval decrease in size of RUL mass? stable mass along R lung apex? increase density of groundglass opacity posterior to dominant mass (?radiation effect) 5/11/17: MRI brain: increasing mass effect and displacement? Start Decadron by Peddada 5/25/17: Chest and abdomen CAT: stable   7/6/17: MRI brain? unchanged from previous 7/7/17:   7/20/17: bone scan: uptake left posterior 12th rib without a definite CT correlate. Conceivably this could be due to interval trauma.   9/18/17: MRI: stable R parietal area with probable edema 9/20/17: Previous dizziness and headache have gone away with decadron?started taper, had to be restarted for worsening neuroo symptoms 11/3/17: Chest and abdomen CAT scan: RUL mass minimally decreased in size? increase in size of nodularity at R lung apex ? scarring or malignancy? no SOB, no pain or neurologic problems   12/22/17: Brain MRI? radiation necrosis with decrease in mass effect and necrosis  1/9/18: Chest CT stable 1/15/18: apparently can't get on study for Avastin and radiation necrosis?    -Establish care here.  She had very good response to Avastin.  Subsequently weaned off of Decadron.     4/18/80-MRI brain-The amount of vasogenic edema extending into the right temporal lobe and splenium of the corpus callosum has decreased significantly since exam of 12/22/2017. Postsurgical changes in the right occipital lobe are unchanged since previous exam  CT chest-CT chest stable since  1/9/2018  2.  Right upper lobe architecture distortion with central 2.2 x 0.7 cm nodule or consolidation  3.  These findings could be radiation therapy changes versus unchanged neoplasm  Interim history  7/19/18: Brain MRI: Extensive encephalomalacic change in the right occipital lobe, parietal lobe, and right posterior temporal lobe with stable appearing ex vacuo dilatation of the posterior body right temporal horn and trigone. No new mass effect or evidence of local   tumor recurrence no recurrence? Chest CT: RUL opacity same or slightly larger 7/25/18: Seen in office? no SOB, no chest pain? functioning well? will get end of August PET scan because of CAT scan? see me after PET 8/24/18: CBC and CMP benign 8/27/18: PET: minimal uptake RUL, more suggestive of post treatment effect  She is switching her care over here from Dr. Thacker who is retired now  Overall she is doing okay.  She still has some weakness otherwise doing well    Past Medical History:      Past Medical History:   Diagnosis Date   • Cancer (HCC)     lung cancer with brain mts   • Joint pain    • Radionecrosis 4/14/2018   • Sick sinus syndrome (HCC)     with AFIB, s/p pacemaker   • Thyroid disease        Past surgical history:       Past Surgical History:   Procedure Laterality Date   • CRANIOTOMY STEALTH Right 11/23/2015    Procedure: CRANIOTOMY STEALTH-right occipital;  Surgeon: Suyapa Schumacher M.D.;  Location: SURGERY [facility];  Service:    • APPENDECTOMY     • CRANIOTOMY     • KNEE ARTHROSCOPY      left    • OTHER      left knee surgery       Allergies:       Penicillins    Medications:         Current Outpatient Prescriptions   Medication Sig Dispense Refill   • methimazole (TAPAZOLE) 5 MG Tab Take 1 Tab by mouth 2 Times a Day. 60 Tab 3   • methimazole (TAPAZOLE) 5 MG Tab Take 1 Tab by mouth 2 Times a Day. 60 Tab 3   • levETIRAcetam (KEPPRA) 500 MG Tab Take 1 Tab by mouth 2 Times a Day. 180 Tab 0   • Cholecalciferol 1000 UNIT Cap Take 1  Cap by mouth every day at 6 PM. 90 Cap 0   • flecainide (TAMBOCOR) 150 MG Tab Take 0.5 Tabs by mouth 2 Times a Day. 90 Tab 0   • Omega-3 Fatty Acids (FISH OIL) 1200 MG Cap Take 1 Cap by mouth every day. 90 Cap 0   • DILTIAZem CD (CARDIZEM CD) 240 MG CAPSULE SR 24 HR Take 1 Cap by mouth every day. 90 Cap 0   • aspirin (ASA) 81 MG Chew Tab chewable tablet Take 1 Tab by mouth every evening. 90 Tab 0   • multivitamin (THERAGRAN) Tab Take 1 Tab by mouth every day.       No current facility-administered medications for this visit.          Social History:     Social History     Social History   • Marital status:      Spouse name: N/A   • Number of children: N/A   • Years of education: N/A     Occupational History   • Not on file.     Social History Main Topics   • Smoking status: Former Smoker     Packs/day: 2.00     Years: 12.00   • Smokeless tobacco: Never Used   • Alcohol use 0.6 oz/week     1 Glasses of wine per week   • Drug use: No   • Sexual activity: Not on file     Other Topics Concern   • Not on file     Social History Narrative   • No narrative on file       Family History:      Family History   Problem Relation Age of Onset   • Dementia Mother    • Diabetes Mother    • Asthma Mother    • Other Mother         osteoarthritis   • Arthritis Mother    • Autoimmune Disease Father         Rheumatoid arthritis   • Arthritis Father    • Cancer Brother         prostate        Review of Systems:  All other review of systems are negative except what was mentioned above in the HPI.    Constitutional: Negative for fever, chills, weight loss and malaise/fatigue.    HEENT: No new auditory or visual complaints. No sore throat and neck pain.     Respiratory: Negative for cough, sputum production, shortness of breath and wheezing.    Cardiovascular: Negative for chest pain, palpitations, orthopnea and leg swelling.    Gastrointestinal: Negative for heartburn, nausea, vomiting and abdominal pain.    Genitourinary:  "Negative for dysuria, hematuria    Musculoskeletal: No new arthralgias or myalgias   Skin: Negative for rash and itching.    Neurological: Negative for focal weakness and headaches.    Endo/Heme/Allergies: No abnormal bleed/bruise.    Psychiatric/Behavioral: No new depression/anxiety.    Physical Exam:  Vitals: Resp 16   Ht 1.549 m (5' 1\")   Wt 64 kg (141 lb 1.5 oz)   BMI 26.66 kg/m²     General: Not in acute distress, alert and oriented x 3  HEENT: No pallor, icterus. Oropharynx clear.   Neck: Supple, no palpable masses.  Lymph nodes: No palpable cervical, supraclavicular, axillary or inguinal lymphadenopathy.    CVS: regular rate and rhythm, no rubs or gallops  RESP: Clear to auscultate bilaterally, no wheezing or crackles.   ABD: Soft, non tender, non distended, positive bowel sounds, no palpable organomegaly  EXT: No edema or cyanosis  CNS: Alert and oriented x3, No focal deficits.  Skin- No rash      Labs:   No visits with results within 1 Week(s) from this visit.   Latest known visit with results is:   Hospital Outpatient Visit on 10/19/2018   Component Date Value Ref Range Status   • Microsomal -Tpo- Abs 10/19/2018 17.5* 0.0 - 9.0 IU/mL Final   • Free T-4 10/19/2018 0.99  0.53 - 1.43 ng/dL Final   • T3 10/19/2018 78.4  60.0 - 181.0 ng/dL Final   • TSH 10/19/2018 0.030* 0.380 - 5.330 uIU/mL Final    Comment: Please note new reference ranges effective 12/14/2017 10:00 AM  Pregnant Females, 1st Trimester  0.050-3.700  Pregnant Females, 2nd Trimester  0.310-4.350  Pregnant Females, 3rd Trimester  0.410-5.180     • T3,Free 10/19/2018 3.63  2.40 - 4.20 pg/mL Final   • Vitamin D-1, 25-Dihydroxy 10/19/2018 45.7  19.9 - 79.3 pg/mL Final    Comment: INTERPRETIVE INFORMATION: Vitamin D, 1,25-Dihydroxy  This test is primarily indicated during patient evaluation for  hypercalcemia and renal failure. A normal result does not rule out  Vitamin D deficiency. The recommended test for diagnosing Vitamin  D deficiency is " Vitamin D 25-hydroxy.  Performed by [facility],  [address] [facility] [place] [address] [phone]  [email or web address], Brennon Bartholomew MD - Lab. Director     • Thyrotropin Receptor Abs 10/19/2018 <0.90  <=1.75 IU/L Final    Comment: Performed by [facility],  [address] [facility] [place] [address] [phone]  [email or web address], Brennon Bartholomew MD - Lab. Director               Assessment and Plan:    Metastatic squamous cell lung carcinoma-. She was treated with carboplatin and Abraxane and received chemoradiation afterwards. She has done very well since then with no clear-cut evidence of relapse or progression. Reviewed last PET scan from 8/2018 which showed minimal uptake surrounding site of prior lung primary.  Overall, posttreatment effect was favored.  We will obtain a follow-up CT scan to ensure stability.      Radionecrosis-appears to be responding to Avastin clinically and by imaging. She is off of Decadron. She is status post 5 doses. She has some insurance/co-pay issues coming to our office. Given the significant improvement in her symptoms.  And she has been off of Avastin.  I will obtain a follow-up MRI to keep an eye on the findings.      If the imaging shows stable disease, she will return to the clinic in 6 months.    She agreed and verbalized  agreement and understanding with the current plan.  I answered all questions and concerns at this time         Please note that this dictation was created using voice recognition software. I have made every reasonable attempt to correct obvious errors, but I expect that there are errors of grammar and possibly content that I did not discover before finalizing the note.      SIGNATURES:  Mariano Aguilar    CC:  Geovanny Small M.D.  No ref. provider found

## 2018-12-13 ENCOUNTER — HOSPITAL ENCOUNTER (OUTPATIENT)
Dept: LAB | Facility: MEDICAL CENTER | Age: 78
End: 2018-12-13
Attending: PHYSICIAN ASSISTANT
Payer: MEDICARE

## 2018-12-13 ENCOUNTER — OFFICE VISIT (OUTPATIENT)
Dept: ENDOCRINOLOGY | Facility: MEDICAL CENTER | Age: 78
End: 2018-12-13
Payer: MEDICARE

## 2018-12-13 ENCOUNTER — APPOINTMENT (OUTPATIENT)
Dept: INTERNAL MEDICINE | Facility: MEDICAL CENTER | Age: 78
End: 2018-12-13
Payer: MEDICARE

## 2018-12-13 VITALS
SYSTOLIC BLOOD PRESSURE: 125 MMHG | WEIGHT: 139 LBS | DIASTOLIC BLOOD PRESSURE: 70 MMHG | HEART RATE: 76 BPM | HEIGHT: 61 IN | BODY MASS INDEX: 26.24 KG/M2 | OXYGEN SATURATION: 92 %

## 2018-12-13 DIAGNOSIS — E05.90 HYPERTHYROIDISM: ICD-10-CM

## 2018-12-13 DIAGNOSIS — Z78.0 POSTMENOPAUSAL: ICD-10-CM

## 2018-12-13 DIAGNOSIS — E04.1 THYROID NODULE: ICD-10-CM

## 2018-12-13 DIAGNOSIS — R79.89 LOW VITAMIN D LEVEL: ICD-10-CM

## 2018-12-13 PROCEDURE — 84439 ASSAY OF FREE THYROXINE: CPT

## 2018-12-13 PROCEDURE — 99214 OFFICE O/P EST MOD 30 MIN: CPT | Performed by: PHYSICIAN ASSISTANT

## 2018-12-13 PROCEDURE — 36415 COLL VENOUS BLD VENIPUNCTURE: CPT

## 2018-12-13 PROCEDURE — 83520 IMMUNOASSAY QUANT NOS NONAB: CPT

## 2018-12-13 PROCEDURE — 82306 VITAMIN D 25 HYDROXY: CPT | Mod: GA

## 2018-12-13 PROCEDURE — 84443 ASSAY THYROID STIM HORMONE: CPT

## 2018-12-13 PROCEDURE — 84481 FREE ASSAY (FT-3): CPT

## 2018-12-13 RX ORDER — ALENDRONATE SODIUM 10 MG/1
10 TABLET ORAL
Qty: 30 TAB | Refills: 3 | Status: SHIPPED | OUTPATIENT
Start: 2018-12-13 | End: 2019-04-16 | Stop reason: SDUPTHER

## 2018-12-13 NOTE — PROGRESS NOTES
Endocrinology Clinic Progress Note  PCP: Geovanny Small M.D.    HPI:  Ml Chavira is a 78 y.o. old patient who comes in today for review of endocrine problems.    Patient did not have labs done for today's evaluation.   According to the patient she is feeling well.  She is without any complaints.  She does intermittently have a tremor in her upper extremities but does not admit that it is bothersome and/or affecting her life in any way.  She denies any symptoms of palpitations, chest pain, shortness of breath, lightheadedness and/or dizziness.  She denies any symptoms of choking and/or disc comfort swallowing.  She has not noticed any change in her voice and/or energy levels.    1. Hyperthyroidism  2. Thyroid nodule  3. Low vitamin D level  4. Postmenopausal      Endocrine history to date:   1. Multiple thyroid nodules-   Found on thyroid ultrasound of 8/16/2018 secondary to abnormal PET      2. Low vitamin D level-diagnosed 6/27/2018 vitamin D deficiency of 22   Currently on Vitamin D replacement  Vitamin D 1, 25-45.7 will need to check regular vitamin D 25 in the future     3. Hyperthyroiditis related to Hot nodule:   Started on Methimazole 11/14/18   10/19/2018 TSH 0.030 free T4 0.99, T3 78.4, free T3 3.63, TPO antibody 17.5, thyroid receptor antibodies less than 0.90  6/27/2018  TSH 0.010 free T4 0.98  8/24/2018 TSH 0.030 free T4 1.09 T3 115.6 TPO antibody 16.8     Thyroid uptake and scan 10/29/2018   borderline elevated 5 hour radioactive iodine uptake measured at 15.4%.  Area of focal increased uptake involving the lower pole of the left thyroid lobe corresponding to a solid nodule in that area likely representing a hyperactive nodule.        US:   Thyroid ultrasound 8/16/2018: Numerous hypoechoic nodules in the right thyroid lobe the largest of which is present in the lower pole measuring 1.9 x 1.6 x 1.2 cm the largest is present in the midportion of the right lobe measuring 1.1 x 1.0 x 0.7 cm.   There are numerous nodules located in the left lobe the largest was identified in the left lower pole measuring 2.8 x 1.8 x 1.9 cm it contains focal calcifications.  The next largest is medial slightly inferior to the left thyroid lobe which measures 1.0 x 1.0 x 0.9 cm     PET: Follow-up metastatic squamous cell carcinoma of the lung under findings of the head and neck there were no abnormal FDG uptake.  There is postoperative changes consistent with right parietal occipital craniotomy.      3. Screening for osteoporosis  She is currently not taking any medications for her osteoporosis.  She has not been on any other medications for her osteoporosis.  She is currently only taking vitamin D and calcium.  DEXA 9/20/2018  The mean bone mineral density for the lumbar spine is 0.914 g/cm2, which corresponds to a T score of -2.1 and a Z score of -0.1.    The proximal left femur has a mean bone mineral density of 0.616 g/cm2, with a T score of -3.1 and a Z score of -1.0.  Findings are consistent with osteoporosis with a high risk of fracture.       ROS:  Constitutional: No weight loss or gain,  fever  HEENT: No difficulty with swallowing, change in voice, or swelling in throat area   Cardiac: No chest pain, palpitations, or racing heart  Resp: No shortness of breath  GI: No abdominal pain, nausea, vomiting, or diarrhea   Neuro: No numbness or tinging in feet  Endo: No heat or cold intolerance, no polyuria or polydipsia      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   • Arthralgia of both knees 06/21/2018   • Finger  "pain, right 06/21/2018   • Balance disorder 06/21/2018   • Acute cystitis 06/21/2018   • Radionecrosis 04/14/2018   • Malignant neoplasm of upper lobe of right lung (HCC) 03/05/2018   • CVA (cerebral vascular accident) (HCC) 11/29/2017   • Lung mass 11/19/2015   • Blurry vision, left eye 11/19/2015   • Metastasis to brain (HCC) 11/18/2015   • Metastatic cancer (CMS-HCC) 11/18/2015       Medications:    Current Outpatient Prescriptions:   •  methimazole (TAPAZOLE) 5 MG Tab, Take 1 Tab by mouth 2 Times a Day., Disp: 60 Tab, Rfl: 3  •  methimazole (TAPAZOLE) 5 MG Tab, Take 1 Tab by mouth 2 Times a Day., Disp: 60 Tab, Rfl: 3  •  levETIRAcetam (KEPPRA) 500 MG Tab, Take 1 Tab by mouth 2 Times a Day., Disp: 180 Tab, Rfl: 0  •  Cholecalciferol 1000 UNIT Cap, Take 1 Cap by mouth every day at 6 PM., Disp: 90 Cap, Rfl: 0  •  flecainide (TAMBOCOR) 150 MG Tab, Take 0.5 Tabs by mouth 2 Times a Day., Disp: 90 Tab, Rfl: 0  •  Omega-3 Fatty Acids (FISH OIL) 1200 MG Cap, Take 1 Cap by mouth every day., Disp: 90 Cap, Rfl: 0  •  DILTIAZem CD (CARDIZEM CD) 240 MG CAPSULE SR 24 HR, Take 1 Cap by mouth every day., Disp: 90 Cap, Rfl: 0  •  aspirin (ASA) 81 MG Chew Tab chewable tablet, Take 1 Tab by mouth every evening., Disp: 90 Tab, Rfl: 0  •  multivitamin (THERAGRAN) Tab, Take 1 Tab by mouth every day., Disp: , Rfl:     Labs: Reviewed as above     Physical Examination:  Vital signs: /70 (BP Location: Right arm, Patient Position: Sitting)   Pulse 76   Ht 1.549 m (5' 1\")   Wt 63 kg (139 lb)   SpO2 92%   BMI 26.26 kg/m²  Body mass index is 26.26 kg/m².  General: No apparent distress, cooperative  Eyes: No scleral icterus, no discharge, normal eyelids  Neck: No abnormal masses on inspection, normal thyroid exam  Resp: Normal effort, clear to auscultation bilaterally  CVS: Regular rate and rhythm, S1 S2 normal, no murmur  Musculoskeletal: Normal digits and nails  Psych: Alert and oriented, normal mood and affect, intact memory and " able to make informed decisions.    Assessment and Plan:    1. Hyperthyroidism  Continue current regimen. Will check labs today and make adjustments based upon the numbers.  She is asymptomatic at the present time.    Current regimen:   Methimazole 5 mg BID     - TSH; Standing  - FREE THYROXINE; Standing  - T3 FREE; Standing  - THYROTROPIN RECEP AB; Standing    2. Thyroid nodule  Repeat US 2/2019     3. Low vitamin D level  Continue current replacement. Secondary to history of osteopenia would encourage adequate replacement 30-50.   - VITAMIN D,25 HYDROXY; Future    4. Postmenopausal  - VITAMIN D,25 HYDROXY; Future  ERX Alendronate. Discussed side effect profile. Discussed how to take medication 30 minutes before food. No lying down for 30 minutes       Return in about 6 weeks (around 1/24/2019).    Thank you for allowing me to participate in the care of this patient.  If you have any questions or concerns please do not hesitate to contact me.    Le Rdz P.A.-C.    CC:   Geovanny Small M.D.    This note was created using voice recognition software (Dragon). The accuracy of the dictation is limited by the abilities of the software. I have reviewed the note prior to signing, however some errors in grammar and context are still possible. If you have any questions related to this note please do not hesitate to contact our office.

## 2018-12-14 LAB
25(OH)D3 SERPL-MCNC: 23 NG/ML (ref 30–100)
T3FREE SERPL-MCNC: 8.04 PG/ML (ref 2.4–4.2)
T4 FREE SERPL-MCNC: 1.31 NG/DL (ref 0.53–1.43)
TSH RECEP AB SER-ACNC: 5.17 IU/L
TSH SERPL DL<=0.005 MIU/L-ACNC: 0.42 UIU/ML (ref 0.38–5.33)

## 2018-12-21 ENCOUNTER — HOSPITAL ENCOUNTER (OUTPATIENT)
Dept: LAB | Facility: MEDICAL CENTER | Age: 78
End: 2018-12-21
Attending: INTERNAL MEDICINE
Payer: MEDICARE

## 2018-12-21 ENCOUNTER — HOSPITAL ENCOUNTER (OUTPATIENT)
Dept: LAB | Facility: MEDICAL CENTER | Age: 78
End: 2018-12-21
Attending: PHYSICIAN ASSISTANT
Payer: MEDICARE

## 2018-12-21 DIAGNOSIS — C34.11 MALIGNANT NEOPLASM OF UPPER LOBE OF RIGHT LUNG (HCC): ICD-10-CM

## 2018-12-21 DIAGNOSIS — E05.90 HYPERTHYROIDISM: ICD-10-CM

## 2018-12-21 LAB
ANION GAP SERPL CALC-SCNC: 8 MMOL/L (ref 0–11.9)
BUN SERPL-MCNC: 20 MG/DL (ref 8–22)
CALCIUM SERPL-MCNC: 10.1 MG/DL (ref 8.5–10.5)
CHLORIDE SERPL-SCNC: 106 MMOL/L (ref 96–112)
CO2 SERPL-SCNC: 26 MMOL/L (ref 20–33)
CREAT SERPL-MCNC: 0.69 MG/DL (ref 0.5–1.4)
GLUCOSE SERPL-MCNC: 93 MG/DL (ref 65–99)
POTASSIUM SERPL-SCNC: 4.4 MMOL/L (ref 3.6–5.5)
SODIUM SERPL-SCNC: 140 MMOL/L (ref 135–145)
T3FREE SERPL-MCNC: 4.02 PG/ML (ref 2.4–4.2)
T4 FREE SERPL-MCNC: 0.84 NG/DL (ref 0.53–1.43)
TSH SERPL DL<=0.005 MIU/L-ACNC: 1.18 UIU/ML (ref 0.38–5.33)

## 2018-12-21 PROCEDURE — 83520 IMMUNOASSAY QUANT NOS NONAB: CPT

## 2018-12-21 PROCEDURE — 84481 FREE ASSAY (FT-3): CPT

## 2018-12-21 PROCEDURE — 84439 ASSAY OF FREE THYROXINE: CPT

## 2018-12-21 PROCEDURE — 80048 BASIC METABOLIC PNL TOTAL CA: CPT

## 2018-12-21 PROCEDURE — 84443 ASSAY THYROID STIM HORMONE: CPT

## 2018-12-21 PROCEDURE — 36415 COLL VENOUS BLD VENIPUNCTURE: CPT

## 2018-12-25 LAB — TSH RECEP AB SER-ACNC: <0.9 IU/L

## 2018-12-27 ENCOUNTER — TELEPHONE (OUTPATIENT)
Dept: ENDOCRINOLOGY | Facility: MEDICAL CENTER | Age: 78
End: 2018-12-27

## 2018-12-27 NOTE — TELEPHONE ENCOUNTER
1. Caller Name: Patient                                           Call Back Number: [phone]        Patient approves a detailed voicemail message: yes    Pt called and stated that she is having swelling in her lower legs and ankles for over a week. She is concerned that it could be from the Alendronate sodium that she started taking. Has been on the medication for about 2 weeks.     Patient would like a call back.    Please advise.

## 2018-12-28 ENCOUNTER — TELEPHONE (OUTPATIENT)
Dept: ENDOCRINOLOGY | Facility: MEDICAL CENTER | Age: 78
End: 2018-12-28

## 2018-12-28 NOTE — TELEPHONE ENCOUNTER
1. Caller Name: Ml Chavira                                             Call Back Number: [phone] (home)         Patient approves a detailed voicemail message: N\A    Patient called and lvm stating she is returning your phone call and would like to speak with you today.

## 2019-01-04 ENCOUNTER — TELEPHONE (OUTPATIENT)
Dept: ENDOCRINOLOGY | Facility: MEDICAL CENTER | Age: 79
End: 2019-01-04

## 2019-01-15 ENCOUNTER — HOSPITAL ENCOUNTER (OUTPATIENT)
Dept: RADIOLOGY | Facility: MEDICAL CENTER | Age: 79
End: 2019-01-15
Attending: INTERNAL MEDICINE
Payer: MEDICARE

## 2019-01-15 ENCOUNTER — HOSPITAL ENCOUNTER (OUTPATIENT)
Dept: LAB | Facility: MEDICAL CENTER | Age: 79
End: 2019-01-15
Attending: PHYSICIAN ASSISTANT
Payer: MEDICARE

## 2019-01-15 DIAGNOSIS — C34.11 MALIGNANT NEOPLASM OF UPPER LOBE OF RIGHT LUNG (HCC): ICD-10-CM

## 2019-01-15 DIAGNOSIS — E05.90 HYPERTHYROIDISM: ICD-10-CM

## 2019-01-15 LAB
T3FREE SERPL-MCNC: 3.17 PG/ML (ref 2.4–4.2)
T4 FREE SERPL-MCNC: 0.61 NG/DL (ref 0.53–1.43)
TSH SERPL DL<=0.005 MIU/L-ACNC: 4.73 UIU/ML (ref 0.38–5.33)

## 2019-01-15 PROCEDURE — 84439 ASSAY OF FREE THYROXINE: CPT

## 2019-01-15 PROCEDURE — 84481 FREE ASSAY (FT-3): CPT

## 2019-01-15 PROCEDURE — 36415 COLL VENOUS BLD VENIPUNCTURE: CPT

## 2019-01-15 PROCEDURE — 84443 ASSAY THYROID STIM HORMONE: CPT

## 2019-01-15 PROCEDURE — 700117 HCHG RX CONTRAST REV CODE 255: Performed by: INTERNAL MEDICINE

## 2019-01-15 PROCEDURE — A9585 GADOBUTROL INJECTION: HCPCS | Performed by: INTERNAL MEDICINE

## 2019-01-15 PROCEDURE — 70553 MRI BRAIN STEM W/O & W/DYE: CPT

## 2019-01-15 PROCEDURE — 71260 CT THORAX DX C+: CPT

## 2019-01-15 PROCEDURE — 83520 IMMUNOASSAY QUANT NOS NONAB: CPT

## 2019-01-15 RX ORDER — GADOBUTROL 604.72 MG/ML
6 INJECTION INTRAVENOUS ONCE
Status: COMPLETED | OUTPATIENT
Start: 2019-01-15 | End: 2019-01-15

## 2019-01-15 RX ADMIN — GADOBUTROL 6 ML: 604.72 INJECTION INTRAVENOUS at 15:46

## 2019-01-15 RX ADMIN — IOHEXOL 100 ML: 350 INJECTION, SOLUTION INTRAVENOUS at 14:09

## 2019-01-16 ENCOUNTER — TELEPHONE (OUTPATIENT)
Dept: OBGYN | Facility: MEDICAL CENTER | Age: 79
End: 2019-01-16

## 2019-01-16 NOTE — TELEPHONE ENCOUNTER
Spoke with Ml on the phone, pt would like to have a morning appointment scheduled for her 6 month follow up. Will inform Kristie LOMAX so she can make that appointment.

## 2019-01-16 NOTE — NON-PROVIDER
Pt's PPM set to MRI safe mode by rep prior to MRI. During MRI scan, pt monitored with HR and SPO2. Pt tolerated scan well. Discharged ambulatory

## 2019-01-17 ENCOUNTER — OFFICE VISIT (OUTPATIENT)
Dept: INTERNAL MEDICINE | Facility: MEDICAL CENTER | Age: 79
End: 2019-01-17
Payer: MEDICARE

## 2019-01-17 VITALS
RESPIRATION RATE: 17 BRPM | WEIGHT: 142 LBS | SYSTOLIC BLOOD PRESSURE: 108 MMHG | TEMPERATURE: 97.2 F | HEIGHT: 61 IN | DIASTOLIC BLOOD PRESSURE: 68 MMHG | BODY MASS INDEX: 26.81 KG/M2 | OXYGEN SATURATION: 90 % | HEART RATE: 82 BPM

## 2019-01-17 DIAGNOSIS — M81.8 OTHER OSTEOPOROSIS, UNSPECIFIED PATHOLOGICAL FRACTURE PRESENCE: ICD-10-CM

## 2019-01-17 DIAGNOSIS — I48.20 CHRONIC ATRIAL FIBRILLATION (HCC): ICD-10-CM

## 2019-01-17 DIAGNOSIS — E05.90 HYPERTHYROIDISM: ICD-10-CM

## 2019-01-17 DIAGNOSIS — Z23 NEED FOR VACCINATION: ICD-10-CM

## 2019-01-17 DIAGNOSIS — E04.1 HOT THYROID NODULE: ICD-10-CM

## 2019-01-17 DIAGNOSIS — Z00.00 HEALTHCARE MAINTENANCE: ICD-10-CM

## 2019-01-17 PROBLEM — R79.89 LOW TSH LEVEL: Status: RESOLVED | Noted: 2018-08-07 | Resolved: 2019-01-17

## 2019-01-17 PROBLEM — M25.562 ARTHRALGIA OF BOTH KNEES: Status: RESOLVED | Noted: 2018-06-21 | Resolved: 2019-01-17

## 2019-01-17 PROBLEM — K29.20 ACUTE ALCOHOLIC GASTRITIS WITHOUT HEMORRHAGE: Status: RESOLVED | Noted: 2018-05-31 | Resolved: 2019-01-17

## 2019-01-17 PROBLEM — N30.00 ACUTE CYSTITIS: Status: RESOLVED | Noted: 2018-06-21 | Resolved: 2019-01-17

## 2019-01-17 PROBLEM — Z13.820 SCREENING FOR OSTEOPOROSIS: Status: RESOLVED | Noted: 2018-07-23 | Resolved: 2019-01-17

## 2019-01-17 PROBLEM — M79.644 FINGER PAIN, RIGHT: Status: RESOLVED | Noted: 2018-06-21 | Resolved: 2019-01-17

## 2019-01-17 PROBLEM — M81.0 OSTEOPOROSIS: Status: ACTIVE | Noted: 2019-01-17

## 2019-01-17 PROBLEM — M25.561 ARTHRALGIA OF BOTH KNEES: Status: RESOLVED | Noted: 2018-06-21 | Resolved: 2019-01-17

## 2019-01-17 LAB — TSH RECEP AB SER-ACNC: <0.9 IU/L

## 2019-01-17 PROCEDURE — 99214 OFFICE O/P EST MOD 30 MIN: CPT | Mod: GC | Performed by: INTERNAL MEDICINE

## 2019-01-17 RX ORDER — METOPROLOL SUCCINATE 25 MG/1
25 TABLET, EXTENDED RELEASE ORAL
Refills: 3 | COMMUNITY
Start: 2019-01-09 | End: 2020-12-30 | Stop reason: SDUPTHER

## 2019-01-17 ASSESSMENT — PAIN SCALES - GENERAL: PAINLEVEL: NO PAIN

## 2019-01-17 NOTE — PATIENT INSTRUCTIONS
Osteoporosis  Introduction  Osteoporosis happens when your bones become thinner and weaker. Weak bones can break (fracture) more easily when you slip or fall. Bones most at risk of breaking are in the hip, wrist, and spine.  Follow these instructions at home:  · Get enough calcium and vitamin D. These nutrients are good for your bones.  · Exercise as told by your doctor.  · Do not use any tobacco products. This includes cigarettes, chewing tobacco, and electronic cigarettes. If you need help quitting, ask your doctor.  · Limit the amount of alcohol you drink.  · Take medicines only as told by your doctor.  · Keep all follow-up visits as told by your doctor. This is important.  · Take care at home to prevent falls. Some ways to do this are:  ¨ Keep rooms well lit and tidy.  ¨ Put safety rails on your stairs.  ¨ Put a rubber mat in the bathroom and other places that are often wet or slippery.  Get help right away if:  · You fall.  · You hurt yourself.  This information is not intended to replace advice given to you by your health care provider. Make sure you discuss any questions you have with your health care provider.  Document Released: 03/11/2013 Document Revised: 05/25/2017 Document Reviewed: 05/28/2015  © 2017 [employer]vier

## 2019-01-17 NOTE — PROGRESS NOTES
Established Patient     Ml presents today with the following:     CC: follow up, need for vaccines, osteoporosis     HPI:   Ms. Chavira is a 79 yo F pleasant lady with pmhx of lung cancer with brain metastasis s/p chemotherapy and radiation and sick sinus syndrome is here for the above CC     Osteoporosis  -pt is seeing by endocrinologist, on vit D supplement and alendronate, however, was not taking Calcium supplement thought that alendronate is the alternative one, otherwise has access to dairy products ( cheese, cream, yogurt..etc.)  -reported her gait is stable, actually going to Gym almost Q day and running on treadmill like 45 min per pt, physically active and has high spirit.      Hyperthyroidism  Thyroid nodule  -pt is following up by endocrinology for that, on methimazole with almost monthly TSH follow up as well as US thyroid 2/2019 per endocrine report   -stable, denies symptoms       Need for vaccination  Both shingrix and Tdap      Patient Active Problem List    Diagnosis Date Noted   • Sick sinus syndrome (HCC) 05/31/2018     Priority: High   • Osteoporosis 01/17/2019   • Need for vaccination 01/17/2019   • Hyperthyroidism 01/17/2019   • Postmenopausal 08/27/2018   • Healthcare maintenance 07/23/2018   • Thyroid nodule 07/23/2018   • Low vitamin D level 07/23/2018   • 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   • Balance disorder 06/21/2018   • Radionecrosis 04/14/2018   • Malignant neoplasm of upper lobe of right lung (HCC) 03/05/2018   • CVA (cerebral vascular accident) (HCC) 11/29/2017   • Lung mass 11/19/2015   • Blurry vision, left eye 11/19/2015   • Metastasis to brain (HCC) 11/18/2015   • Metastatic cancer (CMS-HCC) 11/18/2015       Past Medical History:   Diagnosis Date   • Cancer (HCC)     lung cancer with brain mts   • Joint pain    • Radionecrosis 4/14/2018   • Sick sinus syndrome (HCC)     with AFIB, s/p pacemaker   • Thyroid disease        Current Outpatient Prescriptions   Medication Sig  Dispense Refill   • methimazole (TAPAZOLE) 5 MG Tab Take 1 Tab by mouth 2 Times a Day. 60 Tab 3   • levETIRAcetam (KEPPRA) 500 MG Tab Take 1 Tab by mouth 2 Times a Day. 180 Tab 0   • Cholecalciferol 1000 UNIT Cap Take 1 Cap by mouth every day at 6 PM. 90 Cap 0   • flecainide (TAMBOCOR) 150 MG Tab Take 0.5 Tabs by mouth 2 Times a Day. 90 Tab 0   • Omega-3 Fatty Acids (FISH OIL) 1200 MG Cap Take 1 Cap by mouth every day. 90 Cap 0   • aspirin (ASA) 81 MG Chew Tab chewable tablet Take 1 Tab by mouth every evening. 90 Tab 0   • multivitamin (THERAGRAN) Tab Take 1 Tab by mouth every day.     • metoprolol SR (TOPROL XL) 25 MG TABLET SR 24 HR Take 25 mg by mouth every day.  3   • alendronate (FOSAMAX) 10 MG Tab Take 1 Tab by mouth every morning before breakfast. (Patient not taking: Reported on 1/17/2019) 30 Tab 3   • methimazole (TAPAZOLE) 5 MG Tab Take 1 Tab by mouth 2 Times a Day. (Patient not taking: Reported on 1/17/2019) 60 Tab 3   • DILTIAZem CD (CARDIZEM CD) 240 MG CAPSULE SR 24 HR Take 1 Cap by mouth every day. (Patient not taking: Reported on 1/17/2019) 90 Cap 0     No current facility-administered medications for this visit.        Allergies as of 01/17/2019 - Reviewed 01/17/2019   Allergen Reaction Noted   • Penicillins Rash and Itching 08/12/2012       Social History     Social History   • Marital status:      Spouse name: N/A   • Number of children: N/A   • Years of education: N/A     Occupational History   • Not on file.     Social History Main Topics   • Smoking status: Former Smoker     Packs/day: 2.00     Years: 12.00   • Smokeless tobacco: Never Used   • Alcohol use 0.6 oz/week     1 Glasses of wine per week   • Drug use: No   • Sexual activity: Not on file     Other Topics Concern   • Not on file     Social History Narrative   • No narrative on file       Family History   Problem Relation Age of Onset   • Dementia Mother    • Diabetes Mother    • Asthma Mother    • Other Mother          "osteoarthritis   • Arthritis Mother    • Autoimmune Disease Father         Rheumatoid arthritis   • Arthritis Father    • Cancer Brother         prostate        Past Surgical History:   Procedure Laterality Date   • CRANIOTOMY STEALTH Right 11/23/2015    Procedure: CRANIOTOMY STEALTH-right occipital;  Surgeon: Suyapa Schumacher M.D.;  Location: SURGERY [facility];  Service:    • APPENDECTOMY     • CRANIOTOMY     • KNEE ARTHROSCOPY      left    • OTHER      left knee surgery       ROS: As per HPI. Otherwise unremarkable     /68 (BP Location: Left arm, Patient Position: Sitting, BP Cuff Size: Large adult)   Pulse 82   Temp 36.2 °C (97.2 °F) (Temporal)   Resp 17   Ht 1.549 m (5' 1\")   Wt 64.4 kg (142 lb)   SpO2 90%   Breastfeeding? No   BMI 26.83 kg/m²     Physical Exam  Constitutional:  oriented to person, place, and time. No distress.   Eyes: Pupils are equal, round, and reactive to light. No scleral icterus.  Neck: Neck supple. No thyromegaly present.   Cardiovascular: Normal rate, regular rhythm and normal heart sounds.  Exam reveals no gallop and no friction rub.  No murmur heard.  Pulmonary/Chest: Breath sounds normal. Chest wall is not dull to percussion.   Musculoskeletal: no edema  Lymphadenopathy: no cervical adenopathy  Neurological: alert and oriented to person, place, and time.   Skin: No rash      Assessment and Plan      Osteoporosis  -seeing by endocrinologist, on vit D supplement and alendronate  -DEXA 9/20/2018  The mean bone mineral density for the lumbar spine is 0.914 g/cm2, which corresponds to a T score of -2.1 and a Z score of -0.1.  The proximal left femur has a mean bone mineral density of 0.616 g/cm2, with a T score of -3.1 and a Z score of -1.0.  Findings are consistent with osteoporosis with a high risk of fracture  -advised to take Calcium supplement at least 600 mg daily with dietary supply of total of 1.2 gm/day    Low vitamin D level  -on Vitamin D " replacement    Hyperthyroiditis related to Hot nodule  Thyroid nodule  - following up by endocrinology for that, on methimazole with almost monthly TSH follow up as well as US thyroid 2/2019 per endocrine report   -10/19/2018 TSH 0.030 free T4 0.99, T3 78.4, free T3 3.63, TPO antibody 17.5, thyroid receptor antibodies less than 0.90    Thyroid uptake and scan 10/29/2018   borderline elevated 5 hour radioactive iodine uptake measured at 15.4%.  Area of focal increased uptake involving the lower pole of the left thyroid lobe corresponding to a solid nodule in that area likely representing a hyperactive nodule.        US Thyroid  Thyroid ultrasound 8/16/2018: Numerous hypoechoic nodules in the right thyroid lobe the largest of which is present in the lower pole measuring 1.9 x 1.6 x 1.2 cm the largest is present in the midportion of the right lobe measuring 1.1 x 1.0 x 0.7 cm.  There are numerous nodules located in the left lobe the largest was identified in the left lower pole measuring 2.8 x 1.8 x 1.9 cm it contains focal calcifications.  The next largest is medial slightly inferior to the left thyroid lobe which measures 1.0 x 1.0 x 0.9 cm      Need for vaccination  Both shingrix and Tdap      Preventive care  Flu - 10/2017  Tdap/Shigrix: written prescription given today for the pharmacy   Pneumococcal - prevnar: 10/2016, Pneumovax: 10/2017  Colonoscopy -reported 3 years ago and was normal. No need more screening ( up to age of 76 yo)  Pap - reported last one 4 yrs ago, was normal, no need more screening ( upto age of 64 yo)  Mammogram - reported last one was normal in 2 years, no need more screening ( upto age of 73 yo)  Dexa scan as above (9/20/2018)        #################################################  Chronic issues:     Sick sinus syndrome  Atrial fibrillation  -hospitalized in 6/2018 for the above dx  -had  13 to 16 sec of sinus pause  -s/p permanent  pacemaker to be done on 6/1 by EP  -Echo 6/2018: EF:  65% Hypokineis basal posterior wall, Pacer/ICD wire seen in right ventricle  -on diltiazem and flecainide per EP.  - [id number]: 3 and HAS-BLE of 2, but becasue she has metastasis to brain and hx of brain surgery that may put her on higher risk of bleeding   -no anticoagulation at this time, per cardiology note Dr. Singleton 1/9/19: risk of Anticoagulation outweighs the risks  -f/u with cardiology Dr. 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 Aguilar and Dr. Caro (for radiation).   -CT chest w/ 1/15/19:  1.7 x 2.7 cm ill-defined right upper lobe nodular mass is increased by measurement but unchanged in appearance compared to previous. The differences in measurement are thought to be due to technical variation.  Contiguous nodular infiltrate since last the mass in the right upper lobe do not appear significantly changed.  No new infiltrates or masses are identified.  Hyperexpanded lungs. No pleural effusions.  Atherosclerotic changes.  1.5 cm rim-enhancing right lobe of thyroid nodule again demonstrated.  Hepatic cysts.    -MRI brain w/ and w/o 1/15/19:  1.  Postoperative right parietal-occipital craniotomy.  2.  Mild cerebral atrophy.  3.  Stable ex vacuo dilatation of the right ventricular trigone and body of the right temporal horn.  4.  Extensive heterogeneous encephalomalacic change with hemosiderin deposition and extensive FLAIR hyperintense white matter changes consistent with a combination of macrocystic and microcystic encephalomalacic change, postradiation  white matter change   and/or vasogenic edema with no significant change from 7/19/2018. No masslike or nodular enhancement to suggest local tumor recurrence.  5.  No evidence of new brain metastasis elsewhere in the cerebral hemispheres or posterior fossa.  6.  Altogether, no significant change from 7/19/2018.    Followup: Return in about 6 months (around 7/17/2019) for follow up.      Signed by: Geovanny Small M.D.

## 2019-01-18 DIAGNOSIS — C79.9 METASTATIC CANCER (HCC): ICD-10-CM

## 2019-01-18 DIAGNOSIS — C79.31 METASTASIS TO BRAIN (HCC): ICD-10-CM

## 2019-01-23 ENCOUNTER — OFFICE VISIT (OUTPATIENT)
Dept: ENDOCRINOLOGY | Facility: MEDICAL CENTER | Age: 79
End: 2019-01-23
Payer: MEDICARE

## 2019-01-23 VITALS
HEIGHT: 61 IN | HEART RATE: 82 BPM | RESPIRATION RATE: 16 BRPM | DIASTOLIC BLOOD PRESSURE: 62 MMHG | SYSTOLIC BLOOD PRESSURE: 110 MMHG | OXYGEN SATURATION: 98 % | WEIGHT: 142 LBS | BODY MASS INDEX: 26.81 KG/M2

## 2019-01-23 DIAGNOSIS — E05.90 HYPERTHYROIDISM: Primary | ICD-10-CM

## 2019-01-23 DIAGNOSIS — E04.1 HOT THYROID NODULE: ICD-10-CM

## 2019-01-23 DIAGNOSIS — R79.89 LOW VITAMIN D LEVEL: ICD-10-CM

## 2019-01-23 DIAGNOSIS — Z78.0 POSTMENOPAUSAL: ICD-10-CM

## 2019-01-23 DIAGNOSIS — E04.1 THYROID NODULE: ICD-10-CM

## 2019-01-23 DIAGNOSIS — M81.8 OTHER OSTEOPOROSIS, UNSPECIFIED PATHOLOGICAL FRACTURE PRESENCE: ICD-10-CM

## 2019-01-23 PROCEDURE — 99213 OFFICE O/P EST LOW 20 MIN: CPT | Performed by: PHYSICIAN ASSISTANT

## 2019-01-23 NOTE — PATIENT INSTRUCTIONS
Get labs in 4 week  Methimazole 5 mg daily (DECREASE)   Thyroid ultrasound in Feb 2019     Vitamin D check on cost

## 2019-01-25 NOTE — PROGRESS NOTES
Endocrinology Clinic Progress Note      HPI:  Ml Chavira is a 79 y.o. old patient who comes in today for routine follow up.     1. Hyperthyroidism  Patient is feeling well. She is without complaints of palpations, tremor, shaking, insomnia or difficulty sleeping.   Current regimen:   Methimazole 5 mg BID   Reports compliance   Denies any jaundice or change in eyes.     2. Low vitamin D level- currently on Vitamin D supplementation.    Endocrine history to date:   1. Multiple thyroid nodules-   Found on thyroid ultrasound of 8/16/2018 secondary to abnormal PET      2. Low vitamin D level-diagnosed 6/27/2018 vitamin D deficiency of 22   Currently on Vitamin D replacement  Vitamin D 1, 25-45.7 will need to check regular vitamin D 25 in the future     3. Hyperthyroiditis related to Hot nodule (left)   Started on Methimazole 11/14/18     1/15/19- TSH 4.730, FT4 0.61, FT3 3.17 TRA <0.90 (Methimazole 10mg)   10/19/2018 TSH 0.030 free T4 0.99, T3 78.4, free T3 3.63, TPO antibody 17.5, thyroid receptor antibodies less than 0.90  6/27/2018  TSH 0.010 free T4 0.98  8/24/2018 TSH 0.030 free T4 1.09 T3 115.6 TPO antibody 16.8     Thyroid uptake and scan 10/29/2018   borderline elevated 5 hour radioactive iodine uptake measured at 15.4%.  Area of focal increased uptake involving the lower pole of the left thyroid lobe corresponding to a solid nodule in that area likely representing a hyperactive nodule.        US:   Thyroid ultrasound 8/16/2018: Numerous hypoechoic nodules in the right thyroid lobe the largest of which is present in the lower pole measuring 1.9 x 1.6 x 1.2 cm the largest is present in the midportion of the right lobe measuring 1.1 x 1.0 x 0.7 cm.  There are numerous nodules located in the left lobe the largest was identified in the left lower pole measuring 2.8 x 1.8 x 1.9 cm it contains focal calcifications.  The next largest is medial slightly inferior to the left thyroid lobe which measures 1.0 x 1.0  "x 0.9 cm     PET: Follow-up metastatic squamous cell carcinoma of the lung under findings of the head and neck there were no abnormal FDG uptake.  There is postoperative changes consistent with right parietal occipital craniotomy.      3. Screening for osteoporosis  She has not been on any other medications for her osteoporosis.     She is currently only taking vitamin D and calcium.    DEXA 9/20/2018  The mean bone mineral density for the lumbar spine is 0.914 g/cm2, which corresponds to a T score of -2.1 and a Z score of -0.1.    The proximal left femur has a mean bone mineral density of 0.616 g/cm2, with a T score of -3.1 and a Z score of -1.0.  Findings are consistent with osteoporosis with a high risk of fracture.          ROS:  Constitutional: no fever, chills, lightheaded, dizziness. No symptoms of palpations.   No c/o of double vision/blurred vision. She denies any sensation of choking or dysphagia     Medications:    Current Outpatient Prescriptions:   •  metoprolol SR, 25 mg, Oral, QDAY, 1/23/2019  •  alendronate, 10 mg, Oral, QAM AC, 1/23/2019  •  methimazole, 5 mg, Oral, BID, 1/23/2019  •  methimazole, 5 mg, Oral, BID, 1/23/2019  •  levETIRAcetam, 500 mg, Oral, BID, 1/23/2019  •  Cholecalciferol, 1 Cap, Oral, DAILY AT 1800, 1/23/2019  •  flecainide, 75 mg, Oral, BID, 1/23/2019  •  Fish Oil, 1 Cap, Oral, DAILY, 1/23/2019  •  aspirin, 81 mg, Oral, Q EVENING, 1/23/2019  •  multivitamin, 1 Tab, Oral, DAILY, 1/23/2019  •  DILTIAZem CD, 240 mg, Oral, QDAY (Patient not taking: Reported on 1/17/2019), Not Taking    EXAM:  Vital signs: /62 (BP Location: Left arm, Patient Position: Sitting, BP Cuff Size: Adult)   Pulse 82   Resp 16   Ht 1.549 m (5' 1\")   Wt 64.4 kg (142 lb)   SpO2 98%   BMI 26.83 kg/m²   General: No apparent distress, cooperative  Eyes: No scleral icterus, no discharge  Resp: Normal effort  Extremities: No lower extremity edema  Psych: Alert and oriented, normal mood and " affect    Assessment and Plan:    1. Thyroid nodule  - US-SOFT TISSUES OF HEAD - NECK; Future due 2/19    2. Low vitamin D level  Continue current regimen. She will check with the labs to see if they cover cost of labs.   - VITAMIN D,25 HYDROXY; Future    3. Postmenopausal  - VITAMIN D,25 HYDROXY; Future    4. Other osteoporosis, unspecified pathological fracture presence  - VITAMIN D,25 HYDROXY; Future    5. Hyperthyroidism  Get labs in 4 week after reduction in dose. Will continue to monitor for symptoms of hyperthyroidism   Methimazole 5 mg daily (DECREASE)    Return in about 5 weeks (around 2/27/2019).    Thank you for allowing me to participate in the care of this patient.    Le Rdz P.A.-C.    CC:   Geovanny Small M.D.    This note was created using voice recognition software (Dragon). The accuracy of the dictation is limited by the abilities of the software. I have reviewed the note prior to signing, however some errors in grammar and context are still possible. If you have any questions related to this note please do not hesitate to contact our office.

## 2019-02-20 ENCOUNTER — HOSPITAL ENCOUNTER (OUTPATIENT)
Dept: RADIOLOGY | Facility: MEDICAL CENTER | Age: 79
End: 2019-02-20
Attending: PHYSICIAN ASSISTANT
Payer: MEDICARE

## 2019-02-20 DIAGNOSIS — E04.1 THYROID NODULE: ICD-10-CM

## 2019-02-20 PROCEDURE — 76536 US EXAM OF HEAD AND NECK: CPT

## 2019-02-26 ENCOUNTER — HOSPITAL ENCOUNTER (OUTPATIENT)
Dept: LAB | Facility: MEDICAL CENTER | Age: 79
End: 2019-02-26
Attending: PHYSICIAN ASSISTANT
Payer: MEDICARE

## 2019-02-26 DIAGNOSIS — E05.90 HYPERTHYROIDISM: ICD-10-CM

## 2019-02-26 LAB
T3FREE SERPL-MCNC: 3.08 PG/ML (ref 2.4–4.2)
T4 FREE SERPL-MCNC: 0.96 NG/DL (ref 0.53–1.43)
TSH SERPL DL<=0.005 MIU/L-ACNC: 3.01 UIU/ML (ref 0.38–5.33)

## 2019-02-26 PROCEDURE — 84439 ASSAY OF FREE THYROXINE: CPT

## 2019-02-26 PROCEDURE — 84443 ASSAY THYROID STIM HORMONE: CPT

## 2019-02-26 PROCEDURE — 36415 COLL VENOUS BLD VENIPUNCTURE: CPT

## 2019-02-26 PROCEDURE — 84481 FREE ASSAY (FT-3): CPT

## 2019-02-26 PROCEDURE — 83520 IMMUNOASSAY QUANT NOS NONAB: CPT

## 2019-03-01 LAB — TSH RECEP AB SER-ACNC: <0.9 IU/L

## 2019-03-06 ENCOUNTER — OFFICE VISIT (OUTPATIENT)
Dept: ENDOCRINOLOGY | Facility: MEDICAL CENTER | Age: 79
End: 2019-03-06
Payer: MEDICARE

## 2019-03-06 VITALS
RESPIRATION RATE: 16 BRPM | WEIGHT: 145 LBS | DIASTOLIC BLOOD PRESSURE: 70 MMHG | HEART RATE: 75 BPM | OXYGEN SATURATION: 92 % | BODY MASS INDEX: 27.38 KG/M2 | HEIGHT: 61 IN | SYSTOLIC BLOOD PRESSURE: 132 MMHG

## 2019-03-06 DIAGNOSIS — E04.1 THYROID NODULE: ICD-10-CM

## 2019-03-06 DIAGNOSIS — R79.89 LOW VITAMIN D LEVEL: ICD-10-CM

## 2019-03-06 DIAGNOSIS — M81.8 OTHER OSTEOPOROSIS, UNSPECIFIED PATHOLOGICAL FRACTURE PRESENCE: ICD-10-CM

## 2019-03-06 DIAGNOSIS — E05.90 HYPERTHYROIDISM: ICD-10-CM

## 2019-03-06 PROCEDURE — 99214 OFFICE O/P EST MOD 30 MIN: CPT | Performed by: PHYSICIAN ASSISTANT

## 2019-03-06 NOTE — PROGRESS NOTES
Endocrinology Clinic Progress Note  PCP: Geovanny Small M.D.    HPI:  Ml Chavira is a 79 y.o. old patient who comes in today for review of endocrine problems.    Overall She is feeling well without symptoms of chest pain, palpations, sob, vitale, lightheaded, dizziness. Patient denies any changes in feeling cold or hot. No changes in bowel movements.     1. Hyperthyroidism  Methimazole 5 mg QD (decreased at last appt)   Patient reports compliance of medications     2. Thyroid nodule  Intermittent symptoms of difficulty with swallowing. Denies any symptoms of swelling of the neck or difficulty breathing.     3. Low vitamin D level  Currently on vitamin D supplementation       4. Other osteoporosis, unspecified pathological fracture presence  On alendronate       Endocrine history to date:   1. Multiple thyroid nodules-   Found on thyroid ultrasound of 8/16/2018 secondary to abnormal PET        2. Hyperthyroiditis related to Hot nodule (left)   Started on Methimazole 11/14/18 2/26/2019, TSH 3.010, free T4 0.96, free T3 3.09, thyroid receptor antibody less than 0.90-methimazole 5 mg daily    1/15/19- TSH 4.730, FT4 0.61, FT3 3.17 TRA <0.90 (Methimazole 10mg)   10/19/2018 TSH 0.030 free T4 0.99, T3 78.4, free T3 3.63, TPO antibody 17.5, thyroid receptor antibodies less than 0.90  6/27/2018  TSH 0.010 free T4 0.98  8/24/2018 TSH 0.030 free T4 1.09 T3 115.6 TPO antibody 16.8     Thyroid uptake and scan 10/29/2018   borderline elevated 5 hour radioactive iodine uptake measured at 15.4%.  Area of focal increased uptake involving the lower pole of the left thyroid lobe corresponding to a solid nodule in that area likely representing a hyperactive nodule.        US:   Thyroid ultrasound 8/16/2018: Numerous hypoechoic nodules in the right thyroid lobe the largest of which is present in the lower pole measuring 1.9 x 1.6 x 1.2 cm the largest is present in the midportion of the right lobe measuring 1.1 x 1.0 x 0.7  cm.  There are numerous nodules located in the left lobe the largest was identified in the left lower pole measuring 2.8 x 1.8 x 1.9 cm it contains focal calcifications.  The next largest is medial slightly inferior to the left thyroid lobe which measures 1.0 x 1.0 x 0.9 cm    Thyroid Ultrasound: 2/19-  There is a subtle oval heterogeneous nodule with some internal echogenicity in the left mid gland measuring 1.9 x 1.2 x 1.3 cm. (previously measured 2.8 x 1.9 x 1.8 cm). There is a hypoechoic oval nodule in the left inferior gland measuring 1.2 x 1.0 x 1.1 cm (previously measured 1.0 x 1.0 x 0.9 cm.)    Thyroid US 8/16/2018   There is a probably solid vascular isoechoic oval right inferior gland nodule measuring 2.2 x 1.4 x 1.9 cm which previously measured 1.9 x 1.6 x 1.2 cm. There is an oval hypoechoic nodule in the right lateral gland measuring 1.1 x 0.7 x 1.1 cm which previously measured 1.1 x 0.7 x 1.0 cm.     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  There is postoperative changes consistent with right parietal occipital craniotomy.    3. Low vitamin D level-  6/27/2018 vitamin D-  22   Currently on Vitamin D replacement     4. osteoporosis  On alendronate -    She is currently only taking vitamin D and calcium.     DEXA 9/20/2018  The mean bone mineral density for the lumbar spine is 0.914 g/cm2, which corresponds to a T score of -2.1 and a Z score of -0.1.    The proximal left femur has a mean bone mineral density of 0.616 g/cm2, with a T score of -3.1 and a Z score of -1.0.  Findings are consistent with osteoporosis with a high risk of fracture    ROS:  Constitutional: No weight loss or gain,  fever  HEENT: No change in voice, or swelling in throat area   Cardiac: No chest pain, palpitations, or racing heart  Resp: No shortness of breath  GI: No abdominal pain, nausea, vomiting, or diarrhea   Neuro: No numbness or  tinging in feet  Endo: No heat or cold intolerance, no polyuria or polydipsia      Past Medical History:  Patient Active Problem List    Diagnosis Date Noted   • Sick sinus syndrome (HCC) 05/31/2018     Priority: High   • Osteoporosis 01/17/2019   • Need for vaccination 01/17/2019   • Hyperthyroidism 01/17/2019   • Chronic atrial fibrillation (HCC) 01/17/2019   • Hot thyroid nodule 01/17/2019   • Postmenopausal 08/27/2018   • Healthcare maintenance 07/23/2018   • Thyroid nodule 07/23/2018   • Low vitamin D level 07/23/2018   • 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   • Balance disorder 06/21/2018   • Radionecrosis 04/14/2018   • Malignant neoplasm of upper lobe of right lung (HCC) 03/05/2018   • CVA (cerebral vascular accident) (HCC) 11/29/2017   • Lung mass 11/19/2015   • Blurry vision, left eye 11/19/2015   • Metastasis to brain (HCC) 11/18/2015   • Metastatic cancer (CMS-HCC) 11/18/2015       Medications:    Current Outpatient Prescriptions:   •  flecainide (TAMBOCOR) 150 MG Tab, TAKE 1/2 TABLET BY MOUTH 2 TIMES A DAY., Disp: 90 Tab, Rfl: 1  •  metoprolol SR (TOPROL XL) 25 MG TABLET SR 24 HR, Take 25 mg by mouth every day., Disp: , Rfl: 3  •  alendronate (FOSAMAX) 10 MG Tab, Take 1 Tab by mouth every morning before breakfast., Disp: 30 Tab, Rfl: 3  •  methimazole (TAPAZOLE) 5 MG Tab, Take 1 Tab by mouth 2 Times a Day., Disp: 60 Tab, Rfl: 3  •  levETIRAcetam (KEPPRA) 500 MG Tab, Take 1 Tab by mouth 2 Times a Day., Disp: 180 Tab, Rfl: 0  •  Cholecalciferol 1000 UNIT Cap, Take 1 Cap by mouth every day at 6 PM., Disp: 90 Cap, Rfl: 0  •  Omega-3 Fatty Acids (FISH OIL) 1200 MG Cap, Take 1 Cap by mouth every day., Disp: 90 Cap, Rfl: 0  •  aspirin (ASA) 81 MG Chew Tab chewable tablet, Take 1 Tab by mouth every evening., Disp: 90 Tab, Rfl: 0  •  multivitamin (THERAGRAN) Tab, Take 1 Tab by mouth every day., Disp: , Rfl:   •  methimazole (TAPAZOLE) 5 MG Tab, Take 1 Tab by mouth 2 Times a Day., Disp: 60 Tab, Rfl: 3  •  DILTIAZem  "CD (CARDIZEM CD) 240 MG CAPSULE SR 24 HR, Take 1 Cap by mouth every day. (Patient not taking: Reported on 1/17/2019), Disp: 90 Cap, Rfl: 0    Labs: Reviewed as above     Physical Examination:  Vital signs: /70 (BP Location: Left arm, Patient Position: Sitting, BP Cuff Size: Adult)   Pulse 75   Resp 16   Ht 1.549 m (5' 1\")   Wt 65.8 kg (145 lb)   SpO2 92%   BMI 27.40 kg/m²  Body mass index is 27.4 kg/m².  General: No apparent distress, cooperative  Eyes: No scleral icterus, no discharge, normal eyelids  Neck: No abnormal masses on inspection, l thyroid exam  Resp: Normal effort, clear to auscultation bilaterally  CVS: Regular rate and rhythm, S1 S2 normal, no murmur  Abdomen: abdominal obesity present  Musculoskeletal: Normal digits and nails  Skin: No rash on visible skin  Psych: Alert and oriented, normal mood and affect, intact memory and able to make informed decisions.    Assessment and Plan:    1. Hyperthyroidism  Continue Methimazole 5 mg daily     2. Thyroid nodule  Reviewed ultrasound findings with the patient, there is some growth re: thyroid nodule but after long discussion patient would prefer to recheck US in 6 months if at that time there is change she will \"consider\" biopsy.      3. Low vitamin D level  Currently on Vitamin d supplementation     4.  osteoporosis, unspecified pathological fracture presence  Continue Fosamax       Return in about 6 weeks (around 4/17/2019).    Thank you for allowing me to participate in the care of this patient.  If you have any questions or concerns please do not hesitate to contact me.    Le Rdz P.A.-C.    CC:   Geovanny Small M.D.    This note was created using voice recognition software (Dragon). The accuracy of the dictation is limited by the abilities of the software. I have reviewed the note prior to signing, however some errors in grammar and context are still possible. If you have any questions related to this note please do not " hesitate to contact our office.

## 2019-03-07 RX ORDER — METHIMAZOLE 5 MG/1
5 TABLET ORAL 2 TIMES DAILY
Qty: 60 TAB | Refills: 3 | Status: SHIPPED | OUTPATIENT
Start: 2019-03-07 | End: 2019-04-16

## 2019-03-07 NOTE — TELEPHONE ENCOUNTER
Was the patient seen in the last year in this department? Yes  **LOV 3/6/19**    Does patient have an active prescription for medications requested? Yes    Received Request Via: Patient

## 2019-03-25 DIAGNOSIS — C34.11 MALIGNANT NEOPLASM OF UPPER LOBE OF RIGHT LUNG (HCC): ICD-10-CM

## 2019-03-26 DIAGNOSIS — C79.31 METASTASIS TO BRAIN (HCC): ICD-10-CM

## 2019-03-26 DIAGNOSIS — C79.9 METASTATIC CANCER (HCC): ICD-10-CM

## 2019-03-26 DIAGNOSIS — C34.11 MALIGNANT NEOPLASM OF UPPER LOBE OF RIGHT LUNG (HCC): ICD-10-CM

## 2019-03-26 NOTE — TELEPHONE ENCOUNTER
Was the patient seen in the last year in this department? Yes    Does patient have an active prescription for medications requested? No     Received Request Via: Pharmacy Pt last seen on: 01/17/2019 by Dr. Small Next appt on: none

## 2019-03-27 RX ORDER — LEVETIRACETAM 500 MG/1
500 TABLET ORAL 2 TIMES DAILY
Qty: 180 TAB | Refills: 0 | OUTPATIENT
Start: 2019-03-27

## 2019-03-27 NOTE — TELEPHONE ENCOUNTER
Spoke to patient and let her know that she will need to get her Keppra from her primary care provider. Patient verbalized understanding.

## 2019-03-27 NOTE — TELEPHONE ENCOUNTER
Spoke to patient regarding who has been prescribing this medication for her. Patient stated at first it was Dr. Caro, then Dr. Thacker, and her primary care was the last one to fill the prescription for her. Patient stated she has about 1 weeks worth of the prescription left.    Patient was also not sure as to who should be prescribing this medication for her.

## 2019-03-28 RX ORDER — LEVETIRACETAM 500 MG/1
500 TABLET ORAL 2 TIMES DAILY
Qty: 180 TAB | Refills: 1 | Status: SHIPPED | OUTPATIENT
Start: 2019-03-28 | End: 2019-08-12 | Stop reason: SDUPTHER

## 2019-03-28 NOTE — TELEPHONE ENCOUNTER
Called pt and informed that I will send referral to neurology for further assessment. Keppra refilled this time. Need to be seen by neurology for further decision regarding intake of keppra. Thanks

## 2019-04-08 ENCOUNTER — HOSPITAL ENCOUNTER (OUTPATIENT)
Dept: LAB | Facility: MEDICAL CENTER | Age: 79
End: 2019-04-08
Attending: PHYSICIAN ASSISTANT
Payer: MEDICARE

## 2019-04-08 DIAGNOSIS — E05.90 HYPERTHYROIDISM: ICD-10-CM

## 2019-04-08 LAB
T3FREE SERPL-MCNC: 3.7 PG/ML (ref 2.4–4.2)
T4 FREE SERPL-MCNC: 0.94 NG/DL (ref 0.53–1.43)
TSH SERPL DL<=0.005 MIU/L-ACNC: 3.07 UIU/ML (ref 0.38–5.33)

## 2019-04-08 PROCEDURE — 84439 ASSAY OF FREE THYROXINE: CPT

## 2019-04-08 PROCEDURE — 36415 COLL VENOUS BLD VENIPUNCTURE: CPT

## 2019-04-08 PROCEDURE — 84443 ASSAY THYROID STIM HORMONE: CPT

## 2019-04-08 PROCEDURE — 84481 FREE ASSAY (FT-3): CPT

## 2019-04-08 PROCEDURE — 83520 IMMUNOASSAY QUANT NOS NONAB: CPT

## 2019-04-11 LAB — TSH RECEP AB SER-ACNC: <0.9 IU/L

## 2019-04-16 ENCOUNTER — OFFICE VISIT (OUTPATIENT)
Dept: ENDOCRINOLOGY | Facility: MEDICAL CENTER | Age: 79
End: 2019-04-16
Payer: MEDICARE

## 2019-04-16 VITALS
SYSTOLIC BLOOD PRESSURE: 110 MMHG | HEART RATE: 78 BPM | DIASTOLIC BLOOD PRESSURE: 60 MMHG | OXYGEN SATURATION: 86 % | HEIGHT: 61 IN | WEIGHT: 145 LBS | BODY MASS INDEX: 27.38 KG/M2

## 2019-04-16 DIAGNOSIS — R79.89 LOW VITAMIN D LEVEL: ICD-10-CM

## 2019-04-16 DIAGNOSIS — E05.90 HYPERTHYROIDISM: ICD-10-CM

## 2019-04-16 DIAGNOSIS — M81.8 OTHER OSTEOPOROSIS, UNSPECIFIED PATHOLOGICAL FRACTURE PRESENCE: ICD-10-CM

## 2019-04-16 DIAGNOSIS — E04.1 THYROID NODULE: ICD-10-CM

## 2019-04-16 PROCEDURE — 99214 OFFICE O/P EST MOD 30 MIN: CPT | Performed by: PHYSICIAN ASSISTANT

## 2019-04-16 RX ORDER — METHIMAZOLE 5 MG/1
5 TABLET ORAL DAILY
Qty: 30 TAB | Refills: 3
Start: 2019-04-16 | End: 2019-07-08 | Stop reason: SDUPTHER

## 2019-04-16 RX ORDER — ALENDRONATE SODIUM 10 MG/1
10 TABLET ORAL
Qty: 30 TAB | Refills: 3 | Status: SHIPPED | OUTPATIENT
Start: 2019-04-16 | End: 2019-07-17 | Stop reason: SDUPTHER

## 2019-04-16 NOTE — PROGRESS NOTES
Endocrinology Clinic Progress Note  PCP: Geovanny Small M.D.    HPI:  Ml Chavira is a 79 y.o. old patient who comes in today for review of endocrine problems.    1. Hyperthyroidism  Methimazole 5 mg QD -   Patient reports compliance of medications   Patient without side effects.   Patient denies any symptoms of swelling in her throat and or enlargement.  She denies any palpitations shaking and/or tremors     2. Thyroid nodule  Intermittent symptoms of difficulty with swallowing only with Tuna fish.  Denies any symptoms of swelling of the neck or difficulty breathing.      3. Low vitamin D level  Currently on vitamin D supplementation   Patient reports compliance        4. Other osteoporosis, unspecified pathological fracture presence  Previously on alendronate - has been without and needs a new rx   Patient is scheduled to start PT for balance       Endocrine history to date:     1. Multiple thyroid nodules-   Found on thyroid ultrasound of 8/16/2018 secondary to abnormal PET      Thyroid Ultrasound: 2/19-  There is a subtle oval heterogeneous nodule with some internal echogenicity in the left mid gland measuring 1.9 x 1.2 x 1.3 cm. (previously measured 2.8 x 1.9 x 1.8 cm). There is a hypoechoic oval nodule in the left inferior gland measuring 1.2 x 1.0 x 1.1 cm (previously measured 1.0 x 1.0 x 0.9 cm.)       US:   Thyroid ultrasound 8/16/2018: Numerous hypoechoic nodules in the right thyroid lobe the largest of which is present in the lower pole measuring 1.9 x 1.6 x 1.2 cm the largest is present in the midportion of the right lobe measuring 1.1 x 1.0 x 0.7 cm.  There are numerous nodules located in the left lobe the largest was identified in the left lower pole measuring 2.8 x 1.8 x 1.9 cm it contains focal calcifications.  The next largest is medial slightly inferior to the left thyroid lobe which measures 1.0 x 1.0 x 0.9 cm       Thyroid US 8/16/2018   There is a probably solid vascular isoechoic oval  right inferior gland nodule measuring 2.2 x 1.4 x 1.9 cm which previously measured 1.9 x 1.6 x 1.2 cm. There is an oval hypoechoic nodule in the right lateral gland measuring 1.1 x 0.7 x 1.1 cm which previously measured 1.1 x 0.7 x 1.0 cm.    2. Hyperthyroiditis related to Hot nodule (left)   Started on Methimazole 11/14/18 4/8/2019-TSH 3.070, FT4 0.94, FT3 3.70 TRA <0.90   2/26/2019, TSH 3.010, free T4 0.96, free T3 3.09, thyroid receptor antibody less than 0.90-methimazole 5 mg daily     1/15/19- TSH 4.730, FT4 0.61, FT3 3.17 TRA <0.90 (Methimazole 10mg)   10/19/2018 TSH 0.030 free T4 0.99, T3 78.4, free T3 3.63, TPO antibody 17.5, thyroid receptor antibodies less than 0.90  6/27/2018  TSH 0.010 free T4 0.98  8/24/2018 TSH 0.030 free T4 1.09 T3 115.6 TPO antibody 16.8     Thyroid uptake and scan 10/29/2018   borderline elevated 5 hour radioactive iodine uptake measured at 15.4%.  Area of focal increased uptake involving the lower pole of the left thyroid lobe corresponding to a solid nodule in that area likely representing a hyperactive nodule.        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  There is postoperative changes consistent with right parietal occipital craniotomy.     3. Low vitamin D level-  6/27/2018 vitamin D-  22   Currently on Vitamin D replacement      4. osteoporosis  On alendronate -    She is currently only taking vitamin D and calcium.     DEXA 9/20/2018  The mean bone mineral density for the lumbar spine is 0.914 g/cm2, which corresponds to a T score of -2.1 and a Z score of -0.1.    The proximal left femur has a mean bone mineral density of 0.616 g/cm2, with a T score of -3.1 and a Z score of -1.0.  Findings are consistent with osteoporosis with a high risk of fracture       ROS:  Constitutional: No weight loss or gain,  fever  HEENT: No difficulty with swallowing, change in voice, or swelling in  throat area   Cardiac: No chest pain, palpitations, or racing heart  Resp: No shortness of breath  GI: No abdominal pain, nausea, vomiting, or diarrhea   Neuro: No numbness or tinging in feet  Endo: No heat or cold intolerance, no polyuria or polydipsia  All other detailed ROS is otherwise negative       Past Medical History:  Patient Active Problem List    Diagnosis Date Noted   • Sick sinus syndrome (HCC) 05/31/2018     Priority: High   • Osteoporosis 01/17/2019   • Need for vaccination 01/17/2019   • Hyperthyroidism 01/17/2019   • Chronic atrial fibrillation (HCC) 01/17/2019   • Hot thyroid nodule 01/17/2019   • Postmenopausal 08/27/2018   • Healthcare maintenance 07/23/2018   • Thyroid nodule 07/23/2018   • Low vitamin D level 07/23/2018   • 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   • Balance disorder 06/21/2018   • Radionecrosis 04/14/2018   • Malignant neoplasm of upper lobe of right lung (HCC) 03/05/2018   • CVA (cerebral vascular accident) (HCC) 11/29/2017   • Lung mass 11/19/2015   • Blurry vision, left eye 11/19/2015   • Metastasis to brain (HCC) 11/18/2015   • Metastatic cancer (CMS-HCC) 11/18/2015       Family Medical History:   Family History   Problem Relation Age of Onset   • Dementia Mother    • Diabetes Mother    • Asthma Mother    • Other Mother         osteoarthritis   • Arthritis Mother    • Autoimmune Disease Father         Rheumatoid arthritis   • Arthritis Father    • Cancer Brother         prostate        Social History:   Social History     Social History   • Marital status:      Spouse name: N/A   • Number of children: N/A   • Years of education: N/A     Occupational History   • Not on file.     Social History Main Topics   • Smoking status: Former Smoker     Packs/day: 2.00     Years: 12.00   • Smokeless tobacco: Never Used   • Alcohol use 0.6 oz/week     1 Glasses of wine per week   • Drug use: No   • Sexual activity: Not on file     Other Topics Concern   • Not on file     Social  "History Narrative   • No narrative on file         Medications:    Current Outpatient Prescriptions:   •  alendronate (FOSAMAX) 10 MG Tab, Take 1 Tab by mouth every morning before breakfast., Disp: 30 Tab, Rfl: 3  •  methimazole (TAPAZOLE) 5 MG Tab, Take 1 Tab by mouth every day., Disp: 30 Tab, Rfl: 3  •  levETIRAcetam (KEPPRA) 500 MG Tab, Take 1 Tab by mouth 2 Times a Day., Disp: 180 Tab, Rfl: 1  •  flecainide (TAMBOCOR) 150 MG Tab, TAKE 1/2 TABLET BY MOUTH 2 TIMES A DAY., Disp: 90 Tab, Rfl: 1  •  metoprolol SR (TOPROL XL) 25 MG TABLET SR 24 HR, Take 25 mg by mouth every day., Disp: , Rfl: 3  •  Cholecalciferol 1000 UNIT Cap, Take 1 Cap by mouth every day at 6 PM., Disp: 90 Cap, Rfl: 0  •  Omega-3 Fatty Acids (FISH OIL) 1200 MG Cap, Take 1 Cap by mouth every day., Disp: 90 Cap, Rfl: 0  •  DILTIAZem CD (CARDIZEM CD) 240 MG CAPSULE SR 24 HR, Take 1 Cap by mouth every day., Disp: 90 Cap, Rfl: 0  •  aspirin (ASA) 81 MG Chew Tab chewable tablet, Take 1 Tab by mouth every evening., Disp: 90 Tab, Rfl: 0  •  multivitamin (THERAGRAN) Tab, Take 1 Tab by mouth every day., Disp: , Rfl:     Labs: Reviewed as above     Physical Examination:  Vital signs: /60 (BP Location: Left arm, Patient Position: Sitting, BP Cuff Size: Adult)   Pulse 78   Ht 1.549 m (5' 1\")   Wt 65.8 kg (145 lb)   SpO2 (!) 86%   BMI 27.40 kg/m²  Body mass index is 27.4 kg/m².  General: No apparent distress, cooperative, walking with cane  Eyes: No scleral icterus, no discharge, normal eyelids  Neck: No abnormal masses on inspection, normal thyroid exam  Resp: Normal effort, clear to auscultation bilaterally  CVS: Regular rate and rhythm, S1 S2 normal, no murmur  Extremities: No lower extremity edema,   Abdomen: abdominal obesity present  Musculoskeletal: Normal digits and nails  Skin: No rash on visible skin  Psych: Alert and oriented, normal mood and affect, intact memory and able to make informed decisions.    Assessment and Plan:  1. " Hyperthyroidism  Chronic stable condition managed with current medical regimen   Continue current regimen   Continue Methimazole 5 mg daily   Standing labs       2. Low vitamin D level  Continue Vitamin D supplement   Chronic stable condition managed with current medical regimen   Continue current regimen       3. Other osteoporosis, unspecified pathological fracture presence  Chronic stable condition managed with current medical regimen   Continue current regimen   Rx for alendronate     4. Thyroid nodule  Chronic stable condition managed with current ultrasound surveillance   Will order US at next visit for 8/2019 - unless symptoms intervene     Return in about 2 months (around 6/16/2019).    Thank you for allowing me to participate in the care of this patient.  If you have any questions or concerns please do not hesitate to contact me.    Le Rdz P.A.-C.    CC:   Geovanny Small M.D.    This note was created using voice recognition software (Dragon). The accuracy of the dictation is limited by the abilities of the software. I have reviewed the note prior to signing, however some errors in grammar and context are still possible. If you have any questions related to this note please do not hesitate to contact our office.

## 2019-04-18 ENCOUNTER — TELEPHONE (OUTPATIENT)
Dept: HEMATOLOGY ONCOLOGY | Facility: MEDICAL CENTER | Age: 79
End: 2019-04-18

## 2019-04-18 DIAGNOSIS — C34.11 MALIGNANT NEOPLASM OF UPPER LOBE OF RIGHT LUNG (HCC): ICD-10-CM

## 2019-04-18 DIAGNOSIS — C79.9 METASTATIC CANCER (HCC): ICD-10-CM

## 2019-04-18 NOTE — TELEPHONE ENCOUNTER
Patient calling stating that she made an appointment with Dr. Aguilar at [facility], but she wants to know if she still get Standing labs that she normally gets. Patient also wants to know if she needs a mri head,ct lungs that she normally gets before appointlment. Thank you.

## 2019-04-24 NOTE — TELEPHONE ENCOUNTER
Orders received from Dr. Valerio for the patient to get the following completed prior to her next scheduled   Visit:   CBC   CMP   CT Chest ABD/Pelvis   MRI Brain    Orders placed in epic and MA to assist with scheduling and notifying the patient

## 2019-04-25 ENCOUNTER — TELEPHONE (OUTPATIENT)
Dept: HEMATOLOGY ONCOLOGY | Facility: MEDICAL CENTER | Age: 79
End: 2019-04-25

## 2019-04-25 NOTE — TELEPHONE ENCOUNTER
Left message to inform the patient our imaging scheduler will contact her to schedule and that she has standing lab orders to complete.

## 2019-04-27 NOTE — ASSESSMENT & PLAN NOTE
- Abdominal pain improved with PPI, continue  
- Continue outpatient follow-up  
- New diagnosis, was newly diagnosed with atrial fibrillation during this hospital stay, was in R[facility]  - Then became bradycardic, now with sick sinus syndrome, deemed to need a pacemaker by cardiology  -Pacemaker has been placed, patient is having pain at the site, start tramadol  - Patient is also worried about her ability to perform activities of daily living, asked nurse to have her be more active  
- No neuro changes, continue outpatient follow-up  
- Possibly due to gastritis, possibly due to lung cancer mass and radiation changes   - Pain currently resolved   
- Postradiation, chronic  
10

## 2019-04-29 ENCOUNTER — TELEPHONE (OUTPATIENT)
Dept: HEMATOLOGY ONCOLOGY | Facility: MEDICAL CENTER | Age: 79
End: 2019-04-29

## 2019-04-29 NOTE — TELEPHONE ENCOUNTER
Patient is scheduled to follow up with Dr. Aguilar at [facility] on June 10th per the patient.

## 2019-04-29 NOTE — TELEPHONE ENCOUNTER
2nd attempt:     Patient confirmed she is aware of her upcoming CT and MRI appointments. Patient confirmed she is to see him at [facility] on June 10th.

## 2019-05-14 NOTE — PROGRESS NOTES
"  Preventive Care at the 9 Month Visit  Growth Measurements & Percentiles  Head Circumference: 16.85\" (42.8 cm) (4 %, Source: WHO (Boys, 0-2 years)) 4 %ile based on WHO (Boys, 0-2 years) head circumference-for-age based on Head Circumference recorded on 5/14/2019.   Weight: 17 lbs 3 oz / 7.8 kg (actual weight) / 11 %ile based on WHO (Boys, 0-2 years) weight-for-age data based on Weight recorded on 5/14/2019.   Length: 2' 2.8\" / 68.1 cm 4 %ile based on WHO (Boys, 0-2 years) Length-for-age data based on Length recorded on 5/14/2019.   Weight for length: 39 %ile based on WHO (Boys, 0-2 years) weight-for-recumbent length based on body measurements available as of 5/14/2019.    Your baby s next Preventive Check-up will be at 12 months of age.      Development    At this age, your baby may:      Sit well.      Crawl or creep (not all babies crawl).      Pull self up to stand.      Use his fingers to feed.      Imitate sounds and babble (josh, mama, bababa).      Respond when his name or a familiar object is called.      Understand a few words such as  no-no  or  bye.       Start to understand that an object hidden by a cloth is still there (object permanence).     Feeding Tips      Your baby s appetite will decrease.  He will also drink less formula or breast milk.    Have your baby start to use a sippy cup and start weaning him off the bottle.    Let your child explore finger foods.  It s good if he gets messy.    You can give your baby table foods as long as the foods are soft or cut into small pieces.  Do not give your baby  junk food.     Don t put your baby to bed with a bottle.    To reduce your child's chance of developing peanut allergy, you can start introducing peanut-containing foods in small amounts around 6 months of age.  If your child has severe eczema, egg allergy or both, consult with your doctor first about possible allergy-testing and introduction of small amounts of peanut-containing foods at 4-6 " Subjective:   Ml Chavira is a 78 y.o. female who presents for Chest Pain (pain radiating to R shoulder,SOB x1day )        Chest Pain    This is a new problem. The current episode started yesterday. Associated symptoms include shortness of breath. Pertinent negatives include no abdominal pain, back pain, claudication, cough, dizziness, fever, headaches, nausea, orthopnea, palpitations, PND, sputum production or vomiting.   notes achy pain to chest and shrortness of breath - denies PMH of similar, PMH of lung CA, PMH of brain tumor. Denies PMH of cardiac disease. Has noted some leg swelling as well. PMH of joint swelling and pain. Denies pMH of asthma or bronchitis, remote PMH of pneumonia, denies cough or feeling sick now. Notes PMH of seasonal allerg.  . She denies numbness, tingling, weakness. She denies slurred speech, issues with imbalance. She is seen with her friend present who agrees. She's had no neurologic changes. Past medical history of CVA. She notes continued blurred vision on left side secondary to prior CVA with no changes. She notes a slight swelling to bilateral legs with no recent dramatic increase. No unilateral leg swelling.    Review of Systems   Constitutional: Negative for chills and fever.   HENT: Negative for congestion.    Eyes: Negative for discharge and redness.   Respiratory: Positive for shortness of breath. Negative for cough, sputum production and wheezing.    Cardiovascular: Positive for chest pain. Negative for palpitations, orthopnea, claudication, leg swelling and PND.   Gastrointestinal: Negative for abdominal pain, blood in stool, constipation, diarrhea, melena, nausea and vomiting.   Genitourinary: Negative for hematuria.   Musculoskeletal: Negative for back pain, myalgias and neck pain.   Skin: Negative for rash.   Neurological: Negative for dizziness, sensory change, focal weakness and headaches.   Endo/Heme/Allergies: Negative for environmental allergies. Does not  "bruise/bleed easily.     Allergies   Allergen Reactions   • Penicillins Itching and Rash   I have worn a mask for the entire encounter with this patient.    Objective:   /70   Pulse 86   Temp 36.8 °C (98.2 °F)   Resp 18   Ht 1.575 m (5' 2\")   Wt 61.7 kg (136 lb)   SpO2 92%   BMI 24.87 kg/m²   Physical Exam   Constitutional: She is oriented to person, place, and time. She appears well-developed and well-nourished.  Non-toxic appearance. She does not have a sickly appearance. No distress.   HENT:   Head: Normocephalic and atraumatic.   Right Ear: Tympanic membrane, external ear and ear canal normal.   Left Ear: Tympanic membrane, external ear and ear canal normal.   Nose: Nose normal.   Mouth/Throat: Uvula is midline and mucous membranes are normal. Posterior oropharyngeal erythema ( mild PND) present. No oropharyngeal exudate, posterior oropharyngeal edema or tonsillar abscesses.   Eyes: Conjunctivae, EOM and lids are normal. Pupils are equal, round, and reactive to light. Right eye exhibits no discharge. Left eye exhibits no discharge. No scleral icterus.   Neck: Neck supple.   Cardiovascular: Normal rate, intact distal pulses and normal pulses.  An irregular rhythm present.  Occasional extrasystoles are present.   Pulmonary/Chest: Effort normal. No respiratory distress. She has no decreased breath sounds. She has no wheezes. She has no rhonchi. She has no rales.   Musculoskeletal: Normal range of motion.        Right lower leg: She exhibits no edema.        Left lower leg: She exhibits no edema.   Lymphadenopathy:     She has cervical adenopathy ( mild bilat).   Neurological: She is alert and oriented to person, place, and time. She is not disoriented.   Skin: Skin is warm and dry. She is not diaphoretic. No erythema. No pallor.   Psychiatric: Her speech is normal and behavior is normal.   Nursing note and vitals reviewed.  EKG in the office reveals a irreg rhythm with a rate of 81. There is no ectopy, " months old.  Teething      Babies may drool and chew a lot when getting teeth; a teething ring can give comfort.    Gently clean your baby s gums and teeth after each meal.  Use a soft brush or cloth, along with water or a small amount (smaller than a pea) of fluoridated tooth and gum .     Sleep      Your baby should be able to sleep through the night.  If your baby wakes up during the night, he should go back asleep without your help.  You should not take your baby out of the crib if he wakes up during the night.      Start a nighttime routine which may include bathing, brushing teeth and reading.  Be sure to stick with this routine each night.    Give your baby the same safe toy or blanket for comfort.    Teething discomfort may cause problems with your baby s sleep and appetite.       Safety      Put the car seat in the back seat of your vehicle.  Make sure the seat faces the rear window until your child weighs more than 20 pounds and turns 2 years old.    Put harris on all stairways.    Never put hot liquids near table or countertop edges.  Keep your child away from a hot stove, oven and furnace.    Turn your hot water heater to less than 120  F.    If your baby gets a burn, run the affected body part under cold water and call the clinic right away.    Never leave your child alone in the bathtub or near water.  A child can drown in as little as 1 inch of water.    Do not let your baby get small objects such as toys, nuts, coins, hot dog pieces, peanuts, popcorn, raisins or grapes.  These items may cause choking.    Keep all medicines, cleaning supplies and poisons out of your baby s reach.  You can apply safety latches to cabinets.    Call the poison control center or your health care provider for directions in case your baby swallows poison.  1-303.736.2970    Put plastic covers in unused electrical outlets.    Keep windows closed, or be sure they have screens that cannot be pushed out.  Think about  installing window guards.         What Your Baby Needs      Your baby will become more independent.  Let your baby explore.    Play with your baby.  He will imitate your actions and sounds.  This is how your baby learns.    Setting consistent limits helps your child to feel confident and secure and know what you expect.  Be consistent with your limits and discipline, even if this makes your baby unhappy at the moment.    Practice saying a calm and firm  no  only when your baby is in danger.  At other times, offer a different choice or another toy for your baby.    Never use physical punishment.    Dental Care      Your pediatric provider will speak with your regarding the need for regular dental appointments for cleanings and check-ups starting when your child s first tooth appears.      Your child may need fluoride supplements if you have well water.    Brush your child s teeth with a small amount (smaller than a pea) of fluoridated tooth paste once daily.       Lab Tests      Hemoglobin and lead levels may be checked.       no ST elevation, depression, no signs of ischemia or infarct.      Assessment/Plan:   Assessment    1. Chest pain, unspecified type  - EKG    2. Dyspnea, unspecified type    Other orders  - aspirin (ASA) 81 MG Chew Tab chewable tablet; Take 81 mg by mouth every day.  Patient has been directed to [facility] ER for further management/work up, I have reiterated to patient that although a provider to provider transfer was made this will not necessarily expedite the ER process, I have spoken to Charge RN  at [facility] regarding patient en route by private vehicle    Differential diagnosis, natural history, supportive care, and indications for immediate follow-up discussed.

## 2019-06-04 ENCOUNTER — HOSPITAL ENCOUNTER (OUTPATIENT)
Dept: LAB | Facility: MEDICAL CENTER | Age: 79
End: 2019-06-04
Attending: PHYSICIAN ASSISTANT
Payer: MEDICARE

## 2019-06-04 ENCOUNTER — HOSPITAL ENCOUNTER (OUTPATIENT)
Dept: LAB | Facility: MEDICAL CENTER | Age: 79
End: 2019-06-04
Attending: INTERNAL MEDICINE
Payer: MEDICARE

## 2019-06-04 DIAGNOSIS — E05.90 HYPERTHYROIDISM: ICD-10-CM

## 2019-06-04 DIAGNOSIS — C34.11 MALIGNANT NEOPLASM OF UPPER LOBE OF RIGHT LUNG (HCC): ICD-10-CM

## 2019-06-04 DIAGNOSIS — C79.9 METASTATIC CANCER (HCC): ICD-10-CM

## 2019-06-04 LAB
ALBUMIN SERPL BCP-MCNC: 4.4 G/DL (ref 3.2–4.9)
ALBUMIN/GLOB SERPL: 1.8 G/DL
ALP SERPL-CCNC: 55 U/L (ref 30–99)
ALT SERPL-CCNC: 22 U/L (ref 2–50)
ANION GAP SERPL CALC-SCNC: 8 MMOL/L (ref 0–11.9)
AST SERPL-CCNC: 22 U/L (ref 12–45)
BASOPHILS # BLD AUTO: 0.7 % (ref 0–1.8)
BASOPHILS # BLD: 0.05 K/UL (ref 0–0.12)
BILIRUB SERPL-MCNC: 0.4 MG/DL (ref 0.1–1.5)
BUN SERPL-MCNC: 22 MG/DL (ref 8–22)
CALCIUM SERPL-MCNC: 9.7 MG/DL (ref 8.5–10.5)
CHLORIDE SERPL-SCNC: 108 MMOL/L (ref 96–112)
CO2 SERPL-SCNC: 26 MMOL/L (ref 20–33)
CREAT SERPL-MCNC: 0.84 MG/DL (ref 0.5–1.4)
EOSINOPHIL # BLD AUTO: 0.19 K/UL (ref 0–0.51)
EOSINOPHIL NFR BLD: 2.8 % (ref 0–6.9)
ERYTHROCYTE [DISTWIDTH] IN BLOOD BY AUTOMATED COUNT: 54.3 FL (ref 35.9–50)
GLOBULIN SER CALC-MCNC: 2.4 G/DL (ref 1.9–3.5)
GLUCOSE SERPL-MCNC: 91 MG/DL (ref 65–99)
HCT VFR BLD AUTO: 43 % (ref 37–47)
HGB BLD-MCNC: 14 G/DL (ref 12–16)
IMM GRANULOCYTES # BLD AUTO: 0.02 K/UL (ref 0–0.11)
IMM GRANULOCYTES NFR BLD AUTO: 0.3 % (ref 0–0.9)
LYMPHOCYTES # BLD AUTO: 1.47 K/UL (ref 1–4.8)
LYMPHOCYTES NFR BLD: 21.5 % (ref 22–41)
MCH RBC QN AUTO: 34.6 PG (ref 27–33)
MCHC RBC AUTO-ENTMCNC: 32.6 G/DL (ref 33.6–35)
MCV RBC AUTO: 106.2 FL (ref 81.4–97.8)
MONOCYTES # BLD AUTO: 0.65 K/UL (ref 0–0.85)
MONOCYTES NFR BLD AUTO: 9.5 % (ref 0–13.4)
NEUTROPHILS # BLD AUTO: 4.46 K/UL (ref 2–7.15)
NEUTROPHILS NFR BLD: 65.2 % (ref 44–72)
NRBC # BLD AUTO: 0 K/UL
NRBC BLD-RTO: 0 /100 WBC
PLATELET # BLD AUTO: 241 K/UL (ref 164–446)
PMV BLD AUTO: 9.4 FL (ref 9–12.9)
POTASSIUM SERPL-SCNC: 4 MMOL/L (ref 3.6–5.5)
PROT SERPL-MCNC: 6.8 G/DL (ref 6–8.2)
RBC # BLD AUTO: 4.05 M/UL (ref 4.2–5.4)
SODIUM SERPL-SCNC: 142 MMOL/L (ref 135–145)
WBC # BLD AUTO: 6.8 K/UL (ref 4.8–10.8)

## 2019-06-04 PROCEDURE — 36415 COLL VENOUS BLD VENIPUNCTURE: CPT

## 2019-06-04 PROCEDURE — 85025 COMPLETE CBC W/AUTO DIFF WBC: CPT

## 2019-06-04 PROCEDURE — 84481 FREE ASSAY (FT-3): CPT

## 2019-06-04 PROCEDURE — 84443 ASSAY THYROID STIM HORMONE: CPT

## 2019-06-04 PROCEDURE — 84439 ASSAY OF FREE THYROXINE: CPT

## 2019-06-04 PROCEDURE — 80053 COMPREHEN METABOLIC PANEL: CPT

## 2019-06-04 PROCEDURE — 83520 IMMUNOASSAY QUANT NOS NONAB: CPT

## 2019-06-05 LAB
T3FREE SERPL-MCNC: 3.68 PG/ML (ref 2.4–4.2)
T4 FREE SERPL-MCNC: 0.85 NG/DL (ref 0.53–1.43)
TSH SERPL DL<=0.005 MIU/L-ACNC: 7.63 UIU/ML (ref 0.38–5.33)

## 2019-06-06 ENCOUNTER — HOSPITAL ENCOUNTER (OUTPATIENT)
Dept: RADIOLOGY | Facility: MEDICAL CENTER | Age: 79
End: 2019-06-06
Attending: INTERNAL MEDICINE
Payer: MEDICARE

## 2019-06-06 VITALS — RESPIRATION RATE: 16 BRPM | OXYGEN SATURATION: 89 % | HEART RATE: 70 BPM

## 2019-06-06 DIAGNOSIS — C34.11 MALIGNANT NEOPLASM OF UPPER LOBE OF RIGHT LUNG (HCC): ICD-10-CM

## 2019-06-06 DIAGNOSIS — C79.9 METASTATIC CANCER (HCC): ICD-10-CM

## 2019-06-06 DIAGNOSIS — C79.31 METASTASIS TO BRAIN (HCC): ICD-10-CM

## 2019-06-06 LAB — TSH RECEP AB SER-ACNC: 1.22 IU/L

## 2019-06-06 PROCEDURE — 71260 CT THORAX DX C+: CPT

## 2019-06-06 PROCEDURE — 700117 HCHG RX CONTRAST REV CODE 255: Performed by: INTERNAL MEDICINE

## 2019-06-06 PROCEDURE — 70553 MRI BRAIN STEM W/O & W/DYE: CPT

## 2019-06-06 PROCEDURE — A9585 GADOBUTROL INJECTION: HCPCS | Performed by: INTERNAL MEDICINE

## 2019-06-06 RX ORDER — GADOBUTROL 604.72 MG/ML
7.5 INJECTION INTRAVENOUS ONCE
Status: COMPLETED | OUTPATIENT
Start: 2019-06-06 | End: 2019-06-06

## 2019-06-06 RX ADMIN — IOHEXOL 25 ML: 240 INJECTION, SOLUTION INTRATHECAL; INTRAVASCULAR; INTRAVENOUS; ORAL at 11:08

## 2019-06-06 RX ADMIN — GADOBUTROL 7.5 ML: 604.72 INJECTION INTRAVENOUS at 14:31

## 2019-06-06 RX ADMIN — IOHEXOL 100 ML: 350 INJECTION, SOLUTION INTRAVENOUS at 11:08

## 2019-06-10 ENCOUNTER — TELEPHONE (OUTPATIENT)
Dept: INTERNAL MEDICINE | Facility: MEDICAL CENTER | Age: 79
End: 2019-06-10

## 2019-06-10 NOTE — TELEPHONE ENCOUNTER
1. Caller Name: Ml Chavira                      Call Back Number: [phone] (home)       2. Message: Patient has hives. I called patient lvm to call back and schedule appointment with resident clinic to review hives. I also notify patient Dr Small is graduating needs to re-establish with new resident.    3. Patient approves office to leave a detailed voicemail/MyChart message: yes

## 2019-06-19 ENCOUNTER — OFFICE VISIT (OUTPATIENT)
Dept: INTERNAL MEDICINE | Facility: MEDICAL CENTER | Age: 79
End: 2019-06-19
Payer: MEDICARE

## 2019-06-19 VITALS
OXYGEN SATURATION: 91 % | HEART RATE: 63 BPM | BODY MASS INDEX: 28.58 KG/M2 | TEMPERATURE: 97.1 F | SYSTOLIC BLOOD PRESSURE: 112 MMHG | WEIGHT: 151.38 LBS | DIASTOLIC BLOOD PRESSURE: 58 MMHG | HEIGHT: 61 IN

## 2019-06-19 DIAGNOSIS — R63.5 WEIGHT GAIN: ICD-10-CM

## 2019-06-19 DIAGNOSIS — R09.02 HYPOXIA: ICD-10-CM

## 2019-06-19 DIAGNOSIS — R06.09 DYSPNEA ON EXERTION: ICD-10-CM

## 2019-06-19 DIAGNOSIS — R68.89 DECREASED EXERCISE TOLERANCE: ICD-10-CM

## 2019-06-19 PROCEDURE — 99214 OFFICE O/P EST MOD 30 MIN: CPT | Mod: GC | Performed by: INTERNAL MEDICINE

## 2019-06-19 RX ORDER — CALCIUM CARBONATE 500(1250)
500 TABLET ORAL
Refills: 5 | COMMUNITY
Start: 2019-04-22 | End: 2020-12-20

## 2019-06-19 ASSESSMENT — ENCOUNTER SYMPTOMS
FALLS: 0
LOSS OF CONSCIOUSNESS: 0
MYALGIAS: 0
PND: 0
INSOMNIA: 0
DOUBLE VISION: 0
CHILLS: 0
HEADACHES: 0
WEIGHT LOSS: 0
DEPRESSION: 0
NAUSEA: 0
FEVER: 0
NERVOUS/ANXIOUS: 0
SHORTNESS OF BREATH: 1
VOMITING: 0
PALPITATIONS: 0
BLURRED VISION: 1
ORTHOPNEA: 0

## 2019-06-19 ASSESSMENT — LIFESTYLE VARIABLES: SUBSTANCE_ABUSE: 0

## 2019-06-19 ASSESSMENT — PATIENT HEALTH QUESTIONNAIRE - PHQ9: CLINICAL INTERPRETATION OF PHQ2 SCORE: 0

## 2019-06-19 NOTE — PROGRESS NOTES
Established Patient    Ml presents today with the following:    CC: pt of Dr. Small's presents for focused visit to see if she requires oxygen    HPI:     # Weight gain  # Hypoxia  - would like some counseling re: weight. Regularly exercises, and is watching what she eats but has gained 6 lbs in 2 months.   -unsure what is going on or why she has gained weight despite close self-monitoring  -has a fitness watch and tracks her steps also  -she was also told by an RN when recently getting an MRI that she needs oxygen at night and was told to f/u with her PCP  -she denies smoking; however she has a hx of lung cancer and remote tobacco use x 20+ years x 2-3 ppd  -reports she sometimes feels she wakes up feeling poorly rested; she never wakes up with a HA. She does have new hypersomnolence during the day with needing naps during the day.    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 30 Tab 3   • methimazole (TAPAZOLE) 5 MG Tab Take 1 Tab by mouth every day. 30 Tab 3   • levETIRAcetam (KEPPRA) 500 MG Tab Take 1 Tab by mouth  2 Times a Day. 180 Tab 1   • flecainide (TAMBOCOR) 150 MG Tab TAKE 1/2 TABLET BY MOUTH 2 TIMES A DAY. 90 Tab 1   • metoprolol SR (TOPROL XL) 25 MG TABLET SR 24 HR Take 25 mg by mouth every day.  3   • Cholecalciferol 1000 UNIT Cap Take 1 Cap by mouth every day at 6 PM. 90 Cap 0   • Omega-3 Fatty Acids (FISH OIL) 1200 MG Cap Take 1 Cap by mouth every day. 90 Cap 0   • DILTIAZem CD (CARDIZEM CD) 240 MG CAPSULE SR 24 HR Take 1 Cap by mouth every day. 90 Cap 0   • aspirin (ASA) 81 MG Chew Tab chewable tablet Take 1 Tab by mouth every evening. 90 Tab 0   • multivitamin (THERAGRAN) Tab Take 1 Tab by mouth every day.       No current facility-administered medications for this visit.        Social History     Social History   • Marital status:      Spouse name: N/A   • Number of children: N/A   • Years of education: N/A     Occupational History   • Not on file.     Social History Main Topics   • Smoking status: Former Smoker     Packs/day: 2.00     Years: 12.00   • Smokeless tobacco: Never Used   • Alcohol use 0.6 oz/week     1 Glasses of wine per week   • Drug use: No   • Sexual activity: Not on file     Other Topics Concern   • Not on file     Social History Narrative   • No narrative on file       Family History   Problem Relation Age of Onset   • Dementia Mother    • Diabetes Mother    • Asthma Mother    • Other Mother         osteoarthritis   • Arthritis Mother    • Autoimmune Disease Father         Rheumatoid arthritis   • Arthritis Father    • Cancer Brother         prostate        ROS: As per HPI. Additional pertinent systems as noted below.    Review of Systems   Constitutional: Positive for malaise/fatigue. Negative for chills, fever and weight loss.        + unintentional weight gain   Eyes: Positive for blurred vision. Negative for double vision.        Blurry vision, worse at night   Respiratory: Positive for shortness of breath.         +ZAMARRIPA   Cardiovascular: Positive for leg swelling. Negative for  "chest pain, palpitations, orthopnea and PND.   Gastrointestinal: Negative for nausea and vomiting.   Genitourinary: Negative for dysuria and hematuria.   Musculoskeletal: Negative for falls, joint pain and myalgias.   Skin: Negative for itching and rash.   Neurological: Negative for loss of consciousness and headaches.   Psychiatric/Behavioral: Negative for depression and substance abuse. The patient is not nervous/anxious and does not have insomnia.          /58 (BP Location: Left arm, Patient Position: Sitting, BP Cuff Size: Adult)   Pulse 63   Temp 36.2 °C (97.1 °F) (Temporal)   Ht 1.549 m (5' 1\")   Wt 68.7 kg (151 lb 6 oz)   SpO2 91%   BMI 28.60 kg/m²     Physical Exam   Constitutional: She is oriented to person, place, and time. She appears well-developed and well-nourished. No distress.   HENT:   Head: Normocephalic and atraumatic.   Eyes: Conjunctivae are normal. Right eye exhibits no discharge. Left eye exhibits no discharge. No scleral icterus.   Cardiovascular: Normal rate, regular rhythm and normal heart sounds.  Exam reveals no gallop and no friction rub.    No murmur heard.  Pulmonary/Chest: Effort normal and breath sounds normal. No respiratory distress. She has no wheezes. She has no rales.   R>L basilar crackles. Notably, at rest, SpO2 91%, on ambulation was 84-85%, which improved to >90% with oxygen on walk testing in clinic.   Abdominal: Soft. Bowel sounds are normal. She exhibits no distension. There is no tenderness. There is no guarding.   Musculoskeletal: She exhibits no edema, tenderness or deformity.   Neurological: She is alert and oriented to person, place, and time.   Skin: Skin is warm and dry. No rash noted. She is not diaphoretic. No erythema. No pallor.   Psychiatric: She has a normal mood and affect. Her behavior is normal. Judgment and thought content normal.       Note: I have reviewed all pertinent labs and diagnostic tests associated with this visit with specific " comments listed under the assessment and plan below      Assessment and Plan  1. Dyspnea on exertion  2. Decreased exercise tolerance  3. Weight gain  4. Hypoxia  - at rest, SpO2 91%, on ambulation was 84-85%, which improved to >90% with oxygen on walk testing in clinic.  - given chronicity of sx strongly suspect chronic hypoxic respiratory failure rather than acute.  - Ddx includes: CHF vs VITO/OHS vs COPD vs malignancy. Weight gain despite close monitoring with increased peripheral edema implicates possible CHF, while increased need of daytime naps/hypersomnolence may be symptom of either VITO or malignancy. Significant tobacco hx also puts COPD on ddx.   - PULMONARY FUNCTION TESTS -Test requested: Complete Pulmonary Function Test; Future  - REFERRAL TO SLEEP STUDIES  - BTYPE NATRIURETIC PEPTIDE; Future  - EC-ECHOCARDIOGRAM COMPLETE W/O CONT; Future  - also writing F2F note and ordering oxygen concentrator and pulse oximeter for her to use with exercise.      Followup: Return if symptoms worsen or fail to improve.      Signed by: Grace Callahan M.D.

## 2019-06-19 NOTE — PATIENT INSTRUCTIONS
Today I am referring you for sleep testing (to see if you have sleep apnea), for pulmonary function tests (to see if you have COPD or some other lung disorder), and I am ordering a BNP test and an echocardiogram to check for congestive heart failure. Call [phone] to schedule the pulmonary function tests. Depending on the results of your oxygen-walk test, I may order oxygen for you today.

## 2019-06-19 NOTE — PROGRESS NOTES
"Face to Face Note  -  Durable Medical Equipment    Grace Callahan M.D. - NPI: [id number]  I certify that this patient is under my care and that they had a durable medical equipment(DME)face to face encounter by myself that meets the physician DME face-to-face encounter requirements with this patient on:    Date of encounter:   Patient:                    MRN:                       YOB: 2021  Ml Chavira  [id number]  1940     The encounter with the patient was in whole, or in part, for the following medical condition, which is the primary reason for durable medical equipment:  Other - chronic hypoxic respiratory failure    I certify that, based on my findings, the following durable medical equipment is medically necessary:  Oxygen.    HOME O2 Saturation Measurements:(Values must be present for Home Oxygen orders)         ,     ,         My Clinical findings support the need for the above equipment due to:  Hypoxia    Supporting Symptoms: The patient requires supplemental oxygen, as the following interventions have been tried with limited or no improvement: \"Ambulation with oximetry     On ambulation with oximetry, patient desaturated to spO2 of 84-85%.   On ambulation with oximetry and supplemental oxygen, saturations improved consistently to 91-92%.   Therefore ordering home oxygen concentrator.    "

## 2019-06-20 ENCOUNTER — TELEPHONE (OUTPATIENT)
Dept: INTERNAL MEDICINE | Facility: MEDICAL CENTER | Age: 79
End: 2019-06-20

## 2019-06-20 NOTE — TELEPHONE ENCOUNTER
1. Caller Name: Ml Chavira                                          Call Back Number: [phone] (home)         Patient approves a detailed voicemail message: N\A    Spoke to pt who is requesting a referral to a pulmonologist for a second opinion on sleep apnea and COPD. Informed her he has been referred for a sleep study by Dr. Callahan  but pt insisted she wants to be referred to a pulmonologist.     pcp please advise

## 2019-06-20 NOTE — TELEPHONE ENCOUNTER
Referral to pulmonology would not be appropriate at this time. We need the results from the tests I have ordered to even establish a diagnosis. Even if patient wants a second opinion, pulmonologist would still require the test results I have ordered. She needs to complete these tests first which can help establish a diagnosis and after that point we can discuss referral to pulmonologist if appropriate.

## 2019-06-21 ENCOUNTER — TELEPHONE (OUTPATIENT)
Dept: INTERNAL MEDICINE | Facility: MEDICAL CENTER | Age: 79
End: 2019-06-21

## 2019-06-21 NOTE — TELEPHONE ENCOUNTER
DME: OXYGEN  Sent to [facility]  #[phone]  Fax#[phone]      Faxed orders with Clinicals.   Called and spoke with pt. Notified pt of this. She understood.

## 2019-06-21 NOTE — TELEPHONE ENCOUNTER
PT informed. Pt was upset stating she believed a pulmonologist would have more knowledge on if she needs oxygen. She stated she is unsure why pcp ordered oxygen from doing one simple test in clinic. She will deal with this matter on her own and try to go another route to see a specialist.

## 2019-06-24 ENCOUNTER — TELEPHONE (OUTPATIENT)
Dept: INTERNAL MEDICINE | Facility: MEDICAL CENTER | Age: 79
End: 2019-06-24

## 2019-06-25 ENCOUNTER — OFFICE VISIT (OUTPATIENT)
Dept: ENDOCRINOLOGY | Facility: MEDICAL CENTER | Age: 79
End: 2019-06-25
Payer: MEDICARE

## 2019-06-25 VITALS
WEIGHT: 150 LBS | BODY MASS INDEX: 28.32 KG/M2 | SYSTOLIC BLOOD PRESSURE: 108 MMHG | DIASTOLIC BLOOD PRESSURE: 60 MMHG | HEART RATE: 78 BPM | OXYGEN SATURATION: 93 % | HEIGHT: 61 IN

## 2019-06-25 DIAGNOSIS — E04.1 HOT THYROID NODULE: ICD-10-CM

## 2019-06-25 DIAGNOSIS — E05.90 HYPERTHYROIDISM: ICD-10-CM

## 2019-06-25 DIAGNOSIS — M81.8 OTHER OSTEOPOROSIS, UNSPECIFIED PATHOLOGICAL FRACTURE PRESENCE: ICD-10-CM

## 2019-06-25 DIAGNOSIS — R79.89 LOW VITAMIN D LEVEL: ICD-10-CM

## 2019-06-25 PROCEDURE — 99214 OFFICE O/P EST MOD 30 MIN: CPT | Performed by: PHYSICIAN ASSISTANT

## 2019-06-25 NOTE — TELEPHONE ENCOUNTER
From   Today I received a call from an upset patient, Ml Chavira (MRN [id number]).  She was unhappy with her last visit with Dr. Callahan.  Mrs. Chavira was told by Dr. Callahan, that she should be on Oxygen, and that she would write an order.  When she told Dr. Callahan that she had some questions about being placed on oxygen, Dr. Callahan told her, “The oxygen people will be able to answer your questions.”  When Mrs. Chavira said she would like to discuss it more, Dr. Callahan told her that she “had other patients to see.”    Mrs. Ventura considered going on oxygen as a “life altering event.”  She was clearly upset that Dr. Callahan would not discuss the specifics with her.    The patient has asked for additional feedback as well.    Called pt- she called Dr Fitzgerald- they answered questions.And she will be seen there on 6/26.    Disc her hypoxia with ambulation. And that O2 is probably a good idea. We discussed at length.    Pt will discuss further with pulmonary.    And I will discuss with Dr Callahan.    Pt  Would like to f/u with geriatrics.

## 2019-06-25 NOTE — PATIENT INSTRUCTIONS
1. Hyperthyroidism  Methimazole 5 mg (confirm) if taking 5 will decrease to 2.5 mg (1/2 tablet daily)     Repeat labs 4 weeks   - standing labs     2. Thyroid nodule   Recheck thyroid us      3. Low vitamin D level  Currently on vitamin D supplementation   Patient reports compliance

## 2019-06-25 NOTE — PROGRESS NOTES
Endocrinology Clinic Progress Note  PCP: Pcp Not In Computer    HPI:  Ml Chavira is a 79 y.o. old patient who comes in today for review of endocrine problems.    1. Hyperthyroidism  Methimazole 5 mg taking BID   Patient reports compliance of medications   Patient without side effects.   Patient denies any symptoms of swelling in her throat and or enlargement.  She denies any palpitations shaking and/or tremors       Ref. Range 6/4/2019 15:31   TSH Latest Ref Range: 0.380 - 5.330 uIU/mL 7.630 (H)   Free T-4 Latest Ref Range: 0.53 - 1.43 ng/dL 0.85   T3,Free Latest Ref Range: 2.40 - 4.20 pg/mL 3.68        2. Thyroid nodule  Intermittent symptoms of difficulty with swallowing continues. Denies any symptoms of swelling of the neck or difficulty breathing.      3. Low vitamin D level  Currently on vitamin D supplementation   Patient reports compliance         4. Other osteoporosis, unspecified pathological fracture presence  Previously on alendronate - has been without and needs a new rx   Patient is scheduled to start PT for balance         Endocrine history to date:    1. Multiple thyroid nodules-   Found on thyroid ultrasound of 8/16/2018 secondary to abnormal PET      Thyroid Ultrasound: 2/19-  There is a subtle oval heterogeneous nodule with some internal echogenicity in the left mid gland measuring 1.9 x 1.2 x 1.3 cm. (previously measured 2.8 x 1.9 x 1.8 cm). There is a hypoechoic oval nodule in the left inferior gland measuring 1.2 x 1.0 x 1.1 cm (previously measured 1.0 x 1.0 x 0.9 cm.)       US:   Thyroid ultrasound 8/16/2018: Numerous hypoechoic nodules in the right thyroid lobe the largest of which is present in the lower pole measuring 1.9 x 1.6 x 1.2 cm the largest is present in the midportion of the right lobe measuring 1.1 x 1.0 x 0.7 cm.  There are numerous nodules located in the left lobe the largest was identified in the left lower pole measuring 2.8 x 1.8 x 1.9 cm it contains focal  calcifications.  The next largest is medial slightly inferior to the left thyroid lobe which measures 1.0 x 1.0 x 0.9 cm       Thyroid US 8/16/2018   There is a probably solid vascular isoechoic oval right inferior gland nodule measuring 2.2 x 1.4 x 1.9 cm which previously measured 1.9 x 1.6 x 1.2 cm. There is an oval hypoechoic nodule in the right lateral gland measuring 1.1 x 0.7 x 1.1 cm which previously measured 1.1 x 0.7 x 1.0 cm.     2. Hyperthyroiditis related to Hot nodule (left)   Started on Methimazole 11/14/18 4/8/2019-TSH 3.070, FT4 0.94, FT3 3.70 TRA <0.90   2/26/2019, TSH 3.010, free T4 0.96, free T3 3.09, thyroid receptor antibody less than 0.90-methimazole 5 mg daily     1/15/19- TSH 4.730, FT4 0.61, FT3 3.17 TRA <0.90 (Methimazole 10mg)   10/19/2018 TSH 0.030 free T4 0.99, T3 78.4, free T3 3.63, TPO antibody 17.5, thyroid receptor antibodies less than 0.90  6/27/2018  TSH 0.010 free T4 0.98  8/24/2018 TSH 0.030 free T4 1.09 T3 115.6 TPO antibody 16.8     Thyroid uptake and scan 10/29/2018   borderline elevated 5 hour radioactive iodine uptake measured at 15.4%.  Area of focal increased uptake involving the lower pole of the left thyroid lobe corresponding to a solid nodule in that area likely representing a hyperactive nodule.        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  There is postoperative changes consistent with right parietal occipital craniotomy.     3. Low vitamin D level-  6/27/2018 vitamin D-  22   Currently on Vitamin D replacement      4. osteoporosis  On alendronate -    She is currently only taking vitamin D and calcium.     DEXA 9/20/2018  The mean bone mineral density for the lumbar spine is 0.914 g/cm2, which corresponds to a T score of -2.1 and a Z score of -0.1.    The proximal left femur has a mean bone mineral density of 0.616 g/cm2, with a T score of -3.1 and a Z score of  -1.0.  Findings are consistent with osteoporosis with a high risk of fracture          ROS:  Constitutional: No weight loss or gain,  fever  HEENT: No difficulty with swallowing, change in voice, or swelling in throat area   Cardiac: No chest pain, palpitations, or racing heart  Resp: + shortness of breath (currently being evaluated by Pulm suggested to go on oxygen)   GI: No abdominal pain, nausea, vomiting, or diarrhea   Neuro: No numbness or tinging in feet  Endo: No heat or cold intolerance, no polyuria or polydipsia  All other detailed ROS is otherwise negative       Past Medical History:  Patient Active Problem List    Diagnosis Date Noted   • Sick sinus syndrome (HCC) 05/31/2018     Priority: High   • Osteoporosis 01/17/2019   • Need for vaccination 01/17/2019   • Hyperthyroidism 01/17/2019   • Chronic atrial fibrillation (HCC) 01/17/2019   • Hot thyroid nodule 01/17/2019   • Postmenopausal 08/27/2018   • Healthcare maintenance 07/23/2018   • Thyroid nodule 07/23/2018   • Low vitamin D level 07/23/2018   • 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   • Balance disorder 06/21/2018   • Radionecrosis 04/14/2018   • Malignant neoplasm of upper lobe of right lung (HCC) 03/05/2018   • CVA (cerebral vascular accident) (HCC) 11/29/2017   • Lung mass 11/19/2015   • Blurry vision, left eye 11/19/2015   • Metastasis to brain (HCC) 11/18/2015   • Metastatic cancer (CMS-HCC) 11/18/2015       Family Medical History:   Family History   Problem Relation Age of Onset   • Dementia Mother    • Diabetes Mother    • Asthma Mother    • Other Mother         osteoarthritis   • Arthritis Mother    • Autoimmune Disease Father         Rheumatoid arthritis   • Arthritis Father    • Cancer Brother         prostate        Social History:   Social History     Social History   • Marital status:      Spouse name: N/A   • Number of children: N/A   • Years of education: N/A     Occupational History   • Not on file.     Social History Main Topics  "  • Smoking status: Former Smoker     Packs/day: 2.00     Years: 12.00   • Smokeless tobacco: Never Used   • Alcohol use 0.6 oz/week     1 Glasses of wine per week   • Drug use: No   • Sexual activity: Not on file     Other Topics Concern   • Not on file     Social History Narrative   • No narrative on file         Medications:    Current Outpatient Prescriptions:   •  OYSCO 500 500 MG Tab, TAKE 1 TAB BY MOUTH 2 TIMES A DAY, WITH MEALS. NOT COV, Disp: , Rfl: 5  •  BIOTIN PO, Take  by mouth every day., Disp: , Rfl:   •  Misc. Devices ([employer] PULSE OXIMETER) Misc, 1 Each by Does not apply route 4 times a day as needed., Disp: 1 Each, Rfl: 0  •  alendronate (FOSAMAX) 10 MG Tab, Take 1 Tab by mouth every morning before breakfast., Disp: 30 Tab, Rfl: 3  •  methimazole (TAPAZOLE) 5 MG Tab, Take 1 Tab by mouth every day. (Patient taking differently: Take 5 mg by mouth 2 Times a Day.), Disp: 30 Tab, Rfl: 3  •  levETIRAcetam (KEPPRA) 500 MG Tab, Take 1 Tab by mouth 2 Times a Day., Disp: 180 Tab, Rfl: 1  •  flecainide (TAMBOCOR) 150 MG Tab, TAKE 1/2 TABLET BY MOUTH 2 TIMES A DAY., Disp: 90 Tab, Rfl: 1  •  metoprolol SR (TOPROL XL) 25 MG TABLET SR 24 HR, Take 25 mg by mouth every day., Disp: , Rfl: 3  •  Cholecalciferol 1000 UNIT Cap, Take 1 Cap by mouth every day at 6 PM., Disp: 90 Cap, Rfl: 0  •  Omega-3 Fatty Acids (FISH OIL) 1200 MG Cap, Take 1 Cap by mouth every day., Disp: 90 Cap, Rfl: 0  •  aspirin (ASA) 81 MG Chew Tab chewable tablet, Take 1 Tab by mouth every evening., Disp: 90 Tab, Rfl: 0  •  multivitamin (THERAGRAN) Tab, Take 1 Tab by mouth every day., Disp: , Rfl:     Labs: Reviewed as above     Physical Examination:  Vital signs: /60 (BP Location: Left arm, Patient Position: Sitting, BP Cuff Size: Adult)   Pulse 78   Ht 1.549 m (5' 1\")   Wt 68 kg (150 lb)   SpO2 93%   BMI 28.34 kg/m²  Body mass index is 28.34 kg/m².  General: No apparent distress, cooperative  Eyes: No scleral icterus, no discharge, normal " eyelids  Neck: No abnormal masses on inspection, normal thyroid exam  Resp: Normal effort, clear to auscultation bilaterally  CVS: Regular rate and rhythm, S1 S2 normal, no murmur  Extremities: No lower extremity edema  Abdomen: abdominal obesity present  Musculoskeletal: Normal digits and nails  Skin: No rash on visible skin  Psych: Alert and oriented, normal mood and affect, intact memory and able to make informed decisions.    Assessment and Plan:  1. Hyperthyroidism  Methimazole 5 mg tablet BID.   Will decrease to 5 mg daily     Repeat labs 4 weeks   - standing labs     2. Thyroid nodule   Recheck thyroid us      3. Low vitamin D level  Currently on vitamin D supplementation   Patient reports compliance      4. Other osteoporosis, unspecified pathological fracture presence  Previously on alendronate - has been without and needs a new rx       Return in about 2 months (around 8/25/2019).    Thank you for allowing me to participate in the care of this patient.  If you have any questions or concerns please do not hesitate to contact me.    Le Rdz P.A.-C.    CC:   Pcp Not In Computer    This note was created using voice recognition software (Dragon). The accuracy of the dictation is limited by the abilities of the software. I have reviewed the note prior to signing, however some errors in grammar and context are still possible. If you have any questions related to this note please do not hesitate to contact our office.

## 2019-06-26 ENCOUNTER — SLEEP CENTER VISIT (OUTPATIENT)
Dept: SLEEP MEDICINE | Facility: MEDICAL CENTER | Age: 79
End: 2019-06-26
Payer: MEDICARE

## 2019-06-26 VITALS
OXYGEN SATURATION: 92 % | HEIGHT: 61 IN | SYSTOLIC BLOOD PRESSURE: 118 MMHG | HEART RATE: 70 BPM | WEIGHT: 148 LBS | DIASTOLIC BLOOD PRESSURE: 62 MMHG | RESPIRATION RATE: 16 BRPM | BODY MASS INDEX: 27.94 KG/M2

## 2019-06-26 DIAGNOSIS — G47.33 OSA (OBSTRUCTIVE SLEEP APNEA): ICD-10-CM

## 2019-06-26 DIAGNOSIS — I49.5 SICK SINUS SYNDROME (HCC): ICD-10-CM

## 2019-06-26 DIAGNOSIS — Z95.0 PACEMAKER: ICD-10-CM

## 2019-06-26 DIAGNOSIS — Z87.891 FORMER SMOKER: ICD-10-CM

## 2019-06-26 DIAGNOSIS — I48.20 CHRONIC ATRIAL FIBRILLATION (HCC): ICD-10-CM

## 2019-06-26 DIAGNOSIS — C34.11 MALIGNANT NEOPLASM OF UPPER LOBE OF RIGHT LUNG (HCC): ICD-10-CM

## 2019-06-26 PROCEDURE — 99204 OFFICE O/P NEW MOD 45 MIN: CPT | Performed by: INTERNAL MEDICINE

## 2019-06-26 RX ORDER — ZOLPIDEM TARTRATE 5 MG/1
5 TABLET ORAL NIGHTLY PRN
Qty: 3 TAB | Refills: 0 | Status: SHIPPED | OUTPATIENT
Start: 2019-06-26 | End: 2019-06-28

## 2019-06-26 RX ORDER — ECHINACEA 400 MG
1000 CAPSULE ORAL
COMMUNITY
Start: 2019-06-06 | End: 2019-06-18

## 2019-06-26 RX ORDER — RISEDRONATE SODIUM 150 MG/1
150 TABLET, FILM COATED ORAL
COMMUNITY
Start: 2019-06-06 | End: 2019-06-25

## 2019-06-26 NOTE — PROGRESS NOTES
Chief Complaint   Patient presents with   • New Patient     sleep consultation       HPI: This patient is a pleasant 79 y.o. Female who is referred for sleep apnea evaluation.  She is a former smoker of 40 pack years, who quit at the age of 38.  She developed lung cancer 2015, stage IV, treated with chemoradiation and craniotomy for brain metastasis.  She has had some balance issues since her surgery however otherwise has been doing well.  She had recently noted to be hypoxic with exertion in clinic and has been referred for pulmonary function testing and sleep apnea evaluation.  She was prescribed oxygen however she declined treatment pending evaluation. In terms of sleep, the patient is unaware of snoring or apneas.  She typically gets to bed by 11 PM, falling asleep immediately.  She has on average twice nightly awakenings for nocturia.  She wakes up at 6 AM, generally feeling rested.  Her [place] sleepiness score is 9. Her BMI is 27.  She has very minimal exertional dyspnea, denies cough, wheezing or chest tightness.     Past Medical History:   Diagnosis Date   • Breast cancer (HCC)    • Cancer (HCC)     lung cancer with brain mts   • Chickenpox    • Spanish measles    • Influenza    • Joint pain    • Lung cancer (HCC)    • Mumps    • Radionecrosis 4/14/2018   • Sick sinus syndrome (HCC)     with AFIB, s/p pacemaker   • Thyroid disease    • Tonsillitis        Social History     Social History   • Marital status:      Spouse name: N/A   • Number of children: N/A   • Years of education: N/A     Occupational History   • Not on file.     Social History Main Topics   • Smoking status: Former Smoker     Packs/day: 2.00     Years: 20.00     Quit date: 1960   • Smokeless tobacco: Never Used   • Alcohol use 1.8 oz/week     3 Glasses of wine per week   • Drug use: No   • Sexual activity: Not on file     Other Topics Concern   • Not on file     Social History Narrative   • No narrative on file       Family History    Problem Relation Age of Onset   • Dementia Mother    • Diabetes Mother    • Other Mother         osteoarthritis   • Arthritis Mother    • Autoimmune Disease Father         Rheumatoid arthritis   • Arthritis Father    • Cancer Brother         prostate        Current Outpatient Prescriptions on File Prior to Visit   Medication Sig Dispense Refill   • OYSCO 500 500 MG Tab TAKE 1 TAB BY MOUTH 2 TIMES A DAY, WITH MEALS. NOT COV  5   • BIOTIN PO Take  by mouth every day.     • Misc. Devices ([employer] PULSE OXIMETER) Misc 1 Each by Does not apply route 4 times a day as needed. 1 Each 0   • alendronate (FOSAMAX) 10 MG Tab Take 1 Tab by mouth every morning before breakfast. 30 Tab 3   • methimazole (TAPAZOLE) 5 MG Tab Take 1 Tab by mouth every day. (Patient taking differently: Take 5 mg by mouth Once.) 30 Tab 3   • levETIRAcetam (KEPPRA) 500 MG Tab Take 1 Tab by mouth 2 Times a Day. 180 Tab 1   • flecainide (TAMBOCOR) 150 MG Tab TAKE 1/2 TABLET BY MOUTH 2 TIMES A DAY. 90 Tab 1   • metoprolol SR (TOPROL XL) 25 MG TABLET SR 24 HR Take 25 mg by mouth every day.  3   • Cholecalciferol 1000 UNIT Cap Take 1 Cap by mouth every day at 6 PM. 90 Cap 0   • Omega-3 Fatty Acids (FISH OIL) 1200 MG Cap Take 1 Cap by mouth every day. 90 Cap 0   • aspirin (ASA) 81 MG Chew Tab chewable tablet Take 1 Tab by mouth every evening. 90 Tab 0   • multivitamin (THERAGRAN) Tab Take 1 Tab by mouth every day.       No current facility-administered medications on file prior to visit.        Allergies: Penicillins    ROS:   Constitutional: Denies fevers, chills, night sweats, fatigue or weight loss  Eyes: +vision loss, denies pain, drainage, double vision  Ears, Nose, Throat: Denies earache, difficulty hearing, tinnitus, nasal congestion, hoarseness  Cardiovascular: Denies chest pain, tightness, palpitations, orthopnea,+LE edema  Respiratory: As in HPI  Sleep: As in HPI  GI: +heartburn, dysphagia, denies nausea, abdominal pain, diarrhea or constipation  :  "Denies frequent urination, hematuria, discharge or painful urination  Musculoskeletal: Denies back pain, +painful joints, sore muscles  Neurological: Denies weakness or headaches  Skin: No rashes    /62 (BP Location: Right arm, Patient Position: Sitting, BP Cuff Size: Adult)   Pulse 70   Resp 16   Ht 1.549 m (5' 1\")   Wt 67.1 kg (148 lb)   SpO2 92%     Physical Exam:  Appearance: Well-nourished, well-developed, in no acute distress  HEENT: Normocephalic, atraumatic, white sclera, PERRLA, oropharynx clear, Mallampati 3  Neck: No adenopathy or masses  Respiratory: no intercostal retractions or accessory muscle use  Lungs auscultation: Clear to auscultation bilaterally  Cardiovascular: Regular rate rhythm. No murmurs, rubs or gallops.  No LE edema  Abdomen: soft, nondistended  Gait: Normal  Digits: No clubbing, cyanosis  Motor: No focal deficits  Orientation: Oriented to time, person and place    Diagnosis:  1. VITO (obstructive sleep apnea)  Polysomnography, 4 or More    zolpidem (AMBIEN) 5 MG Tab   2. BMI 27.0-27.9,adult     3. Malignant neoplasm of upper lobe of right lung (HCC)     4. Former smoker     5. Chronic atrial fibrillation (HCC)     6. Sick sinus syndrome (HCC)     7. Pacemaker         Plan:  The patient has oxygen desaturations with exertion suspicious for COPD +/- VITO.  She is a former smoker with history of stage IV lung cancer.  She has declined supplemental oxygen pending medical work-up.  We will proceed with polysomnography in the sleep laboratory for evaluation of sleep disordered breathing/overlap syndrome.  She has also been scheduled for full pulmonary function testing by her PCP to evaluate for COPD.  Return for after sleep study.      "

## 2019-06-26 NOTE — TELEPHONE ENCOUNTER
I called twice and only got the answering machine. I l/m explaining that I ordered O2 because she was noted to be hypoxic with exertion in clinic, but we still do not know why, which is why I ordered the other tests and referrals. I explained I did not refer her to pulmonology yet because we do not yet have a tentative diagnosis so I do not yet know whether that is an appropriate referral, and that a pulmonologist would also want to see those test results. Encouraged her to call the clinic back and let us know when would be a good time for me to call her if she would like to discuss this further.

## 2019-06-27 ENCOUNTER — TELEPHONE (OUTPATIENT)
Dept: INTERNAL MEDICINE | Facility: MEDICAL CENTER | Age: 79
End: 2019-06-27

## 2019-06-27 NOTE — TELEPHONE ENCOUNTER
1. Caller Name: Ml Chavira                                           Call Back Number: [phone] (home)         Patient approves a detailed voicemail message: N\A    Pt called stating she was returning a call to Dr. Callahan. She stated Dr. Callahan can reach her anytime today before 1pm or tomorrow morning. Also stated Dr. Callahan referred her for a colonoscopy but she is not due.     Confirmed with [facility] pt is not due till 2024.    pcp please advise

## 2019-06-28 NOTE — TELEPHONE ENCOUNTER
I called her and spoke with her. She said she saw a pulmonologist and pulmonologist did not order oxygen. Instead ordered other tests including a pulmonary function test. She said she has not questions or concerns at this point.

## 2019-07-08 RX ORDER — METHIMAZOLE 5 MG/1
5 TABLET ORAL DAILY
Qty: 30 TAB | Refills: 3 | Status: SHIPPED | OUTPATIENT
Start: 2019-07-08 | End: 2019-08-15

## 2019-07-11 ENCOUNTER — HOSPITAL ENCOUNTER (OUTPATIENT)
Dept: PULMONOLOGY | Facility: MEDICAL CENTER | Age: 79
End: 2019-07-11
Attending: STUDENT IN AN ORGANIZED HEALTH CARE EDUCATION/TRAINING PROGRAM
Payer: MEDICARE

## 2019-07-11 ENCOUNTER — HOSPITAL ENCOUNTER (OUTPATIENT)
Dept: CARDIOLOGY | Facility: MEDICAL CENTER | Age: 79
End: 2019-07-11
Attending: STUDENT IN AN ORGANIZED HEALTH CARE EDUCATION/TRAINING PROGRAM
Payer: MEDICARE

## 2019-07-11 DIAGNOSIS — R06.09 DYSPNEA ON EXERTION: ICD-10-CM

## 2019-07-11 DIAGNOSIS — R68.89 DECREASED EXERCISE TOLERANCE: ICD-10-CM

## 2019-07-11 DIAGNOSIS — R63.5 WEIGHT GAIN: ICD-10-CM

## 2019-07-11 LAB
LV EJECT FRACT  99904: 60
LV EJECT FRACT MOD 2C 99903: 66.98
LV EJECT FRACT MOD 4C 99902: 62.74
LV EJECT FRACT MOD BP 99901: 65.49

## 2019-07-11 PROCEDURE — 94726 PLETHYSMOGRAPHY LUNG VOLUMES: CPT | Mod: 26 | Performed by: INTERNAL MEDICINE

## 2019-07-11 PROCEDURE — 94060 EVALUATION OF WHEEZING: CPT

## 2019-07-11 PROCEDURE — 94726 PLETHYSMOGRAPHY LUNG VOLUMES: CPT

## 2019-07-11 PROCEDURE — 94060 EVALUATION OF WHEEZING: CPT | Mod: 26 | Performed by: INTERNAL MEDICINE

## 2019-07-11 PROCEDURE — 94729 DIFFUSING CAPACITY: CPT

## 2019-07-11 PROCEDURE — 94729 DIFFUSING CAPACITY: CPT | Mod: 26 | Performed by: INTERNAL MEDICINE

## 2019-07-11 PROCEDURE — 93306 TTE W/DOPPLER COMPLETE: CPT

## 2019-07-11 PROCEDURE — 93306 TTE W/DOPPLER COMPLETE: CPT | Mod: 26 | Performed by: INTERNAL MEDICINE

## 2019-07-11 ASSESSMENT — PULMONARY FUNCTION TESTS
FEV1/FVC_PERCENT_LLN: 64
FEV1/FVC_PREDICTED: 77
FEV1/FVC_PERCENT_PREDICTED: 101
FVC_PREDICTED: 2.24
FEV1_PREDICTED: 1.71
FVC_PERCENT_PREDICTED: 85
FEV1/FVC_PERCENT_PREDICTED: 100
FVC_LLN: 1.87
FEV1/FVC_PERCENT_CHANGE: 100
FEV1/FVC_PERCENT_CHANGE: 0
FEV1/FVC_PERCENT_PREDICTED: 100
FEV1: 1.43
FEV1/FVC_PERCENT_LLN: 64
FEV1_PERCENT_CHANGE: -3
FEV1/FVC: 77
FEV1/FVC: 77
FEV1_PERCENT_PREDICTED: 86
FEV1: 1.48
FEV1_PERCENT_PREDICTED: 83
FVC_PERCENT_PREDICTED: 82
FEV1/FVC_PERCENT_PREDICTED: 76
FVC_LLN: 1.87
FEV1_LLN: 1.43
FEV1/FVC_PERCENT_PREDICTED: 101
FVC: 1.92
FEV1/FVC: 77.3
FEV1_PERCENT_CHANGE: -3
FEV1_LLN: 1.43
FEV1/FVC: 77
FVC: 1.85

## 2019-07-12 NOTE — PROCEDURES
PULMONARY FUNCTION TEST INTERPRETATION    DATE OF STUDY:  07/11/2019    SPIROMETRY AND FLOW VOLUME LOOPS:  Ratio is 77, FEV1 1.48-86%, FVC 1.92-85%.    LUNG VOLUMES:  SVC 83, RC 84, ERV 47.    DIFFUSION:  DLCO 77, VA 76.    CONCLUSIONS  1.  Mild restrictive lung disease favor intrinsic as an interstitial lung   disease.  2.  No evidence of airway obstruction.  3.  Reduced gas diffusion in the setting of restriction in supportive of   interstitial lung disease per above.  4.  Reduced alveolar volume as well, which is consistent with interstitial   lung disease in this case.  5.  Correlate clinically.       ____________________________________     Aahd MD HINA Morales / STEPHANIE    DD:  07/12/2019 09:32:58  DT:  07/12/2019 11:46:54    D#:  [id number]  Job#:  [id number]

## 2019-07-13 ENCOUNTER — SLEEP STUDY (OUTPATIENT)
Dept: SLEEP MEDICINE | Facility: MEDICAL CENTER | Age: 79
End: 2019-07-13
Attending: INTERNAL MEDICINE
Payer: MEDICARE

## 2019-07-13 DIAGNOSIS — G47.33 OSA (OBSTRUCTIVE SLEEP APNEA): ICD-10-CM

## 2019-07-13 PROCEDURE — 95811 POLYSOM 6/>YRS CPAP 4/> PARM: CPT | Performed by: FAMILY MEDICINE

## 2019-07-15 NOTE — PROCEDURES
Technical summary: The patient underwent a split-night polysomnogram. This was a 16 channel montage study to include a 6 channel EEG, a 2 channel EOG, and chin EMG, left and right leg EMG, a snore channel, a nasal pressure transducer, and a nasal oral airflow  thermistor and a CFLOW pressure transducer.   Respiratory effort was assessed with the use of a thoracic and abdominal monitor and overnight oximetry was obtained. Audio and video recordings were reviewed. This was a fully attended study and sleep stage scoring was performed. The test was technically adequate.    Scoring Criteria: A modification of the the AASM Manual for the Scoring of Sleep and Associated Events, 2012, was used.   Obstructive apnea was scored by cessation of airflow for at least 10 seconds with continuing respiratory effort.  Central apnea was scored by cessation of airflow for at least 10 seconds with no effort.  Hypopnea was scored by a 30% or more reduction in airflow for at least 10 seconds accompanied by an arterial oxygen desaturation of 3% or more.  (For Medicare patients, hypopneas were scored by a 30% or more reduction in airflow for at least 10 seconds accompanied by an arterial oxygen saturation of 4% or more, as required by their insurance, CMS.    Interpretation:  Study start time was 09:15:53 PM.  Total recording time was 3h 30.0m (210 minutes) with a total sleep time of 3h 12.0m (192 minutes) resulting in a sleep efficiency of 91.43%.Sleep latency from the start fo the study was 01 minutes minutes and REM latency from sleep onset was 00 minutes minutes. Sleep stages showed normal SE but no REM or N3 sleep with increased arousal index of 48/hr.    Respiratory:   There were 44 apneas in total consisting of 7 obstructive apneas, 0 mixed apneas, and 37 central apneas.  There were 165 hypopneas in total.The apnea index was 13.75 per hour and the hypopnea index was 51.56 per hour.The overall AHI was 65.3, with a REM AHI of 0.00, and  a supine AHI of 90.86.   17 % of the apneas were central apneas.    Limb Movements:  There were a total of 170 periodic leg movements, of which 38 were PLMS arousals.  This resulted in a PLMS index of 53.1 and a PLMS arousal index of 11.9    Oximetry:  The mean SaO2 was 84.0% for the diagnostic portion of the study, with a minimum SaO2 of 72.0%.      Treatment:    Interpretation:  Treatment recording time was 5h 27.0m (327 minutes) with a total sleep time of 4h 38.0m (278 minutes) resulting in a sleep efficiency of 85.0%.  Sleep latency from the start of treatment was 00 minutes minutes and REM latency from sleep onset was 3h 11.5m minutes.  The patient had 181 arousals in total for an arousal index of 39.1.    Respiratory:   There were 111 apneas in total consisting of  4 obstructive apneas, 107 central apneas, and 0 mixed apneas for an apnea index of 23.96.  The patient had 56 hypopneas in total, which resulted in a hypopnea index of 12.09.  The overall AHI was 36.04, with a REM AHI of 6.92, and a supine AHI of 36.04.     64% of the apneas were central apneas.    Limb Movements:  There were a total of 163 periodic leg movements, of which 37 were PLMS arousals.  This resulted in a PLMS index of 35.2 and a PLMS arousal index of 8.0.    Oximetry:  The mean SaO2 during treatment was 89.0%, with a minimum oxygen saturation of 79.0%.    CPAP was tried from 4cm H2O to 14cm H2O.  Bipap was tried from 15/11cm H2O to 17/13cm H2O.    CPAP Titration:  Due to the significant number of obstructive respiratory events observed during the diagnostic portion of the study a CPAP titration trial was performed during the second half of the night. The CPAP pressure was initiated at 4 cm of water and the pressure was increased in an attempt to eliminate all sleep disordered breathing and snoring. CPAP was increased to 14  Cm before switching to BiPAP. The BiPAP was titrated between 15/11 cm to 17/16 cm. Central apneas persisted  therefore it was an incomplete titration.     Impression:  1.  Severe obstructive sleep apnea with AHI of 65.3/hr and O2 sel[place] 72%. Due to severity of the disease sje met the split study protocol. The titration started with CPAP 4 cm and the best tolerated was 16/10 cm. The AHI improved to 18.8/hr with improved O2 selwyn of 84% and average O2 saturation of 88 %.     2.  Sleep related hypoxia  3. PLMS     Recommendations:  No definitive pressure can be extrapolated from the titration, I recommend dedicated BiPAP/ASV. Last ECHO was on 7/11/19 which showed normal EF of  60%. In some cases alternative treatment options may prove effective in resolving sleep apnea and these options include upper airway surgery, the use of a dental orthotic or weight loss and positional therapy. Clinical correlation is required. In general patients with sleep apnea are advised to avoid alcohol and sedatives and to not operate a motor vehicle while drowsy and are at a greater risk for cardiovascular disease.

## 2019-07-17 RX ORDER — ALENDRONATE SODIUM 10 MG/1
10 TABLET ORAL
Qty: 30 TAB | Refills: 3 | Status: SHIPPED | OUTPATIENT
Start: 2019-07-17 | End: 2019-10-21 | Stop reason: SDUPTHER

## 2019-07-17 NOTE — TELEPHONE ENCOUNTER
Was the patient seen in the last year in this department? Yes    Does patient have an active prescription for medications requested? No     Received Request Via: Pharmacy     alendronate (FOSAMAX) 10 MG Tab

## 2019-07-18 ENCOUNTER — SLEEP CENTER VISIT (OUTPATIENT)
Dept: SLEEP MEDICINE | Facility: MEDICAL CENTER | Age: 79
End: 2019-07-18
Payer: MEDICARE

## 2019-07-18 ENCOUNTER — HOSPITAL ENCOUNTER (OUTPATIENT)
Dept: LAB | Facility: MEDICAL CENTER | Age: 79
End: 2019-07-18
Attending: PHYSICIAN ASSISTANT
Payer: MEDICARE

## 2019-07-18 ENCOUNTER — HOSPITAL ENCOUNTER (OUTPATIENT)
Dept: RADIOLOGY | Facility: MEDICAL CENTER | Age: 79
End: 2019-07-18
Attending: PHYSICIAN ASSISTANT
Payer: MEDICARE

## 2019-07-18 VITALS
HEART RATE: 83 BPM | WEIGHT: 151 LBS | BODY MASS INDEX: 28.51 KG/M2 | HEIGHT: 61 IN | RESPIRATION RATE: 15 BRPM | DIASTOLIC BLOOD PRESSURE: 82 MMHG | OXYGEN SATURATION: 91 % | SYSTOLIC BLOOD PRESSURE: 124 MMHG

## 2019-07-18 DIAGNOSIS — E04.1 HOT THYROID NODULE: ICD-10-CM

## 2019-07-18 DIAGNOSIS — G47.33 OSA (OBSTRUCTIVE SLEEP APNEA): ICD-10-CM

## 2019-07-18 LAB
T3FREE SERPL-MCNC: 3.25 PG/ML (ref 2.4–4.2)
T4 FREE SERPL-MCNC: 0.78 NG/DL (ref 0.53–1.43)
TSH SERPL DL<=0.005 MIU/L-ACNC: 2.61 UIU/ML (ref 0.38–5.33)

## 2019-07-18 PROCEDURE — 84443 ASSAY THYROID STIM HORMONE: CPT

## 2019-07-18 PROCEDURE — 84481 FREE ASSAY (FT-3): CPT

## 2019-07-18 PROCEDURE — 84439 ASSAY OF FREE THYROXINE: CPT

## 2019-07-18 PROCEDURE — 83520 IMMUNOASSAY QUANT NOS NONAB: CPT

## 2019-07-18 PROCEDURE — 36415 COLL VENOUS BLD VENIPUNCTURE: CPT

## 2019-07-18 PROCEDURE — 76536 US EXAM OF HEAD AND NECK: CPT

## 2019-07-18 PROCEDURE — 99214 OFFICE O/P EST MOD 30 MIN: CPT | Performed by: NURSE PRACTITIONER

## 2019-07-18 RX ORDER — TRAZODONE HYDROCHLORIDE 50 MG/1
50 TABLET ORAL
Qty: 3 TAB | Refills: 0 | Status: SHIPPED | OUTPATIENT
Start: 2019-07-18 | End: 2019-08-12

## 2019-07-18 NOTE — PATIENT INSTRUCTIONS
1) Titration study now  2) Trazodone Rx sent to pharm  3) Light conditioning encouraged  4) Continue smoking cessation   5) Vaccines: Up to date with Prevnar 13  6) Return in about 6 weeks (around 8/29/2019) for follow up with NICKY Gotti, if not sooner, review of symptoms, Compliance.

## 2019-07-18 NOTE — PROGRESS NOTES
CC:  Here for f/u sleep issues as listed below    HPI:   Ml presents today for follow up obstructive sleep apnea and sleep study results.  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  Sick sinus syndrome, hyperthyroidism chronic atrial fibrillation on flecainide, CVA.    PSG from 7/2019 indicated an AHI of 65.3 that increased to 90.9 while supine and low oxygenation of 72%.  She met criteria to start treatment the night of the study.  CPAP was tried from 4cm H2O to 14cm H2O.  Bipap was tried from 15/11cm H2O to 17/13cm H2O. Her AHI improved to 18.8 at the highest pressure. No definitive pressure were able to be extrapolated from the titration and a dedicated BiPAP/ASV titration is recommended.    Reviewed results and treatment options including BIPAP treatment versus in lab titration, dental appliance, UPPP surgery, and behavioral modifications.  Patient is amendable to titration study. They understand they may need a future sleep study if treatment is ineffective.     Patient is currently sleeping 8 hours per night with 2x nighttime awakenings. They have no trouble falling asleep.  Watches TV or listens to book before bed.  They sometimes feel refreshed in the morning and denies morning H/A. They feel tired throughout the day and naps 3-4 per week for appx 20-45 min long.  Patient reports snoring, apnea events and paroxysmal nocturnal dyspnea events. They have never fallen asleep in conversation, at the wheel, or at work.  They deny sleepwalking/talking. She is concerned that she is gaining weight, 10 lbs in the last 6 months. Would like referral to nutritionist. Walking daily.       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   • Balance disorder 06/21/2018   • Radionecrosis 04/14/2018   • Malignant neoplasm of upper lobe of right lung (HCC) 03/05/2018   • CVA (cerebral vascular accident) (HCC) 11/29/2017   • Lung mass 11/19/2015   • Blurry vision, left eye 11/19/2015   • Metastasis to brain (HCC) 11/18/2015   • Metastatic cancer (CMS-HCC) 11/18/2015       Past Medical History:   Diagnosis Date   • Breast cancer (HCC)    • Cancer (HCC)     lung cancer with brain mts   • Chickenpox    • [place] measles    • Influenza    • Joint pain    • Lung cancer (HCC)    • Mumps    • Radionecrosis 4/14/2018   • Sick sinus syndrome (HCC)     with AFIB, s/p pacemaker   • Thyroid disease    • Tonsillitis        Past Surgical History:   Procedure Laterality Date   • CRANIOTOMY STEALTH Right 11/23/2015    Procedure: CRANIOTOMY STEALTH-right occipital;  Surgeon: Suyapa Schumacher M.D.;  Location: SURGERY [facility];  Service:    • APPENDECTOMY     • CRANIOTOMY     • KNEE ARTHROSCOPY      left    • LUMPECTOMY     • OTHER      left knee surgery   • PACEMAKER INSERTION     • TONSILLECTOMY         Family History   Problem Relation Age of Onset   • Dementia Mother    • Diabetes Mother    • Other Mother         osteoarthritis   • Arthritis Mother    • Autoimmune Disease Father         Rheumatoid arthritis   • Arthritis Father    • Cancer Brother         prostate        Social History     Social History   • Marital status:      Spouse name: N/A   • Number of children: N/A   • Years of education: N/A     Occupational History   • Not on file.     Social History Main Topics   • Smoking status: Former Smoker     Packs/day: 2.00     Years: 20.00     Quit date: 1960   • Smokeless tobacco: Never Used   • Alcohol use 1.8 oz/week     3 Glasses of wine per week   • Drug use: No   • Sexual  "activity: Not on file     Other Topics Concern   • Not on file     Social History Narrative   • No narrative on file       Current Outpatient Prescriptions   Medication Sig Dispense Refill   • traZODone (DESYREL) 50 MG Tab Take 1 Tab by mouth at bedtime as needed.  May repeat 1 time. for Sleep. 3 Tab 0   • OYSCO 500 500 MG Tab TAKE 1 TAB BY MOUTH 2 TIMES A DAY, WITH MEALS. NOT COV  5   • BIOTIN PO Take  by mouth every day.     • Misc. Devices ([employer] PULSE OXIMETER) Misc 1 Each by Does not apply route 4 times a day as needed. 1 Each 0   • levETIRAcetam (KEPPRA) 500 MG Tab Take 1 Tab by mouth 2 Times a Day. 180 Tab 1   • flecainide (TAMBOCOR) 150 MG Tab TAKE 1/2 TABLET BY MOUTH 2 TIMES A DAY. 90 Tab 1   • metoprolol SR (TOPROL XL) 25 MG TABLET SR 24 HR Take 25 mg by mouth every day.  3   • Cholecalciferol 1000 UNIT Cap Take 1 Cap by mouth every day at 6 PM. 90 Cap 0   • Omega-3 Fatty Acids (FISH OIL) 1200 MG Cap Take 1 Cap by mouth every day. 90 Cap 0   • aspirin (ASA) 81 MG Chew Tab chewable tablet Take 1 Tab by mouth every evening. 90 Tab 0   • multivitamin (THERAGRAN) Tab Take 1 Tab by mouth every day.     • alendronate (FOSAMAX) 10 MG Tab Take 1 Tab by mouth every morning before breakfast. 30 Tab 3   • methimazole (TAPAZOLE) 5 MG Tab Take 1 Tab by mouth every day. 30 Tab 3     No current facility-administered medications for this visit.           Allergies: Penicillins      ROS   Gen: Denies fever, chills, unintentional weight loss, fatigue  Resp:Denies Dyspnea  CV: Denies chest pain, chest tightness  Sleep:Denies morning headache, insomnia,   Neuro: Denies frequent headaches, weakness, dizziness  See HPI.  All other systems reviewed and negative        Vital signs for this encounter:  Vitals:    07/18/19 0757   Height: 1.549 m (5' 1\")   Weight: 68.5 kg (151 lb)   Weight % change since last entry.: 0 %   BP: 124/82   Pulse: 83   BMI (Calculated): 28.53   Resp: 15                   Physical Exam:   Gen:         Alert " and oriented, No apparent distress.   Neck:        No Lymphadenopathy.  Lungs:     Clear to auscultation bilaterally.    CV:          Regular rate and rhythm. No murmurs, rubs or gallops.   Abd:         Soft non tender, non distended.            Ext:          No clubbing, cyanosis, edema.    Assessment   1. BMI 28.0-28.9,adult  REFERRAL TO NUTRITION SERVICES   2. VITO (obstructive sleep apnea)  Polysomnography Titration       Patient is clinically stable and will proceed with following plan.     PLAN:   Patient Instructions   1) Titration study now  2) Trazodone Rx sent to pharm  3) Light conditioning encouraged  4) Continue smoking cessation   5) Vaccines: Up to date with Prevnar 13  6) Return in about 6 weeks (around 8/29/2019) for follow up with NICKY Gotti, if not sooner, review of symptoms, Compliance.  7) referral to nutritionist

## 2019-07-20 LAB — TSH RECEP AB SER-ACNC: <0.9 IU/L

## 2019-08-02 ENCOUNTER — HOSPITAL ENCOUNTER (OUTPATIENT)
Dept: RADIOLOGY | Facility: MEDICAL CENTER | Age: 79
End: 2019-08-02
Attending: INTERNAL MEDICINE
Payer: MEDICARE

## 2019-08-02 DIAGNOSIS — C34.11 MALIGNANT NEOPLASM OF UPPER LOBE OF RIGHT LUNG (HCC): ICD-10-CM

## 2019-08-02 PROCEDURE — 700117 HCHG RX CONTRAST REV CODE 255: Performed by: INTERNAL MEDICINE

## 2019-08-02 PROCEDURE — 71260 CT THORAX DX C+: CPT

## 2019-08-02 RX ADMIN — IOHEXOL 75 ML: 350 INJECTION, SOLUTION INTRAVENOUS at 14:15

## 2019-08-10 ENCOUNTER — SLEEP STUDY (OUTPATIENT)
Dept: SLEEP MEDICINE | Facility: MEDICAL CENTER | Age: 79
End: 2019-08-10
Attending: NURSE PRACTITIONER
Payer: MEDICARE

## 2019-08-10 DIAGNOSIS — G47.33 OSA (OBSTRUCTIVE SLEEP APNEA): ICD-10-CM

## 2019-08-10 PROCEDURE — 95811 POLYSOM 6/>YRS CPAP 4/> PARM: CPT | Performed by: FAMILY MEDICINE

## 2019-08-12 ENCOUNTER — OFFICE VISIT (OUTPATIENT)
Dept: NEUROLOGY | Facility: MEDICAL CENTER | Age: 79
End: 2019-08-12
Payer: MEDICARE

## 2019-08-12 VITALS
WEIGHT: 150 LBS | RESPIRATION RATE: 16 BRPM | DIASTOLIC BLOOD PRESSURE: 74 MMHG | OXYGEN SATURATION: 98 % | HEIGHT: 61 IN | HEART RATE: 80 BPM | SYSTOLIC BLOOD PRESSURE: 126 MMHG | TEMPERATURE: 98.6 F | BODY MASS INDEX: 28.32 KG/M2

## 2019-08-12 DIAGNOSIS — Z86.69 HISTORY OF BELL'S PALSY: ICD-10-CM

## 2019-08-12 DIAGNOSIS — I49.5 SICK SINUS SYNDROME (HCC): ICD-10-CM

## 2019-08-12 DIAGNOSIS — C34.11 MALIGNANT NEOPLASM OF UPPER LOBE OF RIGHT LUNG (HCC): ICD-10-CM

## 2019-08-12 DIAGNOSIS — C79.31 METASTASIS TO BRAIN (HCC): ICD-10-CM

## 2019-08-12 PROCEDURE — 99215 OFFICE O/P EST HI 40 MIN: CPT | Performed by: PSYCHIATRY & NEUROLOGY

## 2019-08-12 RX ORDER — LEVETIRACETAM 500 MG/1
500 TABLET ORAL 2 TIMES DAILY
Qty: 180 TAB | Refills: 3 | Status: SHIPPED | OUTPATIENT
Start: 2019-08-12 | End: 2020-05-21 | Stop reason: SDUPTHER

## 2019-08-12 ASSESSMENT — ENCOUNTER SYMPTOMS
WEIGHT LOSS: 0
HALLUCINATIONS: 0
SHORTNESS OF BREATH: 0
FEVER: 0
FALLS: 0
BRUISES/BLEEDS EASILY: 0
SORE THROAT: 0
ABDOMINAL PAIN: 0
EYE DISCHARGE: 0

## 2019-08-12 NOTE — PROCEDURES
Technical summary: The patient underwent a CPAP titration.  This was a 16 channel montage study to include a 6 channel EEG, a 2 channel EOG, and chin EMG, left and right leg EMG, a snore channel, and a CFLOW pressure transducer.   Respiratory effort was assessed with the use of a thoracic and abdominal monitor and overnight oximetry was obtained. Audio and video recordings were reviewed. This was a fully attended study and sleep stage scoring was performed. The test was technically adequate.    Interpretation:  Study start time was 09:26:35 PM.  Diagnostic recording time was 8h 43.5m with a total sleep time of 7h 52.0m resulting in a sleep efficiency of 90.16%%.  Sleep latency from the start of the study was 01 minutes and the latency from sleep to REM was 235 minutes.In total,145  arousals were scored for an arousal index of 18.4. Sleep stages showed normal SE,decreased RE, normal N3 and increased WASO of 50 min    Respiratory:  There were a total of 59 apneas consisting of 3 obstructive apneas, 0 mixed apneas, and 56 central apneas.  A total of 102 hypopneas were scored.The apnea index was 7.50 per hour and the hypopnea index was 12.97 per hour resulting in an overall AHI of 20.47.AHI during rem was 14.6 and AHI while supine was 23.54.  34% of the apneas were central apneas.    Oximetry:  There was a mean oxygen saturation of 89.0% with a minimum oxygen saturation of 66.0%.  Time spent with oxygen saturations below 89% was 107.8 minutes.    Cardiac:  The highest heart rate seen while awake was 81 BPM while the highest heart rate during sleep was 76 BPM with an average sleeping heart rate of 59 BPM.    Limb Movements:  There were a total of 122 PLMs during sleep, of which 31 were PLMS arousals.  This resulted in a PLMS index of 15.5 and a PLMS arousal index of 3.9.     BPAP was tried from 12/8cm H2O to 21/17cm H2O.    CPAP Titration:  The PAP titration was initiated with BiPAP 12/8 cm of water and the pressure  which was slowly titrated up in an attempt to eliminate sleep disordered breathing and snoring. The final pressure tested during the study was BiPAP 21/17 cm water. Lowest best tolerated pressure was BiPAP 20/15 cm.At this pressure the patient was observed in non supine REM sleep stage. The apnea hypopnea index improved to 5.2 per hour and O2 selwyn 84%. The average O2 stauration was 90%. She spent 91 min of sleep time below 89% O2 saturation.  The patient utilized XS quattro mask with heated humidification. The CPAP was well-tolerated and there were minimal air leaks.     Impression:  1.  VITO  2.  Treatment emergent central apneas   3.  Sleep related hypoxia      Recommendations:  I recommend BiPAP 20/15 cm with  XS quattro mask. Consider supplemental O2 bleed in and f/u with OPO on the recommended pressure due to residual sleep hypoxia. Recommended 30 day compliance download to assess the efficacy of the recommended pressure and compliance for further outpatient monitoring and management of CPAP therapy. In some cases alternative treatment options may prove effective in resolving sleep apnea and these options include upper airway surgery, the use of a dental orthotic or weight loss and positional therapy. Clinical correlation is required. In general patients with sleep apnea are advised to avoid alcohol and sedatives and to not operate a motor vehicle while drowsy and are at a greater risk for cardiovascular disease.

## 2019-08-12 NOTE — PROGRESS NOTES
Chief Complaint   Patient presents with   • Establish Care     Metastasis to brain, hx of brain tumor      Patient is referred by Geovanny Small M. for initial consult.    History of present illness:  Ml Chavira 79 y.o. female presents today for brain metastasis.   History is obtained from patient.  and Patient is accompanied by self.  She lives alone.    Duration/timing: Winter/November 2017  Context: Brain metastasis/spell: patient remembers feeling faint and couldn't get herself back up, found that her left side was weak when she woke up (no incontinence or tongue biting). Brain radiation 2015. Brain tumor resection 2015. She possibly had the left-sided weakness in 2015 but she is not sure.  Despite that the weakness she is experienced in the winter 2017 was far from her baseline.  That episode was occurring after trip to [place] and she was otherwise not acutely ill. She does not drive because of hemianopsia. She hs a hx of breast cancer without metastasis.  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  She is referred today for reevaluation on whether or not she needs Keppra.  Location: Brain  Quality: Weakness/swelling  Severity: Moderate  Modifying factors: Improved with time and steroid  Associated signs/symptoms: hx of bell palsy of uncertain side; occasional migraines  Denies: bladder incontinence, bowel incontinence, dizziness, headaches, vision changes, other weakness, numbness/tingling, swallowing difficulties, speech disturbance, depression, anxiety, memory loss, loss of consciousness, hallucinations, abnormal movements, diplopia and falls     Patient has tried:  -Keppra 500 mg p.o. twice daily, started 2017 in patient, no side effects      Past medical history:   Past Medical History:   Diagnosis Date   • Breast cancer (HCC)    • Cancer (HCC)     lung cancer with brain mts   • Chickenpox    • Malay measles    • Influenza    • Joint pain    • Lung  "cancer (HCC)    • Mumps    • Radionecrosis 2018   • Sick sinus syndrome (HCC)     with AFIB, s/p pacemaker   • Thyroid disease    • Tonsillitis        Past surgical history:   Past Surgical History:   Procedure Laterality Date   • CRANIOTOMY STEALTH Right 2015    Procedure: CRANIOTOMY STEALTH-right occipital;  Surgeon: Suyapa Schumacher M.D.;  Location: SURGERY [facility];  Service:    • APPENDECTOMY     • CRANIOTOMY     • KNEE ARTHROSCOPY      left    • LUMPECTOMY     • OTHER      left knee surgery   • PACEMAKER INSERTION     • TONSILLECTOMY         Family history:   Family History   Problem Relation Age of Onset   • Dementia Mother    • Diabetes Mother    • Other Mother         osteoarthritis   • Arthritis Mother    • Autoimmune Disease Father         Rheumatoid arthritis   • Arthritis Father    • Cancer Brother         prostate        Social history:   Tobacco Use   • Smoking status: Former Smoker     Packs/day: 2.00     Years: 20.00     Pack years: 40.00     Last attempt to quit: 1960     Years since quittin.6   • Smokeless tobacco: Never Used   Substance and Sexual Activity   • Alcohol use: Yes     Alcohol/week: 1.8 oz     Types: 3 Glasses of wine per week   • Drug use: No   • Sexual activity: Not on file       Current medications:   Current Outpatient Medications   Medication   • levETIRAcetam (KEPPRA) 500 MG Tab   • alendronate (FOSAMAX) 10 MG Tab   • methimazole (TAPAZOLE) 5 MG Tab   • OYSCO 500 500 MG Tab   • BIOTIN PO   • Misc. Devices ([employer] PULSE OXIMETER) Misc   • flecainide (TAMBOCOR) 150 MG Tab   • metoprolol SR (TOPROL XL) 25 MG TABLET SR 24 HR   • aspirin (ASA) 81 MG Chew Tab chewable tablet     No current facility-administered medications for this visit.        Medication Allergy:  Allergies   Allergen Reactions   • Penicillins Rash and Itching     RXN \"a long time ago\"       Review of Systems   Constitutional: Negative for fever and weight loss.   HENT: Negative for sore throat.  " "  Eyes: Negative for discharge.   Respiratory: Negative for shortness of breath.    Cardiovascular: Positive for leg swelling.   Gastrointestinal: Negative for abdominal pain.   Genitourinary: Negative for dysuria.   Musculoskeletal: Negative for falls.   Skin: Negative for rash.   Neurological:        As per HPI   Endo/Heme/Allergies: Does not bruise/bleed easily.   Psychiatric/Behavioral: Negative for hallucinations.       Physical examination:   Vitals:    08/12/19 1443   BP: 126/74   BP Location: Left arm   Patient Position: Sitting   BP Cuff Size: Adult   Pulse: 80   Resp: 16   Temp: 37 °C (98.6 °F)   TempSrc: Temporal   SpO2: 98%   Weight: 68 kg (150 lb)   Height: 1.549 m (5' 0.98\")     General: Patient in well nourished in no apparent distress. Elevated BMI.   Eyes: Ophthalmoscopic examination performed but discs cannot be visualized well enough to characterize bilaterally.  HENT: Normocephalic, atraumatic.   Cardiovascular: No lower extremity edema.  Respiratory: Normal respiratory effort.   Skin: No appreciable signs of acute rashes or bruising.   Musculoskeletal: No signs of joint or muscle swelling.   Psychiatric: Pleasant.     NEUROLOGICAL EXAM:   Mental status: Awake, alert and fully oriented to person, place, time and situation. Normal attention, concentration and fund of knowledge for education level.   Speech and language: Speech is fluent without errors and clear.  Cranial nerve exam:  II: Pupils are equally round and reactive to light.  Left homonymous hemianopsia.  III, IV, VI: EOMI, no diplopia, mild right ptosis nonobstructive  V: Sensation to light touch is normal over V1-3 distributions bilaterally.  .  VII: Very mild blunting of the right nasolabial fold.  VIII: Hearing intact to soft speech and finger rub bilaterally  IX: Palate elevates symmetrically, uvula is midline. Dysarthria is not present.  XI: Shoulder shrug are symmetrical and strong.   XII: Tongue protrudes midline.    Motor " exam:  Muscle tone is normal in all 4 limbs.    Muscle strength: 5 out of 5 proximal distal right upper and lower extremity strength.  5- out of 5 proximal left upper and lower extremity strength.  Distally she is 5- as well.    Sensory exam:  Intact to Light touch in bilateral upper and lower extremity.    Deep tendon reflexes:       Right  Left  Biceps   0/4  0/4  Triceps  0/4  0/4  Brachioradialis 0/4  0/4  Knee jerk  0/4  0/4  Ankle jerk  0/4  0/4   bilateral toes are downgoing to plantar stimulation..    Coordination: shows a normal finger-nose-finger   Gait: Casual gait is normal.      ANCILLARY DATA REVIEWED:   Lab Data Review:  Lab Results   Component Value Date/Time    WBC 6.8 06/04/2019 03:31 PM    RBC 4.05 (L) 06/04/2019 03:31 PM    HEMOGLOBIN 14.0 06/04/2019 03:31 PM    HEMATOCRIT 43.0 06/04/2019 03:31 PM    .2 (H) 06/04/2019 03:31 PM    MCH 34.6 (H) 06/04/2019 03:31 PM    MCHC 32.6 (L) 06/04/2019 03:31 PM    MPV 9.4 06/04/2019 03:31 PM    NEUTSPOLYS 65.20 06/04/2019 03:31 PM    LYMPHOCYTES 21.50 (L) 06/04/2019 03:31 PM    MONOCYTES 9.50 06/04/2019 03:31 PM    EOSINOPHILS 2.80 06/04/2019 03:31 PM    BASOPHILS 0.70 06/04/2019 03:31 PM      Lab Results   Component Value Date/Time    SODIUM 142 06/04/2019 03:31 PM    POTASSIUM 4.0 06/04/2019 03:31 PM    CHLORIDE 108 06/04/2019 03:31 PM    CO2 26 06/04/2019 03:31 PM    GLUCOSE 91 06/04/2019 03:31 PM    BUN 22 06/04/2019 03:31 PM    CREATININE 0.84 06/04/2019 03:31 PM     Lab Results   Component Value Date/Time    ASTSGOT 22 06/04/2019 1531    ALTSGPT 22 06/04/2019 1531    ALKPHOSPHAT 55 06/04/2019 1531    ALBUMIN 4.4 06/04/2019 1531     Lab Results   Component Value Date/Time    HBA1C 5.3 11/30/2017 03:38 AM        Imaging:   MRI brain June 2019:  1.  Status post right parietal-occipital craniotomy.  2.  Stable posttreatment appearance of the right temporal, parietal and occipital lobes with heterogeneous T2 signal intensity, T1 signal hyperintensity  and enhancement. No evidence of discrete enhancing mass to suggest residual or recurrent neoplasm.  3.  No new enhancing foci are identified to suggest new metastatic lesions.  4.  Mild cerebral substance loss.    I have personally reviewed the patient's imaging with the patient as above and agree with the radiologist's interpretation.     January 2019 with and without contrast MRI brain:  1.  Postoperative right parietal-occipital craniotomy.  2.  Mild cerebral atrophy.  3.  Stable ex vacuo dilatation of the right ventricular trigone and body of the right temporal horn.  4.  Extensive heterogeneous encephalomalacic change with hemosiderin deposition and extensive FLAIR hyperintense white matter changes consistent with a combination of macrocystic and microcystic encephalomalacic change, postradiation white matter change and/or vasogenic edema with no significant change from 7/19/2018. No masslike or nodular enhancement to suggest local tumor recurrence.  5.  No evidence of new brain metastasis elsewhere in the cerebral hemispheres or posterior fossa.  6.  Altogether, no significant change from 7/19/2018.    Records reviewed: Patient was seen by Rakel Nolan (neurology) in November 2017.  Consult note reviewed.  Reported history of metastatic breast?  Cancer to the right parietal lobe status post craniotomy and was transferred from [facility] for left-sided weakness.  Baseline peripheral vision loss from the tumor.  She woke up with acute left-sided weakness.  At that time she was started on Keppra 500 mg p.o. twice daily.  There was concern for radiation necrosis/edema.      ASSESSMENT AND PLAN:    1. Metastasis to brain (HCC): Patient with a history of lung cancer metastasis to the brain status post resection with radiation in 2015 complicated by radiation necrosis and vasogenic edema in 2017 status post Decadron IV.  At that time patient was started on Keppra by inpatient neurology.  She has  been on the medication since that time without issue.  It is unclear since she woke up with her left-sided weakness if she had any seizure activity and Oneil's paralysis.  MRI brain in June 2016 with right temporal parietal and occipital lobe signal intensity.  Thorough risks and benefits discussion with patient today regarding risk for seizure given the postoperative/radiation changes in the brain involving the temporal lobes, seizure threshold, risks and benefits of medications.  Given that she is tolerating medication very well, she has extensive changes on MRI, and independent - high functioning baseline, I believe the risk of injury and to her lifestyle is more detrimental than staying on the Keppra.  She is higher risk compared to the general population to have a seizure due to the amount of scarring in her brain.  I personally believe the benefits outweigh the risks of being on Keppra. Patient agrees.   - levETIRAcetam (KEPPRA) 500 MG Tab; Take 1 Tab by mouth 2 Times a Day.  Dispense: 180 Tab; Refill: 3  -She does not drive  -We discussed repeating EEG if she were to feel strongly about getting off any antiseizure medications to evaluate for abnormal discharges.  She declines for now since she is staying on Monday patients.  EEG at this point in time would not .    2. Malignant neoplasm of upper lobe of right lung (HCC): Doing well    3. Sick sinus syndrome (HCC): With pacemaker, possible history of atrial fibrillation    4. History of Bell's palsy: Suspect right side given clinical exam.    FOLLOW-UP: Return in about 1 year (around 8/12/2020).  Sooner if needed for medication management per  EDUCATION AND COUNSELING:  -I personally discussed the following with the patient:   Risks/benefits/side effects/alternatives of medication including but not limited to drowsiness, sedation, dizziness, increased risk for falls, renal dysfunction, increased risk for depression, anxiety, suicide, psychosis  and mood changes and avoid abrupt cessation of medication., [place] state motor vehicle saftey laws and mandatory reporting., Diagnosis, prognosis, and treatment options discussed with patient at length.  , Compliance with medications was discussed in detail. , Patient/family educated on SUDEP (Sudden Death in Epilepsy). Counseling was provided on the importance of strict medication and follow up compliance. The patient understands the risks associated with non-adherence with the medical plan as outlined, including but not limited to an increased risk for breakthrough seizures, which may contribute to injuries, disability, status epilepticus, and even death.  , Counseling was also provided on potential effects of alcohol and other drugs, which may lower seizure threshold and/or affect the metabolism of antiepileptic drugs. I recommend avoidance of alcohol and illegal drugs., I have made the patient aware of mandatory reporting required by the law in the State of [place] regarding episodes of seizures, loss of consciousness, alteration of awareness, and/or concerns for driving safety as discussed today. The patient and family are responsible for reporting events to the DMV, instructions were provided. The patient verbalized understanding. , Other seizure precautions were discussed at length, including but not limited to no diving, no skydiving, no unsupervised swimming, no Jacuzzi or bathing in bathtubs.  and Red flag symptoms concerning for seizure and to seek sooner medical attention    The patient understands and agrees that due to the complexity of his/her diagnosis, results of any testing and further recommendations will typically be discussed/made during a face to face encounter in my office. The patient and/or family further understands it is their responsibility to keep proper follow up.     Disclaimer  This dictation was created using voice recognition software. I have made every reasonable attempt to avoid  dictation errors, but this document may contain an error not identified before finalizing. If the error changes the accuracy of the document, I would appreciate it being brought to my attention. Thank you very much.     Irina Rodriguez MD  Neurology, Neurophysiology  [facility]

## 2019-08-15 ENCOUNTER — OFFICE VISIT (OUTPATIENT)
Dept: ENDOCRINOLOGY | Facility: MEDICAL CENTER | Age: 79
End: 2019-08-15
Payer: MEDICARE

## 2019-08-15 VITALS
HEIGHT: 61 IN | WEIGHT: 152 LBS | SYSTOLIC BLOOD PRESSURE: 124 MMHG | HEART RATE: 78 BPM | OXYGEN SATURATION: 94 % | BODY MASS INDEX: 28.7 KG/M2 | DIASTOLIC BLOOD PRESSURE: 80 MMHG

## 2019-08-15 DIAGNOSIS — E05.90 HYPERTHYROIDISM: ICD-10-CM

## 2019-08-15 DIAGNOSIS — E04.2 MULTIPLE THYROID NODULES: ICD-10-CM

## 2019-08-15 DIAGNOSIS — E55.9 VITAMIN D DEFICIENCY: ICD-10-CM

## 2019-08-15 PROCEDURE — 99214 OFFICE O/P EST MOD 30 MIN: CPT | Performed by: PHYSICIAN ASSISTANT

## 2019-08-15 RX ORDER — METHIMAZOLE 5 MG/1
2.5 TABLET ORAL DAILY
Qty: 30 TAB | Refills: 3 | Status: SHIPPED | OUTPATIENT
Start: 2019-08-15 | End: 2020-01-28 | Stop reason: SDUPTHER

## 2019-08-15 NOTE — PROGRESS NOTES
Endocrinology Clinic Progress Note  PCP: Pcp Not In Computer    HPI:  Ml Chavira is a 79 y.o. old patient who comes in today for review of endocrine problems.    1. Hyperthyroidism  Methimazole 5 mg taking daily   Complaining of weight gain.    8/15/2019- 152 lbs     Patient reports compliance of medications   Patient without side effects.   Patient with intermittent symptoms of difficulty swallowing.   denies any symptoms of swelling in her throat and or enlargement. She denies any palpitations shaking and/or tremors    7/18/2019- TSH 2.610, FT4 0.78, FT3 3.25 TRA <0.90        2. Thyroid nodule  Intermittent symptoms of difficulty with swallowing continues. Denies any symptoms of swelling of the neck or difficulty breathing.     7/18/2019-   Thyroid Ultrasound:   Nodules >= 1cm:  less than 5    Nodule #1  Location:  Right  lower  Size: 2.1 x 1.7 x 2.0 cm (previously  2.2 x 1.4 x 1.9 cm which previously measured 1.9 x 1.6 x 1.2 cm)  Composition:  Mixed-1  Echogenicity:  Isoechoic-1  Shape:  Wider than tall-0  Margins:  Smooth-0  Echogenic Foci:  Microscopic-3    ACR TIRADS points/category:  5 - TR4 - Moderately Suspicious    Nodule #2  Location:  Right  lower  Size:  1.2 x 1.7 x 1.0 cm  Composition:  Mixed-1  Echogenicity:  Isoechoic-1  Shape:  Wider than tall-0  Margins:  Smooth-0  Echogenic Foci:  Microscopic-3    ACR TIRADS points/category:  5 - TR4 - Moderately Suspicious    Nodule #3  Location:  Right  lower  Size:  0.6 x 1.5 x 1.1 cm (previously 1.1 x 0.7 x 1.1 cm which   previously measured 1.1 x 0.7 x 1.0 cm.)  Composition:  Solid-2  Echogenicity:  Isoechoic-1  Shape:  Wider than tall-0  Margins:  Irregular-2  Echogenic Foci:  Microscopic-3    ACR TIRADS points/category:  8 - TR5 - Highly Suspicious    Nodule #4  Location:  Left  mid  Size:  1.6 x 1.9 x 1.5 cm (1.9 x 1.2 x 1.3 cm. This previously measured 2.8 x 1.9 x 1.8 cm.)  Composition:  Mixed-1  Echogenicity:  Hypoechoic-2  Shape:  Wider than  tall-0  Shape Smooth-0  Echogenic Foci:  Microscopic-3    ACR TIRADS points/category:  6 - TR4 - Moderately Suspicious    There is an additional hypoechoic nodule with smooth margins in the mid left thyroid lobe which measures 1.4 x 0.7 x 1.1 cm. It contains microscopic calcifications and is wider than tall.     3. Low vitamin D level  Currently on vitamin D supplementation   Patient reports compliance         4. Other osteoporosis, unspecified pathological fracture presence  Managed by PCP   Last dexa 9/2018   Previously on alendronate - has been without and needs a new rx   Patient is scheduled to start PT for balance         Endocrine history to date:    1. Multiple thyroid nodules-   Found on thyroid ultrasound of 8/16/2018 secondary to abnormal PET      Thyroid Ultrasound: 2/19-  There is a subtle oval heterogeneous nodule with some internal echogenicity in the left mid gland measuring 1.9 x 1.2 x 1.3 cm. (previously measured 2.8 x 1.9 x 1.8 cm). There is a hypoechoic oval nodule in the left inferior gland measuring 1.2 x 1.0 x 1.1 cm (previously measured 1.0 x 1.0 x 0.9 cm.)       US:   Thyroid ultrasound 8/16/2018: Numerous hypoechoic nodules in the right thyroid lobe the largest of which is present in the lower pole measuring 1.9 x 1.6 x 1.2 cm the largest is present in the midportion of the right lobe measuring 1.1 x 1.0 x 0.7 cm.  There are numerous nodules located in the left lobe the largest was identified in the left lower pole measuring 2.8 x 1.8 x 1.9 cm it contains focal calcifications.  The next largest is medial slightly inferior to the left thyroid lobe which measures 1.0 x 1.0 x 0.9 cm       Thyroid US 8/16/2018   There is a probably solid vascular isoechoic oval right inferior gland nodule measuring 2.2 x 1.4 x 1.9 cm which previously measured 1.9 x 1.6 x 1.2 cm. There is an oval hypoechoic nodule in the right lateral gland measuring 1.1 x 0.7 x 1.1 cm which previously measured 1.1 x 0.7 x 1.0  cm.     2. Hyperthyroiditis related to Hot nodule (left)   Started on Methimazole 11/14/18 4/8/2019-TSH 3.070, FT4 0.94, FT3 3.70 TRA <0.90   2/26/2019, TSH 3.010, free T4 0.96, free T3 3.09, thyroid receptor antibody less than 0.90-methimazole 5 mg daily     1/15/19- TSH 4.730, FT4 0.61, FT3 3.17 TRA <0.90 (Methimazole 10mg)   10/19/2018 TSH 0.030 free T4 0.99, T3 78.4, free T3 3.63, TPO antibody 17.5, thyroid receptor antibodies less than 0.90  6/27/2018  TSH 0.010 free T4 0.98  8/24/2018 TSH 0.030 free T4 1.09 T3 115.6 TPO antibody 16.8     Thyroid uptake and scan 10/29/2018   borderline elevated 5 hour radioactive iodine uptake measured at 15.4%.  Area of focal increased uptake involving the lower pole of the left thyroid lobe corresponding to a solid nodule in that area likely representing a hyperactive nodule.        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  There is postoperative changes consistent with right parietal occipital craniotomy.     3. Low vitamin D level-  6/27/2018 vitamin D-  22   Currently on Vitamin D replacement      4. osteoporosis  On alendronate -    She is currently only taking vitamin D and calcium.     DEXA 9/20/2018  The mean bone mineral density for the lumbar spine is 0.914 g/cm2, which corresponds to a T score of -2.1.    The proximal left femur has a mean bone mineral density of 0.616 g/cm2, with a T score of -3.1.  Findings are consistent with osteoporosis with a high risk of fracture          ROS:  Constitutional: No weight loss or gain,  fever  HEENT: No difficulty with swallowing, change in voice, or swelling in throat area   Cardiac: No chest pain, palpitations, or racing heart  Resp: + shortness of breath   GI: No abdominal pain, nausea, vomiting, or diarrhea   Neuro: No numbness or tinging in feet  Endo: No heat or cold intolerance, no polyuria or polydipsia  All other detailed  ROS is otherwise negative       Past Medical History:  Patient Active Problem List    Diagnosis Date Noted   • Sick sinus syndrome (HCC) 2018     Priority: High   • Multiple thyroid nodules 08/15/2019   • Vitamin D deficiency 08/15/2019   • Osteoporosis 2019   • Need for vaccination 2019   • Hyperthyroidism 2019   • Chronic atrial fibrillation (HCC) 2019   • Hot thyroid nodule 2019   • Postmenopausal 2018   • Healthcare maintenance 2018   • Thyroid nodule 2018   • Low vitamin D level 2018   • Bitemporal hemianopia 2018   • Balance disorder 2018   • Radionecrosis 2018   • Malignant neoplasm of upper lobe of right lung (HCC) 2018   • CVA (cerebral vascular accident) (HCC) 2017   • Lung mass 2015   • Blurry vision, left eye 2015   • Metastasis to brain (HCC) 2015   • Metastatic cancer (CMS-HCC) 2015       Family Medical History:   Family History   Problem Relation Age of Onset   • Dementia Mother    • Diabetes Mother    • Other Mother         osteoarthritis   • Arthritis Mother    • Autoimmune Disease Father         Rheumatoid arthritis   • Arthritis Father    • Cancer Brother         prostate        Social History:   Social History     Socioeconomic History   • Marital status:      Spouse name: Not on file   • Number of children: Not on file   • Years of education: Not on file   • Highest education level: Not on file   Occupational History   • Not on file   Social Needs   • Financial resource strain: Not on file   • Food insecurity:     Worry: Not on file     Inability: Not on file   • Transportation needs:     Medical: Not on file     Non-medical: Not on file   Tobacco Use   • Smoking status: Former Smoker     Packs/day: 2.00     Years: 20.00     Pack years: 40.00     Last attempt to quit: 1960     Years since quittin.6   • Smokeless tobacco: Never Used   Substance and Sexual Activity   •  "Alcohol use: Yes     Alcohol/week: 1.8 oz     Types: 3 Glasses of wine per week   • Drug use: No   • Sexual activity: Not on file   Lifestyle   • Physical activity:     Days per week: Not on file     Minutes per session: Not on file   • Stress: Not on file   Relationships   • Social connections:     Talks on phone: Not on file     Gets together: Not on file     Attends Episcopalian service: Not on file     Active member of club or organization: Not on file     Attends meetings of clubs or organizations: Not on file     Relationship status: Not on file   • Intimate partner violence:     Fear of current or ex partner: Not on file     Emotionally abused: Not on file     Physically abused: Not on file     Forced sexual activity: Not on file   Other Topics Concern   • Not on file   Social History Narrative   • Not on file         Medications:    Current Outpatient Medications:   •  levETIRAcetam (KEPPRA) 500 MG Tab, Take 1 Tab by mouth 2 Times a Day., Disp: 180 Tab, Rfl: 3  •  alendronate (FOSAMAX) 10 MG Tab, Take 1 Tab by mouth every morning before breakfast., Disp: 30 Tab, Rfl: 3  •  methimazole (TAPAZOLE) 5 MG Tab, Take 1 Tab by mouth every day., Disp: 30 Tab, Rfl: 3  •  OYSCO 500 500 MG Tab, TAKE 1 TAB BY MOUTH 2 TIMES A DAY, WITH MEALS. NOT COV, Disp: , Rfl: 5  •  BIOTIN PO, Take  by mouth every day., Disp: , Rfl:   •  Misc. Devices (CVS PULSE OXIMETER) Misc, 1 Each by Does not apply route 4 times a day as needed., Disp: 1 Each, Rfl: 0  •  flecainide (TAMBOCOR) 150 MG Tab, TAKE 1/2 TABLET BY MOUTH 2 TIMES A DAY., Disp: 90 Tab, Rfl: 1  •  metoprolol SR (TOPROL XL) 25 MG TABLET SR 24 HR, Take 25 mg by mouth every day., Disp: , Rfl: 3  •  aspirin (ASA) 81 MG Chew Tab chewable tablet, Take 1 Tab by mouth every evening., Disp: 90 Tab, Rfl: 0    Labs: Reviewed as above     Physical Examination:  Vital signs: /80 (BP Location: Right arm, Patient Position: Sitting, BP Cuff Size: Adult)   Pulse 78   Ht 1.549 m (5' 1\")  "  Wt 68.9 kg (152 lb)   SpO2 94%   BMI 28.72 kg/m²  Body mass index is 28.72 kg/m².  General: No apparent distress, cooperative  Eyes: No scleral icterus, no discharge, normal eyelids  Neck: No abnormal masses on inspection, normal thyroid exam  Resp: Normal effort, clear to auscultation bilaterally  CVS: Regular rate and rhythm, S1 S2 normal, no murmur  Extremities: No lower extremity edema  Musculoskeletal: Normal digits and nails  Skin: No rash on visible skin  Psych: Alert and oriented, normal mood and affect, intact memory and able to make informed decisions.    Assessment and Plan:  1. Hyperthyroidism  Will decrease Methimazole to 1/2 tablet daily     Repeat labs 6 weeks   - standing labs     2. Thyroid nodule   FNA of nodule 1, 3, 4 -we discussed and reviewed her most recent ultrasound in comparison to her prior ultrasounds.  At this time I do believe that we should take a further look at her nodules.  We discussed the pros and cons with regards to biopsy.  At this time she is agreeable but will follow post biopsy.       3. Low vitamin D level  Currently on vitamin D supplementation   Patient reports compliance      4. Other osteoporosis, unspecified pathological fracture presence  Currently on: alendronate currently managed by her primary care physician.  I have encouraged her to continue vitamin D and calcium via diet.  We also discussed weightbearing exercises at today's evaluation.    Return in about 2 months (around 10/15/2019).    Thank you for allowing me to participate in the care of this patient.  If you have any questions or concerns please do not hesitate to contact me.    Le Rdz P.A.-C.    CC:   Pcp Not In Computer    This note was created using voice recognition software (Dragon). The accuracy of the dictation is limited by the abilities of the software. I have reviewed the note prior to signing, however some errors in grammar and context are still possible. If you have any  questions related to this note please do not hesitate to contact our office.

## 2019-08-16 ENCOUNTER — TELEPHONE (OUTPATIENT)
Dept: ENDOCRINOLOGY | Facility: MEDICAL CENTER | Age: 79
End: 2019-08-16

## 2019-08-16 NOTE — TELEPHONE ENCOUNTER
VOICEMAIL  1. Caller Name: clifton                      Call Back Number: [phone] (home)     2. Message: patient would like to know whe you want her to get biopsy done due to her appt with you being on 10/22/2019    3. Patient approves office to leave a detailed voicemail/MyChart message: yes

## 2019-08-21 ENCOUNTER — NON-PROVIDER VISIT (OUTPATIENT)
Dept: CARDIOLOGY | Facility: MEDICAL CENTER | Age: 79
End: 2019-08-21
Payer: MEDICARE

## 2019-08-21 DIAGNOSIS — I49.5 SICK SINUS SYNDROME (HCC): ICD-10-CM

## 2019-08-21 DIAGNOSIS — Z95.0 CARDIAC PACEMAKER IN SITU: ICD-10-CM

## 2019-08-21 PROCEDURE — 93280 PM DEVICE PROGR EVAL DUAL: CPT | Performed by: INTERNAL MEDICINE

## 2019-08-22 ENCOUNTER — HOSPITAL ENCOUNTER (OUTPATIENT)
Dept: RADIOLOGY | Facility: MEDICAL CENTER | Age: 79
End: 2019-08-22
Attending: PHYSICIAN ASSISTANT
Payer: MEDICARE

## 2019-08-22 DIAGNOSIS — E04.2 MULTIPLE THYROID NODULES: ICD-10-CM

## 2019-08-22 LAB — CYTOLOGY REG CYTOL: NORMAL

## 2019-08-22 PROCEDURE — 10006 FNA BX W/US GDN EA ADDL: CPT

## 2019-08-22 PROCEDURE — 88112 CYTOPATH CELL ENHANCE TECH: CPT | Mod: 91

## 2019-08-22 NOTE — PROGRESS NOTES
"Patient given [facility] \"Preventing the Spread of Infection\" brochure upon arrival.     US guided bilateral thyroid nodule fine needle aspiration done by Dr. Longo; NON-SEDATION  (no H&P required as this is a NON SEDATION procedure) bikateal anterior aspect of neck access site; 3 jars of Cytolyt and 3 vials of affirma obtained and sent to pathology lab; pt tolerated the procedure well; pt hemodynamically stable pre/intra/post procedure; all questions and concerns answered prior to being d/c; patient provided with appropriate education for procedure; pt d/c home.     "

## 2019-08-26 NOTE — TELEPHONE ENCOUNTER
I called to let patient her know. She said she had it done last week and wants to know when she can know about the results.

## 2019-08-27 ENCOUNTER — OFFICE VISIT (OUTPATIENT)
Dept: CARDIOLOGY | Facility: MEDICAL CENTER | Age: 79
End: 2019-08-27
Payer: MEDICARE

## 2019-08-27 VITALS
OXYGEN SATURATION: 89 % | BODY MASS INDEX: 28.89 KG/M2 | DIASTOLIC BLOOD PRESSURE: 62 MMHG | HEIGHT: 61 IN | HEART RATE: 66 BPM | SYSTOLIC BLOOD PRESSURE: 112 MMHG | WEIGHT: 153 LBS

## 2019-08-27 DIAGNOSIS — C79.9 METASTATIC CANCER (HCC): ICD-10-CM

## 2019-08-27 DIAGNOSIS — R91.8 LUNG MASS: ICD-10-CM

## 2019-08-27 DIAGNOSIS — Z79.899 HIGH RISK MEDICATION USE: ICD-10-CM

## 2019-08-27 DIAGNOSIS — I48.20 CHRONIC ATRIAL FIBRILLATION (HCC): ICD-10-CM

## 2019-08-27 DIAGNOSIS — C79.31 METASTASIS TO BRAIN (HCC): ICD-10-CM

## 2019-08-27 DIAGNOSIS — C34.11 MALIGNANT NEOPLASM OF UPPER LOBE OF RIGHT LUNG (HCC): ICD-10-CM

## 2019-08-27 DIAGNOSIS — I49.5 SICK SINUS SYNDROME (HCC): ICD-10-CM

## 2019-08-27 DIAGNOSIS — I48.91 ATRIAL FIBRILLATION, UNSPECIFIED TYPE (HCC): ICD-10-CM

## 2019-08-27 LAB — EKG IMPRESSION: NORMAL

## 2019-08-27 PROCEDURE — 93000 ELECTROCARDIOGRAM COMPLETE: CPT | Performed by: INTERNAL MEDICINE

## 2019-08-27 PROCEDURE — 99215 OFFICE O/P EST HI 40 MIN: CPT | Performed by: INTERNAL MEDICINE

## 2019-08-27 ASSESSMENT — ENCOUNTER SYMPTOMS
RESPIRATORY NEGATIVE: 1
GASTROINTESTINAL NEGATIVE: 1
CLAUDICATION: 0
PALPITATIONS: 0
SPUTUM PRODUCTION: 0
DIZZINESS: 0
STRIDOR: 0
WHEEZING: 0
LOSS OF CONSCIOUSNESS: 0
SORE THROAT: 0
FEVER: 0
EYES NEGATIVE: 1
HEMOPTYSIS: 0
ORTHOPNEA: 0
CHILLS: 0
NEUROLOGICAL NEGATIVE: 1
WEAKNESS: 0
PND: 0
COUGH: 0
SHORTNESS OF BREATH: 0
MUSCULOSKELETAL NEGATIVE: 1
CONSTITUTIONAL NEGATIVE: 1
CARDIOVASCULAR NEGATIVE: 1
BRUISES/BLEEDS EASILY: 0

## 2019-08-27 NOTE — PROGRESS NOTES
Chief Complaint   Patient presents with   • Atrial Fibrillation       Subjective:   Ml Chavira is a 79 y.o. female who presents today as a new consultation for atrial fibrillation.  She says this for about a year and a half.  She was diagnosed with his right on the time she was discovered to have metastatic brain tumor from primary lung cancer.  She was treated with chemotherapy and radiation for both of these.  She is had recurrence of her cancer.  She was put on flecainide for the atrial fibrillation and started on aspirin.  She is also on Keppra for seizure prophylaxis.  Is unclear whether or not she ever did really have a seizure however her most recent MRI and neurology consultation imply that perhaps she is at risk for having a seizure and therefore will stay on Keppra.  She only gets occasional lower extremity edema.  She does not get any chest pain palpitations or PND.  She was recently diagnosed with severe obstructive sleep apnea is pending a titration study with pulmonary.    Past Medical History:   Diagnosis Date   • Breast cancer (HCC)    • Cancer (HCC)     lung cancer with brain mts   • Chickenpox    • Persian measles    • Influenza    • Joint pain    • Lung cancer (HCC)    • Mumps    • Radionecrosis 4/14/2018   • Sick sinus syndrome (HCC)     with AFIB, s/p pacemaker   • Thyroid disease    • Tonsillitis      Past Surgical History:   Procedure Laterality Date   • CRANIOTOMY STEALTH Right 11/23/2015    Procedure: CRANIOTOMY STEALTH-right occipital;  Surgeon: Suyapa Schumacher M.D.;  Location: SURGERY [facility];  Service:    • APPENDECTOMY     • CRANIOTOMY     • KNEE ARTHROSCOPY      left    • LUMPECTOMY     • OTHER      left knee surgery   • PACEMAKER INSERTION     • TONSILLECTOMY       Family History   Problem Relation Age of Onset   • Dementia Mother    • Diabetes Mother    • Other Mother         osteoarthritis   • Arthritis Mother    • Autoimmune Disease Father         Rheumatoid  "arthritis   • Arthritis Father    • Cancer Brother         prostate      Social History     Socioeconomic History   • Marital status:      Spouse name: Not on file   • Number of children: Not on file   • Years of education: Not on file   • Highest education level: Not on file   Occupational History   • Not on file   Social Needs   • Financial resource strain: Not on file   • Food insecurity:     Worry: Not on file     Inability: Not on file   • Transportation needs:     Medical: Not on file     Non-medical: Not on file   Tobacco Use   • Smoking status: Former Smoker     Packs/day: 2.00     Years: 20.00     Pack years: 40.00     Last attempt to quit: 1960     Years since quittin.6   • Smokeless tobacco: Never Used   Substance and Sexual Activity   • Alcohol use: Yes     Alcohol/week: 1.8 oz     Types: 3 Glasses of wine per week   • Drug use: No   • Sexual activity: Not on file   Lifestyle   • Physical activity:     Days per week: Not on file     Minutes per session: Not on file   • Stress: Not on file   Relationships   • Social connections:     Talks on phone: Not on file     Gets together: Not on file     Attends Anabaptism service: Not on file     Active member of club or organization: Not on file     Attends meetings of clubs or organizations: Not on file     Relationship status: Not on file   • Intimate partner violence:     Fear of current or ex partner: Not on file     Emotionally abused: Not on file     Physically abused: Not on file     Forced sexual activity: Not on file   Other Topics Concern   • Not on file   Social History Narrative   • Not on file     Allergies   Allergen Reactions   • Penicillins Rash and Itching     RXN \"a long time ago\"     Outpatient Encounter Medications as of 2019   Medication Sig Dispense Refill   • apixaban (ELIQUIS) 5mg Tab Take 1 Tab by mouth 2 Times a Day. 60 Tab 11   • methimazole (TAPAZOLE) 5 MG Tab Take 0.5 Tabs by mouth every day. 30 Tab 3   • " "levETIRAcetam (KEPPRA) 500 MG Tab Take 1 Tab by mouth 2 Times a Day. 180 Tab 3   • alendronate (FOSAMAX) 10 MG Tab Take 1 Tab by mouth every morning before breakfast. 30 Tab 3   • OYSCO 500 500 MG Tab TAKE 1 TAB BY MOUTH 2 TIMES A DAY, WITH MEALS. NOT COV  5   • BIOTIN PO Take  by mouth every day.     • Misc. Devices ([employer]S PULSE OXIMETER) Misc 1 Each by Does not apply route 4 times a day as needed. 1 Each 0   • flecainide (TAMBOCOR) 150 MG Tab TAKE 1/2 TABLET BY MOUTH 2 TIMES A DAY. 90 Tab 1   • metoprolol SR (TOPROL XL) 25 MG TABLET SR 24 HR Take 25 mg by mouth every day.  3   • [DISCONTINUED] aspirin (ASA) 81 MG Chew Tab chewable tablet Take 1 Tab by mouth every evening. 90 Tab 0     No facility-administered encounter medications on file as of 8/27/2019.      Review of Systems   Constitutional: Negative.  Negative for chills, fever and malaise/fatigue.   HENT: Negative.  Negative for sore throat.    Eyes: Negative.    Respiratory: Negative.  Negative for cough, hemoptysis, sputum production, shortness of breath, wheezing and stridor.    Cardiovascular: Negative.  Negative for chest pain, palpitations, orthopnea, claudication, leg swelling and PND.   Gastrointestinal: Negative.    Genitourinary: Negative.    Musculoskeletal: Negative.    Skin: Negative.    Neurological: Negative.  Negative for dizziness, loss of consciousness and weakness.   Endo/Heme/Allergies: Negative.  Does not bruise/bleed easily.   All other systems reviewed and are negative.       Objective:   /62 (BP Location: Left arm, Patient Position: Sitting, BP Cuff Size: Adult)   Pulse 66   Ht 1.549 m (5' 1\")   Wt 69.4 kg (153 lb)   SpO2 89%   BMI 28.91 kg/m²     Physical Exam   Constitutional: She appears well-developed and well-nourished. No distress.   HENT:   Head: Normocephalic and atraumatic.   Right Ear: External ear normal.   Left Ear: External ear normal.   Nose: Nose normal.   Mouth/Throat: No oropharyngeal exudate.   Eyes: Pupils " are equal, round, and reactive to light. Conjunctivae and EOM are normal. Right eye exhibits no discharge. Left eye exhibits no discharge. No scleral icterus.   Neck: Neck supple. No JVD present.   Cardiovascular: Normal rate, regular rhythm and intact distal pulses. Exam reveals no gallop and no friction rub.   No murmur heard.  Pulmonary/Chest: Effort normal. No stridor. No respiratory distress. She has no wheezes. She has no rales. She exhibits no tenderness.   Abdominal: Soft. She exhibits no distension. There is no guarding.   Musculoskeletal: Normal range of motion. She exhibits no edema, tenderness or deformity.   Neurological: She is alert. She has normal reflexes. She displays normal reflexes. No cranial nerve deficit. She exhibits normal muscle tone. Coordination normal.   Skin: Skin is warm and dry. No rash noted. She is not diaphoretic. No erythema. No pallor.   Psychiatric: She has a normal mood and affect. Her behavior is normal. Judgment and thought content normal.   Nursing note and vitals reviewed.      Assessment:     1. Atrial fibrillation, unspecified type (HCC)  EKG   2. Chronic atrial fibrillation (HCC)  apixaban (ELIQUIS) 5mg Tab   3. Malignant neoplasm of upper lobe of right lung (HCC)     4. Metastasis to brain (HCC)     5. Metastatic cancer (CMS-HCC)     6. Sick sinus syndrome (HCC)  apixaban (ELIQUIS) 5mg Tab   7. Lung mass     8. High risk medication use         Medical Decision Making:  Today's Assessment / Status / Plan:     79-year-old female with atrial fibrillation on aspirin and flecainide.  At this point I do not feel that aspirin is appropriate given her risk category.  We will stop that today.  We will start her on Eliquis 5 mg twice daily.  We will stay on the flecainide 150 twice daily.  Her QRS currently is appropriate for that dosing.  We will see her back in 6 months.    1. A-fib    - stop asa    - start eliquis 5 BID    - cont flecainide 150 BID    2. High Risk Meds    -  reviewed labs    3. Seizure concern    - on Keppra    - she will report seizures    - consider Watchman if she has a seziure

## 2019-08-28 ENCOUNTER — TELEPHONE (OUTPATIENT)
Dept: CARDIOLOGY | Facility: MEDICAL CENTER | Age: 79
End: 2019-08-28

## 2019-08-28 DIAGNOSIS — I49.5 SICK SINUS SYNDROME (HCC): ICD-10-CM

## 2019-08-28 DIAGNOSIS — I48.20 CHRONIC ATRIAL FIBRILLATION (HCC): ICD-10-CM

## 2019-08-28 NOTE — TELEPHONE ENCOUNTER
Pt of [facility] is requesting new RX for 90 day supply of  Elquis 5 mg. States current RX is for a 30 day supply would cost her $110 versus 90 day supply would cost her only $60. Pharmacy is [facility] on [address]. If questions can be reached at [phone].    Thank You

## 2019-08-29 NOTE — TELEPHONE ENCOUNTER
A. Right lower anterior thyroid fine needle aspiration:         No malignant cells identified.         [place]on appearing follicular cells consistent with a benign          follicular nodule ([place] category 2).  B. Right lower posterior thyroid fine needle aspiration:         No malignant cells identified.         [place]on appearing follicular cells consistent with a benign          follicular nodule ([place] category 2).  C. Left lower thyroid fine needle aspiration:         No malignant cells identified.         [place] appearing follicular cells consistent with a benign          follicular nodule ([place] category 2).    Status post biopsy. Biopsy was benign.   Le

## 2019-09-19 ENCOUNTER — SLEEP CENTER VISIT (OUTPATIENT)
Dept: SLEEP MEDICINE | Facility: MEDICAL CENTER | Age: 79
End: 2019-09-19
Payer: MEDICARE

## 2019-09-19 ENCOUNTER — TELEPHONE (OUTPATIENT)
Dept: PULMONOLOGY | Facility: HOSPICE | Age: 79
End: 2019-09-19

## 2019-09-19 VITALS
SYSTOLIC BLOOD PRESSURE: 122 MMHG | HEART RATE: 83 BPM | HEIGHT: 61 IN | OXYGEN SATURATION: 93 % | DIASTOLIC BLOOD PRESSURE: 60 MMHG | WEIGHT: 150 LBS | BODY MASS INDEX: 28.32 KG/M2

## 2019-09-19 DIAGNOSIS — G47.34 NOCTURNAL HYPOXIA: ICD-10-CM

## 2019-09-19 DIAGNOSIS — G47.33 OSA (OBSTRUCTIVE SLEEP APNEA): ICD-10-CM

## 2019-09-19 PROCEDURE — 99214 OFFICE O/P EST MOD 30 MIN: CPT | Performed by: NURSE PRACTITIONER

## 2019-09-19 NOTE — TELEPHONE ENCOUNTER
Patient was seen at the sleep center today with Lynda PÉREZ. Patient forgot to ask for a new updated DMV placard form. (I have scanned a BLANK form into the system for review) if provider wishes to complete. Message routed to Providers medical assistant for completion. If signed and approved please mail to patient so she can handle.

## 2019-09-19 NOTE — PROGRESS NOTES
CC:  Here for f/u sleep issues as listed below    HPI:     Ml presents today for follow up obstructive sleep apnea and sleep study results.  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  Sick sinus syndrome, hyperthyroidism chronic atrial fibrillation on flecainide, CVA.     PSG from 7/2019 indicated an AHI of 65.3 that increased to 90.9 while supine and low oxygenation of 72%.  She met criteria to start treatment the night of the study.  CPAP was tried from 4cm H2O to 14cm H2O.  Bipap was tried from 15/11cm H2O to 17/13cm H2O. Her AHI improved to 18.8 at the highest pressure. No definitive pressure were able to be extrapolated. A titration study was completed 8/2019 andBPAP was tried from 12/8cm H2O to 21/17cm H2O. she did best on a BiPAP pressure of 20/15 with reduced AHI of 5.2, mean oxygenation of 90%, REM rebound. She spent 91 min of sleep time below 89% O2 saturation.   She will benefit from the addition of 2 L bleed in.     Reviewed results and patient is amendable to BIPAP therapy. Patient is currently sleeping 8 hours per night with 2x nighttime awakenings. They have no trouble falling asleep.  Watches TV or listens to book before bed.  They sometimes feel refreshed in the morning and denies morning H/A. They feel tired throughout the day and naps 3-4 per week for appx 20-45 min long.  Patient reports snoring, apnea events and paroxysmal nocturnal dyspnea events. They have never fallen asleep in conversation, at the wheel, or at work.  They deny sleepwalking/talking. She is concerned that she is gaining weight, 10 lbs in the last 6 months. She did not follow through with nutritionist due to cost. She will discuss weight increase 2/2 SE of medication that her endocrinologist placed her on. Walking daily.     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   • Balance disorder 06/21/2018   • Radionecrosis 04/14/2018   • Malignant neoplasm of upper lobe of right lung (HCC) 03/05/2018   • CVA (cerebral vascular accident) (HCC) 11/29/2017   • Lung mass 11/19/2015   • Blurry vision, left eye 11/19/2015   • Metastasis to brain (HCC) 11/18/2015   • Metastatic cancer (CMS-HCC) 11/18/2015       Past Medical History:   Diagnosis Date   • Breast cancer (HCC)    • Cancer (HCC)     lung cancer with brain mts   • Chickenpox    • [place] measles    • Influenza    • Joint pain    • Lung cancer (HCC)    • Mumps    • Radionecrosis 4/14/2018   • Sick sinus syndrome (HCC)     with AFIB, s/p pacemaker   • Thyroid disease    • Tonsillitis        Past Surgical History:   Procedure Laterality Date   • CRANIOTOMY STEALTH Right 11/23/2015    Procedure: CRANIOTOMY STEALTH-right occipital;  Surgeon: Suyapa Schumacher M.D.;  Location: SURGERY [facility];  Service:    • APPENDECTOMY     • CRANIOTOMY     • KNEE ARTHROSCOPY      left    • LUMPECTOMY     • OTHER      left knee surgery   • PACEMAKER INSERTION     • TONSILLECTOMY         Family History   Problem Relation Age of Onset   • Dementia Mother    • Diabetes Mother    • Other Mother         osteoarthritis   • Arthritis Mother    • Autoimmune Disease Father         Rheumatoid arthritis   • Arthritis Father    • Cancer Brother         prostate        Social History     Socioeconomic History   • Marital status:      Spouse name: Not on file   • Number of children: Not on file   • Years of education: Not on file   • Highest  education level: Not on file   Occupational History   • Not on file   Social Needs   • Financial resource strain: Not on file   • Food insecurity:     Worry: Not on file     Inability: Not on file   • Transportation needs:     Medical: Not on file     Non-medical: Not on file   Tobacco Use   • Smoking status: Former Smoker     Packs/day: 2.00     Years: 20.00     Pack years: 40.00     Last attempt to quit: 1960     Years since quittin.7   • Smokeless tobacco: Never Used   Substance and Sexual Activity   • Alcohol use: Yes     Alcohol/week: 1.8 oz     Types: 3 Glasses of wine per week   • Drug use: No   • Sexual activity: Not on file   Lifestyle   • Physical activity:     Days per week: Not on file     Minutes per session: Not on file   • Stress: Not on file   Relationships   • Social connections:     Talks on phone: Not on file     Gets together: Not on file     Attends Yazdanism service: Not on file     Active member of club or organization: Not on file     Attends meetings of clubs or organizations: Not on file     Relationship status: Not on file   • Intimate partner violence:     Fear of current or ex partner: Not on file     Emotionally abused: Not on file     Physically abused: Not on file     Forced sexual activity: Not on file   Other Topics Concern   • Not on file   Social History Narrative   • Not on file       Current Outpatient Medications   Medication Sig Dispense Refill   • apixaban (ELIQUIS) 5mg Tab Take 1 Tab by mouth 2 Times a Day. 180 Tab 3   • methimazole (TAPAZOLE) 5 MG Tab Take 0.5 Tabs by mouth every day. 30 Tab 3   • levETIRAcetam (KEPPRA) 500 MG Tab Take 1 Tab by mouth 2 Times a Day. 180 Tab 3   • alendronate (FOSAMAX) 10 MG Tab Take 1 Tab by mouth every morning before breakfast. 30 Tab 3   • OYSCO 500 500 MG Tab TAKE 1 TAB BY MOUTH 2 TIMES A DAY, WITH MEALS. NOT COV  5   • BIOTIN PO Take  by mouth every day.     • flecainide (TAMBOCOR) 150 MG Tab TAKE 1/2 TABLET BY MOUTH 2 TIMES A DAY.  "90 Tab 1   • metoprolol SR (TOPROL XL) 25 MG TABLET SR 24 HR Take 25 mg by mouth every day.  3   • Misc. Devices ([employer] PULSE OXIMETER) Misc 1 Each by Does not apply route 4 times a day as needed. 1 Each 0     No current facility-administered medications for this visit.           Allergies: Penicillins      ROS   Gen: Denies fever, chills, unintentional weight loss,   Resp:Denies Dyspnea  CV: Denies chest pain, chest tightness  Sleep:Denies morning headache, insomnia,   Neuro: Denies frequent headaches, weakness, dizziness  See HPI.  All other systems reviewed and negative        Vital signs for this encounter:  Vitals:    09/19/19 1022   Height: 1.549 m (5' 1\")   Weight: 68 kg (150 lb)   Weight % change since last entry.: 0 %   BP: 122/60   Pulse: 83   BMI (Calculated): 28.34                   Physical Exam:   Gen:         Alert and oriented, No apparent distress.   Neck:        No Lymphadenopathy.  Lungs:     Clear to auscultation bilaterally.    CV:          Regular rate and rhythm. No murmurs, rubs or gallops.   Abd:         Soft non tender, non distended.            Ext:          No clubbing, cyanosis, edema.    Assessment   1. VITO (obstructive sleep apnea)     2. Nocturnal hypoxia  DME BiPAP       Face-to-face time greater than 50% of 25 minutes reviewing questions and concerns relating to her sleep study and starting on the machine.  Patient is clinically stable and will proceed with following plan.     PLAN:   Patient Instructions   1) Start BIPAP at 20/[id number] with 2L oxygen  2) Discussed acclimating to machine and change mask within first 30 days if required. Bring machine to first appointment.  -Discussed Medicare guidelines using the machine 70% of the time for minimum of 4 hours  3) Light conditioning encouraged  4) Continue smoking cessation   5) Vaccines: Up to date with Prevnar 13  6) Return in about 2 months (around 11/19/2019) for follow up with NICKY Gotti.           "

## 2019-09-19 NOTE — PATIENT INSTRUCTIONS
1) Start BIPAP at 20/20elA07 with 2L oxygen  2) Discussed acclimating to machine and change mask within first 30 days if required. Bring machine to first appointment.  3) Light conditioning encouraged  4) Continue smoking cessation   5) Vaccines: Up to date with Prevnar 13  5) Return in about 2 months (around 11/19/2019) for follow up with NICKY Gotti.

## 2019-09-23 NOTE — TELEPHONE ENCOUNTER
Did patient get her new DMV placard form completed by provider? Message routed to providers medical Assistant to check on completion.

## 2019-09-24 NOTE — TELEPHONE ENCOUNTER
Request unable complete due to the fact we only see patient for sleep. True patient does use oxygen BUT with her Bi-Pap. We have to defer to cardiology of PCP. Discuss with patient and she will call PCP or oncology, cardiology etc.

## 2019-10-07 ENCOUNTER — TELEPHONE (OUTPATIENT)
Dept: PULMONOLOGY | Facility: HOSPICE | Age: 79
End: 2019-10-07

## 2019-10-07 NOTE — TELEPHONE ENCOUNTER
"Patient calling states [facility] called her and said \"there is a problem with the Bi-pap order that we submitted\"?? I called them and they state they need  order signed on their forms. They are faxing to us. We will await their forms. Patient aware.  "

## 2019-10-10 ENCOUNTER — OFFICE VISIT (OUTPATIENT)
Dept: MEDICAL GROUP | Facility: IMAGING CENTER | Age: 79
End: 2019-10-10
Payer: MEDICARE

## 2019-10-10 VITALS
SYSTOLIC BLOOD PRESSURE: 118 MMHG | OXYGEN SATURATION: 96 % | HEIGHT: 61 IN | TEMPERATURE: 98.5 F | HEART RATE: 83 BPM | BODY MASS INDEX: 29.07 KG/M2 | DIASTOLIC BLOOD PRESSURE: 72 MMHG | RESPIRATION RATE: 16 BRPM | WEIGHT: 154 LBS

## 2019-10-10 DIAGNOSIS — Z13.21 ENCOUNTER FOR VITAMIN DEFICIENCY SCREENING: ICD-10-CM

## 2019-10-10 DIAGNOSIS — R63.5 UNEXPLAINED WEIGHT GAIN: Primary | ICD-10-CM

## 2019-10-10 DIAGNOSIS — C79.9 METASTATIC CANCER (HCC): ICD-10-CM

## 2019-10-10 DIAGNOSIS — Z23 NEED FOR VACCINATION: ICD-10-CM

## 2019-10-10 DIAGNOSIS — Z12.83 SCREENING FOR SKIN CANCER: ICD-10-CM

## 2019-10-10 DIAGNOSIS — Z76.89 ENCOUNTER TO ESTABLISH CARE WITH NEW DOCTOR: ICD-10-CM

## 2019-10-10 DIAGNOSIS — D49.89 NEOPLASM OF UNSPECIFIED BEHAVIOR OF OTHER SPECIFIED SITES: ICD-10-CM

## 2019-10-10 PROBLEM — R79.89 LOW VITAMIN D LEVEL: Status: RESOLVED | Noted: 2018-07-23 | Resolved: 2019-10-10

## 2019-10-10 PROBLEM — E04.1 THYROID NODULE: Status: RESOLVED | Noted: 2018-07-23 | Resolved: 2019-10-10

## 2019-10-10 PROBLEM — Z00.00 HEALTHCARE MAINTENANCE: Status: RESOLVED | Noted: 2018-07-23 | Resolved: 2019-10-10

## 2019-10-10 PROCEDURE — G0008 ADMIN INFLUENZA VIRUS VAC: HCPCS | Performed by: NURSE PRACTITIONER

## 2019-10-10 PROCEDURE — 99204 OFFICE O/P NEW MOD 45 MIN: CPT | Mod: 25 | Performed by: NURSE PRACTITIONER

## 2019-10-10 PROCEDURE — 90662 IIV NO PRSV INCREASED AG IM: CPT | Performed by: NURSE PRACTITIONER

## 2019-10-10 SDOH — HEALTH STABILITY: PHYSICAL HEALTH: ON AVERAGE, HOW MANY MINUTES DO YOU ENGAGE IN EXERCISE AT THIS LEVEL?: 60 MIN

## 2019-10-10 SDOH — HEALTH STABILITY: PHYSICAL HEALTH: ON AVERAGE, HOW MANY DAYS PER WEEK DO YOU ENGAGE IN MODERATE TO STRENUOUS EXERCISE (LIKE A BRISK WALK)?: 4 DAYS

## 2019-10-10 NOTE — PROGRESS NOTES
"Subjective:     CC: Weight Gain and Other (needs handicap placard )      HISTORY OF THE PRESENT ILLNESS: Patient is a 79 y.o. female. Her prior PCP was [facility], last seen 06/19.  Patient followed by pulmonary, Lynda SAUNDERS (last seen in 9/19); cardiology, Alfred Obregon MD (last seen in 8/19); endocrinology, Le J[facility] (last seen in8/19); oncology, Mariano Aguilar (last seen in 12/18). Patient has history of metastatic brain cancer, lung cancer stage IV, breast cancer treated in 2015.  History of CVA, thyroid nodule, osteoporosis hyperthyroidism, chronic atrial fibrillation, sick sinus syndrome resulting in a pacemaker, and allergic rhinitis. Patient is here today to establish care and discuss unexplained weight gain. Pt. has follow up appointments with all specialist from 10/22/19-2/19/2020.     Pt. is here today to discuss why she has gained weight from 1/19-present.  Reports a total of 25 pounds gain. States she has been told by other providers that it is from her medication, but she is uncertain if this is the cause. Concerned that her cancer has maybe spread to her abdomen, because most of her weight gain has occurred in her abdomen.  States that she feels her stomach is \"packed\".  Reports that she is physically active at the gym 4-5 times a week for 60 minutes every time.  Previously attending physical therapy 1-2 times a week, due to decreased vision since brain cancer. Report recently just stopping PT due to coverage.  Reports she tried to change her diet and decreasing intake, but continues to gain weight. Reports she has even skipped meals. Reports she feels like she has health diet, and it has not changed from previous years when she was 25 pounds lighter. Reports when she was treated for brain cancer that she was on high doses of steroids due to swelling. States she has been on steroids on and off over the years, last exposure 2017.    Pt. desires handi-cap placard to be " renewed due to not being able to drive herself, and decrease vision since surgery to remove brain tumor. Pt. has difficulty seeing peripherally in both eyes, and it is difficult to walk across a parking lot is she dave far away.    No problem-specific Assessment & Plan notes found for this encounter.      Allergies: Penicillins    Current Outpatient Medications Ordered in Epic   Medication Sig Dispense Refill   • apixaban (ELIQUIS) 5mg Tab Take 1 Tab by mouth 2 Times a Day. 180 Tab 3   • methimazole (TAPAZOLE) 5 MG Tab Take 0.5 Tabs by mouth every day. 30 Tab 3   • levETIRAcetam (KEPPRA) 500 MG Tab Take 1 Tab by mouth 2 Times a Day. 180 Tab 3   • alendronate (FOSAMAX) 10 MG Tab Take 1 Tab by mouth every morning before breakfast. 30 Tab 3   • OYSCO 500 500 MG Tab TAKE 1 TAB BY MOUTH 2 TIMES A DAY, WITH MEALS. NOT COV  5   • BIOTIN PO Take  by mouth every day.     • Misc. Devices ([employer] PULSE OXIMETER) Misc 1 Each by Does not apply route 4 times a day as needed. 1 Each 0   • flecainide (TAMBOCOR) 150 MG Tab TAKE 1/2 TABLET BY MOUTH 2 TIMES A DAY. 90 Tab 1   • metoprolol SR (TOPROL XL) 25 MG TABLET SR 24 HR Take 25 mg by mouth every day.  3     No current [facility]-ordered facility-administered medications on file.        Past Medical History:   Diagnosis Date   • Breast cancer (HCC)    • Cancer (HCC)     lung cancer with brain mts   • Chickenpox    • [place] measles    • Healthcare maintenance 7/23/2018   • Influenza    • Joint pain    • Lung cancer (HCC)    • Mumps    • Need for vaccination 1/17/2019   • Radionecrosis 4/14/2018   • Sick sinus syndrome (HCC)     with AFIB, s/p pacemaker   • Thyroid disease    • Tonsillitis        Past Surgical History:   Procedure Laterality Date   • CRANIOTOMY STEALTH Right 11/23/2015    Procedure: CRANIOTOMY STEALTH-right occipital;  Surgeon: Suyapa Schumacher M.D.;  Location: SURGERY [facility];  Service:    • APPENDECTOMY     • CRANIOTOMY     • KNEE ARTHROSCOPY      left    •  LUMPECTOMY     • OTHER      left knee surgery   • PACEMAKER INSERTION     • TONSILLECTOMY         Social History     Tobacco Use   • Smoking status: Former Smoker     Packs/day: 2.00     Years: 20.00     Pack years: 40.00     Last attempt to quit: 1960     Years since quittin.8   • Smokeless tobacco: Never Used   Substance Use Topics   • Alcohol use: Yes     Alcohol/week: 1.8 oz     Types: 3 Glasses of wine per week   • Drug use: No       Social History     Social History Narrative   • Not on file       Family History   Problem Relation Age of Onset   • Dementia Mother    • Diabetes Mother    • Other Mother         osteoarthritis   • Arthritis Mother    • Autoimmune Disease Father         Rheumatoid arthritis   • Arthritis Father    • Cancer Brother         prostate        Health Maintenance: Completed. Discussed scheduling AWV and getting age-specific vaccines. Pt. verbalized understanding.    ROS:   Constitutional: Denies fever, chills, night sweats, weight loss or malaise/fatigue. 25 lb. unintentional weight gain since .  HENT: Denies headache, nasal congestion, sore throat, hearing loss, enlarged thyroid, or difficulty swallowing.    Eyes: Denies pain.  Does wear corrective wear. Since removal brain tumor in , pt. reports that she has had decreased peripheral vision, worse in left eye. Does not drive.   Respiratory: Denies cough, SOB at rest. Reports she experiences SOB with moderate to intense physical activity, able to control with decreasing or stopping activity. Denies dizziness, LOC, or lightheadedness.    Cardiovascular: Denies tachycardia, chest pain, palpitations, or  leg swelling. Pt. has a pace maker due to the history of sick sinus syndrome.   Gastrointestinal: Denies N/V/C/D, abdominal pain, loss appetite, reflux, or hematochezia. Reports abdomen fullness throughout.  Genitourinary: Denies difficulty voiding, dysuria, nocturia, or hematuria.   Skin: Negative for rash or worrisome moles.  "  Neurological: Negative for dizziness, focal weakness and headaches.   Endo/Heme/Allergies: Denies bruise/bleed easily, allergies.   Psychiatric/Behavioral: Denies depression, nervous/anxious, difficulty sleeping.     Objective:     Exam: /72 (BP Location: Left arm, Patient Position: Sitting, BP Cuff Size: Adult)   Pulse 83   Temp 36.9 °C (98.5 °F) (Temporal)   Resp 16   Ht 1.549 m (5' 1\")   Wt 69.9 kg (154 lb)   SpO2 96%  Body mass index is 29.1 kg/m².    General: Normal appearing. No distress.  HEENT: Normocephalic. Eyes conjunctiva clear lids without ptosis, PERRLA, limited lateral peripheral vision in both eyes, decrease medial peripheral in right eye.  Ears normal shape and contour, canals are clear bilaterally, tympanic membranes pearly gray, intact, non-bulging, no drainage noted, no turbinate erythema, no polyps visible, oropharynx is without erythema, edema or exudates. Mallampati score 0. Teeth intact.  Neck: Supple without JVD or abnormal masses. Small soft mobile thyroid palpated, no nodules palpated, non-tender.  Pulmonary: Clear to ausculation.  Normal effort. No rales, ronchi, or wheezing.  Cardiovascular: Regular rate and rhythm without murmur. Pedal and radial pulses are intact and equal bilaterally.  Abdomen: Soft, rounded, nontender. Normal bowel sounds. Liver and spleen are not palpable  Neurologic: CN 2-12 are grossly intact. See eye exam.  Lymph: No cervical or supraclavicular lymph nodes are palpable  Skin: Warm and dry.  Multiple crusted white raised, non-bleeding, non-erythmic around ankles and on feet bilaterally. 0.5 cm x 0.5 cm and 0.25 cm x 0.25 cm in size.  Musculoskeletal: Normal gait. No extremity cyanosis, clubbing, or edema.  Psych: Normal mood and affect. Alert and oriented x3. Judgment and insight is normal.    Assessment & Plan:   1. Encounter to establish care with new doctor  2.  Need for vaccination  3. Screening for skin cancer  4. Encounter for vitamin " "deficiency screening  Reviewed with patient medication use and side effects. Medical, past, surgical history reviewed with patient.   Discussed with patient the risk and benefits of receiving vaccines. Verbalized understanding. Discussed with patient history of her cancers and noted skin lesions having a full body skin check would be beneficial, verbalized understanding. DMV placard paperwork signed for patient by Dr. Alysha Navas. Discussed with patient scheduling AWV and what to experience at visit.  - INFLUENZA VACCINE, HIGH DOSE (65+ ONLY)  - REFERRAL TO DERMATOLOGY  - VITAMIN D,25 HYDROXY; Future    5. Unexplained weight gain  6. Neoplasm of unspecified behavior of other specified sites  Discussed with the patient concern of ongoing weight gain and symptom of feeling \"packed\" in her abdomen and history of metastatic cancer. Also discussed concerns of exposure to steriods over her life time causing adrenal insufficency. Reviewed ordering CT of abdomen and pelvis and labs, pt. Verbalized understanding. Pt.'s last CMP was normal on 6/04/19. Encouraged patient to not skip meals for a way of weight loss. Discussed the importance of maintaining nutrition and muscle strength. Discussed referral to nutritionist at this time, declined. Instructed patient to continue exercise regimen as tolerated. Pending CT and lab results will continue to evaluate for unexplained weight gain. Will investigate current medication for patient to see if any certain drug is know for weight gain, although patient has not had any changes to medication recently that would explain weight gain over the past year.  - CT-ABDOMEN-PELVIS WITH; Future  - ACTH; Future  - CORTISOL - AM    Patient was seen for 45 minutes face to face of which, at least 50% of the time was spent counseling regarding above.    I have placed the below orders and discussed them with an approved delegating provider. The MA is performing the below orders under the direction of " Dr. Navas.    Return for Medicare annual.    Please note that this dictation was created using voice recognition software. I have made every reasonable attempt to correct obvious errors, but I expect that there are errors of grammar and possibly content that I did not discover before finalizing the note.

## 2019-10-11 ENCOUNTER — TELEPHONE (OUTPATIENT)
Dept: MEDICAL GROUP | Facility: IMAGING CENTER | Age: 79
End: 2019-10-11

## 2019-10-11 NOTE — TELEPHONE ENCOUNTER
Received message from patient wondering if she should wait until after her tests to take her probiotic gummy.     Spoke with Jahaira and she states it is probably best to wait until after.   Patient notified. No other questions at this time.

## 2019-10-14 DIAGNOSIS — R63.5 UNEXPLAINED WEIGHT GAIN: ICD-10-CM

## 2019-10-14 NOTE — PROGRESS NOTES
BUN and Creatinine screening within 60 days before scheduled MRI with contrast can be completed. Last CMP 6/19, normal at that time.  Jahaira Santos APRN-C   Completed assessment and administered scheduled medications. Bed in low position, locked, and appropriate alarms set. Personal belongings and call light are within reach. Patient has no additional needs at this time.

## 2019-10-18 ENCOUNTER — HOSPITAL ENCOUNTER (OUTPATIENT)
Dept: LAB | Facility: MEDICAL CENTER | Age: 79
End: 2019-10-18
Payer: MEDICARE

## 2019-10-18 ENCOUNTER — HOSPITAL ENCOUNTER (OUTPATIENT)
Dept: LAB | Facility: MEDICAL CENTER | Age: 79
End: 2019-10-18
Attending: NURSE PRACTITIONER
Payer: MEDICARE

## 2019-10-18 DIAGNOSIS — Z13.21 ENCOUNTER FOR VITAMIN DEFICIENCY SCREENING: ICD-10-CM

## 2019-10-18 DIAGNOSIS — R63.5 UNEXPLAINED WEIGHT GAIN: ICD-10-CM

## 2019-10-18 LAB
ALBUMIN SERPL BCP-MCNC: 4.3 G/DL (ref 3.2–4.9)
ALBUMIN/GLOB SERPL: 1.5 G/DL
ALP SERPL-CCNC: 48 U/L (ref 30–99)
ALT SERPL-CCNC: 26 U/L (ref 2–50)
ANION GAP SERPL CALC-SCNC: 6 MMOL/L (ref 0–11.9)
AST SERPL-CCNC: 24 U/L (ref 12–45)
BILIRUB SERPL-MCNC: 0.6 MG/DL (ref 0.1–1.5)
BUN SERPL-MCNC: 17 MG/DL (ref 8–22)
CALCIUM SERPL-MCNC: 9.6 MG/DL (ref 8.5–10.5)
CHLORIDE SERPL-SCNC: 105 MMOL/L (ref 96–112)
CO2 SERPL-SCNC: 28 MMOL/L (ref 20–33)
CREAT SERPL-MCNC: 0.77 MG/DL (ref 0.5–1.4)
GLOBULIN SER CALC-MCNC: 2.9 G/DL (ref 1.9–3.5)
GLUCOSE SERPL-MCNC: 83 MG/DL (ref 65–99)
POTASSIUM SERPL-SCNC: 4.2 MMOL/L (ref 3.6–5.5)
PROT SERPL-MCNC: 7.2 G/DL (ref 6–8.2)
SODIUM SERPL-SCNC: 139 MMOL/L (ref 135–145)

## 2019-10-18 PROCEDURE — 36415 COLL VENOUS BLD VENIPUNCTURE: CPT

## 2019-10-18 PROCEDURE — 80053 COMPREHEN METABOLIC PANEL: CPT

## 2019-10-18 PROCEDURE — 82024 ASSAY OF ACTH: CPT

## 2019-10-18 NOTE — TELEPHONE ENCOUNTER
Was the patient seen in the last year in this department? Yes    Does patient have an active prescription for medications requested? No     Received Request Via: Pharmacy..      Last seen 08/15/2019

## 2019-10-18 NOTE — TELEPHONE ENCOUNTER
Patient called again this morning, still waiting for [facility] to contact her. AFTER full research and numerous calls this patient is finally taken care of. Per Bret at [facility] patient will be set up with her equipment tomorrow 10/19/19 between 1-2pm. Patient aware. Issues resolved.

## 2019-10-19 ENCOUNTER — HOSPITAL ENCOUNTER (OUTPATIENT)
Dept: LAB | Facility: MEDICAL CENTER | Age: 79
End: 2019-10-19
Attending: NURSE PRACTITIONER
Payer: MEDICARE

## 2019-10-19 LAB — CORTIS SERPL-MCNC: 18.7 UG/DL (ref 0–23)

## 2019-10-19 PROCEDURE — 36415 COLL VENOUS BLD VENIPUNCTURE: CPT

## 2019-10-19 PROCEDURE — 82533 TOTAL CORTISOL: CPT

## 2019-10-20 LAB — ACTH PLAS-MCNC: 21.6 PG/ML (ref 7.2–63.3)

## 2019-10-21 RX ORDER — ALENDRONATE SODIUM 10 MG/1
10 TABLET ORAL
Qty: 30 TAB | Refills: 3 | Status: SHIPPED | OUTPATIENT
Start: 2019-10-21 | End: 2019-10-22 | Stop reason: SDUPTHER

## 2019-10-22 ENCOUNTER — HOSPITAL ENCOUNTER (OUTPATIENT)
Dept: LAB | Facility: MEDICAL CENTER | Age: 79
End: 2019-10-22
Attending: PHYSICIAN ASSISTANT
Payer: MEDICARE

## 2019-10-22 ENCOUNTER — HOSPITAL ENCOUNTER (OUTPATIENT)
Dept: RADIOLOGY | Facility: MEDICAL CENTER | Age: 79
End: 2019-10-22
Attending: NURSE PRACTITIONER
Payer: MEDICARE

## 2019-10-22 ENCOUNTER — OFFICE VISIT (OUTPATIENT)
Dept: ENDOCRINOLOGY | Facility: MEDICAL CENTER | Age: 79
End: 2019-10-22
Payer: MEDICARE

## 2019-10-22 VITALS
HEART RATE: 82 BPM | OXYGEN SATURATION: 97 % | SYSTOLIC BLOOD PRESSURE: 110 MMHG | BODY MASS INDEX: 29.48 KG/M2 | DIASTOLIC BLOOD PRESSURE: 64 MMHG | RESPIRATION RATE: 12 BRPM | WEIGHT: 156 LBS

## 2019-10-22 DIAGNOSIS — E04.1 HOT THYROID NODULE: ICD-10-CM

## 2019-10-22 DIAGNOSIS — E05.90 HYPERTHYROIDISM: ICD-10-CM

## 2019-10-22 DIAGNOSIS — C79.9 METASTATIC CANCER (HCC): ICD-10-CM

## 2019-10-22 DIAGNOSIS — D49.89 NEOPLASM OF UNSPECIFIED BEHAVIOR OF OTHER SPECIFIED SITES: ICD-10-CM

## 2019-10-22 DIAGNOSIS — M81.0 OSTEOPOROSIS, UNSPECIFIED OSTEOPOROSIS TYPE, UNSPECIFIED PATHOLOGICAL FRACTURE PRESENCE: ICD-10-CM

## 2019-10-22 DIAGNOSIS — E55.9 VITAMIN D DEFICIENCY: ICD-10-CM

## 2019-10-22 LAB
T3FREE SERPL-MCNC: 3.89 PG/ML (ref 2.4–4.2)
T4 FREE SERPL-MCNC: 1.17 NG/DL (ref 0.53–1.43)
TSH SERPL DL<=0.005 MIU/L-ACNC: 0.59 UIU/ML (ref 0.38–5.33)

## 2019-10-22 PROCEDURE — 36415 COLL VENOUS BLD VENIPUNCTURE: CPT

## 2019-10-22 PROCEDURE — 84439 ASSAY OF FREE THYROXINE: CPT

## 2019-10-22 PROCEDURE — 700117 HCHG RX CONTRAST REV CODE 255: Performed by: NURSE PRACTITIONER

## 2019-10-22 PROCEDURE — 84443 ASSAY THYROID STIM HORMONE: CPT

## 2019-10-22 PROCEDURE — 74177 CT ABD & PELVIS W/CONTRAST: CPT

## 2019-10-22 PROCEDURE — 83520 IMMUNOASSAY QUANT NOS NONAB: CPT

## 2019-10-22 PROCEDURE — 84481 FREE ASSAY (FT-3): CPT

## 2019-10-22 PROCEDURE — 99214 OFFICE O/P EST MOD 30 MIN: CPT | Performed by: PHYSICIAN ASSISTANT

## 2019-10-22 RX ORDER — ALENDRONATE SODIUM 10 MG/1
10 TABLET ORAL
Qty: 30 TAB | Refills: 3 | Status: SHIPPED | OUTPATIENT
Start: 2019-10-22 | End: 2020-01-15

## 2019-10-22 RX ADMIN — IOHEXOL 25 ML: 240 INJECTION, SOLUTION INTRATHECAL; INTRAVASCULAR; INTRAVENOUS; ORAL at 08:40

## 2019-10-22 RX ADMIN — IOHEXOL 100 ML: 350 INJECTION, SOLUTION INTRAVENOUS at 09:45

## 2019-10-22 NOTE — PROGRESS NOTES
Endocrinology Clinic Progress Note  PCP: Pcp Not In Computer    HPI:  Ml Chavira is a 79 y.o. old patient who comes in today for review of endocrine problems.    Patient is now on Bipap for the last 2 days. Patient has not noticed a difference with regards to fatigue.     1. Hyperthyroidism  Methimazole 5 mg taking daily   Complaining of weight gain still. Recently saw PCP re: weight gain.     10/22/19- 156   8/15/19- 152 lbs     Patient reports compliance of medications   Patient without side effects.     Patient with intermittent symptoms of difficulty swallowing.   denies any symptoms of swelling in her throat and or enlargement. She denies any palpitations shaking and/or tremors    Labs did not get drawn   7/18/2019- TSH 2.610, FT4 0.78, FT3 3.25 TRA <0.90      2. Thyroid nodule  Intermittent symptoms of difficulty with swallowing continues. Denies any symptoms of swelling of the neck or difficulty breathing.     8/22/2019- FNA   A. Right lower anterior thyroid fine needle aspiration:         No malignant cells identified.         [place] appearing follicular cells consistent with a benign          follicular nodule ([place] category 2).  B. Right lower posterior thyroid fine needle aspiration:         No malignant cells identified.         [place] appearing follicular cells consistent with a benign          follicular nodule ([place] category 2).  C. Left lower thyroid fine needle aspiration:         No malignant cells identified.         [place] appearing follicular cells consistent with a benign          follicular nodule ([place] category 2).     7/18/2019-   Nodule #1  Location:  Right  lower  Size: 2.1 x 1.7 x 2.0 cm (previously  2.2 x 1.4 x 1.9 cm which previously measured 1.9 x 1.6 x 1.2 cm)  Composition:  Mixed-1  Echogenicity:  Isoechoic-1  Shape:  Wider than tall-0  Margins:  Smooth-0  Echogenic Foci:  Microscopic-3    ACR TIRADS points/category:  5 - TR4 - Moderately Suspicious    Nodule  #2  Location:  Right  lower  Size:  1.2 x 1.7 x 1.0 cm  Composition:  Mixed-1  Echogenicity:  Isoechoic-1  Shape:  Wider than tall-0  Margins:  Smooth-0  Echogenic Foci:  Microscopic-3    ACR TIRADS points/category:  5 - TR4 - Moderately Suspicious    Nodule #3  Location:  Right  lower  Size:  0.6 x 1.5 x 1.1 cm (previously 1.1 x 0.7 x 1.1 cm which   previously measured 1.1 x 0.7 x 1.0 cm.)  Composition:  Solid-2  Echogenicity:  Isoechoic-1  Shape:  Wider than tall-0  Margins:  Irregular-2  Echogenic Foci:  Microscopic-3    ACR TIRADS points/category:  8 - TR5 - Highly Suspicious    Nodule #4  Location:  Left  mid  Size:  1.6 x 1.9 x 1.5 cm (1.9 x 1.2 x 1.3 cm. This previously measured 2.8 x 1.9 x 1.8 cm.)  Composition:  Mixed-1  Echogenicity:  Hypoechoic-2  Shape:  Wider than tall-0  Shape Smooth-0  Echogenic Foci:  Microscopic-3    ACR TIRADS points/category:  6 - TR4 - Moderately Suspicious    There is an additional hypoechoic nodule with smooth margins in the mid left thyroid lobe which measures 1.4 x 0.7 x 1.1 cm. It contains microscopic calcifications and is wider than tall.     3. Low vitamin D level  Currently on vitamin D supplementation   Patient reports compliance      4. Other osteoporosis, unspecified pathological fracture presence  Managed by PCP   Last dexa 9/2018   Previously on alendronate - compliant with medications   Patient is scheduled to start PT for balance         Endocrine history to date:    1. Multiple thyroid nodules-   Found on thyroid ultrasound of 8/16/2018 secondary to abnormal PET      Thyroid Ultrasound: 2/19-  There is a subtle oval heterogeneous nodule with some internal echogenicity in the left mid gland measuring 1.9 x 1.2 x 1.3 cm. (previously measured 2.8 x 1.9 x 1.8 cm). There is a hypoechoic oval nodule in the left inferior gland measuring 1.2 x 1.0 x 1.1 cm (previously measured 1.0 x 1.0 x 0.9 cm.)       US:   Thyroid ultrasound 8/16/2018: Numerous hypoechoic nodules in  the right thyroid lobe the largest of which is present in the lower pole measuring 1.9 x 1.6 x 1.2 cm the largest is present in the midportion of the right lobe measuring 1.1 x 1.0 x 0.7 cm.  There are numerous nodules located in the left lobe the largest was identified in the left lower pole measuring 2.8 x 1.8 x 1.9 cm it contains focal calcifications.  The next largest is medial slightly inferior to the left thyroid lobe which measures 1.0 x 1.0 x 0.9 cm       Thyroid US 8/16/2018   There is a probably solid vascular isoechoic oval right inferior gland nodule measuring 2.2 x 1.4 x 1.9 cm which previously measured 1.9 x 1.6 x 1.2 cm. There is an oval hypoechoic nodule in the right lateral gland measuring 1.1 x 0.7 x 1.1 cm which previously measured 1.1 x 0.7 x 1.0 cm.     2. Hyperthyroiditis related to Hot nodule (left)   Started on Methimazole 11/14/18 4/8/2019-TSH 3.070, FT4 0.94, FT3 3.70 TRA <0.90   2/26/2019, TSH 3.010, free T4 0.96, free T3 3.09, thyroid receptor antibody less than 0.90-methimazole 5 mg daily     1/15/19- TSH 4.730, FT4 0.61, FT3 3.17 TRA <0.90 (Methimazole 10mg)   10/19/2018 TSH 0.030 free T4 0.99, T3 78.4, free T3 3.63, TPO antibody 17.5, thyroid receptor antibodies less than 0.90  6/27/2018  TSH 0.010 free T4 0.98  8/24/2018 TSH 0.030 free T4 1.09 T3 115.6 TPO antibody 16.8     Thyroid uptake and scan 10/29/2018   borderline elevated 5 hour radioactive iodine uptake measured at 15.4%.  Area of focal increased uptake involving the lower pole of the left thyroid lobe corresponding to a solid nodule in that area likely representing a hyperactive nodule.        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  There is postoperative changes consistent with right parietal occipital craniotomy.     3. Low vitamin D level-  6/27/2018 vitamin D-  22   Currently on Vitamin D replacement      4.  osteoporosis  On alendronate -    She is currently only taking vitamin D and calcium.     DEXA 9/20/2018  The mean bone mineral density for the lumbar spine is 0.914 g/cm2, which corresponds to a T score of -2.1.    The proximal left femur has a mean bone mineral density of 0.616 g/cm2, with a T score of -3.1.  Findings are consistent with osteoporosis with a high risk of fracture          ROS:  Constitutional: No weight loss or gain,  fever  HEENT: No difficulty with swallowing, change in voice, or swelling in throat area   Cardiac: No chest pain, palpitations, or racing heart  Resp: + shortness of breath   GI: No abdominal pain, nausea, vomiting, or diarrhea   Neuro: No numbness or tinging in feet  Endo: No heat or cold intolerance, no polyuria or polydipsia  All other detailed ROS is otherwise negative       Past Medical History:  Patient Active Problem List    Diagnosis Date Noted   • Sick sinus syndrome (HCC) 05/31/2018     Priority: High   • High risk medication use 08/27/2019   • Multiple thyroid nodules 08/15/2019   • Vitamin D deficiency 08/15/2019   • Osteoporosis 01/17/2019   • Hyperthyroidism 01/17/2019   • Chronic atrial fibrillation 01/17/2019   • Hot thyroid nodule 01/17/2019   • Postmenopausal 08/27/2018   • 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   • Balance disorder 06/21/2018   • Radionecrosis 04/14/2018   • Malignant neoplasm of upper lobe of right lung (HCC) 03/05/2018   • CVA (cerebral vascular accident) (HCC) 11/29/2017   • Allergic rhinitis 01/12/2016   • Hilar adenopathy 01/12/2016   • History of breast cancer 01/12/2016   • Lung mass 11/19/2015   • Blurry vision, left eye 11/19/2015   • Metastasis to brain (HCC) 11/18/2015   • Metastatic cancer (CMS-HCC) 11/18/2015       Family Medical History:   Family History   Problem Relation Age of Onset   • Dementia Mother    • Diabetes Mother    • Other Mother         osteoarthritis   • Arthritis Mother    • Autoimmune Disease Father         Rheumatoid  arthritis   • Arthritis Father    • Cancer Brother         prostate        Social History:   Social History     Socioeconomic History   • Marital status:      Spouse name: Not on file   • Number of children: Not on file   • Years of education: Not on file   • Highest education level: Not on file   Occupational History   • Not on file   Social Needs   • Financial resource strain: Not on file   • Food insecurity:     Worry: Not on file     Inability: Not on file   • Transportation needs:     Medical: Not on file     Non-medical: Not on file   Tobacco Use   • Smoking status: Former Smoker     Packs/day: 2.00     Years: 20.00     Pack years: 40.00     Last attempt to quit: 1960     Years since quittin.8   • Smokeless tobacco: Never Used   Substance and Sexual Activity   • Alcohol use: Yes     Alcohol/week: 1.8 oz     Types: 3 Glasses of wine per week   • Drug use: No   • Sexual activity: Not on file   Lifestyle   • Physical activity:     Days per week: 4 days     Minutes per session: 60 min   • Stress: Not on file   Relationships   • Social connections:     Talks on phone: Not on file     Gets together: Not on file     Attends [employer] service: Not on file     Active member of club or organization: Not on file     Attends meetings of clubs or organizations: Not on file     Relationship status: Not on file   • Intimate partner violence:     Fear of current or ex partner: Not on file     Emotionally abused: Not on file     Physically abused: Not on file     Forced sexual activity: Not on file   Other Topics Concern   • Not on file   Social History Narrative   • Not on file         Medications:    Current Outpatient Medications:   •  alendronate (FOSAMAX) 10 MG Tab, Take 1 Tab by mouth every morning before breakfast., Disp: 30 Tab, Rfl: 3  •  Multiple Vitamins-Minerals (MULTIVITAMIN PO), Take  by mouth., Disp: , Rfl:   •  apixaban (ELIQUIS) 5mg Tab, Take 1 Tab by mouth 2 Times a Day., Disp: 180 Tab, Rfl: 3  •   methimazole (TAPAZOLE) 5 MG Tab, Take 0.5 Tabs by mouth every day., Disp: 30 Tab, Rfl: 3  •  levETIRAcetam (KEPPRA) 500 MG Tab, Take 1 Tab by mouth 2 Times a Day., Disp: 180 Tab, Rfl: 3  •  OYSCO 500 500 MG Tab, TAKE 1 TAB BY MOUTH 2 TIMES A DAY, WITH MEALS. NOT COV, Disp: , Rfl: 5  •  BIOTIN PO, Take  by mouth every day., Disp: , Rfl:   •  Misc. Devices ([employer] PULSE OXIMETER) Misc, 1 Each by Does not apply route 4 times a day as needed., Disp: 1 Each, Rfl: 0  •  flecainide (TAMBOCOR) 150 MG Tab, TAKE 1/2 TABLET BY MOUTH 2 TIMES A DAY., Disp: 90 Tab, Rfl: 1  •  metoprolol SR (TOPROL XL) 25 MG TABLET SR 24 HR, Take 25 mg by mouth every day., Disp: , Rfl: 3    Labs: Reviewed as above     Physical Examination:  Vital signs: /64   Pulse 82   Resp 12   Wt 70.8 kg (156 lb)   SpO2 97%   BMI 29.48 kg/m²  Body mass index is 29.48 kg/m².  General: No apparent distress, cooperative  Eyes: No scleral icterus, no discharge, normal eyelids  Neck: No abnormal masses on inspection, normal thyroid exam  Resp: Normal effort, clear to auscultation bilaterally  CVS: Regular rate and rhythm, S1 S2 normal, no murmur  Extremities: No lower extremity edema  Musculoskeletal: Normal digits and nails  Skin: No rash on visible skin  Psych: Alert and oriented, normal mood and affect, intact memory and able to make informed decisions.    Assessment and Plan:  1. Hyperthyroidism  Continue methimazole   Repeat labs (did not draw)     Repeat labs 6 weeks   - standing labs     2. Thyroid nodule   Repeat US   Reviewed FNA     3. Low vitamin D level  Currently on vitamin D supplementation   Patient reports compliance      4. Other osteoporosis, unspecified pathological fracture presence  Currently on: alendronate       Return in about 4 weeks (around 11/19/2019).    Thank you for allowing me to participate in the care of this patient.  If you have any questions or concerns please do not hesitate to contact me.    Le Rdz,  SHARON    CC:   Pcp Not In Computer    This note was created using voice recognition software (Dragon). The accuracy of the dictation is limited by the abilities of the software. I have reviewed the note prior to signing, however some errors in grammar and context are still possible. If you have any questions related to this note please do not hesitate to contact our office.

## 2019-10-23 LAB — TSH RECEP AB SER-ACNC: <0.9 IU/L

## 2019-10-24 ENCOUNTER — OFFICE VISIT (OUTPATIENT)
Dept: MEDICAL GROUP | Facility: IMAGING CENTER | Age: 79
End: 2019-10-24
Payer: MEDICARE

## 2019-10-24 VITALS
HEART RATE: 60 BPM | HEIGHT: 61 IN | WEIGHT: 156.8 LBS | TEMPERATURE: 98.5 F | RESPIRATION RATE: 14 BRPM | OXYGEN SATURATION: 96 % | DIASTOLIC BLOOD PRESSURE: 78 MMHG | SYSTOLIC BLOOD PRESSURE: 118 MMHG | BODY MASS INDEX: 29.6 KG/M2

## 2019-10-24 DIAGNOSIS — R63.5 WEIGHT GAIN: ICD-10-CM

## 2019-10-24 DIAGNOSIS — E04.1 HOT THYROID NODULE: ICD-10-CM

## 2019-10-24 DIAGNOSIS — Z12.39 BREAST SCREENING: Primary | ICD-10-CM

## 2019-10-24 DIAGNOSIS — E05.90 HYPERTHYROIDISM: ICD-10-CM

## 2019-10-24 DIAGNOSIS — E04.2 MULTIPLE THYROID NODULES: ICD-10-CM

## 2019-10-24 DIAGNOSIS — L59.8 RADIONECROSIS: ICD-10-CM

## 2019-10-24 DIAGNOSIS — I48.20 CHRONIC ATRIAL FIBRILLATION (HCC): ICD-10-CM

## 2019-10-24 DIAGNOSIS — Z85.3 HISTORY OF BREAST CANCER: ICD-10-CM

## 2019-10-24 DIAGNOSIS — Z23 NEED FOR VACCINATION: ICD-10-CM

## 2019-10-24 DIAGNOSIS — R26.89 BALANCE DISORDER: ICD-10-CM

## 2019-10-24 DIAGNOSIS — C79.31 METASTASIS TO BRAIN (HCC): ICD-10-CM

## 2019-10-24 DIAGNOSIS — C79.9 METASTATIC CANCER (HCC): ICD-10-CM
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
## 2019-10-24 DIAGNOSIS — H53.47 BITEMPORAL HEMIANOPIA: ICD-10-CM

## 2019-10-24 DIAGNOSIS — I49.5 SICK SINUS SYNDROME (HCC): ICD-10-CM

## 2019-10-24 DIAGNOSIS — M81.0 OSTEOPOROSIS, UNSPECIFIED OSTEOPOROSIS TYPE, UNSPECIFIED PATHOLOGICAL FRACTURE PRESENCE: ICD-10-CM

## 2019-10-24 DIAGNOSIS — H53.8 BLURRY VISION, LEFT EYE: ICD-10-CM

## 2019-10-24 DIAGNOSIS — Z78.0 POSTMENOPAUSAL: ICD-10-CM

## 2019-10-24 DIAGNOSIS — E55.9 VITAMIN D DEFICIENCY: ICD-10-CM

## 2019-10-24 DIAGNOSIS — C34.11 MALIGNANT NEOPLASM OF UPPER LOBE OF RIGHT LUNG (HCC): ICD-10-CM

## 2019-10-24 DIAGNOSIS — R91.8 LUNG MASS: ICD-10-CM

## 2019-10-24 DIAGNOSIS — R60.0 BILATERAL LEG EDEMA: ICD-10-CM

## 2019-10-24 PROCEDURE — 90732 PPSV23 VACC 2 YRS+ SUBQ/IM: CPT | Performed by: NURSE PRACTITIONER

## 2019-10-24 PROCEDURE — 90715 TDAP VACCINE 7 YRS/> IM: CPT | Performed by: NURSE PRACTITIONER

## 2019-10-24 PROCEDURE — 90472 IMMUNIZATION ADMIN EACH ADD: CPT | Performed by: NURSE PRACTITIONER

## 2019-10-24 PROCEDURE — G0009 ADMIN PNEUMOCOCCAL VACCINE: HCPCS | Performed by: NURSE PRACTITIONER

## 2019-10-24 PROCEDURE — G0439 PPPS, SUBSEQ VISIT: HCPCS | Mod: 25 | Performed by: NURSE PRACTITIONER

## 2019-10-24 RX ORDER — ACETAMINOPHEN 160 MG
2000 TABLET,DISINTEGRATING ORAL
COMMUNITY
End: 2020-12-20

## 2019-10-24 RX ORDER — LEVETIRACETAM 500 MG/1
500 TABLET ORAL 2 TIMES DAILY
COMMUNITY
End: 2019-10-24

## 2019-10-24 RX ORDER — POTASSIUM CHLORIDE 750 MG/1
10 TABLET, FILM COATED, EXTENDED RELEASE ORAL DAILY
Qty: 30 TAB | Refills: 0 | Status: SHIPPED | OUTPATIENT
Start: 2019-10-24 | End: 2019-11-12 | Stop reason: SDUPTHER

## 2019-10-24 RX ORDER — FUROSEMIDE 20 MG/1
20 TABLET ORAL DAILY
Qty: 30 TAB | Refills: 0 | Status: SHIPPED | OUTPATIENT
Start: 2019-10-24 | End: 2019-11-12 | Stop reason: SDUPTHER

## 2019-10-24 RX ORDER — ALENDRONATE SODIUM 5 MG
5 TABLET ORAL
COMMUNITY
End: 2019-10-24

## 2019-10-24 SDOH — SOCIAL STABILITY: SOCIAL INSECURITY: WITHIN THE LAST YEAR, HAVE YOU BEEN HUMILIATED OR EMOTIONALLY ABUSED IN OTHER WAYS BY YOUR PARTNER OR EX-PARTNER?: NO

## 2019-10-24 SDOH — SOCIAL STABILITY: SOCIAL INSECURITY
WITHIN THE LAST YEAR, HAVE TO BEEN RAPED OR FORCED TO HAVE ANY KIND OF SEXUAL ACTIVITY BY YOUR PARTNER OR EX-PARTNER?: NO

## 2019-10-24 SDOH — SOCIAL STABILITY: SOCIAL INSECURITY: WITHIN THE LAST YEAR, HAVE YOU BEEN AFRAID OF YOUR PARTNER OR EX-PARTNER?: NO

## 2019-10-24 SDOH — SOCIAL STABILITY: SOCIAL NETWORK: ARE YOU MARRIED, WIDOWED, DIVORCED, SEPARATED, NEVER MARRIED, OR LIVING WITH A PARTNER?: DIVORCED

## 2019-10-24 SDOH — SOCIAL STABILITY: SOCIAL NETWORK
IN A TYPICAL WEEK, HOW MANY TIMES DO YOU TALK ON THE PHONE WITH FAMILY, FRIENDS, OR NEIGHBORS?: MORE THAN THREE TIMES A WEEK

## 2019-10-24 SDOH — SOCIAL STABILITY: SOCIAL NETWORK
DO YOU BELONG TO ANY CLUBS OR ORGANIZATIONS SUCH AS CHURCH GROUPS UNIONS, FRATERNAL OR ATHLETIC GROUPS, OR SCHOOL GROUPS?: YES

## 2019-10-24 SDOH — SOCIAL STABILITY: SOCIAL NETWORK: HOW OFTEN DO YOU GET TOGETHER WITH FRIENDS OR RELATIVES?: MORE THAN THREE TIMES A WEEK

## 2019-10-24 SDOH — SOCIAL STABILITY: SOCIAL NETWORK: HOW OFTEN DO YOU ATTENT MEETINGS OF THE CLUB OR ORGANIZATION YOU BELONG TO?: MORE THAN 4 TIMES PER YEAR

## 2019-10-24 SDOH — SOCIAL STABILITY: SOCIAL INSECURITY
WITHIN THE LAST YEAR, HAVE YOU BEEN KICKED, HIT, SLAPPED, OR OTHERWISE PHYSICALLY HURT BY YOUR PARTNER OR EX-PARTNER?: NO

## 2019-10-24 SDOH — SOCIAL STABILITY: SOCIAL NETWORK: HOW OFTEN DO YOU ATTEND CHURCH OR RELIGIOUS SERVICES?: NEVER

## 2019-10-24 ASSESSMENT — PAIN SCALES - GENERAL: PAINLEVEL: NO PAIN

## 2019-10-24 ASSESSMENT — PATIENT HEALTH QUESTIONNAIRE - PHQ9: CLINICAL INTERPRETATION OF PHQ2 SCORE: 0

## 2019-10-24 ASSESSMENT — ENCOUNTER SYMPTOMS: GENERAL WELL-BEING: GOOD

## 2019-10-24 ASSESSMENT — ACTIVITIES OF DAILY LIVING (ADL): BATHING_REQUIRES_ASSISTANCE: 0

## 2019-10-24 NOTE — ASSESSMENT & PLAN NOTE
Seen by ophthalmology once a year.  Reports eye exam earlier this year.  Patient is no longer driving due to decreased peripheral vision in left eye. Reports decreased vision including decrease peripheral and blurry vision since brain cancer. Using cane out in the public or when unfamiliar with environment.  Continuing to work with physical therapy to improve and maintain mobility.  Denies any recent falls in 2019.

## 2019-10-24 NOTE — ASSESSMENT & PLAN NOTE
Reports that it is unclear if she had a CVA event in the past, or if it was associated with brain swelling during her treatment of brain cancer.  Continues taking Keppra 500 mg daily and Eliquis 5 mg daily.  Denies side effects from medication.

## 2019-10-24 NOTE — ASSESSMENT & PLAN NOTE
History of radionecrosis. Managed by oncology, Dr. Aguilar. Next appointment 12/19/19.  No concerns at this time.

## 2019-10-24 NOTE — ASSESSMENT & PLAN NOTE
Patient is being managed by Judy Rdz PA-C for hyperthyroidism.  Patient currently taking methimazole 5 mg daily. 10/22/2019 TSH 0.59, T3 2.89, T4 1.17.  8/19 FNA:  A. Right lower anterior thyroid fine needle aspiration:         No malignant cells identified.         [place] appearing follicular cells consistent with a benign          follicular nodule ([place] category 2).  B. Right lower posterior thyroid fine needle aspiration:         No malignant cells identified.         [place] appearing follicular cells consistent with a benign          follicular nodule ([place] category 2).  C. Left lower thyroid fine needle aspiration:         No malignant cells identified.         [place] appearing follicular cells consistent with a benign          follicular nodule ([place] category 2).  Comment: Nodules diagnosed as [place] category 2 carry a 0-3% implied  risk of malignancy. Clinical follow-up is recommended.

## 2019-10-24 NOTE — PATIENT INSTRUCTIONS
Shingrix shingles vaccine 2 series. Get compression stockings and begin to wear.  Go get BMP drawn in a week. Start lasix 20 mg once a day with potassium chloride 10 meq daily. Monitor blood pressure daily. Do not take if BP is less than 105/60, or feeling lightheaded, dizzy, fatigue.    Go get BNP today

## 2019-10-24 NOTE — ASSESSMENT & PLAN NOTE
Patient is being managed by Judy Rdz PA-C for hyperthyroidism.  Patient currently taking methimazole 5 mg daily. 10/22/2019 TSH 0.59, T3 2.89, T4 1.17. Concerned about weight gain, wondering if it is related to thyroid.

## 2019-10-24 NOTE — PROGRESS NOTES
Chief Complaint   Patient presents with   • Annual Exam     medicare exam     HISTORY OF THE PRESENT ILLNESS:     Ml is a 79 y.o. here for Medicare Annual Wellness Visit     Radionecrosis  History of radionecrosis. Managed by oncology, Dr. Aguilar. Next appointment 12/19/19.  No concerns at this time.    Sick sinus syndrome (HCC)  Pt. had pace maker placed in 2018. Reports no complication at this time. Denies bradycardia or tachycardia, pain a pace maker site, or signs of infection. Managed by Alfred Obregon MD. Taking metoprolol SR 25 mg SR daily. Flecainide 150 mg once day.    Metastasis to brain (HCC)  Managed by oncology, Dr. Aguilar. Next appointment 12/19/19.  No concerns at this time.    Metastatic cancer (CMS-HCC)  Managed by oncology, Dr. Aguilar. Next appointment 12/19/19.  No concerns at this time.    Malignant neoplasm of upper lobe of right lung (HCC)  Managed by oncology, Dr. Aguilar. Next appointment 12/19/19.No concerns at this time.    Blurry vision, left eye  Seen by ophthalmology once a year.  Reports eye exam earlier this year.  Patient is no longer driving due to decreased peripheral vision in left eye. Reports decreased vision including decrease peripheral and blurry vision since brain cancer. Using cane out in the public or when unfamiliar with environment.  Continuing to work with physical therapy to improve and maintain mobility.  Denies any recent falls in 2019.    CVA (cerebral vascular accident) (CMS-HCC) (HCC)  Reports that it is unclear if she had a CVA event in the past, or if it was associated with brain swelling during her treatment of brain cancer.  Continues taking Keppra 500 mg daily and Eliquis 5 mg daily.  Denies side effects from medication.     Bitemporal hemianopia  Seen by ophthalmology once a year.  Reports eye exam earlier this year.  Patient is no longer driving due to decreased peripheral vision in left eye. Reports decreased vision including decrease peripheral and  blurry vision since brain cancer. Using cane out in the public or when unfamiliar with environment.  Continuing to work with physical therapy to improve and maintain mobility.  Denies any recent falls in 2019.      Balance disorder  Using cane out in the public or when unfamiliar with environment.  Continuing to work with physical therapy to improve and maintain mobility.  Denies any recent falls in 2019.    Postmenopausal  No concerns at this time.    Osteoporosis  Currently taking alendronate 10 mg daily and cholecalciferol 2000 IU daily.    9/20/18 DEXA scan: The mean bone mineral density for the lumbar spine is 0.914 g/cm2, which corresponds to a T score of -2.1 and a Z score of -0.1. According to the World Health Organization classification, bone mineral density of this patient is osteoporosis with high risk of fracture .    Hyperthyroidism  Patient is being managed by Judy Rdz PA-C for hyperthyroidism.  Patient currently taking methimazole 5 mg daily. 10/22/2019 TSH 0.59, T3 2.89, T4 1.17. Concerned about weight gain, wondering if it is related to thyroid.    Chronic atrial fibrillation (HCC)  Pt. had pace maker placed in 2018. Reports no complication at this time. Denies bradycardia or tachycardia, pain a pace maker site, or signs of infection. Managed by Alfred Obregon MD. Taking metoprolol SR 25 mg SR daily. Flecainide 150 mg once day.    Hot thyroid nodule  Patient is being managed by Judy Rdz PA-C for hyperthyroidism.  Patient currently taking methimazole 5 mg daily. 10/22/2019 TSH 0.59, T3 2.89, T4 1.17.  8/19 FNA:  A. Right lower anterior thyroid fine needle aspiration:         No malignant cells identified.         Leslie appearing follicular cells consistent with a benign          follicular nodule ([place] category 2).  B. Right lower posterior thyroid fine needle aspiration:         No malignant cells identified.         Leslie appearing follicular cells consistent with a benign           follicular nodule ([place] category 2).  C. Left lower thyroid fine needle aspiration:         No malignant cells identified.         [place] appearing follicular cells consistent with a benign          follicular nodule ([place] category 2).  Comment: Nodules diagnosed as [place] category 2 carry a 0-3% implied  risk of malignancy. Clinical follow-up is recommended.    Multiple thyroid nodules  7/19 Neck Ultrasound. The thyroid gland is heterogeneous. Vascularity is normal.  The right lobe of the thyroid gland measures 2.60 cm x 5.91 cm x 2.57 cm.  The left lobe of the thyroid gland measures 2.12 cm x 5.70 cm x 2.23 cm.  The isthmus measures 0.35 cm.  Nodules >= 1cm:  less than 5  Nodule #1  Location:  Right  lower  Size: 2.1 x 1.7 x 2.0 cm  Composition:  Mixed-1  Echogenicity:  Isoechoic-1  Shape:  Wider than tall-0  Margins:  Smooth-0  Echogenic Foci:  Microscopic-3  ACR TIRADS points/category:  5 - TR4 - Moderately Suspicious  Nodule #2  Location:  Right  lower  Size:  1.2 x 1.7 x 1.0 cm  Composition:  Mixed-1  Echogenicity:  Isoechoic-1  Shape:  Wider than tall-0  Margins:  Smooth-0  Echogenic Foci:  Microscopic-3  ACR TIRADS points/category:  5 - TR4 - Moderately Suspicious  Nodule #3  Location:  Right  lower  Size:  0.6 x 1.5 x 1.1 cm  Composition:  Solid-2  Echogenicity:  Isoechoic-1  Shape:  Wider than tall-0  Margins:  Irregular-2  Echogenic Foci:  Microscopic-3  ACR TIRADS points/category:  8 - TR5 - Highly Suspicious  Nodule #4  Location:  Left  mid  Size:  1.6 x 1.9 x 1.5 cm  Composition:  Mixed-1  Echogenicity:  Hypoechoic-2  Shape:  Wider than tall-0  Shape Smooth-0  Echogenic Foci:  Microscopic-3  ACR TIRADS points/category:  6 - TR4 - Moderately Suspicious    Vitamin D deficiency  Currently taking Cholecalciferol 2000 IU daily. 12/18 vitamin D level:23    History of breast cancer  Managed by oncology, Dr. Aguilar. Next appointment 12/19/19. Patient wondering if she should continue mammogram  screening due to history of breast cancer and desire to continue to live past 10 years.       Patient Active Problem List    Diagnosis Date Noted   • Sick sinus syndrome (HCC) 05/31/2018     Priority: High   • High risk medication use 08/27/2019   • Multiple thyroid nodules 08/15/2019   • Vitamin D deficiency 08/15/2019   • Osteoporosis 01/17/2019   • Hyperthyroidism 01/17/2019   • Chronic atrial fibrillation 01/17/2019   • Hot thyroid nodule 01/17/2019   • Postmenopausal 08/27/2018   • 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   • Balance disorder 06/21/2018   • Radionecrosis 04/14/2018   • Malignant neoplasm of upper lobe of right lung (HCC) 03/05/2018   • CVA (cerebral vascular accident) (HCC) 11/29/2017   • Allergic rhinitis 01/12/2016   • Hilar adenopathy 01/12/2016   • History of breast cancer 01/12/2016   • Lung mass 11/19/2015   • Blurry vision, left eye 11/19/2015   • Metastasis to brain (HCC) 11/18/2015   • Metastatic cancer (CMS-HCC) 11/18/2015     Current Outpatient Medications   Medication Sig Dispense Refill   • Cholecalciferol (VITAMIN D3) 2000 UNIT Cap Take 2,000 Units by mouth. Indications: Daily Treatment with Aspirin Dose Greater Then 325 mg     • furosemide (LASIX) 20 MG Tab Take 1 Tab by mouth every day. Take in am. 30 Tab 0   • potassium chloride ER (KLOR-CON) 10 MEQ tablet Take 1 Tab by mouth every day. 30 Tab 0   • alendronate (FOSAMAX) 10 MG Tab Take 1 Tab by mouth every morning before breakfast. 30 Tab 3   • apixaban (ELIQUIS) 5mg Tab Take 1 Tab by mouth 2 Times a Day. 180 Tab 3   • methimazole (TAPAZOLE) 5 MG Tab Take 0.5 Tabs by mouth every day. 30 Tab 3   • levETIRAcetam (KEPPRA) 500 MG Tab Take 1 Tab by mouth 2 Times a Day. 180 Tab 3   • OYSCO 500 500 MG Tab TAKE 1 TAB BY MOUTH 2 TIMES A DAY, WITH MEALS. NOT COV  5   • BIOTIN PO Take  by mouth every day.     • Misc. Devices ([employer] PULSE OXIMETER) Misc 1 Each by Does not apply route 4 times a day as needed. 1 Each 0   • flecainide (TAMBOCOR) 150 MG  Tab TAKE 1/2 TABLET BY MOUTH 2 TIMES A DAY. 90 Tab 1   • metoprolol SR (TOPROL XL) 25 MG TABLET SR 24 HR Take 25 mg by mouth every day.  3     No current facility-administered medications for this visit.       Patient is taking medications as noted in medication list.  Current supplements as per medication list.     Allergies: Penicillins    Current social contact/activities:    Is patient current with immunizations? Yes.    She  reports that she quit smoking about 59 years ago. She has a 40.00 pack-year smoking history. She has never used smokeless tobacco. She reports that she drinks about 1.8 oz of alcohol per week. She reports that she does not use drugs.  Counseling given: Not Answered    DPA/Advanced directive: Patient has Advanced Directive on file.     ROS:    Gait: Uses a cane  Ostomy: No   Other tubes: No   Amputations: No   Chronic oxygen use Yes. Uses BiPap while sleeping. Just started,  3 nights, using O2 at night with BiPap.  Last eye exam 2019  Hearing loss: No  : Denies any urinary leakage during the last 6 months    Screening:  Depression Screening    Little interest or pleasure in doing things?  0 - not at all  Feeling down, depressed, or hopeless? 0 - not at all  Patient Health Questionnaire Score: 0    If depressive symptoms identified deferred to follow up visit unless specifically addressed in assessment and plan.    Interpretation of PHQ-9 Total Score   Score Severity   1-4 No Depression   5-9 Mild Depression   10-14 Moderate Depression   15-19 Moderately Severe Depression   20-27 Severe Depression    Screening for Cognitive Impairment    Three Minute Recall (village, kitchen, baby)  3/3    Bandar clock face with all 12 numbers and set the hands to show 10 past 10.  Yes    If cognitive concerns identified, deferred for follow up unless specifically addressed in assessment and plan.    Fall Risk Assessment    Has the patient had two or more falls in the last year or any fall with injury in the  last year?  No  If fall risk identified, deferred for follow up unless specifically addressed in assessment and plan.    Safety Assessment    Throw rugs on floor.  No  Handrails on all stairs.  Yes  Good lighting in all hallways.  Yes  Difficulty hearing.  No  Patient counseled about all safety risks that were identified.    Functional Assessment ADLs    Are there any barriers preventing you from cooking for yourself or meeting nutritional needs?  No.    Are there any barriers preventing you from driving safely or obtaining transportation?  Yes.    Are there any barriers preventing you from using a telephone or calling for help?  No.    Are there any barriers preventing you from shopping?  Yes.    Are there any barriers preventing you from taking care of your own finances?  No.    Are there any barriers preventing you from managing your medications?  No.    Are there any barriers preventing you from showering, bathing or dressing yourself?  No.    Are you currently engaging in any exercise or physical activity?  Yes.     What is your perception of your health?  Good.    Health Maintenance Summary                IMM DTaP/Tdap/Td Vaccine Overdue 1[birth year]20/1959     MAMMOGRAM Overdue 1/20/1978     IMM ZOSTER VACCINES Overdue 8/21/2008      Done 6/26/2008 Imm Admin: Zoster Vaccine Live (ZVL) (Zostavax)    BONE DENSITY Next Due 9/20/2023      Done 9/20/2018 DS-BONE DENSITY STUDY (DEXA)    COLONOSCOPY Next Due 5/1/2024      Done 5/1/2014 REFERRAL TO GI FOR COLONOSCOPY    IMM PNEUMOCOCCAL VACCINE: 65+ Years Next Due 10/24/2024      Done 10/1/2017 Imm Admin: Pneumococcal Vaccine (PCV7) Historical Data     Patient has more history with this topic...          Patient Care Team:  SONIA Fry as PCP - General (Family Medicine)  Benja Mckeon M.D. (Neurosurgery)  OMID Thacker M.D. (Oncology)  Cande DE SANTIAGO M.D. as Consulting Physician (Radiation Oncology)  Mariano Aguilar M.D. as [facility]  "(Oncology)  [facility] as Home Health Provider  [employer]    Social History     Tobacco Use   • Smoking status: Former Smoker     Packs/day: 2.00     Years: 20.00     Pack years: 40.00     Last attempt to quit: 1960     Years since quittin.8   • Smokeless tobacco: Never Used   Substance Use Topics   • Alcohol use: Yes     Alcohol/week: 1.8 oz     Types: 3 Glasses of wine per week   • Drug use: No     Family History   Problem Relation Age of Onset   • Dementia Mother    • Diabetes Mother    • Other Mother         osteoarthritis   • Arthritis Mother    • Autoimmune Disease Father         Rheumatoid arthritis   • Arthritis Father    • Cancer Brother         prostate      She  has a past medical history of Breast cancer (HCC), Cancer (HCC), Chickenpox, Polish measles, Healthcare maintenance (2018), Influenza, Joint pain, Lung cancer (HCC), Mumps, Need for vaccination (2019), Radionecrosis (2018), Sick sinus syndrome (HCC), Thyroid disease, and Tonsillitis.   Past Surgical History:   Procedure Laterality Date   • CRANIOTOMY STEALTH Right 2015    Procedure: CRANIOTOMY STEALTH-right occipital;  Surgeon: Suyapa Schumacher M.D.;  Location: SURGERY [facility];  Service:    • APPENDECTOMY     • CRANIOTOMY     • KNEE ARTHROSCOPY      left    • LUMPECTOMY     • OTHER      left knee surgery   • PACEMAKER INSERTION     • TONSILLECTOMY       Exam:     /78 (BP Location: Left arm, Patient Position: Sitting, BP Cuff Size: Adult)   Pulse 60   Temp 36.9 °C (98.5 °F) (Temporal)   Resp 14   Ht 1.549 m (5' 1\")   Wt 71.1 kg (156 lb 12.8 oz)   SpO2 96%  Body mass index is 29.63 kg/m².    Hearing excellent.    Dentition good Twice a year teeth cleaning. Implants and front bridges  Alert, oriented in no acute distress.  Eye contact is good, speech goal directed, affect calm    General: Normal appearing. No distress.  HEENT: Normocephalic. Eyes conjunctiva clear lids without ptosis, " PERRLA, ears normal shape and contour, canals are clear bilaterally, tympanic membranes pearly gray, intact, non-bulging, no drainage noted, no turbinate erythema, no polyps visible, oropharynx is without erythema, edema or exudates. Mallampati score 0. Teeth intact. Absent lateral peripheral vision in left eye. Limited  lateral peripheral vision in right eye, decrease medial peripheral in right eye.     Neck: Supple without JVD or abnormal masses. Small soft mobile thyroid palpated, no nodules palpated, non-tender.  Pulmonary: Clear to ausculation.  Normal effort. No rales, ronchi, or wheezing.  Cardiovascular: Regular rate and rhythm without murmur. Pedal and radial pulses are intact and equal bilaterally.  Abdomen: Soft, nontender, nondistended. Normal bowel sounds. Liver and spleen are not palpable  Neurologic: CN 2-12 are grossly intact.  Lymph: No cervical or supraclavicular lymph nodes are palpable  Skin: Warm and dry.  No obvious lesions. Multiple crusted white raised, non-bleeding, non-erythmic around ankles and on feet bilaterally. 0.5 cm x 0.5 cm and 0.25 cm x 0.25 cm in size.  Musculoskeletal: Normal gait. No extremity cyanosis or clubbing. Right lower leg and dorsal side of foot +2 pitting edema, left lower leg +3-4 pitting edema, +2 pitting edema on dorsal side of foot.  Psych: Normal mood and affect. Alert and oriented x3. Judgment and insight is normal.    Assessment and Plan. The following treatment and monitoring plan is recommended:    1. Breast screening  [id number]   2. Metastasis to brain (HCC)  [id number]   3. Metastatic cancer (CMS-HCC)  [id number]   4. Lung mass  [id number]   5. Need for vaccination  Tdap =>4yo IM    PneumoVax PPV23 =>1yo   6. Radionecrosis     7. Weight gain  REFERRAL FOR ACUPUNCTURE    proBrain Natriuretic Peptide, NT    REFERRAL TO [facility] HEALTH IMPROVEMENT PROGRAMS (HIP) Services Requested: Registered Dietitian for Medical  "Nutrition Therapy, General-HIP Staff to Evaluate Best Program; Reason for Visit: Overweight/Obesity   8. Bilateral leg edema  proBrain Natriuretic Peptide, NT    Basic Metabolic Panel    furosemide (LASIX) 20 MG Tab    potassium chloride ER (KLOR-CON) 10 MEQ tablet   9. Osteoporosis, unspecified osteoporosis type, unspecified pathological fracture presence  VITAMIN D,25 HYDROXY   10. Vitamin D deficiency  VITAMIN D,25 HYDROXY   11. Blurry vision, left eye  REFERRAL TO PHYSICAL THERAPY Reason for Therapy: Eval/Treat/Report   12. Balance disorder  REFERRAL TO PHYSICAL THERAPY Reason for Therapy: Eval/Treat/Report   13. BMI 29.0-29.9,adult  REFERRAL TO [facility] IMPROVEMENT [facility] (HIP) Services Requested: Registered Dietitian for Medical Nutrition Therapy, General-HIP Staff to Evaluate Best Program; Reason for Visit: Overweight/Obesity   14. Sick sinus syndrome (HCC)     15. Malignant neoplasm of upper lobe of right lung (HCC)     16. Bitemporal hemianopia     17. Postmenopausal     18. Hyperthyroidism     19. Chronic atrial fibrillation     20. Hot thyroid nodule     21. Multiple thyroid nodules     22. History of breast cancer       1. Metastasis to brain (HCC)  2. Metastatic cancer (CMS-HCC)  3. Lung mass  4.History of breast cancer  5. Breast screening  6. Malignant neoplasm of upper lobe of right lung (HCC)  Discussed with patient continuing mammogram screenings every two years due to history of cancer. Pt. verbalized understanding and that she is interested in continuing due to not having scheduled PET scans, and desire to live as long as possible. Referral placed, instructed patient to call to find out \"out of pocket cost\" before performing screening.Verbalized understanding.   - [id number]; Future    7. Need for vaccination  Discussed with patient the risk and benefits of receiving vaccines. Discussed CDC recommendations for immunizations and USPSTF guidelines for screening exams.  Verbalized " understanding. Encouraged patient to wash hands regularly and avoid sick contacts while supporting immune system with Vitamin C, Zinc, Elderberry, and garlic.  - Tdap =>8yo IM  - PneumoVax PPV23 =>1yo    8. Radionecrosis  Chronic stable condition.  Managed by oncology Dr. Cruz scheduled appointment 12/19/2019.    9. Weight gain  10. Bilateral leg edema  11. BMI 29.0-29.9,adult  Reviewed recent lab draw results and CT abdomen results.  Verbalized understand. Discussed at length with patient adjusting diet and exercise, and the benefit of referral to healthy improvement program.  Referral placed, patient will investigate cost of program.  Patient interested in using acupuncture for possible weight loss, referral made.  - REFERRAL FOR ACUPUNCTURE  - REFERRAL TO Count includes the [facility] IMPROVEMENT [facility] (HIP) Services Requested: Registered Dietitian for Medical Nutrition Therapy, General-[facility] Staff to Evaluate Best Program; Reason for Visit: Overweight/Obesity    Discussed with patient possible weight gain is fluid retention.  Discussed with patient physical assessment findings of pitting edema.  Verbalized understanding.  Discussed with patient obtaining BNP to rule out heart failure as his peripheral insufficiency.  Patient encouraged to wear compression stockings daily during wake hours to decrease leg edema.  Discussed with patient starting Lasix 20 mg once a day in a.m. to be taken with potassium chloride 10 mEq daily.  Instructed to get BMP drawn in 1 week after starting Lasix therapy.  Instructed to monitor blood pressure daily and to not take Lasix if blood pressure is less than 105/60 or feeling lightheaded dizzy or fatigue.  Verbalized understanding.  Patient instructed to follow-up in 2 weeks for assessment of decreased edema.     - proBrain Natriuretic Peptide, NT; Future  - Basic Metabolic Panel; Future  - furosemide (LASIX) 20 MG Tab; Take 1 Tab by mouth every day. Take in am.  Dispense: 30 Tab; Refill: 0  -  potassium chloride ER (KLOR-CON) 10 MEQ tablet; Take 1 Tab by mouth every day.  Dispense: 30 Tab; Refill: 0    12. Osteoporosis, unspecified osteoporosis type, unspecified pathological fracture presence  13. Vitamin D deficiency  This is a chronic stable condition.  Patient will continue taking cholecalciferol 2000 IU daily.  Order placed to check vitamin D level has not been checked since 12/18.  Discussed increasing dose if vitamin D level continues to be low.  - VITAMIN D,25 HYDROXY; Future    14. Blurry vision, left eye  15. 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  Patient will continue having yearly eye exam.  Referral placed for physical therapy to continue working on improving and maintaining gait due to difficulty seen peripherally.  - REFERRAL TO PHYSICAL THERAPY Reason for Therapy: Eval/Treat/Report    16. Balance disorder  This is a chronic stable condition.  Referral placed for physical therapy to continue working on improving and maintaining gait due to difficulty seen peripherally.  Patient instructed to work with physical therapy on how to use cane correctly.    - REFERRAL TO PHYSICAL THERAPY Reason for Therapy: Eval/Treat/Report    17. Sick sinus syndrome (HCC)  18. Chronic atrial fibrillation  This is a stable chronic condition.  We will continue to follow-up with cardiology as directed.    19. Hyperthyroidism  20. Hot thyroid nodule  21. Multiple thyroid nodules  This is a chronic stable condition. Instructed to continue to take methimazole 5 mg as directed.  Continue to be managed by endocrinology, next scheduled appointment is 12/10/2019 with Judy Rdz PA-C.    Services suggested: No services needed at this time  Health Care Screening recommendations as per orders if indicated.  Referrals offered: PT/OT/Nutrition counseling/Behavioral Health/Smoking cessation as per orders if indicated.    Discussion today about general wellness and lifestyle habits:    · Prevent falls  and reduce trip hazards; Cautioned about securing or removing rugs.  · Have a working fire alarm and carbon monoxide detector;   · Engage in regular physical activity and social activities         Follow-up: Return in about 2 weeks (around 11/7/2019) for to evaluate leg edema.

## 2019-10-24 NOTE — ASSESSMENT & PLAN NOTE
Pt. had pace maker placed in 2018. Reports no complication at this time. Denies bradycardia or tachycardia, pain a pace maker site, or signs of infection. Managed by Alfred Obregon MD. Taking metoprolol SR 25 mg SR daily. Flecainide 150 mg once day.

## 2019-10-24 NOTE — ASSESSMENT & PLAN NOTE
Currently taking alendronate 10 mg daily and cholecalciferol 2000 IU daily.    9/20/18 DEXA scan: The mean bone mineral density for the lumbar spine is 0.914 g/cm2, which corresponds to a T score of -2.1 and a Z score of -0.1. According to the World Health Organization classification, bone mineral density of this patient is osteoporosis with high risk of fracture .

## 2019-10-24 NOTE — ASSESSMENT & PLAN NOTE
Using cane out in the public or when unfamiliar with environment.  Continuing to work with physical therapy to improve and maintain mobility.  Denies any recent falls in 2019.

## 2019-10-25 NOTE — ASSESSMENT & PLAN NOTE
Managed by oncology, Dr. Aguilar. Next appointment 12/19/19. Patient wondering if she should continue mammogram screening due to history of breast cancer and desire to continue to live past 10 years.

## 2019-10-25 NOTE — ASSESSMENT & PLAN NOTE
7/19 Neck Ultrasound. The thyroid gland is heterogeneous. Vascularity is normal.  The right lobe of the thyroid gland measures 2.60 cm x 5.91 cm x 2.57 cm.  The left lobe of the thyroid gland measures 2.12 cm x 5.70 cm x 2.23 cm.  The isthmus measures 0.35 cm.  Nodules >= 1cm:  less than 5  Nodule #1  Location:  Right  lower  Size: 2.1 x 1.7 x 2.0 cm  Composition:  Mixed-1  Echogenicity:  Isoechoic-1  Shape:  Wider than tall-0  Margins:  Smooth-0  Echogenic Foci:  Microscopic-3  ACR TIRADS points/category:  5 - TR4 - Moderately Suspicious  Nodule #2  Location:  Right  lower  Size:  1.2 x 1.7 x 1.0 cm  Composition:  Mixed-1  Echogenicity:  Isoechoic-1  Shape:  Wider than tall-0  Margins:  Smooth-0  Echogenic Foci:  Microscopic-3  ACR TIRADS points/category:  5 - TR4 - Moderately Suspicious  Nodule #3  Location:  Right  lower  Size:  0.6 x 1.5 x 1.1 cm  Composition:  Solid-2  Echogenicity:  Isoechoic-1  Shape:  Wider than tall-0  Margins:  Irregular-2  Echogenic Foci:  Microscopic-3  ACR TIRADS points/category:  8 - TR5 - Highly Suspicious  Nodule #4  Location:  Left  mid  Size:  1.6 x 1.9 x 1.5 cm  Composition:  Mixed-1  Echogenicity:  Hypoechoic-2  Shape:  Wider than tall-0  Shape Smooth-0  Echogenic Foci:  Microscopic-3  ACR TIRADS points/category:  6 - TR4 - Moderately Suspicious

## 2019-10-28 ENCOUNTER — HOSPITAL ENCOUNTER (OUTPATIENT)
Dept: LAB | Facility: MEDICAL CENTER | Age: 79
End: 2019-10-28
Attending: NURSE PRACTITIONER
Payer: MEDICARE

## 2019-10-28 DIAGNOSIS — M81.0 OSTEOPOROSIS, UNSPECIFIED OSTEOPOROSIS TYPE, UNSPECIFIED PATHOLOGICAL FRACTURE PRESENCE: ICD-10-CM

## 2019-10-28 DIAGNOSIS — E55.9 VITAMIN D DEFICIENCY: ICD-10-CM

## 2019-10-28 DIAGNOSIS — R60.0 BILATERAL LEG EDEMA: ICD-10-CM

## 2019-10-28 DIAGNOSIS — R63.5 WEIGHT GAIN: ICD-10-CM

## 2019-10-28 LAB
25(OH)D3 SERPL-MCNC: 35 NG/ML (ref 30–100)
NT-PROBNP SERPL IA-MCNC: 90 PG/ML (ref 0–125)

## 2019-10-28 PROCEDURE — 83880 ASSAY OF NATRIURETIC PEPTIDE: CPT | Mod: GA

## 2019-10-28 PROCEDURE — 36415 COLL VENOUS BLD VENIPUNCTURE: CPT | Mod: GA

## 2019-10-28 PROCEDURE — 82306 VITAMIN D 25 HYDROXY: CPT

## 2019-10-28 NOTE — TELEPHONE ENCOUNTER
Was the patient seen in the last year in this department? Yes    Does patient have an active prescription for medications requested? Yes    Received Request Via: Pharmacy     methimazole (TAPAZOLE) 5 MG Tab    The source prescription was reordered on 8/15/2019 by Le Rdz P.A.-C..   Sig: TAKE 1 TABLET BY MOUTH EVERY DAY

## 2019-10-29 ENCOUNTER — APPOINTMENT (OUTPATIENT)
Dept: MEDICAL GROUP | Facility: IMAGING CENTER | Age: 79
End: 2019-10-29

## 2019-11-04 ENCOUNTER — TELEPHONE (OUTPATIENT)
Dept: ENDOCRINOLOGY | Facility: MEDICAL CENTER | Age: 79
End: 2019-11-04

## 2019-11-04 RX ORDER — METHIMAZOLE 5 MG/1
TABLET ORAL
Qty: 30 TAB | Refills: 3 | Status: SHIPPED | OUTPATIENT
Start: 2019-11-04 | End: 2019-12-20

## 2019-11-04 NOTE — TELEPHONE ENCOUNTER
Phone Number Called: [phone] (home)       Call outcome: left message for patient to call back regarding message below    Message: Called patient, but no answer. I wanted to let patient know per Le Thyroid labs looked good.

## 2019-11-04 NOTE — TELEPHONE ENCOUNTER
----- Message from Le Rdz P.A.-C. sent at 11/4/2019  1:00 AM PST -----  Please tell the patient - thyroid labs look good.   Le

## 2019-11-07 ENCOUNTER — OFFICE VISIT (OUTPATIENT)
Dept: MEDICAL GROUP | Facility: IMAGING CENTER | Age: 79
End: 2019-11-07
Payer: MEDICARE

## 2019-11-07 ENCOUNTER — HOSPITAL ENCOUNTER (OUTPATIENT)
Dept: LAB | Facility: MEDICAL CENTER | Age: 79
End: 2019-11-07
Attending: NURSE PRACTITIONER
Payer: MEDICARE

## 2019-11-07 VITALS
OXYGEN SATURATION: 95 % | WEIGHT: 155.2 LBS | RESPIRATION RATE: 15 BRPM | TEMPERATURE: 98.3 F | HEIGHT: 61 IN | SYSTOLIC BLOOD PRESSURE: 118 MMHG | BODY MASS INDEX: 29.3 KG/M2 | HEART RATE: 63 BPM | DIASTOLIC BLOOD PRESSURE: 58 MMHG

## 2019-11-07 DIAGNOSIS — R60.9 EDEMA, UNSPECIFIED TYPE: ICD-10-CM

## 2019-11-07 DIAGNOSIS — Z79.899 ENCOUNTER FOR LONG-TERM CURRENT USE OF MEDICATION: ICD-10-CM

## 2019-11-07 DIAGNOSIS — R60.9 EDEMA, UNSPECIFIED TYPE: Primary | ICD-10-CM

## 2019-11-07 LAB
ALBUMIN SERPL BCP-MCNC: 4.7 G/DL (ref 3.2–4.9)
ALBUMIN/GLOB SERPL: 1.7 G/DL
ALP SERPL-CCNC: 53 U/L (ref 30–99)
ALT SERPL-CCNC: 26 U/L (ref 2–50)
ANION GAP SERPL CALC-SCNC: 7 MMOL/L (ref 0–11.9)
AST SERPL-CCNC: 22 U/L (ref 12–45)
BILIRUB SERPL-MCNC: 0.4 MG/DL (ref 0.1–1.5)
BUN SERPL-MCNC: 23 MG/DL (ref 8–22)
CALCIUM SERPL-MCNC: 9.6 MG/DL (ref 8.5–10.5)
CHLORIDE SERPL-SCNC: 104 MMOL/L (ref 96–112)
CO2 SERPL-SCNC: 30 MMOL/L (ref 20–33)
CREAT SERPL-MCNC: 0.9 MG/DL (ref 0.5–1.4)
GLOBULIN SER CALC-MCNC: 2.7 G/DL (ref 1.9–3.5)
GLUCOSE SERPL-MCNC: 89 MG/DL (ref 65–99)
POTASSIUM SERPL-SCNC: 4.1 MMOL/L (ref 3.6–5.5)
PROT SERPL-MCNC: 7.4 G/DL (ref 6–8.2)
SODIUM SERPL-SCNC: 141 MMOL/L (ref 135–145)

## 2019-11-07 PROCEDURE — 99213 OFFICE O/P EST LOW 20 MIN: CPT | Performed by: NURSE PRACTITIONER

## 2019-11-07 PROCEDURE — 36415 COLL VENOUS BLD VENIPUNCTURE: CPT

## 2019-11-07 PROCEDURE — 80053 COMPREHEN METABOLIC PANEL: CPT

## 2019-11-07 ASSESSMENT — PAIN SCALES - GENERAL: PAINLEVEL: NO PAIN

## 2019-11-08 ENCOUNTER — TELEPHONE (OUTPATIENT)
Dept: MEDICAL GROUP | Facility: IMAGING CENTER | Age: 79
End: 2019-11-08

## 2019-11-08 NOTE — TELEPHONE ENCOUNTER
----- Message from SONIA Fry sent at 11/8/2019  7:55 AM PST -----  Please let patient know that electrolytes are normal and she can continue Lasix.   Thank you  Jahaira

## 2019-11-08 NOTE — PROGRESS NOTES
Subjective:     CC: Other (vitamin d defiency. taking 5000 mg) and Follow-Up    HPI:   Ml presents today follow-up after starting Lasix on 10/24/2019 for bilateral leg edema.  On physical assessment it was  noted that patient had bilateral +2 pitting edema on dorsal side of feet, left lower leg +3-4 pitting edema, right lower leg +2 pitting edema. 10/28 BNP: 90.  Patient wearing compression stockings daily.  Reports improvement of edema.  Denies any cramping, dizziness, lightheadedness, or low blood pressure.  Reports taking Lasix 20 mg in the morning daily with potassium chloride ER 10 mEq tablet. Today's /58.    Past Medical History:   Diagnosis Date   • Breast cancer (HCC)    • Cancer (HCC)     lung cancer with brain mts   • Chickenpox    • [place] measles    • Healthcare maintenance 2018   • Influenza    • Joint pain    • Lung cancer (HCC)    • Mumps    • Need for vaccination 2019   • Radionecrosis 2018   • Sick sinus syndrome (HCC)     with AFIB, s/p pacemaker   • Thyroid disease    • Tonsillitis      Social History     Tobacco Use   • Smoking status: Former Smoker     Packs/day: 2.00     Years: 20.00     Pack years: 40.00     Last attempt to quit: 1960     Years since quittin.8   • Smokeless tobacco: Never Used   Substance Use Topics   • Alcohol use: Yes     Alcohol/week: 1.8 oz     Types: 3 Glasses of wine per week   • Drug use: No     Current Outpatient Medications Ordered in Epic   Medication Sig Dispense Refill   • methimazole (TAPAZOLE) 5 MG Tab TAKE 1 TABLET BY MOUTH EVERY DAY 30 Tab 3   • Cholecalciferol (VITAMIN D3) 2000 UNIT Cap Take 2,000 Units by mouth. Indications: Daily Treatment with Aspirin Dose Greater Then 325 mg     • furosemide (LASIX) 20 MG Tab Take 1 Tab by mouth every day. Take in am. 30 Tab 0   • potassium chloride ER (KLOR-CON) 10 MEQ tablet Take 1 Tab by mouth every day. 30 Tab 0   • alendronate (FOSAMAX) 10 MG Tab Take 1 Tab by mouth every morning before  breakfast. 30 Tab 3   • apixaban (ELIQUIS) 5mg Tab Take 1 Tab by mouth 2 Times a Day. 180 Tab 3   • methimazole (TAPAZOLE) 5 MG Tab Take 0.5 Tabs by mouth every day. 30 Tab 3   • levETIRAcetam (KEPPRA) 500 MG Tab Take 1 Tab by mouth 2 Times a Day. 180 Tab 3   • OYSCO 500 500 MG Tab TAKE 1 TAB BY MOUTH 2 TIMES A DAY, WITH MEALS. NOT COV  5   • BIOTIN PO Take  by mouth every day.     • Misc. Devices ([employer] PULSE OXIMETER) Misc 1 Each by Does not apply route 4 times a day as needed. 1 Each 0   • flecainide (TAMBOCOR) 150 MG Tab TAKE 1/2 TABLET BY MOUTH 2 TIMES A DAY. 90 Tab 1   • metoprolol SR (TOPROL XL) 25 MG TABLET SR 24 HR Take 25 mg by mouth every day.  3     No current [facility]-ordered facility-administered medications on file.      Allergies:  Penicillins    ROS:  Constitutional: Denies fever, chills, night sweats, weight loss/gain or malaise/fatigue.   HENT: Denies headache, nasal congestion, sore throat, hearing loss, enlarged thyroid, or difficulty swallowing.    Eyes: Denies changes in vision, pain. Wears corrective wear. Since removal brain tumor in 2015, pt. reports that she has had decreased peripheral vision, worse in left eye. Does not drive.   Respiratory: Denies cough, SOB at rest or activity.    Cardiovascular: Denies tachycardia, chest pain, palpitations. Improved leg swelling.  Pt. has a pace maker due to the history of sick sinus syndrome.  Gastrointestinal: Denies N/V/C/D, abdominal pain, loss appetite, reflux, or hematochezia.  Genitourinary: Denies difficulty voiding, dysuria, nocturia, or hematuria.   Skin: Negative for rash or worrisome moles.   Neurological: Negative for dizziness, focal weakness and headaches.   Endo/Heme/Allergies: Denies bruise/bleed easily, allergies.   Psychiatric/Behavioral: Denies depression, nervous/anxious, difficulty sleeping. BiPap nightly.    Objective:   Exam:  /58 (BP Location: Right arm, Patient Position: Sitting, BP Cuff Size: Adult)   Pulse 63   Temp 36.8  "°C (98.3 °F) (Temporal)   Resp 15   Ht 1.549 m (5' 1\")   Wt 70.4 kg (155 lb 3.2 oz)   SpO2 95%   BMI 29.32 kg/m²  Body mass index is 29.32 kg/m².  General: Normal appearing. No distress.  Pulmonary: Clear to ausculation.  Normal effort. No rales, ronchi, or wheezing.  Cardiovascular: Regular rate and rhythm without murmur. Pedal and radial pulses are intact and equal bilaterally.  Abdomen: Soft, nontender, nondistended. Normal bowel sounds.   Musculoskeletal: Normal gait. No extremity cyanosis, clubbing. Right lower leg and dorsal side of foot non-pitting edema, left lower leg +1 pitting edema, non-pitting edema on dorsal side of foot.  Psych: Normal mood and affect. Alert and oriented x3. Judgment and insight is normal.    Assessment & Plan:   1. Encounter for long-term current use of medication  Reviewed with patient medication use and side effects. Medical, past, surgical history reviewed with patient.     2. Edema, unspecified type  This is a stable condition.  Instructed to continue taking Lasix 20 mg in the a.m. daily with potassium chloride 10 mEq.  Discussed with patient completing CMP labs to evaluate for electrolytes depletion due to the Lasix.  Discussed with patient that she will be contacted if the regimen of Lasix needs to be changed post lab draw discussed side effects of Lasix including lowering blood pressure, dizziness, lightheadedness, and muscle cramping due to electrolyte depletion.  Patient verbalized understanding.  Patient traveling to [place] from November 14 to December 6, instructed to make appointment for follow-up after returning home from trip to evaluate for continued improvement of lower limb edema.  -     Comp Metabolic Panel; Future    Return in about 1 month (around 12/7/2019).    Please note that this dictation was created using voice recognition software. I have made every reasonable attempt to correct obvious errors, but I expect that there are errors of grammar and " possibly content that I did not discover before finalizing the note.

## 2019-11-12 DIAGNOSIS — R60.0 BILATERAL LEG EDEMA: ICD-10-CM

## 2019-11-12 RX ORDER — FUROSEMIDE 20 MG/1
20 TABLET ORAL DAILY
Qty: 90 TAB | Refills: 0 | Status: SHIPPED | OUTPATIENT
Start: 2019-11-12 | End: 2020-02-06 | Stop reason: SDUPTHER

## 2019-11-12 RX ORDER — POTASSIUM CHLORIDE 750 MG/1
10 TABLET, FILM COATED, EXTENDED RELEASE ORAL DAILY
Qty: 90 TAB | Refills: 0 | Status: SHIPPED | OUTPATIENT
Start: 2019-11-12 | End: 2020-07-09 | Stop reason: SDUPTHER

## 2019-11-12 NOTE — TELEPHONE ENCOUNTER
Received message from patient wanting to know if she should continue on her lasik and potassium prescriptions. Patient states if she does need to continue, she is going to need a refill on both as she is leaving out of town for 2 weeks.

## 2019-11-13 ENCOUNTER — TELEPHONE (OUTPATIENT)
Dept: MEDICAL GROUP | Facility: IMAGING CENTER | Age: 79
End: 2019-11-13

## 2019-12-05 ENCOUNTER — HOSPITAL ENCOUNTER (OUTPATIENT)
Dept: LAB | Facility: MEDICAL CENTER | Age: 79
End: 2019-12-05
Attending: INTERNAL MEDICINE
Payer: MEDICARE

## 2019-12-05 ENCOUNTER — HOSPITAL ENCOUNTER (OUTPATIENT)
Dept: LAB | Facility: MEDICAL CENTER | Age: 79
End: 2019-12-05
Attending: PHYSICIAN ASSISTANT
Payer: MEDICARE

## 2019-12-05 DIAGNOSIS — E05.90 HYPERTHYROIDISM: ICD-10-CM

## 2019-12-05 LAB
ALBUMIN SERPL BCP-MCNC: 4.5 G/DL (ref 3.2–4.9)
ALBUMIN/GLOB SERPL: 1.7 G/DL
ALP SERPL-CCNC: 54 U/L (ref 30–99)
ALT SERPL-CCNC: 24 U/L (ref 2–50)
ANION GAP SERPL CALC-SCNC: 10 MMOL/L (ref 0–11.9)
AST SERPL-CCNC: 20 U/L (ref 12–45)
BASOPHILS # BLD AUTO: 0.8 % (ref 0–1.8)
BASOPHILS # BLD: 0.05 K/UL (ref 0–0.12)
BILIRUB SERPL-MCNC: 0.6 MG/DL (ref 0.1–1.5)
BUN SERPL-MCNC: 19 MG/DL (ref 8–22)
CALCIUM SERPL-MCNC: 9.5 MG/DL (ref 8.5–10.5)
CHLORIDE SERPL-SCNC: 104 MMOL/L (ref 96–112)
CO2 SERPL-SCNC: 28 MMOL/L (ref 20–33)
CREAT SERPL-MCNC: 0.86 MG/DL (ref 0.5–1.4)
EOSINOPHIL # BLD AUTO: 0.12 K/UL (ref 0–0.51)
EOSINOPHIL NFR BLD: 2 % (ref 0–6.9)
ERYTHROCYTE [DISTWIDTH] IN BLOOD BY AUTOMATED COUNT: 51.3 FL (ref 35.9–50)
FOLATE SERPL-MCNC: 11.8 NG/ML
GLOBULIN SER CALC-MCNC: 2.6 G/DL (ref 1.9–3.5)
GLUCOSE SERPL-MCNC: 83 MG/DL (ref 65–99)
HCT VFR BLD AUTO: 46.7 % (ref 37–47)
HGB BLD-MCNC: 15 G/DL (ref 12–16)
IMM GRANULOCYTES # BLD AUTO: 0.01 K/UL (ref 0–0.11)
IMM GRANULOCYTES NFR BLD AUTO: 0.2 % (ref 0–0.9)
LYMPHOCYTES # BLD AUTO: 1.17 K/UL (ref 1–4.8)
LYMPHOCYTES NFR BLD: 19.8 % (ref 22–41)
MCH RBC QN AUTO: 34.6 PG (ref 27–33)
MCHC RBC AUTO-ENTMCNC: 32.1 G/DL (ref 33.6–35)
MCV RBC AUTO: 107.9 FL (ref 81.4–97.8)
MONOCYTES # BLD AUTO: 0.43 K/UL (ref 0–0.85)
MONOCYTES NFR BLD AUTO: 7.3 % (ref 0–13.4)
NEUTROPHILS # BLD AUTO: 4.14 K/UL (ref 2–7.15)
NEUTROPHILS NFR BLD: 69.9 % (ref 44–72)
NRBC # BLD AUTO: 0 K/UL
NRBC BLD-RTO: 0 /100 WBC
PLATELET # BLD AUTO: 235 K/UL (ref 164–446)
PMV BLD AUTO: 9.6 FL (ref 9–12.9)
POTASSIUM SERPL-SCNC: 4.1 MMOL/L (ref 3.6–5.5)
PROT SERPL-MCNC: 7.1 G/DL (ref 6–8.2)
RBC # BLD AUTO: 4.33 M/UL (ref 4.2–5.4)
SODIUM SERPL-SCNC: 142 MMOL/L (ref 135–145)
T3FREE SERPL-MCNC: 4.96 PG/ML (ref 2.4–4.2)
T4 FREE SERPL-MCNC: 1.3 NG/DL (ref 0.53–1.43)
TSH SERPL DL<=0.005 MIU/L-ACNC: 2.33 UIU/ML (ref 0.38–5.33)
VIT B12 SERPL-MCNC: 351 PG/ML (ref 211–911)
WBC # BLD AUTO: 5.9 K/UL (ref 4.8–10.8)

## 2019-12-05 PROCEDURE — 82746 ASSAY OF FOLIC ACID SERUM: CPT

## 2019-12-05 PROCEDURE — 80053 COMPREHEN METABOLIC PANEL: CPT

## 2019-12-05 PROCEDURE — 36415 COLL VENOUS BLD VENIPUNCTURE: CPT

## 2019-12-05 PROCEDURE — 84439 ASSAY OF FREE THYROXINE: CPT

## 2019-12-05 PROCEDURE — 85025 COMPLETE CBC W/AUTO DIFF WBC: CPT

## 2019-12-05 PROCEDURE — 83520 IMMUNOASSAY QUANT NOS NONAB: CPT

## 2019-12-05 PROCEDURE — 84443 ASSAY THYROID STIM HORMONE: CPT

## 2019-12-05 PROCEDURE — 82607 VITAMIN B-12: CPT

## 2019-12-05 PROCEDURE — 84481 FREE ASSAY (FT-3): CPT

## 2019-12-06 ENCOUNTER — TELEPHONE (OUTPATIENT)
Dept: MEDICAL GROUP | Facility: IMAGING CENTER | Age: 79
End: 2019-12-06

## 2019-12-06 DIAGNOSIS — H53.8 BLURRY VISION, LEFT EYE: ICD-10-CM

## 2019-12-06 DIAGNOSIS — R26.89 BALANCE DISORDER: ICD-10-CM

## 2019-12-06 NOTE — TELEPHONE ENCOUNTER
Received message from patient requesting referral to PT be sent to [facility] at [facility].   Spoke with Noa in the referrals department and since referral was closed a new referral will need to be placed.

## 2019-12-07 LAB — TSH RECEP AB SER-ACNC: 2.4 IU/L

## 2019-12-10 ENCOUNTER — OFFICE VISIT (OUTPATIENT)
Dept: ENDOCRINOLOGY | Facility: MEDICAL CENTER | Age: 79
End: 2019-12-10
Payer: MEDICARE

## 2019-12-10 VITALS
WEIGHT: 155 LBS | SYSTOLIC BLOOD PRESSURE: 110 MMHG | HEART RATE: 80 BPM | BODY MASS INDEX: 29.27 KG/M2 | DIASTOLIC BLOOD PRESSURE: 64 MMHG | HEIGHT: 61 IN | OXYGEN SATURATION: 89 %

## 2019-12-10 DIAGNOSIS — E05.90 HYPERTHYROIDISM: ICD-10-CM

## 2019-12-10 DIAGNOSIS — E04.1 THYROID NODULE: ICD-10-CM

## 2019-12-10 PROCEDURE — 99214 OFFICE O/P EST MOD 30 MIN: CPT | Performed by: PHYSICIAN ASSISTANT

## 2019-12-10 NOTE — PROGRESS NOTES
Endocrinology Clinic Progress Note  PCP: Pcp Not In Computer    HPI:  Ml Chavira is a 79 y.o. old patient who comes in today for review of endocrine problems.    Patient feels great. She is complaining of weight gain. She recently returned to home from [place]      1. Hyperthyroidism  Methimazole 5 mg taking daily - continue    12/19- 155  10/19- 156   8/19- 152 lbs     Patient reports compliance of medications   Patient without side effects.     Patient with intermittent symptoms of difficulty swallowing.   denies any symptoms of swelling in her throat and or enlargement. She denies any palpitations shaking and/or tremors     Ref. Range 12/5/2019 08:57   TSH Latest Ref Range: 0.380 - 5.330 uIU/mL 2.330   Free T-4 Latest Ref Range: 0.53 - 1.43 ng/dL 1.30   T3,Free Latest Ref Range: 2.40 - 4.20 pg/mL 4.96 (H)   Thyrotropin Receptor Abs Latest Ref Range: <=1.75 IU/L 2.40 (H)     7/18/2019- TSH 2.610, FT4 0.78, FT3 3.25 TRA <0.90      2. Thyroid nodule  Intermittent symptoms of difficulty with swallowing continues. Denies any symptoms of swelling of the neck or difficulty breathing.     8/22/2019- FNA   A. Right lower anterior thyroid fine needle aspiration:         No malignant cells identified.         [place] appearing follicular cells consistent with a benign          follicular nodule ([place] category 2).  B. Right lower posterior thyroid fine needle aspiration:         No malignant cells identified.         [place] appearing follicular cells consistent with a benign          follicular nodule ([place] category 2).  C. Left lower thyroid fine needle aspiration:         No malignant cells identified.         [place] appearing follicular cells consistent with a benign          follicular nodule ([place] category 2).     7/18/2019-   Nodule #1  Location:  Right  lower  Size: 2.1 x 1.7 x 2.0 cm (previously  2.2 x 1.4 x 1.9 cm which previously measured 1.9 x 1.6 x 1.2 cm)  Composition:  Mixed-1  Echogenicity:   Isoechoic-1  Shape:  Wider than tall-0  Margins:  Smooth-0  Echogenic Foci:  Microscopic-3    ACR TIRADS points/category:  5 - TR4 - Moderately Suspicious    Nodule #2  Location:  Right  lower  Size:  1.2 x 1.7 x 1.0 cm  Composition:  Mixed-1  Echogenicity:  Isoechoic-1  Shape:  Wider than tall-0  Margins:  Smooth-0  Echogenic Foci:  Microscopic-3    ACR TIRADS points/category:  5 - TR4 - Moderately Suspicious    Nodule #3  Location:  Right  lower  Size:  0.6 x 1.5 x 1.1 cm (previously 1.1 x 0.7 x 1.1 cm which   previously measured 1.1 x 0.7 x 1.0 cm.)  Composition:  Solid-2  Echogenicity:  Isoechoic-1  Shape:  Wider than tall-0  Margins:  Irregular-2  Echogenic Foci:  Microscopic-3    ACR TIRADS points/category:  8 - TR5 - Highly Suspicious    Nodule #4  Location:  Left  mid  Size:  1.6 x 1.9 x 1.5 cm (1.9 x 1.2 x 1.3 cm. This previously measured 2.8 x 1.9 x 1.8 cm.)  Composition:  Mixed-1  Echogenicity:  Hypoechoic-2  Shape:  Wider than tall-0  Shape Smooth-0  Echogenic Foci:  Microscopic-3    ACR TIRADS points/category:  6 - TR4 - Moderately Suspicious    There is an additional hypoechoic nodule with smooth margins in the mid left thyroid lobe which measures 1.4 x 0.7 x 1.1 cm. It contains microscopic calcifications and is wider than tall.     3. Low vitamin D level  Currently on vitamin D supplementation   Patient reports compliance      4. Other osteoporosis, unspecified pathological fracture presence  Managed by PCP   Last dexa 9/2018   Previously on alendronate - compliant with medications   Patient is scheduled to start PT for balance         Endocrine history to date:    1. Multiple thyroid nodules-   Found on thyroid ultrasound of 8/16/2018 secondary to abnormal PET      Thyroid Ultrasound: 2/19-  There is a subtle oval heterogeneous nodule with some internal echogenicity in the left mid gland measuring 1.9 x 1.2 x 1.3 cm. (previously measured 2.8 x 1.9 x 1.8 cm). There is a hypoechoic oval nodule in the  left inferior gland measuring 1.2 x 1.0 x 1.1 cm (previously measured 1.0 x 1.0 x 0.9 cm.)       US:   Thyroid ultrasound 8/16/2018: Numerous hypoechoic nodules in the right thyroid lobe the largest of which is present in the lower pole measuring 1.9 x 1.6 x 1.2 cm the largest is present in the midportion of the right lobe measuring 1.1 x 1.0 x 0.7 cm.  There are numerous nodules located in the left lobe the largest was identified in the left lower pole measuring 2.8 x 1.8 x 1.9 cm it contains focal calcifications.  The next largest is medial slightly inferior to the left thyroid lobe which measures 1.0 x 1.0 x 0.9 cm       Thyroid US 8/16/2018   There is a probably solid vascular isoechoic oval right inferior gland nodule measuring 2.2 x 1.4 x 1.9 cm which previously measured 1.9 x 1.6 x 1.2 cm. There is an oval hypoechoic nodule in the right lateral gland measuring 1.1 x 0.7 x 1.1 cm which previously measured 1.1 x 0.7 x 1.0 cm.     2. Hyperthyroiditis related to Hot nodule (left)   Started on Methimazole 11/14/18 4/8/2019-TSH 3.070, FT4 0.94, FT3 3.70 TRA <0.90   2/26/2019, TSH 3.010, free T4 0.96, free T3 3.09, thyroid receptor antibody less than 0.90-methimazole 5 mg daily     1/15/19- TSH 4.730, FT4 0.61, FT3 3.17 TRA <0.90 (Methimazole 10mg)   10/19/2018 TSH 0.030 free T4 0.99, T3 78.4, free T3 3.63, TPO antibody 17.5, thyroid receptor antibodies less than 0.90  6/27/2018  TSH 0.010 free T4 0.98  8/24/2018 TSH 0.030 free T4 1.09 T3 115.6 TPO antibody 16.8     Thyroid uptake and scan 10/29/2018   borderline elevated 5 hour radioactive iodine uptake measured at 15.4%.  Area of focal increased uptake involving the lower pole of the left thyroid lobe corresponding to a solid nodule in that area likely representing a hyperactive nodule.        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  There is postoperative changes  consistent with right parietal occipital craniotomy.     3. Low vitamin D level-  6/27/2018 vitamin D-  22   Currently on Vitamin D replacement      4. osteoporosis  On alendronate -    She is currently only taking vitamin D and calcium.     DEXA 9/20/2018  The mean bone mineral density for the lumbar spine is 0.914 g/cm2, which corresponds to a T score of -2.1.    The proximal left femur has a mean bone mineral density of 0.616 g/cm2, with a T score of -3.1.  Findings are consistent with osteoporosis with a high risk of fracture          ROS:  Constitutional: No weight loss or gain,  fever  HEENT: No difficulty with swallowing, change in voice, or swelling in throat area   Cardiac: No chest pain, palpitations, or racing heart  Resp: + shortness of breath   GI: No abdominal pain, nausea, vomiting, or diarrhea   Neuro: No numbness or tinging in feet  Endo: No heat or cold intolerance, no polyuria or polydipsia  All other detailed ROS is otherwise negative       Past Medical History:  Patient Active Problem List    Diagnosis Date Noted   • Sick sinus syndrome (HCC) 05/31/2018     Priority: High   • High risk medication use 08/27/2019   • Multiple thyroid nodules 08/15/2019   • Vitamin D deficiency 08/15/2019   • Osteoporosis 01/17/2019   • Hyperthyroidism 01/17/2019   • Chronic atrial fibrillation 01/17/2019   • Hot thyroid nodule 01/17/2019   • Postmenopausal 08/27/2018   • 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   • Balance disorder 06/21/2018   • Radionecrosis 04/14/2018   • Malignant neoplasm of upper lobe of right lung (HCC) 03/05/2018   • CVA (cerebral vascular accident) (HCC) 11/29/2017   • Allergic rhinitis 01/12/2016   • Hilar adenopathy 01/12/2016   • History of breast cancer 01/12/2016   • Lung mass 11/19/2015   • Blurry vision, left eye 11/19/2015   • Metastasis to brain (HCC) 11/18/2015   • Metastatic cancer (CMS-HCC) 11/18/2015       Family Medical History:   Family History   Problem Relation Age of Onset   •  Dementia Mother    • Diabetes Mother    • Other Mother         osteoarthritis   • Arthritis Mother    • Autoimmune Disease Father         Rheumatoid arthritis   • Arthritis Father    • Cancer Brother         prostate        Social History:   Social History     Socioeconomic History   • Marital status:      Spouse name: Not on file   • Number of children: Not on file   • Years of education: Not on file   • Highest education level: Not on file   Occupational History   • Not on file   Social Needs   • Financial resource strain: Not on file   • Food insecurity:     Worry: Not on file     Inability: Not on file   • Transportation needs:     Medical: Not on file     Non-medical: Not on file   Tobacco Use   • Smoking status: Former Smoker     Packs/day: 2.00     Years: 20.00     Pack years: 40.00     Last attempt to quit: 1960     Years since quittin.9   • Smokeless tobacco: Never Used   Substance and Sexual Activity   • Alcohol use: Yes     Alcohol/week: 1.8 oz     Types: 3 Glasses of wine per week   • Drug use: No   • Sexual activity: Not on file   Lifestyle   • Physical activity:     Days per week: 4 days     Minutes per session: 60 min   • Stress: Not on file   Relationships   • Social connections:     Talks on phone: More than three times a week     Gets together: More than three times a week     Attends Yarsani service: Never     Active member of club or organization: Yes     Attends meetings of clubs or organizations: More than 4 times per year     Relationship status:    • Intimate partner violence:     Fear of current or ex partner: No     Emotionally abused: No     Physically abused: No     Forced sexual activity: No   Other Topics Concern   • Not on file   Social History Narrative   • Not on file         Medications:    Current Outpatient Medications:   •  potassium chloride ER (KLOR-CON) 10 MEQ tablet, Take 1 Tab by mouth every day., Disp: 90 Tab, Rfl: 0  •  furosemide (LASIX) 20 MG Tab,  "Take 1 Tab by mouth every day. Take in am., Disp: 90 Tab, Rfl: 0  •  Cholecalciferol (VITAMIN D3) 2000 UNIT Cap, Take 2,000 Units by mouth. Indications: Daily Treatment with Aspirin Dose Greater Then 325 mg, Disp: , Rfl:   •  alendronate (FOSAMAX) 10 MG Tab, Take 1 Tab by mouth every morning before breakfast., Disp: 30 Tab, Rfl: 3  •  apixaban (ELIQUIS) 5mg Tab, Take 1 Tab by mouth 2 Times a Day., Disp: 180 Tab, Rfl: 3  •  methimazole (TAPAZOLE) 5 MG Tab, Take 0.5 Tabs by mouth every day., Disp: 30 Tab, Rfl: 3  •  levETIRAcetam (KEPPRA) 500 MG Tab, Take 1 Tab by mouth 2 Times a Day., Disp: 180 Tab, Rfl: 3  •  OYSCO 500 500 MG Tab, TAKE 1 TAB BY MOUTH 2 TIMES A DAY, WITH MEALS. NOT COV, Disp: , Rfl: 5  •  BIOTIN PO, Take  by mouth every day., Disp: , Rfl:   •  Misc. Devices ([employer] PULSE OXIMETER) Misc, 1 Each by Does not apply route 4 times a day as needed., Disp: 1 Each, Rfl: 0  •  flecainide (TAMBOCOR) 150 MG Tab, TAKE 1/2 TABLET BY MOUTH 2 TIMES A DAY., Disp: 90 Tab, Rfl: 1  •  metoprolol SR (TOPROL XL) 25 MG TABLET SR 24 HR, Take 25 mg by mouth every day., Disp: , Rfl: 3  •  methimazole (TAPAZOLE) 5 MG Tab, TAKE 1 TABLET BY MOUTH EVERY DAY (Patient not taking: Reported on 12/10/2019), Disp: 30 Tab, Rfl: 3    Labs: Reviewed as above     Physical Examination:  Vital signs: /64 (BP Location: Left arm, Patient Position: Sitting, BP Cuff Size: Adult)   Pulse 80   Ht 1.549 m (5' 1\")   Wt 70.3 kg (155 lb)   SpO2 89%   BMI 29.29 kg/m²  Body mass index is 29.29 kg/m².  General: No apparent distress, cooperative  Eyes: No scleral icterus, no discharge, normal eyelids  Neck: No abnormal masses on inspection, normal thyroid exam  Resp: Normal effort, clear to auscultation bilaterally  CVS: Regular rate and rhythm, S1 S2 normal, no murmur  Extremities: No lower extremity edema  Musculoskeletal: Normal digits and nails  Skin: No rash on visible skin  Psych: Alert and oriented, normal mood and affect, intact memory and " able to make informed decisions.    Assessment and Plan:  1. Hyperthyroidism  Continue methimazole 5mg daily   Reviewed labs.   Will continue to monitor for symptoms    Repeat labs 6 weeks   - standing labs     2. Thyroid nodule   Chronic stable condition managed with current ultrasound surveillance   Reviewed FNA     3. Low vitamin D level  Currently on vitamin D supplementation 5000 IU daily   Patient reports compliance      4. Other osteoporosis, unspecified pathological fracture presence  Currently on: alendronate       Return in about 6 weeks (around 1/21/2020).    Thank you for allowing me to participate in the care of this patient.  If you have any questions or concerns please do not hesitate to contact me.    Le Rdz P.A.-C.    CC:   Pcp Not In Computer    This note was created using voice recognition software (Dragon). The accuracy of the dictation is limited by the abilities of the software. I have reviewed the note prior to signing, however some errors in grammar and context are still possible. If you have any questions related to this note please do not hesitate to contact our office.

## 2019-12-12 ENCOUNTER — TELEPHONE (OUTPATIENT)
Dept: PULMONOLOGY | Facility: HOSPICE | Age: 79
End: 2019-12-12

## 2019-12-13 NOTE — TELEPHONE ENCOUNTER
I spoke to both Bret and Ml. She wants to give them one more chance. If they drop the ball she’ll want to switch.

## 2019-12-13 NOTE — TELEPHONE ENCOUNTER
Pt is quite frustrated with the service she is getting from [facility]  She left a very long message stating she is not getting the correct mask and has not used the machine at all    She mentioned in the message that she is being advised by [facility] she has not met compliance and if she really has not used it since set up and it has been over 2 months - that will mean she will need another sleep study to keep the machine    IF she does have to have a new titration study - will be able to switch her to [facility]     I will investigate further in the AM

## 2019-12-17 ENCOUNTER — HOSPITAL ENCOUNTER (OUTPATIENT)
Dept: RADIOLOGY | Facility: MEDICAL CENTER | Age: 79
End: 2019-12-17
Attending: INTERNAL MEDICINE
Payer: MEDICARE

## 2019-12-17 VITALS — HEART RATE: 60 BPM | OXYGEN SATURATION: 97 %

## 2019-12-17 DIAGNOSIS — C34.11 MALIGNANT NEOPLASM OF UPPER LOBE OF RIGHT LUNG (HCC): ICD-10-CM

## 2019-12-17 PROCEDURE — 70553 MRI BRAIN STEM W/O & W/DYE: CPT

## 2019-12-17 PROCEDURE — A9576 INJ PROHANCE MULTIPACK: HCPCS | Performed by: INTERNAL MEDICINE

## 2019-12-17 PROCEDURE — 700117 HCHG RX CONTRAST REV CODE 255: Performed by: INTERNAL MEDICINE

## 2019-12-17 PROCEDURE — 71260 CT THORAX DX C+: CPT

## 2019-12-17 RX ADMIN — GADOTERIDOL 15 ML: 279.3 INJECTION, SOLUTION INTRAVENOUS at 11:19

## 2019-12-17 RX ADMIN — IOHEXOL 75 ML: 350 INJECTION, SOLUTION INTRAVENOUS at 10:16

## 2019-12-17 NOTE — FLOWSHEET NOTE
Pt's PPM set to MRI safe mode by rep Rea prior to MRI. During MRI scan, pt monitored with ECG and SPO2. Pt tolerated scan well. Discharged ambulatory. VSS

## 2019-12-19 ENCOUNTER — TELEPHONE (OUTPATIENT)
Dept: CARDIOLOGY | Facility: MEDICAL CENTER | Age: 79
End: 2019-12-19

## 2019-12-19 ENCOUNTER — HOSPITAL ENCOUNTER (OUTPATIENT)
Dept: RADIOLOGY | Facility: MEDICAL CENTER | Age: 79
End: 2019-12-19
Attending: PSYCHIATRY & NEUROLOGY
Payer: MEDICARE

## 2019-12-19 ENCOUNTER — DOCUMENTATION (OUTPATIENT)
Dept: NEUROLOGY | Facility: MEDICAL CENTER | Age: 79
End: 2019-12-19

## 2019-12-19 ENCOUNTER — TELEPHONE (OUTPATIENT)
Dept: NEUROLOGY | Facility: MEDICAL CENTER | Age: 79
End: 2019-12-19

## 2019-12-19 DIAGNOSIS — C79.31 BRAIN METASTASES: ICD-10-CM

## 2019-12-19 DIAGNOSIS — I62.9 INTRACRANIAL HEMORRHAGE (HCC): ICD-10-CM

## 2019-12-19 DIAGNOSIS — R90.89 ABNORMAL BRAIN MRI: ICD-10-CM

## 2019-12-19 DIAGNOSIS — Z79.01 ANTICOAGULATED: ICD-10-CM

## 2019-12-19 PROCEDURE — 70450 CT HEAD/BRAIN W/O DYE: CPT

## 2019-12-19 NOTE — TELEPHONE ENCOUNTER
RO    Pt recently had MRI done ordered by Dr Aguilar, results came back w/bleed in brain. Pt was told to call Dr Obregon's office to see if she should stop Eliquis. Please call pt to advise at [phone].

## 2019-12-19 NOTE — TELEPHONE ENCOUNTER
D/w Dr. Obregon. He was in [employer] communication with Dr. Aguilar earlier today. Defer Eliquis decision to neurologist. Called pt and informed her of this.

## 2019-12-19 NOTE — TELEPHONE ENCOUNTER
Pt called to let dr Rodriguez know dr Aguilar had ordered a MRI and she was told she had brain bleed and to let Roberto know if she wants to order other tests. Please advise

## 2019-12-20 ENCOUNTER — OFFICE VISIT (OUTPATIENT)
Dept: NEUROLOGY | Facility: MEDICAL CENTER | Age: 79
End: 2019-12-20
Payer: MEDICARE

## 2019-12-20 VITALS
BODY MASS INDEX: 29.27 KG/M2 | OXYGEN SATURATION: 92 % | HEART RATE: 84 BPM | WEIGHT: 155 LBS | SYSTOLIC BLOOD PRESSURE: 110 MMHG | DIASTOLIC BLOOD PRESSURE: 62 MMHG | TEMPERATURE: 98 F | HEIGHT: 61 IN

## 2019-12-20 DIAGNOSIS — I62.9 INTRACRANIAL HEMORRHAGE (HCC): ICD-10-CM

## 2019-12-20 DIAGNOSIS — C79.9 METASTATIC CANCER (HCC): ICD-10-CM

## 2019-12-20 PROCEDURE — 99215 OFFICE O/P EST HI 40 MIN: CPT | Performed by: PSYCHIATRY & NEUROLOGY

## 2019-12-20 ASSESSMENT — ENCOUNTER SYMPTOMS
BRUISES/BLEEDS EASILY: 0
HALLUCINATIONS: 0
SORE THROAT: 0
EYE DISCHARGE: 0
WEIGHT LOSS: 0
SHORTNESS OF BREATH: 0
ABDOMINAL PAIN: 0
FEVER: 0
FALLS: 0

## 2019-12-20 NOTE — LETTER
[facility]   [address], [address]  [place] [address]  Phone: [phone]  Fax: [phone]              Ml Chavira  1940    Encounter Date: 12/20/2019    Irina Rodriguez M.D.          PROGRESS NOTE:  Chief Complaint   Patient presents with   • Follow-Up     Metastasis to brain     History of present illness:  Ml Chavira 79 y.o. female presents today for abnormal brain MRI follow-up.   History is obtained from patient.  and Patient is accompanied by self.  She lives alone and has a medical alert device at home.     Duration/timing: Winter/November 2017  Context: Brain metastasis/spell: patient remembers feeling faint and couldn't get herself back up, found that her left side was weak when she woke up (no incontinence or tongue biting). Brain radiation 2015. Brain tumor resection 2015. She possibly had the left-sided weakness in 2015 but she is not sure.  Despite that the weakness she is experienced in the winter 2017 was far from her baseline.  That episode was occurring after trip to [place] and she was otherwise not acutely ill. She does not drive because of hemianopsia. She hs a hx of breast cancer without metastasis.  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  She is referred today for reevaluation on whether or not she needs Keppra.  Location: Brain  Quality: Weakness/swelling  Severity: Moderate  Modifying factors: Improved with time and steroid  Associated signs/symptoms: hx of bell palsy of uncertain side; occasional migraines  Denies: bladder incontinence, bowel incontinence, dizziness, headaches, vision changes, other weakness, numbness/tingling, swallowing difficulties, speech disturbance, depression, anxiety, memory loss, loss of consciousness, hallucinations, abnormal movements, diplopia and falls     Patient has tried:  -Keppra 500 mg p.o. twice daily, started 2017 in patient, no side effects      Subjective:  Patient was last seen in neurology clinic in clinic in August 2019 by me.    Since last visit, I have been contacted by patient's oncologist regarding an abnormal MRI result obtained on 12/17/2019.     Patient was asked to return to clinic urgently to discuss stroke prevention in the setting of atrial fibrillation. MRI brain is + for small subacute intraparenchymal hematoma. She has stopped Eliquis as of 12/19/2019 per instruction of her oncologist. Clinically patient feels well.  She had suffered a mild headache but otherwise no focal numbness/weakness/vision changes/swallowing difficulties or speech disturbance.  She has been started on Eliquis 5 mg twice daily in 8/2019 by her cardiologist Dr. Obregon approximately 2 weeks after my visit with her for atrial fibrillation.  Otherwise was tolerating the Eliquis well without any other bleeding.  She denies other breathing diathesis.    Otherwise no breakthrough events concerning for seizures.  She continues to tolerate Keppra well. Otherwise spell characteristics are unchanged from what is documented above.      Past medical history:   Past Medical History:   Diagnosis Date   • Breast cancer (HCC)    • Cancer (HCC)     lung cancer with brain mts   • Chickenpox    • [place] measles    • Healthcare maintenance 7/23/2018   • Influenza    • Joint pain    • Lung cancer (HCC)    • Mumps    • Need for vaccination 1/17/2019   • Radionecrosis 4/14/2018   • Sick sinus syndrome (HCC)     with AFIB, s/p pacemaker   • Thyroid disease    • Tonsillitis        Past surgical history:   Past Surgical History:   Procedure Laterality Date   • CRANIOTOMY STEALTH Right 11/23/2015    Procedure: CRANIOTOMY STEALTH-right occipital;  Surgeon: Suyapa Schumacher M.D.;  Location: SURGERY [facility];  Service:    • APPENDECTOMY     • CRANIOTOMY     • KNEE ARTHROSCOPY      left    • LUMPECTOMY     • OTHER      left knee surgery   • PACEMAKER INSERTION     • TONSILLECTOMY         Family history:    "  Family History   Problem Relation Age of Onset   • Dementia Mother    • Diabetes Mother    • Other Mother         osteoarthritis   • Arthritis Mother    • Autoimmune Disease Father         Rheumatoid arthritis   • Arthritis Father    • Cancer Brother         prostate        Social history:   Tobacco Use   • Smoking status: Former Smoker     Packs/day: 2.00     Years: 20.00     Pack years: 40.00     Last attempt to quit: 1960     Years since quittin.6   • Smokeless tobacco: Never Used   Substance and Sexual Activity   • Alcohol use: Yes     Alcohol/week: 1.8 oz     Types: 3 Glasses of wine per week   • Drug use: No   • Sexual activity: Not on file       Current medications:   Current Outpatient Medications   Medication   • potassium chloride ER (KLOR-CON) 10 MEQ tablet   • furosemide (LASIX) 20 MG Tab   • Cholecalciferol (VITAMIN D3) 2000 UNIT Cap   • alendronate (FOSAMAX) 10 MG Tab   • methimazole (TAPAZOLE) 5 MG Tab   • levETIRAcetam (KEPPRA) 500 MG Tab   • OYSCO 500 500 MG Tab   • BIOTIN PO   • Misc. Devices ([employer] PULSE OXIMETER) Misc   • flecainide (TAMBOCOR) 150 MG Tab   • metoprolol SR (TOPROL XL) 25 MG TABLET SR 24 HR     No current facility-administered medications for this visit.        Medication Allergy:  Allergies   Allergen Reactions   • Penicillins Rash and Itching     RXN \"a long time ago\"       Review of Systems   Constitutional: Negative for fever and weight loss.   HENT: Negative for sore throat.    Eyes: Negative for discharge.   Respiratory: Negative for shortness of breath.    Cardiovascular: Positive for leg swelling.   Gastrointestinal: Negative for abdominal pain.   Genitourinary: Negative for dysuria.   Musculoskeletal: Negative for falls.   Skin: Negative for rash.   Neurological:        As per HPI   Endo/Heme/Allergies: Does not bruise/bleed easily.   Psychiatric/Behavioral: Negative for hallucinations.       Physical examination:   Vitals:    19 1036   BP: 110/62   BP Location: " "Left arm   Patient Position: Sitting   BP Cuff Size: Adult   Pulse: 84   Temp: 36.7 °C (98 °F)   TempSrc: Temporal   SpO2: 92%   Weight: 70.3 kg (155 lb)   Height: 1.549 m (5' 1\")     General: Patient in well nourished in no apparent distress. Elevated BMI.   Eyes: Ophthalmoscopic examination performed but discs cannot be visualized well enough to characterize bilaterally. Prior exam  HENT: Normocephalic, atraumatic.   Cardiovascular: No lower extremity edema.  Respiratory: Normal respiratory effort.   Skin: No appreciable signs of acute rashes or bruising.   Musculoskeletal: No signs of joint or muscle swelling.   Psychiatric: Pleasant.     NEUROLOGICAL EXAM:   Mental status: Awake, alert and fully oriented to person, place, time and situation. Normal attention, concentration and fund of knowledge for education level.   Speech and language: Speech is fluent without errors and clear.  Cranial nerve exam:  II: Pupils are equally round and reactive to light.  Left homonymous hemianopsia.  III, IV, VI: EOMI, no diplopia, mild right ptosis nonobstructive  V: Sensation to light touch is normal over V1-3 distributions bilaterally.    VII: Very mild blunting of the right nasolabial fold.  VIII: Hearing intact to soft speech  IX: Dysarthria is not present.  XI: Shoulder shrug are symmetrical and strong. Prior exam  XII: Tongue protrudes midline. Prior exam    Motor exam:  Muscle tone is normal in all 4 limbs.    Muscle strength: 5 out of 5 proximal distal right upper and lower extremity strength.  5- out of 5 proximal left upper and lower extremity strength.  Distally she is 5- as well.    Sensory exam:  Intact to Light touch in bilateral upper and lower extremity. No sensory extinction.    Deep tendon reflexes:       Right  Left  Biceps   0/4  0/4  Triceps  0/4  0/4  Brachioradialis 0/4  0/4  Knee jerk  0/4  0/4  Ankle jerk  0/4  0/4   bilateral toes are downgoing to plantar stimulation..    Coordination: shows a normal " finger-nose-finger   Gait: Casual gait is with cane.       ANCILLARY DATA REVIEWED:   Lab Data Review:  Lab Results   Component Value Date/Time    WBC 5.9 12/05/2019 08:59 AM    RBC 4.33 12/05/2019 08:59 AM    HEMOGLOBIN 15.0 12/05/2019 08:59 AM    HEMATOCRIT 46.7 12/05/2019 08:59 AM    .9 (H) 12/05/2019 08:59 AM    MCH 34.6 (H) 12/05/2019 08:59 AM    MCHC 32.1 (L) 12/05/2019 08:59 AM    MPV 9.6 12/05/2019 08:59 AM    NEUTSPOLYS 69.90 12/05/2019 08:59 AM    LYMPHOCYTES 19.80 (L) 12/05/2019 08:59 AM    MONOCYTES 7.30 12/05/2019 08:59 AM    EOSINOPHILS 2.00 12/05/2019 08:59 AM    BASOPHILS 0.80 12/05/2019 08:59 AM      Lab Results   Component Value Date/Time    SODIUM 142 12/05/2019 08:59 AM    POTASSIUM 4.1 12/05/2019 08:59 AM    CHLORIDE 104 12/05/2019 08:59 AM    CO2 28 12/05/2019 08:59 AM    GLUCOSE 83 12/05/2019 08:59 AM    BUN 19 12/05/2019 08:59 AM    CREATININE 0.86 12/05/2019 08:59 AM     Lab Results   Component Value Date/Time    ASTSGOT 22 06/04/2019 1531    ALTSGPT 22 06/04/2019 1531    ALKPHOSPHAT 55 06/04/2019 1531    ALBUMIN 4.4 06/04/2019 1531     Lab Results   Component Value Date/Time    HBA1C 5.3 11/30/2017 03:38 AM      May 2018 PT INR and APTT normal  Platelets December 5 2019, 235 (normal)    Imaging:   MRI brain June 2019:  1.  Status post right parietal-occipital craniotomy.  2.  Stable posttreatment appearance of the right temporal, parietal and occipital lobes with heterogeneous T2 signal intensity, T1 signal hyperintensity and enhancement. No evidence of discrete enhancing mass to suggest residual or recurrent neoplasm.  3.  No new enhancing foci are identified to suggest new metastatic lesions.  4.  Mild cerebral substance loss.    MRI brain with without contrast December 17, 2019:  1.  Status post right parietal-occipital craniotomy.  2.  Stable posttreatment appearance of the right temporal, parietal and occipital lobes with heterogeneous T2 signal intensity, T1 signal  hyperintensity and enhancement. There is a new 12 mm rounded focus in the right parietal lobe with T2 hypointense rim, possibly representing small subacute hematoma. A metastatic lesion is difficult to exclude and attention on follow-up is necessary.  3.  Nonspecific linear focus of enhancement within/along the left lateral ab, possibly artifactual. Attention to this on follow-up.  4.  Mild cerebral substance loss.    CT head without contrast December 19, 2019: Follow-up brain MRI  10 x 11 mm rounded focus of increased density in the location of the new lesion appreciated on recent MR in the right parietal lobe thought to represent a small subacute hematoma. CT findings could be compatible with subacute hematoma. The size of the   finding does not appear to have changed when compared to recent prior MRI. It is difficult to assess for metastatic lesion without contrast. Short-term follow-up reassessment is recommended. Postoperative changes in the right cerebral hemisphere appear stable when compared to recent prior MRI.    I have personally reviewed the patient's imaging as above and agree with the radiologist's interpretation.     Records reviewed: Documentation from Dr. Obregon (cardiology) medical assistant stating will defer anticoagulation to neurologist.      ASSESSMENT AND PLAN:    1.  Intracranial hemorrhage (HCC), new: Incidental finding on oncology follow-up MRI on December 17 2019 with a new 12 millimeter round focus in the right parietal lobe confirmed as blood on follow-up CT head 12/19/2019.  Suspected to be subacute in nature and stable based on repeat imaging findings.  Patient was anticoagulated on Eliquis 5 mg twice daily at this time.  Possible mild headache but otherwise asymptomatic.  Differential diagnosis includes secondary to anticoagulation/spontaneous versus new small metastatic lesion after thorough review of imaging with neuroradiology.  Platelets and INR have been in normal limits in the  past.  -Repeat CT head in approximately 4-5 weeks (end 1/2020) to ensure resolution of blood/hyperdensity followed by MRI of the brain with/without contrast in 3 months (middle 3/2020) from initial MRI brain to evaluate for underlying tumor. See problem #3. Both studies ordered today.     2. Metastasis to brain (HCC), resolved?: Patient with a history of lung cancer metastasis to the brain status post resection with radiation in 2015 complicated by radiation necrosis and vasogenic edema in 2017 status post Decadron IV.  At that time patient was started on Keppra by inpatient neurology.  She has been on the medication since that time without issue.  It is unclear since she woke up with her left-sided weakness if she had any seizure activity and Oneil's paralysis.  MRI brain in June 2016 with right temporal parietal and occipital lobe signal intensity.  Prior risks and benefits discussion with patient today regarding risk for seizure given the postoperative/radiation changes in the brain involving the temporal lobes, seizure threshold, risks and benefits of medications.  Given that she is tolerating medication very well, she has extensive changes on MRI, and independent - high functioning baseline, I believe the risk of injury and to her lifestyle is more detrimental than staying on the Keppra.  She is higher risk compared to the general population to have a seizure due to the amount of scarring in her brain.  I personally believe the benefits outweigh the risks of being on Keppra. Patient agrees.   -Continue levETIRAcetam (KEPPRA) 500 MG Tab, 1 tablet twice daily  -She does not drive  -We previously discussed repeating EEG if she were to feel strongly about getting off any antiseizure medications to evaluate for abnormal discharges.  She declines for now since she is staying on Monday patients.  EEG at this point in time would not .    3. Malignant neoplasm of upper lobe of right lung (HCC): Doing  well    3. Atrial fibrillation (HCC): With pacemaker.  Established with Dr. Obregon cardiology.  As of August 2019 patient was placed on Eliquis for stroke prevention by Cardiology, switched from aspirin started previously by Dr. Luz (cards).  [id number] score 3, risk of stroke 3.2% per year.   -Long discussion with patient today including but not limited to the following: risk/benefits/side effects/alternatives with regards to management of stroke prevention in the setting of known atrial fibrillation and recent ICH. We reviewed treatment options including aspirin, anticoagulation, and no treatment.  We discussed the risk of ischemic stroke per year, risk of death and disability from ischemic stroke, risks of anticoagulation/antiplatelet use in the setting of recent ICH and higher risk of ICH if underlying brain metastasis from lung cancer is present including but not limited to risk of severe intracranial bleed, disability, and even death.  We discussed the risks of anticoagulation in general for stroke risk prevention. We discussed the follow-up plan for imaging as documented in problem #1.   -After risk/benefit discussion as above, patient has decided to stay on aspirin 325mg instead of Eliquis even if the follow up imaging findings are not suspicious for underlying tumor. She does not want to risk ICH on anticoagulation again.  -Ok to start aspirin 325mg daily for stroke prevention, Eliquis is to be discontinued. We can reevaluate her readiness for anticoagulation after follow up imaging obtained in case she changes her mind.     4.  Spell: Possible seizure in the setting of abnormal MRI brain and history of brain mets resolved per communication with oncology Dr. Mariano Aguilar 12/19/2019. Abnormal MRI changes thought to be secondary to post surgical/radiation necrosis . See HPI for documentation.     5. History of Bell's palsy: Suspect right side given clinical exam.    FOLLOW-UP: Return for 4-5/2020.      EDUCATION AND COUNSELING:  -I personally discussed the following with the patient:   As documented above.  We reviewed red flag symptoms of intracranial hemorrhage/stroke and if present to report to the emergency room immediately for further evaluation and care. Patient communicated understanding.     The patient understands and agrees that due to the complexity of his/her diagnosis, results of any testing and further recommendations will typically be discussed/made during a face to face encounter in my office. The patient and/or family further understands it is their responsibility to keep proper follow up.     Disclaimer  This dictation was created using voice recognition software. I have made every reasonable attempt to avoid dictation errors, but this document may contain an error not identified before finalizing. If the error changes the accuracy of the document, I would appreciate it being brought to my attention. Thank you very much.     Irina Rodriguez MD  Neurology, Neurophysiology  [facility]      Mariano Aguilar M.D.  [address] [place] [address]  VIA In Basket     Alfred Obregon M.D.  [address]   [address]  [place] [address]  VIA In Basket     Jahaira Santos A.P.R.NMicki  [address] [place] [address]  VIA In Basket

## 2019-12-20 NOTE — PROGRESS NOTES
Chief Complaint   Patient presents with   • Follow-Up     Metastasis to brain     History of present illness:  Ml Chavira 79 y.o. female presents today for abnormal brain MRI follow-up.   History is obtained from patient.  and Patient is accompanied by self.  She lives alone and has a medical alert device at home.     Duration/timing: Winter/November 2017  Context: Brain metastasis/spell: patient remembers feeling faint and couldn't get herself back up, found that her left side was weak when she woke up (no incontinence or tongue biting). Brain radiation 2015. Brain tumor resection 2015. She possibly had the left-sided weakness in 2015 but she is not sure.  Despite that the weakness she is experienced in the winter 2017 was far from her baseline.  That episode was occurring after trip to [place] and she was otherwise not acutely ill. She does not drive because of hemianopsia. She hs a hx of breast cancer without metastasis.  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  She is referred today for reevaluation on whether or not she needs Keppra.  Location: Brain  Quality: Weakness/swelling  Severity: Moderate  Modifying factors: Improved with time and steroid  Associated signs/symptoms: hx of bell palsy of uncertain side; occasional migraines  Denies: bladder incontinence, bowel incontinence, dizziness, headaches, vision changes, other weakness, numbness/tingling, swallowing difficulties, speech disturbance, depression, anxiety, memory loss, loss of consciousness, hallucinations, abnormal movements, diplopia and falls     Patient has tried:  -Keppra 500 mg p.o. twice daily, started 2017 in patient, no side effects      Subjective: Patient was last seen in neurology clinic in clinic in August 2019 by me.    Since last visit, I have been contacted by patient's oncologist regarding an abnormal MRI result obtained on 12/17/2019.     Patient was asked to return to clinic  urgently to discuss stroke prevention in the setting of atrial fibrillation. MRI brain is + for small subacute intraparenchymal hematoma. She has stopped Eliquis as of 12/19/2019 per instruction of her oncologist. Clinically patient feels well.  She had suffered a mild headache but otherwise no focal numbness/weakness/vision changes/swallowing difficulties or speech disturbance.  She has been started on Eliquis 5 mg twice daily in 8/2019 by her cardiologist Dr. Obregon approximately 2 weeks after my visit with her for atrial fibrillation.  Otherwise was tolerating the Eliquis well without any other bleeding.  She denies other breathing diathesis.    Otherwise no breakthrough events concerning for seizures.  She continues to tolerate Keppra well. Otherwise spell characteristics are unchanged from what is documented above.      Past medical history:   Past Medical History:   Diagnosis Date   • Breast cancer (HCC)    • Cancer (HCC)     lung cancer with brain mts   • Chickenpox    • Yi measles    • Healthcare maintenance 7/23/2018   • Influenza    • Joint pain    • Lung cancer (HCC)    • Mumps    • Need for vaccination 1/17/2019   • Radionecrosis 4/14/2018   • Sick sinus syndrome (HCC)     with AFIB, s/p pacemaker   • Thyroid disease    • Tonsillitis        Past surgical history:   Past Surgical History:   Procedure Laterality Date   • CRANIOTOMY STEALTH Right 11/23/2015    Procedure: CRANIOTOMY STEALTH-right occipital;  Surgeon: Suyapa Schumacher M.D.;  Location: SURGERY [facility];  Service:    • APPENDECTOMY     • CRANIOTOMY     • KNEE ARTHROSCOPY      left    • LUMPECTOMY     • OTHER      left knee surgery   • PACEMAKER INSERTION     • TONSILLECTOMY         Family history:   Family History   Problem Relation Age of Onset   • Dementia Mother    • Diabetes Mother    • Other Mother         osteoarthritis   • Arthritis Mother    • Autoimmune Disease Father         Rheumatoid arthritis   • Arthritis Father    •  "Cancer Brother         prostate        Social history:   Tobacco Use   • Smoking status: Former Smoker     Packs/day: 2.00     Years: 20.00     Pack years: 40.00     Last attempt to quit: 1960     Years since quittin.6   • Smokeless tobacco: Never Used   Substance and Sexual Activity   • Alcohol use: Yes     Alcohol/week: 1.8 oz     Types: 3 Glasses of wine per week   • Drug use: No   • Sexual activity: Not on file       Current medications:   Current Outpatient Medications   Medication   • potassium chloride ER (KLOR-CON) 10 MEQ tablet   • furosemide (LASIX) 20 MG Tab   • Cholecalciferol (VITAMIN D3) 2000 UNIT Cap   • alendronate (FOSAMAX) 10 MG Tab   • methimazole (TAPAZOLE) 5 MG Tab   • levETIRAcetam (KEPPRA) 500 MG Tab   • OYSCO 500 500 MG Tab   • BIOTIN PO   • Misc. Devices ([employer] PULSE OXIMETER) Misc   • flecainide (TAMBOCOR) 150 MG Tab   • metoprolol SR (TOPROL XL) 25 MG TABLET SR 24 HR     No current facility-administered medications for this visit.        Medication Allergy:  Allergies   Allergen Reactions   • Penicillins Rash and Itching     RXN \"a long time ago\"       Review of Systems   Constitutional: Negative for fever and weight loss.   HENT: Negative for sore throat.    Eyes: Negative for discharge.   Respiratory: Negative for shortness of breath.    Cardiovascular: Positive for leg swelling.   Gastrointestinal: Negative for abdominal pain.   Genitourinary: Negative for dysuria.   Musculoskeletal: Negative for falls.   Skin: Negative for rash.   Neurological:        As per HPI   Endo/Heme/Allergies: Does not bruise/bleed easily.   Psychiatric/Behavioral: Negative for hallucinations.       Physical examination:   Vitals:    19 1036   BP: 110/62   BP Location: Left arm   Patient Position: Sitting   BP Cuff Size: Adult   Pulse: 84   Temp: 36.7 °C (98 °F)   TempSrc: Temporal   SpO2: 92%   Weight: 70.3 kg (155 lb)   Height: 1.549 m (5' 1\")     General: Patient in well nourished in no apparent " distress. Elevated BMI.   Eyes: Ophthalmoscopic examination performed but discs cannot be visualized well enough to characterize bilaterally. Prior exam  HENT: Normocephalic, atraumatic.   Cardiovascular: No lower extremity edema.  Respiratory: Normal respiratory effort.   Skin: No appreciable signs of acute rashes or bruising.   Musculoskeletal: No signs of joint or muscle swelling.   Psychiatric: Pleasant.     NEUROLOGICAL EXAM:   Mental status: Awake, alert and fully oriented to person, place, time and situation. Normal attention, concentration and fund of knowledge for education level.   Speech and language: Speech is fluent without errors and clear.  Cranial nerve exam:  II: Pupils are equally round and reactive to light.  Left homonymous hemianopsia.  III, IV, VI: EOMI, no diplopia, mild right ptosis nonobstructive  V: Sensation to light touch is normal over V1-3 distributions bilaterally.    VII: Very mild blunting of the right nasolabial fold.  VIII: Hearing intact to soft speech  IX: Dysarthria is not present.  XI: Shoulder shrug are symmetrical and strong. Prior exam  XII: Tongue protrudes midline. Prior exam    Motor exam:  Muscle tone is normal in all 4 limbs.    Muscle strength: 5 out of 5 proximal distal right upper and lower extremity strength.  5- out of 5 proximal left upper and lower extremity strength.  Distally she is 5- as well.    Sensory exam:  Intact to Light touch in bilateral upper and lower extremity. No sensory extinction.    Deep tendon reflexes:       Right  Left  Biceps   0/4  0/4  Triceps  0/4  0/4  Brachioradialis 0/4  0/4  Knee jerk  0/4  0/4  Ankle jerk  0/4  0/4   bilateral toes are downgoing to plantar stimulation..    Coordination: shows a normal finger-nose-finger   Gait: Casual gait is with cane.       ANCILLARY DATA REVIEWED:   Lab Data Review:  Lab Results   Component Value Date/Time    WBC 5.9 12/05/2019 08:59 AM    RBC 4.33 12/05/2019 08:59 AM    HEMOGLOBIN 15.0 12/05/2019  08:59 AM    HEMATOCRIT 46.7 12/05/2019 08:59 AM    .9 (H) 12/05/2019 08:59 AM    MCH 34.6 (H) 12/05/2019 08:59 AM    MCHC 32.1 (L) 12/05/2019 08:59 AM    MPV 9.6 12/05/2019 08:59 AM    NEUTSPOLYS 69.90 12/05/2019 08:59 AM    LYMPHOCYTES 19.80 (L) 12/05/2019 08:59 AM    MONOCYTES 7.30 12/05/2019 08:59 AM    EOSINOPHILS 2.00 12/05/2019 08:59 AM    BASOPHILS 0.80 12/05/2019 08:59 AM      Lab Results   Component Value Date/Time    SODIUM 142 12/05/2019 08:59 AM    POTASSIUM 4.1 12/05/2019 08:59 AM    CHLORIDE 104 12/05/2019 08:59 AM    CO2 28 12/05/2019 08:59 AM    GLUCOSE 83 12/05/2019 08:59 AM    BUN 19 12/05/2019 08:59 AM    CREATININE 0.86 12/05/2019 08:59 AM     Lab Results   Component Value Date/Time    ASTSGOT 22 06/04/2019 1531    ALTSGPT 22 06/04/2019 1531    ALKPHOSPHAT 55 06/04/2019 1531    ALBUMIN 4.4 06/04/2019 1531     Lab Results   Component Value Date/Time    HBA1C 5.3 11/30/2017 03:38 AM      May 2018 PT INR and APTT normal  Platelets December 5 2019, 235 (normal)    Imaging:   MRI brain June 2019:  1.  Status post right parietal-occipital craniotomy.  2.  Stable posttreatment appearance of the right temporal, parietal and occipital lobes with heterogeneous T2 signal intensity, T1 signal hyperintensity and enhancement. No evidence of discrete enhancing mass to suggest residual or recurrent neoplasm.  3.  No new enhancing foci are identified to suggest new metastatic lesions.  4.  Mild cerebral substance loss.    MRI brain with without contrast December 17, 2019:  1.  Status post right parietal-occipital craniotomy.  2.  Stable posttreatment appearance of the right temporal, parietal and occipital lobes with heterogeneous T2 signal intensity, T1 signal hyperintensity and enhancement. There is a new 12 mm rounded focus in the right parietal lobe with T2 hypointense rim, possibly representing small subacute hematoma. A metastatic lesion is difficult to exclude and attention on follow-up is  necessary.  3.  Nonspecific linear focus of enhancement within/along the left lateral ab, possibly artifactual. Attention to this on follow-up.  4.  Mild cerebral substance loss.    CT head without contrast December 19, 2019: Follow-up brain MRI  10 x 11 mm rounded focus of increased density in the location of the new lesion appreciated on recent MR in the right parietal lobe thought to represent a small subacute hematoma. CT findings could be compatible with subacute hematoma. The size of the   finding does not appear to have changed when compared to recent prior MRI. It is difficult to assess for metastatic lesion without contrast. Short-term follow-up reassessment is recommended. Postoperative changes in the right cerebral hemisphere appear stable when compared to recent prior MRI.    I have personally reviewed the patient's imaging as above and agree with the radiologist's interpretation.     Records reviewed: Documentation from Dr. Obregon (cardiology) medical assistant stating will defer anticoagulation to neurologist.      ASSESSMENT AND PLAN:    1.  Intracranial hemorrhage (HCC), new: Incidental finding on oncology follow-up MRI on December 17 2019 with a new 12 millimeter round focus in the right parietal lobe confirmed as blood on follow-up CT head 12/19/2019.  Suspected to be subacute in nature and stable based on repeat imaging findings.  Patient was anticoagulated on Eliquis 5 mg twice daily at this time.  Possible mild headache but otherwise asymptomatic.  Differential diagnosis includes secondary to anticoagulation/spontaneous versus new small metastatic lesion after thorough review of imaging with neuroradiology.  Platelets and INR have been in normal limits in the past.  -Repeat CT head in approximately 4-5 weeks (end 1/2020) to ensure resolution of blood/hyperdensity followed by MRI of the brain with/without contrast in 3 months (middle 3/2020) from initial MRI brain to evaluate for underlying  tumor. See problem #3. Both studies ordered today.     2. Metastasis to brain (HCC), resolved?: Patient with a history of lung cancer metastasis to the brain status post resection with radiation in 2015 complicated by radiation necrosis and vasogenic edema in 2017 status post Decadron IV.  At that time patient was started on Keppra by inpatient neurology.  She has been on the medication since that time without issue.  It is unclear since she woke up with her left-sided weakness if she had any seizure activity and Oneil's paralysis.  MRI brain in June 2016 with right temporal parietal and occipital lobe signal intensity.  Prior risks and benefits discussion with patient today regarding risk for seizure given the postoperative/radiation changes in the brain involving the temporal lobes, seizure threshold, risks and benefits of medications.  Given that she is tolerating medication very well, she has extensive changes on MRI, and independent - high functioning baseline, I believe the risk of injury and to her lifestyle is more detrimental than staying on the Keppra.  She is higher risk compared to the general population to have a seizure due to the amount of scarring in her brain.  I personally believe the benefits outweigh the risks of being on Keppra. Patient agrees.   -Continue levETIRAcetam (KEPPRA) 500 MG Tab, 1 tablet twice daily  -She does not drive  -We previously discussed repeating EEG if she were to feel strongly about getting off any antiseizure medications to evaluate for abnormal discharges.  She declines for now since she is staying on Monday patients.  EEG at this point in time would not .    3. Malignant neoplasm of upper lobe of right lung (HCC): Doing well    3. Atrial fibrillation (HCC): With pacemaker.  Established with Dr. Obregon cardiology.  As of August 2019 patient was placed on Eliquis for stroke prevention by Cardiology, switched from aspirin started previously by Dr. Luz  (cards).  [id number] score 3, risk of stroke 3.2% per year.   -Long discussion with patient today including but not limited to the following: risk/benefits/side effects/alternatives with regards to management of stroke prevention in the setting of known atrial fibrillation and recent ICH. We reviewed treatment options including aspirin, anticoagulation, and no treatment.  We discussed the risk of ischemic stroke per year, risk of death and disability from ischemic stroke, risks of anticoagulation/antiplatelet use in the setting of recent ICH and higher risk of ICH if underlying brain metastasis from lung cancer is present including but not limited to risk of severe intracranial bleed, disability, and even death.  We discussed the risks of anticoagulation in general for stroke risk prevention. We discussed the follow-up plan for imaging as documented in problem #1.   -After risk/benefit discussion as above, patient has decided to stay on aspirin 325mg instead of Eliquis even if the follow up imaging findings are not suspicious for underlying tumor. She does not want to risk ICH on anticoagulation again.  -Ok to start aspirin 325mg daily for stroke prevention, Eliquis is to be discontinued. We can reevaluate her readiness for anticoagulation after follow up imaging obtained in case she changes her mind.     4.  Spell: Possible seizure in the setting of abnormal MRI brain and history of brain mets resolved per communication with oncology Dr. Mariano Aguilar 12/19/2019. Abnormal MRI changes thought to be secondary to post surgical/radiation necrosis . See HPI for documentation.     5. History of Bell's palsy: Suspect right side given clinical exam.    FOLLOW-UP: Return for 4-5/2020.   EDUCATION AND COUNSELING:  -I personally discussed the following with the patient:   As documented above.  We reviewed red flag symptoms of intracranial hemorrhage/stroke and if present to report to the emergency room immediately for  further evaluation and care. Patient communicated understanding.     The patient understands and agrees that due to the complexity of his/her diagnosis, results of any testing and further recommendations will typically be discussed/made during a face to face encounter in my office. The patient and/or family further understands it is their responsibility to keep proper follow up.     Disclaimer  This dictation was created using voice recognition software. I have made every reasonable attempt to avoid dictation errors, but this document may contain an error not identified before finalizing. If the error changes the accuracy of the document, I would appreciate it being brought to my attention. Thank you very much.     Irina Rodriguez MD  Neurology, Neurophysiology  [facility]

## 2019-12-27 ENCOUNTER — OFFICE VISIT (OUTPATIENT)
Dept: MEDICAL GROUP | Facility: IMAGING CENTER | Age: 79
End: 2019-12-27
Payer: MEDICARE

## 2019-12-27 VITALS
SYSTOLIC BLOOD PRESSURE: 112 MMHG | BODY MASS INDEX: 29.64 KG/M2 | HEART RATE: 68 BPM | HEIGHT: 61 IN | OXYGEN SATURATION: 97 % | RESPIRATION RATE: 14 BRPM | TEMPERATURE: 98.7 F | WEIGHT: 157 LBS | DIASTOLIC BLOOD PRESSURE: 62 MMHG

## 2019-12-27 DIAGNOSIS — Z79.899 ENCOUNTER FOR LONG-TERM CURRENT USE OF MEDICATION: ICD-10-CM

## 2019-12-27 DIAGNOSIS — R26.89 BALANCE DISORDER: ICD-10-CM

## 2019-12-27 DIAGNOSIS — Z91.81 AT RISK FOR FALLING: ICD-10-CM

## 2019-12-27 DIAGNOSIS — R60.0 LOWER EXTREMITY EDEMA: Primary | ICD-10-CM

## 2019-12-27 DIAGNOSIS — Z99.89 USE OF CANE AS AMBULATORY AID: ICD-10-CM

## 2019-12-27 PROCEDURE — 99214 OFFICE O/P EST MOD 30 MIN: CPT | Performed by: NURSE PRACTITIONER

## 2019-12-28 NOTE — ASSESSMENT & PLAN NOTE
States that she has started using Lasix 20 mg with potassium chloride ER 10 mEq tablets as needed.  States that she is taking Lasix about 1-2 times a week for lower leg edema.  States that she has seen improvement in her lower leg extremities since last visit, and this is why she has decreased her dosing since seeing provider.  She continues wearing her compression stockings every day during her waking hours.  States she has noticed an improvement of her lower extremity edema due to compression stockings.  Denies any hypotensive events, LOC, dizziness, or falling.

## 2019-12-28 NOTE — ASSESSMENT & PLAN NOTE
States that she continues to go to physical therapy/occupational therapy twice a week for continued efforts to improve and maintain her balance.  Patient states that she continues to see improvement.  States that she only uses her cane when she is going out into the public, and not familiar with the ground and its consistency

## 2019-12-28 NOTE — PROGRESS NOTES
Subjective:     CC: Medication Management (taken off eliquis due to brain bleed )    HPI:   Ml presents today for follow up after seeing neurology and current use of lasix.     States that she was seen on 12/20/19 by Irina Rodriguez MD to discuss her recent MRI due to oncologist reviewing MRI on 12/17/19 and noted to be abnormal. It was determined at that time she was to stop her Eliquis due to a concerning finding on MRI that suggest a small subacute hematoma. It was suggested by Irina Rodriguez MD at this time it cannot be excluded as a metastatic lesion. Plan of care is to repeat CT head in approx. 4-5 weeks then to follow in about 3 months. Patient denies any falling since this finding. Denies and cognitive changes, headache, or muscle weakness.  States that she continues taking 81 mg of ASA daily.  States that this is an approved plan of care with neurologist.    Balance disorder  States that she continues to go to physical therapy/occupational therapy twice a week for continued efforts to improve and maintain her balance.  Patient states that she continues to see improvement.  States that she only uses her cane when she is going out into the public, and not familiar with the ground and its consistency    Lower extremity edema  States that she has started using Lasix 20 mg with potassium chloride ER 10 mEq tablets as needed.  States that she is taking Lasix about 1-2 times a week for lower leg edema.  States that she has seen improvement in her lower leg extremities since last visit, and this is why she has decreased her dosing since seeing provider.  She continues wearing her compression stockings every day during her waking hours.  States she has noticed an improvement of her lower extremity edema due to compression stockings.  Denies any hypotensive events, LOC, dizziness, or falling.     Past Medical History:   Diagnosis Date   • Breast cancer (HCC)    • Cancer (HCC)     lung cancer with brain mts   •  Chickenpox    • [place] measles    • Healthcare maintenance 2018   • Influenza    • Joint pain    • Lung cancer (HCC)    • Mumps    • Need for vaccination 2019   • Radionecrosis 2018   • Sick sinus syndrome (HCC)     with AFIB, s/p pacemaker   • Thyroid disease    • Tonsillitis      Social History     Tobacco Use   • Smoking status: Former Smoker     Packs/day: 2.00     Years: 20.00     Pack years: 40.00     Last attempt to quit: 1960     Years since quittin.0   • Smokeless tobacco: Never Used   Substance Use Topics   • Alcohol use: Yes     Alcohol/week: 1.8 oz     Types: 3 Glasses of wine per week   • Drug use: No     Current Outpatient Medications Ordered in Epic   Medication Sig Dispense Refill   • potassium chloride ER (KLOR-CON) 10 MEQ tablet Take 1 Tab by mouth every day. 90 Tab 0   • furosemide (LASIX) 20 MG Tab Take 1 Tab by mouth every day. Take in am. 90 Tab 0   • Cholecalciferol (VITAMIN D3) 2000 UNIT Cap Take 2,000 Units by mouth. Indications: Daily Treatment with Aspirin Dose Greater Then 325 mg     • alendronate (FOSAMAX) 10 MG Tab Take 1 Tab by mouth every morning before breakfast. 30 Tab 3   • methimazole (TAPAZOLE) 5 MG Tab Take 0.5 Tabs by mouth every day. 30 Tab 3   • levETIRAcetam (KEPPRA) 500 MG Tab Take 1 Tab by mouth 2 Times a Day. 180 Tab 3   • OYSCO 500 500 MG Tab TAKE 1 TAB BY MOUTH 2 TIMES A DAY, WITH MEALS. NOT COV  5   • BIOTIN PO Take  by mouth every day.     • Misc. Devices ([facility] PULSE OXIMETER) Misc 1 Each by Does not apply route 4 times a day as needed. 1 Each 0   • flecainide (TAMBOCOR) 150 MG Tab TAKE 1/2 TABLET BY MOUTH 2 TIMES A DAY. 90 Tab 1   • metoprolol SR (TOPROL XL) 25 MG TABLET SR 24 HR Take 25 mg by mouth every day.  3     No current [facility]-ordered facility-administered medications on file.      Allergies:  Penicillins    ROS:  Constitutional: Denies fever, chills, night sweats, weight loss/gain or malaise/fatigue.   HENT: Denies headache, nasal  "congestion, sore throat, hearing loss, enlarged thyroid, or difficulty swallowing.    Eyes: Denies changes in vision, pain. Wears corrective wear. Since removal brain tumor in 2015, pt. reports that she has had decreased peripheral vision, worse in left eye. Does not drive.   Respiratory: Denies cough, SOB at rest or activity.    Cardiovascular: Denies tachycardia, chest pain, or palpitations. Improved leg swelling.  Pt. has a pace maker due to the history of sick sinus syndrome.  Gastrointestinal: Denies N/V/C/D, abdominal pain, loss appetite, reflux, or hematochezia.  Genitourinary: Denies difficulty voiding, dysuria, nocturia, or hematuria.   Skin: Negative for rash or worrisome moles.   Neurological: Negative for dizziness, focal weakness and headaches.   Endo/Heme/Allergies: Denies bruise/bleed easily, allergies.   Psychiatric/Behavioral: Denies depression, nervous/anxious, difficulty sleeping. Nightly BiPap.    Objective:     Exam:  /62 (BP Location: Left arm, Patient Position: Sitting, BP Cuff Size: Adult)   Pulse 68   Temp 37.1 °C (98.7 °F) (Temporal)   Resp 14   Ht 1.549 m (5' 1\")   Wt 71.2 kg (157 lb)   SpO2 97%   BMI 29.66 kg/m²  Body mass index is 29.66 kg/m².  General: Normal appearing. No distress.  HEENT: Normocephalic. Eyes conjunctiva clear lids without ptosis, PERRLA, ears normal shape and contour.Teeth intact. Absent lateral peripheral vision in left eye. Limited  lateral peripheral vision in right eye, decrease medial peripheral in right eye.  Neck: Supple without JVD or abnormal masses. Small soft mobile thyroid palpated, no nodules palpated, non-tender.  Pulmonary: Clear to ausculation.  Normal effort. No rales, ronchi, or wheezing.  Cardiovascular: Regular rate and rhythm without murmur. Pedal and radial pulses are intact and equal bilaterally.  Abdomen: Soft, nontender, nondistended. Normal bowel sounds. Liver and spleen are not palpable  Neurologic: CN 2-12 are intact.  Lymph: " No cervical or supraclavicular lymph nodes are palpable  Skin: Warm and dry.  No obvious lesions.  Musculoskeletal: Normal gait. No extremity cyanosis, clubbing, or edema. MSK strength 4/5, DTR+2. Bilateral mild non-pitting edema of lower extremities.   Psych: Normal mood and affect. Alert and oriented x3. Judgment and insight is normal.    Assessment & Plan:   1. Encounter for long-term current use of medication  Reviewed with patient medication use and side effects. Medical, past, surgical history reviewed with patient.     2. Lower extremity edema  This is a chronic stable condition.  Reviewed recent CMP results with patient.  Dhiraj with patient when she was taking Lasix daily her potassium was stable at 4.  It is assumed with intermittent use of same dosage that her potassium will remain normal.  Discussed with patient intermittently checking CMP.  Patient instructed to continue current regimen of using Lasix as needed.  Informed patient that is important if she is taking Lasix that she will need to continue her potassium chloride 10 mEq.  Patient encouraged to continue wearing compression stockings during her wake hours.    3. At risk for falling  4. Use of cane as ambulatory aid  5. Balance disorder  This is a chronic stable condition.  Patient encouraged to continue physical therapy/Occupational Therapy twice a week for continued improvement of balance, and appropriate use of cane for ambulatory aid.  Discussed with patient that she is at increased risk of falling due to using the cane.  Verbalized understanding.  Discussed with patient the importance of reporting any falls due to current MRI results and being on ASA 81 mg daily.  Discussed with patient the importance of telling trusted loved ones and and current staff at her current living condition of her most recent MRI results, and to educate them on possible stroke symptoms.  Symptoms reviewed with patient, and verbalized understanding, while being able  to state back to provider.  Patient verbalized understanding and willingness to informed involved parties.      Return in about 6 months (around 6/27/2020).    Please note that this dictation was created using voice recognition software. I have made every reasonable attempt to correct obvious errors, but I expect that there are errors of grammar and possibly content that I did not discover before finalizing the note.

## 2019-12-30 ENCOUNTER — SLEEP CENTER VISIT (OUTPATIENT)
Dept: SLEEP MEDICINE | Facility: MEDICAL CENTER | Age: 79
End: 2019-12-30
Payer: MEDICARE

## 2019-12-30 VITALS
DIASTOLIC BLOOD PRESSURE: 62 MMHG | WEIGHT: 156 LBS | BODY MASS INDEX: 29.45 KG/M2 | OXYGEN SATURATION: 92 % | SYSTOLIC BLOOD PRESSURE: 128 MMHG | HEART RATE: 71 BPM | HEIGHT: 61 IN

## 2019-12-30 DIAGNOSIS — G47.33 OSA (OBSTRUCTIVE SLEEP APNEA): ICD-10-CM

## 2019-12-30 PROCEDURE — 99214 OFFICE O/P EST MOD 30 MIN: CPT | Performed by: NURSE PRACTITIONER

## 2019-12-30 NOTE — PATIENT INSTRUCTIONS
1) Start BIPAP at 20/[id number] with 2L oxygen  2) Clean mask and supplies weekly and change them as insurance allows  3) Light conditioning encouraged  4) Continue smoking cessation   5) Vaccines: Up to date with Prevnar 13  6) Return in about 3 months (around 3/30/2020) for follow up with NICKY Gotti, if not sooner, Compliance.

## 2019-12-30 NOTE — PROGRESS NOTES
CC:  Here for f/u sleep issues as listed below    HPI:   Ml presents today for follow up obstructive sleep apnea.  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  Sick sinus syndrome, hyperthyroidism chronic atrial fibrillation on flecainide, CVA.       PSG from 7/2019 indicated an AHI of 65.3 that increased to 90.9 while supine and low oxygenation of 72%.      Currently she is being treated with BIPAP @ 20/[id number].  Compliance download from the dates 10/19/2019 - 12/29/2019 indicates she is wearing the device 49% for an avg of 5 hours and 38 minutes per night with a reduced AHI of 6.2.  Mask leaking average of 28 minutes up to 115.7 minutes.    She does not tolerate pressure, because the mask is frequently leaking causing intolerance.  She has tried multiple masks and has become frustrated with her NEBOTRADE not helping her as well as but she wishes to obtain a better fitting mask.  She has not found benefit using the machine because she is frequently awoken due to frequent mask leaking.  They deny morning H/A.  She will continue to clean supplies weekly and change them as insurance allows.    Patient is currently sleeping 8 hours per night with 2x nighttime awakenings. They have no trouble falling asleep.  Watches TV or listens to book before bed.  They sometimes feel refreshed in the morning and denies morning H/A. They feel tired throughout the day and naps 3-4 per week for appx 20-45 min long.  Patient reports snoring, apnea events and paroxysmal nocturnal dyspnea events. She is concerned that she is gaining weight. She did not follow through with nutritionist due to cost. She will discuss weight increase 2/2 SE of medication that her endocrinologist placed her on. Walking daily.  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   • Balance disorder 06/21/2018   • Radionecrosis 04/14/2018   • Malignant neoplasm of upper lobe of right lung (HCC) 03/05/2018   • CVA (cerebral vascular accident) (HCC) 11/29/2017   • Allergic rhinitis 01/12/2016   • Hilar adenopathy 01/12/2016   • History of breast cancer 01/12/2016   • Lung mass 11/19/2015   • Blurry vision, left eye 11/19/2015   • Metastasis to brain (HCC) 11/18/2015   • Metastatic cancer (CMS-HCC) 11/18/2015       Past Medical History:   Diagnosis Date   • Breast cancer (HCC)    • Cancer (HCC)     lung cancer with brain mts   • Chickenpox    • [place]zer[place] measles    • Healthcare maintenance 7/23/2018   • Influenza    • Joint pain    • Lung cancer (HCC)    • Mumps    • Need for vaccination 1/17/2019   • Radionecrosis 4/14/2018   • Sick sinus syndrome (HCC)     with AFIB, s/p pacemaker   • Thyroid disease    • Tonsillitis        Past Surgical History:   Procedure Laterality Date   • CRANIOTOMY STEALTH Right 11/23/2015    Procedure: CRANIOTOMY STEALTH-right occipital;  Surgeon: Suyapa Schumacher M.D.;  Location: SURGERY [facility];  Service:    • APPENDECTOMY     • CRANIOTOMY     • KNEE ARTHROSCOPY      left    • LUMPECTOMY     • OTHER      left knee surgery   • PACEMAKER INSERTION     • TONSILLECTOMY         Family History   Problem Relation Age of Onset   • Dementia Mother    • Diabetes Mother    • Other Mother         osteoarthritis   • Arthritis Mother    • Autoimmune Disease Father         Rheumatoid arthritis   • Arthritis Father    • Cancer Brother         prostate        Social History      Socioeconomic History   • Marital status:      Spouse name: Not on file   • Number of children: Not on file   • Years of education: Not on file   • Highest education level: Not on file   Occupational History   • Not on file   Social Needs   • Financial resource strain: Not on file   • Food insecurity:     Worry: Not on file     Inability: Not on file   • Transportation needs:     Medical: Not on file     Non-medical: Not on file   Tobacco Use   • Smoking status: Former Smoker     Packs/day: 2.00     Years: 20.00     Pack years: 40.00     Last attempt to quit: 1960     Years since quittin.0   • Smokeless tobacco: Never Used   Substance and Sexual Activity   • Alcohol use: Yes     Alcohol/week: 1.8 oz     Types: 3 Glasses of wine per week   • Drug use: No   • Sexual activity: Not on file   Lifestyle   • Physical activity:     Days per week: 4 days     Minutes per session: 60 min   • Stress: Not on file   Relationships   • Social connections:     Talks on phone: More than three times a week     Gets together: More than three times a week     Attends [employer] service: Never     Active member of club or organization: Yes     Attends meetings of clubs or organizations: More than 4 times per year     Relationship status:    • Intimate partner violence:     Fear of current or ex partner: No     Emotionally abused: No     Physically abused: No     Forced sexual activity: No   Other Topics Concern   • Not on file   Social History Narrative   • Not on file       Current Outpatient Medications   Medication Sig Dispense Refill   • potassium chloride ER (KLOR-CON) 10 MEQ tablet Take 1 Tab by mouth every day. 90 Tab 0   • furosemide (LASIX) 20 MG Tab Take 1 Tab by mouth every day. Take in am. 90 Tab 0   • Cholecalciferol (VITAMIN D3) 2000 UNIT Cap Take 2,000 Units by mouth. Indications: Daily Treatment with Aspirin Dose Greater Then 325 mg     • alendronate (FOSAMAX) 10 MG Tab Take 1 Tab by mouth every  "morning before breakfast. 30 Tab 3   • methimazole (TAPAZOLE) 5 MG Tab Take 0.5 Tabs by mouth every day. 30 Tab 3   • levETIRAcetam (KEPPRA) 500 MG Tab Take 1 Tab by mouth 2 Times a Day. 180 Tab 3   • OYSCO 500 500 MG Tab TAKE 1 TAB BY MOUTH 2 TIMES A DAY, WITH MEALS. NOT COV  5   • BIOTIN PO Take  by mouth every day.     • flecainide (TAMBOCOR) 150 MG Tab TAKE 1/2 TABLET BY MOUTH 2 TIMES A DAY. 90 Tab 1   • metoprolol SR (TOPROL XL) 25 MG TABLET SR 24 HR Take 25 mg by mouth every day.  3   • Misc. Devices ([facility] PULSE OXIMETER) Misc 1 Each by Does not apply route 4 times a day as needed. 1 Each 0     No current facility-administered medications for this visit.           Allergies: Penicillins      ROS   Gen: Denies fever, chills, unintentional weight loss, fatigue  Resp:Denies Dyspnea  CV: Denies chest pain, chest tightness  Sleep:Denies morning headache, insomnia,  Neuro: Denies frequent headaches, weakness, dizziness  See HPI.  All other systems reviewed and negative        Vital signs for this encounter:  Vitals:    12/30/19 1303   Height: 1.549 m (5' 1\")   Weight: 70.8 kg (156 lb)   Weight % change since last entry.: 0 %   BP: 128/62   Pulse: 71   BMI (Calculated): 29.48                   Physical Exam:   Gen:         Alert and oriented, No apparent distress.   Neck:        No Lymphadenopathy.  Lungs:     Clear to auscultation bilaterally.    CV:          Regular rate and rhythm. No murmurs, rubs or gallops.   Abd:         Soft non tender, non distended.            Ext:          No clubbing, cyanosis, edema.    Assessment   1. VITO (obstructive sleep apnea)  DME Other       Patient is clinically stable and will proceed with following plan.  Face-to-face time greater than 50% of 25 minutes reviewing questions and concerns relating to her machine masks, DME companies.  She is trying her hardest to meet Medicare guidelines and understands she needs to meet guidelines in order to keep her machine.  We fitted her with " a different mask today in order to help accommodate her mask leaking.  Reviewed the possibility of adding chinstrap as well, although she had tried it one night without benefit.    PLAN:   Patient Instructions   1) Continue BIPAP and decrease pressure 18/[id number] with 2L oxygen - drop pressure to accommodate for less leaks in order to acclimate to machine in order to meet Medicare guidelines  2) Clean mask and supplies weekly and change them as insurance allows  3) Light conditioning encouraged  4) Continue smoking cessation   5) Vaccines: Up to date with Prevnar 13  6) Return in about 3 months (around 3/30/2020) for follow up with NICKY Gotti, if not sooner, Compliance.

## 2020-01-13 ENCOUNTER — TELEPHONE (OUTPATIENT)
Dept: MEDICAL GROUP | Facility: IMAGING CENTER | Age: 80
End: 2020-01-13

## 2020-01-13 NOTE — TELEPHONE ENCOUNTER
Message received 1/10/2020 from  staff. Pt called to see if she could replace her diuretic with apple cider vinegar tablets. Reviewed with Jahaira Santos and left message for pt that we would look into this and get back to her soon.

## 2020-01-15 RX ORDER — ALENDRONATE SODIUM 10 MG/1
TABLET ORAL
Qty: 90 TAB | Refills: 1 | Status: SHIPPED | OUTPATIENT
Start: 2020-01-15 | End: 2020-01-28

## 2020-01-15 NOTE — TELEPHONE ENCOUNTER
Was the patient seen in the last year in this department? Yes    Does patient have an active prescription for medications requested? Yes    Received Request Via: Pharm[facility]     [facility]/ Dr. Joseph   alendronate (FOSAMAX) 10 MG Tab        Sig: TAKE 1 TABLET BY MOUTH EVERY DAY BEFORE BREAKFAST

## 2020-01-16 ENCOUNTER — APPOINTMENT (RX ONLY)
Dept: URBAN - METROPOLITAN AREA CLINIC 4 | Facility: CLINIC | Age: 80
Setting detail: DERMATOLOGY
End: 2020-01-16

## 2020-01-16 DIAGNOSIS — D18.0 HEMANGIOMA: ICD-10-CM

## 2020-01-16 DIAGNOSIS — Z71.89 OTHER SPECIFIED COUNSELING: ICD-10-CM

## 2020-01-16 DIAGNOSIS — D22 MELANOCYTIC NEVI: ICD-10-CM

## 2020-01-16 DIAGNOSIS — L70.8 OTHER ACNE: ICD-10-CM

## 2020-01-16 DIAGNOSIS — L82.1 OTHER SEBORRHEIC KERATOSIS: ICD-10-CM

## 2020-01-16 DIAGNOSIS — L81.4 OTHER MELANIN HYPERPIGMENTATION: ICD-10-CM

## 2020-01-16 PROBLEM — D22.61 MELANOCYTIC NEVI OF RIGHT UPPER LIMB, INCLUDING SHOULDER: Status: ACTIVE | Noted: 2020-01-16

## 2020-01-16 PROBLEM — D22.62 MELANOCYTIC NEVI OF LEFT UPPER LIMB, INCLUDING SHOULDER: Status: ACTIVE | Noted: 2020-01-16

## 2020-01-16 PROBLEM — D18.01 HEMANGIOMA OF SKIN AND SUBCUTANEOUS TISSUE: Status: ACTIVE | Noted: 2020-01-16

## 2020-01-16 PROBLEM — D48.5 NEOPLASM OF UNCERTAIN BEHAVIOR OF SKIN: Status: ACTIVE | Noted: 2020-01-16

## 2020-01-16 PROBLEM — D22.5 MELANOCYTIC NEVI OF TRUNK: Status: ACTIVE | Noted: 2020-01-16

## 2020-01-16 PROCEDURE — ? COSMETIC EXTRACTIONS

## 2020-01-16 PROCEDURE — ? BIOPSY BY SHAVE METHOD

## 2020-01-16 PROCEDURE — 11102 TANGNTL BX SKIN SINGLE LES: CPT

## 2020-01-16 PROCEDURE — 99203 OFFICE O/P NEW LOW 30 MIN: CPT | Mod: 25

## 2020-01-16 PROCEDURE — ? LIQUID NITROGEN

## 2020-01-16 PROCEDURE — ? COUNSELING

## 2020-01-16 ASSESSMENT — LOCATION SIMPLE DESCRIPTION DERM
LOCATION SIMPLE: CHEST
LOCATION SIMPLE: RIGHT UPPER ARM
LOCATION SIMPLE: RIGHT PRETIBIAL REGION
LOCATION SIMPLE: LEFT POSTERIOR UPPER ARM
LOCATION SIMPLE: RIGHT FOREHEAD
LOCATION SIMPLE: ABDOMEN
LOCATION SIMPLE: LEFT LOWER BACK
LOCATION SIMPLE: LEFT FOREHEAD
LOCATION SIMPLE: RIGHT FOREARM
LOCATION SIMPLE: LEFT UPPER ARM
LOCATION SIMPLE: LEFT UPPER BACK
LOCATION SIMPLE: LEFT ELBOW
LOCATION SIMPLE: LEFT PRETIBIAL REGION
LOCATION SIMPLE: RIGHT POSTERIOR UPPER ARM
LOCATION SIMPLE: UPPER BACK
LOCATION SIMPLE: LEFT ANTERIOR NECK
LOCATION SIMPLE: LOWER BACK
LOCATION SIMPLE: LEFT FOREARM
LOCATION SIMPLE: RIGHT UPPER BACK
LOCATION SIMPLE: RIGHT SHOULDER

## 2020-01-16 ASSESSMENT — LOCATION DETAILED DESCRIPTION DERM
LOCATION DETAILED: RIGHT INFERIOR FOREHEAD
LOCATION DETAILED: LEFT ELBOW
LOCATION DETAILED: RIGHT MEDIAL UPPER BACK
LOCATION DETAILED: RIGHT DISTAL PRETIBIAL REGION
LOCATION DETAILED: LEFT INFERIOR LATERAL NECK
LOCATION DETAILED: PERIUMBILICAL SKIN
LOCATION DETAILED: LEFT INFERIOR MEDIAL FOREHEAD
LOCATION DETAILED: RIGHT PROXIMAL POSTERIOR UPPER ARM
LOCATION DETAILED: SUPERIOR THORACIC SPINE
LOCATION DETAILED: RIGHT INFERIOR MEDIAL UPPER BACK
LOCATION DETAILED: RIGHT MID-UPPER BACK
LOCATION DETAILED: LEFT MID-UPPER BACK
LOCATION DETAILED: RIGHT INFERIOR LATERAL UPPER BACK
LOCATION DETAILED: RIGHT INFERIOR UPPER BACK
LOCATION DETAILED: SUBXIPHOID
LOCATION DETAILED: LEFT SUPERIOR MEDIAL MIDBACK
LOCATION DETAILED: LEFT CLAVICULAR NECK
LOCATION DETAILED: RIGHT LATERAL ABDOMEN
LOCATION DETAILED: LEFT INFERIOR MEDIAL MIDBACK
LOCATION DETAILED: RIGHT ANTERIOR DISTAL UPPER ARM
LOCATION DETAILED: SUPERIOR LUMBAR SPINE
LOCATION DETAILED: RIGHT SUPERIOR MEDIAL UPPER BACK
LOCATION DETAILED: LEFT LATERAL ABDOMEN
LOCATION DETAILED: RIGHT ANTECUBITAL SKIN
LOCATION DETAILED: RIGHT VENTRAL PROXIMAL FOREARM
LOCATION DETAILED: LEFT ANTECUBITAL SKIN
LOCATION DETAILED: LEFT DISTAL POSTERIOR UPPER ARM
LOCATION DETAILED: LEFT VENTRAL PROXIMAL FOREARM
LOCATION DETAILED: LOWER STERNUM
LOCATION DETAILED: RIGHT SUPERIOR UPPER BACK
LOCATION DETAILED: RIGHT LATERAL UPPER BACK
LOCATION DETAILED: RIGHT ANTERIOR SHOULDER
LOCATION DETAILED: RIGHT RIB CAGE
LOCATION DETAILED: LEFT LATERAL SUPERIOR CHEST
LOCATION DETAILED: LEFT VENTRAL DISTAL FOREARM
LOCATION DETAILED: LEFT ANTERIOR PROXIMAL UPPER ARM
LOCATION DETAILED: LEFT DISTAL PRETIBIAL REGION
LOCATION DETAILED: LEFT INFERIOR UPPER BACK

## 2020-01-16 ASSESSMENT — LOCATION ZONE DERM
LOCATION ZONE: TRUNK
LOCATION ZONE: FACE
LOCATION ZONE: NECK
LOCATION ZONE: ARM
LOCATION ZONE: LEG

## 2020-01-16 NOTE — PROCEDURE: BIOPSY BY SHAVE METHOD
Silver Nitrate Text: The wound bed was treated with silver nitrate after the biopsy was performed.
Render Post-Care Instructions In Note?: no
Dressing: bandage
Anesthesia Volume In Cc: 0.5
Electrodesiccation And Curettage Text: The wound bed was treated with electrodesiccation and curettage after the biopsy was performed.
Type Of Destruction Used: Electrodesiccation
Biopsy Type: H and E
X Size Of Lesion In Cm: 0
Consent: Written consent was obtained and risks were reviewed including but not limited to scarring, infection, bleeding, scabbing, incomplete removal, nerve damage and allergy to anesthesia.
Depth Of Biopsy: dermis
Notification Instructions: Patient will be notified of biopsy results. However, patient instructed to call the office if not contacted within 2 weeks.
Electrodesiccation Text: The wound bed was treated with electrodesiccation after the biopsy was performed.
Wound Care: Vaseline
Size Of Lesion In Cm: 2
Anesthesia Type: 1% lidocaine with epinephrine
Post-Care Instructions: I reviewed with the patient in detail post-care instructions. Patient is to keep the biopsy site dry overnight, and then apply bacitracin twice daily until healed. Patient may apply hydrogen peroxide soaks to remove any crusting.
Lab Facility: 
Cryotherapy Text: The wound bed was treated with cryotherapy after the biopsy was performed.
Billing Type: Third-Party Bill
Lab: [id number]
Detail Level: Detailed
Curettage Text: The wound bed was treated with curettage after the biopsy was performed.
Biopsy Method: Personna blade
Hemostasis: Electrocautery
Was A Bandage Applied: Yes

## 2020-01-16 NOTE — PROCEDURE: LIQUID NITROGEN
Include Z78.9 (Other Specified Conditions Influencing Health Status) As An Associated Diagnosis?: No
Duration Of Freeze Thaw-Cycle (Seconds): 0
Medical Necessity Clause: This procedure was medically necessary because the lesions that were treated were:  If lesion does not resolve, bx is needed.
Consent: The patient's consent was obtained including but not limited to risks of crusting, scabbing, blistering, scarring, darker or lighter pigmentary change, recurrence, incomplete removal and infection.
Detail Level: Detailed
Post-Care Instructions: I reviewed with the patient in detail post-care instructions. Patient is to wear sunprotection, and avoid picking at any of the treated lesions. Pt may apply Vaseline to crusted or scabbing areas.
Medical Necessity Information: It is in your best interest to select a reason for this procedure from the list below. All of these items fulfill various CMS LCD requirements except the new and changing color options.

## 2020-01-22 ENCOUNTER — OFFICE VISIT (OUTPATIENT)
Dept: MEDICAL GROUP | Facility: IMAGING CENTER | Age: 80
End: 2020-01-22
Payer: MEDICARE

## 2020-01-22 ENCOUNTER — HOSPITAL ENCOUNTER (OUTPATIENT)
Dept: LAB | Facility: MEDICAL CENTER | Age: 80
End: 2020-01-22
Attending: PHYSICIAN ASSISTANT
Payer: MEDICARE

## 2020-01-22 ENCOUNTER — HOSPITAL ENCOUNTER (OUTPATIENT)
Dept: RADIOLOGY | Facility: MEDICAL CENTER | Age: 80
End: 2020-01-22
Attending: NURSE PRACTITIONER
Payer: MEDICARE

## 2020-01-22 VITALS
DIASTOLIC BLOOD PRESSURE: 52 MMHG | OXYGEN SATURATION: 90 % | RESPIRATION RATE: 12 BRPM | WEIGHT: 157 LBS | BODY MASS INDEX: 29.64 KG/M2 | SYSTOLIC BLOOD PRESSURE: 110 MMHG | HEIGHT: 61 IN | HEART RATE: 76 BPM | TEMPERATURE: 100.2 F

## 2020-01-22 DIAGNOSIS — R07.89 CHEST TIGHTNESS: ICD-10-CM

## 2020-01-22 DIAGNOSIS — R05.9 COUGH IN ADULT: ICD-10-CM

## 2020-01-22 DIAGNOSIS — R05.9 COUGH IN ADULT: Primary | ICD-10-CM

## 2020-01-22 DIAGNOSIS — R09.89 CHEST RALES: ICD-10-CM

## 2020-01-22 DIAGNOSIS — E04.1 THYROID NODULE: ICD-10-CM

## 2020-01-22 DIAGNOSIS — R09.89 RHONCHI AT BOTH LUNG BASES: ICD-10-CM

## 2020-01-22 DIAGNOSIS — E05.90 HYPERTHYROIDISM: ICD-10-CM

## 2020-01-22 LAB
T3 SERPL-MCNC: 124.4 NG/DL (ref 60–181)
T3FREE SERPL-MCNC: 3.71 PG/ML (ref 2.4–4.2)
T4 FREE SERPL-MCNC: 0.97 NG/DL (ref 0.53–1.43)
TSH SERPL DL<=0.005 MIU/L-ACNC: 0.54 UIU/ML (ref 0.38–5.33)

## 2020-01-22 PROCEDURE — 94640 AIRWAY INHALATION TREATMENT: CPT | Performed by: NURSE PRACTITIONER

## 2020-01-22 PROCEDURE — 84480 ASSAY TRIIODOTHYRONINE (T3): CPT

## 2020-01-22 PROCEDURE — 84443 ASSAY THYROID STIM HORMONE: CPT

## 2020-01-22 PROCEDURE — 84439 ASSAY OF FREE THYROXINE: CPT

## 2020-01-22 PROCEDURE — 71046 X-RAY EXAM CHEST 2 VIEWS: CPT

## 2020-01-22 PROCEDURE — 84481 FREE ASSAY (FT-3): CPT

## 2020-01-22 PROCEDURE — 99213 OFFICE O/P EST LOW 20 MIN: CPT | Mod: 25 | Performed by: NURSE PRACTITIONER

## 2020-01-22 PROCEDURE — 36415 COLL VENOUS BLD VENIPUNCTURE: CPT

## 2020-01-22 RX ORDER — DOXYCYCLINE HYCLATE 100 MG
100 TABLET ORAL 2 TIMES DAILY
Qty: 10 TAB | Refills: 0 | Status: SHIPPED | OUTPATIENT
Start: 2020-01-22 | End: 2020-01-22

## 2020-01-22 RX ORDER — CEFPODOXIME PROXETIL 100 MG/1
100 TABLET, FILM COATED ORAL 2 TIMES DAILY
Qty: 28 TAB | Refills: 0 | Status: SHIPPED | OUTPATIENT
Start: 2020-01-22 | End: 2020-01-22

## 2020-01-22 RX ORDER — IPRATROPIUM BROMIDE AND ALBUTEROL SULFATE 2.5; .5 MG/3ML; MG/3ML
3 SOLUTION RESPIRATORY (INHALATION) ONCE
Status: COMPLETED | OUTPATIENT
Start: 2020-01-22 | End: 2020-01-22

## 2020-01-22 RX ORDER — AZITHROMYCIN 250 MG/1
TABLET, FILM COATED ORAL
Qty: 6 TAB | Refills: 0 | Status: SHIPPED | OUTPATIENT
Start: 2020-01-22 | End: 2020-01-27

## 2020-01-22 RX ORDER — ALBUTEROL SULFATE 90 UG/1
2 AEROSOL, METERED RESPIRATORY (INHALATION) EVERY 4 HOURS PRN
Qty: 1 INHALER | Refills: 0 | Status: SHIPPED | OUTPATIENT
Start: 2020-01-22 | End: 2020-01-27

## 2020-01-22 RX ADMIN — IPRATROPIUM BROMIDE AND ALBUTEROL SULFATE 3 ML: 2.5; .5 SOLUTION RESPIRATORY (INHALATION) at 11:34

## 2020-01-22 ASSESSMENT — PATIENT HEALTH QUESTIONNAIRE - PHQ9: CLINICAL INTERPRETATION OF PHQ2 SCORE: 0

## 2020-01-22 NOTE — PROGRESS NOTES
Subjective:   CC: Cough (chest tightness, wheezing)    HPI:   Ml presents today with a dry cough for the past 2 days.  States that she has multiple sick contacts that have pneumonia in her active senior living complex.  Denies body aches or chills at onset, but last night states she started feeling cold.  Denies fever at that time.  States that her cough is non-productive, but intermittently she has a productive cough with white to grayish-green mucus.  Denies nasal congestion or sore throat at this time.  States that at home she has noticed her SPO2 has been as low as 89.  States that she wears 2 L of oxygen at night.  Denies shortness of breath or difficulty breathing.  States that her chest feels tight after coughing.  States that she has decreased energy and feels weak.  Denies dizziness, LOC, or confusion at this time.  Denies any over-the-counter home remedies at this time.    Past Medical History:   Diagnosis Date   • Breast cancer (HCC)    • Cancer (HCC)     lung cancer with brain mts   • Chickenpox    • [place] measles    • Healthcare maintenance 2018   • Influenza    • Joint pain    • Lung cancer (HCC)    • Mumps    • Need for vaccination 2019   • Radionecrosis 2018   • Sick sinus syndrome (HCC)     with AFIB, s/p pacemaker   • Thyroid disease    • Tonsillitis      Social History     Tobacco Use   • Smoking status: Former Smoker     Packs/day: 2.00     Years: 20.00     Pack years: 40.00     Last attempt to quit: 1960     Years since quittin.0   • Smokeless tobacco: Never Used   Substance Use Topics   • Alcohol use: Yes     Alcohol/week: 1.8 oz     Types: 3 Glasses of wine per week   • Drug use: No     Current Outpatient Medications Ordered in Epic   Medication Sig Dispense Refill   • albuterol 108 (90 Base) MCG/ACT Aero Soln inhalation aerosol Inhale 2 Puffs by mouth every four hours as needed for Shortness of Breath. 1 Inhaler 0   • azithromycin (ZITHROMAX) 250 MG Tab 500 mg in a  single dose on day 1 then 250 mg per day on days 2 through 5. 6 Tab 0   • alendronate (FOSAMAX) 10 MG Tab TAKE 1 TABLET BY MOUTH EVERY DAY BEFORE BREAKFAST 90 Tab 1   • potassium chloride ER (KLOR-CON) 10 MEQ tablet Take 1 Tab by mouth every day. 90 Tab 0   • furosemide (LASIX) 20 MG Tab Take 1 Tab by mouth every day. Take in am. 90 Tab 0   • Cholecalciferol (VITAMIN D3) 2000 UNIT Cap Take 2,000 Units by mouth. Indications: Daily Treatment with Aspirin Dose Greater Then 325 mg     • methimazole (TAPAZOLE) 5 MG Tab Take 0.5 Tabs by mouth every day. 30 Tab 3   • levETIRAcetam (KEPPRA) 500 MG Tab Take 1 Tab by mouth 2 Times a Day. 180 Tab 3   • OYSCO 500 500 MG Tab TAKE 1 TAB BY MOUTH 2 TIMES A DAY, WITH MEALS. NOT COV  5   • BIOTIN PO Take  by mouth every day.     • flecainide (TAMBOCOR) 150 MG Tab TAKE 1/2 TABLET BY MOUTH 2 TIMES A DAY. 90 Tab 1   • metoprolol SR (TOPROL XL) 25 MG TABLET SR 24 HR Take 25 mg by mouth every day.  3   • Misc. Devices ([employer] PULSE OXIMETER) Misc 1 Each by Does not apply route 4 times a day as needed. 1 Each 0     No current [facility]-ordered facility-administered medications on file.      Allergies:  Penicillins    ROS:  Constitutional: Denies fever, chills, night sweats, weight loss/gain or malaise. Fatigue, see HPI.   HENT: Denies nasal congestion, sore throat, hearing loss, enlarged thyroid, or difficulty swallowing.    Eyes: Denies changes in vision, pain. Wear corrective wear. Since removal brain tumor in 2015, pt. reports that she has had decreased peripheral vision, worse in left eye. Does not drive.   Respiratory: Denies cough, SOB at rest or activity.    Cardiovascular: Denies tachycardia, chest pain, or palpitations. Leg swelling, managed with intermittent use of Lasix. Last dose about 2 days ago.  Pt. has a pace maker due to the history of sick sinus syndrome  Gastrointestinal: Denies N/V/C/D, abdominal pain, loss appetite, reflux, or hematochezia.  Genitourinary: Denies difficulty  "voiding, dysuria, nocturia, or hematuria.   Skin: Negative for rash or worrisome moles.   Neurological: Negative for dizziness, focal weakness and headaches.   Endo/Heme/Allergies: Denies bruise/bleed easily, allergies.   Psychiatric/Behavioral: Denies depression, nervous/anxious, difficulty sleeping. Nightly BiPap.    Objective:   Exam:  /52   Pulse 76   Temp 37.9 °C (100.2 °F)   Resp 12   Ht 1.549 m (5' 1\")   Wt 71.2 kg (157 lb)   SpO2 90%   BMI 29.66 kg/m²  Body mass index is 29.66 kg/m².  General: Normal appearing. No distress.  HEENT: Normocephalic. Eyes conjunctiva clear lids without ptosis, PERRLA, ears normal shape and contour, canals are clear bilaterally, tympanic membranes pearly gray, intact, non-bulging, no drainage noted, no turbinate erythema, no polyps visible, oropharynx is with moderate erythema, edema or exudates. Teeth intact.  Neck: Supple without JVD or abnormal masses. Small soft mobile thyroid palpated, no nodules palpated, non-tender.  Pulmonary: Pretreatment: Rales on expiratory breath throughout, worse in bilateral lower lobes, minimum improvement with cough. Post treatment: Improved rales on expiratory breath, decreased at bases bilaterally, good aeration throughout.  Normal effort. No ronchi or wheezing.  Patient states after treatment that she is able to breathe easier and has decreased chest tightness. Noted increase SpO2 after treatment 91% with taking and 97% while resting.  Cardiovascular: Regular rate and rhythm without murmur. Pedal and radial pulses are intact and equal bilaterally.  Abdomen: Soft, nontender, nondistended. Normal bowel sounds.   Lymph: No cervical or supraclavicular lymph nodes are palpable  Skin: Warm and dry.  No obvious lesions.  Musculoskeletal: Normal gait. No extremity cyanosis, clubbing, or edema. Using a cane to ambulate and balance.  Psych: Normal mood and affect. Alert and oriented x3. Judgment and insight is normal.    Assessment & Plan: "   1. Cough in adult  2. Rhonchi at both lung bases  3. Chest rales  4. Chest tightness  This is a stable condition. Patient able to maintain hydration and is not confused or disoriented. Discussed with patient continuing albuterol inhaler at home for cough and chest tightness. Demonstrated how to use inhaler, verbalized understanding. Discussed asking pharmacist how to use it if she needs further instruction. Discussed obtaining chest xray to rule out pneumonia. Plan of care will include Cefopodoxime 200 mg BID for 14 days and Doxycycline 100 mg BID for 5 days if her xray suggest pneumonia, if not patient instructed to take Zpack as prescribed. Discussed PCN allergy and states the reaction was when she was child and she had a rash, denies hives or severe reaction.  Instructed to drink warm to room temperature water and hot tea to suppress cough.  Encouraged to rest for the next few days, while maintaining mild light activity.  Discussed monitoring SPO2 at home if decreased below 91% instructed to use 2 L of oxygen to maintain 92-99%.  Discussed seeking emergency services if her symptoms worsen, or she has increased difficulty breathing.  Patient instructed to return to clinic for evaluation in the next 5 days.    - DX-CHEST-2 VIEWS; Future  - albuterol 108 (90 Base) MCG/ACT Aero Soln inhalation aerosol; Inhale 2 Puffs by mouth every four hours as needed for Shortness of Breath.  Dispense: 1 Inhaler; Refill: 0  - ipratropium-albuterol (DUONEB) nebulizer solution  - azithromycin (ZITHROMAX) 250 MG Tab; 500 mg in a single dose on day 1 then 250 mg per day on days 2 through 5.  Dispense: 6 Tab; Refill: 0    Return in about 5 days (around 1/27/2020).    Please note that this dictation was created using voice recognition software. I have made every reasonable attempt to correct obvious errors, but I expect that there are errors of grammar and possibly content that I did not discover before finalizing the note.

## 2020-01-26 DIAGNOSIS — I48.0 AF (PAROXYSMAL ATRIAL FIBRILLATION) (HCC): ICD-10-CM

## 2020-01-27 ENCOUNTER — HOSPITAL ENCOUNTER (OUTPATIENT)
Dept: RADIOLOGY | Facility: MEDICAL CENTER | Age: 80
End: 2020-01-27
Attending: NURSE PRACTITIONER
Payer: MEDICARE

## 2020-01-27 ENCOUNTER — HOSPITAL ENCOUNTER (OUTPATIENT)
Dept: LAB | Facility: MEDICAL CENTER | Age: 80
End: 2020-01-27
Attending: NURSE PRACTITIONER
Payer: MEDICARE

## 2020-01-27 ENCOUNTER — OFFICE VISIT (OUTPATIENT)
Dept: MEDICAL GROUP | Facility: IMAGING CENTER | Age: 80
End: 2020-01-27
Payer: MEDICARE

## 2020-01-27 VITALS
OXYGEN SATURATION: 91 % | SYSTOLIC BLOOD PRESSURE: 114 MMHG | RESPIRATION RATE: 12 BRPM | HEIGHT: 61 IN | HEART RATE: 80 BPM | WEIGHT: 157 LBS | DIASTOLIC BLOOD PRESSURE: 62 MMHG | TEMPERATURE: 98.3 F | BODY MASS INDEX: 29.64 KG/M2

## 2020-01-27 DIAGNOSIS — R71.8 ELEVATED MCV: ICD-10-CM

## 2020-01-27 DIAGNOSIS — R09.89 CHEST RALES: ICD-10-CM

## 2020-01-27 DIAGNOSIS — R71.8 ABNORMAL RBC MORPHOLOGY: ICD-10-CM

## 2020-01-27 DIAGNOSIS — R05.9 COUGH IN ADULT: Primary | ICD-10-CM

## 2020-01-27 DIAGNOSIS — R09.81 NASAL CONGESTION WITH RHINORRHEA: ICD-10-CM

## 2020-01-27 DIAGNOSIS — J34.89 NASAL CONGESTION WITH RHINORRHEA: ICD-10-CM

## 2020-01-27 DIAGNOSIS — R05.9 COUGH IN ADULT: ICD-10-CM

## 2020-01-27 DIAGNOSIS — Z79.899 ENCOUNTER FOR LONG-TERM CURRENT USE OF MEDICATION: ICD-10-CM

## 2020-01-27 DIAGNOSIS — R09.02 OXYGEN DESATURATION: ICD-10-CM

## 2020-01-27 LAB
ALBUMIN SERPL BCP-MCNC: 4.1 G/DL (ref 3.2–4.9)
ALBUMIN/GLOB SERPL: 1.3 G/DL
ALP SERPL-CCNC: 43 U/L (ref 30–99)
ALT SERPL-CCNC: 31 U/L (ref 2–50)
ANION GAP SERPL CALC-SCNC: 6 MMOL/L (ref 0–11.9)
ANISOCYTOSIS BLD QL SMEAR: ABNORMAL
AST SERPL-CCNC: 21 U/L (ref 12–45)
BASOPHILS # BLD AUTO: 0 % (ref 0–1.8)
BASOPHILS # BLD: 0 K/UL (ref 0–0.12)
BILIRUB SERPL-MCNC: 0.4 MG/DL (ref 0.1–1.5)
BUN SERPL-MCNC: 16 MG/DL (ref 8–22)
CALCIUM SERPL-MCNC: 9.5 MG/DL (ref 8.5–10.5)
CHLORIDE SERPL-SCNC: 102 MMOL/L (ref 96–112)
CO2 SERPL-SCNC: 30 MMOL/L (ref 20–33)
CREAT SERPL-MCNC: 0.75 MG/DL (ref 0.5–1.4)
CRP SERPL HS-MCNC: 1.84 MG/DL (ref 0–0.75)
EOSINOPHIL # BLD AUTO: 0.1 K/UL (ref 0–0.51)
EOSINOPHIL NFR BLD: 1.8 % (ref 0–6.9)
ERYTHROCYTE [DISTWIDTH] IN BLOOD BY AUTOMATED COUNT: 53.5 FL (ref 35.9–50)
GLOBULIN SER CALC-MCNC: 3.2 G/DL (ref 1.9–3.5)
GLUCOSE SERPL-MCNC: 91 MG/DL (ref 65–99)
HCT VFR BLD AUTO: 42.7 % (ref 37–47)
HGB BLD-MCNC: 13.8 G/DL (ref 12–16)
LDH SERPL L TO P-CCNC: 253 U/L (ref 107–266)
LYMPHOCYTES # BLD AUTO: 1.57 K/UL (ref 1–4.8)
LYMPHOCYTES NFR BLD: 28.6 % (ref 22–41)
MACROCYTES BLD QL SMEAR: ABNORMAL
MANUAL DIFF BLD: NORMAL
MCH RBC QN AUTO: 34.8 PG (ref 27–33)
MCHC RBC AUTO-ENTMCNC: 32.3 G/DL (ref 33.6–35)
MCV RBC AUTO: 107.6 FL (ref 81.4–97.8)
MONOCYTES # BLD AUTO: 0.54 K/UL (ref 0–0.85)
MONOCYTES NFR BLD AUTO: 9.8 % (ref 0–13.4)
MORPHOLOGY BLD-IMP: NORMAL
NEUTROPHILS # BLD AUTO: 3.29 K/UL (ref 2–7.15)
NEUTROPHILS NFR BLD: 59.8 % (ref 44–72)
NRBC # BLD AUTO: 0 K/UL
NRBC BLD-RTO: 0 /100 WBC
NT-PROBNP SERPL IA-MCNC: 128 PG/ML (ref 0–125)
PLATELET # BLD AUTO: 238 K/UL (ref 164–446)
PLATELET BLD QL SMEAR: NORMAL
PMV BLD AUTO: 9.5 FL (ref 9–12.9)
POLYCHROMASIA BLD QL SMEAR: NORMAL
POTASSIUM SERPL-SCNC: 4.1 MMOL/L (ref 3.6–5.5)
PROCALCITONIN SERPL-MCNC: <0.05 NG/ML
PROT SERPL-MCNC: 7.3 G/DL (ref 6–8.2)
RBC # BLD AUTO: 3.97 M/UL (ref 4.2–5.4)
RBC BLD AUTO: PRESENT
SODIUM SERPL-SCNC: 138 MMOL/L (ref 135–145)
WBC # BLD AUTO: 5.5 K/UL (ref 4.8–10.8)

## 2020-01-27 PROCEDURE — 86140 C-REACTIVE PROTEIN: CPT

## 2020-01-27 PROCEDURE — 85007 BL SMEAR W/DIFF WBC COUNT: CPT

## 2020-01-27 PROCEDURE — 99214 OFFICE O/P EST MOD 30 MIN: CPT | Performed by: NURSE PRACTITIONER

## 2020-01-27 PROCEDURE — 71046 X-RAY EXAM CHEST 2 VIEWS: CPT

## 2020-01-27 PROCEDURE — 84145 PROCALCITONIN (PCT): CPT

## 2020-01-27 PROCEDURE — 83880 ASSAY OF NATRIURETIC PEPTIDE: CPT | Mod: GA

## 2020-01-27 PROCEDURE — 83615 LACTATE (LD) (LDH) ENZYME: CPT

## 2020-01-27 PROCEDURE — 80053 COMPREHEN METABOLIC PANEL: CPT

## 2020-01-27 PROCEDURE — 36415 COLL VENOUS BLD VENIPUNCTURE: CPT

## 2020-01-27 PROCEDURE — 85027 COMPLETE CBC AUTOMATED: CPT

## 2020-01-27 RX ORDER — LEVOFLOXACIN 750 MG/1
750 TABLET, FILM COATED ORAL DAILY
Qty: 5 TAB | Refills: 0 | Status: SHIPPED | OUTPATIENT
Start: 2020-01-27 | End: 2020-01-27

## 2020-01-27 RX ORDER — ALBUTEROL SULFATE 90 UG/1
2 AEROSOL, METERED RESPIRATORY (INHALATION) EVERY 4 HOURS PRN
Qty: 1 INHALER | Refills: 1 | Status: SHIPPED | OUTPATIENT
Start: 2020-01-27 | End: 2020-03-20

## 2020-01-27 RX ORDER — FLECAINIDE ACETATE 150 MG/1
TABLET ORAL
Qty: 180 TAB | Refills: 0 | Status: SHIPPED | OUTPATIENT
Start: 2020-01-27 | End: 2020-12-30 | Stop reason: SDUPTHER

## 2020-01-27 RX ORDER — INHALER, ASSIST DEVICES
1 SPACER (EA) MISCELLANEOUS ONCE
Qty: 1 EACH | Refills: 0 | Status: SHIPPED | OUTPATIENT
Start: 2020-01-27 | End: 2020-01-27

## 2020-01-27 RX ORDER — FLUTICASONE PROPIONATE 44 UG/1
2 AEROSOL, METERED RESPIRATORY (INHALATION) 2 TIMES DAILY
Qty: 1 INHALER | Refills: 0 | Status: SHIPPED | OUTPATIENT
Start: 2020-01-27 | End: 2020-03-12

## 2020-01-27 ASSESSMENT — PAIN SCALES - GENERAL: PAINLEVEL: NO PAIN

## 2020-01-27 NOTE — TELEPHONE ENCOUNTER
Called patient to confirm current dose of flecanide. She states she is currently taking 150 mg BID. Prepped Rx and sent to RO for approval to [facility] on [address].    Sent message to schedulers to be scheduled for a follow-up visit. Informed pharmacy patient must be seen in office for future refills.

## 2020-01-27 NOTE — PROGRESS NOTES
Subjective:   CC: Cough    HPI:   Ml presents today for follow-up after recent illness that began on 1/20/2020. Patient was seen on 1/22/2020. A Zpak and albuterol inhaler was prescribed for a cough and expiratory rales. A chest xray was obtained at that time that reported lungs are clear, no pleural effusion or pneumothoraces, no acute cardiopulmonary abnormality identified, unchanged right perihilar architecture distortion consistent with neoplasm and/or posttreatment change.  States that she is slightly improved, denies fever, body aches, chills, N/V/D. States she is feeling slightly dizzy with position changes, denies any recent falls. Denies LOC or confusion. States she continues to have a cough that is intermittent productive. States that her mucous is clear to gray/greenish. States that she continues to have nasal congestion. Denies HA or sinus pressure.  States due to where she lives she continues to have sick contacts, currently avoid dinning area, taking her meals in her room. States that she is wearing her BiPap at night and intermittently at during the day at 2L as instructed on 1/22/2020. Denies any over the counter remedies at this time. States she has used all her albuterol inhaler but feels it is due to her having difficulty timing her breath with inhaler.     With rooming of patient by Donna Salazar RN it was noted on RA patient was 87% when talking. See physical exam for improvement with O2.    Past Medical History:   Diagnosis Date   • Breast cancer (HCC)    • Cancer (HCC)     lung cancer with brain mts   • Chickenpox    • [place] measles    • Healthcare maintenance 7/23/2018   • Influenza    • Joint pain    • Lung cancer (HCC)    • Mumps    • Need for vaccination 1/17/2019   • Radionecrosis 4/14/2018   • Sick sinus syndrome (HCC)     with AFIB, s/p pacemaker   • Thyroid disease    • Tonsillitis      Social History     Tobacco Use   • Smoking status: Former Smoker     Packs/day: 2.00      Years: 20.00     Pack years: 40.00     Last attempt to quit: 1960     Years since quittin.1   • Smokeless tobacco: Never Used   Substance Use Topics   • Alcohol use: Yes     Alcohol/week: 1.8 oz     Types: 3 Glasses of wine per week   • Drug use: No     Current Outpatient Medications Ordered in Epic   Medication Sig Dispense Refill   • fluticasone (FLOVENT HFA) 44 MCG/ACT Aerosol Inhale 2 Puffs by mouth 2 times a day. 1 Inhaler 0   • albuterol 108 (90 Base) MCG/ACT Aero Soln inhalation aerosol Inhale 2 Puffs by mouth every four hours as needed for Shortness of Breath. 1 Inhaler 1   • Spacer/Aero-Holding Chambers (AEROCHAMBER MV) Misc 1 Each by Does not apply route Once for 1 dose. 1 Each 0   • alendronate (FOSAMAX) 10 MG Tab TAKE 1 TABLET BY MOUTH EVERY DAY BEFORE BREAKFAST 90 Tab 1   • potassium chloride ER (KLOR-CON) 10 MEQ tablet Take 1 Tab by mouth every day. 90 Tab 0   • furosemide (LASIX) 20 MG Tab Take 1 Tab by mouth every day. Take in am. 90 Tab 0   • Cholecalciferol (VITAMIN D3) 2000 UNIT Cap Take 2,000 Units by mouth. Indications: Daily Treatment with Aspirin Dose Greater Then 325 mg     • methimazole (TAPAZOLE) 5 MG Tab Take 0.5 Tabs by mouth every day. 30 Tab 3   • levETIRAcetam (KEPPRA) 500 MG Tab Take 1 Tab by mouth 2 Times a Day. 180 Tab 3   • OYSCO 500 500 MG Tab TAKE 1 TAB BY MOUTH 2 TIMES A DAY, WITH MEALS. NOT COV  5   • BIOTIN PO Take  by mouth every day.     • Misc. Devices ([employer] PULSE OXIMETER) Misc 1 Each by Does not apply route 4 times a day as needed. 1 Each 0   • flecainide (TAMBOCOR) 150 MG Tab TAKE 1/2 TABLET BY MOUTH 2 TIMES A DAY. 90 Tab 1   • metoprolol SR (TOPROL XL) 25 MG TABLET SR 24 HR Take 25 mg by mouth every day.  3   • azithromycin (ZITHROMAX) 250 MG Tab 500 mg in a single dose on day 1 then 250 mg per day on days 2 through 5. (Patient not taking: Reported on 2020) 6 Tab 0     No current Epic-ordered facility-administered medications on file.   "    Allergies:  Penicillins    ROS:  Constitutional: Denies fever, chills, night sweats, weight loss/gain or malaise. Fatigue.   HENT: Denies sore throat, hearing loss, enlarged thyroid, or difficulty swallowing.  Nasal congestion.   Eyes: Denies changes in vision, pain. Wears corrective wear. Since removal brain tumor in 2015, pt. reports that she has had decreased peripheral vision, worse in left eye. Does not drive.   Respiratory: Denies SOB at rest or activity. Cough, SOB after coughing.  Cardiovascular: Denies tachycardia, chest pain, or palpitations.  Leg swelling, managed with intermittent use of Lasix. Pt. has a pace maker due to the history of sick sinus syndrome.  Gastrointestinal: Denies N/V/C/D, abdominal pain, loss appetite, reflux, or hematochezia.  Genitourinary: Denies difficulty voiding, dysuria, nocturia, or hematuria.   Skin: Negative for rash or worrisome moles.   Neurological: Negative for focal weakness and headaches. Intermittent dizziness when she is changing position, denies any falls.   Endo/Heme/Allergies: Denies bruise/bleed easily, allergies.   Psychiatric/Behavioral: Denies depression, nervous/anxious, difficulty sleeping.    Objective:   Exam:  /62   Pulse 80   Temp 36.8 °C (98.3 °F)   Resp 12   Ht 1.549 m (5' 1\")   Wt 71.2 kg (157 lb)   SpO2 91%   BMI 29.66 kg/m²  Body mass index is 29.66 kg/m².  General: Normal appearing. No distress.  HEENT: Normocephalic. Eyes conjunctiva clear lids without ptosis, PERRLA, ears normal shape and contour, canals are clear bilaterally, tympanic membranes pearly gray, intact, non-bulging, no drainage noted, no turbinate erythema, no polyps visible, oropharynx is with mild erythema, no edema or exudates. Teeth intact.  Neck: Supple without JVD or abnormal masses. Small soft mobile thyroid palpated, no nodules palpated, non-tender.  Pulmonary:  Rales noted in upper right lobe and left lower lobe. Not cleared with coughing. Breath sounds " heard throughout lung field. Normal effort. No ronchi or wheezing.  Cardiovascular: Regular rate and rhythm without murmur. Pedal and radial pulses are intact and equal bilaterally.  Abdomen: Soft, nontender, nondistended. Normal bowel sounds. Liver and spleen are not palpable  Neurologic: CN 2-12 are grossly intact.  Lymph: No cervical or supraclavicular lymph nodes are palpable  Skin: Warm and dry.  No obvious lesions.  Musculoskeletal: Normal gait. No extremity cyanosis, clubbing, or edema. Using a cane to ambulate and balance.  Psych: Normal mood and affect. Alert and oriented x3. Judgment and insight is normal.    In clinic O2 at 1L/min: SpO2 is 95%, 2L/min: 97%    Assessment & Plan:   1. Encounter for long-term current use of medication  Reviewed with patient medication use and side effects. Medical, past, surgical history reviewed with patient.     2. Cough in adult  3. Chest rales  4. Nasal congestion with rhinorrhea  This is a chronic stable condition.  Discussed with patient Curb65 score of 1, due to age.  Reviewed criteria with patient so she can know at home if she needs to seek emergency services if she becomes confused, has increased respiratory effort, or if her blood pressure is lower than 90/60.  Discussed the use of spacer to be able to coordinate breath with inhaler better, patient verbalized understanding and willingness to use.  Discussed new prescription of Flovent be used daily 2 puffs in the morning and 2 puffs at night.  Instructed to rinse mouth after use of Flovent inhaler to decrease risk of thrush development.  Verbalized understanding.  Discussed with patient that albuterol inhaler as a rescue inhaler is to be used for shortness of breath or coughing every 4 hours, verbalized understanding.  Discussed with patient before prescribing antibiotic obtaining follow-up chest x-ray and blood work.  Discussed with patient if x-ray shows pneumonia or worsening lung condition will be prescribing  Levaquin due to PCN allergy.  Discussed potential side effects of medication with patient, verbalized understanding.    -     fluticasone (FLOVENT HFA) 44 MCG/ACT Aerosol; Inhale 2 Puffs by mouth 2 times a day.  -     albuterol 108 (90 Base) MCG/ACT Aero Soln inhalation aerosol; Inhale 2 Puffs by mouth every four hours as needed for Shortness of Breath.  -     Spacer/Aero-Holding Chambers (AEROCHAMBER MV) Misc; 1 Each by Does not apply route Once for 1 dose.  -     DX-CHEST-2 VIEWS; Future  -     Comp Metabolic Panel; Future  -     CBC WITH DIFFERENTIAL; Future  -     CRP QUANTITIVE (NON-CARDIAC); Future  -     LDH; Future  -     proBrain Natriuretic Peptide, NT; Future  -     PROCALCITONIN; Future    5. Oxygen desaturation  This is a stable condition patient instructed to call pulmonologist NICKY Gotti to schedule an appointment for further evaluation due to recent illness.  Patient instructed and informed that provider is ordering 1 to 2 L/min of home oxygen to maintain SPO2 above 95%.  Patient has the ability currently to use her BiPAP machine at 2 L of oxygen and will use until she is able to obtain portable oxygen tank.  Oxygen order sent to [facility], this is the patient's current oxygen supplier.  Discussed with patient getting up from seated position so slowly.  Discussed with patient seeking emergency services, or calling PCP if her respiratory rate increases or shortness of breath increases despite using albuterol inhaler and starting Flovent inhaler.    Return in about 1 week (around 2/3/2020).    Please note that this dictation was created using voice recognition software. I have made every reasonable attempt to correct obvious errors, but I expect that there are errors of grammar and possibly content that I did not discover before finalizing the note.

## 2020-01-28 ENCOUNTER — TELEPHONE (OUTPATIENT)
Dept: PEDIATRICS | Facility: CLINIC | Age: 80
End: 2020-01-28

## 2020-01-28 ENCOUNTER — OFFICE VISIT (OUTPATIENT)
Dept: ENDOCRINOLOGY | Facility: MEDICAL CENTER | Age: 80
End: 2020-01-28
Payer: MEDICARE

## 2020-01-28 VITALS
OXYGEN SATURATION: 92 % | HEIGHT: 61 IN | BODY MASS INDEX: 29.19 KG/M2 | DIASTOLIC BLOOD PRESSURE: 68 MMHG | WEIGHT: 154.6 LBS | SYSTOLIC BLOOD PRESSURE: 114 MMHG | HEART RATE: 88 BPM

## 2020-01-28 DIAGNOSIS — M81.0 OSTEOPOROSIS, UNSPECIFIED OSTEOPOROSIS TYPE, UNSPECIFIED PATHOLOGICAL FRACTURE PRESENCE: ICD-10-CM

## 2020-01-28 DIAGNOSIS — E04.2 MULTINODULAR GOITER: ICD-10-CM

## 2020-01-28 DIAGNOSIS — E05.90 HYPERTHYROIDISM: ICD-10-CM

## 2020-01-28 DIAGNOSIS — Z79.899 HIGH RISK MEDICATION USE: ICD-10-CM

## 2020-01-28 DIAGNOSIS — E55.9 VITAMIN D DEFICIENCY: ICD-10-CM

## 2020-01-28 PROCEDURE — 99214 OFFICE O/P EST MOD 30 MIN: CPT | Performed by: INTERNAL MEDICINE

## 2020-01-28 RX ORDER — ALENDRONATE SODIUM 70 MG/1
70 TABLET ORAL
Qty: 12 TAB | Refills: 2 | Status: SHIPPED | OUTPATIENT
Start: 2020-01-28 | End: 2020-04-28 | Stop reason: SDUPTHER

## 2020-01-28 RX ORDER — METHIMAZOLE 5 MG/1
2.5 TABLET ORAL DAILY
Qty: 90 TAB | Refills: 1 | Status: SHIPPED | OUTPATIENT
Start: 2020-01-28 | End: 2020-04-28 | Stop reason: SDUPTHER

## 2020-01-28 NOTE — TELEPHONE ENCOUNTER
----- Message from SONIA Fry sent at 1/28/2020  1:36 PM PST -----  Can you please call the lab and notify them that I have added 3 test to her blood that is already there. B12 level, folate, iron study.  Thank you,  CRISELDA Fry

## 2020-01-28 NOTE — PATIENT INSTRUCTIONS
overactive thyroid  - methimazole 2.5mg once a day    Osteoporosis - alendronate 70mg once a week    Calcium 600mg once a day    Vitamin D 800-1000u daily

## 2020-01-28 NOTE — PROGRESS NOTES
Chief Complaint: Follow up for Hyperthyroidism secondary to Grave's disease, history of multinodular goiter, history of osteoporosis    HPI:     Ml Chavira is a 80 y.o. female here for follow up of the above medical issues.      She has a history of hyperthyroidism secondary to probable Graves' disease based on high normal uptake on her thyroid uptake and scan from 2018 and also from elevated thyrotropin receptor antibodies.  There is also possibility that she has autonomous thyroid nodules.    Since last visit patient reports feeling excellent.  She remains on Methimazole 2.5 mg daily which has been her dose for the past 3 months.   She reports excellent compliance and denies missing any daily doses.       Weight has been stable  She currently denies denies fatigue, weight changes, heat/cold intolerance, bowel/skin changes or CVS symptoms.      She currently denies anxiousness, feeling excessive energy, tremulousness, palpitations, sweating, weight loss, diarrhea.      Most recent labs from January 22, 2020 showed a normal TSH of 0.540 with normal free T4 levels of 0.97 and normal free T3 levels of 3.71  Thyrotropin receptor antibodies were slightly elevated 2.4 in December 5, 2019.      She previously had a formal thyroid ultrasound on July 2019 which showed:   a dominant hypoechoic solid nodule measuring 2.1 cm in the right lower lobe TR 5 which was biopsied and proven benign  There was also another 1.7 cm hypoechoic solid nodule on the right lower lobe TR 5 which was biopsied and proven benign  She also has a 1.9 cm hypoechoic solid nodule on the left mid lobe TR 6 which was biopsied and proven benign    She underwent biopsies on August 22, 2019      She also has osteoporosis based on the lowest T score of -3.1 for the left femur from her bone density on September 2018.  Her fracture risk was significantly high.  She has been taking alendronate 10 mg daily for the past 3 years and has tolerated the  medication well.  She denies interval falls and fractures.  Her last vitamin D was adequate at 35 on October 28, 2019      Patient's medications, allergies, and social histories were reviewed and updated as appropriate.      ROS:     CONS:     No fever, no chills   EYES:     No diplopia, no blurry vision   CV:           No chest pain, no palpitations   PULM:     No SOB, no cough, no hemoptysis.   GI:            No nausea, no vomiting, no diarrhea, no constipation   ENDO:     No polyuria, no polydipsia, no heat intolerance, no cold intolerance       Past Medical History:  Problem List:  2019-12: At risk for falling  2019-12: Use of cane as ambulatory aid  2019-12: Lower extremity edema  2019-08: High risk medication use  2019-08: Multiple thyroid nodules  2019-08: Vitamin D deficiency  2019-01: Osteoporosis  2019-01: Need for vaccination  2019-01: Hyperthyroidism  2019-01: Chronic atrial fibrillation  2019-01: Hot thyroid nodule  2018-08: Postmenopausal  2018-08: Low TSH level  2018-07: High serum thyroid stimulating hormone (TSH)  2018-07: Healthcare maintenance  2018-07: Screening for osteoporosis  2018-07: Thyroid nodule  2018-07: Low vitamin D level  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  2018-06: Arthralgia of both knees  2018-06: Finger pain, right  2018-06: Balance disorder  2018-06: Acute cystitis  2018-05: Acute alcoholic gastritis without hemorrhage  2018-05: Pleuritic chest pain  2018-05: Sick sinus syndrome (HCC)  2018-04: Radionecrosis  2018-03: Malignant neoplasm of upper lobe of right lung (HCC)  2017-11: CVA (cerebral vascular accident) (HCC)  2016-01: Allergic rhinitis  2016-01: Hilar adenopathy  2016-01: History of breast cancer  2015-11: Lung mass  2015-11: Blurry vision, left eye  2015-11: Metastasis to brain (HCC)  2015-11: Metastatic cancer (CMS-HCC)      Past Surgical History:  Past Surgical History:   Procedure Laterality Date   • CRANIOTOMY STEALTH Right 11/23/2015    Procedure: CRANIOTOMY  "STEALTH-right occipital;  Surgeon: Suyapa Schumacher M.D.;  Location: SURGERY [facility];  Service:    • APPENDECTOMY     • CRANIOTOMY     • KNEE ARTHROSCOPY      left    • LUMPECTOMY     • OTHER      left knee surgery   • PACEMAKER INSERTION     • TONSILLECTOMY          Allergies:  Penicillins     Social History:  Social History     Tobacco Use   • Smoking status: Former Smoker     Packs/day: 2.00     Years: 20.00     Pack years: 40.00     Last attempt to quit: 1960     Years since quittin.1   • Smokeless tobacco: Never Used   Substance Use Topics   • Alcohol use: Yes     Alcohol/week: 1.8 oz     Types: 3 Glasses of wine per week   • Drug use: No        Family History:   family history includes Arthritis in her father and mother; Autoimmune Disease in her father; Cancer in her brother; Dementia in her mother; Diabetes in her mother; Other in her mother.      PHYSICAL EXAM:   Vital signs: /68 (BP Location: Left arm, Patient Position: Sitting)   Pulse 88   Ht 1.549 m (5' 1\")   Wt 70.1 kg (154 lb 9.6 oz)   SpO2 92%   BMI 29.21 kg/m²   GENERAL: Well-developed, well-nourished in no apparent distress.   EYE:  No ocular asymmetry, PERRLA, No exophthalmos or lid lag  HENT: Pink, moist mucous membranes.    NECK: Thyroid is slightly enlarged and feels bosselated  CARDIOVASCULAR:  No murmurs  LUNGS: Clear breath sounds  ABDOMEN: Soft, nontender   EXTREMITIES: No clubbing, cyanosis, or edema.   NEUROLOGICAL: No gross focal motor abnormalities, No visible tremors with both hands  LYMPH: No cervical adenopathy palpated.   SKIN: No rashes, lesions.     Labs:  Lab Results   Component Value Date/Time    WBC 5.5 2020 10:33 AM    RBC 3.97 (L) 2020 10:33 AM    HEMOGLOBIN 13.8 2020 10:33 AM    .6 (H) 2020 10:33 AM    MCH 34.8 (H) 2020 10:33 AM    MCHC 32.3 (L) 2020 10:33 AM    RDW 53.5 (H) 2020 10:33 AM    MPV 9.5 2020 10:33 AM       Lab Results   Component Value " Date/Time    SODIUM 138 01/27/2020 10:33 AM    POTASSIUM 4.1 01/27/2020 10:33 AM    CHLORIDE 102 01/27/2020 10:33 AM    CO2 30 01/27/2020 10:33 AM    ANION 6.0 01/27/2020 10:33 AM    GLUCOSE 91 01/27/2020 10:33 AM    BUN 16 01/27/2020 10:33 AM    CREATININE 0.75 01/27/2020 10:33 AM    CALCIUM 9.5 01/27/2020 10:33 AM    ASTSGOT 21 01/27/2020 10:33 AM    ALTSGPT 31 01/27/2020 10:33 AM    TBILIRUBIN 0.4 01/27/2020 10:33 AM    ALBUMIN 4.1 01/27/2020 10:33 AM    TOTPROTEIN 7.3 01/27/2020 10:33 AM    GLOBULIN 3.2 01/27/2020 10:33 AM    AGRATIO 1.3 01/27/2020 10:33 AM       Lab Results   Component Value Date/Time    TSHULTRASEN 0.540 01/22/2020 1402     Lab Results   Component Value Date/Time    FREET4 0.97 01/22/2020 1402     Lab Results   Component Value Date/Time    FREET3 3.71 01/22/2020 1402     No results found for: THYSTIMIG      Imaging: see DEXA date 9/2018, see thyroid uptake and scan 10/2018      ASSESSMENT/PLAN:     1. Hyperthyroidism  Well-controlled  Continue methimazole 2.5 mg daily  Reviewed importance of adherence  We will plan for follow-up in 3 months with repeat of TSH, free T4 and free T3 levels      2. Multinodular goiter  Stable  Multiple benign dominant solid nodules measuring more than 1.5 cm with TR 5 and TR 6 scores noted  Biopsies are benign  Recommend observation  We will plan to repeat ultrasound in 6 to 12 months      3. Osteoporosis, unspecified osteoporosis type, unspecified pathological fracture presence  Unstable  Elevated fracture risk noted  Patient has no interval falls or fractures  Recommend switching alendronate to 70 mg weekly for better compliance and ease of administration  Reviewed importance of adherence  Advised patient to ensure adequate intake of calcium and vitamin D from diet  Recommend daily weightbearing exercise  Bone density should be repeated on September 21, 2020    4. Vitamin D deficiency  Stable  Last vitamin D was fair at 35  Recommend that she take  over-the-counter vitamin D3 1000 units daily  Recommend that she take calcium 600 mg once daily  We will repeat calcium 25-hydroxy vitamin D levels in 3 months    5. High risk medication use  Patient is taking methimazole which is high risk medication      Return in about 3 months (around 4/28/2020).      This patient during there office visit today was started on a new medication.  Side effects of the new medication were discussed with the patient today in the office.     Thank you kindly for allowing me to participate in the thyroid care plan for this patient.    Abhijit Joseph MD, FACE, [facility]  01/28/20    CC:   LORRIE FryPMickiRRAKESH

## 2020-01-29 NOTE — PROGRESS NOTES
SLEEP PATIENT    Last OV 12/30/19 with NOHEMY SAUNDERS    obstructive sleep apnea.  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  Sick sinus syndrome, hyperthyroidism chronic atrial fibrillation on flecainide, CVA.       PSG from 7/2019 indicated an AHI of 65.3 that increased to 90.9 while supine and low oxygenation of 72%.       Currently she is being treated with BIPAP @ 20/12adU10.  Compliance download from the dates 10/19/2019 - 12/29/2019 indicates she is wearing the device 49% for an avg of 5 hours and 38 minutes per night with a reduced AHI of 6.2.  Mask leaking average of 28 minutes up to 115.7 minutes.     She does not tolerate pressure, because the mask is frequently leaking causing intolerance.  She has tried multiple masks and has become frustrated with her [employer] not helping her as well as but she wishes to obtain a better fitting mask.  She has not found benefit using the machine because she is frequently awoken due to frequent mask leaking.  They deny morning H/A.  She will continue to clean supplies weekly and change them as insurance allows.     Patient is currently sleeping 8 hours per night with 2x nighttime awakenings. They have no trouble falling asleep.  Watches TV or listens to book before bed.  They sometimes feel refreshed in the morning and denies morning H/A. They feel tired throughout the day and naps 3-4 per week for appx 20-45 min long.  Patient reports snoring, apnea events and paroxysmal nocturnal dyspnea events. She is concerned that she is gaining weight. She did not follow through with nutritionist due to cost. She will discuss weight increase 2/2 SE of medication that her endocrinologist placed her on. Walking daily.  BMI is 29     Assessment   1. VITO (obstructive sleep apnea)  DME Other         Patient is clinically stable and will proceed with following plan.  Face-to-face time greater than 50% of 25 minutes  reviewing questions and concerns relating to her machine masks, DME companies.  She is trying her hardest to meet Medicare guidelines and understands she needs to meet guidelines in order to keep her machine.  We fitted her with a different mask today in order to help accommodate her mask leaking.  Reviewed the possibility of adding chinstrap as well, although she had tried it one night without benefit.     PLAN:   Patient Instructions   1) Continue BIPAP and decrease pressure [id number] with 2L oxygen - drop pressure to accommodate for less leaks in order to acclimate to machine in order to meet Medicare guidelines  2) Clean mask and supplies weekly and change them as insurance allows  3) Light conditioning encouraged  4) Continue smoking cessation   5) Vaccines: Up to date with Prevnar 13  6) Return in about 3 months (around 3/30/2020) for follow up with NICKY Gotti, if not sooner, Compliance.

## 2020-01-30 ENCOUNTER — OFFICE VISIT (OUTPATIENT)
Dept: PULMONOLOGY | Facility: HOSPICE | Age: 80
End: 2020-01-30
Payer: MEDICARE

## 2020-01-30 VITALS
WEIGHT: 154 LBS | DIASTOLIC BLOOD PRESSURE: 60 MMHG | SYSTOLIC BLOOD PRESSURE: 124 MMHG | HEART RATE: 88 BPM | RESPIRATION RATE: 16 BRPM | BODY MASS INDEX: 29.07 KG/M2 | OXYGEN SATURATION: 92 % | HEIGHT: 61 IN

## 2020-01-30 DIAGNOSIS — Z87.891 FORMER SMOKER: ICD-10-CM

## 2020-01-30 DIAGNOSIS — G47.34 NOCTURNAL HYPOXIA: ICD-10-CM

## 2020-01-30 DIAGNOSIS — R09.02 HYPOXIA: ICD-10-CM

## 2020-01-30 DIAGNOSIS — G47.33 OBSTRUCTIVE SLEEP APNEA: ICD-10-CM

## 2020-01-30 DIAGNOSIS — Z85.118 HISTORY OF LUNG CANCER: ICD-10-CM

## 2020-01-30 PROCEDURE — 99214 OFFICE O/P EST MOD 30 MIN: CPT | Performed by: NURSE PRACTITIONER

## 2020-01-30 PROCEDURE — 94761 N-INVAS EAR/PLS OXIMETRY MLT: CPT | Performed by: NURSE PRACTITIONER

## 2020-01-30 NOTE — PROCEDURES
Multi-Ox Readings  Multi Ox #1 Room air   O2 sat % at rest 96   O2 sat % on exertion 95   O2 sat average on exertion     Multi Ox #2     O2 sat % at rest     O2 sat % on exertion     O2 sat average on exertion       Oxygen Use     Oxygen Frequency     Duration of need     Is the patient mobile within the home?     CPAP Use?     BIPAP Use?     Servo Titration

## 2020-01-30 NOTE — PROGRESS NOTES
Chief Complaint   Patient presents with   • Follow-Up     Last Seen 12/30/19       HPI:  Ml Chavira is a 80 y.o. year old female here today for daytime hypoxia noted by PCP recently.    PATIENT IS SLEEP PATIENT ONLY AND ONLY PRESCRIBED O2 AT NIGHT. SHE WILL NEED NEW PATIENT PULMONARY VISIT.    Former smoker, quit in 1960 with a 40-pack-year history.  History of lung cancer diagnosed in 2015 and patient completed chemoradiation.  She continues to be followed by oncology Dr. Aguilar and in remission.  Recent chest x-ray indicated no active disease disease.  She is never had a formal pulmonary evaluation.    Today acute symptoms are cough with productive white phlegm.  She was placed on Z-Gm x5 days with albuterol HFA inhaler and notes her symptoms to slowly be improving.  Her phlegm was originally green.  She notes less cough.  She denies significant chest pain or wheezing.  No fevers or chills.  She denies sinus issues or GERD.  Multi-ox in office today indicated normal saturations at rest and while walking.  PCP ordered portable daytime oxygen at the last office visit 3 days ago and patient has not received it.    She continues use BiPAP at night with bleeding O2.  She notes mask issues but is comfortable with her current mask.  She is having difficulty with the DME.  I reinforced the need for using her device every night more than 4 hours.  She feels she is benefiting from therapy.        ROS: As per HPI and otherwise negative if not stated.    Past Medical History:   Diagnosis Date   • Breast cancer (HCC)    • Cancer (HCC)     lung cancer with brain mts   • Chickenpox    • [place] measles    • Healthcare maintenance 7/23/2018   • Influenza    • Joint pain    • Lung cancer (HCC)    • Mumps    • Need for vaccination 1/17/2019   • Radionecrosis 4/14/2018   • Sick sinus syndrome (HCC)     with AFIB, s/p pacemaker   • Thyroid disease    • Tonsillitis        Past Surgical History:   Procedure Laterality Date   •  CRANIOTOMY STEALTH Right 2015    Procedure: CRANIOTOMY STEALTH-right occipital;  Surgeon: Suyapa Schumacher M.D.;  Location: SURGERY [facility];  Service:    • APPENDECTOMY     • CRANIOTOMY     • KNEE ARTHROSCOPY      left    • LUMPECTOMY     • OTHER      left knee surgery   • PACEMAKER INSERTION     • TONSILLECTOMY         Family History   Problem Relation Age of Onset   • Dementia Mother    • Diabetes Mother    • Other Mother         osteoarthritis   • Arthritis Mother    • Autoimmune Disease Father         Rheumatoid arthritis   • Arthritis Father    • Cancer Brother         prostate        Social History     Socioeconomic History   • Marital status:      Spouse name: Not on file   • Number of children: Not on file   • Years of education: Not on file   • Highest education level: Not on file   Occupational History   • Not on file   Social Needs   • Financial resource strain: Not on file   • Food insecurity:     Worry: Not on file     Inability: Not on file   • Transportation needs:     Medical: Not on file     Non-medical: Not on file   Tobacco Use   • Smoking status: Former Smoker     Packs/day: 2.00     Years: 20.00     Pack years: 40.00     Last attempt to quit: 1960     Years since quittin.1   • Smokeless tobacco: Never Used   Substance and Sexual Activity   • Alcohol use: Yes     Alcohol/week: 1.8 oz     Types: 3 Glasses of wine per week   • Drug use: No   • Sexual activity: Not on file   Lifestyle   • Physical activity:     Days per week: 4 days     Minutes per session: 60 min   • Stress: Not on file   Relationships   • Social connections:     Talks on phone: More than three times a week     Gets together: More than three times a week     Attends Holiness service: Never     Active member of club or organization: Yes     Attends meetings of clubs or organizations: More than 4 times per year     Relationship status:    • Intimate partner violence:     Fear of current or ex  partner: No     Emotionally abused: No     Physically abused: No     Forced sexual activity: No   Other Topics Concern   • Not on file   Social History Narrative   • Not on file       Allergies as of 01/30/2020 - Reviewed 01/30/2020   Allergen Reaction Noted   • Penicillins Rash and Itching 08/12/2012        Vitals:  @Vital signs for this encounter:    Current medications as of today   Current Outpatient Medications   Medication Sig Dispense Refill   • methimazole (TAPAZOLE) 5 MG Tab Take 0.5 Tabs by mouth every day. 90 Tab 1   • alendronate (FOSAMAX) 70 MG Tab Take 1 Tab by mouth every 7 days. 12 Tab 2   • flecainide (TAMBOCOR) 150 MG Tab TAKE 1 TABLET BY MOUTH EVERY 12 HOURS 180 Tab 0   • fluticasone (FLOVENT HFA) 44 MCG/ACT Aerosol Inhale 2 Puffs by mouth 2 times a day. 1 Inhaler 0   • albuterol 108 (90 Base) MCG/ACT Aero Soln inhalation aerosol Inhale 2 Puffs by mouth every four hours as needed for Shortness of Breath. 1 Inhaler 1   • potassium chloride ER (KLOR-CON) 10 MEQ tablet Take 1 Tab by mouth every day. 90 Tab 0   • furosemide (LASIX) 20 MG Tab Take 1 Tab by mouth every day. Take in am. 90 Tab 0   • Cholecalciferol (VITAMIN D3) 2000 UNIT Cap Take 2,000 Units by mouth. Indications: Daily Treatment with Aspirin Dose Greater Then 325 mg     • levETIRAcetam (KEPPRA) 500 MG Tab Take 1 Tab by mouth 2 Times a Day. 180 Tab 3   • OYSCO 500 500 MG Tab TAKE 1 TAB BY MOUTH 2 TIMES A DAY, WITH MEALS. NOT COV  5   • BIOTIN PO Take  by mouth every day.     • Misc. Devices ([facility] PULSE OXIMETER) Misc 1 Each by Does not apply route 4 times a day as needed. 1 Each 0   • metoprolol SR (TOPROL XL) 25 MG TABLET SR 24 HR Take 25 mg by mouth every day.  3     No current facility-administered medications for this visit.          Physical Exam:   Gen:           Alert and oriented, No apparent distress. Mood and affect appropriate, normal interaction with examiner.  Eyes:          PERRL, EOM intact, sclere white, conjunctive  moist.  Ears:          Not examined.   Hearing:     Grossly intact.  Nose:          Normal, no lesions or deformities.  Dentition:    Good dentition.  Oropharynx:   Tongue normal, posterior pharynx without erythema or exudate.  Mallampati Classification: 2/3  Neck:        Supple, trachea midline, no masses.  Respiratory Effort: No intercostal retractions or use of accessory muscles.   Lung Auscultation:      Clear to auscultation bilaterally with trace RLL crackle; no wheezing or rhonchi.  CV:            Regular rate and rhythm. No murmurs, rubs or gallops.  Abd:           Not examined.   Lymphadenopathy: Not examined.  Gait and Station: Normal.  Digits and Nails: No clubbing, cyanosis, petechiae, or nodes.   Cranial Nerves: II-XII grossly intact.  Skin:        No rashes, lesions or ulcers noted.               Ext:           Mild BLE edema, controlled with lasix.      Assessment:  1. Hypoxia  REFERRAL TO OTHER    Multiple Oximetry    PULMONARY FUNCTION TESTS -Test requested: Complete Pulmonary Function Test; Include MIPS/MEPS? No   2. Obstructive sleep apnea     3. Nocturnal hypoxia     4. Former smoker  PULMONARY FUNCTION TESTS -Test requested: Complete Pulmonary Function Test; Include MIPS/MEPS? No   5. BMI 29.0-29.9,adult     6. History of lung cancer  PULMONARY FUNCTION TESTS -Test requested: Complete Pulmonary Function Test; Include MIPS/MEPS? No       Immunizations:    Flu:10/2019  Pneumovax 23:10/2019  Prevnar 13:10/2016    Plan:  1.  Patient symptoms are improving since visit PCP and she will be seeing them again within a week.  She will continue with her current albuterol HFA inhaler.  She is completed antibiotics.  2.  Discuss respiratory hygiene.  3.  Encourage continued use of BiPAP nightly with bleeding O2.  4.  Multi-ox in office indicated normal saturations at rest and on exertion.  Daytime O2 is not needed.  5.  Follow-up as scheduled for sleep visit.  We will schedule new patient pulmonary visit for  further evaluation if patient desires.  PFT added to orders at that time.    Please note that this dictation was created using voice recognition software. I have made every reasonable attempt to correct obvious errors, but it is possible there are errors of grammar and possibly content that I did not discover before finalizing the note.

## 2020-02-04 ENCOUNTER — HOSPITAL ENCOUNTER (OUTPATIENT)
Dept: LAB | Facility: MEDICAL CENTER | Age: 80
End: 2020-02-04
Attending: NURSE PRACTITIONER
Payer: MEDICARE

## 2020-02-04 DIAGNOSIS — R71.8 ABNORMAL RBC MORPHOLOGY: ICD-10-CM

## 2020-02-04 DIAGNOSIS — R71.8 ELEVATED MCV: ICD-10-CM

## 2020-02-04 LAB
FOLATE SERPL-MCNC: 12.6 NG/ML
IRON SATN MFR SERPL: 35 % (ref 15–55)
IRON SERPL-MCNC: 133 UG/DL (ref 40–170)
TIBC SERPL-MCNC: 375 UG/DL (ref 250–450)
VIT B12 SERPL-MCNC: 419 PG/ML (ref 211–911)

## 2020-02-04 PROCEDURE — 82607 VITAMIN B-12: CPT

## 2020-02-04 PROCEDURE — 83540 ASSAY OF IRON: CPT

## 2020-02-04 PROCEDURE — 83550 IRON BINDING TEST: CPT

## 2020-02-04 PROCEDURE — 36415 COLL VENOUS BLD VENIPUNCTURE: CPT

## 2020-02-04 PROCEDURE — 82746 ASSAY OF FOLIC ACID SERUM: CPT

## 2020-02-05 ENCOUNTER — OFFICE VISIT (OUTPATIENT)
Dept: MEDICAL GROUP | Facility: IMAGING CENTER | Age: 80
End: 2020-02-05
Payer: MEDICARE

## 2020-02-05 VITALS
HEART RATE: 79 BPM | DIASTOLIC BLOOD PRESSURE: 62 MMHG | OXYGEN SATURATION: 98 % | SYSTOLIC BLOOD PRESSURE: 122 MMHG | TEMPERATURE: 98.8 F | WEIGHT: 156 LBS | BODY MASS INDEX: 29.45 KG/M2 | RESPIRATION RATE: 13 BRPM | HEIGHT: 61 IN

## 2020-02-05 DIAGNOSIS — R05.9 COUGH: Primary | ICD-10-CM

## 2020-02-05 DIAGNOSIS — Z79.899 ENCOUNTER FOR LONG-TERM CURRENT USE OF MEDICATION: ICD-10-CM

## 2020-02-05 DIAGNOSIS — R79.89 ABNORMAL CBC MEASUREMENT: ICD-10-CM

## 2020-02-05 PROCEDURE — 99214 OFFICE O/P EST MOD 30 MIN: CPT | Performed by: NURSE PRACTITIONER

## 2020-02-05 ASSESSMENT — PAIN SCALES - GENERAL: PAINLEVEL: NO PAIN

## 2020-02-05 NOTE — PATIENT INSTRUCTIONS
Flovent (fluticasone) is twice a day, and albuterol (short acting) (ventolin, proventil, proair) is as needed every 4-6 hours shortness of breath or coughing.

## 2020-02-06 DIAGNOSIS — R60.0 BILATERAL LEG EDEMA: ICD-10-CM

## 2020-02-06 PROBLEM — R79.89 ABNORMAL CBC MEASUREMENT: Status: ACTIVE | Noted: 2020-02-06

## 2020-02-06 RX ORDER — FUROSEMIDE 20 MG/1
20 TABLET ORAL DAILY
Qty: 90 TAB | Refills: 0 | Status: SHIPPED | OUTPATIENT
Start: 2020-02-06 | End: 2020-05-04

## 2020-02-06 NOTE — TELEPHONE ENCOUNTER
Patient calling in to let us know she is almost out of her furosemide prescription. She said Jahaira sent in a 90 day supply but she states she only received a 30 day supply. She is hoping for another prescription to be sent it.

## 2020-02-06 NOTE — PROGRESS NOTES
Subjective:   CC: Follow-Up; Requesting Labs; and Cough (wet cough. clear phlegm. still use inhaler?)    HPI:   Ml presents today for follow-up after recent illness that began on 1/20/2020. Patient was seen on 1/22/2020. At time she was prescribed Zpak and albuterol inhaler. A chest xray was obtained at that time that reported lungs are clear, no pleural effusion or pneumothoraces, no acute cardiopulmonary abnormality identified, unchanged right perihilar architecture distortion consistent with neoplasm and/or posttreatment change.     Patient returned on 1/27/2020 for follow-up with ongoing slightly improved symptoms. States that she was experiencing dizziness with position changes. It was also documented while talking she had a SpO2 level of 87% on RA. During office visit patient was placed on 1L/min of O2 via NC. Able to maintain SpO2 at 95% above while on O2. Home O2 of 1-2L/min via NC was prescribed at that time to maintain SpO2 above 95%. Patient reports that the home health oxygen company did not delivery the O2, stating she didn't have low enough O2 documented, and she needed a PFT ordered. States she used her BiPaP machine with given NC from clinic to maintain her O2 above 95%. States that she used this for about 4 days. States that she has checked her SpO2 intermittently on room and she is maintaining consistently above 95% on RA. States that she has seen NICKY Oneal and has been referral to a pulmonalist that can manage and assess her 02 needs.   States she has a PFT scheduled on 2/26/2020 and an appointment with Elena Ingram MD the same day. States that she has a scheduled appointment with cardiologist on 2/19/2020, due to concern that due to recent illness and increase BNP noted on 1/27/2020: 128.  1/27/2020. Repeat chest xray on 1/27/2020: The heart is normal in size. No pulmonary infiltrates or consolidations are noted. No pleural effusions are appreciated. No active disease. No  antibiotics were prescribed at that time. A space was ordered with Flovent and Albuterol refill for on-going cough and oxygen desaturation on RA. States that she continues to take Lasix 20 mg and potassium chloride ER about every 3-4 days. States that she took last about 2 days ago.     Procalcitonin <0.25 ng/mL <0.05      Lab Results   Component Value Date/Time    SODIUM 138 01/27/2020 10:33 AM    POTASSIUM 4.1 01/27/2020 10:33 AM    CHLORIDE 102 01/27/2020 10:33 AM    CO2 30 01/27/2020 10:33 AM    GLUCOSE 91 01/27/2020 10:33 AM    BUN 16 01/27/2020 10:33 AM    CREATININE 0.75 01/27/2020 10:33 AM     Lab Results   Component Value Date/Time    ALKPHOSPHAT 43 01/27/2020 10:33 AM    ASTSGOT 21 01/27/2020 10:33 AM    ALTSGPT 31 01/27/2020 10:33 AM    TBILIRUBIN 0.4 01/27/2020 10:33 AM      States that she continues to have an intermittent productive cough. States mucous is clear at this time. States she is only using one inhaler twice daily. Uncertain which inhaler it is. States that spacer has made it easier to use the inhaler. States that overall she is feeling better, and her home SpO2 is higher than 95% when she checks it. States that she has been able to return to her normal daily activities.     Abnormal CBC measurement    States she has history of exposure to chemotherapy and radiation to mange her cancer in the past. States that she has a CT/MRI of her brain scheduled in the beginning of March to revaluate a noted concern of a possible small subacute hematoma. Denies any stroke related symptoms, HA, LOC, lightheadedness, or change in vision at this time.  Denies any active bleeding at this time.    Lab Results   Component Value Date/Time    WBC 5.5 01/27/2020 10:33 AM    RBC 3.97 (L) 01/27/2020 10:33 AM    HEMOGLOBIN 13.8 01/27/2020 10:33 AM    HEMATOCRIT 42.7 01/27/2020 10:33 AM    .6 (H) 01/27/2020 10:33 AM    MCH 34.8 (H) 01/27/2020 10:33 AM    MCHC 32.3 (L) 01/27/2020 10:33 AM    MPV 9.5 01/27/2020  10:33 AM    NEUTSPOLYS 59.80 2020 10:33 AM    LYMPHOCYTES 28.60 2020 10:33 AM    MONOCYTES 9.80 2020 10:33 AM    EOSINOPHILS 1.80 2020 10:33 AM    BASOPHILS 0.00 2020 10:33 AM    ANISOCYTOSIS 1+ 2020 10:33 AM      Vitamin B12 -True Cobalamin 419      Iron 133    Total Iron Binding 375    % Saturation 35      Folate -Folic Acid >4.0 ng/mL 12.6      Past Medical History:   Diagnosis Date   • Breast cancer (HCC)    • Cancer (HCC)     lung cancer with brain mts   • Chickenpox    • [place] measles    • Healthcare maintenance 2018   • Influenza    • Joint pain    • Lung cancer (HCC)    • Mumps    • Need for vaccination 2019   • Radionecrosis 2018   • Sick sinus syndrome (HCC)     with AFIB, s/p pacemaker   • Thyroid disease    • Tonsillitis      Social History     Tobacco Use   • Smoking status: Former Smoker     Packs/day: 2.00     Years: 20.00     Pack years: 40.00     Last attempt to quit: 1960     Years since quittin.1   • Smokeless tobacco: Never Used   Substance Use Topics   • Alcohol use: Yes     Alcohol/week: 1.8 oz     Types: 3 Glasses of wine per week   • Drug use: No     Current Outpatient Medications Ordered in Epic   Medication Sig Dispense Refill   • methimazole (TAPAZOLE) 5 MG Tab Take 0.5 Tabs by mouth every day. 90 Tab 1   • alendronate (FOSAMAX) 70 MG Tab Take 1 Tab by mouth every 7 days. 12 Tab 2   • flecainide (TAMBOCOR) 150 MG Tab TAKE 1 TABLET BY MOUTH EVERY 12 HOURS 180 Tab 0   • fluticasone (FLOVENT HFA) 44 MCG/ACT Aerosol Inhale 2 Puffs by mouth 2 times a day. 1 Inhaler 0   • albuterol 108 (90 Base) MCG/ACT Aero Soln inhalation aerosol Inhale 2 Puffs by mouth every four hours as needed for Shortness of Breath. 1 Inhaler 1   • potassium chloride ER (KLOR-CON) 10 MEQ tablet Take 1 Tab by mouth every day. 90 Tab 0   • furosemide (LASIX) 20 MG Tab Take 1 Tab by mouth every day. Take in am. 90 Tab 0   • Cholecalciferol (VITAMIN D3) 2000 UNIT Cap  "Take 2,000 Units by mouth. Indications: Daily Treatment with Aspirin Dose Greater Then 325 mg     • levETIRAcetam (KEPPRA) 500 MG Tab Take 1 Tab by mouth 2 Times a Day. 180 Tab 3   • OYSCO 500 500 MG Tab TAKE 1 TAB BY MOUTH 2 TIMES A DAY, WITH MEALS. NOT COV  5   • BIOTIN PO Take  by mouth every day.     • Misc. Devices ([employer] PULSE OXIMETER) Misc 1 Each by Does not apply route 4 times a day as needed. 1 Each 0   • metoprolol SR (TOPROL XL) 25 MG TABLET SR 24 HR Take 25 mg by mouth every day.  3     No current [facility]-ordered facility-administered medications on file.      Allergies:  Penicillins    ROS:  Constitutional: Denies fever, chills, night sweats, weight loss/gain or malaise. Improving fatigue.   HENT: Denies sore throat, hearing loss, enlarged thyroid, or difficulty swallowing.  Improving nasal congestion.   Eyes: Denies changes in vision, pain. Wears corrective wear. Since removal brain tumor in 2015, pt. reports that she has had decreased peripheral vision, worse in left eye. Does not drive.   Respiratory: Denies SOB at rest or activity. Improving intermittent cough, SOB after coughing.  Cardiovascular: Denies tachycardia, chest pain, or palpitations.  Leg swelling, managed with intermittent use of Lasix. Pt. has a pace maker due to the history of sick sinus syndrome.  Gastrointestinal: Denies N/V/C/D, abdominal pain, loss appetite, reflux, or hematochezia.  Genitourinary: Denies difficulty voiding, dysuria, nocturia, or hematuria.   Skin: Negative for rash or worrisome moles.   Neurological: Negative for dizziness, focal weakness and headaches. Denies any falls.   Endo/Heme/Allergies: Denies bruise/bleed easily, allergies.   Psychiatric/Behavioral: Denies depression, nervous/anxious, difficulty sleeping.     Objective:   Exam:  /62 (BP Location: Left arm, Patient Position: Sitting, BP Cuff Size: Adult)   Pulse 79   Temp 37.1 °C (98.8 °F) (Temporal)   Resp 13   Ht 1.549 m (5' 1\")   Wt 70.8 kg (156 " lb)   SpO2 98%   BMI 29.48 kg/m²  Body mass index is 29.48 kg/m².  General: Normal appearing. No distress.  HEENT: Normocephalic. Eyes conjunctiva clear lids without ptosis, PERRLA, ears normal shape and contour, canals are clear bilaterally, tympanic membranes pearly gray, intact, mildly bulging, no drainage noted, no turbinate erythema, no polyps visible, oropharynx is with mild erythema, no edema or exudates. Teeth intact.  Neck: Supple without JVD or abnormal masses. Small soft mobile thyroid palpated, no nodules palpated, non-tender.  Pulmonary: Clear to ausculation.  Normal effort. No rales, ronchi, or wheezing.  Cardiovascular: Regular rate and rhythm without murmur. Pedal and radial pulses are intact and equal bilaterally.  Abdomen: Soft, nontender, nondistended. Normal bowel sounds.   Neurologic: CN 2-12 are grossly intact.  Lymph: No cervical or supraclavicular lymph nodes are palpable  Skin: Warm and dry.  No obvious lesions.  Musculoskeletal: Normal gait. No extremity cyanosis, clubbing, or edema.  Psych: Normal mood and affect. Alert and oriented x3. Judgment and insight is normal.    Assessment & Plan:   1. Encounter for long-term current use of medication  Reviewed with patient medication use and side effects. Medical, past, surgical history reviewed with patient.     2. Cough  This is a stable improving condition. Discussed the continued use of spacer to be able to coordinate breath with inhaler better, patient verbalized understanding and willingness to use.  Discussed checking current prescribed inhalers with patient and making sure she is using correctly. Flovent be used daily 2 puffs in the morning and 2 puffs at night.  Instructed to rinse mouth after use of Flovent inhaler to decrease risk of thrush development.  Verbalized understanding.  Discussed with patient that albuterol inhaler as a rescue inhaler is to be used for shortness of breath or coughing every 6 hours, verbalized  understanding. Instructed to maintain hydration with water. Instructed to take Lasix 20 mg every other day with potassium chloride ER 10 MEQ in am until cough is resolved.  Discussed possible side effects with patient, verbalized understanding.  Instructed to attend appointments with cardiology and pulmonary to further evaluation after recent illness causing the use of of day time O2 needs and increased BNP.  Encouraged patient to wash hands regularly and avoid sick contacts while supporting immune system with Vitamin C, Zinc, Elderberry, and garlic.    3. Abnormal CBC measurement  This is chronic stable condition. Discussed with patient recent lab values. Discussed with patient repeating CBC in 2 months. Reviewed signs and symptoms of stroke and bleeding, verbalized understanding. Instructed to complete previously ordered CT and MRI test     Return in about 4 weeks (around 3/4/2020).    Please note that this dictation was created using voice recognition software. I have made every reasonable attempt to correct obvious errors, but I expect that there are errors of grammar and possibly content that I did not discover before finalizing the note.

## 2020-02-19 ENCOUNTER — NON-PROVIDER VISIT (OUTPATIENT)
Dept: CARDIOLOGY | Facility: MEDICAL CENTER | Age: 80
End: 2020-02-19
Payer: MEDICARE

## 2020-02-19 DIAGNOSIS — I49.5 SICK SINUS SYNDROME (HCC): ICD-10-CM

## 2020-02-19 PROCEDURE — 93280 PM DEVICE PROGR EVAL DUAL: CPT | Performed by: INTERNAL MEDICINE

## 2020-02-26 ENCOUNTER — NON-PROVIDER VISIT (OUTPATIENT)
Dept: PULMONOLOGY | Facility: HOSPICE | Age: 80
End: 2020-02-26
Attending: NURSE PRACTITIONER
Payer: MEDICARE

## 2020-02-26 ENCOUNTER — OFFICE VISIT (OUTPATIENT)
Dept: PULMONOLOGY | Facility: HOSPICE | Age: 80
End: 2020-02-26
Payer: MEDICARE

## 2020-02-26 VITALS
HEART RATE: 80 BPM | TEMPERATURE: 98.2 F | DIASTOLIC BLOOD PRESSURE: 76 MMHG | SYSTOLIC BLOOD PRESSURE: 120 MMHG | RESPIRATION RATE: 16 BRPM | OXYGEN SATURATION: 91 %

## 2020-02-26 VITALS — WEIGHT: 156 LBS | BODY MASS INDEX: 29.48 KG/M2

## 2020-02-26 DIAGNOSIS — C79.31 LUNG CANCER METASTATIC TO BRAIN (HCC): ICD-10-CM

## 2020-02-26 DIAGNOSIS — R09.02 HYPOXIA: ICD-10-CM

## 2020-02-26 DIAGNOSIS — I49.5 SSS (SICK SINUS SYNDROME) (HCC): ICD-10-CM

## 2020-02-26 DIAGNOSIS — G47.33 OSA TREATED WITH BIPAP: ICD-10-CM

## 2020-02-26 DIAGNOSIS — J96.11 CHRONIC RESPIRATORY FAILURE WITH HYPOXIA (HCC): ICD-10-CM

## 2020-02-26 DIAGNOSIS — Z87.891 FORMER SMOKER: ICD-10-CM

## 2020-02-26 DIAGNOSIS — C34.90 LUNG CANCER METASTATIC TO BRAIN (HCC): ICD-10-CM

## 2020-02-26 DIAGNOSIS — Z85.118 HISTORY OF LUNG CANCER: ICD-10-CM

## 2020-02-26 DIAGNOSIS — Z87.891 HISTORY OF TOBACCO USE: ICD-10-CM

## 2020-02-26 DIAGNOSIS — E66.9 OBESITY WITHOUT SERIOUS COMORBIDITY, UNSPECIFIED CLASSIFICATION, UNSPECIFIED OBESITY TYPE: ICD-10-CM

## 2020-02-26 PROCEDURE — 94060 EVALUATION OF WHEEZING: CPT | Performed by: INTERNAL MEDICINE

## 2020-02-26 PROCEDURE — 94726 PLETHYSMOGRAPHY LUNG VOLUMES: CPT | Performed by: INTERNAL MEDICINE

## 2020-02-26 PROCEDURE — 94729 DIFFUSING CAPACITY: CPT | Performed by: INTERNAL MEDICINE

## 2020-02-26 PROCEDURE — 99214 OFFICE O/P EST MOD 30 MIN: CPT | Mod: 25 | Performed by: INTERNAL MEDICINE

## 2020-02-26 ASSESSMENT — PULMONARY FUNCTION TESTS
FEV1/FVC: 91
FVC_PREDICTED: 2.19
FEV1/FVC_PERCENT_CHANGE: -133
FEV1/FVC_PERCENT_PREDICTED: 130
FEV1_PREDICTED: 1.67
FEV1/FVC_PERCENT_PREDICTED: 120
FEV1/FVC_PERCENT_PREDICTED: 128
FEV1/FVC_PREDICTED: 77
FVC_LLN: 1.83
FEV1/FVC_PERCENT_PREDICTED: 117
FEV1_PERCENT_PREDICTED: 78
FEV1/FVC_PERCENT_CHANGE: 8
FEV1_LLN: 1.40
FEV1/FVC_PERCENT_LLN: 65
FEV1/FVC: 99
FEV1/FVC: 91
FVC_PERCENT_PREDICTED: 65
FEV1: 1.31
FEV1/FVC_PERCENT_PREDICTED: 76
FEV1_PERCENT_CHANGE: 4
FEV1: 1.38
FVC_PERCENT_PREDICTED: 63
FEV1/FVC: 99.28
FVC: 1.39
FEV1_PERCENT_CHANGE: -3
FEV1_PERCENT_PREDICTED: 82
FVC: 1.44

## 2020-02-26 ASSESSMENT — ENCOUNTER SYMPTOMS
HEARTBURN: 0
SPUTUM PRODUCTION: 0
EYE PAIN: 0
CONSTIPATION: 0
SINUS PAIN: 0
PND: 0
NECK PAIN: 0
CHILLS: 0
STRIDOR: 0
TREMORS: 0
HEMOPTYSIS: 0
BACK PAIN: 0
SPEECH CHANGE: 0
HEADACHES: 0
WEAKNESS: 0
DOUBLE VISION: 0
FEVER: 0
PALPITATIONS: 0
VOMITING: 0
DEPRESSION: 0
DIZZINESS: 0
EYE REDNESS: 0
MYALGIAS: 0
ABDOMINAL PAIN: 0
FALLS: 0
DIAPHORESIS: 0
ORTHOPNEA: 0
NAUSEA: 0
EYE DISCHARGE: 0
PHOTOPHOBIA: 0
CLAUDICATION: 0
SHORTNESS OF BREATH: 0
DIARRHEA: 0
SORE THROAT: 0
WEIGHT LOSS: 0
WHEEZING: 0
COUGH: 0
BLURRED VISION: 0
FOCAL WEAKNESS: 0

## 2020-02-26 NOTE — PROGRESS NOTES
Chief Complaint   Patient presents with   • Establish Care     referral 1/30/2020 zina Dodson DX hypoxia    • Results     PFT 60 2/26/2020, CXR 1/27/2020        HPI: This patient is a 80 y.o. female presenting for evaluation of hypoxic respiratory failure.  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 XRT and chemo. She is followed closely by oncology and recently suffered ICH in the setting of tx with eliquis for AF/SSS s/p ppm.  She is no longer on anticoagluation and has f/u MRI scheduled to determine if there are recurrent  Lung Ca mets contributing the bleed.  She is followed in our sleep clinic for VITO and is on NIPPV for this.  She has been noted to desaturate on multi-ox during clinic visits in the past but has declined use of ambulatory O2 given she is fairly active and typically monitors her own SaO2 which pt states can drop in the the low 80s but usually stays 89% or above.  She exercise with PT at her assisted living facility and travels regularly.  She is asx from a pulmonary standpoint with no chronic cough, wheezing, CP, ZAMARRIPA.  PFTs today show FEV1 of 78% and FVC of 65% with ratio of 91%. TLC is 82% and DLCO 74%.  These are not significantly changed when compared to 7/2019. CT chest from 12/2019 shows RUL mass similar to prior imaging 8/2019 with max diameter roughly 3 cm in size. Pt is a former tobacco user. No known occupational or environmental exposures.     Past Medical History:   Diagnosis Date   • Breast cancer (HCC)    • Cancer (HCC)     lung cancer with brain mts   • Chickenpox    • [place] measles    • Healthcare maintenance 7/23/2018   • Influenza    • Joint pain    • Lung cancer (HCC)    • Mumps    • Need for vaccination 1/17/2019   • Radionecrosis 4/14/2018   • Sick sinus syndrome (HCC)     with AFIB, s/p pacemaker   • Thyroid disease    • Tonsillitis        Social History     Socioeconomic History    • Marital status:      Spouse name: Not on file   • Number of children: Not on file   • Years of education: Not on file   • Highest education level: Not on file   Occupational History   • Not on file   Social Needs   • Financial resource strain: Not on file   • Food insecurity     Worry: Not on file     Inability: Not on file   • Transportation needs     Medical: Not on file     Non-medical: Not on file   Tobacco Use   • Smoking status: Former Smoker     Packs/day: 2.00     Years: 20.00     Pack years: 40.00     Types: Cigarettes     Last attempt to quit: 1980     Years since quittin.1   • Smokeless tobacco: Never Used   Substance and Sexual Activity   • Alcohol use: Yes     Alcohol/week: 1.8 oz     Types: 3 Glasses of wine per week   • Drug use: No   • Sexual activity: Not on file   Lifestyle   • Physical activity     Days per week: 4 days     Minutes per session: 60 min   • Stress: Not on file   Relationships   • Social connections     Talks on phone: More than three times a week     Gets together: More than three times a week     Attends [employer] service: Never     Active member of club or organization: Yes     Attends meetings of clubs or organizations: More than 4 times per year     Relationship status:    • Intimate partner violence     Fear of current or ex partner: No     Emotionally abused: No     Physically abused: No     Forced sexual activity: No   Other Topics Concern   • Not on file   Social History Narrative   • Not on file       Family History   Problem Relation Age of Onset   • Dementia Mother    • Diabetes Mother    • Other Mother         osteoarthritis   • Arthritis Mother    • Autoimmune Disease Father         Rheumatoid arthritis   • Arthritis Father    • Cancer Brother         prostate        Current Outpatient Medications on File Prior to Visit   Medication Sig Dispense Refill   • aspirin 81 MG tablet Take 81 mg by mouth every day.     • furosemide (LASIX) 20 MG Tab  Take 1 Tab by mouth every day. Take in am. 90 Tab 0   • methimazole (TAPAZOLE) 5 MG Tab Take 0.5 Tabs by mouth every day. 90 Tab 1   • alendronate (FOSAMAX) 70 MG Tab Take 1 Tab by mouth every 7 days. 12 Tab 2   • flecainide (TAMBOCOR) 150 MG Tab TAKE 1 TABLET BY MOUTH EVERY 12 HOURS 180 Tab 0   • albuterol 108 (90 Base) MCG/ACT Aero Soln inhalation aerosol Inhale 2 Puffs by mouth every four hours as needed for Shortness of Breath. 1 Inhaler 1   • potassium chloride ER (KLOR-CON) 10 MEQ tablet Take 1 Tab by mouth every day. 90 Tab 0   • Cholecalciferol (VITAMIN D3) 2000 UNIT Cap Take 2,000 Units by mouth. Indications: Daily Treatment with Aspirin Dose Greater Then 325 mg     • levETIRAcetam (KEPPRA) 500 MG Tab Take 1 Tab by mouth 2 Times a Day. 180 Tab 3   • OYSCO 500 500 MG Tab 500 mg.  5   • BIOTIN PO Take  by mouth every day.     • Misc. Devices ([employer] PULSE OXIMETER) Misc 1 Each by Does not apply route 4 times a day as needed. 1 Each 0   • metoprolol SR (TOPROL XL) 25 MG TABLET SR 24 HR Take 25 mg by mouth every day.  3   • fluticasone (FLOVENT HFA) 44 MCG/ACT Aerosol Inhale 2 Puffs by mouth 2 times a day. (Patient not taking: Reported on 2/26/2020) 1 Inhaler 0     No current facility-administered medications on file prior to visit.        Allergies: Penicillins    ROS:   Review of Systems   Constitutional: Negative for chills, diaphoresis, fever, malaise/fatigue and weight loss.   HENT: Negative for congestion, ear discharge, ear pain, hearing loss, nosebleeds, sinus pain, sore throat and tinnitus.    Eyes: Negative for blurred vision, double vision, photophobia, pain, discharge and redness.   Respiratory: Negative for cough, hemoptysis, sputum production, shortness of breath, wheezing and stridor.    Cardiovascular: Negative for chest pain, palpitations, orthopnea, claudication, leg swelling and PND.   Gastrointestinal: Negative for abdominal pain, constipation, diarrhea, heartburn, nausea and vomiting.    Genitourinary: Negative for dysuria and urgency.   Musculoskeletal: Negative for back pain, falls, joint pain, myalgias and neck pain.   Skin: Negative for itching and rash.   Neurological: Negative for dizziness, tremors, speech change, focal weakness, weakness and headaches.   Endo/Heme/Allergies: Negative for environmental allergies.   Psychiatric/Behavioral: Negative for depression.       /76 (BP Location: Left arm, Patient Position: Sitting, BP Cuff Size: Adult)   Pulse 80   Temp 36.8 °C (98.2 °F) (Temporal)   Resp 16   SpO2 91%     Physical Exam:  Physical Exam  Constitutional:       General: She is not in acute distress.     Appearance: Normal appearance. She is well-developed. She is obese.   HENT:      Head: Normocephalic and atraumatic.      Right Ear: External ear normal.      Left Ear: External ear normal.      Nose: Nose normal. No congestion.      Mouth/Throat:      Mouth: Mucous membranes are moist.      Pharynx: Oropharynx is clear. No oropharyngeal exudate.   Eyes:      General: No scleral icterus.     Extraocular Movements: Extraocular movements intact.      Conjunctiva/sclera: Conjunctivae normal.      Pupils: Pupils are equal, round, and reactive to light.   Neck:      Musculoskeletal: Neck supple.      Vascular: No JVD.      Trachea: No tracheal deviation.   Cardiovascular:      Rate and Rhythm: Normal rate and regular rhythm.      Heart sounds: Normal heart sounds. No murmur. No friction rub. No gallop.    Pulmonary:      Effort: No accessory muscle usage or respiratory distress.      Breath sounds: Normal breath sounds. No wheezing or rales.   Abdominal:      General: There is no distension.      Palpations: Abdomen is soft.      Tenderness: There is no abdominal tenderness.   Musculoskeletal: Normal range of motion.         General: No tenderness or deformity.      Right lower leg: No edema.      Left lower leg: No edema.   Lymphadenopathy:      Cervical: No cervical adenopathy.    Skin:     General: Skin is warm and dry.      Findings: No rash.      Nails: There is no clubbing.     Neurological:      Mental Status: She is alert and oriented to person, place, and time.      Cranial Nerves: No cranial nerve deficit.      Gait: Gait normal.   Psychiatric:         Mood and Affect: Mood normal.         Behavior: Behavior normal.         PFTs as reviewed by me personally:as per hPI    Imaging as reviewed by me personally:as per HPI    Assessment:  1. Lung cancer metastatic to brain (HCC)     2. Chronic respiratory failure with hypoxia (HCC)     3. VITO treated with BiPAP     4. SSS (sick sinus syndrome) (HCC)     5. Obesity without serious comorbidity, unspecified classification, unspecified obesity type     6. History of tobacco use         Plan:  1. This is chronic and followed closely by oncology. Pt is relatively asx from a pulmonary standpoint and while her chest imaging was stable over 4 mo interval, MRI is planned for f/u after ICH  2. This is chronic and pt has adequate SaO2 in clinic today. She has recently dx severe VITO and mildly elevated RVSP of 30 on TTE prior to initiation of tx of sleep apnea.  I not overly concerned with her daytime hypoxia and while we did discuss the risks of not using O2, I encouraged her to continue to monitor her SaO2 and if she finds herself regularly below 88% or limiting activity to remain above 89% then I would recommend supplemental O2 with activity.  4. S/P PPM. Followed by cardiology  5. This does put her at risk for obesity related pulmonary complications such as VITO for which she is being tx. I recommended healthy lifestyle habits.  6. Pt is tobacco free. Encouraged ongoing abstinence.   Return in about 6 months (around 8/26/2020) for lung Ca, hypoxic respiratory failure.

## 2020-02-26 NOTE — PROCEDURES
Technician: Neeta Ortez RRT   Good to fair patient effort & cooperation. Had a hard time following instructions on PLETH and DLCO.  The results of this test meet the ATS/ERS standards for acceptability and repeatability.  Test was performed on the Overinteractive Media Body Plethysmograph-Elite DX system.  Predicted equations for Spirometry are GLI-2012, ITS for lung volumes, and GLI- 2017 for DLCO.  The DLCO was uncorrected for Hgb.  a bronchodilator of Albuterol HFA-2puffs via spacer was Adminstered  DLCO was performed during dilation period. IVC is less than 90%.    Interpretation;   1.  Baseline spirometry shows mild restriction with FEV1 of 1.31 L or 78% predicted and FVC at 1.44 L or 65% predicted with FEV1/FVC ratio of 91.  2.  There is no significant bronchodilator response.  3.  Lung volumes are low normal with total lung capacity at 82% predicted.  4.  DLCO is mildly reduced at 74% predicted.  Pulmonary function testing shows mild restriction with mildly reduced DLCO.  This could be seen with early ILD.  Suggest clinical correlation.

## 2020-02-27 ENCOUNTER — APPOINTMENT (RX ONLY)
Dept: URBAN - METROPOLITAN AREA CLINIC 4 | Facility: CLINIC | Age: 80
Setting detail: DERMATOLOGY
End: 2020-02-27

## 2020-02-27 DIAGNOSIS — L71.8 OTHER ROSACEA: ICD-10-CM

## 2020-02-27 PROCEDURE — 99212 OFFICE O/P EST SF 10 MIN: CPT

## 2020-02-27 PROCEDURE — ? ADDITIONAL NOTES

## 2020-02-27 PROCEDURE — ? COUNSELING

## 2020-02-27 PROCEDURE — ? PRESCRIPTION

## 2020-02-27 RX ORDER — METRONIDAZOLE 7.5 MG/G
CREAM TOPICAL BID
Qty: 1 | Refills: 4 | Status: ERX | COMMUNITY
Start: 2020-02-27

## 2020-02-27 RX ADMIN — METRONIDAZOLE: 7.5 CREAM TOPICAL at 00:00

## 2020-02-27 ASSESSMENT — LOCATION SIMPLE DESCRIPTION DERM
LOCATION SIMPLE: RIGHT CHEEK
LOCATION SIMPLE: LEFT CHEEK

## 2020-02-27 ASSESSMENT — LOCATION DETAILED DESCRIPTION DERM
LOCATION DETAILED: LEFT CENTRAL MALAR CHEEK
LOCATION DETAILED: RIGHT MEDIAL MALAR CHEEK

## 2020-02-27 ASSESSMENT — LOCATION ZONE DERM: LOCATION ZONE: FACE

## 2020-02-27 NOTE — PROCEDURE: ADDITIONAL NOTES
Detail Level: Simple
Additional Notes: Discussed in detail diagnosis and treatments such as topical cream and oral antibiotics. \\nDiscussed about triggers that can cause rosacea. \\nDiscussed treatment and risks in detail with patient. \\nWill prescribe Metrodiazole cream. \\nCan add Doxycycline if not improving.\\nWill send Rx to pharmacy. \\nExplained in detail how to apply topical cream twice daily. \\nRecommend gentle cleansers and moisturizers daily, like Cetaphil or CeraVe. Moisturize within 3 minutes of showering.\\nCouple and Samples of CeraVe facial moisturizer and body moisturizer given to patient.

## 2020-03-06 ENCOUNTER — HOSPITAL ENCOUNTER (OUTPATIENT)
Dept: RADIOLOGY | Facility: MEDICAL CENTER | Age: 80
End: 2020-03-06
Attending: PSYCHIATRY & NEUROLOGY
Payer: MEDICARE

## 2020-03-06 DIAGNOSIS — C79.9 METASTATIC CANCER (HCC): ICD-10-CM

## 2020-03-06 DIAGNOSIS — I62.9 INTRACRANIAL HEMORRHAGE (HCC): ICD-10-CM

## 2020-03-06 PROCEDURE — A9576 INJ PROHANCE MULTIPACK: HCPCS | Performed by: PSYCHIATRY & NEUROLOGY

## 2020-03-06 PROCEDURE — 70553 MRI BRAIN STEM W/O & W/DYE: CPT

## 2020-03-06 PROCEDURE — 700117 HCHG RX CONTRAST REV CODE 255: Performed by: PSYCHIATRY & NEUROLOGY

## 2020-03-06 PROCEDURE — 70450 CT HEAD/BRAIN W/O DYE: CPT

## 2020-03-06 RX ADMIN — GADOTERIDOL 15 ML: 279.3 INJECTION, SOLUTION INTRAVENOUS at 10:47

## 2020-03-12 ENCOUNTER — OFFICE VISIT (OUTPATIENT)
Dept: MEDICAL GROUP | Facility: IMAGING CENTER | Age: 80
End: 2020-03-12
Payer: MEDICARE

## 2020-03-12 VITALS
TEMPERATURE: 98.2 F | HEIGHT: 61 IN | OXYGEN SATURATION: 93 % | BODY MASS INDEX: 29.76 KG/M2 | WEIGHT: 157.6 LBS | HEART RATE: 84 BPM | DIASTOLIC BLOOD PRESSURE: 70 MMHG | SYSTOLIC BLOOD PRESSURE: 120 MMHG

## 2020-03-12 DIAGNOSIS — Z79.899 ENCOUNTER FOR LONG-TERM CURRENT USE OF MEDICATION: ICD-10-CM

## 2020-03-12 DIAGNOSIS — R05.9 COUGH: ICD-10-CM

## 2020-03-12 DIAGNOSIS — R60.0 LOWER EXTREMITY EDEMA: ICD-10-CM

## 2020-03-12 PROCEDURE — 99213 OFFICE O/P EST LOW 20 MIN: CPT | Performed by: NURSE PRACTITIONER

## 2020-03-12 RX ORDER — INHALER, ASSIST DEVICES
SPACER (EA) MISCELLANEOUS
COMMUNITY
Start: 2020-01-27 | End: 2020-12-20

## 2020-03-12 SDOH — HEALTH STABILITY: MENTAL HEALTH: HOW OFTEN DO YOU HAVE A DRINK CONTAINING ALCOHOL?: 2-3 TIMES A WEEK

## 2020-03-12 SDOH — HEALTH STABILITY: MENTAL HEALTH: HOW MANY STANDARD DRINKS CONTAINING ALCOHOL DO YOU HAVE ON A TYPICAL DAY?: 1 OR 2

## 2020-03-12 SDOH — HEALTH STABILITY: MENTAL HEALTH: HOW OFTEN DO YOU HAVE 6 OR MORE DRINKS ON ONE OCCASION?: NEVER

## 2020-03-12 ASSESSMENT — FIBROSIS 4 INDEX: FIB4 SCORE: 1.3

## 2020-03-14 NOTE — ASSESSMENT & PLAN NOTE
States that she is taking Lasix 20 mg with Potassium Chloride 10 meq every other day to manage her ongoing lower leg edema. States that continues to wear her compression sock daily. Denies any side effects from medication.

## 2020-03-14 NOTE — PROGRESS NOTES
Subjective:   CC: Cough and Follow-Up    HPI:   Ml presents today for follow up of recent cough and ongoing lower leg edema.    States she is feeling better since last visit on 2020. States she has followed up with pulmonologist, NICKY Oneal. States she was told at that visit to check her daytime O2 intermittently and make sure it is above 89%. She is to report if it is consistently below. States that she has an occasional non-productive cough, but it is mostly resolved. States she has used her albuterol inhaler a few times since last visit before her PT appointments for SOB. States albuterol inhaler decreases her SOB. Denies use of Flovent inhaler. States she stopped using inhaler about 2 weeks ago.  Denies any viral symptoms at this time.     Lower extremity edema  States that she is taking Lasix 20 mg with Potassium Chloride 10 meq every other day to manage her ongoing lower leg edema. States that continues to wear her compression sock daily. Denies any side effects from medication.    Past Medical History:   Diagnosis Date   • Breast cancer (HCC)    • Cancer (HCC)     lung cancer with brain mts   • Chickenpox    • [place] measles    • Healthcare maintenance 2018   • Influenza    • Joint pain    • Lung cancer (HCC)    • Mumps    • Need for vaccination 2019   • Radionecrosis 2018   • Sick sinus syndrome (HCC)     with AFIB, s/p pacemaker   • Thyroid disease    • Tonsillitis      Social History     Tobacco Use   • Smoking status: Former Smoker     Packs/day: 2.00     Years: 20.00     Pack years: 40.00     Types: Cigarettes     Last attempt to quit: 1980     Years since quittin.2   • Smokeless tobacco: Never Used   Substance Use Topics   • Alcohol use: Yes     Alcohol/week: 1.8 oz     Types: 3 Glasses of wine per week     Frequency: 2-3 times a week     Drinks per session: 1 or 2     Binge frequency: Never   • Drug use: No     Current Outpatient Medications Ordered in Epic    Medication Sig Dispense Refill   • metronidazole (METROCREAM) 0.75 % cream APPLY TO FACE EVERYDAY ONCE TO TWICE A DAY FOR ROSACEA     • Spacer/Aero-Holding Chambers (OPTICHAMBER MANOLO) Misc USE AS DIRECTED     • aspirin 81 MG tablet Take 81 mg by mouth every day.     • furosemide (LASIX) 20 MG Tab Take 1 Tab by mouth every day. Take in am. 90 Tab 0   • methimazole (TAPAZOLE) 5 MG Tab Take 0.5 Tabs by mouth every day. 90 Tab 1   • alendronate (FOSAMAX) 70 MG Tab Take 1 Tab by mouth every 7 days. 12 Tab 2   • flecainide (TAMBOCOR) 150 MG Tab TAKE 1 TABLET BY MOUTH EVERY 12 HOURS 180 Tab 0   • albuterol 108 (90 Base) MCG/ACT Aero Soln inhalation aerosol Inhale 2 Puffs by mouth every four hours as needed for Shortness of Breath. 1 Inhaler 1   • potassium chloride ER (KLOR-CON) 10 MEQ tablet Take 1 Tab by mouth every day. 90 Tab 0   • Cholecalciferol (VITAMIN D3) 2000 UNIT Cap Take 2,000 Units by mouth. Indications: Daily Treatment with Aspirin Dose Greater Then 325 mg     • levETIRAcetam (KEPPRA) 500 MG Tab Take 1 Tab by mouth 2 Times a Day. 180 Tab 3   • OYSCO 500 500 MG Tab 500 mg.  5   • BIOTIN PO Take  by mouth every day.     • Misc. Devices ([employer] PULSE OXIMETER) Misc 1 Each by Does not apply route 4 times a day as needed. 1 Each 0   • metoprolol SR (TOPROL XL) 25 MG TABLET SR 24 HR Take 25 mg by mouth every day.  3     No current [facility]-ordered facility-administered medications on file.      Allergies:  Penicillins    Health Maintenance: Completed.    ROS:  Constitutional: Denies fever, chills, night sweats, weight loss/gain or malaise. Improving fatigue.   HENT: Denies nasal congestion, sore throat, hearing loss, enlarged thyroid, or difficulty swallowing.   Eyes: Denies changes in vision, pain. Wears corrective wear. Since removal brain tumor in 2015, pt. reports that she has had decreased peripheral vision, worse in left eye. Does not drive.   Respiratory: Denies cough and/or SOB at rest or  "activity.  Cardiovascular: Denies tachycardia, chest pain, or palpitations.  Improving leg swelling, managed with intermittent use of Lasix. Pt. has a pace maker due to the history of sick sinus syndrome.  Gastrointestinal: Denies N/V/C/D, abdominal pain, loss appetite, reflux, or hematochezia.  Genitourinary: Denies difficulty voiding, dysuria, nocturia, or hematuria.   Skin: Negative for rash or worrisome moles.   Neurological: Negative for dizziness, focal weakness and headaches. Denies any recent falls.   Endo/Heme/Allergies: Denies bruise/bleed easily, allergies.   Psychiatric/Behavioral: Denies depression, nervous/anxious, difficulty sleeping.    Objective:   Exam:  /70 (BP Location: Left arm, Patient Position: Sitting, BP Cuff Size: Adult)   Pulse 84   Temp 36.8 °C (98.2 °F) (Temporal)   Ht 1.549 m (5' 1\")   Wt 71.5 kg (157 lb 9.6 oz)   SpO2 93%   BMI 29.78 kg/m²  Body mass index is 29.78 kg/m².    General: Normal appearing. No distress.  HEENT: Normocephalic. Eyes conjunctiva clear lids without ptosis, PERRLA, ears normal shape and contour. Teeth intact. Lips without lesions, good dentition, moist mucous membranes.  Neck: Trachea midline, no masses, no thyromegaly.  Respiratory: Unlabored respiratory effort, clear breath sounds throughout, no cough.  Cardiovascular: Regular rate and rhythm without murmur. Pedal and radial pulses are intact and equal bilaterally.  Skin: Warm and dry.  No obvious lesions.  Musculoskeletal: Normal gait with cane. No extremity cyanosis, clubbing. Non-pitting mild lower edema.  Psych: Normal mood and affect. Alert and oriented x3. Judgment and insight is normal  Neuro: Grossly non-focal.     Assessment & Plan:   1. Encounter for long-term current use of medication  Reviewed with patient medication use and side effects. Medical, past, surgical history reviewed with patient. Patient encouraged to maintain all appointments with specialist.    2. Cough  This is a resolved " condition. Instructed to use albuterol inhaler as needed for SOB and/or cough. Discussed continuing to not use Flovent inhaler. Instructed to check daily time O2 saturation intermittently due to pulmonologist recommendation to maintain O2 saturations about 89% during the day on RA. Instructed to report to PCP or pulmonologist if she experiences saturation below 89%.  Encouraged patient to wash hands regularly and avoid sick contacts while supporting immune system with Vitamin C, Zinc, Elderberry, and garlic. Instructed to maintain adequate hydration with water.    3. Lower extremity edema  This is a chronic stable condition. Instructed to continue wearing compression socks as tolerated during the day time hours.  Instructed to continue taking Lasix 20 mg every other day with potassium chloride ER 10 MEQ in am.  Discussed possible side effects with patient, verbalized understanding. Will continue to assess CMP as needed.    Return in about 3 months (around 6/12/2020).    Please note that this dictation was created using voice recognition software. I have made every reasonable attempt to correct obvious errors, but I expect that there are errors of grammar and possibly content that I did not discover before finalizing the note.

## 2020-03-20 DIAGNOSIS — R05.9 COUGH IN ADULT: ICD-10-CM

## 2020-03-20 RX ORDER — ALBUTEROL SULFATE 90 UG/1
2 AEROSOL, METERED RESPIRATORY (INHALATION) EVERY 4 HOURS PRN
Qty: 6.7 INHALER | Refills: 1 | Status: SHIPPED | OUTPATIENT
Start: 2020-03-20 | End: 2020-09-02

## 2020-03-25 ENCOUNTER — OFFICE VISIT (OUTPATIENT)
Dept: CARDIOLOGY | Facility: MEDICAL CENTER | Age: 80
End: 2020-03-25
Payer: MEDICARE

## 2020-03-25 VITALS
OXYGEN SATURATION: 92 % | BODY MASS INDEX: 28.7 KG/M2 | HEIGHT: 61 IN | HEART RATE: 71 BPM | WEIGHT: 152 LBS | DIASTOLIC BLOOD PRESSURE: 64 MMHG | SYSTOLIC BLOOD PRESSURE: 110 MMHG

## 2020-03-25 DIAGNOSIS — I49.5 SICK SINUS SYNDROME (HCC): ICD-10-CM

## 2020-03-25 DIAGNOSIS — I63.10 CEREBROVASCULAR ACCIDENT (CVA) DUE TO EMBOLISM OF PRECEREBRAL ARTERY (HCC): ICD-10-CM

## 2020-03-25 DIAGNOSIS — Z79.899 HIGH RISK MEDICATION USE: ICD-10-CM

## 2020-03-25 DIAGNOSIS — R79.89 ABNORMAL CBC MEASUREMENT: ICD-10-CM

## 2020-03-25 DIAGNOSIS — I48.20 CHRONIC ATRIAL FIBRILLATION (HCC): ICD-10-CM

## 2020-03-25 DIAGNOSIS — R60.0 LOWER EXTREMITY EDEMA: ICD-10-CM

## 2020-03-25 DIAGNOSIS — H53.47 BITEMPORAL HEMIANOPIA: ICD-10-CM

## 2020-03-25 PROCEDURE — 99215 OFFICE O/P EST HI 40 MIN: CPT | Performed by: INTERNAL MEDICINE

## 2020-03-25 ASSESSMENT — ENCOUNTER SYMPTOMS
EYES NEGATIVE: 1
BRUISES/BLEEDS EASILY: 0
PND: 0
PALPITATIONS: 0
SHORTNESS OF BREATH: 0
CHILLS: 0
CLAUDICATION: 0
COUGH: 0
RESPIRATORY NEGATIVE: 1
LOSS OF CONSCIOUSNESS: 0
CONSTITUTIONAL NEGATIVE: 1
ORTHOPNEA: 0
GASTROINTESTINAL NEGATIVE: 1
WEAKNESS: 0
DIZZINESS: 0
STRIDOR: 0
HEMOPTYSIS: 0
MUSCULOSKELETAL NEGATIVE: 1
SPUTUM PRODUCTION: 0
FEVER: 0
WHEEZING: 0
CARDIOVASCULAR NEGATIVE: 1
NEUROLOGICAL NEGATIVE: 1
SORE THROAT: 0

## 2020-03-25 ASSESSMENT — FIBROSIS 4 INDEX: FIB4 SCORE: 1.3

## 2020-03-25 NOTE — PROGRESS NOTES
Chief Complaint   Patient presents with   • Atrial Fibrillation       Subjective:   Ml Chavira is a 80 y.o. female who presents today as a follow-up for her sick sinus syndrome CVA paroxysmal atrial fibrillation high risk medication usage and recent brain bleed.  She continues on aspirin.  She has no chest pain palpitations or PND.  Her blood pressures can been controlled.  Is been no changes to her stage IV lung cancer.  Her most recent CT scan of her chest showed stable disease with no changes.    Past Medical History:   Diagnosis Date   • Breast cancer (HCC)    • Cancer (HCC)     lung cancer with brain mts   • Chickenpox    • [place] measles    • Healthcare maintenance 7/23/2018   • Influenza    • Joint pain    • Lung cancer (HCC)    • Mumps    • Need for vaccination 1/17/2019   • Radionecrosis 4/14/2018   • Sick sinus syndrome (HCC)     with AFIB, s/p pacemaker   • Thyroid disease    • Tonsillitis      Past Surgical History:   Procedure Laterality Date   • CRANIOTOMY STEALTH Right 11/23/2015    Procedure: CRANIOTOMY STEALTH-right occipital;  Surgeon: Suyapa Schumacher M.D.;  Location: SURGERY [facility];  Service:    • APPENDECTOMY     • CRANIOTOMY     • KNEE ARTHROSCOPY      left    • LUMPECTOMY     • OTHER      left knee surgery   • PACEMAKER INSERTION     • TONSILLECTOMY       Family History   Problem Relation Age of Onset   • Dementia Mother    • Diabetes Mother    • Other Mother         osteoarthritis   • Arthritis Mother    • Autoimmune Disease Father         Rheumatoid arthritis   • Arthritis Father    • Cancer Brother         prostate      Social History     Socioeconomic History   • Marital status:      Spouse name: Not on file   • Number of children: Not on file   • Years of education: Not on file   • Highest education level: Not on file   Occupational History   • Not on file   Social Needs   • Financial resource strain: Not on file   • Food insecurity     Worry: Not on file      "Inability: Not on file   • Transportation needs     Medical: Not on file     Non-medical: Not on file   Tobacco Use   • Smoking status: Former Smoker     Packs/day: 2.00     Years: 20.00     Pack years: 40.00     Types: Cigarettes     Last attempt to quit: 1980     Years since quittin.2   • Smokeless tobacco: Never Used   Substance and Sexual Activity   • Alcohol use: Yes     Alcohol/week: 1.8 oz     Types: 3 Glasses of wine per week     Frequency: 2-3 times a week     Drinks per session: 1 or 2     Binge frequency: Never   • Drug use: No   • Sexual activity: Not on file   Lifestyle   • Physical activity     Days per week: 4 days     Minutes per session: 60 min   • Stress: Not on file   Relationships   • Social connections     Talks on phone: More than three times a week     Gets together: More than three times a week     Attends Scientologist service: Never     Active member of club or organization: Yes     Attends meetings of clubs or organizations: More than 4 times per year     Relationship status:    • Intimate partner violence     Fear of current or ex partner: No     Emotionally abused: No     Physically abused: No     Forced sexual activity: No   Other Topics Concern   • Not on file   Social History Narrative   • Not on file     Allergies   Allergen Reactions   • Penicillins Rash and Itching     RXN \"a long time ago\"     Outpatient Encounter Medications as of 3/25/2020   Medication Sig Dispense Refill   • albuterol 108 (90 Base) MCG/ACT Aero Soln inhalation aerosol INHALE 2 PUFFS BY MOUTH EVERY FOUR HOURS AS NEEDED FOR SHORTNESS OF BREATH. 6.7 Inhaler 1   • metronidazole (METROCREAM) 0.75 % cream APPLY TO FACE EVERYDAY ONCE TO TWICE A DAY FOR ROSACEA     • Spacer/Aero-Holding Chambers (KRYSTIANHAMEDY FRENCH) [facility] USE AS DIRECTED     • aspirin 81 MG tablet Take 81 mg by mouth every day.     • furosemide (LASIX) 20 MG Tab Take 1 Tab by mouth every day. Take in am. 90 Tab 0   • methimazole (TAPAZOLE) 5 " "MG Tab Take 0.5 Tabs by mouth every day. 90 Tab 1   • alendronate (FOSAMAX) 70 MG Tab Take 1 Tab by mouth every 7 days. 12 Tab 2   • flecainide (TAMBOCOR) 150 MG Tab TAKE 1 TABLET BY MOUTH EVERY 12 HOURS 180 Tab 0   • potassium chloride ER (KLOR-CON) 10 MEQ tablet Take 1 Tab by mouth every day. 90 Tab 0   • Cholecalciferol (VITAMIN D3) 2000 UNIT Cap Take 2,000 Units by mouth. Indications: Daily Treatment with Aspirin Dose Greater Then 325 mg     • levETIRAcetam (KEPPRA) 500 MG Tab Take 1 Tab by mouth 2 Times a Day. 180 Tab 3   • OYSCO 500 500 MG Tab 500 mg.  5   • BIOTIN PO Take  by mouth every day.     • Misc. Devices ([employer] PULSE OXIMETER) Misc 1 Each by Does not apply route 4 times a day as needed. 1 Each 0   • metoprolol SR (TOPROL XL) 25 MG TABLET SR 24 HR Take 25 mg by mouth every day.  3     No facility-administered encounter medications on file as of 3/25/2020.      Review of Systems   Constitutional: Negative.  Negative for chills, fever and malaise/fatigue.   HENT: Negative.  Negative for sore throat.    Eyes: Negative.    Respiratory: Negative.  Negative for cough, hemoptysis, sputum production, shortness of breath, wheezing and stridor.    Cardiovascular: Negative.  Negative for chest pain, palpitations, orthopnea, claudication, leg swelling and PND.   Gastrointestinal: Negative.    Genitourinary: Negative.    Musculoskeletal: Negative.    Skin: Negative.    Neurological: Negative.  Negative for dizziness, loss of consciousness and weakness.   Endo/Heme/Allergies: Negative.  Does not bruise/bleed easily.   All other systems reviewed and are negative.       Objective:   /64 (BP Location: Left arm, Patient Position: Sitting, BP Cuff Size: Adult)   Pulse 71   Ht 1.549 m (5' 1\")   Wt 68.9 kg (152 lb)   SpO2 92%   BMI 28.72 kg/m²     Physical Exam   Constitutional: She appears well-developed and well-nourished. No distress.   HENT:   Head: Normocephalic and atraumatic.   Right Ear: External ear " normal.   Left Ear: External ear normal.   Nose: Nose normal.   Mouth/Throat: No oropharyngeal exudate.   Eyes: Pupils are equal, round, and reactive to light. Conjunctivae and EOM are normal. Right eye exhibits no discharge. Left eye exhibits no discharge. No scleral icterus.   Neck: Neck supple. No JVD present.   Cardiovascular: Normal rate, regular rhythm and intact distal pulses. Exam reveals no gallop and no friction rub.   No murmur heard.  Pulmonary/Chest: Effort normal. No stridor. No respiratory distress. She has no wheezes. She has no rales. She exhibits no tenderness.   Abdominal: Soft. She exhibits no distension. There is no guarding.   Musculoskeletal: Normal range of motion.         General: No tenderness, deformity or edema.   Neurological: She is alert. She has normal reflexes. She displays normal reflexes. No cranial nerve deficit. She exhibits normal muscle tone. Coordination normal.   Skin: Skin is warm and dry. No rash noted. She is not diaphoretic. No erythema. No pallor.   Psychiatric: She has a normal mood and affect. Her behavior is normal. Judgment and thought content normal.   Nursing note and vitals reviewed.      Assessment:     1. Abnormal CBC measurement  REFERRAL TO CARDIAC ELECTROPHYSIOLOGY   2. Bitemporal hemianopia  REFERRAL TO CARDIAC ELECTROPHYSIOLOGY   3. Chronic atrial fibrillation  REFERRAL TO CARDIAC ELECTROPHYSIOLOGY   4. Cerebrovascular accident (CVA) due to embolism of precerebral artery (HCC)  REFERRAL TO CARDIAC ELECTROPHYSIOLOGY   5. High risk medication use  REFERRAL TO CARDIAC ELECTROPHYSIOLOGY   6. Lower extremity edema     7. Sick sinus syndrome (HCC)  REFERRAL TO CARDIAC ELECTROPHYSIOLOGY       Medical Decision Making:  Today's Assessment / Status / Plan:     80-year-old female with a history of stage IV lung cancer which is stable with a brain metastasis status post XRT and craniotomy with a recent intracerebral hemorrhage in the setting of Eliquis.  We will  obviously keep her off oral anticoagulation.  She will stay on aspirin.  I discussed with her the risk benefits and alternatives of proceeding with a watchman device.  She agrees to proceed.  In the meantime we will keep her on the flecainide for her atrial fibrillation and the furosemide and potassium for lower extremity edema.  She we will see her back in 3 months.

## 2020-03-30 DIAGNOSIS — Z79.899 LONG TERM CURRENT USE OF DIURETIC: ICD-10-CM

## 2020-03-30 NOTE — PROGRESS NOTES
Lab Results   Component Value Date/Time    WBC 5.5 01/27/2020 10:33 AM    RBC 3.97 (L) 01/27/2020 10:33 AM    HEMOGLOBIN 13.8 01/27/2020 10:33 AM    HEMATOCRIT 42.7 01/27/2020 10:33 AM    .6 (H) 01/27/2020 10:33 AM    MCH 34.8 (H) 01/27/2020 10:33 AM    MCHC 32.3 (L) 01/27/2020 10:33 AM    MPV 9.5 01/27/2020 10:33 AM    NEUTSPOLYS 59.80 01/27/2020 10:33 AM    LYMPHOCYTES 28.60 01/27/2020 10:33 AM    MONOCYTES 9.80 01/27/2020 10:33 AM    EOSINOPHILS 1.80 01/27/2020 10:33 AM    BASOPHILS 0.00 01/27/2020 10:33 AM    ANISOCYTOSIS 1+ 01/27/2020 10:33 AM      Lab Results   Component Value Date/Time    SODIUM 138 01/27/2020 10:33 AM    POTASSIUM 4.1 01/27/2020 10:33 AM    CHLORIDE 102 01/27/2020 10:33 AM    CO2 30 01/27/2020 10:33 AM    GLUCOSE 91 01/27/2020 10:33 AM    BUN 16 01/27/2020 10:33 AM    CREATININE 0.75 01/27/2020 10:33 AM      Repeating CBC due previous abnormal CBC in the recent past. Patient using Lasix every other day.   CRISELDA Fry

## 2020-04-14 ENCOUNTER — HOSPITAL ENCOUNTER (OUTPATIENT)
Dept: LAB | Facility: MEDICAL CENTER | Age: 80
End: 2020-04-14
Attending: NURSE PRACTITIONER
Payer: MEDICARE

## 2020-04-14 ENCOUNTER — HOSPITAL ENCOUNTER (OUTPATIENT)
Dept: LAB | Facility: MEDICAL CENTER | Age: 80
End: 2020-04-14
Attending: INTERNAL MEDICINE
Payer: MEDICARE

## 2020-04-14 DIAGNOSIS — E05.90 HYPERTHYROIDISM: ICD-10-CM

## 2020-04-14 DIAGNOSIS — Z79.899 HIGH RISK MEDICATION USE: ICD-10-CM

## 2020-04-14 DIAGNOSIS — E55.9 VITAMIN D DEFICIENCY: ICD-10-CM

## 2020-04-14 DIAGNOSIS — M81.0 OSTEOPOROSIS, UNSPECIFIED OSTEOPOROSIS TYPE, UNSPECIFIED PATHOLOGICAL FRACTURE PRESENCE: ICD-10-CM

## 2020-04-14 DIAGNOSIS — Z79.899 LONG TERM CURRENT USE OF DIURETIC: ICD-10-CM

## 2020-04-14 LAB
25(OH)D3 SERPL-MCNC: 39 NG/ML (ref 30–100)
ALBUMIN SERPL BCP-MCNC: 4.5 G/DL (ref 3.2–4.9)
ALBUMIN SERPL BCP-MCNC: 4.5 G/DL (ref 3.2–4.9)
ALBUMIN/GLOB SERPL: 1.5 G/DL
ALBUMIN/GLOB SERPL: 1.5 G/DL
ALP SERPL-CCNC: 59 U/L (ref 30–99)
ALP SERPL-CCNC: 59 U/L (ref 30–99)
ALT SERPL-CCNC: 24 U/L (ref 2–50)
ALT SERPL-CCNC: 25 U/L (ref 2–50)
ANION GAP SERPL CALC-SCNC: 11 MMOL/L (ref 7–16)
ANION GAP SERPL CALC-SCNC: 11 MMOL/L (ref 7–16)
AST SERPL-CCNC: 19 U/L (ref 12–45)
AST SERPL-CCNC: 21 U/L (ref 12–45)
BASOPHILS # BLD AUTO: 0.7 % (ref 0–1.8)
BASOPHILS # BLD: 0.05 K/UL (ref 0–0.12)
BILIRUB SERPL-MCNC: 0.4 MG/DL (ref 0.1–1.5)
BILIRUB SERPL-MCNC: 0.4 MG/DL (ref 0.1–1.5)
BUN SERPL-MCNC: 17 MG/DL (ref 8–22)
BUN SERPL-MCNC: 18 MG/DL (ref 8–22)
CALCIUM SERPL-MCNC: 9.8 MG/DL (ref 8.5–10.5)
CALCIUM SERPL-MCNC: 9.8 MG/DL (ref 8.5–10.5)
CHLORIDE SERPL-SCNC: 101 MMOL/L (ref 96–112)
CHLORIDE SERPL-SCNC: 102 MMOL/L (ref 96–112)
CO2 SERPL-SCNC: 27 MMOL/L (ref 20–33)
CO2 SERPL-SCNC: 28 MMOL/L (ref 20–33)
CREAT SERPL-MCNC: 0.75 MG/DL (ref 0.5–1.4)
CREAT SERPL-MCNC: 0.75 MG/DL (ref 0.5–1.4)
EOSINOPHIL # BLD AUTO: 0.26 K/UL (ref 0–0.51)
EOSINOPHIL NFR BLD: 3.8 % (ref 0–6.9)
ERYTHROCYTE [DISTWIDTH] IN BLOOD BY AUTOMATED COUNT: 53.5 FL (ref 35.9–50)
FASTING STATUS PATIENT QL REPORTED: NORMAL
GLOBULIN SER CALC-MCNC: 3 G/DL (ref 1.9–3.5)
GLOBULIN SER CALC-MCNC: 3 G/DL (ref 1.9–3.5)
GLUCOSE SERPL-MCNC: 85 MG/DL (ref 65–99)
GLUCOSE SERPL-MCNC: 87 MG/DL (ref 65–99)
HCT VFR BLD AUTO: 45.9 % (ref 37–47)
HGB BLD-MCNC: 14.7 G/DL (ref 12–16)
IMM GRANULOCYTES # BLD AUTO: 0.02 K/UL (ref 0–0.11)
IMM GRANULOCYTES NFR BLD AUTO: 0.3 % (ref 0–0.9)
LYMPHOCYTES # BLD AUTO: 1.4 K/UL (ref 1–4.8)
LYMPHOCYTES NFR BLD: 20.6 % (ref 22–41)
MCH RBC QN AUTO: 34.2 PG (ref 27–33)
MCHC RBC AUTO-ENTMCNC: 32 G/DL (ref 33.6–35)
MCV RBC AUTO: 106.7 FL (ref 81.4–97.8)
MONOCYTES # BLD AUTO: 0.62 K/UL (ref 0–0.85)
MONOCYTES NFR BLD AUTO: 9.1 % (ref 0–13.4)
NEUTROPHILS # BLD AUTO: 4.46 K/UL (ref 2–7.15)
NEUTROPHILS NFR BLD: 65.5 % (ref 44–72)
NRBC # BLD AUTO: 0 K/UL
NRBC BLD-RTO: 0 /100 WBC
PLATELET # BLD AUTO: 227 K/UL (ref 164–446)
PMV BLD AUTO: 9.1 FL (ref 9–12.9)
POTASSIUM SERPL-SCNC: 4.4 MMOL/L (ref 3.6–5.5)
POTASSIUM SERPL-SCNC: 4.5 MMOL/L (ref 3.6–5.5)
PROT SERPL-MCNC: 7.5 G/DL (ref 6–8.2)
PROT SERPL-MCNC: 7.5 G/DL (ref 6–8.2)
PTH-INTACT SERPL-MCNC: 35 PG/ML (ref 14–72)
RBC # BLD AUTO: 4.3 M/UL (ref 4.2–5.4)
SODIUM SERPL-SCNC: 140 MMOL/L (ref 135–145)
SODIUM SERPL-SCNC: 140 MMOL/L (ref 135–145)
T3FREE SERPL-MCNC: 2.76 PG/ML (ref 2.4–4.2)
T4 FREE SERPL-MCNC: 0.94 NG/DL (ref 0.53–1.43)
TSH SERPL DL<=0.005 MIU/L-ACNC: 2.04 UIU/ML (ref 0.38–5.33)
WBC # BLD AUTO: 6.8 K/UL (ref 4.8–10.8)

## 2020-04-14 PROCEDURE — 85025 COMPLETE CBC W/AUTO DIFF WBC: CPT

## 2020-04-14 PROCEDURE — 36415 COLL VENOUS BLD VENIPUNCTURE: CPT

## 2020-04-14 PROCEDURE — 82306 VITAMIN D 25 HYDROXY: CPT

## 2020-04-14 PROCEDURE — 84439 ASSAY OF FREE THYROXINE: CPT

## 2020-04-14 PROCEDURE — 84481 FREE ASSAY (FT-3): CPT

## 2020-04-14 PROCEDURE — 80053 COMPREHEN METABOLIC PANEL: CPT

## 2020-04-14 PROCEDURE — 83970 ASSAY OF PARATHORMONE: CPT

## 2020-04-14 PROCEDURE — 84443 ASSAY THYROID STIM HORMONE: CPT

## 2020-04-14 PROCEDURE — 80053 COMPREHEN METABOLIC PANEL: CPT | Mod: 91

## 2020-04-15 ENCOUNTER — TELEPHONE (OUTPATIENT)
Dept: MEDICAL GROUP | Facility: IMAGING CENTER | Age: 80
End: 2020-04-15

## 2020-04-15 NOTE — TELEPHONE ENCOUNTER
Please call lab and ask if they can add Leuk/Lymph phenotyping, flow cytometry to patient's recent blood draw? If they can please let me know and I will place the order.  Jahaira Santos, APRN-C

## 2020-04-15 NOTE — TELEPHONE ENCOUNTER
Thank you. I will call the patient and discuss her plan of care with her further.  Jahaira Santos, APRN-C

## 2020-04-28 ENCOUNTER — TELEMEDICINE (OUTPATIENT)
Dept: ENDOCRINOLOGY | Facility: MEDICAL CENTER | Age: 80
End: 2020-04-28
Payer: MEDICARE

## 2020-04-28 DIAGNOSIS — M81.0 OSTEOPOROSIS, UNSPECIFIED OSTEOPOROSIS TYPE, UNSPECIFIED PATHOLOGICAL FRACTURE PRESENCE: ICD-10-CM

## 2020-04-28 DIAGNOSIS — E04.2 MULTINODULAR GOITER: ICD-10-CM

## 2020-04-28 DIAGNOSIS — E55.9 VITAMIN D DEFICIENCY: ICD-10-CM

## 2020-04-28 DIAGNOSIS — E05.90 HYPERTHYROIDISM: ICD-10-CM

## 2020-04-28 DIAGNOSIS — Z79.899 HIGH RISK MEDICATION USE: ICD-10-CM

## 2020-04-28 PROCEDURE — 99214 OFFICE O/P EST MOD 30 MIN: CPT | Mod: 95,CR | Performed by: INTERNAL MEDICINE

## 2020-04-28 RX ORDER — METHIMAZOLE 5 MG/1
2.5 TABLET ORAL DAILY
Qty: 90 TAB | Refills: 1 | Status: SHIPPED | OUTPATIENT
Start: 2020-04-28 | End: 2020-10-27 | Stop reason: SDUPTHER

## 2020-04-28 RX ORDER — ALENDRONATE SODIUM 70 MG/1
70 TABLET ORAL
Qty: 12 TAB | Refills: 2 | Status: SHIPPED | OUTPATIENT
Start: 2020-04-28 | End: 2020-10-27 | Stop reason: SDUPTHER

## 2020-04-28 NOTE — PROGRESS NOTES
Chief Complaint: Follow up for Hyperthyroidism secondary to Grave's disease, history of multinodular goiter, history of osteoporosis.  Patient was presented for a telehealth consultation via secure and encrypted videoconferencing technology. This encounter was conducted via Zoom . Verbal consent was obtained. Patient's identity was verified.    HPI:     Ml Chavira is a 80 y.o. female here for follow up of the above medical issues.    She has a history of hyperthyroidism secondary to probable Graves' disease based on high normal uptake on her thyroid uptake and scan from 2018 and also from elevated thyrotropin receptor antibodies.  There is also possibility that she has autonomous thyroid nodules.    Since last visit patient reports feeling excellent.  She remains on Methimazole 2.5 mg daily which has been her dose for the past 6 months.   She reports excellent compliance and denies missing any daily doses.     Weight has increased 5 lbs pounds over last 2 weeks.    She currently reports significant concern about her progressive weight gain  She currently denies anxiousness, feeling excessive energy, tremulousness, palpitations, sweating  Her most recent labs show a normal TSH of 2.0 with a normal free T4 of 0.94 and a low normal free T3 of 2.76        She previously had a formal thyroid ultrasound on July 2019 which showed:   a dominant hypoechoic solid nodule measuring 2.1 cm in the right lower lobe TR 5 which was biopsied and proven benign  There was also another 1.7 cm hypoechoic solid nodule on the right lower lobe TR 5 which was biopsied and proven benign  She also has a 1.9 cm hypoechoic solid nodule on the left mid lobe TR 6 which was biopsied and proven benign  She underwent biopsies on August 22, 2019        She also has osteoporosis based on the lowest T score of -3.1 for the left femur from her bone density on September 2018.  Her fracture risk is significantly high.    She denies interval falls  and fractures.  She has been taking alendronate 70 mg weekly for the past 3 years and has tolerated the medication well.   She takes calcium 1200mg daily with Vitamin D3 5000u daily  Her last vitamin D was adequate at 39 with normal intact PTH levels of 35 on April 2020  Her last serum calcium was normal at 9.8 on April 2020      Patient's medications, allergies, and social histories were reviewed and updated as appropriate.      ROS:     CONS:     No fever, no chills   EYES:     No diplopia, no blurry vision   CV:           No chest pain, no palpitations   PULM:     No SOB, no cough, no hemoptysis.   GI:            No nausea, no vomiting, no diarrhea, no constipation   ENDO:     No polyuria, no polydipsia, no heat intolerance, no cold intolerance       Past Medical History:  Problem List:  2020-02: Abnormal CBC measurement  2019-12: At risk for falling  2019-12: Use of cane as ambulatory aid  2019-12: Lower extremity edema  2019-08: High risk medication use  2019-08: Multinodular goiter  2019-08: Vitamin D deficiency  2019-01: Osteoporosis  2019-01: Need for vaccination  2019-01: Hyperthyroidism  2019-01: Chronic atrial fibrillation (HCC)  2018-08: Postmenopausal  2018-08: Low TSH level  2018-07: High serum thyroid stimulating hormone (TSH)  2018-07: Healthcare maintenance  2018-07: Screening for osteoporosis  2018-07: Thyroid nodule  2018-07: Low vitamin D level  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  2018-06: Arthralgia of both knees  2018-06: Finger pain, right  2018-06: Balance disorder  2018-06: Acute cystitis  2018-05: Acute alcoholic gastritis without hemorrhage  2018-05: Pleuritic chest pain  2018-05: Sick sinus syndrome (HCC)  2018-04: Radionecrosis  2018-03: Malignant neoplasm of upper lobe of right lung (HCC)  2017-11: CVA (cerebral vascular accident) (HCC)  2016-01: Allergic rhinitis  2016-01: Hilar adenopathy  2016-01: History of breast cancer  2015-11: Lung mass  2015-11: Blurry vision, left eye  2015-11:  Metastasis to brain (HCC)  2015: Metastatic cancer (CMS-HCC)      Past Surgical History:  Past Surgical History:   Procedure Laterality Date   • CRANIOTOMY STEALTH Right 2015    Procedure: CRANIOTOMY STEALTH-right occipital;  Surgeon: Suyapa Schumacher M.D.;  Location: SURGERY [facility];  Service:    • APPENDECTOMY     • CRANIOTOMY     • KNEE ARTHROSCOPY      left    • LUMPECTOMY     • OTHER      left knee surgery   • PACEMAKER INSERTION     • TONSILLECTOMY          Allergies:  Penicillins     Social History:  Social History     Tobacco Use   • Smoking status: Former Smoker     Packs/day: 2.00     Years: 20.00     Pack years: 40.00     Types: Cigarettes     Last attempt to quit: 1980     Years since quittin.3   • Smokeless tobacco: Never Used   Substance Use Topics   • Alcohol use: Yes     Alcohol/week: 1.8 oz     Types: 3 Glasses of wine per week     Frequency: 2-3 times a week     Drinks per session: 1 or 2     Binge frequency: Never   • Drug use: No        Family History:   family history includes Arthritis in her father and mother; Autoimmune Disease in her father; Cancer in her brother; Dementia in her mother; Diabetes in her mother; Other in her mother.      PHYSICAL EXAM:   Vital signs: There were no vitals taken for this visit.  GENERAL: Well-developed, well-nourished in no apparent distress.   EYE:  No ocular asymmetry, PERRLA, No exophthalmos or lid lag  HENT: Pink, moist mucous membranes.    NECK: Thyroid is slightly enlarged and feels bosselated  CARDIOVASCULAR:  No murmurs  LUNGS: Clear breath sounds  ABDOMEN: Soft, nontender   EXTREMITIES: No clubbing, cyanosis, or edema.   NEUROLOGICAL: No gross focal motor abnormalities, No visible tremors with both hands  LYMPH: No cervical adenopathy palpated.   SKIN: No rashes, lesions.     Labs:  Lab Results   Component Value Date/Time    WBC 6.8 2020 12:01 PM    RBC 4.30 2020 12:01 PM    HEMOGLOBIN 14.7 2020 12:01 PM    MCV  106.7 (H) 04/14/2020 12:01 PM    MCH 34.2 (H) 04/14/2020 12:01 PM    MCHC 32.0 (L) 04/14/2020 12:01 PM    RDW 53.5 (H) 04/14/2020 12:01 PM    MPV 9.1 04/14/2020 12:01 PM       Lab Results   Component Value Date/Time    SODIUM 140 04/14/2020 12:01 PM    SODIUM 140 04/14/2020 12:01 PM    POTASSIUM 4.4 04/14/2020 12:01 PM    POTASSIUM 4.5 04/14/2020 12:01 PM    CHLORIDE 101 04/14/2020 12:01 PM    CHLORIDE 102 04/14/2020 12:01 PM    CO2 28 04/14/2020 12:01 PM    CO2 27 04/14/2020 12:01 PM    ANION 11.0 04/14/2020 12:01 PM    ANION 11.0 04/14/2020 12:01 PM    GLUCOSE 85 04/14/2020 12:01 PM    GLUCOSE 87 04/14/2020 12:01 PM    BUN 18 04/14/2020 12:01 PM    BUN 17 04/14/2020 12:01 PM    CREATININE 0.75 04/14/2020 12:01 PM    CREATININE 0.75 04/14/2020 12:01 PM    CALCIUM 9.8 04/14/2020 12:01 PM    CALCIUM 9.8 04/14/2020 12:01 PM    ASTSGOT 21 04/14/2020 12:01 PM    ASTSGOT 19 04/14/2020 12:01 PM    ALTSGPT 25 04/14/2020 12:01 PM    ALTSGPT 24 04/14/2020 12:01 PM    TBILIRUBIN 0.4 04/14/2020 12:01 PM    TBILIRUBIN 0.4 04/14/2020 12:01 PM    ALBUMIN 4.5 04/14/2020 12:01 PM    ALBUMIN 4.5 04/14/2020 12:01 PM    TOTPROTEIN 7.5 04/14/2020 12:01 PM    TOTPROTEIN 7.5 04/14/2020 12:01 PM    GLOBULIN 3.0 04/14/2020 12:01 PM    GLOBULIN 3.0 04/14/2020 12:01 PM    AGRATIO 1.5 04/14/2020 12:01 PM    AGRATIO 1.5 04/14/2020 12:01 PM       Lab Results   Component Value Date/Time    TSHULTRASEN 0.540 01/22/2020 1402     Lab Results   Component Value Date/Time    FREET4 0.97 01/22/2020 1402     Lab Results   Component Value Date/Time    FREET3 3.71 01/22/2020 1402     No results found for: THYSTIMIG      Imaging: see DEXA date 9/2018, see thyroid uptake and scan 10/2018      ASSESSMENT/PLAN:     1. Hyperthyroidism  Well-controlled  She reports weight gain and her free T3 is lower  Will adjust methimazole 2.5 mg EVERY OTHER DAY  Reviewed importance of adherence  We will plan for follow-up in 6 months with repeat of TSH, free T4 and free T3  levels      2. Multinodular goiter  Stable  Multiple benign dominant solid nodules measuring more than 1.5 cm with TR 5 and TR 6 scores noted  Biopsies are benign  Recommend observation  We will plan to repeat ultrasound in 6 months      3. Osteoporosis, unspecified osteoporosis type, unspecified pathological fracture presence  Unstable  Elevated fracture risk noted  Patient has no interval falls or fractures  Continue alendronate 70 mg weekly  Reviewed importance of adherence  Advised patient to ensure adequate intake of calcium and vitamin D from diet  Recommend daily weightbearing exercise  Bone density should be repeated on September 21, 2020  I am scheduling her for repeat bone density in 6 months      4. Vitamin D deficiency  Stable  Last vitamin D was fair at 39  Recommend that she take over-the-counter vitamin D3 5000 units daily  Recommend that she take calcium 600 mg once or twice daily  We will repeat calcium and 25-hydroxy vitamin D levels in 6 months      5. High risk medication use  Patient is taking methimazole which is high risk medication      Return in about 6 months (around 10/28/2020).      Thank you kindly for allowing me to participate in the thyroid care plan for this patient.    Abhijit Joseph MD, FACE, ECNU  01/28/20    CC:   SONIA Fry

## 2020-05-04 DIAGNOSIS — R60.0 BILATERAL LEG EDEMA: ICD-10-CM

## 2020-05-04 RX ORDER — FUROSEMIDE 20 MG/1
TABLET ORAL
Qty: 90 TAB | Refills: 0 | Status: SHIPPED | OUTPATIENT
Start: 2020-05-04 | End: 2020-07-09

## 2020-05-15 ENCOUNTER — NON-PROVIDER VISIT (OUTPATIENT)
Dept: CARDIOLOGY | Facility: MEDICAL CENTER | Age: 80
End: 2020-05-15
Payer: MEDICARE

## 2020-05-15 DIAGNOSIS — Z95.0 CARDIAC PACEMAKER IN SITU: ICD-10-CM

## 2020-05-15 DIAGNOSIS — I49.5 SICK SINUS SYNDROME (HCC): ICD-10-CM

## 2020-05-15 PROCEDURE — 93294 REM INTERROG EVL PM/LDLS PM: CPT | Performed by: INTERNAL MEDICINE

## 2020-05-21 ENCOUNTER — TELEMEDICINE (OUTPATIENT)
Dept: NEUROLOGY | Facility: MEDICAL CENTER | Age: 80
End: 2020-05-21
Payer: MEDICARE

## 2020-05-21 DIAGNOSIS — I48.20 CHRONIC ATRIAL FIBRILLATION (HCC): ICD-10-CM

## 2020-05-21 DIAGNOSIS — I62.9 INTRACRANIAL HEMORRHAGE (HCC): ICD-10-CM

## 2020-05-21 DIAGNOSIS — C79.31 METASTASIS TO BRAIN (HCC): ICD-10-CM

## 2020-05-21 PROCEDURE — 99213 OFFICE O/P EST LOW 20 MIN: CPT | Mod: 95,CR | Performed by: PSYCHIATRY & NEUROLOGY

## 2020-05-21 RX ORDER — LEVETIRACETAM 500 MG/1
500 TABLET ORAL 2 TIMES DAILY
Qty: 180 TAB | Refills: 3 | Status: SHIPPED | OUTPATIENT
Start: 2020-05-21 | End: 2021-05-19

## 2020-05-21 ASSESSMENT — ENCOUNTER SYMPTOMS
FEVER: 0
HALLUCINATIONS: 0
SORE THROAT: 0
FALLS: 0
WEIGHT LOSS: 0
BLURRED VISION: 1
BRUISES/BLEEDS EASILY: 0
SHORTNESS OF BREATH: 0

## 2020-05-21 NOTE — PROGRESS NOTES
Chief Complaint   Patient presents with   • Follow-Up     Intracranial Hemorrhage      This encounter was conducted via Zoom .   Verbal consent was obtained. Patient's identity and location (home) was verified.    History of present illness:  Ml Chavira 79 y.o. female presents today for abnormal brain MRI follow-up.   History is obtained from patient.  and Patient is accompanied by self.  She lives alone and has a medical alert device at home.     Duration/timing: Winter/November 2017  Context: Brain metastasis/spell: patient remembers feeling faint and couldn't get herself back up, found that her left side was weak when she woke up (no incontinence or tongue biting). Brain radiation 2015. Brain tumor resection 2015. She possibly had the left-sided weakness in 2015 but she is not sure.  Despite that the weakness she is experienced in the winter 2017 was far from her baseline.  That episode was occurring after trip to [place] and she was otherwise not acutely ill. She does not drive because of hemianopsia. She hs a hx of breast cancer without metastasis.  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  She is referred today for reevaluation on whether or not she needs Keppra.  Location: Brain  Quality: Weakness/swelling  Severity: Moderate  Modifying factors: Improved with time and steroid  Associated signs/symptoms: hx of bell palsy of uncertain side; occasional migraines  Denies: bladder incontinence, bowel incontinence, dizziness, headaches, vision changes, other weakness, numbness/tingling, swallowing difficulties, speech disturbance, depression, anxiety, memory loss, loss of consciousness, hallucinations, abnormal movements, diplopia and falls     Summary of problem:   Intracranial hemorrhage, incidental finding on brain imaging for monitoring of prior brain mets by oncology.  She is status post surgical resection and radiation therapy.    Duration/timing: Subacute on  imaging, December 2019  Context: In the setting of anticoagulation with Eliquis for A. fib  Location: Right parietal, and prior surgical bed  Quality: Hemorrhage  Severity: Mild, patient asymptomatic  Modifying factors: Improved with time  Associated signs/symptoms: None  dizziness, headaches, vision changes/loss or diplopia, head trauma , weakness, numbness/tingling, swallowing difficulties, speech disturbance, incoordination, memory loss, loss of consciousness, seizures and falls       Patient has tried:  -Keppra 500 mg p.o. twice daily, started 2017 in patient, no side effects      Subjective: Patient was last seen in neurology clinic in clinic in Dec 2019 by me.    Since last visit, patient has had no issues.  She has been doing fairly well with regards to her health. No weakness, numbness, headaches.    Otherwise no events concerning for seizures.  She continues to tolerate Keppra well. Otherwise spell characteristics are unchanged from what is documented above.    Patient is pending eval by Dr. Levy () for possible watchman procedure. Pending eval by ophthal for blurry vision with return to baseline.  Not bumping into things. Pending oncology appointment 6/5. Dr. Aguilar.      Past medical history:   Past Medical History:   Diagnosis Date   • Breast cancer (HCC)    • Cancer (HCC)     lung cancer with brain mts   • Chickenpox    • Prydeinig measles    • Healthcare maintenance 7/23/2018   • Influenza    • Joint pain    • Lung cancer (HCC)    • Mumps    • Need for vaccination 1/17/2019   • Radionecrosis 4/14/2018   • Sick sinus syndrome (HCC)     with AFIB, s/p pacemaker   • Thyroid disease    • Tonsillitis        Past surgical history:   Past Surgical History:   Procedure Laterality Date   • CRANIOTOMY STEALTH Right 11/23/2015    Procedure: CRANIOTOMY STEALTH-right occipital;  Surgeon: Suyapa Schumacher M.D.;  Location: SURGERY [facility];  Service:    • APPENDECTOMY     • CRANIOTOMY     • KNEE ARTHROSCOPY       "left    • LUMPECTOMY     • OTHER      left knee surgery   • PACEMAKER INSERTION     • TONSILLECTOMY         Family history:   Family History   Problem Relation Age of Onset   • Dementia Mother    • Diabetes Mother    • Other Mother         osteoarthritis   • Arthritis Mother    • Autoimmune Disease Father         Rheumatoid arthritis   • Arthritis Father    • Cancer Brother         prostate        Social history:   Tobacco Use   • Smoking status: Former Smoker     Packs/day: 2.00     Years: 20.00     Pack years: 40.00     Last attempt to quit: 1960     Years since quittin.6   • Smokeless tobacco: Never Used   Substance and Sexual Activity   • Alcohol use: Yes     Alcohol/week: 1.8 oz     Types: 3 Glasses of wine per week   • Drug use: No   • Sexual activity: Not on file       Current medications:   Current Outpatient Medications   Medication   • furosemide (LASIX) 20 MG Tab   • methimazole (TAPAZOLE) 5 MG Tab   • alendronate (FOSAMAX) 70 MG Tab   • albuterol 108 (90 Base) MCG/ACT Aero Soln inhalation aerosol   • metronidazole (METROCREAM) 0.75 % cream   • Spacer/Aero-Holding Chambers ([facility] MANOLO) Misc   • aspirin 81 MG tablet   • flecainide (TAMBOCOR) 150 MG Tab   • potassium chloride ER (KLOR-CON) 10 MEQ tablet   • Cholecalciferol (VITAMIN D3) 2000 UNIT Cap   • levETIRAcetam (KEPPRA) 500 MG Tab   • OYSCO 500 500 MG Tab   • Misc. Devices ([employer] PULSE OXIMETER) Misc   • metoprolol SR (TOPROL XL) 25 MG TABLET SR 24 HR     No current facility-administered medications for this visit.        Medication Allergy:  Allergies   Allergen Reactions   • Penicillins Rash and Itching     RXN \"a long time ago\"   • Atorvastatin      Causes low quality       Review of Systems   Constitutional: Negative for fever and weight loss.   HENT: Negative for sore throat.    Eyes: Positive for blurred vision.   Respiratory: Negative for shortness of breath.    Cardiovascular: Positive for leg swelling.   Musculoskeletal: Negative " for falls.   Skin: Negative for rash.   Neurological:        As per HPI   Endo/Heme/Allergies: Does not bruise/bleed easily.   Psychiatric/Behavioral: Negative for hallucinations.       Physical examination:   There were no vitals filed for this visit.     Prior exam as detailed below. On today's exam, patient was Oriented to person, place, time, and purpose, Demonstrated normal attention and Speech is fluent without errors.    General: Patient in well nourished in no apparent distress. Elevated BMI.   Eyes: Ophthalmoscopic examination performed but discs cannot be visualized well enough to characterize bilaterally. Prior exam  HENT: Normocephalic, atraumatic.   Cardiovascular: No lower extremity edema.  Respiratory: Normal respiratory effort.   Skin: No appreciable signs of acute rashes or bruising.   Musculoskeletal: No signs of joint or muscle swelling.   Psychiatric: Pleasant.     NEUROLOGICAL EXAM:   Mental status: Awake, alert and fully oriented to person, place, time and situation. Normal attention, concentration and fund of knowledge for education level.   Speech and language: Speech is fluent without errors and clear.  Cranial nerve exam:  II: Pupils are equally round and reactive to light.  Left homonymous hemianopsia.  III, IV, VI: EOMI, no diplopia, mild right ptosis nonobstructive  V: Sensation to light touch is normal over V1-3 distributions bilaterally.    VII: Very mild blunting of the right nasolabial fold.  VIII: Hearing intact to soft speech  IX: Dysarthria is not present.  XI: Shoulder shrug are symmetrical and strong. Prior exam  XII: Tongue protrudes midline. Prior exam    Motor exam:  Muscle tone is normal in all 4 limbs.    Muscle strength: 5 out of 5 proximal distal right upper and lower extremity strength.  5- out of 5 proximal left upper and lower extremity strength.  Distally she is 5- as well.    Sensory exam:  Intact to Light touch in bilateral upper and lower extremity. No sensory  extinction.    Deep tendon reflexes:       Right  Left  Biceps   0/4  0/4  Triceps  0/4  0/4  Brachioradialis 0/4  0/4  Knee jerk  0/4  0/4  Ankle jerk  0/4  0/4   bilateral toes are downgoing to plantar stimulation..    Coordination: shows a normal finger-nose-finger   Gait: Casual gait is with cane.       ANCILLARY DATA REVIEWED:   Lab Data Review:  Lab Results   Component Value Date/Time    WBC 6.8 04/14/2020 12:01 PM    RBC 4.30 04/14/2020 12:01 PM    HEMOGLOBIN 14.7 04/14/2020 12:01 PM    HEMATOCRIT 45.9 04/14/2020 12:01 PM    .7 (H) 04/14/2020 12:01 PM    MCH 34.2 (H) 04/14/2020 12:01 PM    MCHC 32.0 (L) 04/14/2020 12:01 PM    MPV 9.1 04/14/2020 12:01 PM    NEUTSPOLYS 65.50 04/14/2020 12:01 PM    LYMPHOCYTES 20.60 (L) 04/14/2020 12:01 PM    MONOCYTES 9.10 04/14/2020 12:01 PM    EOSINOPHILS 3.80 04/14/2020 12:01 PM    BASOPHILS 0.70 04/14/2020 12:01 PM    ANISOCYTOSIS 1+ 01/27/2020 10:33 AM      Lab Results   Component Value Date/Time    SODIUM 140 04/14/2020 12:01 PM    SODIUM 140 04/14/2020 12:01 PM    POTASSIUM 4.4 04/14/2020 12:01 PM    POTASSIUM 4.5 04/14/2020 12:01 PM    CHLORIDE 101 04/14/2020 12:01 PM    CHLORIDE 102 04/14/2020 12:01 PM    CO2 28 04/14/2020 12:01 PM    CO2 27 04/14/2020 12:01 PM    GLUCOSE 85 04/14/2020 12:01 PM    GLUCOSE 87 04/14/2020 12:01 PM    BUN 18 04/14/2020 12:01 PM    BUN 17 04/14/2020 12:01 PM    CREATININE 0.75 04/14/2020 12:01 PM    CREATININE 0.75 04/14/2020 12:01 PM     Lab Results   Component Value Date/Time    ASTSGOT 22 06/04/2019 1531    ALTSGPT 22 06/04/2019 1531    ALKPHOSPHAT 55 06/04/2019 1531    ALBUMIN 4.4 06/04/2019 1531     Lab Results   Component Value Date/Time    HBA1C 5.3 11/30/2017 03:38 AM      May 2018 PT INR and APTT normal  Platelets December 5 2019, 235 (normal)    Imaging:   MRI brain June 2019:  1.  Status post right parietal-occipital craniotomy.  2.  Stable posttreatment appearance of the right temporal, parietal and occipital lobes with  heterogeneous T2 signal intensity, T1 signal hyperintensity and enhancement. No evidence of discrete enhancing mass to suggest residual or recurrent neoplasm.  3.  No new enhancing foci are identified to suggest new metastatic lesions.  4.  Mild cerebral substance loss.    MRI brain with without contrast December 17, 2019:  1.  Status post right parietal-occipital craniotomy.  2.  Stable posttreatment appearance of the right temporal, parietal and occipital lobes with heterogeneous T2 signal intensity, T1 signal hyperintensity and enhancement. There is a new 12 mm rounded focus in the right parietal lobe with T2 hypointense rim, possibly representing small subacute hematoma. A metastatic lesion is difficult to exclude and attention on follow-up is necessary.  3.  Nonspecific linear focus of enhancement within/along the left lateral ab, possibly artifactual. Attention to this on follow-up.  4.  Mild cerebral substance loss.    CT head without contrast December 19, 2019: Follow-up brain MRI  10 x 11 mm rounded focus of increased density in the location of the new lesion appreciated on recent MR in the right parietal lobe thought to represent a small subacute hematoma. CT findings could be compatible with subacute hematoma. The size of the   finding does not appear to have changed when compared to recent prior MRI. It is difficult to assess for metastatic lesion without contrast. Short-term follow-up reassessment is recommended. Postoperative changes in the right cerebral hemisphere appear stable when compared to recent prior MRI.    I have personally reviewed the patient's imaging as above and agree with the radiologist's interpretation.     CT head without contrast March 2020:  Interval resolution of previously identified focal area of increased density in the right parietal location which was thought to represent a subacute hematoma.     No new abnormalities are appreciated.    MRI brain with without contrast  March 2020:  There is irregular enhancement in the right parietal surgical bed. The gradient echo images demonstrates areas of hemorrhage. There is also abnormal T2 hyperintensity seen surrounding the surgical cavity. There is no mass effect or midline shift. There   has been interval reduction in the size of the previously seen T2 hypointense area within the surgical cavity. 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 Continued follow-up is recommended to ensure the resolution of the contrast enhancement.    Records reviewed: Chart reviewed.  Patient has seen endocrinology since last visit.  Head scans were obtained.      ASSESSMENT AND PLAN:    1.  Intracranial hemorrhage (HCC): Incidental finding on oncology follow-up MRI on December 17 2019 with a new 12 millimeter round focus in the right parietal lobe confirmed as blood on follow-up CT head 12/19/2019.  Suspected to be subacute in nature and stable based on repeat imaging findings in 3/2020.  Patient was anticoagulated on Eliquis 5 mg twice daily at this time.  Possible mild headache but otherwise asymptomatic.  Differential diagnosis includes secondary to anticoagulation/spontaneous/radiation versus new small metastatic lesion after thorough review of imaging with neuroradiology.  Platelets and INR have been in normal limits in the past. MRI brain 3/2020 still inconclusive with regards to differential and should be followed by oncology for possible recurrence.    -CT and MRI head- Studies done out of order, but otherwise stable and reviewed with patient today  -Recommend continued monitoring by oncology as deemed clinically appropriate, Dr. Aguilar  -Repeat head imaging if clinically indicated while patient is on aspirin    2. Metastasis to brain (HCC), resolved?: Patient with a history of lung cancer metastasis to the brain status post resection with radiation in 2015 complicated by radiation necrosis and vasogenic edema in 2017 status post Decadron  IV.  At that time patient was started on Keppra by inpatient neurology.  She has been on the medication since that time without issue.  It is unclear since she woke up with her left-sided weakness if she had any seizure activity and Oneil's paralysis.  MRI brain in June 2016 with right temporal parietal and occipital lobe signal intensity.  Prior risks and benefits discussion with patient today regarding risk for seizure given the postoperative/radiation changes in the brain involving the temporal lobes, seizure threshold, risks and benefits of medications.  Given that she is tolerating medication very well, she has extensive changes on MRI, and independent - high functioning baseline, I believe the risk of injury and to her lifestyle is more detrimental than staying on the Keppra.  She is higher risk compared to the general population to have a seizure due to the amount of scarring in her brain.  I personally believe the benefits outweigh the risks of being on Keppra. Patient agrees.   -Continue levETIRAcetam (KEPPRA) 500 MG Tab, 1 tablet twice daily  -She does not drive  -We previously discussed repeating EEG if she were to feel strongly about getting off any antiseizure medications to evaluate for abnormal discharges.  She declines for now since she is staying on Monday patients.  EEG at this point in time would not .    3. Malignant neoplasm of upper lobe of right lung (HCC): Doing well    3. Atrial fibrillation (HCC): With pacemaker.  Established with Dr. Obregon cardiology.  As of August 2019 patient was placed on Eliquis for stroke prevention by Cardiology, switched from aspirin started previously by Dr. Luz (cards).  [id number] score 3, risk of sLong troke 3.2% per year.   -Repeat discussion with patient today including but not limited to the following: risk/benefits/side effects/alternatives with regards to management of stroke prevention in the setting of known atrial fibrillation and ICH.  We reviewed treatment options including aspirin, anticoagulation, and no treatment.  We discussed the risk of ischemic stroke per year, risk of death and disability from ischemic stroke, risks of anticoagulation/antiplatelet use in the setting of recent ICH and higher risk of ICH if underlying brain metastasis from lung cancer is present including but not limited to risk of severe intracranial bleed, disability, and even death.  We discussed the risks of anticoagulation in general for stroke risk prevention. We discussed the follow-up plan for imaging as documented in problem #1.   -Continue aspirin 81 mg daily  -Patient to follow-up with cardiology for further management of atrial fibrillation    4.  Spell: Possible seizure in the setting of abnormal MRI brain and history of brain mets resolved per communication with oncology Dr. Mariano Aguilar 12/19/2019. Abnormal MRI changes thought to be secondary to post surgical/radiation necrosis . See HPI for documentation.     5. History of Bell's palsy: Suspect right side given clinical exam.    FOLLOW-UP: Return in about 6 months (around 11/21/2020).  For monitoring and if stable one-year follow-up  EDUCATION AND COUNSELING:  -I personally discussed the following with the patient:   As documented above.  We reviewed red flag symptoms of intracranial hemorrhage/stroke and if present to report to the emergency room immediately for further evaluation and care. Patient communicated understanding.     I personally discussed the risks/benefits/alternatives of temporarily postponing non-urgent workup during the current COVID-19 pandemic.     I spent 15 minutes face-to-face in which greater than 50% of the visit was spent in counseling/coordination of care as detailed above.     The patient understands and agrees that due to the complexity of his/her diagnosis, results of any testing and further recommendations will typically be discussed/made during a face to face encounter in  my office. The patient and/or family further understands it is their responsibility to keep proper follow up.     Disclaimer  This dictation was created using voice recognition software. I have made every reasonable attempt to avoid dictation errors, but this document may contain an error not identified before finalizing. If the error changes the accuracy of the document, I would appreciate it being brought to my attention. Thank you very much.     Irina Rodriguez MD  Neurology, Neurophysiology  [facility]

## 2020-06-11 ENCOUNTER — HOSPITAL ENCOUNTER (OUTPATIENT)
Dept: LAB | Facility: MEDICAL CENTER | Age: 80
End: 2020-06-11
Attending: INTERNAL MEDICINE
Payer: MEDICARE

## 2020-06-11 LAB
ALBUMIN SERPL BCP-MCNC: 4.1 G/DL (ref 3.2–4.9)
ALBUMIN/GLOB SERPL: 1.5 G/DL
ALP SERPL-CCNC: 56 U/L (ref 30–99)
ALT SERPL-CCNC: 26 U/L (ref 2–50)
ANION GAP SERPL CALC-SCNC: 13 MMOL/L (ref 7–16)
AST SERPL-CCNC: 21 U/L (ref 12–45)
BASOPHILS # BLD AUTO: 0.9 % (ref 0–1.8)
BASOPHILS # BLD: 0.05 K/UL (ref 0–0.12)
BILIRUB SERPL-MCNC: 0.6 MG/DL (ref 0.1–1.5)
BUN SERPL-MCNC: 19 MG/DL (ref 8–22)
CALCIUM SERPL-MCNC: 9.4 MG/DL (ref 8.5–10.5)
CHLORIDE SERPL-SCNC: 103 MMOL/L (ref 96–112)
CO2 SERPL-SCNC: 25 MMOL/L (ref 20–33)
CREAT SERPL-MCNC: 0.74 MG/DL (ref 0.5–1.4)
EOSINOPHIL # BLD AUTO: 0.12 K/UL (ref 0–0.51)
EOSINOPHIL NFR BLD: 2.2 % (ref 0–6.9)
ERYTHROCYTE [DISTWIDTH] IN BLOOD BY AUTOMATED COUNT: 51 FL (ref 35.9–50)
FASTING STATUS PATIENT QL REPORTED: NORMAL
GLOBULIN SER CALC-MCNC: 2.8 G/DL (ref 1.9–3.5)
GLUCOSE SERPL-MCNC: 96 MG/DL (ref 65–99)
HCT VFR BLD AUTO: 43.5 % (ref 37–47)
HGB BLD-MCNC: 14.1 G/DL (ref 12–16)
IMM GRANULOCYTES # BLD AUTO: 0.02 K/UL (ref 0–0.11)
IMM GRANULOCYTES NFR BLD AUTO: 0.4 % (ref 0–0.9)
LYMPHOCYTES # BLD AUTO: 1.2 K/UL (ref 1–4.8)
LYMPHOCYTES NFR BLD: 21.5 % (ref 22–41)
MCH RBC QN AUTO: 34.1 PG (ref 27–33)
MCHC RBC AUTO-ENTMCNC: 32.4 G/DL (ref 33.6–35)
MCV RBC AUTO: 105.3 FL (ref 81.4–97.8)
MONOCYTES # BLD AUTO: 0.54 K/UL (ref 0–0.85)
MONOCYTES NFR BLD AUTO: 9.7 % (ref 0–13.4)
NEUTROPHILS # BLD AUTO: 3.64 K/UL (ref 2–7.15)
NEUTROPHILS NFR BLD: 65.3 % (ref 44–72)
NRBC # BLD AUTO: 0 K/UL
NRBC BLD-RTO: 0 /100 WBC
PLATELET # BLD AUTO: 206 K/UL (ref 164–446)
PMV BLD AUTO: 9.1 FL (ref 9–12.9)
POTASSIUM SERPL-SCNC: 4 MMOL/L (ref 3.6–5.5)
PROT SERPL-MCNC: 6.9 G/DL (ref 6–8.2)
RBC # BLD AUTO: 4.13 M/UL (ref 4.2–5.4)
SODIUM SERPL-SCNC: 141 MMOL/L (ref 135–145)
WBC # BLD AUTO: 5.6 K/UL (ref 4.8–10.8)

## 2020-06-11 PROCEDURE — 36415 COLL VENOUS BLD VENIPUNCTURE: CPT

## 2020-06-11 PROCEDURE — 80053 COMPREHEN METABOLIC PANEL: CPT

## 2020-06-11 PROCEDURE — 85025 COMPLETE CBC W/AUTO DIFF WBC: CPT

## 2020-06-15 ENCOUNTER — OFFICE VISIT (OUTPATIENT)
Dept: CARDIOLOGY | Facility: MEDICAL CENTER | Age: 80
End: 2020-06-15
Payer: MEDICARE

## 2020-06-15 VITALS
SYSTOLIC BLOOD PRESSURE: 112 MMHG | OXYGEN SATURATION: 92 % | DIASTOLIC BLOOD PRESSURE: 72 MMHG | BODY MASS INDEX: 29.07 KG/M2 | WEIGHT: 154 LBS | HEIGHT: 61 IN | HEART RATE: 86 BPM

## 2020-06-15 DIAGNOSIS — I49.5 SICK SINUS SYNDROME (HCC): ICD-10-CM

## 2020-06-15 DIAGNOSIS — C79.31 METASTASIS TO BRAIN (HCC): ICD-10-CM

## 2020-06-15 DIAGNOSIS — I62.9 INTRACRANIAL HEMORRHAGE (HCC): ICD-10-CM

## 2020-06-15 DIAGNOSIS — Z95.0 CARDIAC PACEMAKER IN SITU: ICD-10-CM

## 2020-06-15 PROCEDURE — 99214 OFFICE O/P EST MOD 30 MIN: CPT | Mod: 25 | Performed by: INTERNAL MEDICINE

## 2020-06-15 PROCEDURE — 93280 PM DEVICE PROGR EVAL DUAL: CPT | Performed by: INTERNAL MEDICINE

## 2020-06-15 ASSESSMENT — FIBROSIS 4 INDEX: FIB4 SCORE: 1.64

## 2020-06-15 NOTE — PROGRESS NOTES
Arrhythmia Clinic Note (New Patient)    DOS: 6/15/2020    Referring physician: Kristopher Obregon    Chief complaint/Reason for consult: ?WATCHMAN    HPI:  Patient is an 79 yo F. She has a history of lung CA with prior brain mets s/p resection and then subsequent XRT. She has a history of sick sinus syndrome s/p PPM and PAF. Previously on oral anticoagulation. Recently with incidentally discovered intracranial hemorrhage on MRI. No new metastasis as far as we can tell imaging wise. Eliquis was stopped. She has remained feeling fine. Referred for consideration of WATCHMAN.    ROS (+ in BOLD):  Constitutional: Fevers/chills/fatigue/weightloss  HEENT: Blurry vision/eye pain/sore throat/hearing loss  Respiratory: Shortness of breath/cough  Cardiovascular: Chest pain/palpitations/edema/orthopnea/syncope  GI: Nausea/vomitting/diarrhea  MSK: Arthralgias/myagias/muscle weakness  Skin: Rash/sores  Neurological: Numbness/tremors/vertigo  Endocrine: Excessive thirst/polyuria/cold intolerance/heat intolerance  Psych: Depression/anxiety    Past Medical History:   Diagnosis Date   • Breast cancer (HCC)    • Cancer (HCC)     lung cancer with brain mts   • Chickenpox    • [place] measles    • Healthcare maintenance 7/23/2018   • Influenza    • Joint pain    • Lung cancer (HCC)    • Mumps    • Need for vaccination 1/17/2019   • Radionecrosis 4/14/2018   • Sick sinus syndrome (HCC)     with AFIB, s/p pacemaker   • Thyroid disease    • Tonsillitis        Past Surgical History:   Procedure Laterality Date   • CRANIOTOMY STEALTH Right 11/23/2015    Procedure: CRANIOTOMY STEALTH-right occipital;  Surgeon: Suyapa Schumacher M.D.;  Location: SURGERY [facility];  Service:    • APPENDECTOMY     • CRANIOTOMY     • KNEE ARTHROSCOPY      left    • LUMPECTOMY     • OTHER      left knee surgery   • PACEMAKER INSERTION     • TONSILLECTOMY         Social History     Socioeconomic History   • Marital status:      Spouse name: Not on file   •  "Number of children: Not on file   • Years of education: Not on file   • Highest education level: Not on file   Occupational History   • Not on file   Social Needs   • Financial resource strain: Not on file   • Food insecurity     Worry: Not on file     Inability: Not on file   • Transportation needs     Medical: Not on file     Non-medical: Not on file   Tobacco Use   • Smoking status: Former Smoker     Packs/day: 2.00     Years: 20.00     Pack years: 40.00     Types: Cigarettes     Last attempt to quit: 1980     Years since quittin.4   • Smokeless tobacco: Never Used   Substance and Sexual Activity   • Alcohol use: Yes     Alcohol/week: 1.8 oz     Types: 3 Glasses of wine per week     Frequency: 2-3 times a week     Drinks per session: 1 or 2     Binge frequency: Never   • Drug use: No   • Sexual activity: Not on file   Lifestyle   • Physical activity     Days per week: 4 days     Minutes per session: 60 min   • Stress: Not on file   Relationships   • Social connections     Talks on phone: More than three times a week     Gets together: More than three times a week     Attends Spiritism service: Never     Active member of club or organization: Yes     Attends meetings of clubs or organizations: More than 4 times per year     Relationship status:    • Intimate partner violence     Fear of current or ex partner: No     Emotionally abused: No     Physically abused: No     Forced sexual activity: No   Other Topics Concern   • Not on file   Social History Narrative   • Not on file       Family History   Problem Relation Age of Onset   • Dementia Mother    • Diabetes Mother    • Other Mother         osteoarthritis   • Arthritis Mother    • Autoimmune Disease Father         Rheumatoid arthritis   • Arthritis Father    • Cancer Brother         prostate        Allergies   Allergen Reactions   • Penicillins Rash and Itching     RXN \"a long time ago\"  Other reaction(s): Rash   • Atorvastatin      Causes low " "quality       Current Outpatient Medications   Medication Sig Dispense Refill   • levETIRAcetam (KEPPRA) 500 MG Tab Take 1 Tab by mouth 2 Times a Day. 180 Tab 3   • furosemide (LASIX) 20 MG Tab TAKE 1 TABLET BY MOUTH EVERY MORNING (Patient taking differently: every 48 hours.) 90 Tab 0   • methimazole (TAPAZOLE) 5 MG Tab Take 0.5 Tabs by mouth every day. 90 Tab 1   • alendronate (FOSAMAX) 70 MG Tab Take 1 Tab by mouth every 7 days. 12 Tab 2   • albuterol 108 (90 Base) MCG/ACT Aero Soln inhalation aerosol INHALE 2 PUFFS BY MOUTH EVERY FOUR HOURS AS NEEDED FOR SHORTNESS OF BREATH. 6.7 Inhaler 1   • metronidazole (METROCREAM) 0.75 % cream APPLY TO FACE EVERYDAY ONCE TO TWICE A DAY FOR ROSACEA     • aspirin 81 MG tablet Take 81 mg by mouth every day.     • flecainide (TAMBOCOR) 150 MG Tab TAKE 1 TABLET BY MOUTH EVERY 12 HOURS 180 Tab 0   • potassium chloride ER (KLOR-CON) 10 MEQ tablet Take 1 Tab by mouth every day. (Patient taking differently: Take 10 mEq by mouth every 48 hours.) 90 Tab 0   • Cholecalciferol (VITAMIN D3) 2000 UNIT Cap Take 2,000 Units by mouth. Indications: Daily Treatment with Aspirin Dose Greater Then 325 mg     • OYSCO 500 500 MG Tab 500 mg.  5   • metoprolol SR (TOPROL XL) 25 MG TABLET SR 24 HR Take 25 mg by mouth every day.  3   • Spacer/Aero-Holding Chambers (OPTICHAMBER MANOLO) Misc USE AS DIRECTED     • Misc. Devices ([facility] PULSE OXIMETER) Misc 1 Each by Does not apply route 4 times a day as needed. 1 Each 0     No current facility-administered medications for this visit.        Physical Exam:  Vitals:    06/15/20 0956   BP: 112/72   BP Location: Left arm   Patient Position: Sitting   BP Cuff Size: Adult   Pulse: 86   SpO2: 92%   Weight: 69.9 kg (154 lb)   Height: 1.549 m (5' 1\")     General appearance: NAD, conversant  Eyes: anicteric sclerae, no lid-lag; PERRLA  HENT: Atraumatic; moist mucous membranes, no ulcerations  Neck: Trachea midline; FROM, supple, no thyromegaly  Lungs: CTA, with " normal respiratory effort and no intercostal retractions  CV: RRR, no MRGs, no JVD  Abdomen: Soft, non-tender; normal bowel sounds, no HSM  Extremities: No peripheral edema. No clubbing or cyanosis.  Skin: Normal temperature, turgor and texture; no rash or ulcers  Psych: Appropriate affect, alert and oriented to person, place and time      Data:  Labs reviewed    Prior echo/stress reviewed:  LVEF 60%    MR brain:  There is irregular enhancement in the right parietal surgical bed. The gradient echo images demonstrates areas of hemorrhage. There is also abnormal T2 hyperintensity seen surrounding the surgical cavity. There is no mass effect or midline shift. There   has been interval reduction in the size of the previously seen T2 hypointense area within the surgical cavity. 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 Continued follow-up is recommended to ensure the resolution of the contrast enhancement.  Device interrogation reviewed    Impression/Plan:  1. Sick sinus syndrome (HCC)     2. Metastasis to brain (HCC)     3. Cardiac pacemaker in situ     4. Intracranial hemorrhage (HCC)       -Device interrogation reviewed, no AF episodes recorded over last year  -I think her burden of AF is essentially 0% right now  -She is also not a great candidate for the device as I do not think even short term resumption of OAC is recommended which is at least necessary for 6 weeks or so post occluder device  -For now recommend monitoring and staying off OAC    Eric Levy MD

## 2020-06-16 ENCOUNTER — APPOINTMENT (RX ONLY)
Dept: URBAN - METROPOLITAN AREA CLINIC 20 | Facility: CLINIC | Age: 80
Setting detail: DERMATOLOGY
End: 2020-06-16

## 2020-06-16 NOTE — HPI: COSMETIC CONSULTATION
Additional History: Concern- rosacea and large pores on nose \\nPlan- series of BBL treatments. \\nStarting alastin retinol .5 today and not cleansing as much. \\n\\nSweetest woman. \\nHistory of brain tumor & cancer may chemo and radiation txs. \\n***PACEMAKER *** PLEASE CHECK HER MEDS FOR ANY CONTRAINDICATIONS *** \\n\\nWill call her to schedule once cleared for treatment by Dr Corey

## 2020-06-25 ENCOUNTER — TELEMEDICINE (OUTPATIENT)
Dept: MEDICAL GROUP | Facility: IMAGING CENTER | Age: 80
End: 2020-06-25
Payer: MEDICARE

## 2020-06-25 VITALS — WEIGHT: 154 LBS | BODY MASS INDEX: 29.07 KG/M2 | HEART RATE: 70 BPM | HEIGHT: 61 IN | OXYGEN SATURATION: 94 %

## 2020-06-25 DIAGNOSIS — Z91.81 AT RISK FOR FALLING: ICD-10-CM

## 2020-06-25 DIAGNOSIS — E05.90 HYPERTHYROIDISM: ICD-10-CM

## 2020-06-25 DIAGNOSIS — R14.0 ABDOMINAL BLOATING: ICD-10-CM

## 2020-06-25 DIAGNOSIS — R26.89 BALANCE DISORDER: ICD-10-CM

## 2020-06-25 DIAGNOSIS — I49.5 SICK SINUS SYNDROME (HCC): ICD-10-CM

## 2020-06-25 DIAGNOSIS — Z79.899 ENCOUNTER FOR LONG-TERM CURRENT USE OF MEDICATION: ICD-10-CM

## 2020-06-25 DIAGNOSIS — R60.0 LOWER EXTREMITY EDEMA: ICD-10-CM

## 2020-06-25 PROCEDURE — 99213 OFFICE O/P EST LOW 20 MIN: CPT | Mod: 95,CR | Performed by: NURSE PRACTITIONER

## 2020-06-25 ASSESSMENT — FIBROSIS 4 INDEX: FIB4 SCORE: 1.64

## 2020-06-25 ASSESSMENT — PAIN SCALES - GENERAL: PAINLEVEL: NO PAIN

## 2020-06-25 NOTE — PATIENT INSTRUCTIONS
The probiotic for overall GI health:  Lactobacillus Acidophilus- diarrhea, bloating, and bowel pain.  Bacillus Coagulans- pain related to abdominal pain from irritable bowels  Bifidobacterium Lactis- constipation  Lactobacillus Casei- Constipation, can be helpful with RA as well.

## 2020-06-25 NOTE — PROGRESS NOTES
Telemedicine Visit: Established Patient   This encounter was conducted via Zoom .   Verbal consent was obtained. Patient's identity was verified.  Patient is aware that this is a billable encounter.  Subjective:   CC:   Chief Complaint   Patient presents with   • Medication Management     questions regarding probiotic      Ml Chavira is a 80 y.o. female presenting for evaluation and management of:    States she continues to have a bloated stomach with no pain or change of BM. States she is wondering if probiotics might help.    States she saw neurology Irina Rodriguez MD on 5/21/2020. States no changes at this time. States she has follow up lung and head CT scan on July 10 th. Denies any HA, dizzy, LOC, or weakness at this time.    Sick sinus syndrome (HCC):  States since her last visit she was seen on 6/15/2020 Eric Levy MD for evaluation of being an candidate for the Watchman. States she is not a candidate for the device. States it has been decided by Dr. Levy and Alfred Obregon MD that she will continue to not be on Eliquis due a recent intracerebral hemorrhage. [facility] she saw Dr. Obregon on 3/25/2020. States she is continuing to take ASA 81 mg. States she continues to be on Metoprolol SR 25 mg, Flecainide 150 mg daily, Lasix 20 mg and Potassium 10 MEQ every other day. [facility] she has a follow up with cardiology in 3 months.     Lower extremity edema:  States this is an improved condition. States she continues to take Lasix 20 mg and Potassium 10 MEQ every other day with no complications. Denies dizziness, lightheadedness, or recent falls.     Sodium  135 - 145 mmol/L  141   140   140     Potassium  3.6 - 5.5 mmol/L  4.0   4.4   4.5     Chloride  96 - 112 mmol/L  103   101   102     Co2  20 - 33 mmol/L  25   28   27     Anion Gap  7.0 - 16.0  13.0   11.0   11.0     Glucose  65 - 99 mg/dL  96   85   87     Bun  8 - 22 mg/dL  19   18   17     Creatinine  0.50 - 1.40 mg/dL  0.74   0.75   0.75     Calcium   8.5 - 10.5 mg/dL  9.4   9.8   9.8     AST(SGOT)  12 - 45 U/L  21   21   19     ALT(SGPT)  2 - 50 U/L  26   25   24     Alkaline Phosphatase  30 - 99 U/L  56   59   59     Total Bilirubin  0.1 - 1.5 mg/dL  0.6   0.4   0.4     Albumin  3.2 - 4.9 g/dL  4.1   4.5   4.5     Total Protein  6.0 - 8.2 g/dL  6.9   7.5   7.5     Globulin  1.9 - 3.5 g/dL  2.8   3.0   3.0     A-G Ratio  g/dL  1.5   1.5   1.5      Balance disorder:  At risk for falling:  States she continues to work with physical therapy for balance and decreasing risk for falling due to declining vision since brain cancer. States currently she is working on walking outside on uneven ground and crossing streets at cross walks. States she continues to use her cane when she is out and unfamiliar with her surroundings.     Hyperthyroidism:  States she saw by Dr. Joseph on 4/28/2020. States that her Methimazole was decreased to 2.5 mg every other day due to weight gain and her free T3 is lower. States she continues to take Alendronate 70 mg weekly for ongoing osteoporosis. States continues to take 5,000 IU OTC Vitamin D3 and Oysco 500 mg daily.    T3,Free  2.40 - 4.20 pg/mL  2.76   3.71   4.96High      TSH  0.380 - 5.330 uIU/mL  2.040   0.540 CM   2.330      Free T-4  0.53 - 1.43 ng/dL  0.94   0.97   1.30        ROS:   Constitutional: Denies fever, chills, night sweats, weight loss/gain or malaise/fatigue.   HENT: Denies nasal congestion, sore throat, hearing loss, enlarged thyroid, or difficulty swallowing.   Eyes: Denies changes in vision, pain. Wears corrective wear. Since removal brain tumor in 2015, pt. reports that she has had decreased peripheral vision, worse in left eye. Does not drive.   Respiratory: Denies cough and/or SOB at rest or activity.  Cardiovascular: Denies tachycardia, chest pain, or palpitations.  Improving leg swelling, managed with intermittent use of Lasix. Pt. has a pace maker due to the history of sick sinus syndrome.  Gastrointestinal:  "Denies N/V/C/D, abdominal pain, loss appetite, reflux, or hematochezia.  Genitourinary: Denies difficulty voiding, dysuria, nocturia, or hematuria.   Skin: Negative for rash or worrisome moles.   Neurological: Negative for dizziness, focal weakness and headaches. Denies any recent falls.   Endo/Heme/Allergies: Denies bruise/bleed easily, allergies.   Psychiatric/Behavioral: Denies depression, nervous/anxious, difficulty sleeping.    Allergies   Allergen Reactions   • Penicillins Rash and Itching     RXN \"a long time ago\"  Other reaction(s): Rash   • Atorvastatin      Causes low quality     Current medicines (including changes today)  Current Outpatient Medications   Medication Sig Dispense Refill   • levETIRAcetam (KEPPRA) 500 MG Tab Take 1 Tab by mouth 2 Times a Day. 180 Tab 3   • furosemide (LASIX) 20 MG Tab TAKE 1 TABLET BY MOUTH EVERY MORNING (Patient taking differently: every 48 hours.) 90 Tab 0   • methimazole (TAPAZOLE) 5 MG Tab Take 0.5 Tabs by mouth every day. 90 Tab 1   • alendronate (FOSAMAX) 70 MG Tab Take 1 Tab by mouth every 7 days. 12 Tab 2   • albuterol 108 (90 Base) MCG/ACT Aero Soln inhalation aerosol INHALE 2 PUFFS BY MOUTH EVERY FOUR HOURS AS NEEDED FOR SHORTNESS OF BREATH. 6.7 Inhaler 1   • metronidazole (METROCREAM) 0.75 % cream APPLY TO FACE EVERYDAY ONCE TO TWICE A DAY FOR ROSACEA     • Spacer/Aero-Holding Chambers (OPTICHAMBER MANOLO) Misc USE AS DIRECTED     • flecainide (TAMBOCOR) 150 MG Tab TAKE 1 TABLET BY MOUTH EVERY 12 HOURS 180 Tab 0   • potassium chloride ER (KLOR-CON) 10 MEQ tablet Take 1 Tab by mouth every day. (Patient taking differently: Take 10 mEq by mouth every 48 hours.) 90 Tab 0   • Cholecalciferol (VITAMIN D3) 2000 UNIT Cap Take 2,000 Units by mouth. Indications: Daily Treatment with Aspirin Dose Greater Then 325 mg     • OYSCO 500 500 MG Tab 500 mg.  5   • Misc. Devices ([facility] PULSE OXIMETER) Misc 1 Each by Does not apply route 4 times a day as needed. 1 Each 0   • " metoprolol SR (TOPROL XL) 25 MG TABLET SR 24 HR Take 25 mg by mouth every day.  3   • aspirin 81 MG tablet Take 81 mg by mouth every day.       No current facility-administered medications for this visit.      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   • Balance disorder 06/21/2018   • Radionecrosis 04/14/2018   • Malignant neoplasm of upper lobe of right lung (HCC) 03/05/2018   • CVA (cerebral vascular accident) ([facility]) 11/29/2017   • Allergic rhinitis 01/12/2016   • Hilar adenopathy 01/12/2016   • History of breast cancer 01/12/2016   • Lung mass 11/19/2015   • Blurry vision, left eye 11/19/2015   • Metastasis to brain ([facility]) 11/18/2015   • Metastatic cancer (CMS-HCC) 11/18/2015     Family History   Problem Relation Age of Onset   • Dementia Mother    • Diabetes Mother    • Other Mother         osteoarthritis   • Arthritis Mother    • Autoimmune Disease Father         Rheumatoid arthritis   • Arthritis Father    • Cancer Brother         prostate      She  has a past medical history of Breast cancer (HCC), Cancer (HCC), Chickenpox, [place] measles, Healthcare maintenance (7/23/2018), Influenza, Joint pain, Lung cancer (HCC), Mumps, Need for vaccination (1/17/2019), Radionecrosis (4/14/2018), Sick sinus syndrome (HCC), Thyroid disease, and Tonsillitis.  She  has a past surgical history that includes craniotomy stealth (Right, 11/23/2015); other; appendectomy; knee arthroscopy; craniotomy; tonsillectomy; pacemaker  "insertion; and lumpectomy.     Objective:   Pulse 70 Comment: patient home o2 monitor  Ht 1.549 m (5' 1\")   Wt 69.9 kg (154 lb)   SpO2 94% Comment: patient home o2 monitor  BMI 29.10 kg/m²   Respiratory rate observed during visit:16 bpm    Physical Exam:  Constitutional: Alert, no distress, well-groomed.  Skin: No rashes in visible areas.  Eye: Round. Conjunctiva clear, lids normal. No icterus.   ENMT: Lips pink without lesions, good dentition, moist mucous membranes. Phonation normal.  Neck: No masses, no thyromegaly. Moves freely without pain.  CV: Pulse as reported by patient  Respiratory: Unlabored respiratory effort, no cough or audible wheeze  Psych: Alert and oriented x3, normal affect and mood.     Assessment and Plan:   1. Encounter for long-term current use of medication  Reviewed with patient medication use and side effects. Medical, past, surgical history reviewed with patient. Instructed to return in 5 months for AWV. Instructed to maintain all follow up appointments with specialist and complete ongoing lab work, verbalized understanding and willingness.    2. Hyperthyroidism  This is a chronic stable condition. Instructed to continue Dr. Joseph plan of care, verbalized understanding and willness.    3. Balance disorder  4. At risk for falling  This is a chronic stable condition. Instructed to continue physical therapy as tolerated. Instructed to get up slowly before beginning gait. Encouraged continued use of cane.    5. Lower extremity edema  This is a chronic stable condition. Discussed continuing every other day use of lasix 20 mg and potassium 10 MEQ for lower leg swelling. Instructed to continue to wear compression stockings as tolerated, as well as, elevating legs often, verbalized understanding and willingness.     6. Sick sinus syndrome (HCC)  This is a chronic stable condition.  Instructed to continue Dr. Obregon plan of care, verbalized understanding and willingness.    7. Abdominal " bloating  This is a chronic stable condition. List provided to patient discussing different types of probiotics to improve overall GI health, verbalized understanding.  Discussed seeking emergency services if she experiences worse symptoms.      Follow-up: Return in about 5 months (around 11/25/2020) for Medicare annual.

## 2020-06-26 ENCOUNTER — TELEMEDICINE (OUTPATIENT)
Dept: SLEEP MEDICINE | Facility: MEDICAL CENTER | Age: 80
End: 2020-06-26
Payer: MEDICARE

## 2020-06-26 VITALS — HEIGHT: 62 IN | OXYGEN SATURATION: 92 % | BODY MASS INDEX: 28.34 KG/M2 | WEIGHT: 154 LBS

## 2020-06-26 DIAGNOSIS — G47.33 OSA (OBSTRUCTIVE SLEEP APNEA): ICD-10-CM

## 2020-06-26 PROCEDURE — 99213 OFFICE O/P EST LOW 20 MIN: CPT | Mod: 95,CR | Performed by: NURSE PRACTITIONER

## 2020-06-26 ASSESSMENT — FIBROSIS 4 INDEX: FIB4 SCORE: 1.64

## 2020-06-26 NOTE — PROGRESS NOTES
"Telemedicine Visit: Established Patient     This encounter was conducted via Zoom .   Verbal consent was obtained. Patient's identity was verified.    Subjective:   CC: VITO f/u    Ml Chavira is a 80 y.o. female presenting for evaluation and management of VITO. 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 XRT and chemo. She is followed closely by oncology and recently suffered ICH in the setting of tx with eliquis for AF/SSS s/p ppm.  She is no longer on anticoagluation and has f/u MRI scheduled to determine if there are recurrent  Lung Ca mets contributing the bleed. Sick sinus syndrome, hyperthyroidism chronic atrial fibrillation on flecainide, CVA.     PSG from 7/2019 indicated an AHI of 65.3 that increased to 90.9 while supine and low oxygenation of 72%.      Patient is also followed by the pulmonary clinic.  Please refer to Dr. Ingram's office note from 2/26/2020 for further details.    Compliance report from 5/27/20-6/25/20 was downloaded and reviewed with the patient which showed BiPAP IPAP/EPAP at 18/13 cmH2O, 97% compliance, 6 hrs 23 min use, AHI of 2.9. Noted mask leak at 95th percentile at 30.4, max 110.2. Patient uses 2L O2 bleed in.  He is tolerating the pressure and mask well, and wishes not to make any changes at this time.  She goes to bed at 11 PM and wakes up between 5 and 8 AM.  She denies morning headache or snoring.  She has chronic shortness of breath which is stable and she uses an inhaler as needed. She denies any new health problems.      ROS   Denies any recent fevers or chills. No nausea or vomiting. No chest pains or shortness of breath.     Allergies   Allergen Reactions   • Penicillins Rash and Itching     RXN \"a long time ago\"  Other reaction(s): Rash   • Atorvastatin      Causes low quality       Current medicines (including changes today)  Current Outpatient Medications   Medication Sig Dispense Refill   • " levETIRAcetam (KEPPRA) 500 MG Tab Take 1 Tab by mouth 2 Times a Day. 180 Tab 3   • furosemide (LASIX) 20 MG Tab TAKE 1 TABLET BY MOUTH EVERY MORNING (Patient taking differently: every 48 hours.) 90 Tab 0   • methimazole (TAPAZOLE) 5 MG Tab Take 0.5 Tabs by mouth every day. 90 Tab 1   • alendronate (FOSAMAX) 70 MG Tab Take 1 Tab by mouth every 7 days. 12 Tab 2   • albuterol 108 (90 Base) MCG/ACT Aero Soln inhalation aerosol INHALE 2 PUFFS BY MOUTH EVERY FOUR HOURS AS NEEDED FOR SHORTNESS OF BREATH. 6.7 Inhaler 1   • metronidazole (METROCREAM) 0.75 % cream APPLY TO FACE EVERYDAY ONCE TO TWICE A DAY FOR ROSACEA     • aspirin 81 MG tablet Take 81 mg by mouth every day.     • flecainide (TAMBOCOR) 150 MG Tab TAKE 1 TABLET BY MOUTH EVERY 12 HOURS 180 Tab 0   • potassium chloride ER (KLOR-CON) 10 MEQ tablet Take 1 Tab by mouth every day. (Patient taking differently: Take 10 mEq by mouth every 48 hours.) 90 Tab 0   • Cholecalciferol (VITAMIN D3) 2000 UNIT Cap Take 2,000 Units by mouth. Indications: Daily Treatment with Aspirin Dose Greater Then 325 mg     • metoprolol SR (TOPROL XL) 25 MG TABLET SR 24 HR Take 25 mg by mouth every day.  3   • Spacer/Aero-Holding Chambers (OPTICHAMBER MANOLO) Misc USE AS DIRECTED     • OYSCO 500 500 MG Tab 500 mg.  5   • Misc. Devices ([employer] PULSE OXIMETER) Misc 1 Each by Does not apply route 4 times a day as needed. 1 Each 0     No current facility-administered medications for this visit.        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   • Balance disorder 06/21/2018   • Radionecrosis 04/14/2018   • Malignant neoplasm of upper lobe of right lung (HCC) 03/05/2018   • CVA (cerebral vascular accident) (HCC) 11/29/2017   • Allergic rhinitis 01/12/2016   • Hilar adenopathy 01/12/2016   • History of breast cancer 01/12/2016   • Lung mass 11/19/2015   • Blurry vision, left eye 11/19/2015   • Metastasis to brain (HCC) 11/18/2015   • Metastatic cancer (CMS-HCC) 11/18/2015       Family History   Problem Relation Age of Onset   • Dementia Mother    • Diabetes Mother    • Other Mother         osteoarthritis   • Arthritis Mother    • Autoimmune Disease Father         Rheumatoid arthritis   • Arthritis Father    • Cancer Brother         prostate        She  has a past medical history of Breast cancer (HCC), Cancer (HCC), Chickenpox, Malay measles, Healthcare maintenance (7/23/2018), Influenza, Joint pain, Lung cancer (HCC), Mumps, Need for vaccination (1/17/2019), Radionecrosis (4/14/2018), Sick sinus syndrome (HCC), Thyroid disease, and Tonsillitis.  She  has a past surgical history that includes craniotomy stealth (Right, 11/23/2015); other; appendectomy; knee arthroscopy; craniotomy; tonsillectomy; pacemaker insertion; and lumpectomy.       Objective:   Ht 1.562 m (5' 1.5\")   Wt 69.9 kg (154 lb)   SpO2 92%   BMI 28.63 kg/m²     Physical Exam:  Constitutional: Alert, no distress, well-groomed.  Skin: No rashes in visible areas.  Eye: Round. Conjunctiva clear, lids normal. No icterus.   ENMT: Lips pink without lesions, good dentition, moist mucous membranes. Phonation normal.  Neck: No masses. Moves freely without pain.  CV: Pulse as reported by patient  Respiratory: Unlabored respiratory effort, no cough or audible wheeze  Psych: Alert and oriented x3, normal affect and mood.       Assessment and Plan:   The following treatment plan was discussed:     1. VITO (obstructive sleep apnea)  - DME Mask and " Supplies    2. BMI 28.0-28.9,adult    1. Compliance report from 5/27/20-6/25/20 was downloaded and reviewed with the patient which showed BiPAP IPAP/EPAP at 18/13 cmH2O, 97% compliance, 6 hrs 23 min use, AHI of 2.9.  Continue BiPAP with 2L O2 bleed in.  Patient is compliant and benefiting from BiPAP therapy and 2L O2 bleed in for management of VITO.  2. DME order ([facility]) for mask (FFM small or MOC) and supplies was provided today.  Continue to clean mask and supplies weekly, change per insurance guidelines.  3. Continue to stay active.   4. Follow up with the appropriate healthcare practitioners for all other medical problems and issues.  5. Continue to f/u with pulmonary clinic.       Follow-up: Return in about 1 year (around 6/26/2021). , or sooner if needed.     YUNIOR Cruz.

## 2020-07-01 ENCOUNTER — TELEMEDICINE (OUTPATIENT)
Dept: CARDIOLOGY | Facility: MEDICAL CENTER | Age: 80
End: 2020-07-01
Payer: MEDICARE

## 2020-07-01 VITALS — BODY MASS INDEX: 28.89 KG/M2 | HEIGHT: 61 IN | WEIGHT: 153 LBS

## 2020-07-01 DIAGNOSIS — C79.31 METASTASIS TO BRAIN (HCC): ICD-10-CM

## 2020-07-01 DIAGNOSIS — C34.11 MALIGNANT NEOPLASM OF UPPER LOBE OF RIGHT LUNG (HCC): ICD-10-CM

## 2020-07-01 DIAGNOSIS — I49.5 SICK SINUS SYNDROME (HCC): ICD-10-CM

## 2020-07-01 DIAGNOSIS — Z79.899 HIGH RISK MEDICATION USE: ICD-10-CM

## 2020-07-01 DIAGNOSIS — I63.10 CEREBROVASCULAR ACCIDENT (CVA) DUE TO EMBOLISM OF PRECEREBRAL ARTERY (HCC): ICD-10-CM

## 2020-07-01 DIAGNOSIS — C79.9 METASTATIC CANCER (HCC): ICD-10-CM

## 2020-07-01 DIAGNOSIS — R59.0 HILAR ADENOPATHY: ICD-10-CM

## 2020-07-01 DIAGNOSIS — H53.47 BITEMPORAL HEMIANOPIA: ICD-10-CM

## 2020-07-01 DIAGNOSIS — R60.0 LOWER EXTREMITY EDEMA: ICD-10-CM

## 2020-07-01 DIAGNOSIS — R79.89 ABNORMAL CBC MEASUREMENT: ICD-10-CM

## 2020-07-01 DIAGNOSIS — I48.20 CHRONIC ATRIAL FIBRILLATION (HCC): ICD-10-CM

## 2020-07-01 PROCEDURE — 99214 OFFICE O/P EST MOD 30 MIN: CPT | Mod: 95,CR | Performed by: INTERNAL MEDICINE

## 2020-07-01 ASSESSMENT — ENCOUNTER SYMPTOMS
BRUISES/BLEEDS EASILY: 0
PND: 0
LOSS OF CONSCIOUSNESS: 0
MUSCULOSKELETAL NEGATIVE: 1
COUGH: 0
WEAKNESS: 0
ORTHOPNEA: 0
FEVER: 0
RESPIRATORY NEGATIVE: 1
CLAUDICATION: 0
CHILLS: 0
WHEEZING: 0
SPUTUM PRODUCTION: 0
CONSTITUTIONAL NEGATIVE: 1
NEUROLOGICAL NEGATIVE: 1
PALPITATIONS: 0
GASTROINTESTINAL NEGATIVE: 1
DIZZINESS: 0
STRIDOR: 0
SORE THROAT: 0
HEMOPTYSIS: 0
EYES NEGATIVE: 1
CARDIOVASCULAR NEGATIVE: 1
SHORTNESS OF BREATH: 0

## 2020-07-01 ASSESSMENT — FIBROSIS 4 INDEX: FIB4 SCORE: 1.64

## 2020-07-01 NOTE — PROGRESS NOTES
Chief Complaint   Patient presents with   • Atrial Fibrillation       Subjective:   Ml Chavira is a 80 y.o. female who presents today as a follow-up for sick sinus syndrome cancer with metastasis to the brain CVA hypertension hyperlipidemia and paroxysmal atrial fibrillation.  Most recent device interrogation showed no atrial fibrillation.  She does take flecainide twice per day.  She was seen for evaluation of a watchman device but was turned down due to her lack of atrial fibrillation and her inability to take oral anticoagulation.  Otherwise she is been doing well with no symptoms of her atrial fibrillation.    Past Medical History:   Diagnosis Date   • Breast cancer (HCC)    • Cancer (HCC)     lung cancer with brain mts   • Chickenpox    • Romansh measles    • Healthcare maintenance 7/23/2018   • Influenza    • Joint pain    • Lung cancer (HCC)    • Mumps    • Need for vaccination 1/17/2019   • Radionecrosis 4/14/2018   • Sick sinus syndrome (HCC)     with AFIB, s/p pacemaker   • Thyroid disease    • Tonsillitis      Past Surgical History:   Procedure Laterality Date   • CRANIOTOMY STEALTH Right 11/23/2015    Procedure: CRANIOTOMY STEALTH-right occipital;  Surgeon: uSyapa Schumacher M.D.;  Location: SURGERY [facility];  Service:    • APPENDECTOMY     • CRANIOTOMY     • KNEE ARTHROSCOPY      left    • LUMPECTOMY     • OTHER      left knee surgery   • PACEMAKER INSERTION     • TONSILLECTOMY       Family History   Problem Relation Age of Onset   • Dementia Mother    • Diabetes Mother    • Other Mother         osteoarthritis   • Arthritis Mother    • Autoimmune Disease Father         Rheumatoid arthritis   • Arthritis Father    • Cancer Brother         prostate      Social History     Socioeconomic History   • Marital status:      Spouse name: Not on file   • Number of children: Not on file   • Years of education: Not on file   • Highest education level: Not on file   Occupational History   • Not  "on file   Social Needs   • Financial resource strain: Not on file   • Food insecurity     Worry: Not on file     Inability: Not on file   • Transportation needs     Medical: Not on file     Non-medical: Not on file   Tobacco Use   • Smoking status: Former Smoker     Packs/day: 2.00     Years: 20.00     Pack years: 40.00     Types: Cigarettes     Last attempt to quit: 1980     Years since quittin.5   • Smokeless tobacco: Never Used   Substance and Sexual Activity   • Alcohol use: Yes     Alcohol/week: 1.8 oz     Types: 3 Glasses of wine per week     Frequency: 2-3 times a week     Drinks per session: 1 or 2     Binge frequency: Never   • Drug use: No   • Sexual activity: Not on file   Lifestyle   • Physical activity     Days per week: 4 days     Minutes per session: 60 min   • Stress: Not on file   Relationships   • Social connections     Talks on phone: More than three times a week     Gets together: More than three times a week     Attends Jainism service: Never     Active member of club or organization: Yes     Attends meetings of clubs or organizations: More than 4 times per year     Relationship status:    • Intimate partner violence     Fear of current or ex partner: No     Emotionally abused: No     Physically abused: No     Forced sexual activity: No   Other Topics Concern   • Not on file   Social History Narrative   • Not on file     Allergies   Allergen Reactions   • Penicillins Rash and Itching     RXN \"a long time ago\"  Other reaction(s): Rash   • Atorvastatin      Causes low quality     Outpatient Encounter Medications as of 2020   Medication Sig Dispense Refill   • levETIRAcetam (KEPPRA) 500 MG Tab Take 1 Tab by mouth 2 Times a Day. 180 Tab 3   • furosemide (LASIX) 20 MG Tab TAKE 1 TABLET BY MOUTH EVERY MORNING (Patient taking differently: every 48 hours.) 90 Tab 0   • methimazole (TAPAZOLE) 5 MG Tab Take 0.5 Tabs by mouth every day. 90 Tab 1   • alendronate (FOSAMAX) 70 MG Tab Take 1 " "Tab by mouth every 7 days. 12 Tab 2   • albuterol 108 (90 Base) MCG/ACT Aero Soln inhalation aerosol INHALE 2 PUFFS BY MOUTH EVERY FOUR HOURS AS NEEDED FOR SHORTNESS OF BREATH. 6.7 Inhaler 1   • metronidazole (METROCREAM) 0.75 % cream APPLY TO FACE EVERYDAY ONCE TO TWICE A DAY FOR ROSACEA     • Spacer/Aero-Holding Chambers (KRYSTIANHAMBER MANOLO) Misc USE AS DIRECTED     • aspirin 81 MG tablet Take 81 mg by mouth every day.     • flecainide (TAMBOCOR) 150 MG Tab TAKE 1 TABLET BY MOUTH EVERY 12 HOURS 180 Tab 0   • potassium chloride ER (KLOR-CON) 10 MEQ tablet Take 1 Tab by mouth every day. (Patient taking differently: Take 10 mEq by mouth every 48 hours.) 90 Tab 0   • Cholecalciferol (VITAMIN D3) 2000 UNIT Cap Take 2,000 Units by mouth. Indications: Daily Treatment with Aspirin Dose Greater Then 325 mg     • OYSCO 500 500 MG Tab 500 mg.  5   • Misc. Devices ([employer] PULSE OXIMETER) Misc 1 Each by Does not apply route 4 times a day as needed. 1 Each 0   • metoprolol SR (TOPROL XL) 25 MG TABLET SR 24 HR Take 25 mg by mouth every day.  3     No facility-administered encounter medications on file as of 7/1/2020.      Review of Systems   Constitutional: Negative.  Negative for chills, fever and malaise/fatigue.   HENT: Negative.  Negative for sore throat.    Eyes: Negative.    Respiratory: Negative.  Negative for cough, hemoptysis, sputum production, shortness of breath, wheezing and stridor.    Cardiovascular: Negative.  Negative for chest pain, palpitations, orthopnea, claudication, leg swelling and PND.   Gastrointestinal: Negative.    Genitourinary: Negative.    Musculoskeletal: Negative.    Skin: Negative.    Neurological: Negative.  Negative for dizziness, loss of consciousness and weakness.   Endo/Heme/Allergies: Negative.  Does not bruise/bleed easily.   All other systems reviewed and are negative.       Objective:   Ht 1.549 m (5' 1\")   Wt 69.4 kg (153 lb)   BMI 28.91 kg/m²     Physical Exam   Constitutional: She is " oriented to person, place, and time. She appears well-developed and well-nourished. No distress.   HENT:   Head: Normocephalic and atraumatic.   Right Ear: External ear normal.   Left Ear: External ear normal.   Nose: Nose normal.   Mouth/Throat: No oropharyngeal exudate.   Eyes: Pupils are equal, round, and reactive to light. Conjunctivae and EOM are normal. Right eye exhibits no discharge. Left eye exhibits no discharge. No scleral icterus.   Neck: Normal range of motion. Neck supple. No JVD present. No tracheal deviation present.   Cardiovascular: Normal rate.   Pulmonary/Chest: Effort normal and breath sounds normal. No stridor. No respiratory distress.   Abdominal: Soft. Bowel sounds are normal.   Musculoskeletal: Normal range of motion.         General: No tenderness, deformity or edema.   Neurological: She is alert and oriented to person, place, and time. No cranial nerve deficit. Coordination normal.   Skin: Skin is warm and dry. No rash noted. She is not diaphoretic. No erythema. No pallor.   Psychiatric: She has a normal mood and affect. Her behavior is normal. Judgment and thought content normal.   Nursing note reviewed.      Assessment:     1. Abnormal CBC measurement     2. Bitemporal hemianopia     3. Chronic atrial fibrillation (HCC)     4. Cerebrovascular accident (CVA) due to embolism of precerebral artery (HCC)     5. High risk medication use     6. Hilar adenopathy     7. Lower extremity edema     8. Malignant neoplasm of upper lobe of right lung (HCC)     9. Metastasis to brain (HCC)     10. Metastatic cancer (CMS-HCC)     11. Sick sinus syndrome (HCC)         Medical Decision Making:  Today's Assessment / Status / Plan:     80-year-old female with atrial fibrillation on flecainide with metastatic cancer to the brain from her lung.  At this point we will keep on the flecainide.  She is not a candidate for the watchman device which is fine.  We will see her back in 6 months.    Start time:  9:30  Stop time: 9:45    This encounter was conducted via Zoom .   Verbal consent was obtained. Patient's identity was verified.

## 2020-07-07 ENCOUNTER — TELEPHONE (OUTPATIENT)
Dept: MEDICAL GROUP | Facility: IMAGING CENTER | Age: 80
End: 2020-07-07

## 2020-07-07 NOTE — TELEPHONE ENCOUNTER
Patient calling in to see if Jahaira would fill out paperwork for the [employer] access bus. Patient states she needs Jahaira to sign saying she is able to ride due to current medical conditions. Let patient know Jahaira may request appointment for her. Patient would like to possibly not have an appointment as she just saw her. Let patient know I would talk with Jahaira and get back to her. Patient states to call on Thursday. He will drop off paperwork later today.

## 2020-07-09 DIAGNOSIS — R60.0 BILATERAL LEG EDEMA: ICD-10-CM

## 2020-07-09 RX ORDER — POTASSIUM CHLORIDE 750 MG/1
10 TABLET, FILM COATED, EXTENDED RELEASE ORAL DAILY
Qty: 90 TAB | Refills: 1 | Status: SHIPPED | OUTPATIENT
Start: 2020-07-09 | End: 2020-12-16

## 2020-07-09 RX ORDER — FUROSEMIDE 20 MG/1
TABLET ORAL
Qty: 90 TAB | Refills: 1 | Status: SHIPPED | OUTPATIENT
Start: 2020-07-09 | End: 2020-12-16

## 2020-07-10 ENCOUNTER — HOSPITAL ENCOUNTER (OUTPATIENT)
Dept: RADIOLOGY | Facility: MEDICAL CENTER | Age: 80
End: 2020-07-10
Attending: INTERNAL MEDICINE
Payer: MEDICARE

## 2020-07-10 VITALS — OXYGEN SATURATION: 94 % | HEART RATE: 60 BPM | RESPIRATION RATE: 20 BRPM

## 2020-07-10 DIAGNOSIS — C34.11 MALIGNANT NEOPLASM OF UPPER LOBE OF RIGHT LUNG (HCC): ICD-10-CM

## 2020-07-10 PROCEDURE — 70553 MRI BRAIN STEM W/O & W/DYE: CPT

## 2020-07-10 PROCEDURE — 700117 HCHG RX CONTRAST REV CODE 255: Performed by: INTERNAL MEDICINE

## 2020-07-10 PROCEDURE — 71260 CT THORAX DX C+: CPT

## 2020-07-10 PROCEDURE — A9576 INJ PROHANCE MULTIPACK: HCPCS | Performed by: INTERNAL MEDICINE

## 2020-07-10 RX ADMIN — IOHEXOL 75 ML: 350 INJECTION, SOLUTION INTRAVENOUS at 09:00

## 2020-07-10 RX ADMIN — GADOTERIDOL 15 ML: 279.3 INJECTION, SOLUTION INTRAVENOUS at 09:38

## 2020-07-10 NOTE — PROGRESS NOTES
Pt's PPM set to MRI safe mode by rep prior to MRI. During MRI scan, pt monitored with BP, ECG and SPO2. Pt tolerated scan well. Discharged ambulatory after settings returned to prior settings

## 2020-07-31 ENCOUNTER — APPOINTMENT (RX ONLY)
Dept: URBAN - METROPOLITAN AREA CLINIC 20 | Facility: CLINIC | Age: 80
Setting detail: DERMATOLOGY
End: 2020-07-31

## 2020-07-31 DIAGNOSIS — Z41.9 ENCOUNTER FOR PROCEDURE FOR PURPOSES OTHER THAN REMEDYING HEALTH STATE, UNSPECIFIED: ICD-10-CM

## 2020-07-31 PROCEDURE — ? SCITON BBL

## 2020-07-31 ASSESSMENT — LOCATION ZONE DERM
LOCATION ZONE: FACE
LOCATION ZONE: NECK

## 2020-07-31 ASSESSMENT — LOCATION DETAILED DESCRIPTION DERM
LOCATION DETAILED: LEFT SUPERIOR ANTERIOR NECK
LOCATION DETAILED: LEFT INFERIOR CENTRAL MALAR CHEEK

## 2020-07-31 ASSESSMENT — LOCATION SIMPLE DESCRIPTION DERM
LOCATION SIMPLE: LEFT CHEEK
LOCATION SIMPLE: LEFT ANTERIOR NECK

## 2020-07-31 NOTE — PROCEDURE: SCITON BBL
Hide Repetition Rate?: No
Repetition Rate (Hz): 3
Spot Size: Finesse Adapter Size: 15 x 45 mm (No Finesse Adapter)
Cooling (In C): 20
Cooling ?: Yes
Indication Override (Will Not Show Above If Text Entered): vascular
Cooling (In C): 25
Price (Use Numbers Only, No Special Characters Or $): 500.00
Repetition Rate (Hz): 2
Fluence (J/Cm2): 9
Repetition Rate (Hz): 1
Pulse Duration Units: milliseconds
Fluence (J/Cm2): 11
Location Override (Will Not Show Above If Text Entered): spot treatment
Preprocedure Text: The treatment areas were thoroughly cleaned. Any exposed at risk hair that was not to be treated was covered in white paper tape. Clear ultrasound gel was applied to the treatment area. The area was treated with no immediate stacking of pulses.
Additional Comments (Optional): same setting with 15x15 square
Pulse Duration: 13
Cooling (In C): 17
Anesthesia Volume In Cc: 0
Post Procedure Text: The patient tolerated the procedure well. Ice-chilled washclothes were applied to the treatment area for comfort. Post care was reviewed with the patient.
Pulse Duration: 12
Fluence (J/Cm2): 21
Fluence (J/Cm2): 14
Spot Size: Finesse Adapter Size: 15 x 15 mm square
Consent: Written consent obtained, risks reviewed including but not limited to crusting, scabbing, blistering, scarring, darker or lighter pigmentary change, bruising, and/or incomplete response.
Pulse Duration: 10
Fluence (J/Cm2): 15
Detail Level: Zone
Post-Care Instructions: I reviewed with the patient in detail post-care instructions. Patient should stay away from the sun and wear sun protection until treated areas are fully healed.
Spot Size: Finesse Adapter Size: 7 mm round

## 2020-08-06 ENCOUNTER — APPOINTMENT (RX ONLY)
Dept: URBAN - METROPOLITAN AREA CLINIC 4 | Facility: CLINIC | Age: 80
Setting detail: DERMATOLOGY
End: 2020-08-06

## 2020-08-06 DIAGNOSIS — L81.4 OTHER MELANIN HYPERPIGMENTATION: ICD-10-CM

## 2020-08-06 DIAGNOSIS — L82.1 OTHER SEBORRHEIC KERATOSIS: ICD-10-CM

## 2020-08-06 PROBLEM — D48.5 NEOPLASM OF UNCERTAIN BEHAVIOR OF SKIN: Status: ACTIVE | Noted: 2020-08-06

## 2020-08-06 PROCEDURE — ? COUNSELING

## 2020-08-06 PROCEDURE — 99213 OFFICE O/P EST LOW 20 MIN: CPT | Mod: 25

## 2020-08-06 PROCEDURE — ? BIOPSY BY SHAVE METHOD

## 2020-08-06 PROCEDURE — ? LIQUID NITROGEN

## 2020-08-06 PROCEDURE — 11102 TANGNTL BX SKIN SINGLE LES: CPT

## 2020-08-06 ASSESSMENT — LOCATION DETAILED DESCRIPTION DERM
LOCATION DETAILED: LEFT LATERAL SUPERIOR CHEST
LOCATION DETAILED: RIGHT PROXIMAL POSTERIOR UPPER ARM
LOCATION DETAILED: RIGHT INFERIOR LATERAL NECK
LOCATION DETAILED: RIGHT PROXIMAL DORSAL FOREARM
LOCATION DETAILED: LEFT POSTERIOR SHOULDER
LOCATION DETAILED: LEFT DISTAL RADIAL DORSAL FOREARM
LOCATION DETAILED: LEFT MID-UPPER BACK
LOCATION DETAILED: SUPERIOR THORACIC SPINE
LOCATION DETAILED: LEFT INFERIOR LATERAL UPPER BACK
LOCATION DETAILED: LEFT DISTAL DORSAL FOREARM
LOCATION DETAILED: RIGHT INFERIOR MEDIAL UPPER BACK
LOCATION DETAILED: RIGHT INFERIOR ANTERIOR NECK
LOCATION DETAILED: RIGHT MEDIAL SUPERIOR CHEST
LOCATION DETAILED: RIGHT RIB CAGE
LOCATION DETAILED: INFERIOR THORACIC SPINE
LOCATION DETAILED: RIGHT SUPERIOR UPPER BACK
LOCATION DETAILED: LEFT SUPERIOR MEDIAL UPPER BACK
LOCATION DETAILED: MIDDLE STERNUM
LOCATION DETAILED: LEFT INFERIOR LATERAL NECK
LOCATION DETAILED: RIGHT DISTAL DORSAL FOREARM
LOCATION DETAILED: LEFT CLAVICULAR NECK
LOCATION DETAILED: RIGHT INFERIOR UPPER BACK
LOCATION DETAILED: LEFT MEDIAL SUPERIOR CHEST
LOCATION DETAILED: RIGHT ANTERIOR SHOULDER
LOCATION DETAILED: RIGHT INFERIOR MEDIAL FOREHEAD
LOCATION DETAILED: LEFT INFERIOR UPPER BACK
LOCATION DETAILED: LEFT SUPERIOR LATERAL UPPER BACK
LOCATION DETAILED: RIGHT MEDIAL UPPER BACK
LOCATION DETAILED: LEFT SUPERIOR UPPER BACK
LOCATION DETAILED: RIGHT LATERAL TRAPEZIAL NECK
LOCATION DETAILED: LEFT CLAVICULAR SKIN
LOCATION DETAILED: LEFT PROXIMAL POSTERIOR UPPER ARM

## 2020-08-06 ASSESSMENT — LOCATION SIMPLE DESCRIPTION DERM
LOCATION SIMPLE: UPPER BACK
LOCATION SIMPLE: RIGHT POSTERIOR UPPER ARM
LOCATION SIMPLE: RIGHT FOREHEAD
LOCATION SIMPLE: RIGHT SHOULDER
LOCATION SIMPLE: CHEST
LOCATION SIMPLE: LEFT SHOULDER
LOCATION SIMPLE: LEFT CLAVICULAR SKIN
LOCATION SIMPLE: POSTERIOR NECK
LOCATION SIMPLE: LEFT ANTERIOR NECK
LOCATION SIMPLE: RIGHT FOREARM
LOCATION SIMPLE: LEFT FOREARM
LOCATION SIMPLE: RIGHT ANTERIOR NECK
LOCATION SIMPLE: ABDOMEN
LOCATION SIMPLE: LEFT POSTERIOR UPPER ARM
LOCATION SIMPLE: LEFT UPPER BACK
LOCATION SIMPLE: RIGHT UPPER BACK

## 2020-08-06 ASSESSMENT — LOCATION ZONE DERM
LOCATION ZONE: NECK
LOCATION ZONE: FACE
LOCATION ZONE: ARM
LOCATION ZONE: TRUNK

## 2020-08-06 NOTE — PROCEDURE: LIQUID NITROGEN
Detail Level: Detailed
Consent: The patient's consent was obtained including but not limited to risks of crusting, scabbing, blistering, scarring, darker or lighter pigmentary change, recurrence, incomplete removal and infection.
Medical Necessity Information: It is in your best interest to select a reason for this procedure from the list below. All of these items fulfill various CMS LCD requirements except the new and changing color options.
Bill Insurance (You Assume Risk Of Denial Or Audit By Selecting Yes): No
Post-Care Instructions: I reviewed with the patient in detail post-care instructions. Patient is to wear sunprotection, and avoid picking at any of the treated lesions. Pt may apply Vaseline to crusted or scabbing areas.
Medical Necessity Clause: This procedure was medically necessary because the lesions that were treated were:  If lesion does not resolve, bx is needed.
Duration Of Freeze Thaw-Cycle (Seconds): 0

## 2020-08-06 NOTE — PROCEDURE: BIOPSY BY SHAVE METHOD
Detail Level: Detailed
Depth Of Biopsy: dermis
Was A Bandage Applied: Yes
Size Of Lesion In Cm: 1
X Size Of Lesion In Cm: 0
Biopsy Type: H and E
Biopsy Method: 10 blade
Anesthesia Type: 1% lidocaine with epinephrine
Anesthesia Volume In Cc: 3
Hemostasis: Electrocautery
Wound Care: Vaseline
Dressing: bandage
Destruction After The Procedure: No
Type Of Destruction Used: Electrodesiccation
Curettage Text: The wound bed was treated with curettage after the biopsy was performed.
Cryotherapy Text: The wound bed was treated with cryotherapy after the biopsy was performed.
Electrodesiccation Text: The wound bed was treated with electrodesiccation after the biopsy was performed.
Electrodesiccation And Curettage Text: The wound bed was treated with electrodesiccation and curettage after the biopsy was performed.
Silver Nitrate Text: The wound bed was treated with silver nitrate after the biopsy was performed.
Lab: [id number]
Lab Facility: 
Consent: Written consent was obtained and risks were reviewed including but not limited to scarring, infection, bleeding, scabbing, incomplete removal, nerve damage and allergy to anesthesia.
Post-Care Instructions: I reviewed with the patient in detail post-care instructions. Patient is to keep the biopsy site dry overnight, and then apply bacitracin twice daily until healed. Patient may apply hydrogen peroxide soaks to remove any crusting.
Notification Instructions: Patient will be notified of biopsy results. However, patient instructed to call the office if not contacted within 2 weeks.
Billing Type: Third-Party Bill
Information: Selecting Yes will display possible errors in your note based on the variables you have selected. This validation is only offered as a suggestion for you. PLEASE NOTE THAT THE VALIDATION TEXT WILL BE REMOVED WHEN YOU FINALIZE YOUR NOTE. IF YOU WANT TO FAX A PRELIMINARY NOTE YOU WILL NEED TO TOGGLE THIS TO 'NO' IF YOU DO NOT WANT IT IN YOUR FAXED NOTE.

## 2020-08-27 ENCOUNTER — OFFICE VISIT (OUTPATIENT)
Dept: PULMONOLOGY | Facility: HOSPICE | Age: 80
End: 2020-08-27
Payer: MEDICARE

## 2020-08-27 VITALS
BODY MASS INDEX: 29.29 KG/M2 | HEART RATE: 87 BPM | RESPIRATION RATE: 16 BRPM | WEIGHT: 155 LBS | TEMPERATURE: 97.5 F | OXYGEN SATURATION: 92 % | DIASTOLIC BLOOD PRESSURE: 64 MMHG | SYSTOLIC BLOOD PRESSURE: 130 MMHG

## 2020-08-27 DIAGNOSIS — C34.90 LUNG CANCER METASTATIC TO BRAIN (HCC): ICD-10-CM

## 2020-08-27 DIAGNOSIS — G47.33 OSA TREATED WITH BIPAP: ICD-10-CM

## 2020-08-27 DIAGNOSIS — Z87.891 FORMER SMOKER: ICD-10-CM

## 2020-08-27 DIAGNOSIS — J96.11 CHRONIC RESPIRATORY FAILURE WITH HYPOXIA (HCC): ICD-10-CM

## 2020-08-27 DIAGNOSIS — C79.31 LUNG CANCER METASTATIC TO BRAIN (HCC): ICD-10-CM

## 2020-08-27 PROCEDURE — 99213 OFFICE O/P EST LOW 20 MIN: CPT | Performed by: INTERNAL MEDICINE

## 2020-08-27 ASSESSMENT — ENCOUNTER SYMPTOMS
SPUTUM PRODUCTION: 0
NECK PAIN: 0
ORTHOPNEA: 0
FOCAL WEAKNESS: 0
NAUSEA: 0
SHORTNESS OF BREATH: 0
DIAPHORESIS: 0
COUGH: 0
SPEECH CHANGE: 0
HEADACHES: 0
CHILLS: 0
PALPITATIONS: 0
EYE REDNESS: 0
MYALGIAS: 0
PHOTOPHOBIA: 0
DIARRHEA: 0
CLAUDICATION: 0
FEVER: 0
WHEEZING: 0
HEARTBURN: 0
HEMOPTYSIS: 0
PND: 0
TREMORS: 0
ABDOMINAL PAIN: 0
EYE PAIN: 0
STRIDOR: 0
SINUS PAIN: 0
FALLS: 0
DIZZINESS: 0
DEPRESSION: 0
VOMITING: 0
BLURRED VISION: 0
BACK PAIN: 0
DOUBLE VISION: 0
WEIGHT LOSS: 0
WEAKNESS: 0
SORE THROAT: 0
EYE DISCHARGE: 0
CONSTIPATION: 0

## 2020-08-27 ASSESSMENT — FIBROSIS 4 INDEX: FIB4 SCORE: 1.64

## 2020-08-27 NOTE — PROGRESS NOTES
Chief Complaint   Patient presents with   • Follow-Up     last seen 2/26/2020    • Results     Ct 7/10/2020         HPI: This patient is a 80 y.o. female whom is followed in our clinic for chronic hypoxic respiratory failure with restrictive airways on PFTs and RUL lung Ca w/hx of brain metastasis last seen by me on 2/26/20.   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 XRT and chemo. She is followed closely by oncology and suffered ICH in the setting of tx with eliquis for AF/SSS s/p ppm and no longer on anticoagluation..  She is followed in our sleep clinic for VITO and is on NIPPV for this.  She has been noted to desaturate on multi-ox during clinic visits in the past but has declined use of ambulatory O2 given she is fairly active and typically monitors her own SaO2 which pt states can drop in the the low 80s but usually stays 89% or above.  She exercise with PT at her assisted living facility and travels regularly.  She is asx from a pulmonary standpoint with no chronic cough, wheezing, CP, ZAMARRIPA.  PFTs today show FEV1 of 78% and FVC of 65% with ratio of 91%. TLC is 82% and DLCO 74%.  These are not significantly changed when compared to 7/2019.  Most recent CT chest and MRI brain from July showed no new metastasis in the brain and no change in right upper lobe mass.  She presents today for routine follow-up.  She is 90% on room air.  No new pulmonary complaints.  She is on no inhaled therapies.  She reports compliance with the benefit from BiPAP therapy at night.  She did notice some mild asymmetry to the supraclavicular area on her right neck but denies any pain associated with this.    Past Medical History:   Diagnosis Date   • Breast cancer (HCC)    • Cancer (HCC)     lung cancer with brain mts   • Chickenpox    • [place] measles    • Healthcare maintenance 7/23/2018   • Influenza    • Joint pain    • Lung cancer (HCC)     • Mumps    • Need for vaccination 2019   • Radionecrosis 2018   • Sick sinus syndrome (HCC)     with AFIB, s/p pacemaker   • Thyroid disease    • Tonsillitis        Social History     Socioeconomic History   • Marital status:      Spouse name: Not on file   • Number of children: Not on file   • Years of education: Not on file   • Highest education level: Not on file   Occupational History   • Not on file   Social Needs   • Financial resource strain: Not on file   • Food insecurity     Worry: Not on file     Inability: Not on file   • Transportation needs     Medical: Not on file     Non-medical: Not on file   Tobacco Use   • Smoking status: Former Smoker     Packs/day: 2.00     Years: 20.00     Pack years: 40.00     Types: Cigarettes     Quit date:      Years since quittin.6   • Smokeless tobacco: Never Used   Substance and Sexual Activity   • Alcohol use: Yes     Alcohol/week: 1.8 oz     Types: 3 Glasses of wine per week     Frequency: 2-3 times a week     Drinks per session: 1 or 2     Binge frequency: Never   • Drug use: No   • Sexual activity: Not on file   Lifestyle   • Physical activity     Days per week: 4 days     Minutes per session: 60 min   • Stress: Not on file   Relationships   • Social connections     Talks on phone: More than three times a week     Gets together: More than three times a week     Attends [employer] service: Never     Active member of club or organization: Yes     Attends meetings of clubs or organizations: More than 4 times per year     Relationship status:    • Intimate partner violence     Fear of current or ex partner: No     Emotionally abused: No     Physically abused: No     Forced sexual activity: No   Other Topics Concern   • Not on file   Social History Narrative   • Not on file       Family History   Problem Relation Age of Onset   • Dementia Mother    • Diabetes Mother    • Other Mother         osteoarthritis   • Arthritis Mother    •  Autoimmune Disease Father         Rheumatoid arthritis   • Arthritis Father    • Cancer Brother         prostate        Current Outpatient Medications on File Prior to Visit   Medication Sig Dispense Refill   • furosemide (LASIX) 20 MG Tab TAKE 1 TABLET BY MOUTH EVERY DAY IN THE MORNING 90 Tab 1   • potassium chloride ER (KLOR-CON) 10 MEQ tablet Take 1 Tab by mouth every day. 90 Tab 1   • levETIRAcetam (KEPPRA) 500 MG Tab Take 1 Tab by mouth 2 Times a Day. 180 Tab 3   • methimazole (TAPAZOLE) 5 MG Tab Take 0.5 Tabs by mouth every day. 90 Tab 1   • alendronate (FOSAMAX) 70 MG Tab Take 1 Tab by mouth every 7 days. 12 Tab 2   • albuterol 108 (90 Base) MCG/ACT Aero Soln inhalation aerosol INHALE 2 PUFFS BY MOUTH EVERY FOUR HOURS AS NEEDED FOR SHORTNESS OF BREATH. 6.7 Inhaler 1   • metronidazole (METROCREAM) 0.75 % cream APPLY TO FACE EVERYDAY ONCE TO TWICE A DAY FOR ROSACEA     • aspirin 81 MG tablet Take 81 mg by mouth every day.     • flecainide (TAMBOCOR) 150 MG Tab TAKE 1 TABLET BY MOUTH EVERY 12 HOURS 180 Tab 0   • Cholecalciferol (VITAMIN D3) 2000 UNIT Cap Take 2,000 Units by mouth. Indications: Daily Treatment with Aspirin Dose Greater Then 325 mg     • OYSCO 500 500 MG Tab 500 mg.  5   • Misc. Devices ([employer] PULSE OXIMETER) Misc 1 Each by Does not apply route 4 times a day as needed. 1 Each 0   • metoprolol SR (TOPROL XL) 25 MG TABLET SR 24 HR Take 25 mg by mouth every day.  3   • Spacer/Aero-Holding Chambers (OPTICHAMBER MANOLO) Misc USE AS DIRECTED       No current facility-administered medications on file prior to visit.        Penicillins and Atorvastatin      ROS:   Review of Systems   Constitutional: Negative for chills, diaphoresis, fever, malaise/fatigue and weight loss.   HENT: Negative for congestion, ear discharge, ear pain, hearing loss, nosebleeds, sinus pain, sore throat and tinnitus.    Eyes: Negative for blurred vision, double vision, photophobia, pain, discharge and redness.   Respiratory:  Negative for cough, hemoptysis, sputum production, shortness of breath, wheezing and stridor.    Cardiovascular: Negative for chest pain, palpitations, orthopnea, claudication, leg swelling and PND.   Gastrointestinal: Negative for abdominal pain, constipation, diarrhea, heartburn, nausea and vomiting.   Genitourinary: Negative for dysuria and urgency.   Musculoskeletal: Negative for back pain, falls, joint pain, myalgias and neck pain.   Skin: Negative for itching and rash.   Neurological: Negative for dizziness, tremors, speech change, focal weakness, weakness and headaches.   Endo/Heme/Allergies: Negative for environmental allergies.   Psychiatric/Behavioral: Negative for depression.       /64 (BP Location: Left arm, Patient Position: Sitting, BP Cuff Size: Adult)   Pulse 87   Temp 36.4 °C (97.5 °F) (Temporal)   Resp 16   Wt 70.3 kg (155 lb)   SpO2 92%   Physical Exam  Constitutional:       General: She is not in acute distress.     Appearance: Normal appearance. She is well-developed and normal weight.   HENT:      Head: Normocephalic and atraumatic.      Right Ear: External ear normal.      Left Ear: External ear normal.      Nose: Nose normal. No congestion.      Mouth/Throat:      Mouth: Mucous membranes are moist.      Pharynx: Oropharynx is clear. No oropharyngeal exudate.   Eyes:      General: No scleral icterus.     Extraocular Movements: Extraocular movements intact.      Conjunctiva/sclera: Conjunctivae normal.      Pupils: Pupils are equal, round, and reactive to light.   Neck:      Musculoskeletal: Normal range of motion and neck supple.      Vascular: No JVD.      Trachea: No tracheal deviation.   Cardiovascular:      Rate and Rhythm: Normal rate and regular rhythm.      Heart sounds: Normal heart sounds. No murmur. No friction rub. No gallop.    Pulmonary:      Effort: Pulmonary effort is normal. No accessory muscle usage or respiratory distress.      Breath sounds: Normal breath sounds.  No wheezing or rales.   Abdominal:      General: There is no distension.      Palpations: Abdomen is soft.      Tenderness: There is no abdominal tenderness.   Musculoskeletal: Normal range of motion.         General: No tenderness or deformity.      Right lower leg: No edema.      Left lower leg: No edema.   Lymphadenopathy:      Cervical: No cervical adenopathy.   Skin:     General: Skin is warm and dry.      Findings: No rash.      Nails: There is no clubbing.     Neurological:      Mental Status: She is alert and oriented to person, place, and time.      Cranial Nerves: No cranial nerve deficit.      Gait: Gait normal.   Psychiatric:         Mood and Affect: Mood normal.         Behavior: Behavior normal.         PFTs as reviewed by me personally: as per hPI    Imaging as reviewed by me personally:  As per hPI    Assessment:  1. Lung cancer metastatic to brain (HCC)     2. Chronic respiratory failure with hypoxia (HCC)     3. VITO treated with BiPAP     4. BMI 29.0-29.9,adult     5. Former smoker         Plan:  1.  Surveillance imaging stable as of July.  I reviewed CT and CT no clear evidence that her mass is an area that could obstruct blood flow and in examining her appears to just mild deformity to the soft tissue in the supraclavicular area on the right but I did inform the patient to follow-up with me or oncology if swelling worsens.  Otherwise follow-up with oncology.  2.  Patient is 92% on room air today.  She monitors her SaO2 at home.  She has supplemental oxygen to be used as needed.  She is active on a daily basis.  3.  Patient reports compliance with BiPAP and supplemental oxygen at night.  Follow-up with sleep medicine.  4.  This test with patient at increased risk for obesity related more applications.  Encouraged healthy lifestyle habits.  5.  Patient is tobacco free.  Encouraged ongoing abstinence.  Return in about 6 months (around 2/27/2021) for hypoxic respiratory failure.

## 2020-09-01 ENCOUNTER — APPOINTMENT (RX ONLY)
Dept: URBAN - METROPOLITAN AREA CLINIC 20 | Facility: CLINIC | Age: 80
Setting detail: DERMATOLOGY
End: 2020-09-01

## 2020-09-01 DIAGNOSIS — R05.9 COUGH IN ADULT: ICD-10-CM

## 2020-09-01 DIAGNOSIS — Z41.9 ENCOUNTER FOR PROCEDURE FOR PURPOSES OTHER THAN REMEDYING HEALTH STATE, UNSPECIFIED: ICD-10-CM

## 2020-09-01 PROCEDURE — ? SCITON BBL

## 2020-09-01 ASSESSMENT — LOCATION DETAILED DESCRIPTION DERM
LOCATION DETAILED: LEFT INFERIOR MEDIAL FOREHEAD
LOCATION DETAILED: LEFT INFERIOR MEDIAL MALAR CHEEK
LOCATION DETAILED: RIGHT CHIN
LOCATION DETAILED: RIGHT INFERIOR LATERAL MALAR CHEEK

## 2020-09-01 ASSESSMENT — LOCATION SIMPLE DESCRIPTION DERM
LOCATION SIMPLE: LEFT FOREHEAD
LOCATION SIMPLE: LEFT CHEEK
LOCATION SIMPLE: RIGHT CHEEK
LOCATION SIMPLE: CHIN

## 2020-09-01 ASSESSMENT — LOCATION ZONE DERM: LOCATION ZONE: FACE

## 2020-09-01 NOTE — PROCEDURE: SCITON BBL
Repetition Rate (Hz): 1
Cooling (In C): 20
Fluence (J/Cm2): 15
Pulse Duration Units: milliseconds
Spot Size: Finesse Adapter Size: 7 mm round
Pulse Duration: 13
Price (Use Numbers Only, No Special Characters Or $): 450.00
Repetition Rate (Hz): 3
Pulse Duration: 10
Fluence (J/Cm2): 21
Fluence (J/Cm2): 14
Fluence (J/Cm2): 25
Spot Size: Finesse Adapter Size: 11 mm square
Spot Size: Finesse Adapter Size: 15 x 15 mm square
Repetition Rate (Hz): 2
Cooling (In C): 19
Anesthesia Volume In Cc: 0
Consent: Written consent obtained, risks reviewed including but not limited to crusting, scabbing, blistering, scarring, darker or lighter pigmentary change, bruising, and/or incomplete response.
Cooling ?: Yes
Post Procedure Text: The patient tolerated the procedure well. Ice-chilled washclothes were applied to the treatment area for comfort. Post care was reviewed with the patient.
Fluence (J/Cm2): 12
Preprocedure Text: The treatment areas were thoroughly cleaned. Any exposed at risk hair that was not to be treated was covered in white paper tape. Clear ultrasound gel was applied to the treatment area. The area was treated with no immediate stacking of pulses.
Post-Care Instructions: I reviewed with the patient in detail post-care instructions. Patient should stay away from the sun and wear sun protection until treated areas are fully healed.
Detail Level: Zone
Hide Repetition Rate?: No

## 2020-09-02 ENCOUNTER — TELEPHONE (OUTPATIENT)
Dept: PULMONOLOGY | Facility: HOSPICE | Age: 80
End: 2020-09-02

## 2020-09-02 RX ORDER — ALBUTEROL SULFATE 90 UG/1
2 AEROSOL, METERED RESPIRATORY (INHALATION) EVERY 4 HOURS PRN
Qty: 1 EACH | Refills: 1 | Status: SHIPPED | OUTPATIENT
Start: 2020-09-02 | End: 2020-10-01

## 2020-09-02 NOTE — TELEPHONE ENCOUNTER
Caller: Ml    Phone Number: [phone] (home)     Message: Pt called and l/m. Wanting a call back.          Called and spoke with pt. Pt said she does noticed her neck is swelling. She is going to see her PCP tomorrow. Notified pt I will route her message to Dr Ingram to inform her of this.

## 2020-09-03 ENCOUNTER — HOSPITAL ENCOUNTER (OUTPATIENT)
Dept: LAB | Facility: MEDICAL CENTER | Age: 80
End: 2020-09-03
Attending: NURSE PRACTITIONER
Payer: MEDICARE

## 2020-09-03 ENCOUNTER — TELEPHONE (OUTPATIENT)
Dept: MEDICAL GROUP | Facility: IMAGING CENTER | Age: 80
End: 2020-09-03

## 2020-09-03 ENCOUNTER — OFFICE VISIT (OUTPATIENT)
Dept: MEDICAL GROUP | Facility: IMAGING CENTER | Age: 80
End: 2020-09-03
Payer: MEDICARE

## 2020-09-03 VITALS
DIASTOLIC BLOOD PRESSURE: 64 MMHG | HEART RATE: 81 BPM | BODY MASS INDEX: 29.07 KG/M2 | SYSTOLIC BLOOD PRESSURE: 124 MMHG | OXYGEN SATURATION: 93 % | WEIGHT: 154 LBS | TEMPERATURE: 98.4 F | HEIGHT: 61 IN | RESPIRATION RATE: 16 BRPM

## 2020-09-03 DIAGNOSIS — R60.0 LOCALIZED EDEMA: ICD-10-CM

## 2020-09-03 DIAGNOSIS — T14.8XXA BRUISING: ICD-10-CM

## 2020-09-03 DIAGNOSIS — S51.812A SKIN TEAR OF LEFT FOREARM WITHOUT COMPLICATION, INITIAL ENCOUNTER: ICD-10-CM

## 2020-09-03 LAB
BASOPHILS # BLD AUTO: 1 % (ref 0–1.8)
BASOPHILS # BLD: 0.07 K/UL (ref 0–0.12)
EOSINOPHIL # BLD AUTO: 0.15 K/UL (ref 0–0.51)
EOSINOPHIL NFR BLD: 2.1 % (ref 0–6.9)
ERYTHROCYTE [DISTWIDTH] IN BLOOD BY AUTOMATED COUNT: 53.2 FL (ref 35.9–50)
HCT VFR BLD AUTO: 46.5 % (ref 37–47)
HGB BLD-MCNC: 14.8 G/DL (ref 12–16)
IMM GRANULOCYTES # BLD AUTO: 0.03 K/UL (ref 0–0.11)
IMM GRANULOCYTES NFR BLD AUTO: 0.4 % (ref 0–0.9)
LYMPHOCYTES # BLD AUTO: 1.43 K/UL (ref 1–4.8)
LYMPHOCYTES NFR BLD: 20.3 % (ref 22–41)
MCH RBC QN AUTO: 33.9 PG (ref 27–33)
MCHC RBC AUTO-ENTMCNC: 31.8 G/DL (ref 33.6–35)
MCV RBC AUTO: 106.7 FL (ref 81.4–97.8)
MONOCYTES # BLD AUTO: 0.55 K/UL (ref 0–0.85)
MONOCYTES NFR BLD AUTO: 7.8 % (ref 0–13.4)
NEUTROPHILS # BLD AUTO: 4.82 K/UL (ref 2–7.15)
NEUTROPHILS NFR BLD: 68.4 % (ref 44–72)
NRBC # BLD AUTO: 0 K/UL
NRBC BLD-RTO: 0 /100 WBC
PLATELET # BLD AUTO: 234 K/UL (ref 164–446)
PMV BLD AUTO: 9.4 FL (ref 9–12.9)
RBC # BLD AUTO: 4.36 M/UL (ref 4.2–5.4)
WBC # BLD AUTO: 7.1 K/UL (ref 4.8–10.8)

## 2020-09-03 PROCEDURE — 36415 COLL VENOUS BLD VENIPUNCTURE: CPT

## 2020-09-03 PROCEDURE — 85652 RBC SED RATE AUTOMATED: CPT

## 2020-09-03 PROCEDURE — 99213 OFFICE O/P EST LOW 20 MIN: CPT | Performed by: NURSE PRACTITIONER

## 2020-09-03 PROCEDURE — 86140 C-REACTIVE PROTEIN: CPT

## 2020-09-03 PROCEDURE — 85025 COMPLETE CBC W/AUTO DIFF WBC: CPT

## 2020-09-03 PROCEDURE — 86160 COMPLEMENT ANTIGEN: CPT

## 2020-09-03 PROCEDURE — 80053 COMPREHEN METABOLIC PANEL: CPT

## 2020-09-03 ASSESSMENT — PAIN SCALES - GENERAL: PAINLEVEL: NO PAIN

## 2020-09-03 ASSESSMENT — FIBROSIS 4 INDEX: FIB4 SCORE: 1.64

## 2020-09-03 NOTE — PROGRESS NOTES
Subjective:   CC: Edema (right shoulder edema. x 1 week. )    HPI:   Ml presents today with concerns of right shoulder and neck swelling for the past two weeks.    States she was seen by pulmonary about two weeks ago and at that time it was noted she had right shoulder and neck swelling. States she was told to follow up with PCP and notify pulmonary if swelling had not decreased. States she notified Dr. Ingram pulmonary and was told she possibly needed a CT scan and/or further imaging. States that she experiences pain in her right side of her neck when turning to the right and when she puts her right ear to her shoulder. Denies pain on left side. Denies SOB and/or cough at this time. Denies any trauma and/or injury to area. Denies any decreased AROM of upper body. Denies startign any new medication and/or supplements at this time. States she is taking Lasix every other day with potassium ER 10 Meq.      States about a week ago she hit her forearm on the door handle due to decreased vision in her left eye and not seeing the door handle. States she has been placing a band-aid over healing wound and using OTC triple antibiotic ointment. Denies increasing pain, itching, drainage, and/or redness. States wound continues to improve.    No problem-specific Assessment & Plan notes found for this encounter.    Past Medical History:   Diagnosis Date   • Breast cancer (HCC)    • Cancer (HCC)     lung cancer with brain mts   • Chickenpox    • [place] measles    • Healthcare maintenance 2018   • Influenza    • Joint pain    • Lung cancer (HCC)    • Mumps    • Need for vaccination 2019   • Radionecrosis 2018   • Sick sinus syndrome (HCC)     with AFIB, s/p pacemaker   • Thyroid disease    • Tonsillitis      Social History     Tobacco Use   • Smoking status: Former Smoker     Packs/day: 2.00     Years: 20.00     Pack years: 40.00     Types: Cigarettes     Quit date:      Years since quittin.7   •  Smokeless tobacco: Never Used   Substance Use Topics   • Alcohol use: Yes     Alcohol/week: 1.8 oz     Types: 3 Glasses of wine per week     Frequency: 2-3 times a week     Drinks per session: 1 or 2     Binge frequency: Never   • Drug use: No     Current Outpatient Medications Ordered in Epic   Medication Sig Dispense Refill   • albuterol 108 (90 Base) MCG/ACT Aero Soln inhalation aerosol INHALE 2 PUFFS BY MOUTH EVERY FOUR HOURS AS NEEDED FOR SHORTNESS OF BREATH. 1 Each 1   • furosemide (LASIX) 20 MG Tab TAKE 1 TABLET BY MOUTH EVERY DAY IN THE MORNING 90 Tab 1   • potassium chloride ER (KLOR-CON) 10 MEQ tablet Take 1 Tab by mouth every day. 90 Tab 1   • levETIRAcetam (KEPPRA) 500 MG Tab Take 1 Tab by mouth 2 Times a Day. 180 Tab 3   • methimazole (TAPAZOLE) 5 MG Tab Take 0.5 Tabs by mouth every day. 90 Tab 1   • alendronate (FOSAMAX) 70 MG Tab Take 1 Tab by mouth every 7 days. 12 Tab 2   • metronidazole (METROCREAM) 0.75 % cream APPLY TO FACE EVERYDAY ONCE TO TWICE A DAY FOR ROSACEA     • Spacer/Aero-Holding Chambers (OPTICHAMBER MANOLO) Misc USE AS DIRECTED     • aspirin 81 MG tablet Take 81 mg by mouth every day.     • flecainide (TAMBOCOR) 150 MG Tab TAKE 1 TABLET BY MOUTH EVERY 12 HOURS 180 Tab 0   • Cholecalciferol (VITAMIN D3) 2000 UNIT Cap Take 2,000 Units by mouth. Indications: Daily Treatment with Aspirin Dose Greater Then 325 mg     • OYSCO 500 500 MG Tab 500 mg.  5   • Misc. Devices ([employer] PULSE OXIMETER) Misc 1 Each by Does not apply route 4 times a day as needed. 1 Each 0   • metoprolol SR (TOPROL XL) 25 MG TABLET SR 24 HR Take 25 mg by mouth every day.  3     No current [facility]-ordered facility-administered medications on file.      Allergies:  Penicillins and Atorvastatin    ROS:  Constitutional: Denies fever, chills, night sweats, weight loss/gain or malaise/fatigue.   HENT: Denies nasal congestion, sore throat, hearing loss, enlarged thyroid, or difficulty swallowing.   Eyes: Denies changes in vision,  "pain. Wears corrective wear. Since removal brain tumor in 2015, pt. reports that she has had decreased peripheral vision, worse in left eye. Does not drive.   Respiratory: Denies cough and/or SOB at rest or activity.  Cardiovascular: Denies tachycardia, chest pain, or palpitations. History of lower leg swelling, managed with intermittent use of Lasix. Pt. has a pace maker due to the history of sick sinus syndrome.  Gastrointestinal: Denies N/V/C/D, abdominal pain, loss appetite, reflux, or hematochezia.  Genitourinary: Denies difficulty voiding, dysuria, nocturia, or hematuria.   Skin: Negative for rash or worrisome moles. Edema of neck and shoulders, see HPI.  Neurological: Negative for dizziness, focal weakness and headaches. Denies any recent falls.   Endo/Heme/Allergies: Denies bruise/bleed easily, allergies.   Psychiatric/Behavioral: Denies depression, nervous/anxious, difficulty sleeping.    Objective:   Exam:  /64 (BP Location: Left arm, Patient Position: Sitting, BP Cuff Size: Adult)   Pulse 81   Temp 36.9 °C (98.4 °F) (Temporal)   Resp 16   Ht 1.549 m (5' 1\")   Wt 69.9 kg (154 lb)   SpO2 93%   BMI 29.10 kg/m²  Body mass index is 29.1 kg/m².    General: Normal appearing. No distress.  HEENT: Normocephalic. Eyes conjunctiva clear lids without ptosis, PERRLA, ears normal shape and contour. Patient wore a mask during visit.  Neck: Supple without JVD or abnormal masses. Small soft mobile thyroid palpated, no nodules palpated, non-tender. Soft non-pitting peripheral edema bilaterally, more on the right than the left. Non-tender to light touch. Right side of neck slightly tender with firmer touch. Full AROM on neck and shoulders. Reports pain with turning head to the right and right ear to shoulder. No skin color changes with edema  Pulmonary: Clear to ausculation.  Normal effort. No rales, ronchi, or wheezing.  Cardiovascular: Regular rate and rhythm without murmur. Pedal and radial pulses are intact " and equal bilaterally.  Neurologic: Grossly non-focal.  Lymph: No cervical or supraclavicular lymph nodes are palpable  Skin: Warm and dry. No obvious lesions. Healing skin tear, noted black/purple bruising, no drainage and/or redness, 1.25 inches by 0.5 inches.  Musculoskeletal: Normal gait. No extremity cyanosis, clubbing.   Psych: Normal mood and affect. Alert and oriented x3. Judgment and insight is normal.    Assessment & Plan:   1. Localized edema  This is a stable condition. Discussed with patient obtain blood work to further evaluate cause of swelling. Discussed consulting with colleague, Dr. Alysha Navas for recommended imaging. Discussed with patient possible CT scan, will notify patient, verbalized understanding.   Discussed seeking emergency services if she experiences worse symptoms.    -     CBC WITH DIFFERENTIAL; Future  -     Comp Metabolic Panel; Future  -     Sed Rate; Future  -     CRP QUANTITIVE (NON-CARDIAC); Future  -     COMPLEMENT C4; Future    2. Bruising   3. Skin tear of left forearm without complication, initial encounter  This is a stable condition. Instructed to continue to use OTC triple antibiotic ointment and to cover with band-aid if unable to maintain protection and/or cleanliness. Instructed to allow air to healing wound intermittently. Discussed signs and symptoms of infection and when to seek further care, verbalized understanding. Discussed with patient she is at risk for bruising and bleeding easily due to ASA therapy, verbalized understanding.        Discussed imaging with Dr. Alysha Navas. It was recommended to reach out to established oncologist, Dr. Aguilar for recommendation of imaging due to history of brain and lung cancer.     9/3/2020 1245: Message sent via Epic to Dr. Aguilar. Office called to notify message was sent.     9/3/2020 1310: Meenu from Dr. Aguilar's office called PCP back and was informed that they contacted patient and instructed to schedule an appointment for  further evaluation. States they will get patient in the next 7 days.    See [employer] messaging that PCP staff called patient to inform her of POC. Patient verbalized that she missed a call from Dr. Aguilar's office and will call back to schedule an appointment.     Return pending labs and apppointment with Dr. Aguilar.    Please note that this dictation was created using voice recognition software. I have made every reasonable attempt to correct obvious errors, but I expect that there are errors of grammar and possibly content that I did not discover before finalizing the note.

## 2020-09-03 NOTE — TELEPHONE ENCOUNTER
Elena Ingram M.D.  You 23 hours ago (4:55 PM)     Thank you for the update. She may need CT neck.

## 2020-09-03 NOTE — TELEPHONE ENCOUNTER
Pt returned call and notified of Jahaira's message. She states she missed a call from Dr. Aguilar's office, so she will call them back as well. Pt states she just did her lab work. She understands the plan and has no questions at this time.

## 2020-09-03 NOTE — TELEPHONE ENCOUNTER
Please call Ml and let her know that I have talk to Dr. Aguilar's office and Meenu from his office has call me back to let me know that she has a scheduled appointment next week.  I would like her to complete the labs I have ordered today before this visit.  We are going to wait for any imaging until we have Dr. Aguilar's input.  I contacted him due to wanting his input of the appropriate imaging for her ongoing swelling due to her history.  Please let her know I do not want her to be alarmed or concerned at this time, but I just value that she has multiple team members who are contributing to her care and I would like his opinion before ordering further imaging to evaluate this new onset of swelling.  Please let her know I tried to call, but she was unable to answer, and I wanted her to be contacted this is why or calling.    Jahaira Santos, APRAARTI-C

## 2020-09-04 LAB
ALBUMIN SERPL BCP-MCNC: 4.7 G/DL (ref 3.2–4.9)
ALBUMIN/GLOB SERPL: 1.7 G/DL
ALP SERPL-CCNC: 63 U/L (ref 30–99)
ALT SERPL-CCNC: 37 U/L (ref 2–50)
ANION GAP SERPL CALC-SCNC: 13 MMOL/L (ref 7–16)
AST SERPL-CCNC: 25 U/L (ref 12–45)
BILIRUB SERPL-MCNC: 0.4 MG/DL (ref 0.1–1.5)
BUN SERPL-MCNC: 22 MG/DL (ref 8–22)
C4 SERPL-MCNC: 25.2 MG/DL (ref 19–52)
CALCIUM SERPL-MCNC: 10.2 MG/DL (ref 8.5–10.5)
CHLORIDE SERPL-SCNC: 101 MMOL/L (ref 96–112)
CO2 SERPL-SCNC: 26 MMOL/L (ref 20–33)
CREAT SERPL-MCNC: 0.67 MG/DL (ref 0.5–1.4)
CRP SERPL HS-MCNC: 0.7 MG/DL (ref 0–0.75)
ERYTHROCYTE [SEDIMENTATION RATE] IN BLOOD BY WESTERGREN METHOD: 10 MM/HOUR (ref 0–30)
GLOBULIN SER CALC-MCNC: 2.8 G/DL (ref 1.9–3.5)
GLUCOSE SERPL-MCNC: 87 MG/DL (ref 65–99)
POTASSIUM SERPL-SCNC: 4.1 MMOL/L (ref 3.6–5.5)
PROT SERPL-MCNC: 7.5 G/DL (ref 6–8.2)
SODIUM SERPL-SCNC: 140 MMOL/L (ref 135–145)

## 2020-09-10 ENCOUNTER — HOSPITAL ENCOUNTER (OUTPATIENT)
Dept: RADIOLOGY | Facility: MEDICAL CENTER | Age: 80
End: 2020-09-10
Attending: INTERNAL MEDICINE
Payer: MEDICARE

## 2020-09-10 DIAGNOSIS — C34.11 MALIGNANT NEOPLASM OF UPPER LOBE OF RIGHT LUNG (HCC): ICD-10-CM

## 2020-09-10 PROCEDURE — 700117 HCHG RX CONTRAST REV CODE 255: Performed by: INTERNAL MEDICINE

## 2020-09-10 PROCEDURE — 71260 CT THORAX DX C+: CPT

## 2020-09-10 PROCEDURE — 70491 CT SOFT TISSUE NECK W/DYE: CPT

## 2020-09-10 RX ADMIN — IOHEXOL 80 ML: 350 INJECTION, SOLUTION INTRAVENOUS at 16:43

## 2020-09-16 ENCOUNTER — HOSPITAL ENCOUNTER (OUTPATIENT)
Dept: RADIOLOGY | Facility: MEDICAL CENTER | Age: 80
End: 2020-09-16
Attending: INTERNAL MEDICINE
Payer: MEDICARE

## 2020-09-16 DIAGNOSIS — C34.11 SMALL CELL LUNG CANCER, RIGHT UPPER LOBE (HCC): ICD-10-CM

## 2020-09-16 PROCEDURE — 76536 US EXAM OF HEAD AND NECK: CPT

## 2020-09-23 NOTE — PROGRESS NOTES
"Pharmacy Chemotherapy Verification  Patient Name: Ml Chavira   Dx: NSCLC Stage IV/Radionecrosis      Protocol: Bevacizumab      *Dosing Reference*  Bevacizumab 5-10 mg/kg   Q2 weeks until disease progression or unacceptable toxicity  Ramiro MCKEON et al. Bevacizumab as a treatment for radiation necrosis of brain metastases post stereotactic radiosurgery. Neuro-Oncology. 2013;15(9):0104-4707.     Allergies:  Penicillins     /68   Pulse 73   Temp 36.4 °C (97.5 °F)   Resp 18   Ht 1.55 m (5' 1.02\")   Wt 64.4 kg (141 lb 15.6 oz)   SpO2 97%   BMI 26.81 kg/m²  Body surface area is 1.67 meters squared.    Labs 4/23/18  ANC~ 3380 Plt = 239k   Hgb = 14.5     Labs 4/30/18  SCr = 0.56 mg/dL CrCl ~ 65 mL/min   AST/ALT/AP = 39/34/75 TBili = 0.5   Urine protein = negative     Drug Order   (Drug name, dose, route, IV Fluid & volume, frequency, number of doses) Cycle 5 - delayed 1 week due to ingrown toenail procedure - PAPER ORDERS IN CHART  Previous treatment: 4/9/18   Medication = Bevacizumab (Avastin)  Base Dose = 10 mg/kg  Calc Dose: Base Dose x 64.4 kg = 644 mg  Final Dose = 700 mg   Route = IV  Fluid & Volume =  mL  Admin Duration = Over 60 min           Rounded to vial size(within 10%) per dose rounding protocol.   ok to treat with final dose     By my signature below, I confirm this process was performed independently with the BSA and all final chemotherapy dosing calculations congruent. I have reviewed the above chemotherapy order and that my calculation of the final dose and BSA (when applicable) corroborate those calculations of the  pharmacist. Discrepancies of 5% or greater in the written dose have been addressed and documented within the [facility] Progress notes.    Rose Watkins, PharmD, BCOP            "
"Pharmacy Chemotherapy calculation:    Patient Name: Ml Chavira  DX: NSCLC - radionecrosis    PAPER ORDERS IN CHART     Cycle 5 - delayed 1 week to allow adequate healing of ingrown toenail removal site   Previous treatment: 4/9/18    Protocol: Bevacizumab  *Dosing Reference*  Bevacizumab 5-10mg/kg q2 weeks until DP/UT  -- 10mg/kg per Dr. Lauren MCKEON, et al - Neuro-Oncology 15(9): 4525-3558     /68   Pulse 73   Temp 36.4 °C (97.5 °F)   Resp 18   Ht 1.55 m (5' 1.02\")   Wt 64.4 kg (141 lb 15.6 oz)   SpO2 97%   BMI 26.81 kg/m²   Body surface area is 1.67 meters squared.     04/09/18- q4 weeks  ANC ~ 4000 Plt = 282k Hgb - 13.6    4/30/18-  SCr = 0.56mg/dL CrCl ~ 65mL/min (min Cr 0.7)  LFT's = WNL TBili = 0.5     4/30/18- [id number]  Urine protein = negative     BP = 133/68      Bevacizumab (Avastin) 10mg/kg x 64.4kg = 644mg    Rounded to vial size(within 10%) per dose rounding protocol.    ok to treat with final dose = 700 mg IV over 30min, 3rd dose and beyond      Carrie Little, PharmD    "
Chemotherapy Verification - PRIMARY RN      Height = 155 cm  Weight = 64.4 kg  BSA = 1.67 m2       Medication: Avastin  Dose: 10 mg/kg  Calculated Dose: 640 mg - rounded to 700 mg per [facility] protocol                              I confirm this process was performed independently with the BSA and all final chemotherapy dosing calculations congruent.  Any discrepancies of 5% or greater have been addressed with the chemotherapy pharmacist. The resolution of the discrepancy has been documented in the EPIC progress notes.       
Chemotherapy Verification - SECONDARY RN       Height = 155 cm  Weight = 64.4 kg  BSA = 1.67 m2       Medication: Avastin  Dose: 10 mg/kg  Calculated Dose: 644 mg                             (In mg/m2, AUC, mg/kg)     I confirm that this process was performed independently.   
Pt returns for C5 q2 week Avastin. Pt was delayed last week due to surgery on her R foot for an ingrown toenail. Pt had labs done prior to today's visit, results reviewed and WNL for chemo to proceed. IV already in place from appt with MD. IV flushed and blood return observed. Avastin infused over 30 min as ordered. Pt malik well. Line flushed clear. IV flushed and removed. Pressure dressing applied. Pt knows to return on 5/15. Will be seeing her podiatrist tomorrow to evaluate healing of toe. Pt discharged home under self care in no apparent distress.   
ADMIT

## 2020-09-30 ENCOUNTER — APPOINTMENT (RX ONLY)
Dept: URBAN - METROPOLITAN AREA CLINIC 20 | Facility: CLINIC | Age: 80
Setting detail: DERMATOLOGY
End: 2020-09-30

## 2020-09-30 DIAGNOSIS — Z41.9 ENCOUNTER FOR PROCEDURE FOR PURPOSES OTHER THAN REMEDYING HEALTH STATE, UNSPECIFIED: ICD-10-CM

## 2020-09-30 PROCEDURE — ? FRAXEL

## 2020-09-30 ASSESSMENT — LOCATION SIMPLE DESCRIPTION DERM
LOCATION SIMPLE: CHIN
LOCATION SIMPLE: RIGHT CHEEK
LOCATION SIMPLE: LEFT CHEEK
LOCATION SIMPLE: SUPERIOR FOREHEAD

## 2020-09-30 ASSESSMENT — LOCATION DETAILED DESCRIPTION DERM
LOCATION DETAILED: RIGHT INFERIOR CENTRAL MALAR CHEEK
LOCATION DETAILED: LEFT INFERIOR CENTRAL MALAR CHEEK
LOCATION DETAILED: RIGHT CHIN
LOCATION DETAILED: SUPERIOR MID FOREHEAD

## 2020-09-30 ASSESSMENT — LOCATION ZONE DERM: LOCATION ZONE: FACE

## 2020-09-30 NOTE — PROCEDURE: FRAXEL
Price (Use Numbers Only, No Special Characters Or $): 600.00
Number Of Passes: 6
Number Of Passes: 4
Energy(Mj/Cm2): 20
Energy(Mj/Cm2): 1
Location: full face
Depth In Microns (Use Numbers Only, No Special Characters Or $): 6764
Total Coverage: 26%
Detail Level: Detailed
Wavelength: 1550nm
Indication: photodamage
Consent: Written consent obtained, risks reviewed including but not limited to pain and incomplete improvement.
Location: neck
Location: Use Location Override
Add Post-Care Below To The Note: No
Wavelength: 1927nm
Total Coverage: 45%
Location Override: Perioral
Location: full face except eyelids
Topical Anesthesia Type: 23% Lidocaine 7% Tetracaine
Post-Care Instructions: I reviewed with the patient in detail post-care instructions. Patient should avoid sun until area fully healed.
Treatment Level: 5
Energy(Mj/Cm2): 50
Treatment Level: 9
Length Of Topical Anesthesia Application (Optional): 60 minutes

## 2020-10-01 DIAGNOSIS — R05.9 COUGH IN ADULT: ICD-10-CM

## 2020-10-01 RX ORDER — ALBUTEROL SULFATE 90 UG/1
AEROSOL, METERED RESPIRATORY (INHALATION)
Qty: 1 EACH | Refills: 1 | Status: SHIPPED | OUTPATIENT
Start: 2020-10-01 | End: 2021-01-11 | Stop reason: SDUPTHER

## 2020-10-05 ENCOUNTER — TELEPHONE (OUTPATIENT)
Dept: MEDICAL GROUP | Facility: IMAGING CENTER | Age: 80
End: 2020-10-05

## 2020-10-05 NOTE — TELEPHONE ENCOUNTER
Pt calling with complaints of acid reflux. Pt reports an increase in reflux. Reports acid coming up about once a week over the last few weeks. She has taken something for this in the past, but is not currently taking anything for this issue. Discussed foods to avoid, small more frequent meals, sitting up after meals. Pt inquiring about over the counter medications.

## 2020-10-06 ENCOUNTER — APPOINTMENT (RX ONLY)
Dept: URBAN - METROPOLITAN AREA CLINIC 20 | Facility: CLINIC | Age: 80
Setting detail: DERMATOLOGY
End: 2020-10-06

## 2020-10-06 NOTE — TELEPHONE ENCOUNTER
If this is only happening once a week I would recommend her taking over-the-counter Tums or Pepcid when she experiences acid reflux.  I have checked and there are no drug interactions with these over-the-counter medications.  She would just want to take all other medications away from these medications due to them altering her stomach acid and possibly affecting her absorbance of medication.  I would like to know though if she is experiencing this acid reflux after taking her Fosamax.  It is very important for her if she is taking her Fosamax to drink a minimum of 8 ounces of plain water before food and she would want to be sitting upright for greater than 30 minutes.  I would encourage her to reach out to the provider who is prescribing this medication to her to see if there is any possibility that she is experiencing side effects from the Fosamax.   Jahaira Santos, NICKY-C

## 2020-10-06 NOTE — TELEPHONE ENCOUNTER
Pt notified of armani's recommendation. She does not think it's related to the fosamax. She will try OTC remedies. She will notify us if it begins to occur more often.

## 2020-10-14 ENCOUNTER — HOSPITAL ENCOUNTER (OUTPATIENT)
Dept: RADIOLOGY | Facility: MEDICAL CENTER | Age: 80
End: 2020-10-14
Payer: MEDICARE

## 2020-10-16 ENCOUNTER — HOSPITAL ENCOUNTER (OUTPATIENT)
Dept: RADIOLOGY | Facility: MEDICAL CENTER | Age: 80
End: 2020-10-16
Attending: INTERNAL MEDICINE
Payer: MEDICARE

## 2020-10-16 ENCOUNTER — HOSPITAL ENCOUNTER (OUTPATIENT)
Dept: RADIOLOGY | Facility: MEDICAL CENTER | Age: 80
End: 2020-10-16
Attending: NURSE PRACTITIONER
Payer: MEDICARE

## 2020-10-16 DIAGNOSIS — E55.9 VITAMIN D DEFICIENCY: ICD-10-CM

## 2020-10-16 DIAGNOSIS — M81.0 OSTEOPOROSIS, UNSPECIFIED OSTEOPOROSIS TYPE, UNSPECIFIED PATHOLOGICAL FRACTURE PRESENCE: ICD-10-CM

## 2020-10-16 DIAGNOSIS — E04.2 MULTINODULAR GOITER: ICD-10-CM

## 2020-10-16 DIAGNOSIS — E05.90 HYPERTHYROIDISM: ICD-10-CM

## 2020-10-16 DIAGNOSIS — Z79.899 HIGH RISK MEDICATION USE: ICD-10-CM

## 2020-10-16 DIAGNOSIS — Z12.31 VISIT FOR SCREENING MAMMOGRAM: ICD-10-CM

## 2020-10-16 PROCEDURE — 77067 SCR MAMMO BI INCL CAD: CPT

## 2020-10-16 PROCEDURE — 77080 DXA BONE DENSITY AXIAL: CPT

## 2020-10-20 ENCOUNTER — HOSPITAL ENCOUNTER (OUTPATIENT)
Dept: LAB | Facility: MEDICAL CENTER | Age: 80
End: 2020-10-20
Attending: INTERNAL MEDICINE
Payer: MEDICARE

## 2020-10-20 ENCOUNTER — HOSPITAL ENCOUNTER (EMERGENCY)
Facility: MEDICAL CENTER | Age: 80
End: 2020-10-20
Attending: EMERGENCY MEDICINE
Payer: MEDICARE

## 2020-10-20 VITALS
SYSTOLIC BLOOD PRESSURE: 131 MMHG | RESPIRATION RATE: 20 BRPM | TEMPERATURE: 97.5 F | BODY MASS INDEX: 29.47 KG/M2 | DIASTOLIC BLOOD PRESSURE: 79 MMHG | HEIGHT: 61 IN | WEIGHT: 156.09 LBS | OXYGEN SATURATION: 93 % | HEART RATE: 70 BPM

## 2020-10-20 DIAGNOSIS — H11.31 SUBCONJUNCTIVAL HEMORRHAGE OF RIGHT EYE: ICD-10-CM

## 2020-10-20 DIAGNOSIS — E55.9 VITAMIN D DEFICIENCY: ICD-10-CM

## 2020-10-20 DIAGNOSIS — M81.0 OSTEOPOROSIS, UNSPECIFIED OSTEOPOROSIS TYPE, UNSPECIFIED PATHOLOGICAL FRACTURE PRESENCE: ICD-10-CM

## 2020-10-20 DIAGNOSIS — E05.90 HYPERTHYROIDISM: ICD-10-CM

## 2020-10-20 DIAGNOSIS — E04.2 MULTINODULAR GOITER: ICD-10-CM

## 2020-10-20 DIAGNOSIS — Z79.899 HIGH RISK MEDICATION USE: ICD-10-CM

## 2020-10-20 LAB
25(OH)D3 SERPL-MCNC: 35 NG/ML (ref 30–100)
ALBUMIN SERPL BCP-MCNC: 4.3 G/DL (ref 3.2–4.9)
ALBUMIN/GLOB SERPL: 1.4 G/DL
ALP SERPL-CCNC: 58 U/L (ref 30–99)
ALT SERPL-CCNC: 27 U/L (ref 2–50)
ANION GAP SERPL CALC-SCNC: 10 MMOL/L (ref 7–16)
AST SERPL-CCNC: 19 U/L (ref 12–45)
BILIRUB SERPL-MCNC: 0.4 MG/DL (ref 0.1–1.5)
BUN SERPL-MCNC: 16 MG/DL (ref 8–22)
CALCIUM SERPL-MCNC: 9.9 MG/DL (ref 8.5–10.5)
CHLORIDE SERPL-SCNC: 100 MMOL/L (ref 96–112)
CO2 SERPL-SCNC: 28 MMOL/L (ref 20–33)
CREAT SERPL-MCNC: 0.7 MG/DL (ref 0.5–1.4)
GLOBULIN SER CALC-MCNC: 3 G/DL (ref 1.9–3.5)
GLUCOSE SERPL-MCNC: 87 MG/DL (ref 65–99)
POTASSIUM SERPL-SCNC: 4.2 MMOL/L (ref 3.6–5.5)
PROT SERPL-MCNC: 7.3 G/DL (ref 6–8.2)
PTH-INTACT SERPL-MCNC: 28.2 PG/ML (ref 14–72)
SODIUM SERPL-SCNC: 138 MMOL/L (ref 135–145)
T3FREE SERPL-MCNC: 3.55 PG/ML (ref 2–4.4)
T4 FREE SERPL-MCNC: 1.21 NG/DL (ref 0.93–1.7)
TSH SERPL DL<=0.005 MIU/L-ACNC: 0.12 UIU/ML (ref 0.38–5.33)

## 2020-10-20 PROCEDURE — 82306 VITAMIN D 25 HYDROXY: CPT

## 2020-10-20 PROCEDURE — 84445 ASSAY OF TSI GLOBULIN: CPT

## 2020-10-20 PROCEDURE — 84439 ASSAY OF FREE THYROXINE: CPT

## 2020-10-20 PROCEDURE — 84443 ASSAY THYROID STIM HORMONE: CPT

## 2020-10-20 PROCEDURE — 80053 COMPREHEN METABOLIC PANEL: CPT

## 2020-10-20 PROCEDURE — 83520 IMMUNOASSAY QUANT NOS NONAB: CPT

## 2020-10-20 PROCEDURE — 36415 COLL VENOUS BLD VENIPUNCTURE: CPT

## 2020-10-20 PROCEDURE — 83970 ASSAY OF PARATHORMONE: CPT

## 2020-10-20 PROCEDURE — 84481 FREE ASSAY (FT-3): CPT

## 2020-10-20 PROCEDURE — 99284 EMERGENCY DEPT VISIT MOD MDM: CPT

## 2020-10-20 ASSESSMENT — FIBROSIS 4 INDEX: FIB4 SCORE: 1.44

## 2020-10-20 ASSESSMENT — ENCOUNTER SYMPTOMS
BLURRED VISION: 0
EYE REDNESS: 1
EYE PAIN: 0

## 2020-10-21 NOTE — ED PROVIDER NOTES
ED Provider Note    Scribed for Elena Newby M.D. by Andres Dunbar. 10/20/2020, 8:56 PM.    Primary care provider: SONIA Fry  Means of arrival: Walk-in  History obtained from: Patient  History limited by: None    CHIEF COMPLAINT  Chief Complaint   Patient presents with   • Eye Pain     pt reports R eye redness on sclera this morning.  Pt denies HTN or injury/trauma to the eye.  Pt does not take blood thinners.       HPI  Ml Chavira is a 80 y.o. female who presents to the Emergency Department mild right eye redness onset earlier today. Patient states that she was completely unaware of the redness until someone told her this afternoon. She denies any associated right eye pain or visual disturbances. She is followed by her ophthalmologist, Dr. Willingham.    REVIEW OF SYSTEMS  Review of Systems   Eyes: Positive for redness. Negative for blurred vision and pain.     PAST MEDICAL HISTORY   has a past medical history of Breast cancer (HCC), Cancer (HCC), Chickenpox, [place] measles, Healthcare maintenance (2018), Influenza, Joint pain, Lung cancer (HCC), Mumps, Need for vaccination (2019), Radionecrosis (2018), Sick sinus syndrome (HCC), Thyroid disease, and Tonsillitis.    SURGICAL HISTORY   has a past surgical history that includes craniotomy stealth (Right, 2015); other; appendectomy; knee arthroscopy; craniotomy; tonsillectomy; pacemaker insertion; and lumpectomy.    SOCIAL HISTORY  Social History     Tobacco Use   • Smoking status: Former Smoker     Packs/day: 2.00     Years: 20.00     Pack years: 40.00     Types: Cigarettes     Quit date:      Years since quittin.8   • Smokeless tobacco: Never Used   Substance Use Topics   • Alcohol use: Yes     Alcohol/week: 1.8 oz     Types: 3 Glasses of wine per week     Frequency: 2-3 times a week     Drinks per session: 1 or 2     Binge frequency: Never   • Drug use: No      Social History     Substance and Sexual Activity  "  Drug Use No       FAMILY HISTORY  Family History   Problem Relation Age of Onset   • Dementia Mother    • Diabetes Mother    • Other Mother         osteoarthritis   • Arthritis Mother    • Autoimmune Disease Father         Rheumatoid arthritis   • Arthritis Father    • Cancer Brother         prostate        CURRENT MEDICATIONS  Current Outpatient Medications   Medication Instructions   • albuterol 108 (90 Base) MCG/ACT Aero Soln inhalation aerosol INHALE 2 PUFFS BY MOUTH EVERY 4 HOURS AS NEEDED FOR SHORTNESS OF BREATH   • alendronate (FOSAMAX) 70 mg, Oral, EVERY 7 DAYS   • aspirin 81 mg, Oral, DAILY   • flecainide (TAMBOCOR) 150 MG Tab TAKE 1 TABLET BY MOUTH EVERY 12 HOURS   • furosemide (LASIX) 20 MG Tab TAKE 1 TABLET BY MOUTH EVERY DAY IN THE MORNING   • levETIRAcetam (KEPPRA) 500 mg, Oral, 2 TIMES DAILY   • methimazole (TAPAZOLE) 2.5 mg, Oral, DAILY   • metoprolol SR (TOPROL XL) 25 mg, Oral, EVERY DAY   • metronidazole (METROCREAM) 0.75 % cream APPLY TO FACE EVERYDAY ONCE TO TWICE A DAY FOR ROSACEA   • Misc. Devices ([employer] PULSE OXIMETER) Misc 1 Each, Does not apply, 4 TIMES DAILY PRN   • OYSCO 500 500 MG Tab 500 mg.   • potassium chloride ER (KLOR-CON) 10 MEQ tablet 10 mEq, Oral, DAILY   • Spacer/Aero-Holding Chambers (KRYSTIANHAMEDY FRENCH) Misc USE AS DIRECTED   • Vitamin D3 2,000 Units, Oral        ALLERGIES  Allergies   Allergen Reactions   • Penicillins Rash and Itching     RXN \"a long time ago\"  Other reaction(s): Rash   • Atorvastatin      Causes low quality       PHYSICAL EXAM  VITAL SIGNS: /65   Pulse 73   Temp 36.3 °C (97.4 °F) (Temporal)   Resp 16   Ht 1.549 m (5' 1\")   Wt 70.8 kg (156 lb 1.4 oz)   SpO2 92%   BMI 29.49 kg/m²   Vitals reviewed by myself.  Physical Exam  Nursing note and vitals reviewed.  Constitutional: Well-developed and well-nourished. No acute distress.   HENT: Head is normocephalic and atraumatic.  Eyes: Right eye has subconjunctival hemorrhage. EOMI. no pain with " extraocular movements, no periorbital erythema or swelling  Cardiovascular: Normal rate and regular rhythm. No murmur heard.  Pulmonary/Chest: Breath sounds normal. No wheezes or rales.   Musculoskeletal: Extremities exhibit normal range of motion without edema or tenderness.   Neurological: Awake and alert  Skin: Skin is warm and dry. No rash.     REASSESSMENT     8:56 PM - Patient seen and examined at bedside. Patient was informed she had a subconjunctival hemorrhage which would likely resolve on its own. She was recommended to follow up with her ophthalmologist. Return precautions were discussed with the patient and she was cleared for discharge at this time.     COURSE & MEDICAL DECISION MAKING  Nursing notes, VS, PMSFHx reviewed in chart.    Patient is a 80-year-old female who comes in for evaluation of red eye.  Physical exam is unremarkable, vitals are within normal limits.  Exam is consistent with a subconjunctival hemorrhage.  I advised patient on management of subconjunctival hemorrhage which will self resolve.  If she has any worsening symptoms or eye pain she should follow-up with her ophthalmologist or return to the ER immediately.  Patient is amenable to this plan.  She is then discharged in stable condition.    The patient will return for new or worsening symptoms and is stable at the time of discharge.    The patient is referred to a primary physician for blood pressure management, diabetic screening, and for all other preventative health concerns.      DISPOSITION:  Patient will be discharged home in stable condition.    FOLLOW UP:  Follow-up with your ophthalmologist Dr. Willingham if you start to have eye pain or visual disturbances              FINAL IMPRESSION  1. Subconjunctival hemorrhage of right eye          Andres CIFUENTES (Ravinder), am scribing for, and in the presence of, Elena Newby M.D..    Electronically signed by: Andres Dunbar (Ravinder), 10/20/2020    Elena CIFUENTES M.D.  personally performed the services described in this documentation, as scribed by Andres Dunbar in my presence, and it is both accurate and complete. E    The note accurately reflects work and decisions made by me.  Elena Newby M.D.  10/20/2020  10:07 PM

## 2020-10-23 LAB — TSI SER-ACNC: <0.1 IU/L

## 2020-10-26 LAB — TSH RECEP AB SER-ACNC: 1.07 IU/L

## 2020-10-27 ENCOUNTER — OFFICE VISIT (OUTPATIENT)
Dept: ENDOCRINOLOGY | Facility: MEDICAL CENTER | Age: 80
End: 2020-10-27
Attending: INTERNAL MEDICINE
Payer: MEDICARE

## 2020-10-27 VITALS
DIASTOLIC BLOOD PRESSURE: 70 MMHG | WEIGHT: 155.4 LBS | SYSTOLIC BLOOD PRESSURE: 126 MMHG | HEART RATE: 65 BPM | HEIGHT: 61 IN | BODY MASS INDEX: 29.34 KG/M2 | OXYGEN SATURATION: 95 %

## 2020-10-27 DIAGNOSIS — E05.90 HYPERTHYROIDISM: ICD-10-CM

## 2020-10-27 DIAGNOSIS — M81.0 OSTEOPOROSIS, UNSPECIFIED OSTEOPOROSIS TYPE, UNSPECIFIED PATHOLOGICAL FRACTURE PRESENCE: ICD-10-CM

## 2020-10-27 DIAGNOSIS — Z79.899 HIGH RISK MEDICATION USE: ICD-10-CM

## 2020-10-27 DIAGNOSIS — E55.9 VITAMIN D DEFICIENCY: ICD-10-CM

## 2020-10-27 DIAGNOSIS — E04.2 MULTINODULAR GOITER: ICD-10-CM

## 2020-10-27 PROCEDURE — 99211 OFF/OP EST MAY X REQ PHY/QHP: CPT | Performed by: INTERNAL MEDICINE

## 2020-10-27 PROCEDURE — 99214 OFFICE O/P EST MOD 30 MIN: CPT | Performed by: INTERNAL MEDICINE

## 2020-10-27 RX ORDER — ALENDRONATE SODIUM 70 MG/1
70 TABLET ORAL
Qty: 12 TAB | Refills: 2 | Status: SHIPPED | OUTPATIENT
Start: 2020-10-27 | End: 2020-12-30

## 2020-10-27 RX ORDER — METHIMAZOLE 5 MG/1
2.5 TABLET ORAL DAILY
Qty: 90 TAB | Refills: 1 | Status: SHIPPED | OUTPATIENT
Start: 2020-10-27 | End: 2021-04-27 | Stop reason: SDUPTHER

## 2020-10-27 ASSESSMENT — FIBROSIS 4 INDEX: FIB4 SCORE: 1.28

## 2020-10-27 NOTE — PROGRESS NOTES
Chief Complaint: Follow up for Hyperthyroidism secondary to Grave's disease, history of multinodular goiter, history of osteoporosis.      HPI:     Ml Chavira is a 80 y.o. female here for follow up of the above medical issues.    She has a history of hyperthyroidism secondary to probable Graves' disease based on high normal uptake on her thyroid uptake and scan from 2018 and also from elevated thyrotropin receptor antibodies.  There is also possibility that she has autonomous thyroid nodules.    Since last visit patient reports feeling excellent.  She remains on Methimazole 2.5 mg every other daily which has been her dose for the past 6 months.   She reports excellent compliance and denies missing any daily doses.       Weight has been stable.    She currently is doing well despite being subclinically hyperthyroid on her labs  She currently denies anxiousness, feeling excessive energy, tremulousness, palpitations, sweating    Her TSH is slightly low at 0.123 but with a normal free t4 1.21 and normal free T3 of 3.55 on 10/20/2020          She previously had a formal thyroid ultrasound on July 2019 which showed:   a dominant hypoechoic solid nodule measuring 2.1 cm in the right lower lobe TR 5 which was biopsied and proven benign  There was also another 1.7 cm hypoechoic solid nodule on the right lower lobe TR 5 which was biopsied and proven benign  She also has a 1.9 cm hypoechoic solid nodule on the left mid lobe TR 6 which was biopsied and proven benign  She underwent biopsies on August 22, 2019        She also has osteoporosis.   Her fracture risk is significantly high.   Her follow up DEXA however shows significant improvement in BMD for the spine and left hip.   Her DEXA on 10/16/2020 show the lowest T score of -2.4 for the left hip which is better compared to the DEXA in 2018         She denies interval falls and fractures.  She has been taking alendronate 70 mg weekly for the past 3 years and has  tolerated the medication well.   She takes calcium 1200mg daily with Vitamin D3 5000u daily  Her last vitamin D was adequate at 35 with normal intact PTH levels of 28 and calcium of 9.9 on October 2020        Patient's medications, allergies, and social histories were reviewed and updated as appropriate.      ROS:     CONS:     No fever, no chills   EYES:     No diplopia, no blurry vision   CV:           No chest pain, no palpitations   PULM:     No SOB, no cough, no hemoptysis.   GI:            No nausea, no vomiting, no diarrhea, no constipation   ENDO:     No polyuria, no polydipsia, no heat intolerance, no cold intolerance       Past Medical History:  Problem List:  2020-02: Abnormal CBC measurement  2019-12: At risk for falling  2019-12: Use of cane as ambulatory aid  2019-12: Lower extremity edema  2019-08: High risk medication use  2019-08: Multinodular goiter  2019-08: Vitamin D deficiency  2019-01: Osteoporosis  2019-01: Need for vaccination  2019-01: Hyperthyroidism  2019-01: Chronic atrial fibrillation (HCC)  2018-08: Postmenopausal  2018-08: Low TSH level  2018-07: High serum thyroid stimulating hormone (TSH)  2018-07: Healthcare maintenance  2018-07: Screening for osteoporosis  2018-07: Thyroid nodule  2018-07: Low vitamin D level  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  2018-06: Arthralgia of both knees  2018-06: Finger pain, right  2018-06: Balance disorder  2018-06: Acute cystitis  2018-05: Acute alcoholic gastritis without hemorrhage  2018-05: Pleuritic chest pain  2018-05: Sick sinus syndrome (HCC)  2018-04: Radionecrosis  2018-03: Malignant neoplasm of upper lobe of right lung (HCC)  2017-11: CVA (cerebral vascular accident) (HCC)  2016-01: Allergic rhinitis  2016-01: Hilar adenopathy  2016-01: History of breast cancer  2015-11: Lung mass  2015-11: Blurry vision, left eye  2015-11: Metastasis to brain (HCC)  2015-11: Metastatic cancer (CMS-HCC)      Past Surgical History:  Past Surgical History:  "  Procedure Laterality Date   • CRANIOTOMY STEALTH Right 2015    Procedure: CRANIOTOMY STEALTH-right occipital;  Surgeon: Suyapa Schumacher M.D.;  Location: SURGERY [facility];  Service:    • APPENDECTOMY     • CRANIOTOMY     • KNEE ARTHROSCOPY      left    • LUMPECTOMY     • OTHER      left knee surgery   • PACEMAKER INSERTION     • TONSILLECTOMY          Allergies:  Penicillins     Social History:  Social History     Tobacco Use   • Smoking status: Former Smoker     Packs/day: 2.00     Years: 20.00     Pack years: 40.00     Types: Cigarettes     Quit date:      Years since quittin.8   • Smokeless tobacco: Never Used   Substance Use Topics   • Alcohol use: Yes     Alcohol/week: 1.8 oz     Types: 3 Glasses of wine per week     Frequency: 2-3 times a week     Drinks per session: 1 or 2     Binge frequency: Never   • Drug use: No        Family History:   family history includes Arthritis in her father and mother; Autoimmune Disease in her father; Cancer in her brother; Dementia in her mother; Diabetes in her mother; Other in her mother.      PHYSICAL EXAM:   Vital signs: /70 (BP Location: Left arm, Patient Position: Sitting, BP Cuff Size: Large adult)   Pulse 65   Ht 1.549 m (5' 1\")   Wt 70.5 kg (155 lb 6.4 oz)   SpO2 95%   BMI 29.36 kg/m²   GENERAL: Well-developed, well-nourished in no apparent distress.   EYE:  No ocular asymmetry, PERRLA, No exophthalmos or lid lag  HENT: Pink, moist mucous membranes.    NECK: Thyroid is slightly enlarged and feels bosselated  CARDIOVASCULAR:  No murmurs  LUNGS: Clear breath sounds  ABDOMEN: Soft, nontender   EXTREMITIES: No clubbing, cyanosis, or edema.   NEUROLOGICAL: No gross focal motor abnormalities, No visible tremors with both hands  LYMPH: No cervical adenopathy palpated.   SKIN: No rashes, lesions.     Labs:  Lab Results   Component Value Date/Time    WBC 7.1 2020 12:37 PM    RBC 4.36 2020 12:37 PM    HEMOGLOBIN 14.8 2020 " 12:37 PM    .7 (H) 09/03/2020 12:37 PM    MCH 33.9 (H) 09/03/2020 12:37 PM    MCHC 31.8 (L) 09/03/2020 12:37 PM    RDW 53.2 (H) 09/03/2020 12:37 PM    MPV 9.4 09/03/2020 12:37 PM       Lab Results   Component Value Date/Time    SODIUM 138 10/20/2020 10:07 AM    POTASSIUM 4.2 10/20/2020 10:07 AM    CHLORIDE 100 10/20/2020 10:07 AM    CO2 28 10/20/2020 10:07 AM    ANION 10.0 10/20/2020 10:07 AM    GLUCOSE 87 10/20/2020 10:07 AM    BUN 16 10/20/2020 10:07 AM    CREATININE 0.70 10/20/2020 10:07 AM    CALCIUM 9.9 10/20/2020 10:07 AM    ASTSGOT 19 10/20/2020 10:07 AM    ALTSGPT 27 10/20/2020 10:07 AM    TBILIRUBIN 0.4 10/20/2020 10:07 AM    ALBUMIN 4.3 10/20/2020 10:07 AM    TOTPROTEIN 7.3 10/20/2020 10:07 AM    GLOBULIN 3.0 10/20/2020 10:07 AM    AGRATIO 1.4 10/20/2020 10:07 AM       Lab Results   Component Value Date/Time    TSHULTRASEN 0.540 01/22/2020 1402     Lab Results   Component Value Date/Time    FREET4 0.97 01/22/2020 1402     Lab Results   Component Value Date/Time    FREET3 3.71 01/22/2020 1402     No results found for: THYSTIMIG      Imaging: see DEXA date 9/2018, see thyroid uptake and scan 10/2018      ASSESSMENT/PLAN:     1. Hyperthyroidism  Well-controlled  Even though she is subclinically hyperthyroid she is asymptomatic and her energy is much better  Continue methimazole 2.5 mg EVERY OTHER DAY  Reviewed importance of adherence  We will plan for follow-up in 6 months with repeat of TSH, free T4 and free T3 levels      2. Multinodular goiter  Stable  Multiple benign dominant solid nodules measuring more than 1.5 cm with TR 5 and TR 6 scores noted  Biopsies are benign  Recommend observation  We will plan to repeat ultrasound in 6-12 months      3. Osteoporosis, unspecified osteoporosis type, unspecified pathological fracture presence  Stable  Her bone density is significantly better  Patient has no interval falls or fractures  Continue alendronate 70 mg weekly  Reviewed importance of  adherence  Advised patient to ensure adequate intake of calcium and vitamin D from diet  Recommend daily weightbearing exercise  Bone density should be repeated on Oct  2022        4. Vitamin D deficiency  Stable  Last vitamin D was fair at 35  Recommend that she take over-the-counter vitamin D3 5000 units daily  Recommend that she take calcium 600 mg once or twice daily  We will repeat calcium and 25-hydroxy vitamin D levels in 6 months      5. High risk medication use  Patient is taking methimazole which is high risk medication      Return in about 6 months (around 4/27/2021).      Thank you kindly for allowing me to participate in the thyroid care plan for this patient.    Abhijit Joseph MD, FACE, ECNU  01/28/20    CC:   [email or web address]

## 2020-11-16 ENCOUNTER — TELEPHONE (OUTPATIENT)
Dept: CARDIOLOGY | Facility: MEDICAL CENTER | Age: 80
End: 2020-11-16

## 2020-11-20 ENCOUNTER — NON-PROVIDER VISIT (OUTPATIENT)
Dept: CARDIOLOGY | Facility: MEDICAL CENTER | Age: 80
End: 2020-11-20
Payer: MEDICARE

## 2020-11-20 DIAGNOSIS — I49.5 SICK SINUS SYNDROME (HCC): ICD-10-CM

## 2020-11-20 DIAGNOSIS — Z95.0 CARDIAC PACEMAKER IN SITU: ICD-10-CM

## 2020-11-20 PROCEDURE — 93296 REM INTERROG EVL PM/IDS: CPT | Performed by: INTERNAL MEDICINE

## 2020-12-16 DIAGNOSIS — R60.0 BILATERAL LEG EDEMA: ICD-10-CM

## 2020-12-16 RX ORDER — POTASSIUM CHLORIDE 750 MG/1
TABLET, FILM COATED, EXTENDED RELEASE ORAL
Qty: 90 TAB | Refills: 1 | Status: SHIPPED | OUTPATIENT
Start: 2020-12-16 | End: 2021-03-22

## 2020-12-16 RX ORDER — FUROSEMIDE 20 MG/1
TABLET ORAL
Qty: 90 TAB | Refills: 1 | Status: SHIPPED | OUTPATIENT
Start: 2020-12-16 | End: 2021-03-22

## 2020-12-17 ENCOUNTER — TELEPHONE (OUTPATIENT)
Dept: MEDICAL GROUP | Facility: IMAGING CENTER | Age: 80
End: 2020-12-17

## 2020-12-17 NOTE — TELEPHONE ENCOUNTER
Msg x2 received from pt to see if she should get tested for covid 19 due to chills, fatigue, runny nose that began on 12/14/20. Attempted to call pt back and left voicemail.

## 2020-12-17 NOTE — TELEPHONE ENCOUNTER
Spoke with patient. Her residence is recommended that she contact her pcp regardin testing. In addition to symptoms below, pt has a mild cough. Denies any immediate distress. Set up for virtual visit for 12/18/20.

## 2020-12-18 ENCOUNTER — TELEMEDICINE (OUTPATIENT)
Dept: MEDICAL GROUP | Facility: IMAGING CENTER | Age: 80
End: 2020-12-18
Payer: MEDICARE

## 2020-12-18 VITALS
BODY MASS INDEX: 28.32 KG/M2 | WEIGHT: 150 LBS | HEART RATE: 72 BPM | OXYGEN SATURATION: 92 % | SYSTOLIC BLOOD PRESSURE: 102 MMHG | HEIGHT: 61 IN | DIASTOLIC BLOOD PRESSURE: 58 MMHG

## 2020-12-18 DIAGNOSIS — Z20.822 SUSPECTED COVID-19 VIRUS INFECTION: ICD-10-CM

## 2020-12-18 DIAGNOSIS — R05.9 COUGH: ICD-10-CM

## 2020-12-18 PROCEDURE — 99213 OFFICE O/P EST LOW 20 MIN: CPT | Mod: CS,95,CR | Performed by: NURSE PRACTITIONER

## 2020-12-18 ASSESSMENT — PAIN SCALES - GENERAL: PAINLEVEL: NO PAIN

## 2020-12-18 ASSESSMENT — FIBROSIS 4 INDEX: FIB4 SCORE: 1.28

## 2020-12-18 NOTE — PROGRESS NOTES
Virtual Visit: Established Patient   This visit was conducted via Zoom using secure and encrypted videoconferencing technology. The patient was in a private location in the state of [place].    The patient's identity was confirmed and verbal consent was obtained for this virtual visit.  Patient is aware that this is a billable encounter.  Subjective:   CC:   Chief Complaint   Patient presents with   • Coronavirus Screening   • Fatigue     started monday   • Cough     off and on, worse today      Ml Chavira is a 80 y.o. female presenting for evaluation and management of:    States on Monday she started feeling fatigued and tired.  States that she continued to have body aches that have now resolved.  States she continues to feel fatigued and is sleeping a lot.  States that she has a mild nonproductive cough due to runny nose.  Denies any known Covid positive contacts, but lives in a assisted living environment.  States that she has not had a recent Covid test at this time.  Denies any fever, chills, N/V/D/C, SOB, rash, decreased taste and smell, and/or sore throat at this time.    States that her previously reported edema of her neck and shoulder has not resolved but she has followed up with oncology and is believed that it is just a fat deposit.  Denies any concerns at this time.    ROS:  Constitutional: Denies fever, chills, night sweats, weight loss/gain. Malaise/fatigue, see HPI.  HENT: Denies nasal congestion, sore throat, hearing loss, enlarged thyroid, or difficulty swallowing.  Rhinorrhea, see HPI.  Eyes: Denies changes in vision, pain. Wears corrective wear. Since removal brain tumor in 2015, pt. reports that she has had decreased peripheral vision, worse in left eye. Does not drive.   Respiratory: Denies SOB at rest or activity.  Cough, see HPI.  Cardiovascular: Denies tachycardia, chest pain, or palpitations. History of lower leg swelling, managed with intermittent use of Lasix. Pt. has a pace maker  "due to the history of sick sinus syndrome.  Gastrointestinal: Denies N/V/C/D, abdominal pain, loss appetite, reflux, or hematochezia.  Genitourinary: Denies difficulty voiding, dysuria, nocturia, or hematuria.   Skin: Negative for rash or worrisome moles. Edema of neck and shoulders, see HPI.  Neurological: Negative for dizziness, focal weakness and headaches. Denies any recent falls.   Endo/Heme/Allergies: Denies bruise/bleed easily, allergies.   Psychiatric/Behavioral: Denies depression, nervous/anxious, difficulty sleeping.    Allergies   Allergen Reactions   • Penicillins Rash and Itching     RXN \"a long time ago\"  Other reaction(s): Rash   • Atorvastatin      Causes low quality     Current medicines (including changes today)  No current facility-administered medications for this visit.      No current outpatient medications on file.     Facility-Administered Medications Ordered in Other Visits   Medication Dose Route Frequency Provider Last Rate Last Admin   • albuterol inhaler 2 Puff  2 Puff Inhalation Q4HRS PRN Bradley Restrepo D.O.       • flecainide (TAMBOCOR) tablet 150 mg  150 mg Oral TWICE DAILY Bradley Restrepo, D.O.   150 mg at 12/21/20 0539   • furosemide (LASIX) tablet 20 mg  20 mg Oral QAM Bradley Restrepo D.O.   20 mg at 12/21/20 0540   • levETIRAcetam (KEPPRA) tablet 500 mg  500 mg Oral BID Bradley Restrepo D.O.   500 mg at 12/21/20 0539   • methimazole (TAPAZOLE) tablet 2.5 mg  2.5 mg Oral DAILY Bradley Restrepo, D.O.   2.5 mg at 12/21/20 0540   • metoprolol SR (TOPROL XL) tablet 25 mg  25 mg Oral QDAY Bradley Restrepo D.O.   25 mg at 12/21/20 0539   • senna-docusate (PERICOLACE or SENOKOT S) 8.6-50 MG per tablet 2 Tab  2 Tab Oral BID Bradley Restrepo D.O.   2 Tab at 12/21/20 0539    And   • polyethylene glycol/lytes (MIRALAX) PACKET 1 Packet  1 Packet Oral QDAY PRN Bradley Restrepo D.O.        And   • magnesium hydroxide (MILK OF MAGNESIA) suspension 30 mL  30 mL Oral QDAY PRN " LINDA Lindsey.LAN.        And   • bisacodyl (DULCOLAX) suppository 10 mg  10 mg Rectal QDAY PRN LINDA Lindsey.O.       • enoxaparin (LOVENOX) inj 40 mg  40 mg Subcutaneous DAILY Bradley Restrepo D.O.   40 mg at 12/21/20 0539   • acetaminophen (Tylenol) tablet 650 mg  650 mg Oral Q6HRS PRN Bradley Restrepo D.O.       • ondansetron (ZOFRAN) syringe/vial injection 4 mg  4 mg Intravenous Q6HRS PRN Bradley Restrepo D.O.       • ondansetron (ZOFRAN ODT) dispertab 4 mg  4 mg Oral Q6HRS PRN LINDA Lindsey.O.       • labetalol (NORMODYNE/TRANDATE) injection 10 mg  10 mg Intravenous Q6HRS PRN Bradley Restrepo D.O.       • guaiFENesin dextromethorphan (ROBITUSSIN DM) 100-10 MG/5ML syrup 10 mL  10 mL Oral Q6HRS PRN Bradley Restrepo D.O.       • benzonatate (TESSALON) capsule 100 mg  100 mg Oral TID PRN LINDA Lindsey.O.       • dexamethasone (DECADRON) tablet 6 mg  6 mg Oral DAILY Bradley Restrepo D.O.   6 mg at 12/21/20 0540     Patient Active Problem List    Diagnosis Date Noted   • Acute respiratory failure with hypoxia (HCC) 12/20/2020     Priority: High   • Sick sinus syndrome (HCC) 05/31/2018     Priority: High   • Pneumonia due to COVID-19 virus 12/20/2020   • Abnormal CBC measurement 02/06/2020   • At risk for falling 12/27/2019   • Use of cane as ambulatory aid 12/27/2019   • Lower extremity edema 12/27/2019   • High risk medication use 08/27/2019   • Multinodular goiter 08/15/2019   • Vitamin D deficiency 08/15/2019   • Osteoporosis 01/17/2019   • Hyperthyroidism 01/17/2019   • Chronic atrial fibrillation (HCC) 01/17/2019   • Postmenopausal 08/27/2018   • 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   • Balance disorder 06/21/2018   • Radionecrosis 04/14/2018   • Malignant neoplasm of upper lobe of right lung (HCC) 03/05/2018   • CVA (cerebral vascular accident) (HCC) 11/29/2017   • Allergic rhinitis 01/12/2016   • Hilar adenopathy 01/12/2016   • History of breast cancer 01/12/2016   • Lung  "mass 11/19/2015   • Blurry vision, left eye 11/19/2015   • Metastasis to brain (HCC) 11/18/2015   • Metastatic cancer (CMS-HCC) 11/18/2015     Family History   Problem Relation Age of Onset   • Dementia Mother    • Diabetes Mother    • Other Mother         osteoarthritis   • Arthritis Mother    • Autoimmune Disease Father         Rheumatoid arthritis   • Arthritis Father    • Cancer Brother         prostate      She  has a past medical history of Breast cancer (HCC), Cancer (HCC), Chickenpox, Kiswahili measles, Healthcare maintenance (7/23/2018), Influenza, Joint pain, Lung cancer (HCC), Mumps, Need for vaccination (1/17/2019), Radionecrosis (4/14/2018), Sick sinus syndrome (HCC), Thyroid disease, and Tonsillitis.  She  has a past surgical history that includes craniotomy stealth (Right, 11/23/2015); other; appendectomy; knee arthroscopy; craniotomy; tonsillectomy; pacemaker insertion; and lumpectomy.     Objective:   /58   Pulse 72   Ht 1.549 m (5' 1\")   Wt 68 kg (150 lb)   SpO2 92%   BMI 28.34 kg/m²   Respiratory rate observed during visit: 16 bpm    Physical Exam:  Constitutional: Alert, no distress, well-groomed, fatigued appearing.  Skin: No rashes in visible areas.  Eye: Round. Conjunctiva clear, lids normal. No icterus.   ENMT: Lips pink without lesions, good dentition, moist mucous membranes. Phonation is nasally.  Neck: No masses, no thyromegaly. Moves freely without pain.  Respiratory: Unlabored respiratory effort, no cough or audible wheeze  Psych: Alert and oriented x3, normal affect and mood.     Assessment and Plan:   1. Suspected COVID-19 virus infection  2. Cough  This is a stable condition.  Discussed with patient obtaining chest x-ray if symptoms worsen over the weekend and/or if she has any concerns due to health history, verbalized understanding and willingness.  Discussed with patient that PCP has a low threshold for her to start a Z-Gm if symptoms worsen due to health history, " verbalized understanding.  Encouraged to increase or maintain hydration, receive plenty of rest, and take tylenol (500 mg to 1000 mg every 6 hours as tolerated, not to exceed 4g in 24 hours) for discomfort as tolerated. Encouraged patient to wash hands regularly and avoid sick contacts while supporting immune system with Vitamin C and Zinc.  Instructed patient to social isolate while waiting to obtain COVID-19 testing and while waiting for test results.  Discussed with patient if her test is positive for COVID-19 she will need to social isolate for 14 days from onset of illness.  Discussed with patient if her COVID-19 test is negative she will need to social isolate for 7 days from onset and/or 3 days from last fever, whichever is longer.  Verbalized understanding.  Discussed drinking room temperature water and/or hot teas with slippery elm, licorice root and/or marshmallow root to soothe throat and cough, verbalized understanding. Discussed avoiding cold water at this time, verbalized understanding. Discussed with patient using albuterol inhaler intermittent as needed up to 2 puffs every 4-6 hours for SOB or cough.  Discussed possible side effects of use of albuterol inhaler, verbalized understanding.  Patient instructed to seek emergency services if her symptoms worsen and/or she has any concerns and is unable to manage her care at home.  Instructed patient to return to clinic if she continues to have symptoms past 10 days.    - DX-CHEST-2 VIEWS; Future  - COVID/SARS COV-2 PCR; Future    Follow-up: Return if symptoms worsen or fail to improve within 10 days.

## 2020-12-20 ENCOUNTER — APPOINTMENT (OUTPATIENT)
Dept: RADIOLOGY | Facility: MEDICAL CENTER | Age: 80
DRG: 177 | End: 2020-12-20
Attending: EMERGENCY MEDICINE
Payer: MEDICARE

## 2020-12-20 ENCOUNTER — HOSPITAL ENCOUNTER (INPATIENT)
Facility: MEDICAL CENTER | Age: 80
LOS: 9 days | DRG: 177 | End: 2020-12-29
Attending: EMERGENCY MEDICINE | Admitting: STUDENT IN AN ORGANIZED HEALTH CARE EDUCATION/TRAINING PROGRAM
Payer: MEDICARE

## 2020-12-20 DIAGNOSIS — Z85.3 HISTORY OF BREAST CANCER: ICD-10-CM

## 2020-12-20 DIAGNOSIS — J12.82 PNEUMONIA DUE TO COVID-19 VIRUS: ICD-10-CM

## 2020-12-20 DIAGNOSIS — U07.1 PNEUMONIA DUE TO COVID-19 VIRUS: ICD-10-CM

## 2020-12-20 DIAGNOSIS — I49.5 SICK SINUS SYNDROME (HCC): ICD-10-CM

## 2020-12-20 DIAGNOSIS — J96.01 ACUTE RESPIRATORY FAILURE WITH HYPOXIA (HCC): ICD-10-CM

## 2020-12-20 LAB
ALBUMIN SERPL BCP-MCNC: 3.4 G/DL (ref 3.2–4.9)
ALBUMIN/GLOB SERPL: 1.2 G/DL
ALP SERPL-CCNC: 40 U/L (ref 30–99)
ALT SERPL-CCNC: 39 U/L (ref 2–50)
ANION GAP SERPL CALC-SCNC: 7 MMOL/L (ref 7–16)
APTT PPP: 32.7 SEC (ref 24.7–36)
AST SERPL-CCNC: 38 U/L (ref 12–45)
BASOPHILS # BLD AUTO: 0.3 % (ref 0–1.8)
BASOPHILS # BLD: 0.01 K/UL (ref 0–0.12)
BILIRUB SERPL-MCNC: 0.6 MG/DL (ref 0.1–1.5)
BUN SERPL-MCNC: 14 MG/DL (ref 8–22)
CALCIUM SERPL-MCNC: 8.3 MG/DL (ref 8.5–10.5)
CHLORIDE SERPL-SCNC: 100 MMOL/L (ref 96–112)
CK SERPL-CCNC: 70 U/L (ref 0–154)
CO2 SERPL-SCNC: 24 MMOL/L (ref 20–33)
COVID ORDER STATUS COVID19: NORMAL
CREAT SERPL-MCNC: 0.64 MG/DL (ref 0.5–1.4)
D DIMER PPP IA.FEU-MCNC: 1.7 UG/ML (FEU) (ref 0–0.5)
EOSINOPHIL # BLD AUTO: 0 K/UL (ref 0–0.51)
EOSINOPHIL NFR BLD: 0 % (ref 0–6.9)
ERYTHROCYTE [DISTWIDTH] IN BLOOD BY AUTOMATED COUNT: 50.2 FL (ref 35.9–50)
FLUAV RNA SPEC QL NAA+PROBE: NEGATIVE
FLUBV RNA SPEC QL NAA+PROBE: NEGATIVE
GLOBULIN SER CALC-MCNC: 2.8 G/DL (ref 1.9–3.5)
GLUCOSE SERPL-MCNC: 105 MG/DL (ref 65–99)
HCT VFR BLD AUTO: 38.6 % (ref 37–47)
HGB BLD-MCNC: 12.6 G/DL (ref 12–16)
IMM GRANULOCYTES # BLD AUTO: 0.02 K/UL (ref 0–0.11)
IMM GRANULOCYTES NFR BLD AUTO: 0.5 % (ref 0–0.9)
INR PPP: 1.15 (ref 0.87–1.13)
LACTATE BLD-SCNC: 1.2 MMOL/L (ref 0.5–2)
LIPASE SERPL-CCNC: 44 U/L (ref 11–82)
LYMPHOCYTES # BLD AUTO: 0.45 K/UL (ref 1–4.8)
LYMPHOCYTES NFR BLD: 12.1 % (ref 22–41)
MCH RBC QN AUTO: 33.1 PG (ref 27–33)
MCHC RBC AUTO-ENTMCNC: 32.6 G/DL (ref 33.6–35)
MCV RBC AUTO: 101.3 FL (ref 81.4–97.8)
MONOCYTES # BLD AUTO: 0.41 K/UL (ref 0–0.85)
MONOCYTES NFR BLD AUTO: 11.1 % (ref 0–13.4)
NEUTROPHILS # BLD AUTO: 2.82 K/UL (ref 2–7.15)
NEUTROPHILS NFR BLD: 76 % (ref 44–72)
NRBC # BLD AUTO: 0 K/UL
NRBC BLD-RTO: 0 /100 WBC
NT-PROBNP SERPL IA-MCNC: 481 PG/ML (ref 0–125)
PLATELET # BLD AUTO: 180 K/UL (ref 164–446)
PMV BLD AUTO: 9.4 FL (ref 9–12.9)
POTASSIUM SERPL-SCNC: 3.5 MMOL/L (ref 3.6–5.5)
PROT SERPL-MCNC: 6.2 G/DL (ref 6–8.2)
PROTHROMBIN TIME: 15.1 SEC (ref 12–14.6)
RBC # BLD AUTO: 3.81 M/UL (ref 4.2–5.4)
RSV RNA SPEC QL NAA+PROBE: NEGATIVE
SARS-COV-2 RNA RESP QL NAA+PROBE: DETECTED
SODIUM SERPL-SCNC: 131 MMOL/L (ref 135–145)
SPECIMEN SOURCE: ABNORMAL
TROPONIN T SERPL-MCNC: 11 NG/L (ref 6–19)
WBC # BLD AUTO: 3.7 K/UL (ref 4.8–10.8)

## 2020-12-20 PROCEDURE — 83520 IMMUNOASSAY QUANT NOS NONAB: CPT

## 2020-12-20 PROCEDURE — 36415 COLL VENOUS BLD VENIPUNCTURE: CPT

## 2020-12-20 PROCEDURE — 84443 ASSAY THYROID STIM HORMONE: CPT

## 2020-12-20 PROCEDURE — 770014 HCHG ROOM/CARE - WARD (150)

## 2020-12-20 PROCEDURE — C9803 HOPD COVID-19 SPEC COLLECT: HCPCS | Performed by: EMERGENCY MEDICINE

## 2020-12-20 PROCEDURE — A9270 NON-COVERED ITEM OR SERVICE: HCPCS | Performed by: EMERGENCY MEDICINE

## 2020-12-20 PROCEDURE — 700102 HCHG RX REV CODE 250 W/ 637 OVERRIDE(OP): Performed by: EMERGENCY MEDICINE

## 2020-12-20 PROCEDURE — 85730 THROMBOPLASTIN TIME PARTIAL: CPT

## 2020-12-20 PROCEDURE — 84484 ASSAY OF TROPONIN QUANT: CPT

## 2020-12-20 PROCEDURE — 83605 ASSAY OF LACTIC ACID: CPT

## 2020-12-20 PROCEDURE — 71045 X-RAY EXAM CHEST 1 VIEW: CPT

## 2020-12-20 PROCEDURE — 83690 ASSAY OF LIPASE: CPT

## 2020-12-20 PROCEDURE — 84145 PROCALCITONIN (PCT): CPT

## 2020-12-20 PROCEDURE — 83880 ASSAY OF NATRIURETIC PEPTIDE: CPT

## 2020-12-20 PROCEDURE — 87040 BLOOD CULTURE FOR BACTERIA: CPT

## 2020-12-20 PROCEDURE — 85379 FIBRIN DEGRADATION QUANT: CPT

## 2020-12-20 PROCEDURE — 0241U HCHG SARS-COV-2 COVID-19 NFCT DS RESP RNA 4 TRGT MIC: CPT

## 2020-12-20 PROCEDURE — 700105 HCHG RX REV CODE 258: Performed by: EMERGENCY MEDICINE

## 2020-12-20 PROCEDURE — 80053 COMPREHEN METABOLIC PANEL: CPT

## 2020-12-20 PROCEDURE — 82550 ASSAY OF CK (CPK): CPT

## 2020-12-20 PROCEDURE — 85025 COMPLETE CBC W/AUTO DIFF WBC: CPT

## 2020-12-20 PROCEDURE — 96374 THER/PROPH/DIAG INJ IV PUSH: CPT

## 2020-12-20 PROCEDURE — 99223 1ST HOSP IP/OBS HIGH 75: CPT | Mod: AI | Performed by: STUDENT IN AN ORGANIZED HEALTH CARE EDUCATION/TRAINING PROGRAM

## 2020-12-20 PROCEDURE — 85610 PROTHROMBIN TIME: CPT

## 2020-12-20 PROCEDURE — 700111 HCHG RX REV CODE 636 W/ 250 OVERRIDE (IP): Performed by: EMERGENCY MEDICINE

## 2020-12-20 PROCEDURE — 82728 ASSAY OF FERRITIN: CPT

## 2020-12-20 PROCEDURE — 99285 EMERGENCY DEPT VISIT HI MDM: CPT

## 2020-12-20 RX ORDER — BISACODYL 10 MG
10 SUPPOSITORY, RECTAL RECTAL
Status: DISCONTINUED | OUTPATIENT
Start: 2020-12-20 | End: 2020-12-21

## 2020-12-20 RX ORDER — METOPROLOL SUCCINATE 25 MG/1
25 TABLET, EXTENDED RELEASE ORAL
Status: DISCONTINUED | OUTPATIENT
Start: 2020-12-21 | End: 2020-12-29 | Stop reason: HOSPADM

## 2020-12-20 RX ORDER — FUROSEMIDE 20 MG/1
20 TABLET ORAL EVERY MORNING
Status: DISCONTINUED | OUTPATIENT
Start: 2020-12-21 | End: 2020-12-29 | Stop reason: HOSPADM

## 2020-12-20 RX ORDER — ASPIRIN 81 MG/1
324 TABLET, CHEWABLE ORAL ONCE
Status: COMPLETED | OUTPATIENT
Start: 2020-12-20 | End: 2020-12-20

## 2020-12-20 RX ORDER — ONDANSETRON 4 MG/1
4 TABLET, ORALLY DISINTEGRATING ORAL EVERY 6 HOURS PRN
Status: DISCONTINUED | OUTPATIENT
Start: 2020-12-20 | End: 2020-12-23

## 2020-12-20 RX ORDER — AMOXICILLIN 250 MG
2 CAPSULE ORAL 2 TIMES DAILY
Status: DISCONTINUED | OUTPATIENT
Start: 2020-12-21 | End: 2020-12-21

## 2020-12-20 RX ORDER — SODIUM CHLORIDE, SODIUM LACTATE, POTASSIUM CHLORIDE, CALCIUM CHLORIDE 600; 310; 30; 20 MG/100ML; MG/100ML; MG/100ML; MG/100ML
30 INJECTION, SOLUTION INTRAVENOUS ONCE
Status: COMPLETED | OUTPATIENT
Start: 2020-12-20 | End: 2020-12-21

## 2020-12-20 RX ORDER — DEXAMETHASONE 4 MG/1
6 TABLET ORAL DAILY
Status: COMPLETED | OUTPATIENT
Start: 2020-12-21 | End: 2020-12-29

## 2020-12-20 RX ORDER — LABETALOL HYDROCHLORIDE 5 MG/ML
10 INJECTION, SOLUTION INTRAVENOUS EVERY 6 HOURS PRN
Status: DISCONTINUED | OUTPATIENT
Start: 2020-12-20 | End: 2020-12-23

## 2020-12-20 RX ORDER — BENZONATATE 100 MG/1
100 CAPSULE ORAL 3 TIMES DAILY PRN
Status: DISCONTINUED | OUTPATIENT
Start: 2020-12-20 | End: 2020-12-23

## 2020-12-20 RX ORDER — ACETAMINOPHEN 325 MG/1
650 TABLET ORAL EVERY 6 HOURS PRN
Status: DISCONTINUED | OUTPATIENT
Start: 2020-12-20 | End: 2020-12-27

## 2020-12-20 RX ORDER — LEVETIRACETAM 500 MG/1
500 TABLET ORAL 2 TIMES DAILY
Status: DISCONTINUED | OUTPATIENT
Start: 2020-12-21 | End: 2020-12-29 | Stop reason: HOSPADM

## 2020-12-20 RX ORDER — METHIMAZOLE 5 MG/1
2.5 TABLET ORAL DAILY
Status: DISCONTINUED | OUTPATIENT
Start: 2020-12-21 | End: 2020-12-21

## 2020-12-20 RX ORDER — FLECAINIDE ACETATE 150 MG/1
150 TABLET ORAL TWICE DAILY
Status: DISCONTINUED | OUTPATIENT
Start: 2020-12-21 | End: 2020-12-29 | Stop reason: HOSPADM

## 2020-12-20 RX ORDER — ALENDRONATE SODIUM 70 MG/1
70 TABLET ORAL
Status: DISCONTINUED | OUTPATIENT
Start: 2020-12-21 | End: 2020-12-20

## 2020-12-20 RX ORDER — ONDANSETRON 4 MG/1
4 TABLET, ORALLY DISINTEGRATING ORAL EVERY 4 HOURS PRN
Status: DISCONTINUED | OUTPATIENT
Start: 2020-12-20 | End: 2020-12-20

## 2020-12-20 RX ORDER — ALBUTEROL SULFATE 90 UG/1
2 AEROSOL, METERED RESPIRATORY (INHALATION) EVERY 4 HOURS PRN
Status: DISCONTINUED | OUTPATIENT
Start: 2020-12-20 | End: 2020-12-23

## 2020-12-20 RX ORDER — ALBUTEROL SULFATE 90 UG/1
2 AEROSOL, METERED RESPIRATORY (INHALATION) ONCE
Status: COMPLETED | OUTPATIENT
Start: 2020-12-20 | End: 2020-12-20

## 2020-12-20 RX ORDER — ONDANSETRON 2 MG/ML
4 INJECTION INTRAMUSCULAR; INTRAVENOUS EVERY 4 HOURS PRN
Status: DISCONTINUED | OUTPATIENT
Start: 2020-12-20 | End: 2020-12-20

## 2020-12-20 RX ORDER — GUAIFENESIN/DEXTROMETHORPHAN 100-10MG/5
10 SYRUP ORAL EVERY 6 HOURS PRN
Status: DISCONTINUED | OUTPATIENT
Start: 2020-12-20 | End: 2020-12-21

## 2020-12-20 RX ORDER — ACETAMINOPHEN 500 MG
1000 TABLET ORAL ONCE
Status: COMPLETED | OUTPATIENT
Start: 2020-12-20 | End: 2020-12-20

## 2020-12-20 RX ORDER — ONDANSETRON 2 MG/ML
4 INJECTION INTRAMUSCULAR; INTRAVENOUS EVERY 6 HOURS PRN
Status: DISCONTINUED | OUTPATIENT
Start: 2020-12-20 | End: 2020-12-23

## 2020-12-20 RX ORDER — POLYETHYLENE GLYCOL 3350 17 G/17G
1 POWDER, FOR SOLUTION ORAL
Status: DISCONTINUED | OUTPATIENT
Start: 2020-12-20 | End: 2020-12-21

## 2020-12-20 RX ORDER — DEXAMETHASONE SODIUM PHOSPHATE 10 MG/ML
10 INJECTION, SOLUTION INTRAMUSCULAR; INTRAVENOUS ONCE
Status: COMPLETED | OUTPATIENT
Start: 2020-12-20 | End: 2020-12-20

## 2020-12-20 RX ADMIN — ASPIRIN 324 MG: 81 TABLET, CHEWABLE ORAL at 23:00

## 2020-12-20 RX ADMIN — ACETAMINOPHEN 1000 MG: 500 TABLET ORAL at 22:59

## 2020-12-20 RX ADMIN — DEXAMETHASONE SODIUM PHOSPHATE 10 MG: 10 INJECTION INTRAMUSCULAR; INTRAVENOUS at 23:00

## 2020-12-20 RX ADMIN — SODIUM CHLORIDE, POTASSIUM CHLORIDE, SODIUM LACTATE AND CALCIUM CHLORIDE 2040 ML: 600; 310; 30; 20 INJECTION, SOLUTION INTRAVENOUS at 22:30

## 2020-12-20 RX ADMIN — ALBUTEROL SULFATE 2 PUFF: 90 AEROSOL, METERED RESPIRATORY (INHALATION) at 23:00

## 2020-12-20 ASSESSMENT — ENCOUNTER SYMPTOMS
DEPRESSION: 0
ABDOMINAL PAIN: 0
BLOOD IN STOOL: 0
NECK PAIN: 0
WHEEZING: 0
LOSS OF CONSCIOUSNESS: 0
HEADACHES: 1
WEAKNESS: 0
DOUBLE VISION: 0
HEARTBURN: 0
BRUISES/BLEEDS EASILY: 0
FEVER: 1
BACK PAIN: 0
FOCAL WEAKNESS: 0
CONSTIPATION: 0
DIARRHEA: 1
INSOMNIA: 0
SORE THROAT: 0
CHILLS: 0
BLURRED VISION: 0
NAUSEA: 0
PALPITATIONS: 1
SHORTNESS OF BREATH: 1
VOMITING: 0
COUGH: 1

## 2020-12-20 ASSESSMENT — FIBROSIS 4 INDEX: FIB4 SCORE: 1.28

## 2020-12-20 ASSESSMENT — PAIN DESCRIPTION - PAIN TYPE: TYPE: ACUTE PAIN

## 2020-12-21 PROBLEM — D75.89 MACROCYTOSIS: Status: ACTIVE | Noted: 2020-12-21

## 2020-12-21 PROBLEM — I48.0 PAROXYSMAL ATRIAL FIBRILLATION (HCC): Status: ACTIVE | Noted: 2020-12-21

## 2020-12-21 PROBLEM — D72.819 LEUKOPENIA: Status: ACTIVE | Noted: 2020-12-21

## 2020-12-21 PROBLEM — R56.9 SEIZURE (HCC): Status: ACTIVE | Noted: 2020-12-21

## 2020-12-21 LAB
ANION GAP SERPL CALC-SCNC: 10 MMOL/L (ref 7–16)
BASOPHILS # BLD AUTO: 0 % (ref 0–1.8)
BASOPHILS # BLD: 0 K/UL (ref 0–0.12)
BUN SERPL-MCNC: 15 MG/DL (ref 8–22)
CALCIUM SERPL-MCNC: 8.4 MG/DL (ref 8.5–10.5)
CHLORIDE SERPL-SCNC: 103 MMOL/L (ref 96–112)
CO2 SERPL-SCNC: 25 MMOL/L (ref 20–33)
CREAT SERPL-MCNC: 0.49 MG/DL (ref 0.5–1.4)
EOSINOPHIL # BLD AUTO: 0 K/UL (ref 0–0.51)
EOSINOPHIL NFR BLD: 0 % (ref 0–6.9)
ERYTHROCYTE [DISTWIDTH] IN BLOOD BY AUTOMATED COUNT: 50.8 FL (ref 35.9–50)
FERRITIN SERPL-MCNC: 455 NG/ML (ref 10–291)
GLUCOSE BLD-MCNC: 192 MG/DL (ref 65–99)
GLUCOSE SERPL-MCNC: 200 MG/DL (ref 65–99)
HCT VFR BLD AUTO: 42.2 % (ref 37–47)
HGB BLD-MCNC: 13.7 G/DL (ref 12–16)
IMM GRANULOCYTES # BLD AUTO: 0.02 K/UL (ref 0–0.11)
IMM GRANULOCYTES NFR BLD AUTO: 1.1 % (ref 0–0.9)
LDH SERPL L TO P-CCNC: 307 U/L (ref 107–266)
LYMPHOCYTES # BLD AUTO: 0.35 K/UL (ref 1–4.8)
LYMPHOCYTES NFR BLD: 19 % (ref 22–41)
MCH RBC QN AUTO: 33.3 PG (ref 27–33)
MCHC RBC AUTO-ENTMCNC: 32.5 G/DL (ref 33.6–35)
MCV RBC AUTO: 102.4 FL (ref 81.4–97.8)
MONOCYTES # BLD AUTO: 0.09 K/UL (ref 0–0.85)
MONOCYTES NFR BLD AUTO: 4.9 % (ref 0–13.4)
NEUTROPHILS # BLD AUTO: 1.38 K/UL (ref 2–7.15)
NEUTROPHILS NFR BLD: 75 % (ref 44–72)
NRBC # BLD AUTO: 0 K/UL
NRBC BLD-RTO: 0 /100 WBC
PLATELET # BLD AUTO: 188 K/UL (ref 164–446)
PMV BLD AUTO: 9.6 FL (ref 9–12.9)
POTASSIUM SERPL-SCNC: 3.5 MMOL/L (ref 3.6–5.5)
PROCALCITONIN SERPL-MCNC: <0.05 NG/ML
RBC # BLD AUTO: 4.12 M/UL (ref 4.2–5.4)
SODIUM SERPL-SCNC: 138 MMOL/L (ref 135–145)
TSH SERPL DL<=0.005 MIU/L-ACNC: 0.04 UIU/ML (ref 0.38–5.33)
WBC # BLD AUTO: 1.7 K/UL (ref 4.8–10.8)

## 2020-12-21 PROCEDURE — 99233 SBSQ HOSP IP/OBS HIGH 50: CPT | Performed by: HOSPITALIST

## 2020-12-21 PROCEDURE — 82962 GLUCOSE BLOOD TEST: CPT

## 2020-12-21 PROCEDURE — A9270 NON-COVERED ITEM OR SERVICE: HCPCS | Performed by: STUDENT IN AN ORGANIZED HEALTH CARE EDUCATION/TRAINING PROGRAM

## 2020-12-21 PROCEDURE — 85025 COMPLETE CBC W/AUTO DIFF WBC: CPT

## 2020-12-21 PROCEDURE — 83615 LACTATE (LD) (LDH) ENZYME: CPT

## 2020-12-21 PROCEDURE — 700102 HCHG RX REV CODE 250 W/ 637 OVERRIDE(OP): Performed by: STUDENT IN AN ORGANIZED HEALTH CARE EDUCATION/TRAINING PROGRAM

## 2020-12-21 PROCEDURE — 700101 HCHG RX REV CODE 250: Performed by: EMERGENCY MEDICINE

## 2020-12-21 PROCEDURE — 700105 HCHG RX REV CODE 258: Performed by: EMERGENCY MEDICINE

## 2020-12-21 PROCEDURE — 770014 HCHG ROOM/CARE - WARD (150)

## 2020-12-21 PROCEDURE — XW033E5 INTRODUCTION OF REMDESIVIR ANTI-INFECTIVE INTO PERIPHERAL VEIN, PERCUTANEOUS APPROACH, NEW TECHNOLOGY GROUP 5: ICD-10-PCS | Performed by: STUDENT IN AN ORGANIZED HEALTH CARE EDUCATION/TRAINING PROGRAM

## 2020-12-21 PROCEDURE — 700111 HCHG RX REV CODE 636 W/ 250 OVERRIDE (IP): Performed by: STUDENT IN AN ORGANIZED HEALTH CARE EDUCATION/TRAINING PROGRAM

## 2020-12-21 PROCEDURE — 80048 BASIC METABOLIC PNL TOTAL CA: CPT

## 2020-12-21 RX ORDER — METHIMAZOLE 5 MG/1
2.5 TABLET ORAL 2 TIMES DAILY
Status: DISCONTINUED | OUTPATIENT
Start: 2020-12-21 | End: 2020-12-29 | Stop reason: HOSPADM

## 2020-12-21 RX ORDER — DEXTROSE MONOHYDRATE 25 G/50ML
50 INJECTION, SOLUTION INTRAVENOUS
Status: DISCONTINUED | OUTPATIENT
Start: 2020-12-21 | End: 2020-12-29 | Stop reason: HOSPADM

## 2020-12-21 RX ADMIN — DOCUSATE SODIUM 50 MG AND SENNOSIDES 8.6 MG 2 TABLET: 8.6; 5 TABLET, FILM COATED ORAL at 05:39

## 2020-12-21 RX ADMIN — METHIMAZOLE 2.5 MG: 5 TABLET ORAL at 05:40

## 2020-12-21 RX ADMIN — REMDESIVIR 200 MG: 100 INJECTION, POWDER, LYOPHILIZED, FOR SOLUTION INTRAVENOUS at 13:14

## 2020-12-21 RX ADMIN — LEVETIRACETAM 500 MG: 500 TABLET ORAL at 17:52

## 2020-12-21 RX ADMIN — FLECAINIDE ACETATE 150 MG: 150 TABLET ORAL at 05:39

## 2020-12-21 RX ADMIN — LEVETIRACETAM 500 MG: 500 TABLET ORAL at 05:39

## 2020-12-21 RX ADMIN — FLECAINIDE ACETATE 150 MG: 150 TABLET ORAL at 17:53

## 2020-12-21 RX ADMIN — ENOXAPARIN SODIUM 40 MG: 40 INJECTION SUBCUTANEOUS at 05:39

## 2020-12-21 RX ADMIN — DEXAMETHASONE 6 MG: 4 TABLET ORAL at 05:40

## 2020-12-21 RX ADMIN — METOPROLOL SUCCINATE 25 MG: 25 TABLET, EXTENDED RELEASE ORAL at 05:39

## 2020-12-21 RX ADMIN — FUROSEMIDE 20 MG: 20 TABLET ORAL at 05:40

## 2020-12-21 ASSESSMENT — ENCOUNTER SYMPTOMS
PALPITATIONS: 0
HEARTBURN: 0
DIZZINESS: 0
VOMITING: 0
CHILLS: 0
DOUBLE VISION: 0
HEMOPTYSIS: 0
BLURRED VISION: 0
MYALGIAS: 1
ABDOMINAL PAIN: 0
SPUTUM PRODUCTION: 0
FEVER: 0
NAUSEA: 0
HEADACHES: 0
SHORTNESS OF BREATH: 1
COUGH: 1
DEPRESSION: 0

## 2020-12-21 ASSESSMENT — PAIN DESCRIPTION - PAIN TYPE: TYPE: ACUTE PAIN

## 2020-12-21 ASSESSMENT — FIBROSIS 4 INDEX: FIB4 SCORE: 2.77

## 2020-12-21 NOTE — PROGRESS NOTES
Hospital Medicine Daily Progress Note    Date of Service  12/21/2020    Chief Complaint  80 y.o. female admitted 12/20/2020 with   Chief Complaint   Patient presents with   • Shortness of Breath         Hospital Course  This is 80-year-old female with past medical history significant for sick sinus syndrome status post pacemaker, A. fib, secondary hypercoagulable state, history of CVA with bilateral hemianopsia,presented in ER with a complaint of shortness of breath, cough, fever, loss of test and smell.  On presentation in ER she is noted to be hypoxic at 70s.  Patient has a long history of smoking, quit several years ago, does wear oxygen at home.    She is found to be febrile with a temperature of 101.5, hypoxic leukopenia at 3.7.  In ER, she is found to be COVID-19 positive on 12/20/2020.  Patient is then started on IV Decadron 10 mg and asked to be admitted to the hospital for further evaluation.    Stay in the hospital, patient continue to monitor and ACS; she has been requiring 6 L of oxygen.  Intensivist Dr. Pradhan contacted for remdesivir.      Interval Problem Update  No acute events overnight, laying in the bed, denied any complaint.  Currently patient is requiring 5 to 6 L of oxygen.   --Continue supportive treatment for Covid pneumonia including Decadron, remdesivir; titrate oxygen as needed, encourage self proning.   --- Obtain echocardiogram   --- Noted to have hyperthyroidism with TSH of 0.040; will obtain free T4    Consultants/Specialty  Intensivist    Code Status  Full Code    Disposition  Home once medically cleared     Review of Systems  Review of Systems   Constitutional: Positive for malaise/fatigue. Negative for chills and fever.   HENT: Negative for congestion.    Eyes: Negative for blurred vision and double vision.   Respiratory: Positive for cough and shortness of breath. Negative for hemoptysis and sputum production.    Cardiovascular: Negative for chest pain and palpitations.    Gastrointestinal: Negative for abdominal pain, heartburn, nausea and vomiting.   Musculoskeletal: Positive for myalgias.   Neurological: Negative for dizziness and headaches.   Psychiatric/Behavioral: Negative for depression.        Physical Exam  Temp:  [35.9 °C (96.7 °F)-38.6 °C (101.5 °F)] 36.7 °C (98.1 °F)  Pulse:  [60-67] 60  Resp:  [18-32] 18  BP: (104-130)/(48-60) 104/48  SpO2:  [92 %-96 %] 95 %    Physical Exam  Vitals signs and nursing note reviewed.   Constitutional:       Appearance: Normal appearance.   HENT:      Head: Normocephalic and atraumatic.   Eyes:      Extraocular Movements: Extraocular movements intact.   Neck:      Musculoskeletal: Neck supple.   Cardiovascular:      Rate and Rhythm: Normal rate.      Pulses: Normal pulses.      Heart sounds: No murmur.   Pulmonary:      Effort: Pulmonary effort is normal.      Breath sounds: Rales present.      Comments: Decreased breath sound at the bases  Abdominal:      General: Abdomen is flat. There is no distension.      Palpations: Abdomen is soft.   Skin:     General: Skin is warm.   Neurological:      Mental Status: She is alert and oriented to person, place, and time.      Cranial Nerves: No cranial nerve deficit.   Psychiatric:         Mood and Affect: Mood normal.         Fluids  No intake or output data in the 24 hours ending 12/21/20 1519    Laboratory  Recent Labs     12/20/20 2200 12/21/20  1011   WBC 3.7* 1.7*   RBC 3.81* 4.12*   HEMOGLOBIN 12.6 13.7   HEMATOCRIT 38.6 42.2   .3* 102.4*   MCH 33.1* 33.3*   MCHC 32.6* 32.5*   RDW 50.2* 50.8*   PLATELETCT 180 188   MPV 9.4 9.6     Recent Labs     12/20/20 2200 12/21/20  1011   SODIUM 131* 138   POTASSIUM 3.5* 3.5*   CHLORIDE 100 103   CO2 24 25   GLUCOSE 105* 200*   BUN 14 15   CREATININE 0.64 0.49*   CALCIUM 8.3* 8.4*     Recent Labs     12/20/20 2200   APTT 32.7   INR 1.15*               Imaging  DX-CHEST-PORTABLE (1 VIEW)   Final Result      Mild hazy peripheral bilateral  pulmonary opacities most suggestive of Covid pneumonia.           Assessment/Plan  * Pneumonia due to COVID-19 virus- (present on admission)  Assessment & Plan  Continue supportive treatment including Decadron, remdesivir.   --- Titrate oxygen as needed; continue Droplet, Contact, and Eye Protection  mucinex   Rt protocol  Currently requiring 6 l of O2    Acute respiratory failure with hypoxia (HCC)- (present on admission)  Assessment & Plan  Likely 2/2 covi pna   -Continue Decadron 6 mg p.o. daily, remdesivir.  Titrate oxygen as needed   -Provide incentive spirometer, encourage self proning  Procalcitonin is normal, no antibiotics indicated      Sick sinus syndrome (HCC)- (present on admission)  Assessment & Plan  Status post pacemaker placement      Leukopenia  Assessment & Plan  At 1.7 , anc still at > 1000  -- Monitor daily     Macrocytosis  Assessment & Plan  TSH low   Vitamin b12 ok    Seizure (HCC)  Assessment & Plan  Continue Keppra 500 mg po bid   Seizure precaution    Paroxysmal atrial fibrillation (HCC)  Assessment & Plan   Does follow dr Obregon as an op   -- continue Flecanide and metoprolol    -- monitor    Hyperthyroidism- (present on admission)  Assessment & Plan  Mild, need to have repeat TSH and free T4 in 4 to 6 weeks.  Continue methimazole       VTE prophylaxis: lovenox

## 2020-12-21 NOTE — ED TRIAGE NOTES
Patient brought in by EMS for SOB, fever, cough, body aches.  79% on RA, 95% on 4L.  Hx of lung cancer, pacemaker.  Patient has been feeling ill for 1 week, which become worse today.  A/Ox4

## 2020-12-21 NOTE — ASSESSMENT & PLAN NOTE
Continue supportive treatment including Decadron, remdesivir (finished on 12/25)    Titrate oxygen as needed; continue Droplet, Contact, and Eye Protection  Self proning and incentive spirometer encouraged

## 2020-12-21 NOTE — ED PROVIDER NOTES
ED Provider Note    CHIEF COMPLAINT  Chief Complaint   Patient presents with   • Shortness of Breath       [facility]    Primary care provider: SONIA Fry  History obtained from: Patient  History limited by: Nothing    Ml Chavira is a 80 y.o. female who presents with dyspnea and cough.  Onset a week ago.  No known sick contacts or known close Covid contacts.  Also having some chills.  No chest pain.  No abdominal pain.  No vomiting.  No hemoptysis.  No prior episodes like this.  Symptoms constant and worsening prompting her to come in for emergent evaluation because her dyspnea continued to get worse.  Complex medical history including breast cancer.  No active chemotherapy.  Former smoker none recently.  No alleviating factors noted no aggravating factors symptoms was Getting worse so she came in for emergent evaluation.    REVIEW OF SYSTEMS  Constitutional: Negative for fever but positive for fatigue and chills.   HENT: Negative for rhinorrhea or sore throat.   Respiratory: Positive for cough and shortness of breath.    Cardiovascular: Negative for chest pain or palpitations.   Gastrointestinal: Negative for nausea, vomiting, or abdominal pain.   Genitourinary: Negative for dysuria or flank pain.   Musculoskeletal: Negative for back pain or joint pain.   Skin: Negative for itching or rash.   Neurological: Negative for sensory or motor changes.   See HPI for further details. All other systems are negative.     PAST MEDICAL HISTORY   has a past medical history of Breast cancer (HCC), Cancer (HCC), Chickenpox, Arabic measles, Healthcare maintenance (7/23/2018), Influenza, Joint pain, Lung cancer (HCC), Mumps, Need for vaccination (1/17/2019), Radionecrosis (4/14/2018), Sick sinus syndrome (HCC), Thyroid disease, and Tonsillitis.    PAST FAMILY HISTORY  Family History   Problem Relation Age of Onset   • Dementia Mother    • Diabetes Mother    • Other Mother         osteoarthritis   • Arthritis Mother   "  • Autoimmune Disease Father         Rheumatoid arthritis   • Arthritis Father    • Cancer Brother         prostate        SOCIAL HISTORY  Social History     Tobacco Use   • Smoking status: Former Smoker     Packs/day: 2.00     Years: 20.00     Pack years: 40.00     Types: Cigarettes     Quit date:      Years since quittin.0   • Smokeless tobacco: Never Used   Substance and Sexual Activity   • Alcohol use: Yes     Alcohol/week: 1.8 oz     Types: 3 Glasses of wine per week     Frequency: 2-3 times a week     Drinks per session: 1 or 2     Binge frequency: Never     Comment: occasionally    • Drug use: No   • Sexual activity: Not Currently       SURGICAL HISTORY   has a past surgical history that includes craniotomy stealth (Right, 2015); other; appendectomy; knee arthroscopy; craniotomy; tonsillectomy; pacemaker insertion; and lumpectomy.    CURRENT MEDICATIONS  Home Medications     Reviewed by Lamar Thomas on 20 at 2323  Med List Status: Complete   Medication Last Dose Status   albuterol 108 (90 Base) MCG/ACT Aero Soln inhalation aerosol 2020 Active   alendronate (FOSAMAX) 70 MG Tab 2020 Active   flecainide (TAMBOCOR) 150 MG Tab 2020 Active   furosemide (LASIX) 20 MG Tab 2020 Active   KLOR-CON 10 10 MEQ tablet 2020 Active   levETIRAcetam (KEPPRA) 500 MG Tab 2020 Active   methimazole (TAPAZOLE) 5 MG Tab 2020 Active   metoprolol SR (TOPROL XL) 25 MG TABLET SR 24 HR 2020 Active   Misc. Devices ([employer] PULSE OXIMETER) Misc Not Taking Active                ALLERGIES  Allergies   Allergen Reactions   • Penicillins Rash and Itching     RXN \"a long time ago\"  Other reaction(s): Rash   • Atorvastatin      Causes low quality       PHYSICAL EXAM  VITAL SIGNS: /44   Pulse 88   Temp 36.6 °C (97.8 °F) (Temporal)   Resp 20   Ht 1.575 m (5' 2\")   Wt 69.6 kg (153 lb 7 oz)   SpO2 90%   BMI 28.06 kg/m²    Pulse ox interpretation: On supplemental " oxygen, I interpret this pulse ox as low.  Constitutional: Sitting up in mild distress.  HEENT: Normocephalic, atraumatic. Posterior pharynx clear, mucous membranes dry.  Eyes:  EOMI. Normal sclerae.  Neck: Supple, nontender.  Chest/Pulmonary: Diminished to ausculation bilaterally, no wheezes or rhonchi.  Cardiovascular: Regular rate and rhythm, no murmur.   Abdomen: Soft, nontender; no rebound, guarding, or masses.  Back: No CVA or midline tenderness.   Musculoskeletal: No deformity or edema.  Neuro: Clear speech, normal coordination, cranial nerves II-XII grossly intact, no focal asymmetry or sensory deficits.   Psych: Normal mood and affect.  Skin: No rashes, warm and dry.      DIAGNOSTIC STUDIES / PROCEDURES    LABS & EKG  Results for orders placed or performed during the hospital encounter of 12/20/20   Lactic acid (lactate)   Result Value Ref Range    Lactic Acid 1.2 0.5 - 2.0 mmol/L   CBC WITH DIFFERENTIAL   Result Value Ref Range    WBC 3.7 (L) 4.8 - 10.8 K/uL    RBC 3.81 (L) 4.20 - 5.40 M/uL    Hemoglobin 12.6 12.0 - 16.0 g/dL    Hematocrit 38.6 37.0 - 47.0 %    .3 (H) 81.4 - 97.8 fL    MCH 33.1 (H) 27.0 - 33.0 pg    MCHC 32.6 (L) 33.6 - 35.0 g/dL    RDW 50.2 (H) 35.9 - 50.0 fL    Platelet Count 180 164 - 446 K/uL    MPV 9.4 9.0 - 12.9 fL    Neutrophils-Polys 76.00 (H) 44.00 - 72.00 %    Lymphocytes 12.10 (L) 22.00 - 41.00 %    Monocytes 11.10 0.00 - 13.40 %    Eosinophils 0.00 0.00 - 6.90 %    Basophils 0.30 0.00 - 1.80 %    Immature Granulocytes 0.50 0.00 - 0.90 %    Nucleated RBC 0.00 /100 WBC    Neutrophils (Absolute) 2.82 2.00 - 7.15 K/uL    Lymphs (Absolute) 0.45 (L) 1.00 - 4.80 K/uL    Monos (Absolute) 0.41 0.00 - 0.85 K/uL    Eos (Absolute) 0.00 0.00 - 0.51 K/uL    Baso (Absolute) 0.01 0.00 - 0.12 K/uL    Immature Granulocytes (abs) 0.02 0.00 - 0.11 K/uL    NRBC (Absolute) 0.00 K/uL   COMP METABOLIC PANEL   Result Value Ref Range    Sodium 131 (L) 135 - 145 mmol/L    Potassium 3.5 (L) 3.6 -  5.5 mmol/L    Chloride 100 96 - 112 mmol/L    Co2 24 20 - 33 mmol/L    Anion Gap 7.0 7.0 - 16.0    Glucose 105 (H) 65 - 99 mg/dL    Bun 14 8 - 22 mg/dL    Creatinine 0.64 0.50 - 1.40 mg/dL    Calcium 8.3 (L) 8.5 - 10.5 mg/dL    AST(SGOT) 38 12 - 45 U/L    ALT(SGPT) 39 2 - 50 U/L    Alkaline Phosphatase 40 30 - 99 U/L    Total Bilirubin 0.6 0.1 - 1.5 mg/dL    Albumin 3.4 3.2 - 4.9 g/dL    Total Protein 6.2 6.0 - 8.2 g/dL    Globulin 2.8 1.9 - 3.5 g/dL    A-G Ratio 1.2 g/dL   BLOOD CULTURE    Specimen: Peripheral; Blood   Result Value Ref Range    Significant Indicator NEG     Source BLD     Site PERIPHERAL     Culture Result       No Growth  Note: Blood cultures are incubated for 5 days and  are monitored continuously.Positive blood cultures  are called to the RN and reported as soon as  they are identified.     BLOOD CULTURE    Specimen: Peripheral; Blood   Result Value Ref Range    Significant Indicator NEG     Source BLD     Site PERIPHERAL     Culture Result       No Growth  Note: Blood cultures are incubated for 5 days and  are monitored continuously.Positive blood cultures  are called to the RN and reported as soon as  they are identified.     COVID/SARS CoV-2 PCR    Specimen: Nasopharyngeal; Respirate   Result Value Ref Range    COVID Order Status Received    TROPONIN   Result Value Ref Range    Troponin T 11 6 - 19 ng/L   PT/INR   Result Value Ref Range    PT 15.1 (H) 12.0 - 14.6 sec    INR 1.15 (H) 0.87 - 1.13   PTT   Result Value Ref Range    APTT 32.7 24.7 - 36.0 sec   LIPASE   Result Value Ref Range    Lipase 44 11 - 82 U/L   CREATINE KINASE   Result Value Ref Range    CPK Total 70 0 - 154 U/L   proBrain Natriuretic Peptide, NT   Result Value Ref Range    NT-proBNP 481 (H) 0 - 125 pg/mL   CoV-2, Flu A/B, And RSV by PCR   Result Value Ref Range    Influenza virus A RNA Negative Negative    Influenza virus B, PCR Negative Negative    RSV, PCR Negative Negative    SARS-CoV-2 by PCR DETECTED (AA)      SARS-CoV-2 Source NP Swab    ESTIMATED GFR   Result Value Ref Range    GFR If African American >60 >60 mL/min/1.73 m 2    GFR If Non African American >60 >60 mL/min/1.73 m 2   CBC with Differential   Result Value Ref Range    WBC 1.7 (LL) 4.8 - 10.8 K/uL    RBC 4.12 (L) 4.20 - 5.40 M/uL    Hemoglobin 13.7 12.0 - 16.0 g/dL    Hematocrit 42.2 37.0 - 47.0 %    .4 (H) 81.4 - 97.8 fL    MCH 33.3 (H) 27.0 - 33.0 pg    MCHC 32.5 (L) 33.6 - 35.0 g/dL    RDW 50.8 (H) 35.9 - 50.0 fL    Platelet Count 188 164 - 446 K/uL    MPV 9.6 9.0 - 12.9 fL    Neutrophils-Polys 75.00 (H) 44.00 - 72.00 %    Lymphocytes 19.00 (L) 22.00 - 41.00 %    Monocytes 4.90 0.00 - 13.40 %    Eosinophils 0.00 0.00 - 6.90 %    Basophils 0.00 0.00 - 1.80 %    Immature Granulocytes 1.10 (H) 0.00 - 0.90 %    Nucleated RBC 0.00 /100 WBC    Neutrophils (Absolute) 1.38 (L) 2.00 - 7.15 K/uL    Lymphs (Absolute) 0.35 (L) 1.00 - 4.80 K/uL    Monos (Absolute) 0.09 0.00 - 0.85 K/uL    Eos (Absolute) 0.00 0.00 - 0.51 K/uL    Baso (Absolute) 0.00 0.00 - 0.12 K/uL    Immature Granulocytes (abs) 0.02 0.00 - 0.11 K/uL    NRBC (Absolute) 0.00 K/uL   Basic Metabolic Panel (BMP)   Result Value Ref Range    Sodium 138 135 - 145 mmol/L    Potassium 3.5 (L) 3.6 - 5.5 mmol/L    Chloride 103 96 - 112 mmol/L    Co2 25 20 - 33 mmol/L    Glucose 200 (H) 65 - 99 mg/dL    Bun 15 8 - 22 mg/dL    Creatinine 0.49 (L) 0.50 - 1.40 mg/dL    Calcium 8.4 (L) 8.5 - 10.5 mg/dL    Anion Gap 10.0 7.0 - 16.0   Procalcitonin   Result Value Ref Range    Procalcitonin <0.05 <0.25 ng/mL   D-Dimer   Result Value Ref Range    D-Dimer Screen 1.70 (H) 0.00 - 0.50 ug/mL (FEU)   FERRITIN   Result Value Ref Range    Ferritin 455.0 (H) 10.0 - 291.0 ng/mL   TSH   Result Value Ref Range    TSH 0.040 (L) 0.380 - 5.330 uIU/mL   LDH   Result Value Ref Range    LDH Total 307 (H) 107 - 266 U/L   ESTIMATED GFR   Result Value Ref Range    GFR If African American >60 >60 mL/min/1.73 m 2    GFR If Non African  American >60 >60 mL/min/1.73 m 2   CBC with Differential   Result Value Ref Range    WBC 8.7 4.8 - 10.8 K/uL    RBC 4.25 4.20 - 5.40 M/uL    Hemoglobin 14.0 12.0 - 16.0 g/dL    Hematocrit 43.0 37.0 - 47.0 %    .2 (H) 81.4 - 97.8 fL    MCH 32.9 27.0 - 33.0 pg    MCHC 32.6 (L) 33.6 - 35.0 g/dL    RDW 50.2 (H) 35.9 - 50.0 fL    Platelet Count 273 164 - 446 K/uL    MPV 9.8 9.0 - 12.9 fL    Neutrophils-Polys 89.00 (H) 44.00 - 72.00 %    Lymphocytes 4.10 (L) 22.00 - 41.00 %    Monocytes 6.30 0.00 - 13.40 %    Eosinophils 0.00 0.00 - 6.90 %    Basophils 0.10 0.00 - 1.80 %    Immature Granulocytes 0.50 0.00 - 0.90 %    Nucleated RBC 0.00 /100 WBC    Neutrophils (Absolute) 7.76 (H) 2.00 - 7.15 K/uL    Lymphs (Absolute) 0.36 (L) 1.00 - 4.80 K/uL    Monos (Absolute) 0.55 0.00 - 0.85 K/uL    Eos (Absolute) 0.00 0.00 - 0.51 K/uL    Baso (Absolute) 0.01 0.00 - 0.12 K/uL    Immature Granulocytes (abs) 0.04 0.00 - 0.11 K/uL    NRBC (Absolute) 0.00 K/uL   proBrain Natriuretic Peptide, NT   Result Value Ref Range    NT-proBNP 735 (H) 0 - 125 pg/mL   HEMOGLOBIN A1C   Result Value Ref Range    Glycohemoglobin 5.7 (H) 0.0 - 5.6 %    Est Avg Glucose 117 mg/dL   Basic Metabolic Panel   Result Value Ref Range    Sodium 137 135 - 145 mmol/L    Potassium 3.4 (L) 3.6 - 5.5 mmol/L    Chloride 101 96 - 112 mmol/L    Co2 27 20 - 33 mmol/L    Glucose 113 (H) 65 - 99 mg/dL    Bun 15 8 - 22 mg/dL    Creatinine 0.51 0.50 - 1.40 mg/dL    Calcium 8.3 (L) 8.5 - 10.5 mg/dL    Anion Gap 9.0 7.0 - 16.0   ESTIMATED GFR   Result Value Ref Range    GFR If African American >60 >60 mL/min/1.73 m 2    GFR If Non African American >60 >60 mL/min/1.73 m 2   Comp Metabolic Panel   Result Value Ref Range    Sodium 137 135 - 145 mmol/L    Potassium 3.5 (L) 3.6 - 5.5 mmol/L    Chloride 103 96 - 112 mmol/L    Co2 26 20 - 33 mmol/L    Anion Gap 8.0 7.0 - 16.0    Glucose 123 (H) 65 - 99 mg/dL    Bun 16 8 - 22 mg/dL    Creatinine 0.47 (L) 0.50 - 1.40 mg/dL     Calcium 8.2 (L) 8.5 - 10.5 mg/dL    AST(SGOT) 23 12 - 45 U/L    ALT(SGPT) 28 2 - 50 U/L    Alkaline Phosphatase 41 30 - 99 U/L    Total Bilirubin 0.5 0.1 - 1.5 mg/dL    Albumin 3.3 3.2 - 4.9 g/dL    Total Protein 6.3 6.0 - 8.2 g/dL    Globulin 3.0 1.9 - 3.5 g/dL    A-G Ratio 1.1 g/dL   CBC WITH DIFFERENTIAL   Result Value Ref Range    WBC 5.2 4.8 - 10.8 K/uL    RBC 4.32 4.20 - 5.40 M/uL    Hemoglobin 14.0 12.0 - 16.0 g/dL    Hematocrit 43.7 37.0 - 47.0 %    .2 (H) 81.4 - 97.8 fL    MCH 32.4 27.0 - 33.0 pg    MCHC 32.0 (L) 33.6 - 35.0 g/dL    RDW 50.0 35.9 - 50.0 fL    Platelet Count 265 164 - 446 K/uL    MPV 9.6 9.0 - 12.9 fL    Neutrophils-Polys 87.00 (H) 44.00 - 72.00 %    Lymphocytes 5.20 (L) 22.00 - 41.00 %    Monocytes 7.80 0.00 - 13.40 %    Eosinophils 0.00 0.00 - 6.90 %    Basophils 0.00 0.00 - 1.80 %    Nucleated RBC 0.00 /100 WBC    Neutrophils (Absolute) 4.52 2.00 - 7.15 K/uL    Lymphs (Absolute) 0.27 (L) 1.00 - 4.80 K/uL    Monos (Absolute) 0.41 0.00 - 0.85 K/uL    Eos (Absolute) 0.00 0.00 - 0.51 K/uL    Baso (Absolute) 0.00 0.00 - 0.12 K/uL    NRBC (Absolute) 0.00 K/uL   ESTIMATED GFR   Result Value Ref Range    GFR If African American >60 >60 mL/min/1.73 m 2    GFR If Non African American >60 >60 mL/min/1.73 m 2   DIFFERENTIAL MANUAL   Result Value Ref Range    Manual Diff Status PERFORMED    PERIPHERAL SMEAR REVIEW   Result Value Ref Range    Peripheral Smear Review see below    PLATELET ESTIMATE   Result Value Ref Range    Plt Estimation Normal    MORPHOLOGY   Result Value Ref Range    RBC Morphology Normal    ACCU-CHEK GLUCOSE   Result Value Ref Range    Glucose - Accu-Ck 192 (H) 65 - 99 mg/dL   ACCU-CHEK GLUCOSE   Result Value Ref Range    Glucose - Accu-Ck 118 (H) 65 - 99 mg/dL   ACCU-CHEK GLUCOSE   Result Value Ref Range    Glucose - Accu-Ck 126 (H) 65 - 99 mg/dL   ACCU-CHEK GLUCOSE   Result Value Ref Range    Glucose - Accu-Ck 125 (H) 65 - 99 mg/dL   ACCU-CHEK GLUCOSE   Result Value Ref  Range    Glucose - Accu-Ck 160 (H) 65 - 99 mg/dL   ACCU-CHEK GLUCOSE   Result Value Ref Range    Glucose - Accu-Ck 107 (H) 65 - 99 mg/dL   ACCU-CHEK GLUCOSE   Result Value Ref Range    Glucose - Accu-Ck 152 (H) 65 - 99 mg/dL   ACCU-CHEK GLUCOSE   Result Value Ref Range    Glucose - Accu-Ck 132 (H) 65 - 99 mg/dL         RADIOLOGY  DX-CHEST-PORTABLE (1 VIEW)   Final Result      Mild hazy peripheral bilateral pulmonary opacities most suggestive of Covid pneumonia.          COURSE & MEDICAL DECISION MAKING    This is a 80 y.o. female who presents with cough and dyspnea worsening over the last week.    Differential Diagnosis includes but is not limited to:  Covid, COPD, asthma, pneumonia, sepsis    ED Course:  This is a pleasant 80-year-old female coming in with cough and dyspnea for 1 week.  Given current pandemic high suspicion for Covid.  Requiring supplemental oxygen via nasal cannula.  No severe respiratory distress.    Work-up concerning for Covid, she is Covid swab positive on rapid testing.  X-ray concerning for bilateral infiltrates consistent with Covid.  Slightly low white blood cell count.  No severe lactic acidosis.  Given need for supplemental oxygen plan admission to the hospital.  Dexamethasone given as well.  Looks dehydrated I will give her a single crystalloid fluid bolus as well as aspirin.  Discussed with hospitalist Dr. Restrepo who will come admit for further work-up and treatment.    Medications   flecainide (TAMBOCOR) tablet 150 mg (150 mg Oral Given 12/23/20 1727)   furosemide (LASIX) tablet 20 mg (20 mg Oral Given 12/23/20 0613)   levETIRAcetam (KEPPRA) tablet 500 mg (500 mg Oral Given 12/23/20 1728)   metoprolol SR (TOPROL XL) tablet 25 mg (25 mg Oral Given 12/23/20 0613)   enoxaparin (LOVENOX) inj 40 mg (40 mg Subcutaneous Given 12/23/20 0613)   acetaminophen (Tylenol) tablet 650 mg (has no administration in time range)   dexamethasone (DECADRON) tablet 6 mg (6 mg Oral Given 12/23/20 0613)    remdesivir (Veklury) 100 mg in  mL IVPB (0 mg Intravenous Stopped 12/23/20 1828)   insulin regular (HumuLIN R,NovoLIN R) injection (0 Units Subcutaneous Dose not Required 12/23/20 1710)     And   glucose 4 g chewable tablet 16 g (has no administration in time range)     And   dextrose 50% (D50W) injection 50 mL (has no administration in time range)   methimazole (TAPAZOLE) tablet 2.5 mg (2.5 mg Oral Given 12/23/20 1728)   lactated ringers infusion (BOLUS) (0 mL Intravenous Stopped 12/21/20 1311)   dexamethasone pf (DECADRON) injection 10 mg (10 mg Intravenous Given 12/20/20 2300)   aspirin (ASA) chewable tab 324 mg (324 mg Oral Given 12/20/20 2300)   albuterol inhaler 2 Puff (2 Puffs Inhalation Given 12/20/20 2300)   acetaminophen (TYLENOL) tablet 1,000 mg (1,000 mg Oral Given 12/20/20 2259)   remdesivir (Veklury) 200 mg in  mL IVPB (0 mg Intravenous Stopped 12/21/20 1414)   potassium chloride SA (Kdur) tablet 20 mEq (20 mEq Oral Given 12/23/20 1728)       FINAL IMPRESSION  1. Pneumonia due to COVID-19 virus    2. Acute respiratory failure with hypoxia (HCC)    3. Sick sinus syndrome (HCC)    4. History of breast cancer        -ADMIT-      Pertinent Labs & Imaging studies reviewed and verified by myself, as well as nursing notes and the patient's past medical, family, and social histories (See chart for details).    Portions of this record were made with voice recognition software.  Despite my review, spelling/grammar/context errors may still remain.  Interpretation of this chart should be taken in this context.    Electronically signed by Manjinder Willis M.D. on 12/23/2020 at 5:31 PM.

## 2020-12-21 NOTE — ASSESSMENT & PLAN NOTE
Secondary to COVID-19 pneumonia.  Wean down oxygen as he tolerates.    Will need home O2 set up prior to discharge.

## 2020-12-21 NOTE — PROGRESS NOTES
Lab called with critical result of WBC at 1.7. Critical lab result read back to lab.   Dr. Gould notified of critical lab result at 1310.  Critical lab result read back by Dr. Mosley .

## 2020-12-21 NOTE — ED NOTES
Med rec updated and complete. Allergies reviewed. Met with pt at bedside.  Dicussed current medications.  Pt denies antibiotic use in last 14 days.    [facility] pharmacy [facility]

## 2020-12-22 LAB
ANION GAP SERPL CALC-SCNC: 9 MMOL/L (ref 7–16)
BASOPHILS # BLD AUTO: 0.1 % (ref 0–1.8)
BASOPHILS # BLD: 0.01 K/UL (ref 0–0.12)
BUN SERPL-MCNC: 15 MG/DL (ref 8–22)
CALCIUM SERPL-MCNC: 8.3 MG/DL (ref 8.5–10.5)
CHLORIDE SERPL-SCNC: 101 MMOL/L (ref 96–112)
CO2 SERPL-SCNC: 27 MMOL/L (ref 20–33)
CREAT SERPL-MCNC: 0.51 MG/DL (ref 0.5–1.4)
EOSINOPHIL # BLD AUTO: 0 K/UL (ref 0–0.51)
EOSINOPHIL NFR BLD: 0 % (ref 0–6.9)
ERYTHROCYTE [DISTWIDTH] IN BLOOD BY AUTOMATED COUNT: 50.2 FL (ref 35.9–50)
EST. AVERAGE GLUCOSE BLD GHB EST-MCNC: 117 MG/DL
GLUCOSE BLD-MCNC: 118 MG/DL (ref 65–99)
GLUCOSE BLD-MCNC: 125 MG/DL (ref 65–99)
GLUCOSE BLD-MCNC: 126 MG/DL (ref 65–99)
GLUCOSE BLD-MCNC: 160 MG/DL (ref 65–99)
GLUCOSE SERPL-MCNC: 113 MG/DL (ref 65–99)
HBA1C MFR BLD: 5.7 % (ref 0–5.6)
HCT VFR BLD AUTO: 43 % (ref 37–47)
HGB BLD-MCNC: 14 G/DL (ref 12–16)
IMM GRANULOCYTES # BLD AUTO: 0.04 K/UL (ref 0–0.11)
IMM GRANULOCYTES NFR BLD AUTO: 0.5 % (ref 0–0.9)
LYMPHOCYTES # BLD AUTO: 0.36 K/UL (ref 1–4.8)
LYMPHOCYTES NFR BLD: 4.1 % (ref 22–41)
MCH RBC QN AUTO: 32.9 PG (ref 27–33)
MCHC RBC AUTO-ENTMCNC: 32.6 G/DL (ref 33.6–35)
MCV RBC AUTO: 101.2 FL (ref 81.4–97.8)
MONOCYTES # BLD AUTO: 0.55 K/UL (ref 0–0.85)
MONOCYTES NFR BLD AUTO: 6.3 % (ref 0–13.4)
NEUTROPHILS # BLD AUTO: 7.76 K/UL (ref 2–7.15)
NEUTROPHILS NFR BLD: 89 % (ref 44–72)
NRBC # BLD AUTO: 0 K/UL
NRBC BLD-RTO: 0 /100 WBC
NT-PROBNP SERPL IA-MCNC: 735 PG/ML (ref 0–125)
PLATELET # BLD AUTO: 273 K/UL (ref 164–446)
PMV BLD AUTO: 9.8 FL (ref 9–12.9)
POTASSIUM SERPL-SCNC: 3.4 MMOL/L (ref 3.6–5.5)
RBC # BLD AUTO: 4.25 M/UL (ref 4.2–5.4)
SODIUM SERPL-SCNC: 137 MMOL/L (ref 135–145)
WBC # BLD AUTO: 8.7 K/UL (ref 4.8–10.8)

## 2020-12-22 PROCEDURE — 36415 COLL VENOUS BLD VENIPUNCTURE: CPT

## 2020-12-22 PROCEDURE — 99232 SBSQ HOSP IP/OBS MODERATE 35: CPT | Performed by: INTERNAL MEDICINE

## 2020-12-22 PROCEDURE — 700102 HCHG RX REV CODE 250 W/ 637 OVERRIDE(OP): Performed by: HOSPITALIST

## 2020-12-22 PROCEDURE — 700105 HCHG RX REV CODE 258: Performed by: EMERGENCY MEDICINE

## 2020-12-22 PROCEDURE — 82962 GLUCOSE BLOOD TEST: CPT | Mod: 91

## 2020-12-22 PROCEDURE — 80048 BASIC METABOLIC PNL TOTAL CA: CPT

## 2020-12-22 PROCEDURE — A9270 NON-COVERED ITEM OR SERVICE: HCPCS | Performed by: HOSPITALIST

## 2020-12-22 PROCEDURE — 83880 ASSAY OF NATRIURETIC PEPTIDE: CPT

## 2020-12-22 PROCEDURE — 85025 COMPLETE CBC W/AUTO DIFF WBC: CPT

## 2020-12-22 PROCEDURE — 83036 HEMOGLOBIN GLYCOSYLATED A1C: CPT

## 2020-12-22 PROCEDURE — 770014 HCHG ROOM/CARE - WARD (150)

## 2020-12-22 PROCEDURE — A9270 NON-COVERED ITEM OR SERVICE: HCPCS | Performed by: STUDENT IN AN ORGANIZED HEALTH CARE EDUCATION/TRAINING PROGRAM

## 2020-12-22 PROCEDURE — 700102 HCHG RX REV CODE 250 W/ 637 OVERRIDE(OP): Performed by: STUDENT IN AN ORGANIZED HEALTH CARE EDUCATION/TRAINING PROGRAM

## 2020-12-22 PROCEDURE — 700111 HCHG RX REV CODE 636 W/ 250 OVERRIDE (IP): Performed by: STUDENT IN AN ORGANIZED HEALTH CARE EDUCATION/TRAINING PROGRAM

## 2020-12-22 PROCEDURE — 700101 HCHG RX REV CODE 250: Performed by: EMERGENCY MEDICINE

## 2020-12-22 RX ADMIN — REMDESIVIR 100 MG: 100 INJECTION, POWDER, LYOPHILIZED, FOR SOLUTION INTRAVENOUS at 14:48

## 2020-12-22 RX ADMIN — METOPROLOL SUCCINATE 25 MG: 25 TABLET, EXTENDED RELEASE ORAL at 06:11

## 2020-12-22 RX ADMIN — METHIMAZOLE 2.5 MG: 5 TABLET ORAL at 17:31

## 2020-12-22 RX ADMIN — FLECAINIDE ACETATE 150 MG: 150 TABLET ORAL at 06:11

## 2020-12-22 RX ADMIN — FLECAINIDE ACETATE 150 MG: 150 TABLET ORAL at 17:31

## 2020-12-22 RX ADMIN — FUROSEMIDE 20 MG: 20 TABLET ORAL at 06:11

## 2020-12-22 RX ADMIN — METHIMAZOLE 2.5 MG: 5 TABLET ORAL at 06:11

## 2020-12-22 RX ADMIN — LEVETIRACETAM 500 MG: 500 TABLET ORAL at 17:31

## 2020-12-22 RX ADMIN — LEVETIRACETAM 500 MG: 500 TABLET ORAL at 06:11

## 2020-12-22 RX ADMIN — ENOXAPARIN SODIUM 40 MG: 40 INJECTION SUBCUTANEOUS at 06:11

## 2020-12-22 RX ADMIN — DEXAMETHASONE 6 MG: 4 TABLET ORAL at 06:12

## 2020-12-22 ASSESSMENT — ENCOUNTER SYMPTOMS
HEMOPTYSIS: 0
ABDOMINAL PAIN: 0
NAUSEA: 0
MYALGIAS: 1
HEARTBURN: 0
SPUTUM PRODUCTION: 0
DOUBLE VISION: 0
DEPRESSION: 0
DIZZINESS: 0
COUGH: 1
HEADACHES: 0
PALPITATIONS: 0
VOMITING: 0
FEVER: 0
BLURRED VISION: 0
SHORTNESS OF BREATH: 1
CHILLS: 0

## 2020-12-22 NOTE — PROGRESS NOTES
Hospital Medicine Daily Progress Note    Date of Service  12/22/2020    Chief Complaint  80 y.o. female admitted 12/20/2020 with   Chief Complaint   Patient presents with   • Shortness of Breath         Hospital Course  This is 80-year-old female with past medical history significant for sick sinus syndrome status post pacemaker, A. fib, secondary hypercoagulable state, history of CVA with bilateral hemianopsia,presented in ER with a complaint of shortness of breath, cough, fever, loss of test and smell.  On presentation in ER she is noted to be hypoxic at 70s.  Patient has a long history of smoking, quit several years ago, does wear oxygen at home.    She is found to be febrile with a temperature of 101.5, hypoxic leukopenia at 3.7.  In ER, she is found to be COVID-19 positive on 12/20/2020.  Patient is then started on IV Decadron 10 mg and asked to be admitted to the hospital for further evaluation.    Stay in the hospital, patient continue to monitor and ACS; she has been requiring 6 L of oxygen.  Intensivist Dr. Pradhan contacted for remdesivir.      Interval Problem Update  No acute events overnight, laying in the bed, denied any complaint.  Currently patient is requiring 5 to 6 L of oxygen.   --Continue supportive treatment for Covid pneumonia including Decadron, remdesivir; titrate oxygen as needed, encourage self proning.   --- Obtain echocardiogram   --- Noted to have hyperthyroidism with TSH of 0.040    12/22: Patient seen at bedside, currently on 6L of NC. Patient mentions she feels somewhat better. She is lying in bed comfortably. As per nursing no other over night events reported.    Consultants/Specialty  None    Code Status  Full Code    Disposition  Continue ACS monitoring. Pending PT/OT evaluation    Review of Systems  Review of Systems   Constitutional: Positive for malaise/fatigue. Negative for chills and fever.   HENT: Negative for congestion.    Eyes: Negative for blurred vision and double  vision.   Respiratory: Positive for cough and shortness of breath. Negative for hemoptysis and sputum production.    Cardiovascular: Negative for chest pain and palpitations.   Gastrointestinal: Negative for abdominal pain, heartburn, nausea and vomiting.   Musculoskeletal: Positive for myalgias.   Neurological: Negative for dizziness and headaches.   Psychiatric/Behavioral: Negative for depression.        Physical Exam  Temp:  [36.1 °C (96.9 °F)-36.7 °C (98.1 °F)] 36.7 °C (98 °F)  Pulse:  [60-70] 70  Resp:  [17-20] 20  BP: (104-123)/(48-57) 106/48  SpO2:  [91 %-95 %] 93 %    Physical Exam  Vitals signs and nursing note reviewed.   Constitutional:       General: She is not in acute distress.     Appearance: Normal appearance. She is not toxic-appearing.   HENT:      Head: Normocephalic and atraumatic.      Mouth/Throat:      Pharynx: No oropharyngeal exudate or posterior oropharyngeal erythema.   Eyes:      General:         Right eye: No discharge.         Left eye: No discharge.      Extraocular Movements: Extraocular movements intact.   Neck:      Musculoskeletal: Neck supple.   Cardiovascular:      Rate and Rhythm: Normal rate.      Pulses: Normal pulses.      Heart sounds: No murmur.   Pulmonary:      Effort: Pulmonary effort is normal.      Breath sounds: Rhonchi present.      Comments: Decreased breath sound at the bases  Abdominal:      General: Abdomen is flat. There is no distension.      Palpations: Abdomen is soft.      Tenderness: There is no abdominal tenderness. There is no guarding.   Skin:     General: Skin is warm and dry.   Neurological:      Mental Status: She is alert and oriented to person, place, and time.      Cranial Nerves: No cranial nerve deficit.   Psychiatric:         Mood and Affect: Mood normal.         Behavior: Behavior normal.         Thought Content: Thought content normal.         Judgment: Judgment normal.         Fluids  No intake or output data in the 24 hours ending 12/22/20  1123    Laboratory  Recent Labs     12/20/20 2200 12/21/20  1011 12/22/20  0920   WBC 3.7* 1.7* 8.7   RBC 3.81* 4.12* 4.25   HEMOGLOBIN 12.6 13.7 14.0   HEMATOCRIT 38.6 42.2 43.0   .3* 102.4* 101.2*   MCH 33.1* 33.3* 32.9   MCHC 32.6* 32.5* 32.6*   RDW 50.2* 50.8* 50.2*   PLATELETCT 180 188 273   MPV 9.4 9.6 9.8     Recent Labs     12/20/20 2200 12/21/20  1011 12/22/20  0920   SODIUM 131* 138 137   POTASSIUM 3.5* 3.5* 3.4*   CHLORIDE 100 103 101   CO2 24 25 27   GLUCOSE 105* 200* 113*   BUN 14 15 15   CREATININE 0.64 0.49* 0.51   CALCIUM 8.3* 8.4* 8.3*     Recent Labs     12/20/20 2200   APTT 32.7   INR 1.15*               Imaging  DX-CHEST-PORTABLE (1 VIEW)   Final Result      Mild hazy peripheral bilateral pulmonary opacities most suggestive of Covid pneumonia.           Assessment/Plan  * Pneumonia due to COVID-19 virus- (present on admission)  Assessment & Plan  Continue supportive treatment including Decadron, remdesivir.   --- Titrate oxygen as needed; continue Droplet, Contact, and Eye Protection  mucinex   Rt protocol  Currently requiring 6 l of O2    Self proning and incentive spirometer encouraged    Acute respiratory failure with hypoxia (HCC)- (present on admission)  Assessment & Plan  Likely 2/2 covi pna   -Continue Decadron 6 mg p.o. daily, remdesivir.  Titrate oxygen as needed   -Provide incentive spirometer, encourage self proning  Procalcitonin is normal, no antibiotics indicated    CMP daily to check kidney and liver function.    Sick sinus syndrome (HCC)- (present on admission)  Assessment & Plan  Status post pacemaker placement      Leukopenia  Assessment & Plan  At 1.7 , anc still at > 1000  -- Monitor daily     Macrocytosis  Assessment & Plan  TSH low   Vitamin b12 ok    Seizure (HCC)  Assessment & Plan  Continue Keppra 500 mg po bid   Seizure precaution    Paroxysmal atrial fibrillation (HCC)  Assessment & Plan   Does follow dr Obregon as an op   -- continue Flecanide and metoprolol     -- monitor    Hyperthyroidism- (present on admission)  Assessment & Plan  Mild, need to have repeat TSH and free T4 in 4 to 6 weeks.  Continue methimazole       VTE prophylaxis: lovenox

## 2020-12-23 PROBLEM — C34.90 LUNG CANCER (HCC): Status: ACTIVE | Noted: 2020-12-23

## 2020-12-23 PROBLEM — E87.6 HYPOKALEMIA: Status: ACTIVE | Noted: 2020-12-23

## 2020-12-23 LAB
ALBUMIN SERPL BCP-MCNC: 3.3 G/DL (ref 3.2–4.9)
ALBUMIN/GLOB SERPL: 1.1 G/DL
ALP SERPL-CCNC: 41 U/L (ref 30–99)
ALT SERPL-CCNC: 28 U/L (ref 2–50)
ANION GAP SERPL CALC-SCNC: 8 MMOL/L (ref 7–16)
AST SERPL-CCNC: 23 U/L (ref 12–45)
BASOPHILS # BLD AUTO: 0 % (ref 0–1.8)
BASOPHILS # BLD: 0 K/UL (ref 0–0.12)
BILIRUB SERPL-MCNC: 0.5 MG/DL (ref 0.1–1.5)
BUN SERPL-MCNC: 16 MG/DL (ref 8–22)
CALCIUM SERPL-MCNC: 8.2 MG/DL (ref 8.5–10.5)
CHLORIDE SERPL-SCNC: 103 MMOL/L (ref 96–112)
CO2 SERPL-SCNC: 26 MMOL/L (ref 20–33)
CREAT SERPL-MCNC: 0.47 MG/DL (ref 0.5–1.4)
EOSINOPHIL # BLD AUTO: 0 K/UL (ref 0–0.51)
EOSINOPHIL NFR BLD: 0 % (ref 0–6.9)
ERYTHROCYTE [DISTWIDTH] IN BLOOD BY AUTOMATED COUNT: 50 FL (ref 35.9–50)
GLOBULIN SER CALC-MCNC: 3 G/DL (ref 1.9–3.5)
GLUCOSE BLD-MCNC: 107 MG/DL (ref 65–99)
GLUCOSE BLD-MCNC: 132 MG/DL (ref 65–99)
GLUCOSE BLD-MCNC: 152 MG/DL (ref 65–99)
GLUCOSE SERPL-MCNC: 123 MG/DL (ref 65–99)
HCT VFR BLD AUTO: 43.7 % (ref 37–47)
HGB BLD-MCNC: 14 G/DL (ref 12–16)
IL6 SERPL-MCNC: 18.9 PG/ML
LYMPHOCYTES # BLD AUTO: 0.27 K/UL (ref 1–4.8)
LYMPHOCYTES NFR BLD: 5.2 % (ref 22–41)
MANUAL DIFF BLD: NORMAL
MCH RBC QN AUTO: 32.4 PG (ref 27–33)
MCHC RBC AUTO-ENTMCNC: 32 G/DL (ref 33.6–35)
MCV RBC AUTO: 101.2 FL (ref 81.4–97.8)
MONOCYTES # BLD AUTO: 0.41 K/UL (ref 0–0.85)
MONOCYTES NFR BLD AUTO: 7.8 % (ref 0–13.4)
MORPHOLOGY BLD-IMP: NORMAL
NEUTROPHILS # BLD AUTO: 4.52 K/UL (ref 2–7.15)
NEUTROPHILS NFR BLD: 87 % (ref 44–72)
NRBC # BLD AUTO: 0 K/UL
NRBC BLD-RTO: 0 /100 WBC
PLATELET # BLD AUTO: 265 K/UL (ref 164–446)
PLATELET BLD QL SMEAR: NORMAL
PMV BLD AUTO: 9.6 FL (ref 9–12.9)
POTASSIUM SERPL-SCNC: 3.5 MMOL/L (ref 3.6–5.5)
PROT SERPL-MCNC: 6.3 G/DL (ref 6–8.2)
RBC # BLD AUTO: 4.32 M/UL (ref 4.2–5.4)
RBC BLD AUTO: NORMAL
SODIUM SERPL-SCNC: 137 MMOL/L (ref 135–145)
WBC # BLD AUTO: 5.2 K/UL (ref 4.8–10.8)

## 2020-12-23 PROCEDURE — 700102 HCHG RX REV CODE 250 W/ 637 OVERRIDE(OP): Performed by: INTERNAL MEDICINE

## 2020-12-23 PROCEDURE — A9270 NON-COVERED ITEM OR SERVICE: HCPCS | Performed by: INTERNAL MEDICINE

## 2020-12-23 PROCEDURE — A9270 NON-COVERED ITEM OR SERVICE: HCPCS | Performed by: HOSPITALIST

## 2020-12-23 PROCEDURE — 700102 HCHG RX REV CODE 250 W/ 637 OVERRIDE(OP): Performed by: HOSPITALIST

## 2020-12-23 PROCEDURE — 97166 OT EVAL MOD COMPLEX 45 MIN: CPT

## 2020-12-23 PROCEDURE — 700111 HCHG RX REV CODE 636 W/ 250 OVERRIDE (IP): Performed by: STUDENT IN AN ORGANIZED HEALTH CARE EDUCATION/TRAINING PROGRAM

## 2020-12-23 PROCEDURE — 700102 HCHG RX REV CODE 250 W/ 637 OVERRIDE(OP): Performed by: STUDENT IN AN ORGANIZED HEALTH CARE EDUCATION/TRAINING PROGRAM

## 2020-12-23 PROCEDURE — 36415 COLL VENOUS BLD VENIPUNCTURE: CPT

## 2020-12-23 PROCEDURE — 99232 SBSQ HOSP IP/OBS MODERATE 35: CPT | Performed by: INTERNAL MEDICINE

## 2020-12-23 PROCEDURE — A9270 NON-COVERED ITEM OR SERVICE: HCPCS | Performed by: STUDENT IN AN ORGANIZED HEALTH CARE EDUCATION/TRAINING PROGRAM

## 2020-12-23 PROCEDURE — 700101 HCHG RX REV CODE 250: Performed by: EMERGENCY MEDICINE

## 2020-12-23 PROCEDURE — 80053 COMPREHEN METABOLIC PANEL: CPT

## 2020-12-23 PROCEDURE — 97161 PT EVAL LOW COMPLEX 20 MIN: CPT

## 2020-12-23 PROCEDURE — 85007 BL SMEAR W/DIFF WBC COUNT: CPT

## 2020-12-23 PROCEDURE — 700105 HCHG RX REV CODE 258: Performed by: EMERGENCY MEDICINE

## 2020-12-23 PROCEDURE — 85027 COMPLETE CBC AUTOMATED: CPT

## 2020-12-23 PROCEDURE — 82962 GLUCOSE BLOOD TEST: CPT | Mod: 91

## 2020-12-23 PROCEDURE — 770014 HCHG ROOM/CARE - WARD (150)

## 2020-12-23 RX ORDER — POTASSIUM CHLORIDE 20 MEQ/1
20 TABLET, EXTENDED RELEASE ORAL ONCE
Status: COMPLETED | OUTPATIENT
Start: 2020-12-23 | End: 2020-12-23

## 2020-12-23 RX ADMIN — METOPROLOL SUCCINATE 25 MG: 25 TABLET, EXTENDED RELEASE ORAL at 06:13

## 2020-12-23 RX ADMIN — FLECAINIDE ACETATE 150 MG: 150 TABLET ORAL at 06:13

## 2020-12-23 RX ADMIN — FLECAINIDE ACETATE 150 MG: 150 TABLET ORAL at 17:27

## 2020-12-23 RX ADMIN — ENOXAPARIN SODIUM 40 MG: 40 INJECTION SUBCUTANEOUS at 06:13

## 2020-12-23 RX ADMIN — POTASSIUM CHLORIDE 20 MEQ: 1500 TABLET, EXTENDED RELEASE ORAL at 17:28

## 2020-12-23 RX ADMIN — LEVETIRACETAM 500 MG: 500 TABLET ORAL at 17:28

## 2020-12-23 RX ADMIN — FUROSEMIDE 20 MG: 20 TABLET ORAL at 06:13

## 2020-12-23 RX ADMIN — LEVETIRACETAM 500 MG: 500 TABLET ORAL at 06:13

## 2020-12-23 RX ADMIN — DEXAMETHASONE 6 MG: 4 TABLET ORAL at 06:13

## 2020-12-23 RX ADMIN — METHIMAZOLE 2.5 MG: 5 TABLET ORAL at 17:28

## 2020-12-23 RX ADMIN — METHIMAZOLE 2.5 MG: 5 TABLET ORAL at 06:13

## 2020-12-23 RX ADMIN — REMDESIVIR 100 MG: 100 INJECTION, POWDER, LYOPHILIZED, FOR SOLUTION INTRAVENOUS at 17:28

## 2020-12-23 ASSESSMENT — ENCOUNTER SYMPTOMS
HEARTBURN: 0
DOUBLE VISION: 0
DIZZINESS: 0
FEVER: 0
HEADACHES: 0
HEMOPTYSIS: 0
BLURRED VISION: 0
MYALGIAS: 1
ABDOMINAL PAIN: 0
SPUTUM PRODUCTION: 0
VOMITING: 0
SHORTNESS OF BREATH: 1
NAUSEA: 0
COUGH: 1
PALPITATIONS: 0
CHILLS: 0
DEPRESSION: 0

## 2020-12-23 ASSESSMENT — GAIT ASSESSMENTS
GAIT LEVEL OF ASSIST: MINIMAL ASSIST
DEVIATION: DECREASED BASE OF SUPPORT
DISTANCE (FEET): 80

## 2020-12-23 ASSESSMENT — COGNITIVE AND FUNCTIONAL STATUS - GENERAL
DRESSING REGULAR LOWER BODY CLOTHING: A LITTLE
SUGGESTED CMS G CODE MODIFIER DAILY ACTIVITY: CK
MOBILITY SCORE: 22
PERSONAL GROOMING: A LITTLE
WALKING IN HOSPITAL ROOM: A LITTLE
CLIMB 3 TO 5 STEPS WITH RAILING: A LITTLE
DRESSING REGULAR UPPER BODY CLOTHING: A LITTLE
EATING MEALS: A LITTLE
SUGGESTED CMS G CODE MODIFIER MOBILITY: CJ
HELP NEEDED FOR BATHING: A LITTLE
DAILY ACTIVITIY SCORE: 18
TOILETING: A LITTLE

## 2020-12-23 ASSESSMENT — ACTIVITIES OF DAILY LIVING (ADL): TOILETING: INDEPENDENT

## 2020-12-23 NOTE — THERAPY
Physical Therapy   Initial Evaluation     Patient Name: Ml Chavira  Age:  80 y.o., Sex:  female  Medical Record #: [id number]  Today's Date: 12/23/2020     Precautions: Fall Risk    Assessment  Patient is 80 y.o. female who was recently admitted for SOB, fevers, and cough secondary to COVID. Pt is now on 6L O2. Pt presented to PT with impaired balance, impaired gait, and dec activity tolerance. Pt was primarily limited by poor activity tolerance. Pt demonstrated with slight LOB upon standing and required HHA to maintain upright balance initially. Pt was provided with FWW use during ambulation and was able to ambulate with CGA due to environmental barriers and close spaces for about 80 ft. Pt was primarily limited by dec in SpO2 down to 83%. Pt was assisted back to EOB and encouraged to perform diaphragmatic breathing and was able to recover back to 88%. RN aware of concerns with dec SpO2 with ambulation for safe d/c home. Once patient has improved in activity tolerance and is able to ambulate longer distances anticipate pt to be safe to d/c home with recs for FWW use. Will continue to follow while in house.     Plan    Recommend Physical Therapy 3 times per week until therapy goals are met for the following treatments:  Bed Mobility, Community Re-integration, Equipment, Gait Training, Manual Therapy, Neuro Re-Education / Balance, Self Care/Home Evaluation, Therapeutic Activities and Therapeutic Exercises    DC Equipment Recommendations: (P) Front-Wheel Walker  Discharge Recommendations: (P) Anticipate that the patient will have no further physical therapy needs after discharge from the hospital(anticipate pt to d/c home once O2 demands lower  )     Objective       12/23/20 1057   Prior Living Situation   Prior Services None   Housing / Facility 3 Story Apartment / Condo;Independent Living Facility   Steps Into Home 0   Steps In Home 0   Elevator Yes   Equipment Owned Single Point Cane   Lives with - Patient's  Self Care Capacity Alone and Able to Care For Self   Comments pt states she primarily lives alone and is I with functional mobility and ADL's    Prior Level of Functional Mobility   Bed Mobility Independent   Transfer Status Independent   Ambulation Independent   Distance Ambulation (Feet)   (household distances )   Assistive Devices Used Single Point Cane   Stairs Independent   Comments pt states she primarily uses SPC out in community; no AD in her home    Gait Analysis   Gait Level Of Assist Minimal Assist  (primarily CGA )   Assistive Device Front Wheel Walker;[place] ([place]) Crutches   Distance (Feet) 80   # of Times Distance was Traveled 1   Deviation Decreased Base Of Support   Weight Bearing Status fwb   Comments pt was primarily limited in distance due to dec in SpO2 during ambulation down to 83% on 6 L O2   Bed Mobility    Supine to Sit Supervised   Sit to Supine Supervised   Scooting Supervised   Rolling Supervised   Comments HOB elevated and rails up; pt states her HOB can go up/down    Functional Mobility   Sit to Stand Supervised   Bed, Chair, Wheelchair Transfer Supervised   Comments cues for navigation with FWW use   Patient / Family Goals    Patient / Family Goal #1 to go home    Short Term Goals    Short Term Goal # 1 pt will ambulate w/fww w/Mod I in 6tx for safe d/c home    Short Term Goal # 2 pt will maintain SpO2 at or above 90% during functional activites for safe activity tolerance to d/c home

## 2020-12-23 NOTE — THERAPY
Occupational Therapy   Initial Evaluation     Patient Name: Ml Chavira  Age:  80 y.o., Sex:  female  Medical Record #: [id number]  Today's Date: 12/23/2020     Precautions  Precautions: (P) Fall Risk  Comments: (P) desats to 88% with activity, on O2. recovers to above 90 within 1 minute of seated rest/ breathing    Assessment  Patient is 80 y.o. female with a diagnosis of COVID-19, pneumonia.  Additional factors influencing patient status / progress: good support available at [facility].      Plan    Recommend Occupational Therapy 3 times per week until therapy goals are met for the following treatments:  Self Care/Activities of Daily Living, Therapeutic Activities and Therapeutic Exercises.    DC Equipment Recommendations: (P) None  Discharge Recommendations: (P) Anticipate that the patient will have no further occupational therapy needs after discharge from the hospital(may benefit from resumption of inhouse PT at [facility].)     Subjective    Pleasant and cooperative throughout.      Objective       12/23/20 1140   Total Time Spent   Total Time Spent (Mins) 42   Charge Group   OT Evaluation OT Evaluation Mod   Initial Contact Note    Initial Contact Note Order Received and Verified, Occupational Therapy Evaluation in Progress with Full Report to Follow.   Prior Living Situation   Prior Services None   Housing / Facility 3 Story Apartment / Condo  (third floor at [facility], independent living section)   Steps Into Home 0   Steps In Home 0   Elevator Yes   Bathroom Set up Bathtub / Shower Combination;Grab Bars   Equipment Owned Single Point Cane   Lives with - Patient's Self Care Capacity Alone and Able to Care For Self   Comments facility provides weekly housekeeping and laundry of sheets/ towels   Prior Level of ADL Function   Self Feeding Independent   Grooming / Hygiene Independent   Bathing Independent   Dressing Independent   Toileting Independent   Prior Level of IADL Function   Medication Management Independent    Laundry Independent  (personal clothing)   Kitchen Mobility Independent   Finances Independent   Home Management Requires Assist  (facility does weekly housekeeping)   Shopping Independent   Prior Level Of Mobility Independent With Device in Home   Driving / Transportation Relatives / Others Provide Transportation  (facility bus)   Precautions   Precautions Fall Risk   Comments desats to 88% with activity, on O2. recovers to above 90 within 1 minute of seated rest/ breathing   Vitals   O2 (LPM) 5   O2 Delivery Device Silicone Nasal Cannula   Vitals Comments tends to hold breath with activity, cues to coordinate breathing with activity   Pain 0 - 10 Group   Therapist Pain Assessment Post Activity Pain Same as Prior to Activity;Nurse Notified;0   Cognition    Cognition / Consciousness WDL   Level of Consciousness Alert   Comments pleasant and cooperative   Passive ROM Upper Body   Passive ROM Upper Body WDL   Active ROM Upper Body   Active ROM Upper Body  WDL   Dominant Hand Right   Strength Upper Body   Upper Body Strength  WDL   Sensation Upper Body   Upper Extremity Sensation  WDL   Upper Body Muscle Tone   Upper Body Muscle Tone  WDL   Coordination Upper Body   Coordination WDL   Balance Assessment   Sitting Balance (Static) Good   Sitting Balance (Dynamic) Fair +   Standing Balance (Static) Fair   Standing Balance (Dynamic) Fair -   Weight Shift Sitting Good   Weight Shift Standing Fair   Bed Mobility    Supine to Sit Supervised   Sit to Supine Supervised   Scooting Supervised   Rolling Supervised   ADL Assessment   Eating Supervision   Grooming Supervision;Seated   Bathing   (NT)   Upper Body Dressing Supervision   Lower Body Dressing Minimal Assist   Toileting Minimal Assist   How much help from another person does the patient currently need...   Putting on and taking off regular lower body clothing? 3   Bathing (including washing, rinsing, and drying)? 3   Toileting, which includes using a toilet, bedpan, or  urinal? 3   Putting on and taking off regular upper body clothing? 3   Taking care of personal grooming such as brushing teeth? 3   Eating meals? 3   6 Clicks Daily Activity Score 18   Functional Mobility   Sit to Stand Supervised   Bed, Chair, Wheelchair Transfer Supervised   Toilet Transfers Supervised   Transfer Method Stand Pivot   Visual Perception   Visual Perception  WDL   Patient / Family Goals   Patient / Family Goal #1 go home   Short Term Goals   Short Term Goal # 1 complete 10 minutes of continuous functional activity, prior to resting, demonstrating good coordination of breathing, without need for cues   Short Term Goal # 2 mod I toileting tasks   Short Term Goal # 3 tolerate seated dressing tasks without ZAMARRIPA, or Sats dropping below 90% on O2   Education Group   Role of Occupational Therapist Patient Response Patient;Acceptance;Explanation;Demonstration;Verbal Demonstration;Reinforcement Needed   Problem List   Problem List Decreased Active Daily Living Skills;Decreased Functional Mobility;Decreased Activity Tolerance   Anticipated Discharge Equipment and Recommendations   DC Equipment Recommendations None   Discharge Recommendations Anticipate that the patient will have no further occupational therapy needs after discharge from the hospital  (may benefit from resumption of inhouse PT at [facility].)   Interdisciplinary Plan of Care Collaboration   IDT Collaboration with  Nursing   Patient Position at End of Therapy Seated;Edge of Bed;Call Light within Reach;Tray Table within Reach;Phone within Reach   Collaboration Comments report given   Session Information   Date / Session Number  12/23,1( 1/3, 12/29)   Priority 2

## 2020-12-23 NOTE — ASSESSMENT & PLAN NOTE
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 No need for inpatient follow up  She will continue her routine follow ups as outpatient

## 2020-12-23 NOTE — PROGRESS NOTES
[facility] Medicine Daily Progress Note    Date of Service  12/23/2020    Chief Complaint  80 y.o. female admitted 12/20/2020 with   Chief Complaint   Patient presents with   • Shortness of Breath         Hospital Course  This is 80-year-old female with past medical history significant for sick sinus syndrome status post pacemaker, A. fib, secondary hypercoagulable state, history of CVA with bilateral hemianopsia,presented in ER with a complaint of shortness of breath, cough, fever, loss of test and smell.  On presentation in ER she is noted to be hypoxic at 70s.  Patient has a long history of smoking, quit several years ago, does wear oxygen at home.    She is found to be febrile with a temperature of 101.5, hypoxic leukopenia at 3.7.  In ER, she is found to be COVID-19 positive on 12/20/2020.  Patient is then started on IV Decadron 10 mg and asked to be admitted to the hospital for further evaluation.    Stay in the hospital, patient continue to monitor and ACS; she has been requiring 6 L of oxygen.  Intensivist Dr. Pradhan contacted for remdesivir.      Interval Problem Update  No acute events overnight, laying in the bed, denied any complaint.  Currently patient is requiring 5 to 6 L of oxygen.   --Continue supportive treatment for Covid pneumonia including Decadron, remdesivir; titrate oxygen as needed, encourage self proning.   --- Obtain echocardiogram   --- Noted to have hyperthyroidism with TSH of 0.040    12/22: Patient seen at bedside, currently on 6L of NC. Patient mentions she feels somewhat better. She is lying in bed comfortably. As per nursing no other over night events reported.    12/23: Patient seen at [facility], she mentions she is feeling somewhat better, currently on 5L of NC. As per nursing no other over night events reported.    Consultants/Specialty  None    Code Status  Full Code    Disposition  Continue ACS monitoring. Pending PT/OT evaluation    Review of Systems  Review of Systems    Constitutional: Positive for malaise/fatigue. Negative for chills and fever.   HENT: Negative for congestion.    Eyes: Negative for blurred vision and double vision.   Respiratory: Positive for cough and shortness of breath. Negative for hemoptysis and sputum production.    Cardiovascular: Negative for chest pain and palpitations.   Gastrointestinal: Negative for abdominal pain, heartburn, nausea and vomiting.   Musculoskeletal: Positive for myalgias.   Neurological: Negative for dizziness and headaches.   Psychiatric/Behavioral: Negative for depression.        Physical Exam  Temp:  [36.1 °C (97 °F)-37 °C (98.6 °F)] 36.3 °C (97.3 °F)  Pulse:  [60-71] 68  Resp:  [17-18] 18  BP: (106-125)/(48-62) 124/62  SpO2:  [91 %-94 %] 92 %    Physical Exam  Vitals signs and nursing note reviewed.   Constitutional:       General: She is not in acute distress.     Appearance: Normal appearance. She is not toxic-appearing.   HENT:      Head: Normocephalic and atraumatic.      Mouth/Throat:      Pharynx: No oropharyngeal exudate or posterior oropharyngeal erythema.   Eyes:      General:         Right eye: No discharge.         Left eye: No discharge.      Extraocular Movements: Extraocular movements intact.   Neck:      Musculoskeletal: Neck supple.   Cardiovascular:      Rate and Rhythm: Normal rate.      Pulses: Normal pulses.      Heart sounds: No murmur.   Pulmonary:      Effort: Pulmonary effort is normal.      Breath sounds: Rhonchi present.      Comments: Decreased breath sound at the bases  Abdominal:      General: Abdomen is flat. There is no distension.      Palpations: Abdomen is soft.      Tenderness: There is no abdominal tenderness. There is no guarding.   Skin:     General: Skin is warm and dry.   Neurological:      Mental Status: She is alert and oriented to person, place, and time.      Cranial Nerves: No cranial nerve deficit.   Psychiatric:         Mood and Affect: Mood normal.         Behavior: Behavior normal.          Thought Content: Thought content normal.         Judgment: Judgment normal.         Fluids    Intake/Output Summary (Last 24 hours) at 12/23/2020 1301  Last data filed at 12/23/2020 0745  Gross per 24 hour   Intake --   Output 850 ml   Net -850 ml       Laboratory  Recent Labs     12/21/20  1011 12/22/20  0920 12/23/20  0944   WBC 1.7* 8.7 5.2   RBC 4.12* 4.25 4.32   HEMOGLOBIN 13.7 14.0 14.0   HEMATOCRIT 42.2 43.0 43.7   .4* 101.2* 101.2*   MCH 33.3* 32.9 32.4   MCHC 32.5* 32.6* 32.0*   RDW 50.8* 50.2* 50.0   PLATELETCT 188 273 265   MPV 9.6 9.8 9.6     Recent Labs     12/21/20  1011 12/22/20  0920 12/23/20  0944   SODIUM 138 137 137   POTASSIUM 3.5* 3.4* 3.5*   CHLORIDE 103 101 103   CO2 25 27 26   GLUCOSE 200* 113* 123*   BUN 15 15 16   CREATININE 0.49* 0.51 0.47*   CALCIUM 8.4* 8.3* 8.2*     Recent Labs     12/20/20  2200   APTT 32.7   INR 1.15*               Imaging  DX-CHEST-PORTABLE (1 VIEW)   Final Result      Mild hazy peripheral bilateral pulmonary opacities most suggestive of Covid pneumonia.           Assessment/Plan  * Pneumonia due to COVID-19 virus- (present on admission)  Assessment & Plan  Continue supportive treatment including Decadron, remdesivir.   --- Titrate oxygen as needed; continue Droplet, Contact, and Eye Protection  mucinex   Rt protocol  Currently requiring 6 l of O2    Self proning and incentive spirometer encouraged    Acute respiratory failure with hypoxia (HCC)- (present on admission)  Assessment & Plan  Likely 2/2 covi pna   -Continue Decadron 6 mg p.o. daily until 12/29, remdesivir until 12/25.  Titrate oxygen as needed   -Provide incentive spirometer, encourage self proning  Procalcitonin is normal, no antibiotics indicated    CMP daily to check kidney and liver function.    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 No need for inpatient follow up  She will continue her routine follow ups as outpatient    Leukopenia  Assessment & Plan  12/23: resolved    At 1.7 , anc still at > 1000  -- Monitor daily     Macrocytosis  Assessment & Plan  TSH low   Vitamin b12 ok    Seizure (HCC)  Assessment & Plan  Continue Keppra 500 mg po bid   Seizure precaution    Paroxysmal atrial fibrillation (HCC)  Assessment & Plan   Does follow dr Obregon as an op   -- continue Flecanide and metoprolol    -- monitor    Hyperthyroidism- (present on admission)  Assessment & Plan  Mild, need to have repeat TSH and free T4 in 4 to 6 weeks.  Continue methimazole       VTE prophylaxis: lovenox

## 2020-12-24 LAB
ALBUMIN SERPL BCP-MCNC: 3.4 G/DL (ref 3.2–4.9)
ALBUMIN/GLOB SERPL: 1.1 G/DL
ALP SERPL-CCNC: 42 U/L (ref 30–99)
ALT SERPL-CCNC: 30 U/L (ref 2–50)
ANION GAP SERPL CALC-SCNC: 9 MMOL/L (ref 7–16)
AST SERPL-CCNC: 30 U/L (ref 12–45)
BASOPHILS # BLD AUTO: 0 % (ref 0–1.8)
BASOPHILS # BLD: 0 K/UL (ref 0–0.12)
BILIRUB SERPL-MCNC: 0.6 MG/DL (ref 0.1–1.5)
BUN SERPL-MCNC: 17 MG/DL (ref 8–22)
BURR CELLS BLD QL SMEAR: NORMAL
CALCIUM SERPL-MCNC: 8.2 MG/DL (ref 8.5–10.5)
CHLORIDE SERPL-SCNC: 102 MMOL/L (ref 96–112)
CO2 SERPL-SCNC: 28 MMOL/L (ref 20–33)
CREAT SERPL-MCNC: 0.39 MG/DL (ref 0.5–1.4)
EOSINOPHIL # BLD AUTO: 0 K/UL (ref 0–0.51)
EOSINOPHIL NFR BLD: 0 % (ref 0–6.9)
ERYTHROCYTE [DISTWIDTH] IN BLOOD BY AUTOMATED COUNT: 49.6 FL (ref 35.9–50)
GLOBULIN SER CALC-MCNC: 3 G/DL (ref 1.9–3.5)
GLUCOSE BLD-MCNC: 110 MG/DL (ref 65–99)
GLUCOSE BLD-MCNC: 134 MG/DL (ref 65–99)
GLUCOSE BLD-MCNC: 153 MG/DL (ref 65–99)
GLUCOSE BLD-MCNC: 253 MG/DL (ref 65–99)
GLUCOSE SERPL-MCNC: 160 MG/DL (ref 65–99)
HCT VFR BLD AUTO: 44 % (ref 37–47)
HGB BLD-MCNC: 14.2 G/DL (ref 12–16)
LYMPHOCYTES # BLD AUTO: 0.36 K/UL (ref 1–4.8)
LYMPHOCYTES NFR BLD: 7 % (ref 22–41)
MACROCYTES BLD QL SMEAR: ABNORMAL
MANUAL DIFF BLD: NORMAL
MCH RBC QN AUTO: 32.9 PG (ref 27–33)
MCHC RBC AUTO-ENTMCNC: 32.3 G/DL (ref 33.6–35)
MCV RBC AUTO: 102.1 FL (ref 81.4–97.8)
MONOCYTES # BLD AUTO: 0.09 K/UL (ref 0–0.85)
MONOCYTES NFR BLD AUTO: 1.7 % (ref 0–13.4)
MORPHOLOGY BLD-IMP: NORMAL
NEUTROPHILS # BLD AUTO: 4.75 K/UL (ref 2–7.15)
NEUTROPHILS NFR BLD: 90.4 % (ref 44–72)
NEUTS BAND NFR BLD MANUAL: 0.9 % (ref 0–10)
NRBC # BLD AUTO: 0 K/UL
NRBC BLD-RTO: 0 /100 WBC
PLATELET # BLD AUTO: 310 K/UL (ref 164–446)
PLATELET BLD QL SMEAR: NORMAL
PMV BLD AUTO: 9.3 FL (ref 9–12.9)
POIKILOCYTOSIS BLD QL SMEAR: NORMAL
POTASSIUM SERPL-SCNC: 3.4 MMOL/L (ref 3.6–5.5)
PROT SERPL-MCNC: 6.4 G/DL (ref 6–8.2)
RBC # BLD AUTO: 4.31 M/UL (ref 4.2–5.4)
RBC BLD AUTO: PRESENT
SODIUM SERPL-SCNC: 139 MMOL/L (ref 135–145)
WBC # BLD AUTO: 5.2 K/UL (ref 4.8–10.8)

## 2020-12-24 PROCEDURE — 700102 HCHG RX REV CODE 250 W/ 637 OVERRIDE(OP): Performed by: INTERNAL MEDICINE

## 2020-12-24 PROCEDURE — 700105 HCHG RX REV CODE 258: Performed by: EMERGENCY MEDICINE

## 2020-12-24 PROCEDURE — 85027 COMPLETE CBC AUTOMATED: CPT

## 2020-12-24 PROCEDURE — 770014 HCHG ROOM/CARE - WARD (150)

## 2020-12-24 PROCEDURE — 700102 HCHG RX REV CODE 250 W/ 637 OVERRIDE(OP): Performed by: HOSPITALIST

## 2020-12-24 PROCEDURE — A9270 NON-COVERED ITEM OR SERVICE: HCPCS | Performed by: STUDENT IN AN ORGANIZED HEALTH CARE EDUCATION/TRAINING PROGRAM

## 2020-12-24 PROCEDURE — 700101 HCHG RX REV CODE 250: Performed by: EMERGENCY MEDICINE

## 2020-12-24 PROCEDURE — A9270 NON-COVERED ITEM OR SERVICE: HCPCS | Performed by: INTERNAL MEDICINE

## 2020-12-24 PROCEDURE — 36415 COLL VENOUS BLD VENIPUNCTURE: CPT

## 2020-12-24 PROCEDURE — A9270 NON-COVERED ITEM OR SERVICE: HCPCS | Performed by: HOSPITALIST

## 2020-12-24 PROCEDURE — 99232 SBSQ HOSP IP/OBS MODERATE 35: CPT | Performed by: INTERNAL MEDICINE

## 2020-12-24 PROCEDURE — 85007 BL SMEAR W/DIFF WBC COUNT: CPT

## 2020-12-24 PROCEDURE — 700111 HCHG RX REV CODE 636 W/ 250 OVERRIDE (IP): Performed by: STUDENT IN AN ORGANIZED HEALTH CARE EDUCATION/TRAINING PROGRAM

## 2020-12-24 PROCEDURE — 700102 HCHG RX REV CODE 250 W/ 637 OVERRIDE(OP): Performed by: STUDENT IN AN ORGANIZED HEALTH CARE EDUCATION/TRAINING PROGRAM

## 2020-12-24 PROCEDURE — 80053 COMPREHEN METABOLIC PANEL: CPT

## 2020-12-24 PROCEDURE — 82962 GLUCOSE BLOOD TEST: CPT | Mod: 91

## 2020-12-24 RX ORDER — POTASSIUM CHLORIDE 20 MEQ/1
20 TABLET, EXTENDED RELEASE ORAL ONCE
Status: COMPLETED | OUTPATIENT
Start: 2020-12-24 | End: 2020-12-24

## 2020-12-24 RX ADMIN — ENOXAPARIN SODIUM 40 MG: 40 INJECTION SUBCUTANEOUS at 05:00

## 2020-12-24 RX ADMIN — LEVETIRACETAM 500 MG: 500 TABLET ORAL at 17:38

## 2020-12-24 RX ADMIN — FLECAINIDE ACETATE 150 MG: 150 TABLET ORAL at 04:57

## 2020-12-24 RX ADMIN — DEXAMETHASONE 6 MG: 4 TABLET ORAL at 04:58

## 2020-12-24 RX ADMIN — METOPROLOL SUCCINATE 25 MG: 25 TABLET, EXTENDED RELEASE ORAL at 04:58

## 2020-12-24 RX ADMIN — LEVETIRACETAM 500 MG: 500 TABLET ORAL at 04:57

## 2020-12-24 RX ADMIN — POTASSIUM CHLORIDE 20 MEQ: 1500 TABLET, EXTENDED RELEASE ORAL at 12:33

## 2020-12-24 RX ADMIN — METHIMAZOLE 2.5 MG: 5 TABLET ORAL at 17:37

## 2020-12-24 RX ADMIN — REMDESIVIR 100 MG: 100 INJECTION, POWDER, LYOPHILIZED, FOR SOLUTION INTRAVENOUS at 17:37

## 2020-12-24 RX ADMIN — FLECAINIDE ACETATE 150 MG: 150 TABLET ORAL at 17:37

## 2020-12-24 RX ADMIN — METHIMAZOLE 2.5 MG: 5 TABLET ORAL at 04:57

## 2020-12-24 ASSESSMENT — ENCOUNTER SYMPTOMS
COUGH: 1
PALPITATIONS: 0
SHORTNESS OF BREATH: 1
CHILLS: 0
DOUBLE VISION: 0
NAUSEA: 0
VOMITING: 0
HEARTBURN: 0
ABDOMINAL PAIN: 0
HEADACHES: 0
MYALGIAS: 1
SPUTUM PRODUCTION: 0
BLURRED VISION: 0
FEVER: 0
DEPRESSION: 0
DIZZINESS: 0
HEMOPTYSIS: 0

## 2020-12-24 NOTE — PROGRESS NOTES
[facility] Medicine Daily Progress Note    Date of Service  12/24/2020    Chief Complaint  80 y.o. female admitted 12/20/2020 with   Chief Complaint   Patient presents with   • Shortness of Breath         Hospital Course  This is 80-year-old female with past medical history significant for sick sinus syndrome status post pacemaker, A. fib, secondary hypercoagulable state, history of CVA with bilateral hemianopsia,presented in ER with a complaint of shortness of breath, cough, fever, loss of test and smell.  On presentation in ER she is noted to be hypoxic at 70s.  Patient has a long history of smoking, quit several years ago, does wear oxygen at home.    She is found to be febrile with a temperature of 101.5, hypoxic leukopenia at 3.7.  In ER, she is found to be COVID-19 positive on 12/20/2020.  Patient is then started on IV Decadron 10 mg and asked to be admitted to the hospital for further evaluation.    Stay in the hospital, patient continue to monitor and ACS; she has been requiring 6 L of oxygen.  Intensivist Dr. Pradhan contacted for remdesivir.      Interval Problem Update  No acute events overnight, laying in the bed, denied any complaint.  Currently patient is requiring 5 to 6 L of oxygen.   --Continue supportive treatment for Covid pneumonia including Decadron, remdesivir; titrate oxygen as needed, encourage self proning.   --- Obtain echocardiogram   --- Noted to have hyperthyroidism with TSH of 0.040    12/22: Patient seen at bedside, currently on 6L of NC. Patient mentions she feels somewhat better. She is lying in bed comfortably. As per nursing no other over night events reported.    12/23: Patient seen at [facility], she mentions she is feeling somewhat better, currently on 5L of NC. As per nursing no other over night events reported.    12/24: Patient sitting at bedside comfortably, eating breakfast. Currently on 5L of NC. As per nursing no other over night events  reported.    Consultants/Specialty  None    Code Status  Full Code    Disposition  Continue ACS monitoring. Asper PT, patient would require a FWW once discharged.    Review of Systems  Review of Systems   Constitutional: Positive for malaise/fatigue. Negative for chills and fever.   HENT: Negative for congestion.    Eyes: Negative for blurred vision and double vision.   Respiratory: Positive for cough and shortness of breath. Negative for hemoptysis and sputum production.    Cardiovascular: Negative for chest pain and palpitations.   Gastrointestinal: Negative for abdominal pain, heartburn, nausea and vomiting.   Musculoskeletal: Positive for myalgias.   Neurological: Negative for dizziness and headaches.   Psychiatric/Behavioral: Negative for depression.        Physical Exam  Temp:  [36.1 °C (97 °F)-36.8 °C (98.2 °F)] 36.1 °C (97 °F)  Pulse:  [60-88] 60  Resp:  [18-20] 18  BP: (103-127)/(44-77) 126/64  SpO2:  [90 %-94 %] 92 %    Physical Exam  Vitals signs and nursing note reviewed.   Constitutional:       General: She is not in acute distress.     Appearance: Normal appearance. She is not toxic-appearing.   HENT:      Head: Normocephalic and atraumatic.      Mouth/Throat:      Pharynx: No oropharyngeal exudate or posterior oropharyngeal erythema.   Eyes:      General:         Right eye: No discharge.         Left eye: No discharge.      Extraocular Movements: Extraocular movements intact.   Neck:      Musculoskeletal: Neck supple.   Cardiovascular:      Rate and Rhythm: Normal rate.      Pulses: Normal pulses.      Heart sounds: No murmur.   Pulmonary:      Effort: Pulmonary effort is normal.      Breath sounds: Rhonchi present.      Comments: Decreased breath sound at the bases  Abdominal:      General: Abdomen is flat. There is no distension.      Palpations: Abdomen is soft.      Tenderness: There is no abdominal tenderness. There is no guarding.   Skin:     General: Skin is warm and dry.   Neurological:       Mental Status: She is alert and oriented to person, place, and time.      Cranial Nerves: No cranial nerve deficit.   Psychiatric:         Mood and Affect: Mood normal.         Behavior: Behavior normal.         Thought Content: Thought content normal.         Judgment: Judgment normal.         Fluids  No intake or output data in the 24 hours ending 12/24/20 1225    Laboratory  Recent Labs     12/22/20  0920 12/23/20  0944 12/24/20  0920   WBC 8.7 5.2 5.2   RBC 4.25 4.32 4.31   HEMOGLOBIN 14.0 14.0 14.2   HEMATOCRIT 43.0 43.7 44.0   .2* 101.2* 102.1*   MCH 32.9 32.4 32.9   MCHC 32.6* 32.0* 32.3*   RDW 50.2* 50.0 49.6   PLATELETCT 273 265 310   MPV 9.8 9.6 9.3     Recent Labs     12/22/20  0920 12/23/20  0944 12/24/20  0920   SODIUM 137 137 139   POTASSIUM 3.4* 3.5* 3.4*   CHLORIDE 101 103 102   CO2 27 26 28   GLUCOSE 113* 123* 160*   BUN 15 16 17   CREATININE 0.51 0.47* 0.39*   CALCIUM 8.3* 8.2* 8.2*                   Imaging  DX-CHEST-PORTABLE (1 VIEW)   Final Result      Mild hazy peripheral bilateral pulmonary opacities most suggestive of Covid pneumonia.           Assessment/Plan  * Pneumonia due to COVID-19 virus- (present on admission)  Assessment & Plan  Continue supportive treatment including Decadron, remdesivir.   --- Titrate oxygen as needed; continue Droplet, Contact, and Eye Protection  mucinex   Rt protocol  Currently requiring 5 l of O2    Self proning and incentive spirometer encouraged    Acute respiratory failure with hypoxia (HCC)- (present on admission)  Assessment & Plan  Likely 2/2 covi pna   -Continue Decadron 6 mg p.o. daily until 12/29, remdesivir until 12/25.  Titrate oxygen as needed   -Provide incentive spirometer, encourage self proning              -Evaluate lasix needs daily  Procalcitonin is normal, no antibiotics indicated    CMP daily to check kidney and liver function.    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 No need for inpatient follow up  She will continue her routine follow ups as outpatient    Leukopenia  Assessment & Plan  12/23: resolved    At 1.7 , anc still at > 1000  -- Monitor daily     Macrocytosis  Assessment & Plan  TSH low   Vitamin b12 ok    Seizure (HCC)  Assessment & Plan  Continue Keppra 500 mg po bid   Seizure precaution    Paroxysmal atrial fibrillation (HCC)  Assessment & Plan   Does follow dr Obregon as an op   -- continue Flecanide and metoprolol    -- monitor    Hyperthyroidism- (present on admission)  Assessment & Plan  Mild, need to have repeat TSH and free T4 in 4 to 6 weeks.  Continue methimazole       VTE prophylaxis: lovenox

## 2020-12-24 NOTE — PROGRESS NOTES
Pt currently proning, on 4L O2 and O2 sats are 95%. Pt getting up to ambulate with SBA, encouraging to use IS. No complaints at this time. Will cont to monitor

## 2020-12-25 LAB
ALBUMIN SERPL BCP-MCNC: 3.4 G/DL (ref 3.2–4.9)
ALBUMIN/GLOB SERPL: 1.1 G/DL
ALP SERPL-CCNC: 43 U/L (ref 30–99)
ALT SERPL-CCNC: 42 U/L (ref 2–50)
ANION GAP SERPL CALC-SCNC: 8 MMOL/L (ref 7–16)
AST SERPL-CCNC: 42 U/L (ref 12–45)
BACTERIA BLD CULT: NORMAL
BACTERIA BLD CULT: NORMAL
BASOPHILS # BLD AUTO: 0 % (ref 0–1.8)
BASOPHILS # BLD: 0 K/UL (ref 0–0.12)
BILIRUB SERPL-MCNC: 0.6 MG/DL (ref 0.1–1.5)
BUN SERPL-MCNC: 18 MG/DL (ref 8–22)
CALCIUM SERPL-MCNC: 8.6 MG/DL (ref 8.5–10.5)
CHLORIDE SERPL-SCNC: 102 MMOL/L (ref 96–112)
CO2 SERPL-SCNC: 27 MMOL/L (ref 20–33)
CREAT SERPL-MCNC: 0.57 MG/DL (ref 0.5–1.4)
EOSINOPHIL # BLD AUTO: 0 K/UL (ref 0–0.51)
EOSINOPHIL NFR BLD: 0 % (ref 0–6.9)
ERYTHROCYTE [DISTWIDTH] IN BLOOD BY AUTOMATED COUNT: 49.1 FL (ref 35.9–50)
GLOBULIN SER CALC-MCNC: 3 G/DL (ref 1.9–3.5)
GLUCOSE BLD-MCNC: 137 MG/DL (ref 65–99)
GLUCOSE BLD-MCNC: 144 MG/DL (ref 65–99)
GLUCOSE BLD-MCNC: 179 MG/DL (ref 65–99)
GLUCOSE SERPL-MCNC: 128 MG/DL (ref 65–99)
HCT VFR BLD AUTO: 43.6 % (ref 37–47)
HGB BLD-MCNC: 14.2 G/DL (ref 12–16)
LYMPHOCYTES # BLD AUTO: 0.51 K/UL (ref 1–4.8)
LYMPHOCYTES NFR BLD: 7.8 % (ref 22–41)
MACROCYTES BLD QL SMEAR: ABNORMAL
MANUAL DIFF BLD: NORMAL
MCH RBC QN AUTO: 33 PG (ref 27–33)
MCHC RBC AUTO-ENTMCNC: 32.6 G/DL (ref 33.6–35)
MCV RBC AUTO: 101.4 FL (ref 81.4–97.8)
MONOCYTES # BLD AUTO: 0.22 K/UL (ref 0–0.85)
MONOCYTES NFR BLD AUTO: 3.4 % (ref 0–13.4)
MORPHOLOGY BLD-IMP: NORMAL
NEUTROPHILS # BLD AUTO: 5.77 K/UL (ref 2–7.15)
NEUTROPHILS NFR BLD: 88.8 % (ref 44–72)
NRBC # BLD AUTO: 0 K/UL
NRBC BLD-RTO: 0 /100 WBC
OVALOCYTES BLD QL SMEAR: NORMAL
PLATELET # BLD AUTO: 310 K/UL (ref 164–446)
PLATELET BLD QL SMEAR: NORMAL
PMV BLD AUTO: 9.3 FL (ref 9–12.9)
POTASSIUM SERPL-SCNC: 3.6 MMOL/L (ref 3.6–5.5)
PROT SERPL-MCNC: 6.4 G/DL (ref 6–8.2)
RBC # BLD AUTO: 4.3 M/UL (ref 4.2–5.4)
RBC BLD AUTO: PRESENT
SIGNIFICANT IND 70042: NORMAL
SIGNIFICANT IND 70042: NORMAL
SITE SITE: NORMAL
SITE SITE: NORMAL
SODIUM SERPL-SCNC: 137 MMOL/L (ref 135–145)
SOURCE SOURCE: NORMAL
SOURCE SOURCE: NORMAL
VARIANT LYMPHS BLD QL SMEAR: NORMAL
WBC # BLD AUTO: 6.5 K/UL (ref 4.8–10.8)

## 2020-12-25 PROCEDURE — 700111 HCHG RX REV CODE 636 W/ 250 OVERRIDE (IP): Performed by: STUDENT IN AN ORGANIZED HEALTH CARE EDUCATION/TRAINING PROGRAM

## 2020-12-25 PROCEDURE — 82962 GLUCOSE BLOOD TEST: CPT | Mod: 91

## 2020-12-25 PROCEDURE — 80053 COMPREHEN METABOLIC PANEL: CPT

## 2020-12-25 PROCEDURE — 700102 HCHG RX REV CODE 250 W/ 637 OVERRIDE(OP): Performed by: HOSPITALIST

## 2020-12-25 PROCEDURE — A9270 NON-COVERED ITEM OR SERVICE: HCPCS | Performed by: HOSPITALIST

## 2020-12-25 PROCEDURE — 85007 BL SMEAR W/DIFF WBC COUNT: CPT

## 2020-12-25 PROCEDURE — 770014 HCHG ROOM/CARE - WARD (150)

## 2020-12-25 PROCEDURE — 85027 COMPLETE CBC AUTOMATED: CPT

## 2020-12-25 PROCEDURE — 99232 SBSQ HOSP IP/OBS MODERATE 35: CPT | Performed by: INTERNAL MEDICINE

## 2020-12-25 PROCEDURE — A9270 NON-COVERED ITEM OR SERVICE: HCPCS | Performed by: STUDENT IN AN ORGANIZED HEALTH CARE EDUCATION/TRAINING PROGRAM

## 2020-12-25 PROCEDURE — 36415 COLL VENOUS BLD VENIPUNCTURE: CPT

## 2020-12-25 PROCEDURE — 700101 HCHG RX REV CODE 250: Performed by: EMERGENCY MEDICINE

## 2020-12-25 PROCEDURE — 700105 HCHG RX REV CODE 258: Performed by: EMERGENCY MEDICINE

## 2020-12-25 PROCEDURE — 700102 HCHG RX REV CODE 250 W/ 637 OVERRIDE(OP): Performed by: STUDENT IN AN ORGANIZED HEALTH CARE EDUCATION/TRAINING PROGRAM

## 2020-12-25 RX ADMIN — METHIMAZOLE 2.5 MG: 5 TABLET ORAL at 05:32

## 2020-12-25 RX ADMIN — FLECAINIDE ACETATE 150 MG: 150 TABLET ORAL at 05:32

## 2020-12-25 RX ADMIN — DEXAMETHASONE 6 MG: 4 TABLET ORAL at 05:32

## 2020-12-25 RX ADMIN — LEVETIRACETAM 500 MG: 500 TABLET ORAL at 05:32

## 2020-12-25 RX ADMIN — ENOXAPARIN SODIUM 40 MG: 40 INJECTION SUBCUTANEOUS at 05:32

## 2020-12-25 RX ADMIN — METHIMAZOLE 2.5 MG: 5 TABLET ORAL at 18:06

## 2020-12-25 RX ADMIN — FLECAINIDE ACETATE 150 MG: 150 TABLET ORAL at 18:06

## 2020-12-25 RX ADMIN — REMDESIVIR 100 MG: 100 INJECTION, POWDER, LYOPHILIZED, FOR SOLUTION INTRAVENOUS at 18:07

## 2020-12-25 RX ADMIN — METOPROLOL SUCCINATE 25 MG: 25 TABLET, EXTENDED RELEASE ORAL at 05:32

## 2020-12-25 RX ADMIN — LEVETIRACETAM 500 MG: 500 TABLET ORAL at 18:05

## 2020-12-25 ASSESSMENT — ENCOUNTER SYMPTOMS
ABDOMINAL PAIN: 0
SHORTNESS OF BREATH: 1
DEPRESSION: 0
PALPITATIONS: 0
HEADACHES: 0
DIZZINESS: 0
VOMITING: 0
MYALGIAS: 1
FEVER: 0
DOUBLE VISION: 0
SPUTUM PRODUCTION: 0
COUGH: 1
CHILLS: 0
HEARTBURN: 0
HEMOPTYSIS: 0
NAUSEA: 0
BLURRED VISION: 0

## 2020-12-25 NOTE — PROGRESS NOTES
[facility] Medicine Daily Progress Note    Date of Service  12/25/2020    Chief Complaint  80 y.o. female admitted 12/20/2020 with   Chief Complaint   Patient presents with   • Shortness of Breath         Hospital Course  This is 80-year-old female with past medical history significant for sick sinus syndrome status post pacemaker, A. fib, secondary hypercoagulable state, history of CVA with bilateral hemianopsia,presented in ER with a complaint of shortness of breath, cough, fever, loss of test and smell.  On presentation in ER she is noted to be hypoxic at 70s.  Patient has a long history of smoking, quit several years ago, does wear oxygen at home.    She is found to be febrile with a temperature of 101.5, hypoxic leukopenia at 3.7.  In ER, she is found to be COVID-19 positive on 12/20/2020.  Patient is then started on IV Decadron 10 mg and asked to be admitted to the hospital for further evaluation.    Stay in the hospital, patient continue to monitor and ACS; she has been requiring 6 L of oxygen.  Intensivist Dr. Pradhan contacted for remdesivir.      Interval Problem Update  No acute events overnight, laying in the bed, denied any complaint.  Currently patient is requiring 5 to 6 L of oxygen.   --Continue supportive treatment for Covid pneumonia including Decadron, remdesivir; titrate oxygen as needed, encourage self proning.   --- Obtain echocardiogram   --- Noted to have hyperthyroidism with TSH of 0.040    12/22: Patient seen at bedside, currently on 6L of NC. Patient mentions she feels somewhat better. She is lying in bed comfortably. As per nursing no other over night events reported.    12/23: Patient seen at [facility], she mentions she is feeling somewhat better, currently on 5L of NC. As per nursing no other over night events reported.    12/24: Patient sitting at bedside comfortably, eating breakfast. Currently on 5L of NC. As per nursing no other over night events reported.    12/25: Patient seen at  bedside, currently sitting up on bed. She is not complaining of any pain. Currently on 4-5L of oxygen. No other over night reports as per nursing.    Consultants/Specialty  None    Code Status  Full Code    Disposition  Continue ACS monitoring. Asper PT, patient would require a FWW once discharged.    Review of Systems  Review of Systems   Constitutional: Positive for malaise/fatigue. Negative for chills and fever.   HENT: Negative for congestion.    Eyes: Negative for blurred vision and double vision.   Respiratory: Positive for cough and shortness of breath. Negative for hemoptysis and sputum production.    Cardiovascular: Negative for chest pain and palpitations.   Gastrointestinal: Negative for abdominal pain, heartburn, nausea and vomiting.   Musculoskeletal: Positive for myalgias.   Neurological: Negative for dizziness and headaches.   Psychiatric/Behavioral: Negative for depression.        Physical Exam  Temp:  [35.9 °C (96.6 °F)-36.8 °C (98.2 °F)] 36.4 °C (97.6 °F)  Pulse:  [62-66] 62  Resp:  [16-18] 16  BP: (122-136)/(55-57) 122/56  SpO2:  [88 %-92 %] 91 %    Physical Exam  Vitals signs and nursing note reviewed.   Constitutional:       General: She is not in acute distress.     Appearance: Normal appearance. She is not toxic-appearing.   HENT:      Head: Normocephalic and atraumatic.      Mouth/Throat:      Pharynx: No oropharyngeal exudate or posterior oropharyngeal erythema.   Eyes:      General:         Right eye: No discharge.         Left eye: No discharge.      Extraocular Movements: Extraocular movements intact.   Neck:      Musculoskeletal: Neck supple.   Cardiovascular:      Rate and Rhythm: Normal rate.      Pulses: Normal pulses.      Heart sounds: No murmur.   Pulmonary:      Effort: Pulmonary effort is normal.      Breath sounds: Rhonchi present.      Comments: Decreased breath sound at the bases  Abdominal:      General: Abdomen is flat. There is no distension.      Palpations: Abdomen is  soft.      Tenderness: There is no abdominal tenderness. There is no guarding.   Skin:     General: Skin is warm and dry.   Neurological:      Mental Status: She is alert and oriented to person, place, and time.      Cranial Nerves: No cranial nerve deficit.   Psychiatric:         Mood and Affect: Mood normal.         Behavior: Behavior normal.         Thought Content: Thought content normal.         Judgment: Judgment normal.         Fluids  No intake or output data in the 24 hours ending 12/25/20 0755    Laboratory  Recent Labs     12/22/20 0920 12/23/20 0944 12/24/20 0920   WBC 8.7 5.2 5.2   RBC 4.25 4.32 4.31   HEMOGLOBIN 14.0 14.0 14.2   HEMATOCRIT 43.0 43.7 44.0   .2* 101.2* 102.1*   MCH 32.9 32.4 32.9   MCHC 32.6* 32.0* 32.3*   RDW 50.2* 50.0 49.6   PLATELETCT 273 265 310   MPV 9.8 9.6 9.3     Recent Labs     12/22/20 0920 12/23/20 0944 12/24/20  0920   SODIUM 137 137 139   POTASSIUM 3.4* 3.5* 3.4*   CHLORIDE 101 103 102   CO2 27 26 28   GLUCOSE 113* 123* 160*   BUN 15 16 17   CREATININE 0.51 0.47* 0.39*   CALCIUM 8.3* 8.2* 8.2*                   Imaging  DX-CHEST-PORTABLE (1 VIEW)   Final Result      Mild hazy peripheral bilateral pulmonary opacities most suggestive of Covid pneumonia.           Assessment/Plan  * Pneumonia due to COVID-19 virus- (present on admission)  Assessment & Plan  Continue supportive treatment including Decadron, remdesivir.   --- Titrate oxygen as needed; continue Droplet, Contact, and Eye Protection  mucinex   Rt protocol  Currently requiring 4-5 l of O2    Self proning and incentive spirometer encouraged    Acute respiratory failure with hypoxia (HCC)- (present on admission)  Assessment & Plan  Likely 2/2 covi pna   -Continue Decadron 6 mg p.o. daily until 12/29, remdesivir until 12/25.  Titrate oxygen as needed   -Provide incentive spirometer, encourage self proning              -Evaluate lasix needs daily  Procalcitonin is normal, no antibiotics indicated    CMP  daily to check kidney and liver function.    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 No need for inpatient follow up  She will continue her routine follow ups as outpatient    Leukopenia  Assessment & Plan  12/23: resolved    At 1.7 , anc still at > 1000  -- Monitor daily     Macrocytosis  Assessment & Plan  TSH low   Vitamin b12 ok    Seizure (HCC)  Assessment & Plan  Continue Keppra 500 mg po bid   Seizure precaution    Paroxysmal atrial fibrillation (HCC)  Assessment & Plan   Does follow dr Obregon as an op   -- continue Flecanide and metoprolol    -- monitor    Hyperthyroidism- (present on admission)  Assessment & Plan  Mild, need to have repeat TSH and free T4 in 4 to 6 weeks.  Continue methimazole       VTE prophylaxis: lovenox

## 2020-12-25 NOTE — PROGRESS NOTES
Assessment completed. Pt A&Ox4.  Respirations even, unlabored on 4L n/c.  Pt denies pain.  POC discussed: Wean O2. Communication board updated.   Bed in locked, lowest position.  Call light and belongings within reach.  Needs met, will continue to monitor.

## 2020-12-26 PROBLEM — D72.819 LEUKOPENIA: Status: RESOLVED | Noted: 2020-12-21 | Resolved: 2020-12-26

## 2020-12-26 PROBLEM — E87.6 HYPOKALEMIA: Status: RESOLVED | Noted: 2020-12-23 | Resolved: 2020-12-26

## 2020-12-26 LAB
ALBUMIN SERPL BCP-MCNC: 3.3 G/DL (ref 3.2–4.9)
ALBUMIN/GLOB SERPL: 1.1 G/DL
ALP SERPL-CCNC: 41 U/L (ref 30–99)
ALT SERPL-CCNC: 42 U/L (ref 2–50)
ANION GAP SERPL CALC-SCNC: 10 MMOL/L (ref 7–16)
AST SERPL-CCNC: 38 U/L (ref 12–45)
BASOPHILS # BLD AUTO: 0.1 % (ref 0–1.8)
BASOPHILS # BLD: 0.01 K/UL (ref 0–0.12)
BILIRUB SERPL-MCNC: 0.6 MG/DL (ref 0.1–1.5)
BUN SERPL-MCNC: 18 MG/DL (ref 8–22)
CALCIUM SERPL-MCNC: 8.7 MG/DL (ref 8.5–10.5)
CHLORIDE SERPL-SCNC: 104 MMOL/L (ref 96–112)
CO2 SERPL-SCNC: 24 MMOL/L (ref 20–33)
CREAT SERPL-MCNC: 0.49 MG/DL (ref 0.5–1.4)
EOSINOPHIL # BLD AUTO: 0.01 K/UL (ref 0–0.51)
EOSINOPHIL NFR BLD: 0.1 % (ref 0–6.9)
ERYTHROCYTE [DISTWIDTH] IN BLOOD BY AUTOMATED COUNT: 49 FL (ref 35.9–50)
GLOBULIN SER CALC-MCNC: 2.9 G/DL (ref 1.9–3.5)
GLUCOSE BLD-MCNC: 153 MG/DL (ref 65–99)
GLUCOSE BLD-MCNC: 179 MG/DL (ref 65–99)
GLUCOSE SERPL-MCNC: 113 MG/DL (ref 65–99)
HCT VFR BLD AUTO: 43.6 % (ref 37–47)
HGB BLD-MCNC: 14.2 G/DL (ref 12–16)
IMM GRANULOCYTES # BLD AUTO: 0.07 K/UL (ref 0–0.11)
IMM GRANULOCYTES NFR BLD AUTO: 1 % (ref 0–0.9)
LYMPHOCYTES # BLD AUTO: 0.57 K/UL (ref 1–4.8)
LYMPHOCYTES NFR BLD: 8.4 % (ref 22–41)
MCH RBC QN AUTO: 32.6 PG (ref 27–33)
MCHC RBC AUTO-ENTMCNC: 32.6 G/DL (ref 33.6–35)
MCV RBC AUTO: 100 FL (ref 81.4–97.8)
MONOCYTES # BLD AUTO: 0.49 K/UL (ref 0–0.85)
MONOCYTES NFR BLD AUTO: 7.2 % (ref 0–13.4)
NEUTROPHILS # BLD AUTO: 5.62 K/UL (ref 2–7.15)
NEUTROPHILS NFR BLD: 83.2 % (ref 44–72)
NRBC # BLD AUTO: 0 K/UL
NRBC BLD-RTO: 0 /100 WBC
PLATELET # BLD AUTO: 337 K/UL (ref 164–446)
PMV BLD AUTO: 9.2 FL (ref 9–12.9)
POTASSIUM SERPL-SCNC: 3.8 MMOL/L (ref 3.6–5.5)
PROT SERPL-MCNC: 6.2 G/DL (ref 6–8.2)
RBC # BLD AUTO: 4.36 M/UL (ref 4.2–5.4)
SODIUM SERPL-SCNC: 138 MMOL/L (ref 135–145)
WBC # BLD AUTO: 6.8 K/UL (ref 4.8–10.8)

## 2020-12-26 PROCEDURE — 99232 SBSQ HOSP IP/OBS MODERATE 35: CPT | Performed by: INTERNAL MEDICINE

## 2020-12-26 PROCEDURE — 80053 COMPREHEN METABOLIC PANEL: CPT

## 2020-12-26 PROCEDURE — A9270 NON-COVERED ITEM OR SERVICE: HCPCS | Performed by: STUDENT IN AN ORGANIZED HEALTH CARE EDUCATION/TRAINING PROGRAM

## 2020-12-26 PROCEDURE — 700111 HCHG RX REV CODE 636 W/ 250 OVERRIDE (IP): Performed by: STUDENT IN AN ORGANIZED HEALTH CARE EDUCATION/TRAINING PROGRAM

## 2020-12-26 PROCEDURE — 700102 HCHG RX REV CODE 250 W/ 637 OVERRIDE(OP): Performed by: HOSPITALIST

## 2020-12-26 PROCEDURE — 85025 COMPLETE CBC W/AUTO DIFF WBC: CPT

## 2020-12-26 PROCEDURE — A9270 NON-COVERED ITEM OR SERVICE: HCPCS | Performed by: HOSPITALIST

## 2020-12-26 PROCEDURE — 770014 HCHG ROOM/CARE - WARD (150)

## 2020-12-26 PROCEDURE — 36415 COLL VENOUS BLD VENIPUNCTURE: CPT

## 2020-12-26 PROCEDURE — 700102 HCHG RX REV CODE 250 W/ 637 OVERRIDE(OP): Performed by: STUDENT IN AN ORGANIZED HEALTH CARE EDUCATION/TRAINING PROGRAM

## 2020-12-26 PROCEDURE — 82962 GLUCOSE BLOOD TEST: CPT | Mod: 91

## 2020-12-26 RX ADMIN — METHIMAZOLE 2.5 MG: 5 TABLET ORAL at 17:25

## 2020-12-26 RX ADMIN — LEVETIRACETAM 500 MG: 500 TABLET ORAL at 05:28

## 2020-12-26 RX ADMIN — FLECAINIDE ACETATE 150 MG: 150 TABLET ORAL at 05:28

## 2020-12-26 RX ADMIN — FLECAINIDE ACETATE 150 MG: 150 TABLET ORAL at 17:25

## 2020-12-26 RX ADMIN — DEXAMETHASONE 6 MG: 4 TABLET ORAL at 05:28

## 2020-12-26 RX ADMIN — METHIMAZOLE 2.5 MG: 5 TABLET ORAL at 05:28

## 2020-12-26 RX ADMIN — ENOXAPARIN SODIUM 40 MG: 40 INJECTION SUBCUTANEOUS at 05:28

## 2020-12-26 RX ADMIN — METOPROLOL SUCCINATE 25 MG: 25 TABLET, EXTENDED RELEASE ORAL at 05:28

## 2020-12-26 RX ADMIN — LEVETIRACETAM 500 MG: 500 TABLET ORAL at 17:25

## 2020-12-26 ASSESSMENT — ENCOUNTER SYMPTOMS
HEMOPTYSIS: 0
COUGH: 1
BLURRED VISION: 0
SPUTUM PRODUCTION: 0
SHORTNESS OF BREATH: 1
CHILLS: 0
HEADACHES: 0
DOUBLE VISION: 0
FEVER: 0
VOMITING: 0
MYALGIAS: 1
DIZZINESS: 0
NAUSEA: 0
HEARTBURN: 0
ABDOMINAL PAIN: 0
DEPRESSION: 0
PALPITATIONS: 0

## 2020-12-26 ASSESSMENT — PAIN DESCRIPTION - PAIN TYPE: TYPE: ACUTE PAIN

## 2020-12-26 NOTE — PROGRESS NOTES
[facility] Medicine Daily Progress Note    Date of Service  12/26/2020    Chief Complaint  80 y.o. female admitted 12/20/2020 with   Chief Complaint   Patient presents with   • Shortness of Breath         Hospital Course  This is 80-year-old female with past medical history significant for sick sinus syndrome status post pacemaker, A. fib, secondary hypercoagulable state, history of CVA with bilateral hemianopsia,presented in ER with a complaint of shortness of breath, cough, fever, loss of test and smell.  On presentation in ER she is noted to be hypoxic at 70s.  Patient has a long history of smoking, quit several years ago, does wear oxygen at home.    She is found to be febrile with a temperature of 101.5, hypoxic leukopenia at 3.7.  In ER, she is found to be COVID-19 positive on 12/20/2020.  Patient is then started on IV Decadron 10 mg and asked to be admitted to the hospital for further evaluation.    Stay in the hospital, patient continue to monitor and ACS; she has been requiring 6 L of oxygen.  Intensivist Dr. Pradhan contacted for remdesivir.      Interval Problem Update  No acute events overnight, laying in the bed, denied any complaint.  Currently patient is requiring 5 to 6 L of oxygen.   --Continue supportive treatment for Covid pneumonia including Decadron, remdesivir; titrate oxygen as needed, encourage self proning.   --- Obtain echocardiogram   --- Noted to have hyperthyroidism with TSH of 0.040    12/22: Patient seen at bedside, currently on 6L of NC. Patient mentions she feels somewhat better. She is lying in bed comfortably. As per nursing no other over night events reported.    12/23: Patient seen at [facility], she mentions she is feeling somewhat better, currently on 5L of NC. As per nursing no other over night events reported.    12/24: Patient sitting at bedside comfortably, eating breakfast. Currently on 5L of NC. As per nursing no other over night events reported.    12/25: Patient seen at  bedside, currently sitting up on bed. She is not complaining of any pain. Currently on 4-5L of oxygen. No other over night reports as per nursing.    12/26: Patient mentions she feels better, currently on 4L. She finished her remdesivir treatment yesterday. As per nursing no other over night events reported. She is in good spirits.     Consultants/Specialty  None    Code Status  Full Code    Disposition  Continue ACS monitoring. As per PT, patient would require a FWW once discharged.  Possible discharge is she qualifies for home oxygen within the next 24-48 hours.    Review of Systems  Review of Systems   Constitutional: Positive for malaise/fatigue. Negative for chills and fever.   HENT: Negative for congestion.    Eyes: Negative for blurred vision and double vision.   Respiratory: Positive for cough and shortness of breath. Negative for hemoptysis and sputum production.    Cardiovascular: Negative for chest pain and palpitations.   Gastrointestinal: Negative for abdominal pain, heartburn, nausea and vomiting.   Musculoskeletal: Positive for myalgias.   Neurological: Negative for dizziness and headaches.   Psychiatric/Behavioral: Negative for depression.        Physical Exam  Temp:  [36 °C (96.8 °F)-36.9 °C (98.5 °F)] 36.3 °C (97.4 °F)  Pulse:  [60-71] 66  Resp:  [16-20] 18  BP: (133-152)/(61-76) 141/61  SpO2:  [91 %-95 %] 91 %    Physical Exam  Vitals signs and nursing note reviewed.   Constitutional:       General: She is not in acute distress.     Appearance: Normal appearance. She is not toxic-appearing.   HENT:      Head: Normocephalic and atraumatic.      Mouth/Throat:      Pharynx: No oropharyngeal exudate or posterior oropharyngeal erythema.   Eyes:      General:         Right eye: No discharge.         Left eye: No discharge.      Extraocular Movements: Extraocular movements intact.   Neck:      Musculoskeletal: Neck supple.   Cardiovascular:      Rate and Rhythm: Normal rate.      Pulses: Normal pulses.       Heart sounds: No murmur.   Pulmonary:      Effort: Pulmonary effort is normal.      Breath sounds: Rhonchi present.      Comments: Decreased breath sound at the bases  Abdominal:      General: Abdomen is flat. There is no distension.      Palpations: Abdomen is soft.      Tenderness: There is no abdominal tenderness. There is no guarding.   Skin:     General: Skin is warm and dry.   Neurological:      Mental Status: She is alert and oriented to person, place, and time.      Cranial Nerves: No cranial nerve deficit.   Psychiatric:         Mood and Affect: Mood normal.         Behavior: Behavior normal.         Thought Content: Thought content normal.         Judgment: Judgment normal.         Fluids  No intake or output data in the 24 hours ending 12/26/20 1116    Laboratory  Recent Labs     12/24/20  0920 12/25/20  0916 12/26/20  0837   WBC 5.2 6.5 6.8   RBC 4.31 4.30 4.36   HEMOGLOBIN 14.2 14.2 14.2   HEMATOCRIT 44.0 43.6 43.6   .1* 101.4* 100.0*   MCH 32.9 33.0 32.6   MCHC 32.3* 32.6* 32.6*   RDW 49.6 49.1 49.0   PLATELETCT 310 310 337   MPV 9.3 9.3 9.2     Recent Labs     12/24/20  0920 12/25/20  0916 12/26/20  0837   SODIUM 139 137 138   POTASSIUM 3.4* 3.6 3.8   CHLORIDE 102 102 104   CO2 28 27 24   GLUCOSE 160* 128* 113*   BUN 17 18 18   CREATININE 0.39* 0.57 0.49*   CALCIUM 8.2* 8.6 8.7                   Imaging  DX-CHEST-PORTABLE (1 VIEW)   Final Result      Mild hazy peripheral bilateral pulmonary opacities most suggestive of Covid pneumonia.           Assessment/Plan  * Pneumonia due to COVID-19 virus- (present on admission)  Assessment & Plan  Continue supportive treatment including Decadron, remdesivir (finished on 12/25)   --- Titrate oxygen as needed; continue Droplet, Contact, and Eye Protection  mucinex   Rt protocol  Currently requiring 4 l of O2    Self proning and incentive spirometer encouraged    Acute respiratory failure with hypoxia (HCC)- (present on admission)  Assessment &  Plan  Likely 2/2 covi pna   -Continue Decadron 6 mg p.o. daily until 12/29, remdesivir until 12/25.  Titrate oxygen as needed   -Provide incentive spirometer, encourage self proning              -Evaluate lasix needs daily  Procalcitonin is normal, no antibiotics indicated    CMP daily to check kidney and liver function.    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 No need for inpatient follow up  She will continue her routine follow ups as outpatient    Macrocytosis  Assessment & Plan  TSH low   Vitamin b12 ok    Seizure (HCC)  Assessment & Plan  Continue Keppra 500 mg po bid   Seizure precaution    Paroxysmal atrial fibrillation (HCC)  Assessment & Plan   Does follow dr Obregon as an op   -- continue Flecanide and metoprolol    -- monitor    Hyperthyroidism- (present on admission)  Assessment & Plan  Mild, need to have repeat TSH and free T4 in 4 to 6 weeks.  Continue methimazole       VTE prophylaxis: lovenox

## 2020-12-26 NOTE — PROGRESS NOTES
Assessment completed. Pt A&Ox4.  Respirations even, unlabored on 4L n/c.  Pt denies pain.   POC discussed: D/C. Communication board updated.   Bed in locked, lowest position.  Call light and belongings within reach.  Needs met, will continue to monitor.

## 2020-12-27 LAB
GLUCOSE BLD-MCNC: 105 MG/DL (ref 65–99)
GLUCOSE BLD-MCNC: 112 MG/DL (ref 65–99)
GLUCOSE BLD-MCNC: 177 MG/DL (ref 65–99)
GLUCOSE BLD-MCNC: 182 MG/DL (ref 65–99)
GLUCOSE BLD-MCNC: 212 MG/DL (ref 65–99)
GLUCOSE BLD-MCNC: 90 MG/DL (ref 65–99)

## 2020-12-27 PROCEDURE — 700102 HCHG RX REV CODE 250 W/ 637 OVERRIDE(OP): Performed by: HOSPITALIST

## 2020-12-27 PROCEDURE — 700111 HCHG RX REV CODE 636 W/ 250 OVERRIDE (IP): Performed by: STUDENT IN AN ORGANIZED HEALTH CARE EDUCATION/TRAINING PROGRAM

## 2020-12-27 PROCEDURE — A9270 NON-COVERED ITEM OR SERVICE: HCPCS | Performed by: HOSPITALIST

## 2020-12-27 PROCEDURE — 97530 THERAPEUTIC ACTIVITIES: CPT

## 2020-12-27 PROCEDURE — 770014 HCHG ROOM/CARE - WARD (150)

## 2020-12-27 PROCEDURE — 82962 GLUCOSE BLOOD TEST: CPT | Mod: 91

## 2020-12-27 PROCEDURE — 700102 HCHG RX REV CODE 250 W/ 637 OVERRIDE(OP): Performed by: STUDENT IN AN ORGANIZED HEALTH CARE EDUCATION/TRAINING PROGRAM

## 2020-12-27 PROCEDURE — A9270 NON-COVERED ITEM OR SERVICE: HCPCS | Performed by: STUDENT IN AN ORGANIZED HEALTH CARE EDUCATION/TRAINING PROGRAM

## 2020-12-27 PROCEDURE — 99231 SBSQ HOSP IP/OBS SF/LOW 25: CPT | Performed by: INTERNAL MEDICINE

## 2020-12-27 RX ADMIN — METOPROLOL SUCCINATE 25 MG: 25 TABLET, EXTENDED RELEASE ORAL at 05:25

## 2020-12-27 RX ADMIN — FLECAINIDE ACETATE 150 MG: 150 TABLET ORAL at 05:25

## 2020-12-27 RX ADMIN — METHIMAZOLE 2.5 MG: 5 TABLET ORAL at 05:25

## 2020-12-27 RX ADMIN — LEVETIRACETAM 500 MG: 500 TABLET ORAL at 17:34

## 2020-12-27 RX ADMIN — FLECAINIDE ACETATE 150 MG: 150 TABLET ORAL at 17:34

## 2020-12-27 RX ADMIN — LEVETIRACETAM 500 MG: 500 TABLET ORAL at 05:25

## 2020-12-27 RX ADMIN — METHIMAZOLE 2.5 MG: 5 TABLET ORAL at 17:34

## 2020-12-27 RX ADMIN — DEXAMETHASONE 6 MG: 4 TABLET ORAL at 05:25

## 2020-12-27 RX ADMIN — FUROSEMIDE 20 MG: 20 TABLET ORAL at 05:24

## 2020-12-27 RX ADMIN — ENOXAPARIN SODIUM 40 MG: 40 INJECTION SUBCUTANEOUS at 05:26

## 2020-12-27 ASSESSMENT — COGNITIVE AND FUNCTIONAL STATUS - GENERAL
CLIMB 3 TO 5 STEPS WITH RAILING: A LITTLE
SUGGESTED CMS G CODE MODIFIER MOBILITY: CJ
WALKING IN HOSPITAL ROOM: A LITTLE
MOBILITY SCORE: 21
STANDING UP FROM CHAIR USING ARMS: A LITTLE

## 2020-12-27 ASSESSMENT — ENCOUNTER SYMPTOMS
SPUTUM PRODUCTION: 0
CHILLS: 0
MYALGIAS: 1
HEARTBURN: 0
ABDOMINAL PAIN: 0
HEADACHES: 0
FEVER: 0
BLURRED VISION: 0
DOUBLE VISION: 0
NAUSEA: 0
VOMITING: 0
COUGH: 1
DEPRESSION: 0
SHORTNESS OF BREATH: 1
DIZZINESS: 0
PALPITATIONS: 0
HEMOPTYSIS: 0

## 2020-12-27 ASSESSMENT — PAIN DESCRIPTION - PAIN TYPE: TYPE: ACUTE PAIN

## 2020-12-27 ASSESSMENT — GAIT ASSESSMENTS
DISTANCE (FEET): 200
GAIT LEVEL OF ASSIST: SUPERVISED

## 2020-12-27 NOTE — FACE TO FACE
Face to Face Note  -  Durable Medical Equipment    Hansel Wharton M.D. - NPI: [id number]  I certify that this patient is under my care and that they had a durable medical equipment(DME)face to face encounter by myself that meets the physician DME face-to-face encounter requirements with this patient on:    Date of encounter:   Patient:                    MRN:                       YOB: 2022  Ml Chavira  [id number]  1940     The encounter with the patient was in whole, or in part, for the following medical condition, which is the primary reason for durable medical equipment:  Other - COVID-19 debility    I certify that, based on my findings, the following durable medical equipment is medically necessary:  Walkers.        My Clinical findings support the need for the above equipment due to:  Other - COVID-19 Debility    Supporting Symptoms: Debility due to current Covid status    If patient feels more short of breath, they can go up to 6 liters per minute and contact healthcare provider.

## 2020-12-27 NOTE — RESPIRATORY CARE
REMOTE MONITORING PROGRAM by RESPIRATORY THERAPY  12/27/2020 at 10:42 AM by Jonny Jung     Patient interviewed by RT. Patient agrees to Remote Monitoring program. Device instruction performed with welcome packet, consent signed and placed at bedside chart. Patient instructed on how to use MyChart and Zoom. No questions at this time.

## 2020-12-27 NOTE — PROGRESS NOTES
Assumed care of patient from [id number]  AAOx4  3L oxygen, 2L at baseline  Up to BSC independently  Steady gait  Denies n/v  +void  +flatus    Blood sugars ACHS    POC reviewed, education provided    Resting comfortably at this time

## 2020-12-27 NOTE — DISCHARGE PLANNING
Medical Social Work  RAMÓN informed patient will need a walker and O2, has not been on before.  Further patient is requesting a portable concentrator.    [facility] to [facility]N told that if order/face to face state patient needs an OCD then can get one    Volt to Dr Coats with update.    RAMÓN faxed choice for FWW and for O2 to [facility]

## 2020-12-27 NOTE — DISCHARGE PLANNING
Received Choice form at 1042  Agency/Facility Name: [facility]  Referral sent per Choice form @ 1045     Received Choice form at 1042  Agency/Facility Name: [facility]  Referral sent per Choice form @ 1045     @1226   Agency/Facility Name: [facility]  Outcome: Per on call they are reviewing referral and will [facility] back.

## 2020-12-27 NOTE — DISCHARGE PLANNING
Medical Social Work  Follow up with [facility] for an update, told the referral is still pending, no time frame on when it will be delivered

## 2020-12-27 NOTE — FACE TO FACE
"Face to Face Note  -  Durable Medical Equipment    Hansel Wharton M.D. - NPI: [id number]  I certify that this patient is under my care and that they had a durable medical equipment(DME)face to face encounter by myself that meets the physician DME face-to-face encounter requirements with this patient on:    Date of encounter:   Patient:                    MRN:                       YOB: 2022  Ml Chavira  [id number]  1940     The encounter with the patient was in whole, or in part, for the following medical condition, which is the primary reason for durable medical equipment:  Other - Covid 19    I certify that, based on my findings, the following durable medical equipment is medically necessary:  Oxygen. Portable OCD.    HOME O2 Saturation Measurements:(Values must be present for Home Oxygen orders)  Room air sat at rest: 80  Room air sat with amb: 76  With liters of O2: 3, O2 sat at rest with O2: 92  With Liters of O2: 3, O2 sat with amb with O2 : 91  Is the patient mobile?: Yes    My Clinical findings support the need for the above equipment due to:  Hypoxia    Supporting Symptoms: The patient requires supplemental oxygen, as the following interventions have been tried with limited or no improvement: \"Ambulation with oximetry    If patient feels more short of breath, they can go up to 6 liters per minute and contact healthcare provider.  "

## 2020-12-27 NOTE — DISCHARGE PLANNING
Anticipated Discharge Disposition: Home    Action: Patient denied by [facility] and patient is on services with [facility].    SW called patient to discuss her O2 provider.  Patient stated that she was on oxygen only at nights. Does not want to go home as she has tubing all over the house.  Patient is wanting a small portable concentrator like she has seen on tv and what some of her friends have.  SW asked patient if she has talked to [facility] to see if they have a portable, SW told no.  Patient stated it was the O2 provider and [facility] problem to take care of the tubing all over her house.  SW stated that this can not be followed up today, patient stated she is aware of this.  Patient asked about an [employer] portable concentrator, SW stated that this may be possible but usually this is done on an outpatient basis. Patient again stated that she is not going home until she has one.  SW stated that while it appears [employer] offers one which is covered by Medicare. SW does not know the protocol or how long before it could be approved or delivered. Patient stated she isn't leaving today and will be here another day. Patient also made if very clear she will not pay for anything, saying that she has been paying for Medicare and either they owe her one or the oxygen company.  Patient then stated she needs help at home, some to help her take showers, baths, etc.     PC to [facility] to provide an update/    Barriers to Discharge: medical clearance    Plan: follow up with

## 2020-12-28 ENCOUNTER — PHARMACY VISIT (OUTPATIENT)
Dept: PHARMACY | Facility: MEDICAL CENTER | Age: 80
End: 2020-12-28
Payer: MEDICARE

## 2020-12-28 LAB
GLUCOSE BLD-MCNC: 108 MG/DL (ref 65–99)
GLUCOSE BLD-MCNC: 140 MG/DL (ref 65–99)
GLUCOSE BLD-MCNC: 153 MG/DL (ref 65–99)

## 2020-12-28 PROCEDURE — 99231 SBSQ HOSP IP/OBS SF/LOW 25: CPT | Performed by: INTERNAL MEDICINE

## 2020-12-28 PROCEDURE — A9270 NON-COVERED ITEM OR SERVICE: HCPCS | Performed by: HOSPITALIST

## 2020-12-28 PROCEDURE — 700102 HCHG RX REV CODE 250 W/ 637 OVERRIDE(OP): Performed by: STUDENT IN AN ORGANIZED HEALTH CARE EDUCATION/TRAINING PROGRAM

## 2020-12-28 PROCEDURE — 700102 HCHG RX REV CODE 250 W/ 637 OVERRIDE(OP): Performed by: HOSPITALIST

## 2020-12-28 PROCEDURE — 770014 HCHG ROOM/CARE - WARD (150)

## 2020-12-28 PROCEDURE — A9270 NON-COVERED ITEM OR SERVICE: HCPCS | Performed by: STUDENT IN AN ORGANIZED HEALTH CARE EDUCATION/TRAINING PROGRAM

## 2020-12-28 PROCEDURE — 700111 HCHG RX REV CODE 636 W/ 250 OVERRIDE (IP): Performed by: STUDENT IN AN ORGANIZED HEALTH CARE EDUCATION/TRAINING PROGRAM

## 2020-12-28 PROCEDURE — RXMED WILLOW AMBULATORY MEDICATION CHARGE: Performed by: INTERNAL MEDICINE

## 2020-12-28 PROCEDURE — 82962 GLUCOSE BLOOD TEST: CPT | Mod: 91

## 2020-12-28 RX ORDER — DEXAMETHASONE 6 MG/1
6 TABLET ORAL DAILY
Qty: 1 TAB | Refills: 0 | Status: SHIPPED | OUTPATIENT
Start: 2020-12-29 | End: 2020-12-28

## 2020-12-28 RX ORDER — DEXAMETHASONE 6 MG/1
6 TABLET ORAL DAILY
Qty: 1 TAB | Refills: 0 | Status: SHIPPED | OUTPATIENT
Start: 2020-12-29 | End: 2020-12-30

## 2020-12-28 RX ADMIN — METHIMAZOLE 2.5 MG: 5 TABLET ORAL at 17:47

## 2020-12-28 RX ADMIN — ENOXAPARIN SODIUM 40 MG: 40 INJECTION SUBCUTANEOUS at 06:40

## 2020-12-28 RX ADMIN — METHIMAZOLE 2.5 MG: 5 TABLET ORAL at 06:45

## 2020-12-28 RX ADMIN — FLECAINIDE ACETATE 150 MG: 150 TABLET ORAL at 06:40

## 2020-12-28 RX ADMIN — FLECAINIDE ACETATE 150 MG: 150 TABLET ORAL at 17:47

## 2020-12-28 RX ADMIN — LEVETIRACETAM 500 MG: 500 TABLET ORAL at 17:47

## 2020-12-28 RX ADMIN — LEVETIRACETAM 500 MG: 500 TABLET ORAL at 06:40

## 2020-12-28 RX ADMIN — FUROSEMIDE 20 MG: 20 TABLET ORAL at 06:41

## 2020-12-28 RX ADMIN — DEXAMETHASONE 6 MG: 4 TABLET ORAL at 06:39

## 2020-12-28 RX ADMIN — METOPROLOL SUCCINATE 25 MG: 25 TABLET, EXTENDED RELEASE ORAL at 06:40

## 2020-12-28 NOTE — THERAPY
"Physical Therapy   Daily Treatment     Patient Name: Ml Chavira  Age:  80 y.o., Sex:  female  Medical Record #: [id number]  Today's Date: 12/27/2020     Precautions: Fall Risk    Assessment    Patient progressing with functional mobility. She ambulated approximately 200ft without AD with supervision while managing portable O2 tank. She expressed concerns regarding managing O2 tanks and lines at home but demonstrated ability to do so during ambulation in unit. Anticipate patient will progress to PLOF with continued mobilization but would benefit from home health PT to progress mobility, independence, and to reinforce education regarding pacing activity and monitoring O2 levels. Patient has met acute PT goals and is at safe level to return home with  PT. Recommend patient mobilize with RN staff during remainder of acute stay and manage O2 lines and tanks independently during mobility. Patient will not be actively followed for physical therapy services at this time, however may be seen if requested by physician for 1 more visit within 30 days to address any discharge or equipment needs.    Plan    Discharge secondary to goals met.    DC Equipment Recommendations: None  Discharge Recommendations: Recommend home health for continued physical therapy services      Subjective    \"I have been here too long and I need clean underwear!\"     Objective       12/27/20 1602   Total Time Spent   Total Time Spent (Mins) 45   Charge Group   Charges  Yes   PT Therapeutic Activities 3   Precautions   Precautions Fall Risk   Vitals   O2 (LPM) 2   O2 Delivery Device Silicone Nasal Cannula   Vitals Comments 3L O2 used during mobility to maintain SpO2 > 88%   Pain 0 - 10 Group   Therapist Pain Assessment   (no pain complaint)   Cognition    Cognition / Consciousness WDL   Level of Consciousness Alert   Comments pleasant, cooperative, eager to return home   Passive ROM Lower Body   Passive ROM Lower Body WDL   Active ROM Lower Body "    Active ROM Lower Body  WDL   Strength Lower Body   Lower Body Strength  WDL   Sensation Lower Body   Lower Extremity Sensation   WDL   Lower Body Muscle Tone   Lower Body Muscle Tone  WDL   Balance   Sitting Balance (Static) Good   Sitting Balance (Dynamic) Good   Standing Balance (Static) Fair   Standing Balance (Dynamic) Fair   Weight Shift Sitting Good   Weight Shift Standing Fair   Skilled Intervention Verbal Cuing   Comments supervision throughout, no AD, no LOB, managing O2 tank during ambulation   Gait Analysis   Gait Level Of Assist Supervised   Assistive Device None   Distance (Feet) 200   # of Times Distance was Traveled 1   Deviation   (inconsistent GERALDINE)   # of Stairs Climbed 0   Weight Bearing Status no restrictions   Vision Deficits Impacting Mobility NT   Skilled Intervention Verbal Cuing   Bed Mobility    Supine to Sit   (NT, in chair pre and post)   Scooting Supervised  (seated)   Skilled Intervention Verbal Cuing   Functional Mobility   Sit to Stand Supervised   Bed, Chair, Wheelchair Transfer Supervised   Transfer Method Stand Step   Skilled Intervention Verbal Cuing   How much difficulty does the patient currently have...   Turning over in bed (including adjusting bedclothes, sheets and blankets)? 4   Sitting down on and standing up from a chair with arms (e.g., wheelchair, bedside commode, etc.) 4   Moving from lying on back to sitting on the side of the bed? 4   How much help from another person does the patient currently need...   Moving to and from a bed to a chair (including a wheelchair)? 3   Need to walk in a hospital room? 3   Climbing 3-5 steps with a railing? 3   6 clicks Mobility Score 21   Activity Tolerance   Sitting in Chair in chair pre and post   Sitting Edge of Bed NT   Standing 25 min   Comments no overt pain, fatigue, SOB, dizziness   Patient / Family Goals    Patient / Family Goal #1 to go home    Goal #1 Outcome Progressing as expected   Short Term Goals    Short Term Goal  # 1 pt will ambulate w/fww w/Mod I in 6tx for safe d/c home    Goal Outcome # 1 Goal met   Short Term Goal # 2 pt will maintain SpO2 at or above 90% during functional activites for safe activity tolerance to d/c home    Goal Outcome # 2 Goal met   Education Group   Education Provided Role of Physical Therapist   Role of Physical Therapist Patient Response Patient;Acceptance;Explanation;Verbal Demonstration   Anticipated Discharge Equipment and Recommendations   DC Equipment Recommendations None   Discharge Recommendations Recommend home health for continued physical therapy services   Interdisciplinary Plan of Care Collaboration   IDT Collaboration with  Nursing   Patient Position at End of Therapy Seated;Call Light within Reach;Tray Table within Reach;Phone within Reach   Collaboration Comments RN aware of visit, response   Session Information   Date / Session Number  12/27-2 (2/3, 12/29)   Priority 2

## 2020-12-28 NOTE — RESPIRATORY CARE
REMOTE MONITORING PROGRAM by RESPIRATORY THERAPY  12/28/2020 at 10:34 AM by MADIHA Mcgrath     After second evaluation, it was decided that pt was not a good candidate for the RMP. She was unable to demonstrate or remember receiving eduction on her smart phone or the device yesterday. RTOC notified. Provided portable pulse oximeter for patient discharge.

## 2020-12-28 NOTE — PROGRESS NOTES
[facility] Medicine Daily Progress Note    Date of Service  12/27/2020    Chief Complaint  80 y.o. female admitted 12/20/2020 with   Chief Complaint   Patient presents with   • Shortness of Breath         Hospital Course  This is 80-year-old female with past medical history significant for sick sinus syndrome status post pacemaker, A. fib, secondary hypercoagulable state, history of CVA with bilateral hemianopsia,presented in ER with a complaint of shortness of breath, cough, fever, loss of test and smell.  On presentation in ER she is noted to be hypoxic at 70s.  Patient has a long history of smoking, quit several years ago, does wear oxygen at home.    She is found to be febrile with a temperature of 101.5, hypoxic leukopenia at 3.7.  In ER, she is found to be COVID-19 positive on 12/20/2020.  Patient is then started on IV Decadron 10 mg and asked to be admitted to the hospital for further evaluation.    Stay in the hospital, patient continue to monitor and ACS; she has been requiring 6 L of oxygen.  Intensivist Dr. Pradhan contacted for remdesivir.      Interval Problem Update  No acute events overnight, laying in the bed, denied any complaint.  Currently patient is requiring 5 to 6 L of oxygen.   --Continue supportive treatment for Covid pneumonia including Decadron, remdesivir; titrate oxygen as needed, encourage self proning.   --- Obtain echocardiogram   --- Noted to have hyperthyroidism with TSH of 0.040    12/22: Patient seen at bedside, currently on 6L of NC. Patient mentions she feels somewhat better. She is lying in bed comfortably. As per nursing no other over night events reported.    12/23: Patient seen at [facility], she mentions she is feeling somewhat better, currently on 5L of NC. As per nursing no other over night events reported.    12/24: Patient sitting at bedside comfortably, eating breakfast. Currently on 5L of NC. As per nursing no other over night events reported.      12/27: Patient seen at  bedside, currently on 3L of NC. Patient was set to be discharged today, however the patient mentions she lives at home, and will now need a walker, and she mentions she will not be able to use oxygen and walk with a walker at home without help. We will re-evaluate with PT/OT for safe discharge planning for now. We will continue with decadron treatment, self proning and IS. As per nursing no other over night events reported.    Consultants/Specialty  None    Code Status  Full Code    Disposition  Continue ACS monitoring. As per PT, patient would require a FWW once discharged.  12/27: Patient will require a re-evaluation by PT/OT for safe discharge planning.    Review of Systems  Review of Systems   Constitutional: Positive for malaise/fatigue. Negative for chills and fever.   HENT: Negative for congestion.    Eyes: Negative for blurred vision and double vision.   Respiratory: Positive for cough and shortness of breath. Negative for hemoptysis and sputum production.    Cardiovascular: Negative for chest pain and palpitations.   Gastrointestinal: Negative for abdominal pain, heartburn, nausea and vomiting.   Musculoskeletal: Positive for myalgias.   Neurological: Negative for dizziness and headaches.   Psychiatric/Behavioral: Negative for depression.        Physical Exam  Temp:  [36.4 °C (97.6 °F)-36.6 °C (97.9 °F)] 36.5 °C (97.7 °F)  Pulse:  [60-69] 61  Resp:  [15-20] 18  BP: (110-135)/(59-82) 134/62  SpO2:  [76 %-96 %] 93 %    Physical Exam  Vitals signs and nursing note reviewed.   Constitutional:       General: She is not in acute distress.     Appearance: Normal appearance. She is not toxic-appearing.   HENT:      Head: Normocephalic and atraumatic.      Mouth/Throat:      Pharynx: No oropharyngeal exudate or posterior oropharyngeal erythema.   Eyes:      General:         Right eye: No discharge.         Left eye: No discharge.      Extraocular Movements: Extraocular movements intact.   Neck:      Musculoskeletal:  Neck supple.   Cardiovascular:      Rate and Rhythm: Normal rate.      Pulses: Normal pulses.      Heart sounds: No murmur.   Pulmonary:      Effort: Pulmonary effort is normal.      Breath sounds: Rhonchi present.      Comments: Decreased breath sound at the bases  Abdominal:      General: Abdomen is flat. There is no distension.      Palpations: Abdomen is soft.      Tenderness: There is no abdominal tenderness. There is no guarding.   Skin:     General: Skin is warm and dry.   Neurological:      Mental Status: She is alert and oriented to person, place, and time.      Cranial Nerves: No cranial nerve deficit.   Psychiatric:         Mood and Affect: Mood normal.         Behavior: Behavior normal.         Thought Content: Thought content normal.         Judgment: Judgment normal.         Fluids  No intake or output data in the 24 hours ending 12/27/20 1606    Laboratory  Recent Labs     12/25/20  0916 12/26/20  0837   WBC 6.5 6.8   RBC 4.30 4.36   HEMOGLOBIN 14.2 14.2   HEMATOCRIT 43.6 43.6   .4* 100.0*   MCH 33.0 32.6   MCHC 32.6* 32.6*   RDW 49.1 49.0   PLATELETCT 310 337   MPV 9.3 9.2     Recent Labs     12/25/20  0916 12/26/20  0837   SODIUM 137 138   POTASSIUM 3.6 3.8   CHLORIDE 102 104   CO2 27 24   GLUCOSE 128* 113*   BUN 18 18   CREATININE 0.57 0.49*   CALCIUM 8.6 8.7                   Imaging  DX-CHEST-PORTABLE (1 VIEW)   Final Result      Mild hazy peripheral bilateral pulmonary opacities most suggestive of Covid pneumonia.           Assessment/Plan  * Acute respiratory failure with hypoxia (HCC)- (present on admission)  Assessment & Plan  Likely 2/2 covi pna   -Continue Decadron 6 mg p.o. daily until 12/29, remdesivir until 12/25.  Titrate oxygen as needed   -Provide incentive spirometer, encourage self proning              -Evaluate lasix needs daily  Procalcitonin is normal, no antibiotics indicated    CMP daily to check kidney and liver function.    12/27: Currently on 3 L. Patient passed her  O2 walking test with 3L, and the plan was to discharge home with oxygen. However because the patient lives by herself and does not have any help at home, she mentions that she will not be able to walk with a walker and use the oxygen by herself. She had wanted to have a portable oxygen consentrator, however as per CM patient was denied by insurance. We will have to re-evaluate with PT/OT for safe discharge planning.    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 No need for inpatient follow up  She will continue her routine follow ups as outpatient    Macrocytosis  Assessment & Plan  TSH low   Vitamin b12 ok    Seizure (HCC)  Assessment & Plan  Continue Keppra 500 mg po bid   Seizure precaution    Paroxysmal atrial fibrillation (HCC)  Assessment & Plan   Does follow dr Obregon as an op   -- continue Flecanide and metoprolol    -- monitor    Hyperthyroidism- (present on admission)  Assessment & Plan  Mild, need to have repeat TSH and free T4 in 4 to 6 weeks.  Continue methimazole       VTE prophylaxis: lovenox

## 2020-12-28 NOTE — PROGRESS NOTES
Report received from DAMI Johnson.  Patient sitting up on the edge of the bed.  POC gone over with pt and all questions answered.  Patient A & O  X 4.  No apparent signs of distress.  Safety precautions in place.  Patient educated to call for assistance.  Will continue to monitor.

## 2020-12-28 NOTE — DISCHARGE PLANNING
Received request for C. Spoke with patient at bedside. Choice form filled out and faxed to [facility] with verbal consent. Address and phone number verified.

## 2020-12-28 NOTE — DISCHARGE SUMMARY
Discharge Summary    CHIEF COMPLAINT ON ADMISSION  Chief Complaint   Patient presents with   • Shortness of Breath       Reason for Admission  EMS     Admission Date  12/20/2020    CODE STATUS  Full Code    HPI & HOSPITAL COURSE  80 y.o. female who presented 12/20/2020 with complaints of agnosia, ageusia, cough without production, fevers, ZAMARRIPA presents to ED for the above symptoms. Patient states he has a long smoking history and quit several years ago however wears nocturnal O2. She states that she has noticed she has been more short of breath the past few days and with her current symptoms decided to seek medical attention. She was noted to have SpO2 in 70's on R/A requiring continuous O2 supplementation. She states she is compliant with all of her other home medication regimen. She states she did have a few episodes of diarrhea earlier in the week which have since been resolved.      In the ED, patient had the following vitals on presentation: 101.5, 67, 26, 125/60, 94% 4L NC.     The patient was transferred to the ACS for further treatment and evaluation. The patient was started on a 10 day course of decadron that will finish on 12/29. The patient also had remdesivir treatment that finished on 12/25. The patient has been improving on her oxygen demand. The patient was found to have a mildly elevated D-dimer in the setting of COVID-19 positive, however her oxygen demand improved throughout her hospitalization and there were no other signs and symptoms of PE, reason for which we will not discharge the patient with anticoagulation and she only received lovenox prophylactically.    The patient was evaluated by PT and due to the fact that she lives by herself and she will now need a walker, home health was recommend and we have ordered it today. The patient is in good spirits and is ready to go home. The patient will be discharged home with home oxygen and home monitoring with home health.     The patient has a hx of  hyperthyroidism for which she will continue her home methimazole. She also has a hx of A fib and sick sinus syndrome s/p pacemaker placement, not currently on anticoagulation that follows closely with Dr Obregon, we will continue home dose of flecainide and metoprolol and follow with her cardiologist. She has a history of lung cancer with brain metastasis however she said she has been on remission since 2015, so she will need close follow up as outpatient. She also has a history of seizures for which she takes Keppra.    The patient is in good spirits, has improved clinically, and will be discharged home today.    Therefore, she is discharged in fair and stable condition to home with close outpatient follow-up.    The patient met 2-midnight criteria for an inpatient stay at the time of discharge.    Discharge Date  12/28/2020    FOLLOW UP ITEMS POST DISCHARGE  Follow up with PCP and Cardiology    DISCHARGE DIAGNOSES  Principal Problem:    Acute respiratory failure with hypoxia (HCC) POA: Yes  Active Problems:    Pneumonia due to COVID-19 virus POA: Yes    Sick sinus syndrome (HCC) POA: Yes      Overview: Pt. had pace maker placed in 2018. Reports no complication at this time.       Denies bradycardia or tachycardia, pain a pace maker site, or signs of       infection. Managed by Alfred Obregon MD. Taking metoprolol SR 25 mg SR       daily. Flecainide 150 mg once a day.    Hyperthyroidism POA: Yes    Paroxysmal atrial fibrillation (HCC) POA: Unknown    Seizure (HCC) POA: Unknown    Macrocytosis POA: Unknown    Lung cancer (HCC) POA: Unknown  Resolved Problems:    Leukopenia POA: Unknown    Hypokalemia POA: Unknown      FOLLOW UP  Future Appointments   Date Time Provider Department Center   12/30/2020 11:30 AM Alfred Obregon M.D. [facility] None   2/19/2021  8:00 AM REMOTE MONITORING - CAM B [facility] None   3/25/2021 10:00 AM SONIA Fry [facility] None   4/27/2021 11:30 AM Abhijit Joseph M.D. [facility]  "    Jahaira Santos AMickiP.R.N.  [address] [place] [id number] [phone]    In 2 weeks      Alfred Obregon M.D.  [address]  [address]  [place] [id number] [phone]    In 2 weeks        MEDICATIONS ON DISCHARGE     Medication List      START taking these medications      Instructions   dexamethasone 6 MG Tabs  Start taking on: December 29, 2020  Commonly known as: DECADRON   Take 1 Tab by mouth every day for 1 day.  Dose: 6 mg        CHANGE how you take these medications      Instructions   alendronate 70 MG Tabs  What changed: when to take this  Commonly known as: FOSAMAX   Take 1 Tab by mouth every 7 days.  Dose: 70 mg        CONTINUE taking these medications      Instructions   albuterol 108 (90 Base) MCG/ACT Aers inhalation aerosol   INHALE 2 PUFFS BY MOUTH EVERY 4 HOURS AS NEEDED FOR SHORTNESS OF BREATH     [facility] Pulse Oximeter [facility]   Doctor's comments: To monitor oxygen levels with exertion.  1 Each by Does not apply route 4 times a day as needed.  Dose: 1 Each     flecainide 150 MG Tabs  Commonly known as: TAMBOCOR   Doctor's comments: Patient must be seen in office for further refills.  TAKE 1 TABLET BY MOUTH EVERY 12 HOURS     furosemide 20 MG Tabs  Commonly known as: LASIX   TAKE 1 TABLET BY MOUTH EVERY DAY IN THE MORNING     Klor-Con 10 10 MEQ tablet  Generic drug: potassium chloride ER   TAKE 1 TABLET BY MOUTH EVERY DAY     levETIRAcetam 500 MG Tabs  Commonly known as: KEPPRA   Take 1 Tab by mouth 2 Times a Day.  Dose: 500 mg     methimazole 5 MG Tabs  Commonly known as: TAPAZOLE   Take 0.5 Tabs by mouth every day.  Dose: 2.5 mg     metoprolol SR 25 MG Tb24  Commonly known as: TOPROL XL   Take 25 mg by mouth every day.  Dose: 25 mg            Allergies  Allergies   Allergen Reactions   • Penicillins Rash and Itching     RXN \"a long time ago\"  Other reaction(s): Rash   • Atorvastatin      Causes low quality       DIET  Orders Placed This Encounter   Procedures   • Diet Order Diet: " Consistent CHO (Diabetic)     Standing Status:   Standing     Number of Occurrences:   1     Order Specific Question:   Diet:     Answer:   Consistent CHO (Diabetic) [4]       ACTIVITY  As tolerated.  Weight bearing as tolerated    CONSULTATIONS  None    PROCEDURES  None    LABORATORY  Lab Results   Component Value Date    SODIUM 138 12/26/2020    POTASSIUM 3.8 12/26/2020    CHLORIDE 104 12/26/2020    CO2 24 12/26/2020    GLUCOSE 113 (H) 12/26/2020    BUN 18 12/26/2020    CREATININE 0.49 (L) 12/26/2020        Lab Results   Component Value Date    WBC 6.8 12/26/2020    HEMOGLOBIN 14.2 12/26/2020    HEMATOCRIT 43.6 12/26/2020    PLATELETCT 337 12/26/2020        Total time of the discharge process exceeds 25 minutes.

## 2020-12-28 NOTE — DISCHARGE PLANNING
Spoke with patient at bedside. Patient states she is with [facility] for home O2. Tj RODRIGUEZ updated.

## 2020-12-28 NOTE — DISCHARGE PLANNING
Received Choice form at 1015  Agency/Facility Name: [facility]  Referral sent per Choice form @ 0926 -7010  Agency/Facility Name: [facility]  Outcome: Left vmail for intake     -1319  Per Epic response, [facility] accepted     Agency/Facility Name: [facility]   Outcome: Left vmail for [facility]    -1350  Agency/Facility Name: [facility]  Spoke To: Aaron  Outcome: PT declined due to company not carrying 02    Agency/Facility Name: [facility]  Spoke To: Deborah   Outcome: PT is not on service with this company    Per DAMI Nina, JENNIFER refaxed referral to [facility]     Agency/Facility Name: [facility]  Spoke To: Heath   Outcome: Referral under review     -1441  Agency/Facility Name: [facility]  Spoke To: Via  Outcome: PT on service.  Will fax over referral to   P: [phone]    Agency/Facility Name: [facility]  Outcome: CCA left vmail explaining PT is on service with [facility] per DAMI Nina    -6010  Agency/Facility Name: [facility]   Spoke To: Via   Outcome: Referral under review.  Company requested 24 hours to do so.  DAMI Nina notified     Agency/Facility Name: [facility]   Spoke To: Heath   Outcome: CCA notified [facility] that PT is on service with [facility]

## 2020-12-28 NOTE — FACE TO FACE
Face to Face Supporting Documentation - Home Health    The encounter with this patient was in whole or in part the primary reason for home health admission.    Date of encounter:   Patient:                    MRN:                       YOB: 2022  Ml Chavira  [id number]  1940     Home health to see patient for:  Physical Therapy evaluation and treatment    Skilled need for:  Recent Deterioration of Health Status due to COVID-19    Skilled nursing interventions to include:  Comment: As per PT recommendations    Homebound status evidenced by:  Need the aid of supportive devices such as crutches, canes, wheelchairs or walkers. Leaving home requires a considerable and taxing effort. There is a normal inability to leave the home.    Community Physician to provide follow up care: SONIA Fry     Optional Interventions? Yes, Details: As per PT recommendations      I certify the face to face encounter for this home health care referral meets the CMS requirements and the encounter/clinical assessment with the patient was, in whole, or in part, for the medical condition(s) listed above, which is the primary reason for home health care. Based on my clinical findings: the service(s) are medically necessary, support the need for home health care, and the homebound criteria are met.  I certify that this patient has had a face to face encounter by myself.  Hansel Wharton M.D. - VINEETI: [phone]

## 2020-12-28 NOTE — DISCHARGE PLANNING
Meds-to-Beds: Discharge prescription order listed below delivered to patient's bedside DAMI Junior. Written information regarding the dispensed prescription was provided to the patient including the phone number of the pharmacy to call for any questions. Per RN, patient elected to have co-payment billed to patient account.      Ml Chavira   Home Medication Instructions [id number]    Printed on:12/28/20 8879   Medication Information                      dexamethasone (DECADRON) 6 MG Tab  Take 1 Tablet by mouth every day for 1 day.               Meri Beard, PharmD

## 2020-12-29 VITALS
DIASTOLIC BLOOD PRESSURE: 89 MMHG | HEIGHT: 62 IN | OXYGEN SATURATION: 97 % | TEMPERATURE: 97 F | SYSTOLIC BLOOD PRESSURE: 144 MMHG | RESPIRATION RATE: 16 BRPM | BODY MASS INDEX: 28.24 KG/M2 | HEART RATE: 60 BPM | WEIGHT: 153.44 LBS

## 2020-12-29 PROBLEM — D75.89 MACROCYTOSIS: Status: RESOLVED | Noted: 2020-12-21 | Resolved: 2020-12-29

## 2020-12-29 LAB
GLUCOSE BLD-MCNC: 113 MG/DL (ref 65–99)
GLUCOSE BLD-MCNC: 158 MG/DL (ref 65–99)
MAGNESIUM SERPL-MCNC: 2.3 MG/DL (ref 1.5–2.5)

## 2020-12-29 PROCEDURE — A9270 NON-COVERED ITEM OR SERVICE: HCPCS | Performed by: STUDENT IN AN ORGANIZED HEALTH CARE EDUCATION/TRAINING PROGRAM

## 2020-12-29 PROCEDURE — 700102 HCHG RX REV CODE 250 W/ 637 OVERRIDE(OP): Performed by: HOSPITALIST

## 2020-12-29 PROCEDURE — A9270 NON-COVERED ITEM OR SERVICE: HCPCS | Performed by: HOSPITALIST

## 2020-12-29 PROCEDURE — 82962 GLUCOSE BLOOD TEST: CPT | Mod: 91

## 2020-12-29 PROCEDURE — 99238 HOSP IP/OBS DSCHRG MGMT 30/<: CPT | Performed by: STUDENT IN AN ORGANIZED HEALTH CARE EDUCATION/TRAINING PROGRAM

## 2020-12-29 PROCEDURE — 83735 ASSAY OF MAGNESIUM: CPT

## 2020-12-29 PROCEDURE — 700102 HCHG RX REV CODE 250 W/ 637 OVERRIDE(OP): Performed by: STUDENT IN AN ORGANIZED HEALTH CARE EDUCATION/TRAINING PROGRAM

## 2020-12-29 PROCEDURE — 36415 COLL VENOUS BLD VENIPUNCTURE: CPT

## 2020-12-29 PROCEDURE — 700111 HCHG RX REV CODE 636 W/ 250 OVERRIDE (IP): Performed by: STUDENT IN AN ORGANIZED HEALTH CARE EDUCATION/TRAINING PROGRAM

## 2020-12-29 RX ADMIN — ENOXAPARIN SODIUM 40 MG: 40 INJECTION SUBCUTANEOUS at 05:42

## 2020-12-29 RX ADMIN — DEXAMETHASONE 6 MG: 4 TABLET ORAL at 05:41

## 2020-12-29 RX ADMIN — LEVETIRACETAM 500 MG: 500 TABLET ORAL at 05:41

## 2020-12-29 RX ADMIN — FLECAINIDE ACETATE 150 MG: 150 TABLET ORAL at 05:41

## 2020-12-29 RX ADMIN — METHIMAZOLE 2.5 MG: 5 TABLET ORAL at 05:40

## 2020-12-29 RX ADMIN — METOPROLOL SUCCINATE 25 MG: 25 TABLET, EXTENDED RELEASE ORAL at 05:41

## 2020-12-29 ASSESSMENT — ENCOUNTER SYMPTOMS
MYALGIAS: 1
DOUBLE VISION: 0
NAUSEA: 0
DEPRESSION: 0
HEMOPTYSIS: 0
BLURRED VISION: 0
HEADACHES: 0
PALPITATIONS: 0
HEARTBURN: 0
ABDOMINAL PAIN: 0
SHORTNESS OF BREATH: 1
FEVER: 0
SPUTUM PRODUCTION: 0
CHILLS: 0
VOMITING: 0
DIZZINESS: 0
COUGH: 1

## 2020-12-29 NOTE — DISCHARGE PLANNING
Agency/Facility Name: [facility]   Outcome: No answer, unable to leave vmail    Agency/Facility Name: [facility]   Spoke To: Glenys   Outcome: Per Glenys, [facility] does not do tanks.

## 2020-12-29 NOTE — PROGRESS NOTES
Discharge instructions reviewed with patient, follow up needs addressed, medications reviewed, all patient questions and concerns answered. Peripheral IV's discontinued. Patient belongings returned to patient. Patient transported via wheel chair to  area to meet family.

## 2020-12-29 NOTE — PROGRESS NOTES
"Bedside report received.  Assessment complete.  A&O x 4. Patient calls appropriately.  Patient ambulates with standby assist. Bed alarm off.   Patient has 0/10 pain. Pain managed with prescribed medications.  Denies N&V. Tolerating ADA diet.  + void, + flatus, + BM.  Patient is calm and cooperative.  Review plan with of care with patient. Call light and personal belongings with in reach. Hourly rounding in place. All needs met at this time.  /89   Pulse 60   Temp 36.1 °C (97 °F) (Temporal)   Resp 16   Ht 1.575 m (5' 2\")   Wt 69.6 kg (153 lb 7 oz)   SpO2 97%   BMI 28.06 kg/m²     "

## 2020-12-29 NOTE — DISCHARGE INSTRUCTIONS
Discharge Instructions    Discharged to home by car with relative. Discharged via wheelchair, hospital escort: Yes.  Special equipment needed: Not Applicable    Be sure to schedule a follow-up appointment with your primary care doctor or any specialists as instructed.     Discharge Plan:   Diet Plan: Discussed  Activity Level: Discussed  Confirmed Follow up Appointment: Patient to Call and Schedule Appointment  Confirmed Symptoms Management: Discussed  Medication Reconciliation Updated: Yes  Influenza Vaccine Indication: Not indicated: Previously immunized this influenza season and > 8 years of age    I understand that a diet low in cholesterol, fat, and sodium is recommended for good health. Unless I have been given specific instructions below for another diet, I accept this instruction as my diet prescription.   Other diet: Heart Healthy     Special Instructions: None    · Is patient discharged on Warfarin / Coumadin?   No     Depression / Suicide Risk    As you are discharged from this [facility] facility, it is important to learn how to keep safe from harming yourself.    Recognize the warning signs:  · Abrupt changes in personality, positive or negative- including increase in energy   · Giving away possessions  · Change in eating patterns- significant weight changes-  positive or negative  · Change in sleeping patterns- unable to sleep or sleeping all the time   · Unwillingness or inability to communicate  · Depression  · Unusual sadness, discouragement and loneliness  · Talk of wanting to die  · Neglect of personal appearance   · Rebelliousness- reckless behavior  · Withdrawal from people/activities they love  · Confusion- inability to concentrate     If you or a loved one observes any of these behaviors or has concerns about self-harm, here's what you can do:  · Talk about it- your feelings and reasons for harming yourself  · Remove any means that you might use to hurt yourself (examples: pills, rope,  extension cords, firearm)  · Get professional help from the community (Mental Health, Substance Abuse, psychological counseling)  · Do not be alone:Call your Safe Contact- someone whom you trust who will be there for you.  · Call your local CRISIS HOTLINE 706-0352 or 544-023-8634  · Call your local Children's Mobile Crisis Response Team [place] (480) 927-7436 or www.[employer]  · Call the toll free National Suicide Prevention Hotlines   · National Suicide Prevention Lifeline 475-786-HQEN (8157)  · The [employer] Line Network 800-SUICIDE (700-2837)          - Discharge Instructions to Pt:  · Follow up with your Primary Care Physician in 2 weeks.   · Follow up with your specialist Dr Obregon in 2-3 weeks.  · Be compliant with your follow up appointments.  · Please be compliant with your medications, including new ones.  · A new medication has been given please take as advised.  · Keep blood pressure controlled and be compliant to keep systolic blood pressure <140.  · Please adhere to a healthy diet, that consist of low fat, low salt, low calorie.  · Weight loss and physical activity as tolerated is encouraged.   · Ambulate with assistance.  ·  If there any signs or symptoms of worsening or symptoms recurring, please call your Primary Care Physician or return to Emergency Department for further assessment.  · Avoid sudden changes in position, like standing up quickly.  · Do not drive until cleared by PCP.  · Refrain from drinking alcohol and smoking.     Recommendation to PCP:  · Your pt will need close monitoring.  · If  failure to respond to therapy, we suggest having patient return for further care, or if cannot be treated as an outpatient, return to ED if worsening of symptoms.  · Please make certain your pt follows up with specialist appointmentsDischarge Instructions    Discharged to home by car with relative. Discharged via wheelchair, hospital escort: Yes.  Special equipment needed: Oxygen    Be sure  to schedule a follow-up appointment with your primary care doctor or any specialists as instructed.     Discharge Plan:   Diet Plan: Discussed  Activity Level: Discussed  Confirmed Follow up Appointment: Patient to Call and Schedule Appointment  Confirmed Symptoms Management: Discussed  Medication Reconciliation Updated: Yes  Influenza Vaccine Indication: Not indicated: Previously immunized this influenza season and > 8 years of age    I understand that a diet low in cholesterol, fat, and sodium is recommended for good health. Unless I have been given specific instructions below for another diet, I accept this instruction as my diet prescription.   Other diet: Heart Healthy    · Is patient discharged on Warfarin / Coumadin?   No     Depression / Suicide Risk    As you are discharged from this [facility] facility, it is important to learn how to keep safe from harming yourself.    Recognize the warning signs:  · Abrupt changes in personality, positive or negative- including increase in energy   · Giving away possessions  · Change in eating patterns- significant weight changes-  positive or negative  · Change in sleeping patterns- unable to sleep or sleeping all the time   · Unwillingness or inability to communicate  · Depression  · Unusual sadness, discouragement and loneliness  · Talk of wanting to die  · Neglect of personal appearance   · Rebelliousness- reckless behavior  · Withdrawal from people/activities they love  · Confusion- inability to concentrate     If you or a loved one observes any of these behaviors or has concerns about self-harm, here's what you can do:  · Talk about it- your feelings and reasons for harming yourself  · Remove any means that you might use to hurt yourself (examples: pills, rope, extension cords, firearm)  · Get professional help from the community (Mental Health, Substance Abuse, psychological counseling)  · Do not be alone:Call your Safe Contact- someone whom you trust who will  be there for you.  · Call your local CRISIS HOTLINE 217-0779 or 252-159-9937  · Call your local Children's Mobile Crisis Response Team [place] (965) 281-2060 or www.[employer]  · Call the toll free National Suicide Prevention Hotlines   · National Suicide Prevention Lifeline 992-895-YWCI (3883)  · National [employer] ([employer]) Line Network 800-GKLYRZL (424-7707)      · Ensure patient adheres to diet and lifestyle modifications and reconcile.  · Please note the change to medication and reconcile.  · Patient without progression of symptoms. Prognosis and risk factors discussed in detail with patient and she understands.

## 2020-12-29 NOTE — PROGRESS NOTES
[facility] Medicine Daily Progress Note    Date of Service  12/29/2020    Chief Complaint  80 y.o. female admitted 12/20/2020 with   Chief Complaint   Patient presents with   • Shortness of Breath         Hospital Course  This is 80-year-old female with past medical history significant for sick sinus syndrome status post pacemaker, A. fib, secondary hypercoagulable state, history of CVA with bilateral hemianopsia,presented in ER with a complaint of shortness of breath, cough, fever, loss of test and smell.  On presentation in ER she is noted to be hypoxic at 70s.  Patient has a long history of smoking, quit several years ago, does wear oxygen at home.    She is found to be febrile with a temperature of 101.5, hypoxic leukopenia at 3.7.  In ER, she is found to be COVID-19 positive on 12/20/2020.  Patient is then started on IV Decadron 10 mg and asked to be admitted to the hospital for further evaluation.    Stay in the hospital, patient continue to monitor and ACS; she has been requiring 6 L of oxygen.  Intensivist Dr. Pradhan contacted for remdesivir.      Interval Problem Update  Patient seen and examined at bedside this morning.  No acute events overnight.   Patient awaiting home health acceptance and home oxygen set up before discharge home.    Consultants/Specialty   None    Code Status  Full Code    Disposition  Continue ACS monitoring. As per PT, patient would require a FWW once discharged.  12/27: Patient will require a re-evaluation by PT/OT for safe discharge planning.    Review of Systems  Review of Systems   Constitutional: Positive for malaise/fatigue. Negative for chills and fever.   HENT: Negative for congestion.    Eyes: Negative for blurred vision and double vision.   Respiratory: Positive for cough and shortness of breath. Negative for hemoptysis and sputum production.    Cardiovascular: Negative for chest pain and palpitations.   Gastrointestinal: Negative for abdominal pain, heartburn, nausea and  vomiting.   Musculoskeletal: Positive for myalgias.   Neurological: Negative for dizziness and headaches.   Psychiatric/Behavioral: Negative for depression.        Physical Exam  Temp:  [36 °C (96.8 °F)-36.5 °C (97.7 °F)] 36.1 °C (97 °F)  Pulse:  [60-70] 60  Resp:  [16-20] 16  BP: (102-144)/(49-89) 144/89  SpO2:  [91 %-97 %] 97 %    Physical Exam  Vitals signs and nursing note reviewed.   Constitutional:       General: She is not in acute distress.     Appearance: Normal appearance. She is not toxic-appearing.   HENT:      Head: Normocephalic and atraumatic.      Mouth/Throat:      Pharynx: No oropharyngeal exudate or posterior oropharyngeal erythema.   Eyes:      General:         Right eye: No discharge.         Left eye: No discharge.      Extraocular Movements: Extraocular movements intact.   Neck:      Musculoskeletal: Neck supple.   Cardiovascular:      Rate and Rhythm: Normal rate.      Pulses: Normal pulses.      Heart sounds: No murmur.   Pulmonary:      Effort: Pulmonary effort is normal.      Breath sounds: Rhonchi present.      Comments: Decreased breath sound at the bases  Abdominal:      General: Abdomen is flat. There is no distension.      Palpations: Abdomen is soft.      Tenderness: There is no abdominal tenderness. There is no guarding.   Skin:     General: Skin is warm and dry.   Neurological:      Mental Status: She is alert and oriented to person, place, and time.      Cranial Nerves: No cranial nerve deficit.   Psychiatric:         Mood and Affect: Mood normal.         Behavior: Behavior normal.         Thought Content: Thought content normal.         Judgment: Judgment normal.         Fluids    Intake/Output Summary (Last 24 hours) at 12/29/2020 1221  Last data filed at 12/28/2020 1758  Gross per 24 hour   Intake --   Output 1 ml   Net -1 ml       Laboratory                        Imaging  DX-CHEST-PORTABLE (1 VIEW)   Final Result      Mild hazy peripheral bilateral pulmonary opacities most  suggestive of Covid pneumonia.           Assessment/Plan  * Acute respiratory failure with hypoxia (HCC)- (present on admission)  Assessment & Plan  Secondary to COVID-19 pneumonia.  Wean down oxygen as he tolerates.    Will need home O2 set up prior to discharge.    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 No need for inpatient follow up  She will continue her routine follow ups as outpatient    Seizure (HCC)  Assessment & Plan  Continue Keppra 500 mg po bid   Seizure precaution    Paroxysmal atrial fibrillation (HCC)  Assessment & Plan   Does follow dr Obregon as an op   -- continue Flecanide and metoprolol    -- monitor    Hyperthyroidism- (present on admission)  Assessment & Plan  Mild, need to have repeat TSH and free T4 in 4 to 6 weeks.  Continue methimazole       VTE prophylaxis: lovenox

## 2020-12-29 NOTE — DISCHARGE SUMMARY
Discharge Summary    CHIEF COMPLAINT ON ADMISSION  Chief Complaint   Patient presents with   • Shortness of Breath       Reason for Admission  EMS     Admission Date  12/20/2020    CODE STATUS  Full Code    HPI & HOSPITAL COURSE  80 y.o. female who presented 12/20/2020 with complaints of agnosia, ageusia, cough without production, fevers, ZAMARRIPA presents to ED for the above symptoms. Patient states he has a long smoking history and quit several years ago however wears nocturnal O2. She states that she has noticed she has been more short of breath the past few days and with her current symptoms decided to seek medical attention. She was noted to have SpO2 in 70's on R/A requiring continuous O2 supplementation. She states she is compliant with all of her other home medication regimen. She states she did have a few episodes of diarrhea earlier in the week which have since been resolved.      In the ED, patient had the following vitals on presentation: 101.5, 67, 26, 125/60, 94% 4L NC.     The patient was transferred to the ACS for further treatment and evaluation. The patient was started on a 10 day course of decadron that will finish on 12/29. The patient also had remdesivir treatment that finished on 12/25. The patient has been improving on her oxygen demand. The patient was found to have a mildly elevated D-dimer in the setting of COVID-19 positive, however her oxygen demand improved throughout her hospitalization and there were no other signs and symptoms of PE, reason for which we will not discharge the patient with anticoagulation and she only received lovenox prophylactically.    The patient was evaluated by PT and due to the fact that she lives by herself and she will now need a walker, home health was recommend and we have ordered it today. The patient is in good spirits and is ready to go home. The patient will be discharged home with home oxygen and home monitoring with home health.     The patient has a hx of  hyperthyroidism for which she will continue her home methimazole. She also has a hx of A fib and sick sinus syndrome s/p pacemaker placement, not currently on anticoagulation that follows closely with Dr Obregon, we will continue home dose of flecainide and metoprolol and follow with her cardiologist. She has a history of lung cancer with brain metastasis however she said she has been on remission since 2015, so she will need close follow up as outpatient. She also has a history of seizures for which she takes Keppra.    The patient is in good spirits, has improved clinically, and will be discharged home today.    Therefore, she is discharged in fair and stable condition to home with close outpatient follow-up.    The patient met 2-midnight criteria for an inpatient stay at the time of discharge.    Discharge Date  12/29/2020    FOLLOW UP ITEMS POST DISCHARGE  Follow up with PCP and Cardiology    DISCHARGE DIAGNOSES  Principal Problem:    Acute respiratory failure with hypoxia (HCC) POA: Yes  Active Problems:    Pneumonia due to COVID-19 virus POA: Yes    Sick sinus syndrome (HCC) POA: Yes      Overview: Pt. had pace maker placed in 2018. Reports no complication at this time.       Denies bradycardia or tachycardia, pain a pace maker site, or signs of       infection. Managed by Alfred Obregon MD. Taking metoprolol SR 25 mg SR       daily. Flecainide 150 mg once a day.    Hyperthyroidism POA: Yes    Paroxysmal atrial fibrillation (HCC) POA: Unknown    Seizure (HCC) POA: Unknown    Lung cancer (HCC) POA: Unknown  Resolved Problems:    Macrocytosis POA: Unknown    Leukopenia POA: Unknown    Hypokalemia POA: Unknown      FOLLOW UP  Future Appointments   Date Time Provider Department Center   12/30/2020 11:30 AM Alfred Obregon M.D. [facility] None   2/19/2021  8:00 AM REMOTE MONITORING - [facility] None   3/25/2021 10:00 AM SONIA Fry [facility] None   4/27/2021 11:30 AM Abhijit Joseph M.D. [facility]  "    Jahaira Santos, AMickiP.R.N.  [address] [place] [id number] [phone]    In 2 weeks      Alfred Obregon M.D.  [address]  [address]  [place] [id number] [phone]    In 2 weeks        MEDICATIONS ON DISCHARGE     Medication List      START taking these medications      Instructions   dexamethasone 6 MG Tabs  Commonly known as: DECADRON   Take 1 Tablet by mouth every day for 1 day.  Dose: 6 mg        CHANGE how you take these medications      Instructions   alendronate 70 MG Tabs  What changed: when to take this  Commonly known as: FOSAMAX   Take 1 Tab by mouth every 7 days.  Dose: 70 mg        CONTINUE taking these medications      Instructions   albuterol 108 (90 Base) MCG/ACT Aers inhalation aerosol   INHALE 2 PUFFS BY MOUTH EVERY 4 HOURS AS NEEDED FOR SHORTNESS OF BREATH     [facility] Pulse Oximeter [facility]   Doctor's comments: To monitor oxygen levels with exertion.  1 Each by Does not apply route 4 times a day as needed.  Dose: 1 Each     flecainide 150 MG Tabs  Commonly known as: TAMBOCOR   Doctor's comments: Patient must be seen in office for further refills.  TAKE 1 TABLET BY MOUTH EVERY 12 HOURS     furosemide 20 MG Tabs  Commonly known as: LASIX   TAKE 1 TABLET BY MOUTH EVERY DAY IN THE MORNING     Klor-Con 10 10 MEQ tablet  Generic drug: potassium chloride ER   TAKE 1 TABLET BY MOUTH EVERY DAY     levETIRAcetam 500 MG Tabs  Commonly known as: KEPPRA   Take 1 Tab by mouth 2 Times a Day.  Dose: 500 mg     methimazole 5 MG Tabs  Commonly known as: TAPAZOLE   Take 0.5 Tabs by mouth every day.  Dose: 2.5 mg     metoprolol SR 25 MG Tb24  Commonly known as: TOPROL XL   Take 25 mg by mouth every day.  Dose: 25 mg            Allergies  Allergies   Allergen Reactions   • Penicillins Rash and Itching     RXN \"a long time ago\"  Other reaction(s): Rash   • Atorvastatin      Causes low quality       DIET  Orders Placed This Encounter   Procedures   • Diet Order Diet: Consistent CHO (Diabetic)     Standing " Status:   Standing     Number of Occurrences:   1     Order Specific Question:   Diet:     Answer:   Consistent CHO (Diabetic) [4]       ACTIVITY  As tolerated.  Weight bearing as tolerated    CONSULTATIONS  None    PROCEDURES  None    LABORATORY  Lab Results   Component Value Date    SODIUM 138 12/26/2020    POTASSIUM 3.8 12/26/2020    CHLORIDE 104 12/26/2020    CO2 24 12/26/2020    GLUCOSE 113 (H) 12/26/2020    BUN 18 12/26/2020    CREATININE 0.49 (L) 12/26/2020        Lab Results   Component Value Date    WBC 6.8 12/26/2020    HEMOGLOBIN 14.2 12/26/2020    HEMATOCRIT 43.6 12/26/2020    PLATELETCT 337 12/26/2020        Total time of the discharge process exceeds 25 minutes.

## 2020-12-29 NOTE — DISCHARGE PLANNING
Noted Tj RODRIGUEZ's note regarding home O2. Called and spoke with Nadir LOMAX to update and to have him update MD and let CM know if any needs arise.

## 2020-12-29 NOTE — DISCHARGE PLANNING
Medical Social Work    MSW received a call from bedside RN requesting an update on pt's portable oxygen to be able to D/C home.  MSW reviewed chart and per SW/CM notes a referral was sent to [facility] at 1532.  Per the [employer] company they requested 24 hours to process the referral.  MSW updated bedside RN.  SW will provide report to unit SW/CM to follow up tomorrow.

## 2020-12-30 ENCOUNTER — TELEPHONE (OUTPATIENT)
Dept: MEDICAL GROUP | Facility: IMAGING CENTER | Age: 80
End: 2020-12-30

## 2020-12-30 ENCOUNTER — TELEMEDICINE (OUTPATIENT)
Dept: CARDIOLOGY | Facility: MEDICAL CENTER | Age: 80
End: 2020-12-30
Payer: MEDICARE

## 2020-12-30 ENCOUNTER — TELEMEDICINE (OUTPATIENT)
Dept: URGENT CARE | Facility: CLINIC | Age: 80
End: 2020-12-30
Payer: MEDICARE

## 2020-12-30 VITALS — RESPIRATION RATE: 14 BRPM | OXYGEN SATURATION: 94 %

## 2020-12-30 VITALS
RESPIRATION RATE: 19 BRPM | OXYGEN SATURATION: 97 % | HEIGHT: 61 IN | BODY MASS INDEX: 28.32 KG/M2 | HEART RATE: 62 BPM | WEIGHT: 150 LBS

## 2020-12-30 DIAGNOSIS — I48.20 CHRONIC ATRIAL FIBRILLATION (HCC): ICD-10-CM

## 2020-12-30 DIAGNOSIS — J96.01 ACUTE RESPIRATORY FAILURE WITH HYPOXIA (HCC): ICD-10-CM

## 2020-12-30 DIAGNOSIS — U07.1 PNEUMONIA DUE TO COVID-19 VIRUS: ICD-10-CM

## 2020-12-30 DIAGNOSIS — R91.8 LUNG MASS: ICD-10-CM

## 2020-12-30 DIAGNOSIS — Z79.899 HIGH RISK MEDICATION USE: ICD-10-CM

## 2020-12-30 DIAGNOSIS — I63.10 CEREBROVASCULAR ACCIDENT (CVA) DUE TO EMBOLISM OF PRECEREBRAL ARTERY (HCC): ICD-10-CM

## 2020-12-30 DIAGNOSIS — I48.0 PAROXYSMAL ATRIAL FIBRILLATION (HCC): ICD-10-CM

## 2020-12-30 DIAGNOSIS — I49.5 SICK SINUS SYNDROME (HCC): ICD-10-CM

## 2020-12-30 DIAGNOSIS — R60.0 LOWER EXTREMITY EDEMA: ICD-10-CM

## 2020-12-30 DIAGNOSIS — J12.82 PNEUMONIA DUE TO COVID-19 VIRUS: ICD-10-CM

## 2020-12-30 DIAGNOSIS — I48.0 AF (PAROXYSMAL ATRIAL FIBRILLATION) (HCC): ICD-10-CM

## 2020-12-30 PROCEDURE — 99214 OFFICE O/P EST MOD 30 MIN: CPT | Mod: 95,CR | Performed by: INTERNAL MEDICINE

## 2020-12-30 PROCEDURE — 99457 RPM TX MGMT 1ST 20 MIN: CPT | Mod: 95,CR | Performed by: NURSE PRACTITIONER

## 2020-12-30 RX ORDER — METOPROLOL SUCCINATE 25 MG/1
25 TABLET, EXTENDED RELEASE ORAL
Qty: 90 TAB | Refills: 3 | Status: SHIPPED | OUTPATIENT
Start: 2020-12-30 | End: 2021-07-01 | Stop reason: SDUPTHER

## 2020-12-30 RX ORDER — FLECAINIDE ACETATE 150 MG/1
150 TABLET ORAL 2 TIMES DAILY
Qty: 180 TAB | Refills: 3 | Status: SHIPPED | OUTPATIENT
Start: 2020-12-30 | End: 2021-07-01

## 2020-12-30 ASSESSMENT — ENCOUNTER SYMPTOMS
RESPIRATORY NEGATIVE: 1
SHORTNESS OF BREATH: 0
CONSTITUTIONAL NEGATIVE: 1
GASTROINTESTINAL NEGATIVE: 1
SORE THROAT: 0
CHILLS: 0
COUGH: 0
STRIDOR: 0
PND: 0
NEUROLOGICAL NEGATIVE: 1
CARDIOVASCULAR NEGATIVE: 1
FEVER: 0
EYES NEGATIVE: 1
WHEEZING: 0
MUSCULOSKELETAL NEGATIVE: 1
WEAKNESS: 0
ORTHOPNEA: 0
HEMOPTYSIS: 0
LOSS OF CONSCIOUSNESS: 0
SPUTUM PRODUCTION: 0
PALPITATIONS: 0
BRUISES/BLEEDS EASILY: 0
CLAUDICATION: 0
DIZZINESS: 0

## 2020-12-30 ASSESSMENT — FIBROSIS 4 INDEX: FIB4 SCORE: 1.43

## 2020-12-30 NOTE — TELEPHONE ENCOUNTER
Spoke with [facility]. Pt had declined oxygen from them stating she was already set up. Left message with [facility].

## 2020-12-30 NOTE — PROGRESS NOTES
Chief Complaint   Patient presents with   • Atrial Fibrillation       Subjective:   Ml Chavira is a 80 y.o. female who presents today as a follow up for her afib and CVA.  Since he was last seen she came down with Covid and was in the hospital.  She is now on oxygen.  Her O2 sats are 94-95 on 2 L.  She is finding out when she can get a concentrator and wants to be off the oxygen.  Otherwise has been doing well with no palpitations.  She is almost out of her flecainide.  She is not taking the Toprol so far she can tell.    Past Medical History:   Diagnosis Date   • Breast cancer (HCC)    • Cancer (HCC)     lung cancer with brain mts   • Chickenpox    • Maced[place] measles    • Healthcare maintenance 7/23/2018   • Influenza    • Joint pain    • Lung cancer (HCC)    • Mumps    • Need for vaccination 1/17/2019   • Radionecrosis 4/14/2018   • Sick sinus syndrome (HCC)     with AFIB, s/p pacemaker   • Thyroid disease    • Tonsillitis      Past Surgical History:   Procedure Laterality Date   • CRANIOTOMY STEALTH Right 11/23/2015    Procedure: CRANIOTOMY STEALTH-right occipital;  Surgeon: Suyapa Schumacher M.D.;  Location: SURGERY [facility];  Service:    • APPENDECTOMY     • CRANIOTOMY     • KNEE ARTHROSCOPY      left    • LUMPECTOMY     • OTHER      left knee surgery   • PACEMAKER INSERTION     • TONSILLECTOMY       Family History   Problem Relation Age of Onset   • Dementia Mother    • Diabetes Mother    • Other Mother         osteoarthritis   • Arthritis Mother    • Autoimmune Disease Father         Rheumatoid arthritis   • Arthritis Father    • Cancer Brother         prostate      Social History     Socioeconomic History   • Marital status:      Spouse name: Not on file   • Number of children: Not on file   • Years of education: Not on file   • Highest education level: Not on file   Occupational History   • Not on file   Social Needs   • Financial resource strain: Not on file   • Food insecurity      "Worry: Not on file     Inability: Not on file   • Transportation needs     Medical: Not on file     Non-medical: Not on file   Tobacco Use   • Smoking status: Former Smoker     Packs/day: 2.00     Years: 20.00     Pack years: 40.00     Types: Cigarettes     Quit date:      Years since quittin.0   • Smokeless tobacco: Never Used   Substance and Sexual Activity   • Alcohol use: Yes     Alcohol/week: 1.8 oz     Types: 3 Glasses of wine per week     Frequency: 2-3 times a week     Drinks per session: 1 or 2     Binge frequency: Never     Comment: occasionally    • Drug use: No   • Sexual activity: Not Currently   Lifestyle   • Physical activity     Days per week: 4 days     Minutes per session: 60 min   • Stress: Not on file   Relationships   • Social connections     Talks on phone: More than three times a week     Gets together: More than three times a week     Attends [employer] service: Never     Active member of club or organization: Yes     Attends meetings of clubs or organizations: More than 4 times per year     Relationship status:    • Intimate partner violence     Fear of current or ex partner: No     Emotionally abused: No     Physically abused: No     Forced sexual activity: No   Other Topics Concern   • Not on file   Social History Narrative   • Not on file     Allergies   Allergen Reactions   • Penicillins Rash and Itching     RXN \"a long time ago\"  Other reaction(s): Rash   • Atorvastatin      Causes low quality     Outpatient Encounter Medications as of 2020   Medication Sig Dispense Refill   • furosemide (LASIX) 20 MG Tab TAKE 1 TABLET BY MOUTH EVERY DAY IN THE MORNING 90 Tab 1   • KLOR-CON 10 10 MEQ tablet TAKE 1 TABLET BY MOUTH EVERY DAY 90 Tab 1   • methimazole (TAPAZOLE) 5 MG Tab Take 0.5 Tabs by mouth every day. 90 Tab 1   • alendronate (FOSAMAX) 70 MG Tab Take 1 Tab by mouth every 7 days. (Patient taking differently: Take 70 mg by mouth every Monday.) 12 Tab 2   • albuterol " "108 (90 Base) MCG/ACT Aero Soln inhalation aerosol INHALE 2 PUFFS BY MOUTH EVERY 4 HOURS AS NEEDED FOR SHORTNESS OF BREATH 1 Each 1   • levETIRAcetam (KEPPRA) 500 MG Tab Take 1 Tab by mouth 2 Times a Day. 180 Tab 3   • flecainide (TAMBOCOR) 150 MG Tab TAKE 1 TABLET BY MOUTH EVERY 12 HOURS 180 Tab 0   • metoprolol SR (TOPROL XL) 25 MG TABLET SR 24 HR Take 25 mg by mouth every day.  3   • dexamethasone (DECADRON) 6 MG Tab Take 1 Tablet by mouth every day for 1 day. (Patient not taking: Reported on 12/30/2020) 1 Tab 0   • Misc. Devices ([employer] PULSE OXIMETER) Misc 1 Each by Does not apply route 4 times a day as needed. (Patient not taking: Reported on 12/20/2020) 1 Each 0     No facility-administered encounter medications on file as of 12/30/2020.      Review of Systems   Constitutional: Negative.  Negative for chills, fever and malaise/fatigue.   HENT: Negative.  Negative for sore throat.    Eyes: Negative.    Respiratory: Negative.  Negative for cough, hemoptysis, sputum production, shortness of breath, wheezing and stridor.    Cardiovascular: Negative.  Negative for chest pain, palpitations, orthopnea, claudication, leg swelling and PND.   Gastrointestinal: Negative.    Genitourinary: Negative.    Musculoskeletal: Negative.    Skin: Negative.    Neurological: Negative.  Negative for dizziness, loss of consciousness and weakness.   Endo/Heme/Allergies: Negative.  Does not bruise/bleed easily.   All other systems reviewed and are negative.       Objective:   Pulse 62   Resp 19   Ht 1.549 m (5' 1\")   Wt 68 kg (150 lb)   SpO2 97%   BMI 28.34 kg/m²     Physical Exam   Constitutional: She appears well-developed and well-nourished. No distress.   HENT:   Head: Normocephalic and atraumatic.   Right Ear: External ear normal.   Left Ear: External ear normal.   Nose: Nose normal.   Mouth/Throat: No oropharyngeal exudate.   Eyes: Pupils are equal, round, and reactive to light. Conjunctivae and EOM are normal. Right eye " exhibits no discharge. Left eye exhibits no discharge. No scleral icterus.   Neck: Neck supple. No JVD present.   Cardiovascular: Normal rate, regular rhythm and intact distal pulses. Exam reveals no gallop and no friction rub.   No murmur heard.  Pulmonary/Chest: Effort normal. No stridor. No respiratory distress. She has no wheezes. She has no rales. She exhibits no tenderness.   Abdominal: Soft. She exhibits no distension. There is no guarding.   Musculoskeletal: Normal range of motion.         General: No tenderness, deformity or edema.   Neurological: She is alert. She has normal reflexes. She displays normal reflexes. No cranial nerve deficit. She exhibits normal muscle tone. Coordination normal.   Skin: Skin is warm and dry. No rash noted. She is not diaphoretic. No erythema. No pallor.   Psychiatric: She has a normal mood and affect. Her behavior is normal. Judgment and thought content normal.   Nursing note and vitals reviewed.      Assessment:     1. High risk medication use     2. Cerebrovascular accident (CVA) due to embolism of precerebral artery (HCC)     3. Chronic atrial fibrillation (HCC)     4. Acute respiratory failure with hypoxia (HCC)     5. Lower extremity edema     6. Lung mass     7. Paroxysmal atrial fibrillation (HCC)     8. Pneumonia due to COVID-19 virus     9. Sick sinus syndrome (HCC)         Medical Decision Making:  Today's Assessment / Status / Plan:     80-year-old female with atrial fibrillation edema and hypoxic respiratory failure.  I refilled her flecainide and metoprolol.  For her atrial fibrillation we talked about again the risk of oral anticoagulation in the setting of a recent brain bleed.  She will stay off anticoagulation.  I will see her benefit of aspirin either.  In terms of edema she will continue on the furosemide.  For her respiratory failure she will check her oxygen both ambulatory and rest and so long as it stays above 88 she will use the oxygen as  needed.      1. A-fib    - refill flecainide 150 BID    - refill metop XL 25    2. Edema    - cont lasix PRN    3. Hypoxic Resp Failure    - cont O2    - wean as tolerated    Start time: 11:45  Stop time: 12:00    This evaluation was conducted via Zoom using secure and encrypted videoconferencing technology. The patient was in a private location in the [facility].    The patient's identity was confirmed and verbal consent was obtained for this virtual visit.

## 2020-12-30 NOTE — TELEPHONE ENCOUNTER
Pt called requesting new company for supplemental oxygen. She is struggling with her current oxygen setup. She is having trouble with the tanks and keeps getting tangled and it makes it difficult for her to get around. She states that the oxygen is working now. She is unsure of which company was used. She normally uses [facility], but was told they could not provide the tanks.     Unable to determine where oxygen was delivered from. Call made to  RN for advice and left VM.

## 2020-12-30 NOTE — TELEPHONE ENCOUNTER
Consulted with Dr. Navas, covering for Jahaira OhmNICKY. Order for portable oxygen [facility]ed to [facility]. Pt notified. Pt will call with any issues.

## 2020-12-30 NOTE — TELEPHONE ENCOUNTER
Called pt to clarify. Pt states she did not cancel the [facility] order, that happened while she was in the hospital. She also never received the continuous oxygen set up from the hospital because her current DME company did not have the tanks. So she has been just using her night time concentrator. She is supposed to be using 1.5 LPM continuously.

## 2020-12-31 ENCOUNTER — TELEMEDICINE (OUTPATIENT)
Dept: URGENT CARE | Facility: CLINIC | Age: 80
End: 2020-12-31
Payer: MEDICARE

## 2020-12-31 VITALS — OXYGEN SATURATION: 89 % | RESPIRATION RATE: 16 BRPM

## 2020-12-31 DIAGNOSIS — Z99.81 DEPENDENCE ON SUPPLEMENTAL OXYGEN: ICD-10-CM

## 2020-12-31 DIAGNOSIS — J12.82 PNEUMONIA DUE TO COVID-19 VIRUS: ICD-10-CM

## 2020-12-31 DIAGNOSIS — U07.1 PNEUMONIA DUE TO COVID-19 VIRUS: ICD-10-CM

## 2020-12-31 PROCEDURE — 99213 OFFICE O/P EST LOW 20 MIN: CPT | Mod: 95 | Performed by: EMERGENCY MEDICINE

## 2020-12-31 NOTE — PROGRESS NOTES
Virtual Visit: New Patient   This visit was conducted via Zoom using secure and encrypted videoconferencing technology. The patient was in a private location in the state of [place].    The patient's identity was confirmed and verbal consent was obtained for this virtual visit.    Subjective:     CC:   Chief Complaint   Patient presents with   • Covid-19 Home Monitoring Video Visit   • Covid-19 Home Monitoring Video Visit       Ml Chavira is a 80 y.o. female presenting to establish care and to discuss the evaluation and management of:    Remote patient monitoring after discharge from hospital for covid.  She reports she is feeling much better.  She denies any chest pain, fever, worsening shortness of breath, or swelling/redness/pain in the legs.  She is a former smoker and uses supplemental oxygen at night.  She is currently on 1.5 L day and night since discharge from the hospital.  She finished a course of Decadron yesterday.  She has a history of hyperthyroidism and takes methimazole.  She has a history of A fib and sick sinus syndrome s/p pacemaker placement.  She followed up virtually with her cardiologist today, who advised against any anticoagulation due to history of recent brain bleed.  She will continue her metoprolol and flecainide daily and also take lasix prn per cardiology today.  She takes Keppra due to history of seizures.  She has a history of lung cancer with brain metastasis.  She has a home health referral but states she will try to cancel it as she has learned that she can get in-house physical therapy and ADL assistance at the facility where she lives.    Home Monitoring Patient Triage  COVID-19 Monitoring Level:     stable at home    [facility] Flowsheet   1. Record COVID-19 Severity Index (qCSI):    2  2.Confirm appropriate patient and chart with two identifiers: Yes - continue to question #3   3. Days Since Onset of Symptoms: 10  4. Does patient have another adult at home to  "help take care of you:    5. Confirm O2 supply is working and there are no cannula problems: Yes - continue to question #6  6. Is your breathing today better or worse? Better - continue to question #7  7. Are you able to tolerate fluids? Yes - Continue to question #8  8. Any vomiting or diarrhea in the last 24 hours? No - continue to question #9  9. Are you able to control your fevers? N/A  10. Are you able to control your cough? Yes - continue to question #11  11. Current O2 Flow Rate: 1.5  12. Review ER precautions: present to ED or call 911 if experiencing severe shortness of breath: Yes  13. Confirm next VV: Appointment scheduled  14. Final disposition: Stable at home  15. Time Spent: 15    ROS  See HPI  Constitutional: Negative for fever, chills and malaise/fatigue.   HENT: Negative for congestion.    Eyes: Negative for pain.   Respiratory: Negative for cough and shortness of breath.    Cardiovascular: Negative for leg swelling.   Gastrointestinal: Negative for nausea, vomiting, abdominal pain and diarrhea.   Genitourinary: Negative for dysuria and hematuria.   Skin: Negative for rash.   Neurological: Negative for dizziness, focal weakness and headaches.   Endo/Heme/Allergies: Does not bruise/bleed easily.   Psychiatric/Behavioral: Negative for depression.  The patient is not nervous/anxious.      Allergies   Allergen Reactions   • Penicillins Rash and Itching     RXN \"a long time ago\"  Other reaction(s): Rash   • Atorvastatin      Causes low quality       Current medicines (including changes today)  Current Outpatient Medications   Medication Sig Dispense Refill   • flecainide (TAMBOCOR) 150 MG Tab Take 1 Tab by mouth 2 times a day. 180 Tab 3   • metoprolol SR (TOPROL XL) 25 MG TABLET SR 24 HR Take 1 Tab by mouth every day. 90 Tab 3   • furosemide (LASIX) 20 MG Tab TAKE 1 TABLET BY MOUTH EVERY DAY IN THE MORNING 90 Tab 1   • KLOR-CON 10 10 MEQ tablet TAKE 1 TABLET BY MOUTH EVERY DAY 90 Tab 1   • methimazole " (TAPAZOLE) 5 MG Tab Take 0.5 Tabs by mouth every day. 90 Tab 1   • albuterol 108 (90 Base) MCG/ACT Aero Soln inhalation aerosol INHALE 2 PUFFS BY MOUTH EVERY 4 HOURS AS NEEDED FOR SHORTNESS OF BREATH 1 Each 1   • levETIRAcetam (KEPPRA) 500 MG Tab Take 1 Tab by mouth 2 Times a Day. 180 Tab 3     No current facility-administered medications for this visit.        She  has a past medical history of Breast cancer (HCC), Cancer (HCC), Chickenpox, Hungarian measles, Healthcare maintenance (2018), Influenza, Joint pain, Lung cancer (HCC), Mumps, Need for vaccination (2019), Radionecrosis (2018), Sick sinus syndrome (HCC), Thyroid disease, and Tonsillitis.  She  has a past surgical history that includes craniotomy stealth (Right, 2015); other; appendectomy; knee arthroscopy; craniotomy; tonsillectomy; pacemaker insertion; and lumpectomy.      Family History   Problem Relation Age of Onset   • Dementia Mother    • Diabetes Mother    • Other Mother         osteoarthritis   • Arthritis Mother    • Autoimmune Disease Father         Rheumatoid arthritis   • Arthritis Father    • Cancer Brother         prostate      Family Status   Relation Name Status   • Mo     • Fa     • Sis  Alive   • Bro  Alive       Patient Active Problem List    Diagnosis Date Noted   • Acute respiratory failure with hypoxia (HCC) 2020     Priority: High   • Pneumonia due to COVID-19 virus 2020     Priority: High   • Sick sinus syndrome (HCC) 2018     Priority: Medium   • Lung cancer (HCC) 2020   • Paroxysmal atrial fibrillation (HCC) 2020   • Seizure (formerly [facility]) 2020   • Abnormal CBC measurement 2020   • At risk for falling 2019   • Use of cane as ambulatory aid 2019   • Lower extremity edema 2019   • High risk medication use 2019   • Multinodular goiter 08/15/2019   • Vitamin D deficiency 08/15/2019   • Osteoporosis 2019   • Hyperthyroidism 2019   •  Chronic atrial fibrillation (HCC) 01/17/2019   • Postmenopausal 08/27/2018   • 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  Skin: No rashes in visible areas.  Eye: Round. Conjunctiva clear, lids normal. No icterus.   ENMT: Lips pink without lesions, good dentition, moist mucous membranes. Phonation normal.  Neck: No masses, no thyromegaly. Moves freely without pain.  Respiratory: Unlabored respiratory effort, no cough or audible wheeze  Psych: Alert and oriented x3, normal affect and mood.       Assessment and Plan:   The following treatment plan was discussed:     1. Pneumonia due to COVID-19 virus    Discussed exam findings with Ml.  Continue her regular medications as previously prescribed.  Decrease supplemental oxygen to 1 L/min; anticipate titration of supplemental oxygen down to baseline in a short amount of time.  Perform incentive spirometry 3 times per hour while awake.  Follow up tomorrow as scheduled.  ED precautions discussed.  She verbalized understanding of and agreed with plan of care.      Follow-up: No follow-ups on file.

## 2020-12-31 NOTE — TELEPHONE ENCOUNTER
[facility] home[facility] called to notify us that they are unable to fill oxygen request due to pt being set [facility] with [facility] already. Spoke with Donald at [facility]. He states that the request is under review with medicare and that this may take up to 2 weeks. Notified them that this is an unacceptable timeframe as patient is already home and in need of continuous oxygen set up. Donald will pass along to manager for urgent review. Requested call back with outcome/resolution.

## 2021-01-01 ENCOUNTER — TELEMEDICINE (OUTPATIENT)
Dept: URGENT CARE | Facility: CLINIC | Age: 81
End: 2021-01-01
Payer: MEDICARE

## 2021-01-01 DIAGNOSIS — J12.82 PNEUMONIA DUE TO COVID-19 VIRUS: ICD-10-CM

## 2021-01-01 DIAGNOSIS — Z99.81 DEPENDENCE ON SUPPLEMENTAL OXYGEN: ICD-10-CM

## 2021-01-01 DIAGNOSIS — U07.1 PNEUMONIA DUE TO COVID-19 VIRUS: ICD-10-CM

## 2021-01-01 PROCEDURE — 99457 RPM TX MGMT 1ST 20 MIN: CPT | Mod: 95,CR | Performed by: NURSE PRACTITIONER

## 2021-01-01 PROCEDURE — 99458 RPM TX MGMT EA ADDL 20 MIN: CPT | Mod: 95,CR | Performed by: NURSE PRACTITIONER

## 2021-01-01 ASSESSMENT — ENCOUNTER SYMPTOMS
FEVER: 0
ABDOMINAL PAIN: 0
COUGH: 0
SHORTNESS OF BREATH: 1
MYALGIAS: 0
CHILLS: 0
HEADACHES: 0
NAUSEA: 0
SORE THROAT: 0

## 2021-01-01 NOTE — PROGRESS NOTES
Subjective:      Ml Chavira is a 80 y.o. female who presents with Covid-19 Home Monitoring Video Visit    This evaluation was conducted via Zoom using secure and encrypted videoconferencing technology. The patient was in a private location in the [facility].    The patient's identity was confirmed and verbal consent was obtained for this virtual visit.    Home Monitoring Patient Triage  COVID-19 Monitoring Level: Home with basic monitoring       [facility] Flowsheet   1. Record COVID-19 Severity Index (qCSI):  2  2.Confirm appropriate patient and chart with two identifiers: Yes - continue to question #3   3. Days Since Onset of Symptoms: 11  4. Does patient have another adult at home to help take care of you: No - not appropiate for program, contact [facility] for direct admit  5. Confirm O2 supply is working and there are no cannula problems: Yes - continue to question #6  6. Is your breathing today better or worse? Same  7. Are you able to tolerate fluids? Yes - Continue to question #8  8. Any vomiting or diarrhea in the last 24 hours? No - continue to question #9  9. Are you able to control your fevers? N/A  10. Are you able to control your cough? Yes - continue to question #11  11. Current O2 Flow Rate: 0  12. Review ER precautions: present to ED or call 911 if experiencing severe shortness of breath: Yes  13. Confirm next VV: Appointment scheduled  14. Final disposition: Stable at home  15. Time Spent: 10          HPI    ROS       Objective:     Resp 16   SpO2 89%      Physical Exam    Constitutional:       General: Awake.      Appearance: Not toxic-appearing.   HENT:      Nose: No signs of injury.      Mouth/Throat: Voice clear.     Lips: Pink.   Eyes:      General: Lids are normal.      Conjunctiva/sclera: Conjunctivae normal.   Neck:      Trachea: Phonation normal.   Pulmonary:      Effort: Pulmonary effort is normal.   Skin:     Coloration: Skin is not cyanotic or pale.    Neurological:      Mental Status: Alert and oriented to person, place, and time.   Psychiatric:         Mood and Affect: Mood and affect normal.         Speech: Speech normal.         Behavior: Behavior is cooperative.             Assessment/Plan:        1. Pneumonia due to COVID-19 virus  Continue RPM; next visit 2 days    2. Dependence on supplemental oxygen  Advised O2 2 L/min activity, 0-1 L/min rest/sleep

## 2021-01-02 ENCOUNTER — TELEMEDICINE (OUTPATIENT)
Dept: URGENT CARE | Facility: CLINIC | Age: 81
End: 2021-01-02
Payer: MEDICARE

## 2021-01-02 VITALS — OXYGEN SATURATION: 95 % | RESPIRATION RATE: 16 BRPM

## 2021-01-02 DIAGNOSIS — U07.1 PNEUMONIA DUE TO COVID-19 VIRUS: ICD-10-CM

## 2021-01-02 DIAGNOSIS — J12.82 PNEUMONIA DUE TO COVID-19 VIRUS: ICD-10-CM

## 2021-01-02 DIAGNOSIS — Z99.81 DEPENDENCE ON SUPPLEMENTAL OXYGEN: ICD-10-CM

## 2021-01-02 PROCEDURE — 99213 OFFICE O/P EST LOW 20 MIN: CPT | Mod: 95 | Performed by: EMERGENCY MEDICINE

## 2021-01-02 ASSESSMENT — ENCOUNTER SYMPTOMS
DIARRHEA: 0
COUGH: 0
VOMITING: 0
FEVER: 0
SHORTNESS OF BREATH: 0

## 2021-01-02 NOTE — PROGRESS NOTES
Subjective:      Ml Chavira is a 80 y.o. female who presents with Covid-19 Home Monitoring Video Visit    This evaluation was conducted via Zoom using secure and encrypted videoconferencing technology. The patient was in a private location in the [facility].    The patient's identity was confirmed and verbal consent was obtained for this virtual visit.    Home Monitoring Patient Triage  COVID-19 Monitoring Level: Home with basic monitoring       [facility] Flowsheet   1. Record COVID-19 Severity Index (qCSI):  0  2.Confirm appropriate patient and chart with two identifiers: Yes - continue to question #3   3. Days Since Onset of Symptoms: 16  4. Does patient have another adult at home to help take care of you:    5. Confirm O2 supply is working and there are no cannula problems: Yes - continue to question #6  6. Is your breathing today better or worse? Same  7. Are you able to tolerate fluids? Yes - Continue to question #8  8. Any vomiting or diarrhea in the last 24 hours? No - continue to question #9  9. Are you able to control your fevers? N/A  10. Are you able to control your cough? Yes - continue to question #11  11. Current O2 Flow Rate: 1  12. Review ER precautions: present to ED or call 911 if experiencing severe shortness of breath: Yes  13. Confirm next VV: Appointment scheduled  14. Final disposition: Stable at home  15. Time Spent: 10          Pneumonia  There is no cough or shortness of breath. The problem has been unchanged. Pertinent negatives include no fever.   Says feels good    Review of Systems   Constitutional: Negative for fever.   Respiratory: Negative for cough and shortness of breath.    Gastrointestinal: Negative for diarrhea and vomiting.     Past Medical History:   Diagnosis Date   • Breast cancer (HCC)    • Cancer (HCC)     lung cancer with brain mts   • Chickenpox    • [place] measles    • Healthcare maintenance 7/23/2018   • Influenza    • Joint pain    • Lung cancer  (HCC)    • Mumps    • Need for vaccination 2019   • Radionecrosis 2018   • Sick sinus syndrome (HCC)     with AFIB, s/p pacemaker   • Thyroid disease    • Tonsillitis      Past Surgical History:   Procedure Laterality Date   • CRANIOTOMY STEALTH Right 2015    Procedure: CRANIOTOMY STEALTH-right occipital;  Surgeon: Suyapa Schumacher M.D.;  Location: SURGERY [facility];  Service:    • APPENDECTOMY     • CRANIOTOMY     • KNEE ARTHROSCOPY      left    • LUMPECTOMY     • OTHER      left knee surgery   • PACEMAKER INSERTION     • TONSILLECTOMY        Allergy:  Penicillins and Atorvastatin     Current Outpatient Medications:   •  flecainide, 150 mg, Oral, BID  •  metoprolol SR, 25 mg, Oral, QDAY  •  furosemide, TAKE 1 TABLET BY MOUTH EVERY DAY IN THE MORNING  •  Klor-Con 10, TAKE 1 TABLET BY MOUTH EVERY DAY  •  methimazole, 2.5 mg, Oral, DAILY  •  albuterol, INHALE 2 PUFFS BY MOUTH EVERY 4 HOURS AS NEEDED FOR SHORTNESS OF BREATH  •  levETIRAcetam, 500 mg, Oral, BID   family history includes Arthritis in her father and mother; Autoimmune Disease in her father; Cancer in her brother; Dementia in her mother; Diabetes in her mother; Other in her mother.   Social History     Tobacco Use   • Smoking status: Former Smoker     Packs/day: 2.00     Years: 20.00     Pack years: 40.00     Types: Cigarettes     Quit date:      Years since quittin.0   • Smokeless tobacco: Never Used   Substance Use Topics   • Alcohol use: Yes     Alcohol/week: 1.8 oz     Types: 3 Glasses of wine per week     Frequency: 2-3 times a week     Drinks per session: 1 or 2     Binge frequency: Never     Comment: occasionally    • Drug use: No         Objective:     Resp 16   SpO2 95%      Physical Exam    Constitutional:       General: Awake.      Appearance: Not toxic-appearing.   HENT:      Nose: No signs of injury.      Mouth/Throat: Voice clear.     Lips: Pink.   Eyes:      General: Lids are normal.      Conjunctiva/sclera:  Conjunctivae normal.   Neck:      Trachea: Phonation normal.   Pulmonary:      Effort: Pulmonary effort is normal.   Skin:     Coloration: Skin is not cyanotic or pale.   Neurological:      Mental Status: Alert and oriented to person, place, and time.   Psychiatric:         Mood and Affect: Mood and affect normal.         Speech: Speech normal.         Behavior: Behavior is cooperative.             Assessment/Plan:        1. Pneumonia due to COVID-19 virus  Continue RPM one more day    2. Dependence on supplemental oxygen  Attempted SpO2 telemetry walk test; had transient asymptomatic desaturation to 74%, resolved to 94% with rest.

## 2021-01-03 ENCOUNTER — TELEMEDICINE (OUTPATIENT)
Dept: URGENT CARE | Facility: CLINIC | Age: 81
End: 2021-01-03
Payer: MEDICARE

## 2021-01-03 VITALS — OXYGEN SATURATION: 94 % | RESPIRATION RATE: 16 BRPM

## 2021-01-03 DIAGNOSIS — U07.1 PNEUMONIA DUE TO COVID-19 VIRUS: ICD-10-CM

## 2021-01-03 DIAGNOSIS — J12.82 PNEUMONIA DUE TO COVID-19 VIRUS: ICD-10-CM

## 2021-01-03 DIAGNOSIS — Z99.81 DEPENDENCE ON SUPPLEMENTAL OXYGEN: ICD-10-CM

## 2021-01-03 PROCEDURE — 99999 PR NO CHARGE: CPT | Mod: 95 | Performed by: NURSE PRACTITIONER

## 2021-01-03 ASSESSMENT — ENCOUNTER SYMPTOMS
NAUSEA: 0
VOMITING: 0
FEVER: 0
WHEEZING: 0
DIARRHEA: 0
CHILLS: 0
COUGH: 0
HEADACHES: 0
SHORTNESS OF BREATH: 0

## 2021-01-03 NOTE — PROGRESS NOTES
Telemedicine Visit:      Subjective:   Ml Chavira is a 80 y.o. female presenting for evaluation and management of Covid-19 Home Monitoring     This evaluation was conducted via Zoom using secure and encrypted videoconferencing technology. The patient was in a private location in the [facility].    The patient's identity was confirmed and verbal consent was obtained for this virtual visit.    Home Monitoring Patient Triage  COVID-19 Monitoring Level: Home with basic monitoring       [facility] Flowsheet   1. Record COVID-19 Severity Index (qCSI):  0  2.Confirm appropriate patient and chart with two identifiers: Yes - continue to question #3   3. Days Since Onset of Symptoms: 17  4. Does patient have another adult at home to help take care of you: Yes - continue to question #5  5. Confirm O2 supply is working and there are no cannula problems: Yes - continue to question #6  6. Is your breathing today better or worse? Same  7. Are you able to tolerate fluids? Yes - Continue to question #8  8. Any vomiting or diarrhea in the last 24 hours? No - continue to question #9  9. Are you able to control your fevers? Yes - continue to question #10  10. Are you able to control your cough? Yes - continue to question #11  11. Current O2 Flow Rate: 1.5  12. Review ER precautions: present to ED or call 911 if experiencing severe shortness of breath:    13. Confirm next VV: Appointment scheduled  14. Final disposition: Stable at home  15. Time Spent: 10    Patient states she is doing much better, States that she is not having SOB and is on 1.5 Liters of oxygen. No fever    Review of Systems   Constitutional: Negative for chills, fever and malaise/fatigue.   Respiratory: Negative for cough, shortness of breath and wheezing.    Gastrointestinal: Negative for diarrhea, nausea and vomiting.   Neurological: Negative for headaches.       Current medicines (including changes today)  Medications:    • albuterol  Aers  • flecainide Tabs  • furosemide Tabs  • Klor-Con 10 Tbcr  • levETIRAcetam Tabs  • methimazole Tabs  • metoprolol SR Tb24    Allergies: Penicillins and Atorvastatin    Problem List: Ml Chavira has Metastasis to brain (HCC); Metastatic cancer (CMS-HCC); Lung mass; Blurry vision, left eye; CVA (cerebral vascular accident) (HCC); Malignant neoplasm of upper lobe of right lung (HCC); Radionecrosis; Sick sinus syndrome (HCC); Bitemporal hemianopia; Balance disorder; Postmenopausal; Osteoporosis; Hyperthyroidism; Chronic atrial fibrillation (HCC); Multinodular goiter; Vitamin D deficiency; High risk medication use; Allergic rhinitis; Hilar adenopathy; History of breast cancer; At risk for falling; Use of cane as ambulatory aid; Lower extremity edema; Abnormal CBC measurement; Acute respiratory failure with hypoxia (HCC); Pneumonia due to COVID-19 virus; Paroxysmal atrial fibrillation (HCC); Seizure (HCC); and Lung cancer (HCC) on their problem list.    Surgical History:  Past Surgical History:   Procedure Laterality Date   • CRANIOTOMY STEALTH Right 11/23/2015    Procedure: CRANIOTOMY STEALTH-right occipital;  Surgeon: Suyapa Schumacher M.D.;  Location: SURGERY [facility];  Service:    • APPENDECTOMY     • CRANIOTOMY     • KNEE ARTHROSCOPY      left    • LUMPECTOMY     • OTHER      left knee surgery   • PACEMAKER INSERTION     • TONSILLECTOMY         Past Social Hx: Ml Chavira  reports that she quit smoking about 41 years ago. Her smoking use included cigarettes. She has a 40.00 pack-year smoking history. She has never used smokeless tobacco. She reports current alcohol use of about 1.8 oz of alcohol per week. She reports that she does not use drugs.     Past Family Hx:  Ml Chavira family history includes Arthritis in her father and mother; Autoimmune Disease in her father; Cancer in her brother; Dementia in her mother; Diabetes in her mother; Other in her mother.     Problem list,  medications, and allergies reviewed by myself today in Epic.     Objective:   Resp 16   SpO2 94%  (from [facility] home monitor)    Physical Exam:  Constitutional:       General: Awake.      Appearance: Not toxic-appearing.   HENT:      Nose: No signs of injury.      Mouth/Throat: Voice clear.     Lips: Pink.   Eyes:      General: Lids are normal.      Conjunctiva/sclera: Conjunctivae normal.   Neck:      Trachea: Phonation normal.   Pulmonary:      Effort: Pulmonary effort is normal.   Skin:     Coloration: Skin is not cyanotic or pale.   Neurological:      Mental Status: Alert and oriented to person, place, and time.   Psychiatric:         Mood and Affect: Mood and affect normal.         Speech: Speech normal.         Behavior: Behavior is cooperative.       Assessment and Plan:   The following treatment plan was discussed:     1. Pneumonia due to COVID-19 virus    2. Dependence on supplemental oxygen      CSI:0  Day of Symptoms: 17  O2 Day: 0   O2 Night: 0-1  Next appointment: 1/4/2021  Time Spent: 10  Additional comments:  Advised to take off oxygen and can wear up to 1L at night, if needed. Strict ER precautions provided. FUV rpm tomorrow     Supportive care and indications for immediate follow-up discussed with patient.    Pathogenesis of diagnosis discussed including typical length and natural progression. Patient expresses understanding and agrees to plan.          Please note that this dictation was created using voice recognition software. I have made every reasonable attempt to correct obvious errors, but I expect that there are errors of grammar and possibly content that I did not discover before finalizing the note.    Note electronically signed by NICKY Perez

## 2021-01-04 ENCOUNTER — TELEMEDICINE (OUTPATIENT)
Dept: URGENT CARE | Facility: CLINIC | Age: 81
End: 2021-01-04
Payer: MEDICARE

## 2021-01-04 DIAGNOSIS — J12.82 PNEUMONIA DUE TO COVID-19 VIRUS: ICD-10-CM

## 2021-01-04 DIAGNOSIS — U07.1 PNEUMONIA DUE TO COVID-19 VIRUS: ICD-10-CM

## 2021-01-04 DIAGNOSIS — Z99.81 OXYGEN DEPENDENT: ICD-10-CM

## 2021-01-04 PROCEDURE — 99213 OFFICE O/P EST LOW 20 MIN: CPT | Mod: 95 | Performed by: NURSE PRACTITIONER

## 2021-01-04 NOTE — PROGRESS NOTES
Virtual Visit: Established Patient   This evaluation was conducted via Zoom using secure and encrypted videoconferencing technology. The patient was in a private location in the [facility].    The patient's identity was confirmed and verbal consent was obtained for this virtual visit.    Subjective:   CC:   Chief Complaint   Patient presents with   • Covid-19 Home Monitoring Video Visit       Ml Chavira is a 80 y.o. female presenting for evaluation and management of:    Remote Patient monitoring after hospitalization for Covid.  She reports she is feeling the same as yesterday and reamins on 1 1/2 L/min of oxygen throughout the day and night.  She has had some desaturations down to 88% overnight.  She denies any chest pain, worsening shortness of breath, fever, or new symptom development.  She has finished her decadron.  She continues to take her daily routine medications as previously prescribed.  She has a history of hyperthyroidism, A-fib and sick sinus syndrome s/p pacemaker, history of brain bleed, history of seizures, history of lung cancer with brain metastasis.  She reports she is able to perform her ADLs at home.      Home Monitoring Patient Triage  COVID-19 Monitoring Level: Monitoring no longer needed Home or Self Care     [facility] Flowsheet   1. Record COVID-19 Severity Index (qCSI):  0  2.Confirm appropriate patient and chart with two identifiers: Yes - continue to question #3   3. Days Since Onset of Symptoms: 18  4. Does patient have another adult at home to help take care of you: Yes - continue to question #5  5. Confirm O2 supply is working and there are no cannula problems: Yes - continue to question #6  6. Is your breathing today better or worse? Same  7. Are you able to tolerate fluids? Yes - Continue to question #8  8. Any vomiting or diarrhea in the last 24 hours? No - continue to question #9  9. Are you able to control your fevers? N/A  10. Are you able to control  "your cough? Yes - continue to question #11  11. Current O2 Flow Rate: 1.5  12. Review ER precautions: present to ED or call 911 if experiencing severe shortness of breath: Yes  13. Confirm next VV: N/A  14. Final disposition: Discharge from Program  15. Time Spent: 15    ROS   Denies any recent fevers or chills. No nausea or vomiting. No chest pains or shortness of breath.     Allergies   Allergen Reactions   • Penicillins Rash and Itching     RXN \"a long time ago\"  Other reaction(s): Rash   • Atorvastatin      Causes low quality       Current medicines (including changes today)  Current Outpatient Medications   Medication Sig Dispense Refill   • flecainide (TAMBOCOR) 150 MG Tab Take 1 Tab by mouth 2 times a day. 180 Tab 3   • metoprolol SR (TOPROL XL) 25 MG TABLET SR 24 HR Take 1 Tab by mouth every day. 90 Tab 3   • furosemide (LASIX) 20 MG Tab TAKE 1 TABLET BY MOUTH EVERY DAY IN THE MORNING 90 Tab 1   • KLOR-CON 10 10 MEQ tablet TAKE 1 TABLET BY MOUTH EVERY DAY 90 Tab 1   • methimazole (TAPAZOLE) 5 MG Tab Take 0.5 Tabs by mouth every day. 90 Tab 1   • albuterol 108 (90 Base) MCG/ACT Aero Soln inhalation aerosol INHALE 2 PUFFS BY MOUTH EVERY 4 HOURS AS NEEDED FOR SHORTNESS OF BREATH 1 Each 1   • levETIRAcetam (KEPPRA) 500 MG Tab Take 1 Tab by mouth 2 Times a Day. 180 Tab 3     No current facility-administered medications for this visit.        Patient Active Problem List    Diagnosis Date Noted   • Acute respiratory failure with hypoxia (HCC) 12/20/2020     Priority: High   • Pneumonia due to COVID-19 virus 12/20/2020     Priority: High   • Sick sinus syndrome (HCC) 05/31/2018     Priority: Medium   • Lung cancer (HCC) 12/23/2020   • Paroxysmal atrial fibrillation (HCC) 12/21/2020   • Seizure ([facility]) 12/21/2020   • Abnormal CBC measurement 02/06/2020   • At risk for falling 12/27/2019   • Use of cane as ambulatory aid 12/27/2019   • Lower extremity edema 12/27/2019   • High risk medication use 08/27/2019   • " Multinodular goiter 08/15/2019   • Vitamin D deficiency 08/15/2019   • Osteoporosis 01/17/2019   • Hyperthyroidism 01/17/2019   • Chronic atrial fibrillation (HCC) 01/17/2019   • Postmenopausal 08/27/2018   • 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   • Balance disorder 06/21/2018   • Radionecrosis 04/14/2018   • Malignant neoplasm of upper lobe of right lung (HCC) 03/05/2018   • CVA (cerebral vascular accident) (HCC) 11/29/2017   • Allergic rhinitis 01/12/2016   • Hilar adenopathy 01/12/2016   • History of breast cancer 01/12/2016   • Lung mass 11/19/2015   • Blurry vision, left eye 11/19/2015   • Metastasis to brain (HCC) 11/18/2015   • Metastatic cancer (CMS-HCC) 11/18/2015       Family History   Problem Relation Age of Onset   • Dementia Mother    • Diabetes Mother    • Other Mother         osteoarthritis   • Arthritis Mother    • Autoimmune Disease Father         Rheumatoid arthritis   • Arthritis Father    • Cancer Brother         prostate        She  has a past medical history of Breast cancer (HCC), Cancer (HCC), Chickenpox, [place] measles, Healthcare maintenance (7/23/2018), Influenza, Joint pain, Lung cancer (HCC), Mumps, Need for vaccination (1/17/2019), Radionecrosis (4/14/2018), Sick sinus syndrome (HCC), Thyroid disease, and Tonsillitis.  She  has a past surgical history that includes craniotomy stealth (Right, 11/23/2015); other; appendectomy; knee arthroscopy; craniotomy; tonsillectomy; pacemaker insertion; and lumpectomy.       Objective:   Respiration 20   Oxygen Saturation 93%     Physical Exam:  Constitutional: Alert, no distress, well-groomed.  Skin: No rashes in visible areas.  Eye: Round. Conjunctiva clear, lids normal. No icterus.   ENMT: Lips pink without lesions, good dentition, moist mucous membranes. Phonation normal.  Neck: No masses, no thyromegaly. Moves freely without pain.  Respiratory: Unlabored respiratory effort, no cough or audible wheeze.  Ambulation test: Ml reports she is at  95% on 1 1/2 L/min at beginning of walk test.  She ambulates without difficulty for approximately 2 minutes with saturations going as low as 88% and recovers to 93% when she stops walking.  She is able to maintain flow of conversation but becomes slightly winded throughout the ambulation.  Psych: Alert and oriented x3, normal affect and mood.       Assessment and Plan:   The following treatment plan was discussed:     1. Pneumonia due to COVID-19 virus  - REFERRAL TO FOLLOW-UP WITH PRIMARY CARE  - REFERRAL TO PULMONARY AND SLEEP MEDICINE    2. Oxygen dependent  - REFERRAL TO FOLLOW-UP WITH PRIMARY CARE  - REFERRAL TO PULMONARY AND SLEEP MEDICINE    Discussed exam findings with Ml.  Recommended she increase supplemental oxygen to 2 L/min day and night.  Continue all baseline medications as previously prescribed.  Follow up with PCP for virtual visit or in person visit this week.  Follow up with pulmonology for urgent consult and care as referred.  ED precautions reviewed.  She verbalized understanding of and agreed with plan of care.     Follow-up: No follow-ups on file.

## 2021-01-05 VITALS — OXYGEN SATURATION: 93 % | RESPIRATION RATE: 20 BRPM

## 2021-01-06 ENCOUNTER — TELEPHONE (OUTPATIENT)
Dept: CARDIOLOGY | Facility: MEDICAL CENTER | Age: 81
End: 2021-01-06

## 2021-01-06 NOTE — TELEPHONE ENCOUNTER
RO    Pt called stating she was recently in the hospital and has questions about her monitoring equipment. Please call Pt back at [phone].

## 2021-01-07 ENCOUNTER — TELEMEDICINE (OUTPATIENT)
Dept: MEDICAL GROUP | Facility: IMAGING CENTER | Age: 81
End: 2021-01-07
Payer: MEDICARE

## 2021-01-07 VITALS — WEIGHT: 140 LBS | BODY MASS INDEX: 25.76 KG/M2 | OXYGEN SATURATION: 95 % | HEIGHT: 62 IN

## 2021-01-07 DIAGNOSIS — Z99.81 ON HOME O2: ICD-10-CM

## 2021-01-07 DIAGNOSIS — Z86.16 HISTORY OF COVID-19: ICD-10-CM

## 2021-01-07 PROCEDURE — 99213 OFFICE O/P EST LOW 20 MIN: CPT | Mod: 95,CR | Performed by: NURSE PRACTITIONER

## 2021-01-07 ASSESSMENT — PATIENT HEALTH QUESTIONNAIRE - PHQ9: CLINICAL INTERPRETATION OF PHQ2 SCORE: 0

## 2021-01-07 ASSESSMENT — FIBROSIS 4 INDEX: FIB4 SCORE: 1.43

## 2021-01-07 NOTE — TELEPHONE ENCOUNTER
Spoke with patient, issue was related to home Covid monitoring equipment, patient spoke with [facility] urgent care and matter is settled.

## 2021-01-07 NOTE — PROGRESS NOTES
Virtual Visit: Established Patient   This visit was conducted via Zoom using secure and encrypted videoconferencing technology. The patient was in a private location in the state of [place].    The patient's identity was confirmed and verbal consent was obtained for this virtual visit.  Patient is aware that this is a billable encounter.  Subjective:   CC:   Chief Complaint   Patient presents with   • Covid-19 Home Monitoring Video Visit     Ml Chavira is a 80 y.o. female presenting for evaluation and management of:    Reports that she was hospitalized from 12/20-29 due to Covid infection.  Reports that she sought care due to increased shortness of breath.  States during hospitalization she was treated for Covid pneumonia.  States that she was discharged on 4 L of oxygen and has continued to be able to wean herself to 1.5 L of oxygen.  Denies having an appointment with pulmonology at this time as suggested by discharging provider from hospital.  States today she was seen by physical therapy and attempted a room air trial while walking and quickly desaturated to 80% oxygen.  States that oxygen was replaced via nasal cannula and with deep breathing she recovered her oxygen SPO2 to 95%.  States that she is currently using a compressor and does not have a portable oxygen tank to be able to attend any appointments.  States that she has not been able to coordinate this.  Reports overall she is feeling better.  Reports that she becomes fatigued easily.    ROS:  Constitutional: Denies fever, chills, night sweats, weight loss/gain, and/or malaise. Fatigue, see HPI.  HENT: Denies nasal congestion, sore throat, hearing loss, enlarged thyroid, or difficulty swallowing.   Eyes: Denies changes in vision, pain. Wears corrective wear. Since removal brain tumor in 2015, pt. reports that she has had decreased peripheral vision, worse in left eye. Does not drive.   Respiratory: Denies cough, SOB at rest or activity.  "  Cardiovascular: Denies tachycardia, chest pain, or palpitations. History of lower leg swelling, managed with intermittent use of Lasix. Pt. has a pace maker due to the history of sick sinus syndrome.  Gastrointestinal: Denies N/V/C/D, abdominal pain, loss appetite, reflux, or hematochezia.  Genitourinary: Denies difficulty voiding, dysuria, nocturia, or hematuria.   Skin: Negative for rash or worrisome moles. Edema of neck and shoulders, see HPI.  Neurological: Negative for dizziness, focal weakness and headaches. Denies any recent falls.   Endo/Heme/Allergies: Denies bruise/bleed easily, allergies.   Psychiatric/Behavioral: Denies depression, nervous/anxious, difficulty sleeping.    Allergies   Allergen Reactions   • Penicillins Rash and Itching     RXN \"a long time ago\"  Other reaction(s): Rash   • Atorvastatin      Causes low quality     Current medicines (including changes today)  Current Outpatient Medications   Medication Sig Dispense Refill   • CALCIUM CARBONATE-VITAMIN D PO Take  by mouth.     • alendronate (FOSAMAX) 70 MG Tab Take 70 mg by mouth every 7 days.     • flecainide (TAMBOCOR) 150 MG Tab Take 1 Tab by mouth 2 times a day. 180 Tab 3   • metoprolol SR (TOPROL XL) 25 MG TABLET SR 24 HR Take 1 Tab by mouth every day. 90 Tab 3   • furosemide (LASIX) 20 MG Tab TAKE 1 TABLET BY MOUTH EVERY DAY IN THE MORNING 90 Tab 1   • KLOR-CON 10 10 MEQ tablet TAKE 1 TABLET BY MOUTH EVERY DAY 90 Tab 1   • methimazole (TAPAZOLE) 5 MG Tab Take 0.5 Tabs by mouth every day. 90 Tab 1   • albuterol 108 (90 Base) MCG/ACT Aero Soln inhalation aerosol INHALE 2 PUFFS BY MOUTH EVERY 4 HOURS AS NEEDED FOR SHORTNESS OF BREATH 1 Each 1   • levETIRAcetam (KEPPRA) 500 MG Tab Take 1 Tab by mouth 2 Times a Day. 180 Tab 3   • metronidazole (METROCREAM) 0.75 % cream APPLY TO FACE EVERYDAY ONCE TO TWICE A DAY FOR ROSACEA       No current facility-administered medications for this visit.      Patient Active Problem List    Diagnosis Date " Noted   • Acute respiratory failure with hypoxia (HCC) 12/20/2020     Priority: High   • Pneumonia due to COVID-19 virus 12/20/2020     Priority: High   • Sick sinus syndrome (HCC) 05/31/2018     Priority: Medium   • History of COVID-19 01/09/2021   • Lung cancer (HCC) 12/23/2020   • Paroxysmal atrial fibrillation (HCC) 12/21/2020   • Seizure (HCC) 12/21/2020   • Abnormal CBC measurement 02/06/2020   • At risk for falling 12/27/2019   • Use of cane as ambulatory aid 12/27/2019   • Lower extremity edema 12/27/2019   • High risk medication use 08/27/2019   • Multinodular goiter 08/15/2019   • Vitamin D deficiency 08/15/2019   • Osteoporosis 01/17/2019   • Hyperthyroidism 01/17/2019   • Chronic atrial fibrillation (HCC) 01/17/2019   • Postmenopausal 08/27/2018   • 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   • Balance disorder 06/21/2018   • Radionecrosis 04/14/2018   • Malignant neoplasm of upper lobe of right lung (HCC) 03/05/2018   • CVA (cerebral vascular accident) (HCC) 11/29/2017   • Allergic rhinitis 01/12/2016   • Hilar adenopathy 01/12/2016   • History of breast cancer 01/12/2016   • Lung mass 11/19/2015   • Blurry vision, left eye 11/19/2015   • Metastasis to brain (HCC) 11/18/2015   • Metastatic cancer (CMS-HCC) 11/18/2015     Family History   Problem Relation Age of Onset   • Dementia Mother    • Diabetes Mother    • Other Mother         osteoarthritis   • Arthritis Mother    • Autoimmune Disease Father         Rheumatoid arthritis   • Arthritis Father    • Cancer Brother         prostate      She  has a past medical history of Breast cancer (HCC), Cancer (HCC), Chickenpox, [place] measles, Healthcare maintenance (7/23/2018), Influenza, Joint pain, Lung cancer (HCC), Mumps, Need for vaccination (1/17/2019), Radionecrosis (4/14/2018), Sick sinus syndrome (HCC), Thyroid disease, and Tonsillitis.  She  has a past surgical history that includes craniotomy stealth (Right, 11/23/2015); other; appendectomy; knee arthroscopy;  "craniotomy; tonsillectomy; pacemaker insertion; and lumpectomy.     Objective:   Ht 1.562 m (5' 1.5\")   Wt 63.5 kg (140 lb)   SpO2 95% Comment: 1.5 L  BMI 26.02 kg/m²   Respiratory rate observed during visit:  16 bpm    Physical Exam:  Constitutional: Alert, no distress, well-groomed.  Pale and fatigued appearing.  Skin: No rashes in visible areas.  Eye: Round. Conjunctiva clear, lids normal. No icterus.   ENMT: Lips pink without lesions, good dentition, moist mucous membranes. Phonation normal.  Neck: No masses, no thyromegaly. Moves freely without pain.  Respiratory: Unlabored respiratory effort, no cough or audible wheeze  Psych: Alert and oriented x3, normal affect and mood.     Assessment and Plan:   1. History of COVID-19  2. On home O2  This is a stable condition.  Patient instructed to make appointment with pulmonology, contact information provided to patient.  Discussed with patient attempting to wean to 1 L as tolerated if SPO2 is above 95%.  Instructed patient that she will need to check SPO2 frequently while weaning, verbalized understanding and willingness.  Patient instructed to increase oxygen to 1.5 L if she notes increased shortness of breath and/or SPO2 less than 92%.  Instructed to maintain liter of oxygen for 24 hours and attempt to weaning again as tolerated.  Discussed with patient coordinating care for portable oxygen to be able to attend appointments.  Discussed with patient supporting immune system with vitamin C 1000 mg and zinc 50 mcg daily.  Discussed with patient seeking emergency services if she has any concerns about overall health and/or if she experiences any difficulty breathing.      Follow-up: Return in about 2 weeks (around 1/21/2021).         "

## 2021-01-08 LAB
GLUCOSE BLD-MCNC: 130 MG/DL (ref 65–99)
GLUCOSE BLD-MCNC: 154 MG/DL (ref 65–99)
GLUCOSE BLD-MCNC: 159 MG/DL (ref 65–99)

## 2021-01-09 PROBLEM — Z86.16 HISTORY OF COVID-19: Status: ACTIVE | Noted: 2021-01-09

## 2021-01-09 RX ORDER — ALENDRONATE SODIUM 70 MG/1
70 TABLET ORAL
COMMUNITY
End: 2021-04-27 | Stop reason: SDUPTHER

## 2021-01-11 ENCOUNTER — OFFICE VISIT (OUTPATIENT)
Dept: SLEEP MEDICINE | Facility: MEDICAL CENTER | Age: 81
End: 2021-01-11
Payer: MEDICARE

## 2021-01-11 VITALS
SYSTOLIC BLOOD PRESSURE: 118 MMHG | DIASTOLIC BLOOD PRESSURE: 76 MMHG | HEART RATE: 93 BPM | WEIGHT: 136 LBS | OXYGEN SATURATION: 94 % | BODY MASS INDEX: 25.68 KG/M2 | HEIGHT: 61 IN | RESPIRATION RATE: 16 BRPM

## 2021-01-11 DIAGNOSIS — Z87.891 FORMER SMOKER: ICD-10-CM

## 2021-01-11 DIAGNOSIS — Z23 NEED FOR VACCINATION: ICD-10-CM

## 2021-01-11 DIAGNOSIS — R05.9 COUGH IN ADULT: ICD-10-CM

## 2021-01-11 DIAGNOSIS — Z85.118 HISTORY OF LUNG CANCER: ICD-10-CM

## 2021-01-11 DIAGNOSIS — J96.21 ACUTE ON CHRONIC RESPIRATORY FAILURE WITH HYPOXIA (HCC): ICD-10-CM

## 2021-01-11 DIAGNOSIS — Z86.16 HISTORY OF COVID-19: ICD-10-CM

## 2021-01-11 DIAGNOSIS — G47.33 OSA (OBSTRUCTIVE SLEEP APNEA): ICD-10-CM

## 2021-01-11 PROCEDURE — 99214 OFFICE O/P EST MOD 30 MIN: CPT | Performed by: PHYSICIAN ASSISTANT

## 2021-01-11 RX ORDER — ALBUTEROL SULFATE 90 UG/1
2 AEROSOL, METERED RESPIRATORY (INHALATION) EVERY 4 HOURS PRN
Qty: 1 EACH | Refills: 1 | Status: SHIPPED | OUTPATIENT
Start: 2021-01-11 | End: 2021-03-23

## 2021-01-11 ASSESSMENT — ENCOUNTER SYMPTOMS
CHILLS: 0
WEIGHT LOSS: 0
COUGH: 1
TREMORS: 1
WHEEZING: 0
FEVER: 0
SORE THROAT: 0
SHORTNESS OF BREATH: 1
SPUTUM PRODUCTION: 0
ORTHOPNEA: 0
ROS GI COMMENTS: NO DENTURES, NO DIFFICULTY SWALLOWING
INSOMNIA: 0
HEADACHES: 0
HEARTBURN: 0
PALPITATIONS: 0
DIZZINESS: 0

## 2021-01-11 ASSESSMENT — FIBROSIS 4 INDEX: FIB4 SCORE: 1.43

## 2021-01-11 NOTE — PROGRESS NOTES
CC: Requiring oxygen during day    HPI:  Ml Chavira is a 80 y.o. year old female here today for follow-up on hospital admission for COVID-19 on 12/20/2020.  Patient tested Covid + 12/20/2020 21 days ago.  Last seen in clinic 8/27/2020 by Dr. Ingram.  Patient is a former smoker with reported quit date in 1980 and 40-pack-year history.    Primary history includes chronic hypoxic respiratory failure, stage IV right upper lobe lung cancer with history of brain metastases status post craniotomy, radiation and chemo.  Patient did have ICH on treatment for it with Eliquis for atrial fib/sick sinus syndrome, no longer on anticoagulation.  Remote breast cancer.  Patient is followed by oncology.  She reports no chemo or radiation since 2015.    Patient is followed by sleep center, last seen by NICKY López 6/26/2020.  She is on BiPAP 18/13 centimeters H2O pressure.  Patient has not been using her BiPAP since discharge due to being on daytime oxygen.  Note she was previously on BiPAP with O2 so this is not a change.    Reviewed in clinic vitals including blood pressure 118/76, heart rate of 92, O2 sat of 94% and BMI of 25.7 kg/m².  Patient reports she had lost sense of taste but feels this is resolving.    Reviewed home medication regimen including albuterol.  He does have a portable oxygen concentrator and is on 2 L pulsed.    Reviewed most recent imaging including chest x-ray obtained 12/20/2020 demonstrating stable cardiomediastinal silhouette, mild to moderate peripheral hazy bilateral pulmonary opacities left worse than right.  Changes are nonspecific but most suggestive of Covid pneumonia.  Right axillary surgical clips.    Chest CT obtained 9/10/2020 demonstrated stable masslike opacity right upper lobe not significantly changed in size measuring approximately 1.8 x 4.1 cm previously 4 x 2 cm.  Emphysematous changes.  No new pulmonary nodules.  Bilateral thyroid nodules again noted.  Pacemaker present.   Compression deformity T12 vertebral body unchanged.    Pulmonary function testing obtained 2/26/2020 demonstrated FEV1 of 1.31 L or 78% predicted, FVC of 1.44 L or 65% predicted, FEV1/FVC ratio of 91, no significant bronchodilator response, residual volume 88% predicted, total lung capacity 82% predicted, DLCO 74% predicted.  Per pulmonologist interpretation mild restriction with mildly reduced DLCO, could be seen with early ILD.                                                                                                                                               Patient remains very fatigued, reports nonproductive cough, shortness of breath with any exertion, requiring her rescue inhaler with attempts to take a walk.  She is receiving physical therapy.           Review of Systems   Constitutional: Positive for malaise/fatigue. Negative for chills, fever and weight loss.   HENT: Negative for congestion, hearing loss, nosebleeds, sore throat and tinnitus.    Eyes:        Presc glasses   Respiratory: Positive for cough and shortness of breath (with exertion). Negative for sputum production and wheezing.    Cardiovascular: Positive for leg swelling (mild takes lasix). Negative for chest pain, palpitations and orthopnea.   Gastrointestinal: Negative for heartburn.        No dentures, no difficulty swallowing    Neurological: Positive for tremors (occasional ). Negative for dizziness and headaches.   Psychiatric/Behavioral: The patient does not have insomnia.        Past Medical History:   Diagnosis Date   • Breast cancer (HCC)    • Cancer (HCC)     lung cancer with brain mts   • Chickenpox    • [place] measles    • Healthcare maintenance 7/23/2018   • Influenza    • Joint pain    • Lung cancer (HCC)    • Mumps    • Need for vaccination 1/17/2019   • Radionecrosis 4/14/2018   • Sick sinus syndrome (HCC)     with AFIB, s/p pacemaker   • Thyroid disease    • Tonsillitis        Past Surgical History:   Procedure  Laterality Date   • CRANIOTOMY STEALTH Right 2015    Procedure: CRANIOTOMY STEALTH-right occipital;  Surgeon: Suyapa Schumacher M.D.;  Location: SURGERY [facility];  Service:    • APPENDECTOMY     • CRANIOTOMY     • KNEE ARTHROSCOPY      left    • LUMPECTOMY     • OTHER      left knee surgery   • PACEMAKER INSERTION     • TONSILLECTOMY         Family History   Problem Relation Age of Onset   • Dementia Mother    • Diabetes Mother    • Other Mother         osteoarthritis   • Arthritis Mother    • Autoimmune Disease Father         Rheumatoid arthritis   • Arthritis Father    • Cancer Brother         prostate        Social History     Socioeconomic History   • Marital status:      Spouse name: Not on file   • Number of children: Not on file   • Years of education: Not on file   • Highest education level: Not on file   Occupational History   • Not on file   Social Needs   • Financial resource strain: Not on file   • Food insecurity     Worry: Not on file     Inability: Not on file   • Transportation needs     Medical: Not on file     Non-medical: Not on file   Tobacco Use   • Smoking status: Former Smoker     Packs/day: 2.00     Years: 20.00     Pack years: 40.00     Types: Cigarettes     Quit date:      Years since quittin.0   • Smokeless tobacco: Never Used   Substance and Sexual Activity   • Alcohol use: Yes     Alcohol/week: 1.8 oz     Types: 3 Glasses of wine per week     Frequency: 2-3 times a week     Drinks per session: 1 or 2     Binge frequency: Never     Comment: occasionally    • Drug use: No   • Sexual activity: Not Currently   Lifestyle   • Physical activity     Days per week: 4 days     Minutes per session: 60 min   • Stress: Not on file   Relationships   • Social connections     Talks on phone: More than three times a week     Gets together: More than three times a week     Attends [employer]oration[employer] service: Never     Active member of club or organization: Yes     Attends meetings of  "clubs or organizations: More than 4 times per year     Relationship status:    • Intimate partner violence     Fear of current or ex partner: No     Emotionally abused: No     Physically abused: No     Forced sexual activity: No   Other Topics Concern   • Not on file   Social History Narrative   • Not on file       Allergies as of 01/11/2021 - Reviewed 01/11/2021   Allergen Reaction Noted   • Penicillins Rash and Itching 08/12/2012   • Atorvastatin  04/06/2020        @Vital signs for this encounter:  Vitals:    01/11/21 1013 01/11/21 1015   Height: 1.549 m (5' 1\")    Weight: 61.7 kg (136 lb)    Weight % change since last entry.: 0 %    BP: 118/76    Pulse: 93    BMI (Calculated): 25.7    Resp: 16    O2 Flow Rate (L/min):  2       Current medications as of today   Current Outpatient Medications   Medication Sig Dispense Refill   • CALCIUM CARBONATE-VITAMIN D PO Take  by mouth.     • alendronate (FOSAMAX) 70 MG Tab Take 70 mg by mouth every 7 days.     • metronidazole (METROCREAM) 0.75 % cream APPLY TO FACE EVERYDAY ONCE TO TWICE A DAY FOR ROSACEA     • flecainide (TAMBOCOR) 150 MG Tab Take 1 Tab by mouth 2 times a day. 180 Tab 3   • metoprolol SR (TOPROL XL) 25 MG TABLET SR 24 HR Take 1 Tab by mouth every day. 90 Tab 3   • furosemide (LASIX) 20 MG Tab TAKE 1 TABLET BY MOUTH EVERY DAY IN THE MORNING 90 Tab 1   • KLOR-CON 10 10 MEQ tablet TAKE 1 TABLET BY MOUTH EVERY DAY 90 Tab 1   • methimazole (TAPAZOLE) 5 MG Tab Take 0.5 Tabs by mouth every day. 90 Tab 1   • albuterol 108 (90 Base) MCG/ACT Aero Soln inhalation aerosol INHALE 2 PUFFS BY MOUTH EVERY 4 HOURS AS NEEDED FOR SHORTNESS OF BREATH 1 Each 1   • levETIRAcetam (KEPPRA) 500 MG Tab Take 1 Tab by mouth 2 Times a Day. 180 Tab 3     No current facility-administered medications for this visit.          Physical Exam:   Gen:           Alert and oriented, No apparent distress. Mood and affect appropriate, normal interaction with provider.  Eyes:          sclere " white, conjunctive moist.  Hearing:     Grossly intact.  Dentition:    Fair dentition.  Oropharynx:   Tongue normal, posterior pharynx without erythema or exudate.  Neck:        Supple, trachea midline, no masses.  Respiratory Effort: No intercostal retractions or use of accessory muscles.   Lung Auscultation:      Crackles bibasilar; no rhonchi or wheezing.  CV:            Regular rate and rhythm. No edema. No murmurs, rubs or gallops.  Digits, Nails, Ext: No clubbing, cyanosis, petechiae, or nodes.   Skin:        No rashes, lesions or ulcers noted on exposed skin surfaces.                     Assessment:  1. History of COVID-19  Fluticasone Furoate-Vilanterol (BREO ELLIPTA) 100-25 MCG/INH AEROSOL POWDER, BREATH ACTIVATED   2. Acute on chronic respiratory failure with hypoxia (HCC)     3. Cough in adult  albuterol 108 (90 Base) MCG/ACT Aero Soln inhalation aerosol   4. Former smoker     5. VITO (obstructive sleep apnea)     6. History of lung cancer         Immunizations:    Flu: 8/28/2020                                                                   Pneumovax 23: 10/24/2019  Prevnar 13: 10/6/2016    Plan:     80 y.o. year old female here today for follow-up on hospital admission for COVID-19 on 12/20/2020.  Patient tested Covid + 12/20/2020 21 days ago.  Last seen in clinic 8/27/2020 by Dr. Ingram.  Patient is a former smoker with reported quit date in 1980 and 40-pack-year history.    Former smoker: Remains abstinent.     Primary history includes chronic hypoxic respiratory failure, stage IV right upper lobe lung cancer with history of brain metastases status post craniotomy, radiation and chemo.  Patient did have ICH on treatment for it with Eliquis for atrial fib/sick sinus syndrome, no longer on anticoagulation.  Remote breast cancer.     History of lung cancer with brain metastases: Patient is followed by oncology.  She reports no chemo or radiation since 2015.    Patient is followed by sleep center, last  seen by NICKY López 6/26/2020.  She is on BiPAP 18/13 centimeters H2O pressure.  Patient has not been using her BiPAP since discharge due to being on daytime oxygen.  Note she was previously on BiPAP with O2 so this is not a change.    VITO: Patient has not been wearing her BiPAP since discharge.  Reviewed treatment goals.  Patient instructed to resume BiPAP use.  States willingness to do so.    Reviewed in clinic vitals including blood pressure 118/76, heart rate of 92, O2 sat of 94% and BMI of 25.7 kg/m².  Patient reports she had lost sense of taste but feels this is resolving.    Reviewed home medication regimen including albuterol.  She does have a portable oxygen concentrator and is on 2 L pulsed.    Acute on chronic respiratory failure: Patient currently requiring O2 at 2 L around-the-clock, previously just at night.  She has a home oximeter and will monitor her oxygen saturations during the day with goal of 92 to 96%.    History of hospitalization for COVID-19/cough: Trial Breo.  Patient provided sample and paper prescription if tolerates sample and sees benefit.  Reviewed oral care.    Reviewed most recent imaging including chest x-ray obtained 12/20/2020 demonstrating stable cardiomediastinal silhouette, mild to moderate peripheral hazy bilateral pulmonary opacities left worse than right.  Changes are nonspecific but most suggestive of Covid pneumonia.  Right axillary surgical clips.    Chest CT obtained 9/10/2020 demonstrated stable masslike opacity right upper lobe not significantly changed in size measuring approximately 1.8 x 4.1 cm previously 4 x 2 cm.  Emphysematous changes.  No new pulmonary nodules.  Bilateral thyroid nodules again noted.  Pacemaker present.  Compression deformity T12 vertebral body unchanged.    Pulmonary function testing obtained 2/26/2020 demonstrated FEV1 of 1.31 L or 78% predicted, FVC of 1.44 L or 65% predicted, FEV1/FVC ratio of 91, no significant bronchodilator  response, residual volume 88% predicted, total lung capacity 82% predicted, DLCO 74% predicted.  Per pulmonologist interpretation mild restriction with mildly reduced DLCO, could be seen with early ILD.                                                                                                                                               Patient remains very fatigued, reports nonproductive cough, shortness of breath with any exertion, requiring her rescue inhaler with attempts to take a walk.  She is receiving physical therapy.  We discussed gradually increasing her activity level.    Follow-up in 3 months sooner if needed.    This dictation was created using voice recognition software. The accuracy of the dictation is limited to the abilities of the software. I expect there may be some errors of grammar and possibly content.

## 2021-01-11 NOTE — PATIENT INSTRUCTIONS
1-reviewed bipap and oxygen use  2-resume bipap at night with bleed in oxygen  3-continue oxygen use at 2L during day  4-goal for oxygen saturations 92-96%  5-sample Breo  6-paper script provided if you tolerate  7-good oral care to avoid yeast in mouth (thrush)   swish and spit x 2 with water, swish and swallow x 1, brush teeth and tongue   8-follow up in 3 months

## 2021-01-22 DIAGNOSIS — J96.21 ACUTE ON CHRONIC RESPIRATORY FAILURE WITH HYPOXIA (HCC): ICD-10-CM

## 2021-02-19 ENCOUNTER — NON-PROVIDER VISIT (OUTPATIENT)
Dept: CARDIOLOGY | Facility: MEDICAL CENTER | Age: 81
End: 2021-02-19
Payer: MEDICARE

## 2021-02-19 ENCOUNTER — TELEPHONE (OUTPATIENT)
Dept: CARDIOLOGY | Facility: MEDICAL CENTER | Age: 81
End: 2021-02-19

## 2021-02-19 DIAGNOSIS — I49.5 SICK SINUS SYNDROME (HCC): ICD-10-CM

## 2021-02-19 DIAGNOSIS — Z95.0 CARDIAC PACEMAKER IN SITU: ICD-10-CM

## 2021-02-19 PROCEDURE — 93294 REM INTERROG EVL PM/LDLS PM: CPT | Performed by: INTERNAL MEDICINE

## 2021-02-19 NOTE — TELEPHONE ENCOUNTER
Called pt and left message regarding sending in a manual remote transmission for her remote monitoring appointment scheduled for today. Left direct line for any questions or troubleshooting help.

## 2021-02-24 ENCOUNTER — TELEPHONE (OUTPATIENT)
Dept: MEDICAL GROUP | Facility: IMAGING CENTER | Age: 81
End: 2021-02-24

## 2021-02-25 NOTE — TELEPHONE ENCOUNTER
Patient called to check if she had any labs ordered that she needed to complete prior to her appointment with Jahaira on 3/25. Returned patients call to confirm that no labs had been ordered for her.

## 2021-02-26 ENCOUNTER — HOSPITAL ENCOUNTER (OUTPATIENT)
Dept: RADIOLOGY | Facility: MEDICAL CENTER | Age: 81
End: 2021-02-26
Attending: INTERNAL MEDICINE
Payer: MEDICARE

## 2021-02-26 VITALS — HEART RATE: 70 BPM | OXYGEN SATURATION: 97 %

## 2021-02-26 DIAGNOSIS — C34.11 MALIGNANT NEOPLASM OF UPPER LOBE OF RIGHT LUNG (HCC): ICD-10-CM

## 2021-02-26 PROCEDURE — 700117 HCHG RX CONTRAST REV CODE 255: Performed by: INTERNAL MEDICINE

## 2021-02-26 PROCEDURE — A9576 INJ PROHANCE MULTIPACK: HCPCS | Performed by: INTERNAL MEDICINE

## 2021-02-26 PROCEDURE — 70553 MRI BRAIN STEM W/O & W/DYE: CPT | Mod: MH

## 2021-02-26 RX ADMIN — GADOTERIDOL 10 ML: 279.3 INJECTION, SOLUTION INTRAVENOUS at 12:21

## 2021-02-26 NOTE — PROGRESS NOTES
MRI NURSING NOTE:      [employer] rep confirms leads and can are MRI conditional . Pt denies abandoned device(s) &/or lead(s).  Pt educated when to alert MRI tech/Imaging RN. Pt vu.  Pt tolerated MRI brain c/s contrast scan well s issue(s). MT PPM set to DOO 70 per [employer] rep.  HR, EKG, BP, pulse ox monitored during scan.   Pre-mri settings restored p MRI scan per [employer] rep.   Pt updated. VU.

## 2021-03-08 ENCOUNTER — TELEPHONE (OUTPATIENT)
Dept: MEDICAL GROUP | Facility: IMAGING CENTER | Age: 81
End: 2021-03-08

## 2021-03-09 NOTE — TELEPHONE ENCOUNTER
"Patient called to ask Jahaira a few questions prior to her appointment at the end of the month.     First question, she would like to know if she should get the Covid-19 vaccine and when (and who to call). She had been hospitalized for Covid in December and her Pulmonologist had advised her to wait before getting the vaccine but did not specify for how long.     Second question, she would like Jahaira to order a \"Pulmonary test\" since she is on oxygen during the day and some friends who were therapists had advised her to request this to ensure she is receiving the right amount of oxygen.    Advised the patient I would ask the provider her questions on her behalf but she would need to discuss the pulmonary test with Jahaira directly and be evaluated at her appointment to determine if that is appropriate for her.     Patient stated it was okay to leave a detailed voice message on her mobile phone.   "

## 2021-03-10 NOTE — TELEPHONE ENCOUNTER
Please contact patient and let her know that she would want to discuss her oxygen concerns with her established pulmonologist. She can call and schedule and/or message them and ask about requested test.     Also the current research suggest she waits 3 months from having acute symptoms from COVID-19. I would suggest she receives the vaccine at the beginning of April.     Jahaira Santos, APRAARTI-C

## 2021-03-16 ENCOUNTER — OFFICE VISIT (OUTPATIENT)
Dept: URGENT CARE | Facility: CLINIC | Age: 81
End: 2021-03-16
Payer: MEDICARE

## 2021-03-16 ENCOUNTER — APPOINTMENT (OUTPATIENT)
Dept: RADIOLOGY | Facility: IMAGING CENTER | Age: 81
End: 2021-03-16
Attending: FAMILY MEDICINE
Payer: MEDICARE

## 2021-03-16 VITALS
HEIGHT: 62 IN | WEIGHT: 153.7 LBS | SYSTOLIC BLOOD PRESSURE: 102 MMHG | TEMPERATURE: 98.6 F | RESPIRATION RATE: 16 BRPM | HEART RATE: 67 BPM | DIASTOLIC BLOOD PRESSURE: 58 MMHG | BODY MASS INDEX: 28.29 KG/M2 | OXYGEN SATURATION: 95 %
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
## 2021-03-16 DIAGNOSIS — S29.9XXA RIB INJURY: ICD-10-CM

## 2021-03-16 PROCEDURE — 99213 OFFICE O/P EST LOW 20 MIN: CPT | Performed by: FAMILY MEDICINE

## 2021-03-16 PROCEDURE — 73501 X-RAY EXAM HIP UNI 1 VIEW: CPT | Mod: TC,LT | Performed by: FAMILY MEDICINE

## 2021-03-16 PROCEDURE — 71101 X-RAY EXAM UNILAT RIBS/CHEST: CPT | Mod: TC,LT | Performed by: FAMILY MEDICINE

## 2021-03-16 RX ORDER — ACETAMINOPHEN 500 MG
500 TABLET ORAL ONCE
Status: COMPLETED | OUTPATIENT
Start: 2021-03-16 | End: 2021-03-16

## 2021-03-16 RX ORDER — ZINC SULFATE 50(220)MG
220 CAPSULE ORAL DAILY
COMMUNITY

## 2021-03-16 RX ADMIN — Medication 500 MG: at 17:30

## 2021-03-16 ASSESSMENT — FIBROSIS 4 INDEX: FIB4 SCORE: 1.44

## 2021-03-17 ENCOUNTER — TELEPHONE (OUTPATIENT)
Dept: MEDICAL GROUP | Facility: IMAGING CENTER | Age: 81
End: 2021-03-17

## 2021-03-17 NOTE — PROGRESS NOTES
"Subjective:      Ml Chavira is a 81 y.o. female who presents with Fall (x3 days, fell in her home, left hip is sore and hard to sleep due to the pain.)            This is a new problem.  81-year-old presenting for evaluation of left sided chest wall injury after she fell and hit the side of the countertop.  Also has some mild lower back pain on the left side but no radiculopathy or muscle weakness.  Denies any trouble breathing, fever or chills.  No other injuries reported.  This was accidental.  She took an [employer] and came here.      Review of Systems   All other systems reviewed and are negative.         Objective:     /58 (BP Location: Right arm, Patient Position: Sitting, BP Cuff Size: Adult)   Pulse 67   Temp 37 °C (98.6 °F) (Temporal)   Resp 16   Ht 1.575 m (5' 2\")   Wt 69.7 kg (153 lb 11.2 oz)   SpO2 95%   BMI 28.11 kg/m²      Physical Exam  Constitutional:       General: She is not in acute distress.     Appearance: She is not ill-appearing, toxic-appearing or diaphoretic.   HENT:      Head: Normocephalic and atraumatic.   Eyes:      Conjunctiva/sclera: Conjunctivae normal.   Cardiovascular:      Rate and Rhythm: Normal rate and regular rhythm.      Heart sounds: No murmur. No friction rub. No gallop.    Pulmonary:      Effort: Pulmonary effort is normal. No respiratory distress.      Breath sounds: No stridor. No wheezing, rhonchi or rales.   Chest:      Chest wall: Tenderness present.   Musculoskeletal:      Cervical back: No signs of trauma, tenderness or bony tenderness.      Thoracic back: No swelling, edema, deformity, spasms or bony tenderness.      Lumbar back: No swelling, edema, signs of trauma, lacerations, tenderness or bony tenderness. Normal range of motion.        Back:    Skin:     Coloration: Skin is not jaundiced or pale.   Neurological:      Mental Status: She is alert and oriented to person, place, and time.      Sensory: Sensation is intact.      Motor: Motor function " is intact.   Psychiatric:         Thought Content: Thought content normal.          X-ray of the left hip and 1 view pelvis negative for acute abnormalities  X-ray of the left rib series and 1 view chest x-ray also negative for acute abnormalities.     Assessment/Plan:       ASSESSMENT:PLAN:  1. Rib injury  - [id number] (WITH 1-VIEW CXR) LEFT; Future  - acetaminophen (TYLENOL) tablet 500 mg    2. Fall, initial encounter  - DX-HIP-UNILATERAL-WITH PELVIS-1 VIEW LEFT; Future  - acetaminophen (TYLENOL) tablet 500 mg      She received 1 dose of Tylenol by myself  X-ray discussed and are negative.  Patient came in by herself and she is very mobile.  Exam otherwise reassuring consistent with a contusion to the rib cage.  Warning signs reviewed.  OTC Tylenol as needed for pain.  Icing frequently.  Plan per orders and instructions  Warning signs reviewed

## 2021-03-17 NOTE — TELEPHONE ENCOUNTER
Received message from patient stating she had a fall on Sunday. She states she did not seek any care right away as she was not having any pain. Patient states she went to the gym and started having some lower left abdominal, back pain. She was wondering if she should head to  or ER.   Message received while out of the office. Patient was seen at .     Called and spoke with patient this morning. She states she is doing better today. She has been taking Tylenol as needed and her pain has decreased. She states her pain in worse when getting in and out of bed. She is wondering if Jahaira recommends a muscle relaxer at this time or not. Notified patient I would speak with Jahaira, but she does recommend she keep her appointment with her next week to review how she is doing. Patient states she plans to keep appointment. No further questions at this time.

## 2021-03-19 DIAGNOSIS — R05.9 COUGH IN ADULT: ICD-10-CM

## 2021-03-19 DIAGNOSIS — R60.0 BILATERAL LEG EDEMA: ICD-10-CM

## 2021-03-22 ENCOUNTER — HOSPITAL ENCOUNTER (OUTPATIENT)
Dept: LAB | Facility: MEDICAL CENTER | Age: 81
End: 2021-03-22
Attending: INTERNAL MEDICINE
Payer: MEDICARE

## 2021-03-22 LAB
ALBUMIN SERPL BCP-MCNC: 4 G/DL (ref 3.2–4.9)
ALBUMIN/GLOB SERPL: 1.4 G/DL
ALP SERPL-CCNC: 69 U/L (ref 30–99)
ALT SERPL-CCNC: 28 U/L (ref 2–50)
ANION GAP SERPL CALC-SCNC: 7 MMOL/L (ref 7–16)
AST SERPL-CCNC: 26 U/L (ref 12–45)
BASOPHILS # BLD AUTO: 0.5 % (ref 0–1.8)
BASOPHILS # BLD: 0.04 K/UL (ref 0–0.12)
BILIRUB SERPL-MCNC: 0.3 MG/DL (ref 0.1–1.5)
BUN SERPL-MCNC: 17 MG/DL (ref 8–22)
CALCIUM SERPL-MCNC: 9.3 MG/DL (ref 8.5–10.5)
CHLORIDE SERPL-SCNC: 102 MMOL/L (ref 96–112)
CO2 SERPL-SCNC: 28 MMOL/L (ref 20–33)
CREAT SERPL-MCNC: 0.67 MG/DL (ref 0.5–1.4)
EOSINOPHIL # BLD AUTO: 0.24 K/UL (ref 0–0.51)
EOSINOPHIL NFR BLD: 3.3 % (ref 0–6.9)
ERYTHROCYTE [DISTWIDTH] IN BLOOD BY AUTOMATED COUNT: 54.2 FL (ref 35.9–50)
GLOBULIN SER CALC-MCNC: 2.9 G/DL (ref 1.9–3.5)
GLUCOSE SERPL-MCNC: 100 MG/DL (ref 65–99)
HCT VFR BLD AUTO: 41.2 % (ref 37–47)
HGB BLD-MCNC: 12.9 G/DL (ref 12–16)
IMM GRANULOCYTES # BLD AUTO: 0.02 K/UL (ref 0–0.11)
IMM GRANULOCYTES NFR BLD AUTO: 0.3 % (ref 0–0.9)
LYMPHOCYTES # BLD AUTO: 1.25 K/UL (ref 1–4.8)
LYMPHOCYTES NFR BLD: 17 % (ref 22–41)
MCH RBC QN AUTO: 33.4 PG (ref 27–33)
MCHC RBC AUTO-ENTMCNC: 31.3 G/DL (ref 33.6–35)
MCV RBC AUTO: 106.7 FL (ref 81.4–97.8)
MONOCYTES # BLD AUTO: 0.54 K/UL (ref 0–0.85)
MONOCYTES NFR BLD AUTO: 7.3 % (ref 0–13.4)
NEUTROPHILS # BLD AUTO: 5.27 K/UL (ref 2–7.15)
NEUTROPHILS NFR BLD: 71.6 % (ref 44–72)
NRBC # BLD AUTO: 0 K/UL
NRBC BLD-RTO: 0 /100 WBC
PLATELET # BLD AUTO: 235 K/UL (ref 164–446)
PMV BLD AUTO: 9.3 FL (ref 9–12.9)
POTASSIUM SERPL-SCNC: 4 MMOL/L (ref 3.6–5.5)
PROT SERPL-MCNC: 6.9 G/DL (ref 6–8.2)
RBC # BLD AUTO: 3.86 M/UL (ref 4.2–5.4)
SODIUM SERPL-SCNC: 137 MMOL/L (ref 135–145)
WBC # BLD AUTO: 7.4 K/UL (ref 4.8–10.8)

## 2021-03-22 PROCEDURE — 82378 CARCINOEMBRYONIC ANTIGEN: CPT

## 2021-03-22 PROCEDURE — 85025 COMPLETE CBC W/AUTO DIFF WBC: CPT

## 2021-03-22 PROCEDURE — 80053 COMPREHEN METABOLIC PANEL: CPT

## 2021-03-22 PROCEDURE — 36415 COLL VENOUS BLD VENIPUNCTURE: CPT

## 2021-03-22 RX ORDER — POTASSIUM CHLORIDE 750 MG/1
TABLET, FILM COATED, EXTENDED RELEASE ORAL
Qty: 90 TABLET | Refills: 1 | Status: SHIPPED | OUTPATIENT
Start: 2021-03-22 | End: 2021-07-01 | Stop reason: SDUPTHER

## 2021-03-22 RX ORDER — FUROSEMIDE 20 MG/1
TABLET ORAL
Qty: 90 TABLET | Refills: 1 | Status: SHIPPED | OUTPATIENT
Start: 2021-03-22 | End: 2021-07-01 | Stop reason: SDUPTHER

## 2021-03-23 LAB — CEA SERPL-MCNC: 4.2 NG/ML (ref 0–3)

## 2021-03-23 RX ORDER — ALBUTEROL SULFATE 90 UG/1
AEROSOL, METERED RESPIRATORY (INHALATION)
Qty: 1 EACH | Refills: 11 | Status: SHIPPED | OUTPATIENT
Start: 2021-03-23 | End: 2022-03-28

## 2021-03-23 NOTE — TELEPHONE ENCOUNTER
Have we ever prescribed this med? Yes.  If yes, what date? 01/11/2021    Last OV: 01/11/2021 - JASS BECKMAN    Next OV: 04/19/2021 - JASS BECKMAN    DX: SOB    Medications: Albuterol

## 2021-03-25 ENCOUNTER — OFFICE VISIT (OUTPATIENT)
Dept: MEDICAL GROUP | Facility: IMAGING CENTER | Age: 81
End: 2021-03-25
Payer: MEDICARE

## 2021-03-25 VITALS
HEART RATE: 84 BPM | TEMPERATURE: 98.1 F | HEIGHT: 62 IN | BODY MASS INDEX: 27.6 KG/M2 | RESPIRATION RATE: 16 BRPM | SYSTOLIC BLOOD PRESSURE: 108 MMHG | OXYGEN SATURATION: 96 % | WEIGHT: 150 LBS | DIASTOLIC BLOOD PRESSURE: 52 MMHG

## 2021-03-25 DIAGNOSIS — H53.8 BLURRY VISION, LEFT EYE: ICD-10-CM

## 2021-03-25 DIAGNOSIS — J30.9 ALLERGIC RHINITIS, UNSPECIFIED SEASONALITY, UNSPECIFIED TRIGGER: ICD-10-CM

## 2021-03-25 DIAGNOSIS — M81.0 OSTEOPOROSIS, UNSPECIFIED OSTEOPOROSIS TYPE, UNSPECIFIED PATHOLOGICAL FRACTURE PRESENCE: ICD-10-CM

## 2021-03-25 DIAGNOSIS — C79.31 METASTASIS TO BRAIN (HCC): ICD-10-CM

## 2021-03-25 DIAGNOSIS — E04.2 MULTINODULAR GOITER: ICD-10-CM

## 2021-03-25 DIAGNOSIS — Z86.16 HISTORY OF COVID-19: ICD-10-CM

## 2021-03-25 DIAGNOSIS — Z91.81 AT RISK FOR FALLING: ICD-10-CM

## 2021-03-25 DIAGNOSIS — Z00.00 MEDICARE ANNUAL WELLNESS VISIT, SUBSEQUENT: ICD-10-CM

## 2021-03-25 DIAGNOSIS — R79.89 ABNORMAL CBC MEASUREMENT: ICD-10-CM

## 2021-03-25 DIAGNOSIS — E05.90 HYPERTHYROIDISM: ICD-10-CM

## 2021-03-25 DIAGNOSIS — R60.0 LOWER EXTREMITY EDEMA: ICD-10-CM

## 2021-03-25 DIAGNOSIS — I48.20 CHRONIC ATRIAL FIBRILLATION (HCC): ICD-10-CM

## 2021-03-25 DIAGNOSIS — Z99.89 USE OF CANE AS AMBULATORY AID: ICD-10-CM

## 2021-03-25 DIAGNOSIS — I63.10 CEREBROVASCULAR ACCIDENT (CVA) DUE TO EMBOLISM OF PRECEREBRAL ARTERY (HCC): ICD-10-CM

## 2021-03-25 DIAGNOSIS — E55.9 VITAMIN D DEFICIENCY: ICD-10-CM

## 2021-03-25 DIAGNOSIS — C79.9 METASTATIC MALIGNANT NEOPLASM, UNSPECIFIED SITE (HCC): ICD-10-CM

## 2021-03-25 DIAGNOSIS — Z85.3 HISTORY OF BREAST CANCER: ICD-10-CM

## 2021-03-25 DIAGNOSIS — I48.0 PAROXYSMAL ATRIAL FIBRILLATION (HCC): ICD-10-CM

## 2021-03-25 DIAGNOSIS — R26.89 BALANCE DISORDER: ICD-10-CM

## 2021-03-25 DIAGNOSIS — H53.47 BITEMPORAL HEMIANOPIA: ICD-10-CM

## 2021-03-25 DIAGNOSIS — I49.5 SICK SINUS SYNDROME (HCC): ICD-10-CM

## 2021-03-25 DIAGNOSIS — R56.9 SEIZURE (HCC): ICD-10-CM

## 2021-03-25 PROCEDURE — G0439 PPPS, SUBSEQ VISIT: HCPCS | Performed by: NURSE PRACTITIONER

## 2021-03-25 ASSESSMENT — ENCOUNTER SYMPTOMS: GENERAL WELL-BEING: GOOD

## 2021-03-25 ASSESSMENT — ACTIVITIES OF DAILY LIVING (ADL): BATHING_REQUIRES_ASSISTANCE: 0

## 2021-03-25 ASSESSMENT — PAIN SCALES - GENERAL: PAINLEVEL: 5=MODERATE PAIN

## 2021-03-25 ASSESSMENT — PATIENT HEALTH QUESTIONNAIRE - PHQ9: CLINICAL INTERPRETATION OF PHQ2 SCORE: 0

## 2021-03-25 ASSESSMENT — FIBROSIS 4 INDEX: FIB4 SCORE: 1.74

## 2021-03-25 NOTE — PROGRESS NOTES
Chief Complaint   Patient presents with   • Annual Exam     HPI:  Ml is a 81 y.o. here for Medicare Annual Wellness Visit    History of COVID-19:  Reports that she is scheduled to see pulmonologist at the end of April.  States that she is doing well on 2 L of oxygen would like to wean oxygen further before pulmonologist appointment.  States that she does have periods of time when she is not wearing oxygen during the day.  States that she can go without oxygen for about an hour before feeling short of breath.  States at that time when she checks her SPO2 she is below 88-92%.  Denies any increase work of breathing on 2 L.  Reports that she is feeling better since diagnosis of COVID-19.  States initially she felt very fatigued and rundown.    Lung cancer:  History of breast cancer:  Metastatic cancer:  Metastasis to brain:  Reports this is a well controlled condition.  States that she has been told by oncologist that they will not her rule out complete remission, but at this time she has no signs of active cancer.  States that she continues to see oncologist regularly.    Seizure:  Reports this is a chronic stable condition.  Denies any recent seizure activity.  States that she continues taking Keppra 500 mg twice daily.  Denies any side effects to medication.  Reports that she sees neurologist regularly.    Paroxysmal atrial fibrillation:  Abnormal CBC measurements:  Sick sinus syndrome:  Lower extremity edema:  Reports that she sees cardiology regularly.  States that she takes metoprolol 25 mg once a day, flecainide 150 mg twice a day, Lasix 20 mg every other day with potassium 10 mEq.  Denies any side effects to medication. Denies headache, SOB, chest pain, tachycardia, palpitations, and/or edema.    Use of cane as ambulatory aid:  At risk of falling:  Balance disorder:  CVA:  Reports that she continues to work with physical therapy regularly.  States that she normally uses her cane, but when she is out  currently she is using a walker due to use of oxygen.  States that she recently had a fall and sought care at urgent care.  States that they were no fractures noted.  Denies hitting head when falling.  States that she continues to improve.  States that she intermittently will have lower back pain after sleeping.  States that pain improves when she starts walking around.  Reports that she is intermittently using heat to lower back.    Hilar adenopathy:  Reports this is well controlled condition.  Denies any concerns this time.  Reports she sees pulmonology regularly.    Allergic rhinitis:  Reports that this is a well controlled symptom.  States that she intermittently will take over-the-counter allergy medication as needed.  Denies any concerns at this time.    Multinodular goiter:  Hyperthyroidism:  Reports that she sees endocrinology regularly.  Reports next visit end of April.  States that she continues to take methimazole 5 mg daily.  Denies any side effects to medication.  Denies any hyper/hypothyroid symptoms at this time.    20/2020 TSH level: 0.123  Osteoporosis:  Reports that she continues taking Fosamax 70 mg once a week.  Reports that she takes calcium and vitamin D supplements daily.  Denies any recent fractures.    10/16/2020 2:19 PM     HISTORY/REASON FOR EXAM:  Postmenopausal, osteopenia of the lumbar spine, osteoporosis of the left femur, history of fracture     TECHNIQUE/EXAM DESCRIPTION AND NUMBER OF VIEWS:  Dual x-ray bone densitometry was performed from the L1, L2 and L4 levels and from the proximal left femur utilizing the GE Prodigy unit.     COMPARISON:   Bone densitometry scan 9/20/2018     FINDINGS:  The mean bone mineral density for the lumbar spine is 1.097 g/cm2, which corresponds to a T score of -0.6 and a Z score of 1.1.     The proximal left femur has a mean bone mineral density of 0.708 g/cm2, with a T score of -2.4 and a Z score of -0.4.     When compared with the most recent study  dated 9/20/2018, there has been a 10.8% increase in the bone mineral density of the lumbar spine and a 14.9% increase in the bone mineral density of the left femur.     IMPRESSION:  According to the World Health Organization classification, bone mineral density of this patient is normal in the spine and osteopenic in the proximal left femur.    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  States that she has appointment with ophthalmology in June for further evaluation.  States that she continues to work with physical therapy to work on balance and mobility with decreased vision.  States that she had noticed his vision is worse at night and is avoiding using computer due to making it worse.  Denies any pain in eyes.    Abnormal CBC:  3/22/2021:  WBC 4.8 - 10.8 K/uL 7.4    RBC 4.20 - 5.40 M/uL 3.86Low     Hemoglobin 12.0 - 16.0 g/dL 12.9    Hematocrit 37.0 - 47.0 % 41.2    MCV 81.4 - 97.8 fL 106.7High     MCH 27.0 - 33.0 pg 33.4High     MCHC 33.6 - 35.0 g/dL 31.3Low     RDW 35.9 - 50.0 fL 54.2High     Platelet Count 164 - 446 K/uL 235    MPV 9.0 - 12.9 fL 9.3    Neutrophils-Polys 44.00 - 72.00 % 71.60    Lymphocytes 22.00 - 41.00 % 17.00Low     Monocytes 0.00 - 13.40 % 7.30    Eosinophils 0.00 - 6.90 % 3.30    Basophils 0.00 - 1.80 % 0.50    Immature Granulocytes 0.00 - 0.90 % 0.30    Nucleated RBC /100 WBC 0.00    Neutrophils (Absolute) 2.00 - 7.15 K/uL 5.27    Comment: Includes immature neutrophils, if present.   Lymphs (Absolute) 1.00 - 4.80 K/uL 1.25    Monos (Absolute) 0.00 - 0.85 K/uL 0.54    Eos (Absolute) 0.00 - 0.51 K/uL 0.24    Baso (Absolute) 0.00 - 0.12 K/uL 0.04    Immature Granulocytes (abs) 0.00 - 0.11 K/uL 0.02             Patient Active Problem List    Diagnosis Date Noted   • Sick sinus syndrome (HCC) 05/31/2018   •  History of COVID-19 01/09/2021   • Lung cancer (HCC) 12/23/2020   • Paroxysmal atrial fibrillation (HCC) 12/21/2020   • Seizure (HCC) 12/21/2020   • Abnormal CBC measurement 02/06/2020   • At risk for falling 12/27/2019   • Use of cane as ambulatory aid 12/27/2019   • Lower extremity edema 12/27/2019   • High risk medication use 08/27/2019   • Multinodular goiter 08/15/2019   • Vitamin D deficiency 08/15/2019   • Osteoporosis 01/17/2019   • Hyperthyroidism 01/17/2019   • Chronic atrial fibrillation (HCC) 01/17/2019   • Postmenopausal 08/27/2018   • 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   • Balance disorder 06/21/2018   • Radionecrosis 04/14/2018   • Malignant neoplasm of upper lobe of right lung (HCC) 03/05/2018   • CVA (cerebral vascular accident) (HCC) 11/29/2017   • Allergic rhinitis 01/12/2016   • Hilar adenopathy 01/12/2016   • History of breast cancer 01/12/2016   • Lung mass 11/19/2015   • Blurry vision, left eye 11/19/2015   • Metastasis to brain (HCC) 11/18/2015   • Metastatic cancer (CMS-HCC) 11/18/2015     Current Outpatient Medications   Medication Sig Dispense Refill   • albuterol 108 (90 Base) MCG/ACT Aero Soln inhalation aerosol INHALE 2 PUFFS BY MOUTH EVERY 4 HOURS AS NEEDED FOR SHORTNESS OF BREATH 1 Each 11   • furosemide (LASIX) 20 MG Tab TAKE 1 TABLET BY MOUTH EVERY DAY IN THE MORNING (Patient taking differently: Take 20 mg by mouth. Every other day) 90 tablet 1   • zinc sulfate (ZINCATE) 220 (50 Zn) MG Cap Take 220 mg by mouth every day.     • Fluticasone Furoate-Vilanterol (BREO ELLIPTA) 100-25 MCG/INH AEROSOL POWDER, BREATH ACTIVATED Inhale 1 Puff every day. Rinse mouth after use. 3 Each 3   • CALCIUM CARBONATE-VITAMIN D PO Take  by mouth.     • alendronate (FOSAMAX) 70 MG Tab Take 70 mg by mouth every 7 days.     • metronidazole (METROCREAM) 0.75 % cream APPLY TO FACE EVERYDAY ONCE TO TWICE A DAY FOR ROSACEA     • flecainide (TAMBOCOR) 150 MG Tab Take 1 Tab by mouth 2 times a day. 180 Tab 3   •  metoprolol SR (TOPROL XL) 25 MG TABLET SR 24 HR Take 1 Tab by mouth every day. 90 Tab 3   • methimazole (TAPAZOLE) 5 MG Tab Take 0.5 Tabs by mouth every day. 90 Tab 1   • levETIRAcetam (KEPPRA) 500 MG Tab Take 1 Tab by mouth 2 Times a Day. 180 Tab 3   • KLOR-CON 10 10 MEQ tablet TAKE 1 TABLET BY MOUTH EVERY DAY (Patient taking differently: Take 10 mEq by mouth. Every other day with lasix) 90 tablet 1   • Fluticasone Furoate-Vilanterol (BREO ELLIPTA) 100-25 MCG/INH AEROSOL POWDER, BREATH ACTIVATED Inhale 1 Puff every day. Rinse mouth after use. (Patient not taking: Reported on 3/16/2021) 1 Each 0     No current facility-administered medications for this visit.      Patient is taking medications as noted in medication list.  Current supplements as per medication list.     Allergies: Penicillins and Atorvastatin    Current social contact/activities: Yes     Is patient current with immunizations? No, due for covid . Patient is interested in receiving NONE today.    She  reports that she quit smoking about 41 years ago. Her smoking use included cigarettes. She has a 40.00 pack-year smoking history. She has never used smokeless tobacco. She reports current alcohol use of about 1.8 oz of alcohol per week. She reports that she does not use drugs.  Counseling given: Not Answered    DPA/Advanced directive: Patient has Advanced Directive on file.     ROS:    Gait: Uses a walker to help with carrying oxygen. Sometimes uses a cane   Ostomy: No   Other tubes: No   Amputations: No   Chronic oxygen use Yes   Last eye exam 05/2020   Wears hearing aids: No   : no    Screening:  Depression Screening  Little interest or pleasure in doing things?  0 - not at all  Feeling down, depressed, or hopeless? 0 - not at all  Trouble falling or staying asleep, or sleeping too much?     Feeling tired or having little energy?     Poor appetite or overeating?     Feeling bad about yourself - or that you are a failure or have let yourself or your  family down?    Trouble concentrating on things, such as reading the newspaper or watching television?    Moving or speaking so slowly that other people could have noticed.  Or the opposite - being so fidgety or restless that you have been moving around a lot more than usual?     Thoughts that you would be better off dead, or of hurting yourself?     Patient Health Questionnaire Score:      If depressive symptoms identified deferred to follow up visit unless specifically addressed in assessment and plan.    Interpretation of PHQ-9 Total Score   Score Severity   1-4 No Depression   5-9 Mild Depression   10-14 Moderate Depression   15-19 Moderately Severe Depression   20-27 Severe Depression    Screening for Cognitive Impairment  Three Minute Recall (river, mayela, finger)  3/3    Draw clock face with all 12 numbers and set the hands to show 10 past 11.  Yes    If cognitive concerns identified, deferred for follow up unless specifically addressed in assessment and plan.    Fall Risk Assessment  Has the patient had two or more falls in the last year or any fall with injury in the last year?  Yes  If fall risk identified, deferred for follow up unless specifically addressed in assessment and plan.    Safety Assessment  Throw rugs on floor.  No  Handrails on all stairs.  Yes  Good lighting in all hallways.  Yes  Difficulty hearing.  No  Patient counseled about all safety risks that were identified.    Functional Assessment ADLs  Are there any barriers preventing you from cooking for yourself or meeting nutritional needs?  No.    Are there any barriers preventing you from driving safely or obtaining transportation?  Yes. Doesn't drive and ub[employer] and cabs unreliable   Are there any barriers preventing you from using a telephone or calling for help?  No.    Are there any barriers preventing you from shopping?  No.    Are there any barriers preventing you from taking care of your own finances?  No.    Are there any barriers  preventing you from managing your medications?    No.    Are there any barriers preventing you from showering, bathing or dressing yourself?  No.    Are you currently engaging in any exercise or physical activity?  Yes.  Walking and PT   What is your perception of your health?  Good.    Health Maintenance Summary                COVID-19 Vaccine Overdue 1956     IMM ZOSTER VACCINES Overdue 2008      Done 2008 Imm Admin: Zoster Vaccine Live (ZVL) (Zostavax) - HISTORICAL DATA    MAMMOGRAM Next Due 10/16/2021      Done 10/16/2020 MA-SCREENING MAMMO BILAT W/TOMOSYNTHESIS W/CAD    Annual Wellness Visit Next Due 3/26/2022      Done 3/25/2021 Visit Dx: Medicare annual wellness visit, subsequent     Patient has more history with this topic...    COLONOSCOPY Next Due 2024      Done 2014 REFERRAL TO GI FOR COLONOSCOPY    BONE DENSITY Next Due 10/16/2025      Done 10/16/2020 DS-BONE DENSITY STUDY (DEXA)     Patient has more history with this topic...    IMM DTaP/Tdap/Td Vaccine Next Due 10/24/2029      Done 10/24/2019 Imm Admin: Tdap Vaccine        Patient Care Team:  SONIA Fry as PCP - General (Family Medicine)  Benja Mckeon M.D. (Inactive) (Neurosurgery)  OMID Thacker M.D. (Oncology)  Cande DE SANTIAGO M.D. as Consulting Physician (Radiation Oncology)  Mariano Aguilar M.D. as [facility] (Oncology)  [facility] as Home Health Provider  [employer]    Social History     Tobacco Use   • Smoking status: Former Smoker     Packs/day: 2.00     Years: 20.00     Pack years: 40.00     Types: Cigarettes     Quit date:      Years since quittin.2   • Smokeless tobacco: Never Used   Substance Use Topics   • Alcohol use: Yes     Alcohol/week: 1.8 oz     Types: 3 Glasses of wine per week     Comment: occasionally    • Drug use: No     Family History   Problem Relation Age of Onset   • Dementia Mother    • Diabetes Mother    • Other Mother          "osteoarthritis   • Arthritis Mother    • Autoimmune Disease Father         Rheumatoid arthritis   • Arthritis Father    • Cancer Brother         prostate      She  has a past medical history of Acute respiratory failure with hypoxia (HCC) (12/20/2020), Breast cancer (HCC), Cancer (HCC), Chickenpox, [place] measles, Healthcare maintenance (7/23/2018), Influenza, Joint pain, Lung cancer (HCC), Mumps, Need for vaccination (1/17/2019), Pneumonia due to COVID-19 virus (12/20/2020), Radionecrosis (4/14/2018), Sick sinus syndrome (HCC), Thyroid disease, and Tonsillitis.   Past Surgical History:   Procedure Laterality Date   • CRANIOTOMY STEALTH Right 11/23/2015    Procedure: CRANIOTOMY STEALTH-right occipital;  Surgeon: Suyapa Schumacher M.D.;  Location: SURGERY [facility];  Service:    • APPENDECTOMY     • CRANIOTOMY     • KNEE ARTHROSCOPY      left    • LUMPECTOMY     • OTHER      left knee surgery   • PACEMAKER INSERTION     • TONSILLECTOMY       Exam:   /52   Pulse 84   Temp 36.7 °C (98.1 °F)   Resp 16   Ht 1.575 m (5' 2\")   Wt 68 kg (150 lb)   SpO2 96%  Body mass index is 27.44 kg/m².    Spo2 recheck on 1 L/min of oxygen via concentrator after 15 minutes: 96%  Hearing excellent.    Dentition good  Alert, oriented in no acute distress.  Eye contact is good, speech goal directed, affect calm    General: Normal appearing. No distress.  HEENT: Normocephalic. Eyes conjunctiva clear lids without ptosis, PERRLA, ears normal shape and contour. Patient wore a mask during visit.  Neck: Trachea midline, no masses, no thyromegaly.  Pulmonary: Clear to ausculation.  Normal effort. No rales, ronchi, or wheezing. Oxygen via NC, no skin breakdown at nares. Decreased O2 to 1L during visit, no increased in WOB noted.  Cardiovascular: Regular rate and rhythm without murmur. Pedal and radial pulses are intact and equal bilaterally.  Neurologic: Grossly non-focal.  Musculoskeletal: Normal gait. No extremity cyanosis, " clubbing, or edema.   Psych: Normal mood and affect. Alert and oriented x3. Judgment and insight is normal.    Assessment and Plan:  1. Medicare annual wellness visit, subsequent  Reviewed with patient medication use and side effects. Medical, past, surgical history reviewed with patient. Discussed with patient the risk and benefits of receiving vaccines. Discussed CDC recommendations for immunizations and USPSTF guidelines for screening exams. Reviewed with patient how to schedule COVID-19 vaccine, verbalized understanding. Encouraged patient to wash hands regularly and avoid sick contacts while supporting immune system. Discussed continuing an accumulation of 150 minutes or more of moderate-intensity activity each week as tolerated as tolerated. Discussed a healthy diet that is low in fat, sodium, and cholesterol, such as the mediterranean diet that is typically high in fruits, vegetables, whole grains, beans, nuts, and seeds, includes olive oil as an important source of monounsaturated fat and/or DASH diet.  Discussed the overall benefit of a well-balanced diet, regular exercise, and stress management, verbalized understanding.    2. Sick sinus syndrome (HCC)  3. Paroxysmal atrial fibrillation (HCC)  4. Chronic atrial fibrillation (HCC)  5. Lower extremity edema  This is a chronic stable condition.  Patient instructed to continue seeing cardiologist regularly.  Instructed to continue reported medication regimen.    6. Bitemporal hemianopia  7. Blurry vision, left eye  This is a chronic stable condition.  Patient instructed to maintain appointment with ophthalmologist this June for further evaluation and/or sooner vision continues to change or she has any concerns.    8. Cerebrovascular accident (CVA) due to embolism of precerebral artery (HCC)  9. Seizure (HCC)  This is a chronic stable condition.  Patient instructed to continue seeing neurologist regularly.  Instructed to seek emergency services if she  strokelike symptoms.    10. Balance disorder  11. At risk for falling  12. Use of cane as ambulatory aid  This is a chronic stable condition.  Patient instructed to continue using cane and/or walker while on oxygen for stability.  Instructed to continue working with physical therapy as tolerated.  Instructed to report any falls.  Instructed to continue using ice therapy and Arnica as tolerated to lower back from recent fall, verbalized understanding and willingness.  Instructed to notify PCP if pain does not continue to improve, verbalized understanding and willingness.    -     Patient identified as fall risk.  Appropriate orders and counseling given.    13. Metastasis to brain (HCC)  14. Metastatic malignant neoplasm, unspecified site (HCC)  15. History of breast cancer  This is a chronic stable condition.  Patient instructed to continue care with oncologist regularly.    16. Osteoporosis, unspecified osteoporosis type, unspecified pathological fracture presence  This is a chronic stable condition.  Patient instructed to continue taking Fosamax once a week as tolerated.  Instructed to continue regular physical activity.  Instructed to report any unexplainable lower back pain, bone pain, and/or falls.  We will continue DEXA scans every 2 to 3 years.  Instructed to continue taking calcium and vitamin D supplements verbalized understanding and willingness.    17. Hyperthyroidism  18. Multinodular goiter  This is a chronic stable condition.  Patient instructed to continue seeing endocrinologist as advised.  Instructed to continue taking methimazole as reported.    19. Vitamin D deficiency  This is a chronic stable condition.  Patient instructed to continue taking vitamin D supplement.    20. Allergic rhinitis, unspecified seasonality, unspecified trigger  This is a chronic stable condition.  Patient instructed to continue managing intermittent allergic rhinitis with over-the-counter allergy medication.    21. Abnormal  CBC measurement  This is a chronic stable condition.  We will continue to evaluate CBCs as needed.    22. History of COVID-19  This is a chronic stable condition.  Discussed with patient beginning to wean oxygen.  Instructed to continue pulmonologist appointment as reported.  Patient shown how to decrease and increase oxygen on oxygen concentrator.  Patient able to demonstrate ability to do this.  Patient instructed to remain on 1 L for 5 days as long as she keeps her SPO2 above 92 on 1 L.  Instructed if she has any work of breathing, chest pain, dizziness, and/or desaturations on 1 L to return to 2 L for minimum of 24 hours before trying to continue weaning oxygen again.  Discussed with patient remaining on 1 L for 5 days if able to oxygenate appropriately before weaning to room air.  Instructed patient to check SPO2 multiple times during the day to make sure she is maintaining SPO2 above 92% while at rest and with active.  Discussed with patient if she is able to wean to room air she is to continue checking SPO2 regularly and is instructed to return to 1 L of oxygen SPO2 is not obtained and/or has any work of breathing, chest pain, fatigue, dizziness.  Discussed with patient if she has any symptoms while in room air she is to go back up to 1 L of oxygen for minimum 24 hours before trying to to wean to room air again, verbalized understanding and willingness.    Services suggested: No services needed at this time  Health Care Screening recommendations as per orders if indicated.  Referrals offered: PT/OT/Nutrition counseling/Behavioral Health/Smoking cessation as per orders if indicated.    Discussion today about general wellness and lifestyle habits:    · Prevent falls and reduce trip hazards; Cautioned about securing or removing rugs.  · Have a working fire alarm and carbon monoxide detector;   · Engage in regular physical activity and social activities     Follow-up: Return in about 2 weeks (around  4/8/2021).

## 2021-03-25 NOTE — PATIENT INSTRUCTIONS
Weaned today to 1L of oxygen, continue on 1L of oxygen as long as oxygen level stays about 92%. If decreased below 92% return to 2L of oxygen for 24 hours. Then try again on 1L the next day. Please check oxygen level multiple times a day. Before trying to wean to room air must be on 1L of oxygen for 5 days with no desaturation (decreased oxygen). Once trying room air continue to check oxygen multiple times a day, if decreased below 92% must go back to 1L of oxygen for 24 hours before trying again. Continue night time oxygen regardless. Increase flow at anytime if experiencing shortness of breath, chest pain, fatigue, or dizziness.

## 2021-03-28 PROBLEM — J96.01 ACUTE RESPIRATORY FAILURE WITH HYPOXIA (HCC): Status: RESOLVED | Noted: 2020-12-20 | Resolved: 2021-03-28

## 2021-03-28 PROBLEM — J12.82 PNEUMONIA DUE TO COVID-19 VIRUS: Status: RESOLVED | Noted: 2020-12-20 | Resolved: 2021-03-28

## 2021-03-28 PROBLEM — U07.1 PNEUMONIA DUE TO COVID-19 VIRUS: Status: RESOLVED | Noted: 2020-12-20 | Resolved: 2021-03-28

## 2021-03-31 ENCOUNTER — IMMUNIZATION (OUTPATIENT)
Dept: FAMILY PLANNING/WOMEN'S HEALTH CLINIC | Facility: IMMUNIZATION CENTER | Age: 81
End: 2021-03-31
Attending: INTERNAL MEDICINE
Payer: MEDICARE

## 2021-03-31 DIAGNOSIS — Z23 NEED FOR VACCINATION: ICD-10-CM

## 2021-03-31 DIAGNOSIS — Z23 ENCOUNTER FOR VACCINATION: Primary | ICD-10-CM

## 2021-03-31 PROCEDURE — 91300 PFIZER SARS-COV-2 VACCINE: CPT

## 2021-03-31 PROCEDURE — 0001A PFIZER SARS-COV-2 VACCINE: CPT

## 2021-04-08 ENCOUNTER — TELEMEDICINE (OUTPATIENT)
Dept: MEDICAL GROUP | Facility: IMAGING CENTER | Age: 81
End: 2021-04-08
Payer: MEDICARE

## 2021-04-08 VITALS
SYSTOLIC BLOOD PRESSURE: 110 MMHG | TEMPERATURE: 97.6 F | HEART RATE: 74 BPM | DIASTOLIC BLOOD PRESSURE: 46 MMHG | BODY MASS INDEX: 26.87 KG/M2 | WEIGHT: 146 LBS | HEIGHT: 62 IN | OXYGEN SATURATION: 96 %

## 2021-04-08 DIAGNOSIS — Z99.81 ON HOME O2: ICD-10-CM

## 2021-04-08 DIAGNOSIS — Z86.16 HISTORY OF COVID-19: ICD-10-CM

## 2021-04-08 DIAGNOSIS — M54.50 ACUTE MIDLINE LOW BACK PAIN WITHOUT SCIATICA: ICD-10-CM

## 2021-04-08 PROCEDURE — 99213 OFFICE O/P EST LOW 20 MIN: CPT | Mod: 95,CR | Performed by: NURSE PRACTITIONER

## 2021-04-08 ASSESSMENT — PAIN SCALES - GENERAL: PAINLEVEL: 3=SLIGHT PAIN

## 2021-04-08 ASSESSMENT — FIBROSIS 4 INDEX: FIB4 SCORE: 1.74

## 2021-04-08 NOTE — PROGRESS NOTES
Virtual Visit: Established Patient   This visit was conducted via Zoom using secure and encrypted videoconferencing technology. The patient was in a private location in the state of [place].    The patient's identity was confirmed and verbal consent was obtained for this virtual visit.  Patient is aware that this is a billable encounter.  Subjective:   CC:   Chief Complaint   Patient presents with   • Follow-Up     2 weeks     Ml Chavira is a 81 y.o. female presenting for evaluation and management of:    Reports that she continues using oxygen via concentrator and nasal cannula.  States that she goes between 1 to 2 L of oxygen to maintain SPO2 above 92%.  States that she is able to be on 1 L majority of the day.  States in the evening she notices at rest that her oxygen will decrease and she at that time returns to 2 years.  States that she continues to wear her BiPAP at night.  Denies any room air trial.  Denies any shortness of breath and/or cough.  States that she has been using incentive spirometer to attempt to improve lung compliance.    Reports that her lower back pain from a recent falls improving.  States that she is receiving massage therapy and continues to work with physical therapy to improve lower back pain.  Denies any red flag symptoms, including urinary and stool incontinence, urinary retention, and saddle paresthesia.      ROS:  Constitutional: Denies fever, chills, night sweats, weight loss/gain, and/or malaise. Improving fatigue, see HPI.  HENT: Denies nasal congestion, sore throat, hearing loss, enlarged thyroid, or difficulty swallowing.   Eyes: Denies changes in vision, pain. Wears corrective wear. Since removal brain tumor in 2015, pt. reports that she has had decreased peripheral vision, worse in left eye. Does not drive.   Respiratory: Denies cough, SOB at rest or activity. Home O2 after COVID-19, see HPI.  Cardiovascular: Denies tachycardia, chest pain, or palpitations. History of  "lower leg swelling, managed with intermittent use of Lasix. Pt. has a pace maker due to the history of sick sinus syndrome.  Gastrointestinal: Denies N/V/C/D, abdominal pain, loss appetite, reflux, or hematochezia.  Genitourinary: Denies difficulty voiding, dysuria, nocturia, or hematuria.   Skin: Negative for rash or worrisome moles.   Neurological: Negative for dizziness, focal weakness and headaches.   Endo/Heme/Allergies: Denies bruise/bleed easily, allergies.   Psychiatric/Behavioral: Denies depression, nervous/anxious, difficulty sleeping.  MSK: Improving lower back pain, see HPI.    Allergies   Allergen Reactions   • Penicillins Rash and Itching     RXN \"a long time ago\"  Other reaction(s): Rash   • Atorvastatin      Causes low quality     Current medicines (including changes today)  Current Outpatient Medications   Medication Sig Dispense Refill   • albuterol 108 (90 Base) MCG/ACT Aero Soln inhalation aerosol INHALE 2 PUFFS BY MOUTH EVERY 4 HOURS AS NEEDED FOR SHORTNESS OF BREATH 1 Each 11   • furosemide (LASIX) 20 MG Tab TAKE 1 TABLET BY MOUTH EVERY DAY IN THE MORNING (Patient taking differently: Take 20 mg by mouth. Every other day) 90 tablet 1   • KLOR-CON 10 10 MEQ tablet TAKE 1 TABLET BY MOUTH EVERY DAY (Patient taking differently: Take 10 mEq by mouth. Every other day with lasix) 90 tablet 1   • zinc sulfate (ZINCATE) 220 (50 Zn) MG Cap Take 220 mg by mouth every day.     • Fluticasone Furoate-Vilanterol (BREO ELLIPTA) 100-25 MCG/INH AEROSOL POWDER, BREATH ACTIVATED Inhale 1 Puff every day. Rinse mouth after use. 3 Each 3   • CALCIUM CARBONATE-VITAMIN D PO Take  by mouth.     • alendronate (FOSAMAX) 70 MG Tab Take 70 mg by mouth every 7 days.     • metronidazole (METROCREAM) 0.75 % cream APPLY TO FACE EVERYDAY ONCE TO TWICE A DAY FOR ROSACEA     • flecainide (TAMBOCOR) 150 MG Tab Take 1 Tab by mouth 2 times a day. 180 Tab 3   • metoprolol SR (TOPROL XL) 25 MG TABLET SR 24 HR Take 1 Tab by mouth every " day. 90 Tab 3   • methimazole (TAPAZOLE) 5 MG Tab Take 0.5 Tabs by mouth every day. 90 Tab 1   • levETIRAcetam (KEPPRA) 500 MG Tab Take 1 Tab by mouth 2 Times a Day. 180 Tab 3     No current facility-administered medications for this visit.     Patient Active Problem List    Diagnosis Date Noted   • Sick sinus syndrome (HCC) 05/31/2018     Priority: Medium   • On home O2 04/10/2021   • History of COVID-19 01/09/2021   • Lung cancer (HCC) 12/23/2020   • Paroxysmal atrial fibrillation (HCC) 12/21/2020   • Seizure ([facility]) 12/21/2020   • Abnormal CBC measurement 02/06/2020   • At risk for falling 12/27/2019   • Use of cane as ambulatory aid 12/27/2019   • Lower extremity edema 12/27/2019   • High risk medication use 08/27/2019   • Multinodular goiter 08/15/2019   • Vitamin D deficiency 08/15/2019   • Osteoporosis 01/17/2019   • Hyperthyroidism 01/17/2019   • Chronic atrial fibrillation (HCC) 01/17/2019   • Postmenopausal 08/27/2018   • 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   • Balance disorder 06/21/2018   • Radionecrosis 04/14/2018   • Malignant neoplasm of upper lobe of right lung (HCC) 03/05/2018   • CVA (cerebral vascular accident) (HCC) 11/29/2017   • Allergic rhinitis 01/12/2016   • Hilar adenopathy 01/12/2016   • History of breast cancer 01/12/2016   • Lung mass 11/19/2015   • Blurry vision, left eye 11/19/2015   • Metastasis to brain (HCC) 11/18/2015   • Metastatic cancer (CMS-HCC) 11/18/2015     Family History   Problem Relation Age of Onset   • Dementia Mother    • Diabetes Mother    • Other Mother         osteoarthritis   • Arthritis Mother    • Autoimmune Disease Father         Rheumatoid arthritis   • Arthritis Father    • Cancer Brother         prostate      She  has a past medical history of Acute respiratory failure with hypoxia (HCC) (12/20/2020), Breast cancer (HCC), Cancer (HCC), Chickenpox, Greek measles, Healthcare maintenance (7/23/2018), Influenza, Joint pain, Lung cancer (HCC), Mumps, Need for  "vaccination (1/17/2019), Pneumonia due to COVID-19 virus (12/20/2020), Radionecrosis (4/14/2018), Sick sinus syndrome (HCC), Thyroid disease, and Tonsillitis.  She  has a past surgical history that includes craniotomy stealth (Right, 11/23/2015); other; appendectomy; knee arthroscopy; craniotomy; tonsillectomy; pacemaker insertion; and lumpectomy.     Objective:   /46   Pulse 74   Temp 36.4 °C (97.6 °F)   Ht 1.575 m (5' 2\")   Wt 66.2 kg (146 lb)   SpO2 96%   BMI 26.70 kg/m²   Respiratory rate observed during visit: 14 bpm    Physical Exam:  Constitutional: Alert, no distress, well-groomed.  Skin: No rashes in visible areas.  Eye: Round. Conjunctiva clear, lids normal. No icterus.   ENMT: Lips pink without lesions, good dentition, moist mucous membranes. Phonation normal. Nasal cannula.  Neck: No masses, no thyromegaly. Moves freely without pain.  Respiratory: Unlabored respiratory effort, no cough or audible wheeze  Psych: Alert and oriented x3, normal affect and mood.     Assessment and Plan:   1. History of COVID-19  2. On home O2  This is a chronic stable condition.  Patient instructed to continue with plan of care discussed at previous visit for weaning of oxygen, verbalized understanding and willingness.  Instructed to keep pulmonology appointment as scheduled.  Discussed seeking emergency services if she experiences worse symptoms.    3. Acute midline low back pain without sciatica  This is a improving stable condition.  Instructed to continue massage therapy and physical therapy at this time.  Instructed to continue comfort measures.  Instructed to notify PCP if lower back pain does not continue to improve and/or worsens, verbalized understanding and willingness.    Follow-up: Return in about 3 weeks (around 4/29/2021).           "

## 2021-04-10 PROBLEM — Z99.81 ON HOME O2: Status: ACTIVE | Noted: 2021-04-10

## 2021-04-12 ENCOUNTER — TELEPHONE (OUTPATIENT)
Dept: MEDICAL GROUP | Facility: IMAGING CENTER | Age: 81
End: 2021-04-12

## 2021-04-12 NOTE — TELEPHONE ENCOUNTER
Pt left message about weaning off of oxygen therapy. Pt remembers that prior to covid, she had used 2L of oxygen in her bipap at night. She is inquiring if she should just continue the 2L at night despite weaning off her daytime oxygen.

## 2021-04-13 NOTE — TELEPHONE ENCOUNTER
Notified patient to continue night time settings. Pt reports she is doing well with weaning daytime oxygen. Oxygen saturations running above 92%. Mostly ranging between 94-98. She reports she is feeling well. No needs from our office at this time.

## 2021-04-13 NOTE — TELEPHONE ENCOUNTER
I would like patient to continue current settings of Bipap machine at night. We are only working on weaning daytime nasal cannula oxygen.   Jahaira Santos, APRN-C

## 2021-04-15 ENCOUNTER — HOSPITAL ENCOUNTER (OUTPATIENT)
Dept: LAB | Facility: MEDICAL CENTER | Age: 81
End: 2021-04-15
Attending: INTERNAL MEDICINE
Payer: MEDICARE

## 2021-04-15 DIAGNOSIS — E05.90 HYPERTHYROIDISM: ICD-10-CM

## 2021-04-15 DIAGNOSIS — M81.0 OSTEOPOROSIS, UNSPECIFIED OSTEOPOROSIS TYPE, UNSPECIFIED PATHOLOGICAL FRACTURE PRESENCE: ICD-10-CM

## 2021-04-15 DIAGNOSIS — Z79.899 HIGH RISK MEDICATION USE: ICD-10-CM

## 2021-04-15 DIAGNOSIS — E04.2 MULTINODULAR GOITER: ICD-10-CM

## 2021-04-15 DIAGNOSIS — E55.9 VITAMIN D DEFICIENCY: ICD-10-CM

## 2021-04-15 LAB
ALBUMIN SERPL BCP-MCNC: 4.4 G/DL (ref 3.2–4.9)
ALBUMIN/GLOB SERPL: 1.7 G/DL
ALP SERPL-CCNC: 75 U/L (ref 30–99)
ALT SERPL-CCNC: 28 U/L (ref 2–50)
ANION GAP SERPL CALC-SCNC: 8 MMOL/L (ref 7–16)
AST SERPL-CCNC: 21 U/L (ref 12–45)
BILIRUB SERPL-MCNC: 0.3 MG/DL (ref 0.1–1.5)
BUN SERPL-MCNC: 20 MG/DL (ref 8–22)
CALCIUM SERPL-MCNC: 9.6 MG/DL (ref 8.5–10.5)
CHLORIDE SERPL-SCNC: 104 MMOL/L (ref 96–112)
CO2 SERPL-SCNC: 28 MMOL/L (ref 20–33)
CREAT SERPL-MCNC: 0.69 MG/DL (ref 0.5–1.4)
GLOBULIN SER CALC-MCNC: 2.6 G/DL (ref 1.9–3.5)
GLUCOSE SERPL-MCNC: 74 MG/DL (ref 65–99)
PHOSPHATE SERPL-MCNC: 3.8 MG/DL (ref 2.5–4.5)
POTASSIUM SERPL-SCNC: 4.1 MMOL/L (ref 3.6–5.5)
PROT SERPL-MCNC: 7 G/DL (ref 6–8.2)
PTH-INTACT SERPL-MCNC: 32.2 PG/ML (ref 14–72)
SODIUM SERPL-SCNC: 140 MMOL/L (ref 135–145)
T3FREE SERPL-MCNC: 3.21 PG/ML (ref 2–4.4)
T4 FREE SERPL-MCNC: 1.02 NG/DL (ref 0.93–1.7)
TSH SERPL DL<=0.005 MIU/L-ACNC: 0.43 UIU/ML (ref 0.38–5.33)

## 2021-04-15 PROCEDURE — 36415 COLL VENOUS BLD VENIPUNCTURE: CPT

## 2021-04-15 PROCEDURE — 84100 ASSAY OF PHOSPHORUS: CPT

## 2021-04-15 PROCEDURE — 84439 ASSAY OF FREE THYROXINE: CPT

## 2021-04-15 PROCEDURE — 82306 VITAMIN D 25 HYDROXY: CPT

## 2021-04-15 PROCEDURE — 83970 ASSAY OF PARATHORMONE: CPT

## 2021-04-15 PROCEDURE — 80053 COMPREHEN METABOLIC PANEL: CPT

## 2021-04-15 PROCEDURE — 84481 FREE ASSAY (FT-3): CPT

## 2021-04-15 PROCEDURE — 84443 ASSAY THYROID STIM HORMONE: CPT

## 2021-04-17 LAB — 25(OH)D3 SERPL-MCNC: 29 NG/ML (ref 30–80)

## 2021-04-19 ENCOUNTER — OFFICE VISIT (OUTPATIENT)
Dept: SLEEP MEDICINE | Facility: MEDICAL CENTER | Age: 81
End: 2021-04-19
Payer: MEDICARE

## 2021-04-19 ENCOUNTER — APPOINTMENT (OUTPATIENT)
Dept: SLEEP MEDICINE | Facility: MEDICAL CENTER | Age: 81
End: 2021-04-19
Payer: MEDICARE

## 2021-04-19 VITALS
WEIGHT: 147.8 LBS | RESPIRATION RATE: 16 BRPM | DIASTOLIC BLOOD PRESSURE: 60 MMHG | HEIGHT: 62 IN | BODY MASS INDEX: 27.2 KG/M2 | OXYGEN SATURATION: 94 % | SYSTOLIC BLOOD PRESSURE: 108 MMHG | HEART RATE: 74 BPM

## 2021-04-19 DIAGNOSIS — Z86.16 HISTORY OF COVID-19: ICD-10-CM

## 2021-04-19 DIAGNOSIS — G47.33 OSA (OBSTRUCTIVE SLEEP APNEA): ICD-10-CM

## 2021-04-19 DIAGNOSIS — Z99.81 ON HOME O2: ICD-10-CM

## 2021-04-19 DIAGNOSIS — C34.90 MALIGNANT NEOPLASM OF LUNG, UNSPECIFIED LATERALITY, UNSPECIFIED PART OF LUNG (HCC): ICD-10-CM

## 2021-04-19 DIAGNOSIS — J96.21 ACUTE ON CHRONIC RESPIRATORY FAILURE WITH HYPOXIA (HCC): ICD-10-CM

## 2021-04-19 DIAGNOSIS — R91.8 LUNG MASS: ICD-10-CM

## 2021-04-19 PROCEDURE — 99214 OFFICE O/P EST MOD 30 MIN: CPT | Performed by: NURSE PRACTITIONER

## 2021-04-19 RX ORDER — CALCIUM CARBONATE 260MG(650)
1 TABLET,CHEWABLE ORAL 2 TIMES DAILY
COMMUNITY
End: 2021-11-04

## 2021-04-19 ASSESSMENT — FIBROSIS 4 INDEX: FIB4 SCORE: 1.4

## 2021-04-19 NOTE — PROGRESS NOTES
Chief Complaint   Patient presents with   • Follow-Up     VITO, SOB       HPI:  Ml Chavira is a 81 y.o. year old female here today for follow-up on Covid pneumonia, respiratory failure, and lung cancer with brain metasteses.     Last seen on 1/11/2021 for follow-up on hospital admission for COVID-19 on 12/20/2020.Pertinent history includes chronic hypoxic respiratory failure, stage IV right upper lobe lung cancer with history of brain metastases status post craniotomy, radiation and chemotherapy.    Uses albuterol, but was recently started on Breo at last appointment. She also had physical therapy and uses a portable oxygen concentrator and is on 2 L pulsed.   Most recent imaging:    X-ray (12/20/2020):   stable cardiomediastinal silhouette, mild to moderate peripheral hazy bilateral pulmonary opacities left worse than right.  Changes are nonspecific but most suggestive of Covid pneumonia.  Right axillary surgical clips.    Chest CT (9/10/2020):  stable masslike opacity right upper lobe not significantly changed in size measuring approximately 1.8 x 4.1 cm previously 4 x 2 cm.  Emphysematous changes.  No new pulmonary nodules.  Bilateral thyroid nodules again noted.  Pacemaker present.  Compression deformity T12 vertebral body unchanged.    Pulmonary function testing (2/26/2020): FEV1 of 1.31 L or 78% predicted, FVC of 1.44 L or 65% predicted, FEV1/FVC ratio of 91, no significant bronchodilator response, residual volume 88% predicted, total lung capacity 82% predicted, DLCO 74% predicted.  Per pulmonologist interpretation mild restriction with mildly reduced DLCO, could be seen with early ILD.    Today she presents for month follow-up and a compliance screening.  She states after having Covid and in the recent months she is starting to feel better.  She is starting to wean off of the daytime oxygen.  Her logs demonstrate oxygen saturations of 94% on 1 L.  He is also doing physical therapy.  She has been  using Breo since last visit and states that is helping as well.    In regards to VITO, she is using a BiPAP machine at 18/13 cm/H2O.  Average machine usage is 5 hours 10 minutes.  97% usage 29/30 days.  No leaks noted, and resultant AHI of 3.5.  She states that the mask usually is uncomfortable and she often has dry mouth when she wakes up.  She uses biotin to help with this.  She states she is waking up fully rested, and no complaints of morning headaches, palpitations, memory problems, or confusion.    In regard to her home oxygen, she states that she has been having trouble with an excessively long oxygen cord.  She ordered a 7 foot cord from [employer] and uses those accordingly.  Today she requested to be checked for possibility of coming off of daytime oxygen.  Multi ox was performed with ambulation.  On room air she did desaturate to 87%.  With the initiation of 2 L O2, she increased to 97%.  There was no evidence of accessory muscle use, dyspnea, or shortness of breath.     ROS: As per HPI and otherwise negative if not stated.    Past Medical History:   Diagnosis Date   • Acute respiratory failure with hypoxia (HCC) 12/20/2020   • Breast cancer (HCC)    • Cancer (HCC)     lung cancer with brain mts   • Chickenpox    • Romansh measles    • Healthcare maintenance 7/23/2018   • Influenza    • Joint pain    • Lung cancer (HCC)    • Mumps    • Need for vaccination 1/17/2019   • Pneumonia due to COVID-19 virus 12/20/2020   • Radionecrosis 4/14/2018   • Sick sinus syndrome (HCC)     with AFIB, s/p pacemaker   • Thyroid disease    • Tonsillitis        Past Surgical History:   Procedure Laterality Date   • CRANIOTOMY STEALTH Right 11/23/2015    Procedure: CRANIOTOMY STEALTH-right occipital;  Surgeon: Suyapa Schumacher M.D.;  Location: SURGERY [facility];  Service:    • APPENDECTOMY     • CRANIOTOMY     • KNEE ARTHROSCOPY      left    • LUMPECTOMY     • OTHER      left knee surgery   • PACEMAKER INSERTION     •  TONSILLECTOMY         Family History   Problem Relation Age of Onset   • Dementia Mother    • Diabetes Mother    • Other Mother         osteoarthritis   • Arthritis Mother    • Autoimmune Disease Father         Rheumatoid arthritis   • Arthritis Father    • Cancer Brother         prostate        Social History     Socioeconomic History   • Marital status:      Spouse name: Not on file   • Number of children: Not on file   • Years of education: Not on file   • Highest education level: Not on file   Occupational History   • Not on file   Tobacco Use   • Smoking status: Former Smoker     Packs/day: 2.00     Years: 20.00     Pack years: 40.00     Types: Cigarettes     Quit date:      Years since quittin.3   • Smokeless tobacco: Never Used   Substance and Sexual Activity   • Alcohol use: Yes     Alcohol/week: 1.8 oz     Types: 3 Glasses of wine per week     Comment: occasionally    • Drug use: No   • Sexual activity: Not Currently   Other Topics Concern   • Not on file   Social History Narrative   • Not on file     Social Determinants of Health     Financial Resource Strain:    • Difficulty of Paying Living Expenses:    Food Insecurity:    • Worried About Running Out of Food in the Last Year:    • Ran Out of Food in the Last Year:    Transportation Needs:    • Lack of Transportation (Medical):    • Lack of Transportation (Non-Medical):    Physical Activity:    • Days of Exercise per Week:    • Minutes of Exercise per Session:    Stress:    • Feeling of Stress :    Social Connections:    • Frequency of Communication with Friends and Family:    • Frequency of Social Gatherings with Friends and Family:    • Attends Congregation Services:    • Active Member of Clubs or Organizations:    • Attends Club or Organization Meetings:    • Marital Status:    Intimate Partner Violence:    • Fear of Current or Ex-Partner:    • Emotionally Abused:    • Physically Abused:    • Sexually Abused:        Allergies as of  "04/19/2021 - Reviewed 04/19/2021   Allergen Reaction Noted   • Penicillins Rash and Itching 08/12/2012   • Atorvastatin  04/06/2020        Vitals:  /60 (BP Location: Left arm, Patient Position: Sitting, BP Cuff Size: Adult)   Pulse 74   Resp 16   Ht 1.562 m (5' 1.5\")   Wt 67 kg (147 lb 12.8 oz)   SpO2 94%     Current medications as of today   Current Outpatient Medications   Medication Sig Dispense Refill   • Zinc 10 MG Lozenge Dissolve 1 Each in the mouth 2 times a day.     • Fluticasone Furoate-Vilanterol (BREO ELLIPTA) 100-25 MCG/INH AEROSOL POWDER, BREATH ACTIVATED Inhale 1 Puff every day. Rinse mouth after use. 3 Each 3   • albuterol 108 (90 Base) MCG/ACT Aero Soln inhalation aerosol INHALE 2 PUFFS BY MOUTH EVERY 4 HOURS AS NEEDED FOR SHORTNESS OF BREATH 1 Each 11   • furosemide (LASIX) 20 MG Tab TAKE 1 TABLET BY MOUTH EVERY DAY IN THE MORNING (Patient taking differently: Take 20 mg by mouth. Every other day) 90 tablet 1   • KLOR-CON 10 10 MEQ tablet TAKE 1 TABLET BY MOUTH EVERY DAY (Patient taking differently: Take 10 mEq by mouth. Every other day with lasix) 90 tablet 1   • zinc sulfate (ZINCATE) 220 (50 Zn) MG Cap Take 220 mg by mouth every day.     • CALCIUM CARBONATE-VITAMIN D PO Take  by mouth.     • alendronate (FOSAMAX) 70 MG Tab Take 70 mg by mouth every 7 days.     • metronidazole (METROCREAM) 0.75 % cream APPLY TO FACE EVERYDAY ONCE TO TWICE A DAY FOR ROSACEA     • flecainide (TAMBOCOR) 150 MG Tab Take 1 Tab by mouth 2 times a day. 180 Tab 3   • metoprolol SR (TOPROL XL) 25 MG TABLET SR 24 HR Take 1 Tab by mouth every day. 90 Tab 3   • methimazole (TAPAZOLE) 5 MG Tab Take 0.5 Tabs by mouth every day. 90 Tab 1   • levETIRAcetam (KEPPRA) 500 MG Tab Take 1 Tab by mouth 2 Times a Day. 180 Tab 3     No current facility-administered medications for this visit.         Physical Exam:   Gen:           Alert and oriented, No apparent distress. Mood and affect appropriate, normal interaction with " examiner.  Eyes:          PERRL, EOM intact, sclere white, conjunctive moist.  Ears:          Not examined. No lesions or deformities.  Hearing:     Grossly intact.  Nose:          Normal, no lesions or deformities.  Dentition:    Good dentition.  Oropharynx:   Tongue normal, posterior pharynx without erythema or exudate.  Neck:        Supple, trachea midline, no masses.  Respiratory Effort: No intercostal retractions or use of accessory muscles.   Lung Auscultation:      Clear to auscultation bilaterally; no rales, rhonchi or wheezing.  CV:            Regular rate and rhythm. No murmurs, rubs or gallops.  Abd:           Not examined. Soft non tender, non distended. Normal active bowel sounds. No masses.  Lymphadenopathy: No palpable nodes or edema.  Gait and Station: Normal.  Digits and Nails: No clubbing, cyanosis, petechiae, or nodes.   Cranial Nerves: II-XII grossly intact.  Skin:        No rashes, lesions or ulcers noted.               Ext:           No cyanosis or edema.      Assessment:  1. History of COVID-19  Exercise Test for Bronchospasm / 6-Minute Walk    Multiple Oximetry   2. On home O2  Exercise Test for Bronchospasm / 6-Minute Walk    Multiple Oximetry   3. Acute on chronic respiratory failure with hypoxia (HCC)  Fluticasone Furoate-Vilanterol (BREO ELLIPTA) 100-25 MCG/INH AEROSOL POWDER, BREATH ACTIVATED    Exercise Test for Bronchospasm / 6-Minute Walk    Multiple Oximetry   4. VITO (obstructive sleep apnea)  DME Mask and Supplies   5. Malignant neoplasm of lung, unspecified laterality, unspecified part of lung (HCC)     6. Lung mass         Immunizations:    Flu:8/28/2020  Pneumovax 23:10/24/2019  Prevnar 13:10/6/2016  COVID-19: 3/31/2021    Plan:  1.  Patient is still requiring at least 1 L per nasal cannula of home O2.  She also uses nighttime oxygen bleed in with BiPAP machine.  She was ambulated with a multiox, however she did not meet criteria to discontinue daytime oxygen.  We will see her  back in 1 month for PFTs and 6-minute walk test, and update accordingly.  2.  See above.  3. See above.  4.  Reordered mask and supplies will follow up for VITO in 1 year.  5.  Stable.  6.  Stable  7.  Follow-up all healthcare concerns with appropriate care providers.    Please note that this dictation was created using voice recognition software. I have made every reasonable attempt to correct obvious errors, but it is possible there are errors of grammar and possibly content that I did not discover before finalizing the note.

## 2021-04-19 NOTE — PATIENT INSTRUCTIONS
1. Breathing exercises to help with improved oxygenation (pusred lip breathing and diaphragmatic breathing.   2. Follow-up for 6 minute walk test and PFTs in 1 month  3. Follow up in one year for sleep apnea.  COVID-19  COVID-19 is a respiratory infection that is caused by a virus called severe acute respiratory syndrome coronavirus 2 (SARS-CoV-2). The disease is also known as coronavirus disease or novel coronavirus. In some people, the virus may not cause any symptoms. In others, it may cause a serious infection. The infection can get worse quickly and can lead to complications, such as:  · Pneumonia, or infection of the lungs.  · Acute respiratory distress syndrome or ARDS. This is fluid build-up in the lungs.  · Acute respiratory failure. This is a condition in which there is not enough oxygen passing from the lungs to the body.  · Sepsis or septic shock. This is a serious bodily reaction to an infection.  · Blood clotting problems.  · Secondary infections due to bacteria or fungus.  The virus that causes COVID-19 is contagious. This means that it can spread from person to person through droplets from coughs and sneezes (respiratory secretions).  What are the causes?  This illness is caused by a virus. You may catch the virus by:  · Breathing in droplets from an infected person's cough or sneeze.  · Touching something, like a table or a doorknob, that was exposed to the virus (contaminated) and then touching your mouth, nose, or eyes.  What increases the risk?  Risk for infection  You are more likely to be infected with this virus if you:  · Live in or travel to an area with a COVID-19 outbreak.  · Come in contact with a sick person who recently traveled to an area with a COVID-19 outbreak.  · Provide care for or live with a person who is infected with COVID-19.  Risk for serious illness  You are more likely to become seriously ill from the virus if you:  · Are 65 years of age or older.  · Have a long-term  disease that lowers your body's ability to fight infection (immunocompromised).  · Live in a nursing home or long-term care facility.  · Have a long-term (chronic) disease such as:  ? Chronic lung disease, including chronic obstructive pulmonary disease or asthma  ? Heart disease.  ? Diabetes.  ? Chronic kidney disease.  ? Liver disease.  · Are obese.  What are the signs or symptoms?  Symptoms of this condition can range from mild to severe. Symptoms may appear any time from 2 to 14 days after being exposed to the virus. They include:  · A fever.  · A cough.  · Difficulty breathing.  · Chills.  · Muscle pains.  · A sore throat.  · Loss of taste or smell.  Some people may also have stomach problems, such as nausea, vomiting, or diarrhea.  Other people may not have any symptoms of COVID-19.  How is this diagnosed?  This condition may be diagnosed based on:  · Your signs and symptoms, especially if:  ? You live in an area with a COVID-19 outbreak.  ? You recently traveled to or from an area where the virus is common.  ? You provide care for or live with a person who was diagnosed with COVID-19.  · A physical exam.  · Lab tests, which may include:  ? A nasal swab to take a sample of fluid from your nose.  ? A throat swab to take a sample of fluid from your throat.  ? A sample of mucus from your lungs (sputum).  ? Blood tests.  · Imaging tests, which may include, X-rays, CT scan, or ultrasound.  How is this treated?  At present, there is no medicine to treat COVID-19. Medicines that treat other diseases are being used on a trial basis to see if they are effective against COVID-19.  Your health care provider will talk with you about ways to treat your symptoms. For most people, the infection is mild and can be managed at home with rest, fluids, and over-the-counter medicines.  Treatment for a serious infection usually takes places in a hospital intensive care unit (ICU). It may include one or more of the following  treatments. These treatments are given until your symptoms improve.  · Receiving fluids and medicines through an IV.  · Supplemental oxygen. Extra oxygen is given through a tube in the nose, a face mask, or a castrejon.  · Positioning you to lie on your stomach (prone position). This makes it easier for oxygen to get into the lungs.  · Continuous positive airway pressure (CPAP) or bi-level positive airway pressure (BPAP) machine. This treatment uses mild air pressure to keep the airways open. A tube that is connected to a motor delivers oxygen to the body.  · Ventilator. This treatment moves air into and out of the lungs by using a tube that is placed in your windpipe.  · Tracheostomy. This is a procedure to create a hole in the neck so that a breathing tube can be inserted.  · Extracorporeal membrane oxygenation (ECMO). This procedure gives the lungs a chance to recover by taking over the functions of the heart and lungs. It supplies oxygen to the body and removes carbon dioxide.  Follow these instructions at home:  Lifestyle  · If you are sick, stay home except to get medical care. Your health care provider will tell you how long to stay home. Call your health care provider before you go for medical care.  · Rest at home as told by your health care provider.  · Do not use any products that contain nicotine or tobacco, such as cigarettes, e-cigarettes, and chewing tobacco. If you need help quitting, ask your health care provider.  · Return to your normal activities as told by your health care provider. Ask your health care provider what activities are safe for you.  General instructions  · Take over-the-counter and prescription medicines only as told by your health care provider.  · Drink enough fluid to keep your urine pale yellow.  · Keep all follow-up visits as told by your health care provider. This is important.  How is this prevented?    There is no vaccine to help prevent COVID-19 infection. However, there are  steps you can take to protect yourself and others from this virus.  To protect yourself:   · Do not travel to areas where COVID-19 is a risk. The areas where COVID-19 is reported change often. To identify high-risk areas and travel restrictions, check the [facility] travel website: wwwnc.cdc.gov/travel/notices  · If you live in, or must travel to, an area where COVID-19 is a risk, take precautions to avoid infection.  ? Stay away from people who are sick.  ? Wash your hands often with soap and water for 20 seconds. If soap and water are not available, use an alcohol-based hand .  ? Avoid touching your mouth, face, eyes, or nose.  ? Avoid going out in public, follow guidance from your state and local health authorities.  ? If you must go out in public, wear a cloth face covering or face mask.  ? Disinfect objects and surfaces that are frequently touched every day. This may include:  § Counters and tables.  § Doorknobs and light switches.  § Sinks and faucets.  § Electronics, such as phones, remote controls, keyboards, computers, and tablets.  To protect others:  If you have symptoms of COVID-19, take steps to prevent the virus from spreading to others.  · If you think you have a COVID-19 infection, contact your health care provider right away. Tell your health care team that you think you may have a COVID-19 infection.  · Stay home. Leave your house only to seek medical care. Do not use public transport.  · Do not travel while you are sick.  · Wash your hands often with soap and water for 20 seconds. If soap and water are not available, use alcohol-based hand .  · Stay away from other members of your household. Let healthy household members care for children and pets, if possible. If you have to care for children or pets, wash your hands often and wear a mask. If possible, stay in your own room, separate from others. Use a different bathroom.  · Make sure that all people in your household wash their hands  well and often.  · Cough or sneeze into a tissue or your sleeve or elbow. Do not cough or sneeze into your hand or into the air.  · Wear a cloth face covering or face mask.  Where to find more information  · Centers for Disease Control and Prevention: www.cdc.gov/coronavirus/2019-ncov/index.html  · World Health Organization: www.who.int/health-topics/coronavirus  Contact a health care provider if:  · You live in or have traveled to an area where COVID-19 is a risk and you have symptoms of the infection.  · You have had contact with someone who has COVID-19 and you have symptoms of the infection.  Get help right away if:  · You have trouble breathing.  · You have pain or pressure in your chest.  · You have confusion.  · You have bluish lips and fingernails.  · You have difficulty waking from sleep.  · You have symptoms that get worse.  These symptoms may represent a serious problem that is an emergency. Do not wait to see if the symptoms will go away. Get medical help right away. Call your local emergency services (911 in the [place] Do not drive yourself to the hospital. Let the emergency medical personnel know if you think you have COVID-19.  Summary  · COVID-19 is a respiratory infection that is caused by a virus. It is also known as coronavirus disease or novel coronavirus. It can cause serious infections, such as pneumonia, acute respiratory distress syndrome, acute respiratory failure, or sepsis.  · The virus that causes COVID-19 is contagious. This means that it can spread from person to person through droplets from coughs and sneezes.  · You are more likely to develop a serious illness if you are 65 years of age or older, have a weak immunity, live in a nursing home, or have chronic disease.  · There is no medicine to treat COVID-19. Your health care provider will talk with you about ways to treat your symptoms.  · Take steps to protect yourself and others from infection. Wash your hands often and disinfect  objects and surfaces that are frequently touched every day. Stay away from people who are sick and wear a mask if you are sick.  This information is not intended to replace advice given to you by your health care provider. Make sure you discuss any questions you have with your health care provider.  Document Released: 01/23/2020 Document Revised: 05/14/2020 Document Reviewed: 01/23/2020  [employer] Patient Education © 2020 [employer]    Chronic Respiratory Failure    Respiratory failure is a condition in which the lungs do not work well and the breathing (respiratory) system fails. When respiratory failure occurs, it becomes difficult for the lungs to get enough oxygen or to eliminate carbon dioxide or to do both duties. If the lungs do not work properly, the heart, brain, and other body systems do not get enough oxygen. Respiratory failure is life-threatening if it is not treated.  Respiratory failure can be acute or chronic. Acute respiratory failure is sudden and severe and requires emergency medical treatment. Chronic respiratory failure happens over time, usually due to a medical condition that gets worse.  What are the causes?  This condition may be caused by any problem that affects the heart or lungs. Causes include:  · Chronic bronchitis and emphysema (COPD).  · Pulmonary fibrosis.  · Water in the lungs due to heart failure, lung injury, or infection (pulmonary edema).  · Asthma.  · Nerve or muscle diseases that make chest movements difficult, such as Fanta Gehrig disease or Guillain-Innis syndrome.  · A collapsed lung (pneumothorax).  · Pulmonary hypertension.  · Chronic sleep apnea.  · Pneumonia.  · Obesity.  · A blood clot in a lung (pulmonary embolism).  · Trauma to the chest that makes breathing difficult.  What increases the risk?  You are more likely to develop this condition if:  · You are a smoker, or have a history of smoking.  · You have a weak immune system.  · You have a family history of  breathing problems or lung disease.  · You have a long term lung disease such as COPD.  What are the signs or symptoms?  Symptoms of this condition include:  · Shortness of breath with or without activity.  · Difficulty breathing.  · Wheezing.  · A fast or irregular heartbeat (arrhythmia).  · Chest pain or tightness.  · A bluish color to the fingernail or toenail beds (cyanosis).  · Confusion.  · Drowsiness.  · Extreme fatigue, especially with minimal activity.  How is this diagnosed?  This condition may be diagnosed based on:  · Your medical history.  · A physical exam.  · Other tests, such as:  ? A chest X-ray.  ? A CT scan of your lungs.  ? Blood tests, such as an arterial blood gas test. This test is done to check if you have enough oxygen in your blood.  ? An electrocardiogram. This test records the electrical activity of your heart.  ? An echocardiogram. This test uses sound waves to produce an image of your heart.  · A check of your blood pressure, heart rate, breathing rate, and blood oxygen level.  How is this treated?  Treatment for this condition depends on the cause. Treatment can include the following:  · Getting oxygen through a nasal cannula. This is a tube that goes in your nose.  · Getting oxygen through a face mask.  · Receiving noninvasive positive pressure ventilation. This is a method of breathing support in which a machine blows air into your lungs through a mask. The machine allows you to breathe on your own. It helps the body take in oxygen and eliminate carbon dioxide.  · Using a ventilator. This is a breathing machine that delivers oxygen to the lungs through a breathing tube that is put into the trachea. This machine is used when you can no longer breathe well enough on your own.  · Medicines to help with breathing, such as:  ? Medicines that open up and relax air passages, such as bronchodilators. These may be given through a device that turns liquid medicines into a mist you can breathe  in (nebulizer). These medicines help with breathing.  ? Diuretics. These medicines get rid of extra fluid out of your lungs, which can help you breathe better.  ? Steroid medicines. These decrease inflammation in the lungs.  ? Antibiotic medicines. These may be given to treat a bacterial infection, such as pneumonia.  · Pulmonary rehabilitation. This is an exercise program that strengthens the muscles in your chest and helps you learn breathing techniques in order to manage your condition.  Follow these instructions at home:  Medicines  · Take over-the-counter and prescription medicines only as told by your health care provider.  · If you were prescribed an antibiotic medicine, take it as told by your health care provider. Do not stop taking the antibiotic even if you start to feel better.  General instructions  · Use oxygen therapy and pulmonary rehabilitation if directed to by your health care provider. If you require home oxygen therapy, ask your health care provider whether you should purchase a pulse oximeter to measure your oxygen level at home.  · Work with your health care provider to create a plan to help you deal with your condition. Follow this plan.  · Do not use any products that contain nicotine or tobacco, such as cigarettes and e-cigarettes. If you need help quitting, ask your health care provider.  · Avoid exposure to irritants that make your breathing problems worse. These include smoke, chemicals, and fumes.  · Stay active, but balance activity with periods of rest. Exercise and physical activity will help you maintain your ability to do things you want to do.  · Stay up to date on all vaccines, especially yearly influenza and pneumonia vaccines.  · Avoid people who are sick as well as crowded places during the flu season.  · Keep all follow-up visits as told by your health care provider. This is important.  Contact a health care provider if:  · Your shortness of breath gets worse and you cannot  do the things you used to do.  · You have increased mucus (sputum), wheezing, coughing, or loss of energy.  · You are on oxygen therapy and you are starting to need more.  · You need to use your medicines more often.  · You have a fever.  Get help right away if:  · Your shortness of breath becomes worse.  · You are unable to say more than a few words without having to catch your breath.  · You develop chest pain or tightness.  Summary  · Respiratory failure is a condition in which the lungs do not work well and the breathing system fails.  · This condition can be very serious and is often life-threatening.  · This condition is diagnosed with tests and can be treated with medicines or oxygen.  · Contact a health care provider if your shortness of breath gets worse or if you need to use your oxygen or medicines more often than before.  This information is not intended to replace advice given to you by your health care provider. Make sure you discuss any questions you have with your health care provider.  Document Released: 12/18/2006 Document Revised: 11/30/2018 Document Reviewed: 12/29/2017  [employer] Patient Education © 2020 [employer]

## 2021-04-23 ENCOUNTER — SUPERVISING PHYSICIAN REVIEW (OUTPATIENT)
Dept: SLEEP MEDICINE | Facility: MEDICAL CENTER | Age: 81
End: 2021-04-23

## 2021-04-23 ENCOUNTER — IMMUNIZATION (OUTPATIENT)
Dept: FAMILY PLANNING/WOMEN'S HEALTH CLINIC | Facility: IMMUNIZATION CENTER | Age: 81
End: 2021-04-23
Attending: INTERNAL MEDICINE
Payer: MEDICARE

## 2021-04-23 DIAGNOSIS — Z23 ENCOUNTER FOR VACCINATION: Primary | ICD-10-CM

## 2021-04-23 PROCEDURE — 0002A PFIZER SARS-COV-2 VACCINE: CPT

## 2021-04-23 PROCEDURE — 91300 PFIZER SARS-COV-2 VACCINE: CPT

## 2021-04-23 NOTE — PROGRESS NOTES
I am the supervising MD for NICKY, Jose Nolan. I have reviewed the clinical data and discussed the assessment and plan from office visit 4/19/2021.  I agree with current plan as outlined in his office note.

## 2021-04-27 ENCOUNTER — OFFICE VISIT (OUTPATIENT)
Dept: ENDOCRINOLOGY | Facility: MEDICAL CENTER | Age: 81
End: 2021-04-27
Attending: INTERNAL MEDICINE
Payer: MEDICARE

## 2021-04-27 VITALS
HEART RATE: 71 BPM | HEIGHT: 61 IN | WEIGHT: 143.5 LBS | SYSTOLIC BLOOD PRESSURE: 112 MMHG | DIASTOLIC BLOOD PRESSURE: 58 MMHG | BODY MASS INDEX: 27.09 KG/M2 | OXYGEN SATURATION: 96 %

## 2021-04-27 DIAGNOSIS — E05.90 HYPERTHYROIDISM: ICD-10-CM

## 2021-04-27 DIAGNOSIS — E55.9 VITAMIN D DEFICIENCY: ICD-10-CM

## 2021-04-27 DIAGNOSIS — E04.2 MULTINODULAR GOITER: ICD-10-CM

## 2021-04-27 DIAGNOSIS — M81.0 OSTEOPOROSIS, UNSPECIFIED OSTEOPOROSIS TYPE, UNSPECIFIED PATHOLOGICAL FRACTURE PRESENCE: ICD-10-CM

## 2021-04-27 PROCEDURE — 99211 OFF/OP EST MAY X REQ PHY/QHP: CPT | Performed by: INTERNAL MEDICINE

## 2021-04-27 PROCEDURE — 99214 OFFICE O/P EST MOD 30 MIN: CPT | Performed by: INTERNAL MEDICINE

## 2021-04-27 RX ORDER — ERGOCALCIFEROL 1.25 MG/1
50000 CAPSULE ORAL
Qty: 12 CAPSULE | Refills: 3 | Status: SHIPPED | OUTPATIENT
Start: 2021-04-27 | End: 2021-11-02 | Stop reason: SDUPTHER

## 2021-04-27 RX ORDER — ALENDRONATE SODIUM 70 MG/1
70 TABLET ORAL
Qty: 12 TABLET | Refills: 3 | Status: SHIPPED | OUTPATIENT
Start: 2021-04-27 | End: 2021-11-02 | Stop reason: SDUPTHER

## 2021-04-27 RX ORDER — METHIMAZOLE 5 MG/1
2.5 TABLET ORAL DAILY
Qty: 90 TABLET | Refills: 1 | Status: SHIPPED | OUTPATIENT
Start: 2021-04-27 | End: 2021-11-02 | Stop reason: SDUPTHER

## 2021-04-27 ASSESSMENT — FIBROSIS 4 INDEX: FIB4 SCORE: 1.4

## 2021-04-27 NOTE — PROGRESS NOTES
Chief Complaint: Follow up for Hyperthyroidism secondary to Grave's disease, history of multinodular goiter, history of osteoporosis.      HPI:     Ml Chavira is a 80 y.o. female here for follow up of the above medical issues.    She has a history of hyperthyroidism secondary to probable Graves' disease based on high normal uptake on her thyroid uptake and scan from 2018 and also from elevated thyrotropin receptor antibodies.  There is also possibility that she has autonomous thyroid nodules.      She remains on Methimazole 2.5 mg every other daily which has been her dose for the past 12 months.   She reports excellent compliance and denies missing any daily doses.       She feels good otherwise she is concerned about progressive weight gain  But she denies symptoms of thyrotoxicosis such as palpitations tremors and insomnia, and unexplained weight loss    Her most recent TSH is normal at 0.430 with a free T4 1.02 and free T3 of 3.21 on April 15, 2021      She previously had a formal thyroid ultrasound on July 2019 which showed:   hypoechoic solid nodule 2.1 cm in the right lower lobe TR 5   which was biopsied and proven benign on August 22, 2019    1.7 cm hypoechoic solid nodule on the right lower lobe TR 5   which was biopsied and proven benign on August 22, 2019    1.9 cm hypoechoic solid nodule on the left mid lobe TR 6   which was biopsied and proven benign on August 22, 2019      She is overdue for a thyroid ultrasound          She also has osteoporosis.   Her DEXA on 10/16/2020 showed the lowest T score of -2.4 for the left hip which is better compared to the DEXA in 2018 showing significant improvement in bone mineral density for the lumbar spine and hip      She denies interval falls and fractures.  She has been taking alendronate 70 mg weekly for the past 3-4 years and has tolerated the medication well.   She reports that she ran out of medication last month  She takes calcium 1200mg daily with  Vitamin D3 5000u daily    Her most recent calcium is normal at 9.6 with a serum creatinine 0.69 vitamin D is low at 29 and PTH is normal 32 on April 15, 2021        Patient's medications, allergies, and social histories were reviewed and updated as appropriate.      ROS:     CONS:     No fever, no chills   EYES:     No diplopia, no blurry vision   CV:           No chest pain, no palpitations   PULM:     No SOB, no cough, no hemoptysis.   GI:            No nausea, no vomiting, no diarrhea, no constipation   ENDO:     No polyuria, no polydipsia, no heat intolerance, no cold intolerance       Past Medical History:  Problem List:  2021-04: On home O2  2021-01: History of COVID-19  2020-12: Lung cancer (HCC)  2020-12: Hypokalemia  2020-12: Paroxysmal atrial fibrillation ([facility])  2020-12: Seizure ([facility])  2020-12: Macrocytosis  2020-12: Leukopenia  2020-12: Acute respiratory failure with hypoxia ([facility])  2020-12: Pneumonia due to COVID-19 virus  2020-02: Abnormal CBC measurement  2019-12: At risk for falling  2019-12: Use of cane as ambulatory aid  2019-12: Lower extremity edema  2019-08: High risk medication use  2019-08: Multinodular goiter  2019-08: Vitamin D deficiency  2019-01: Osteoporosis  2019-01: Need for vaccination  2019-01: Hyperthyroidism  2019-01: Chronic atrial fibrillation ([facility])  2018-08: Postmenopausal  2018-08: Low TSH level  2018-07: High serum thyroid stimulating hormone (TSH)  2018-07: Healthcare maintenance  2018-07: Screening for osteoporosis  2018-07: Thyroid nodule  2018-07: Low vitamin D level  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  2018-06: Arthralgia of both knees  2018-06: Finger pain, right  2018-06: Balance disorder  2018-06: Acute cystitis  2018-05: Acute alcoholic gastritis without hemorrhage  2018-05: Pleuritic chest pain  2018-05: Sick sinus syndrome (HCC)  2018-04: Radionecrosis  2018-03: Malignant neoplasm of upper lobe of right lung ([facility])  2017-11: CVA (cerebral vascular accident) ([facility])  2016-01:  "Allergic rhinitis  2016: Hilar adenopathy  2016: History of breast cancer  2015: Lung mass  2015: Blurry vision, left eye  2015: Metastasis to brain (HCC)  2015: Metastatic cancer (CMS-HCC)      Past Surgical History:  Past Surgical History:   Procedure Laterality Date   • CRANIOTOMY STEALTH Right 2015    Procedure: CRANIOTOMY STEALTH-right occipital;  Surgeon: Suyapa Schumacher M.D.;  Location: SURGERY [facility];  Service:    • APPENDECTOMY     • CRANIOTOMY     • KNEE ARTHROSCOPY      left    • LUMPECTOMY     • OTHER      left knee surgery   • PACEMAKER INSERTION     • TONSILLECTOMY          Allergies:  Penicillins     Social History:  Social History     Tobacco Use   • Smoking status: Former Smoker     Packs/day: 2.00     Years: 20.00     Pack years: 40.00     Types: Cigarettes     Quit date:      Years since quittin.3   • Smokeless tobacco: Never Used   Substance Use Topics   • Alcohol use: Yes     Alcohol/week: 1.8 oz     Types: 3 Glasses of wine per week     Comment: occasionally    • Drug use: No        Family History:   family history includes Arthritis in her father and mother; Autoimmune Disease in her father; Cancer in her brother; Dementia in her mother; Diabetes in her mother; Other in her mother.      PHYSICAL EXAM:   Vital signs: /58 (BP Location: Right arm, Patient Position: Sitting, BP Cuff Size: Adult)   Pulse 71   Ht 1.549 m (5' 1\")   Wt 65.1 kg (143 lb 8 oz)   SpO2 96% Comment: 2L O2  BMI 27.11 kg/m²   GENERAL: Well-developed, well-nourished in no apparent distress.   EYE:  No ocular asymmetry, PERRLA, No exophthalmos or lid lag  HENT: Pink, moist mucous membranes.    NECK: Thyroid is slightly enlarged and feels bosselated  CARDIOVASCULAR:  No murmurs  LUNGS: Clear breath sounds  ABDOMEN: Soft, nontender   EXTREMITIES: No clubbing, cyanosis, or edema.   NEUROLOGICAL: No gross focal motor abnormalities, No visible tremors with both hands  LYMPH: No " cervical adenopathy palpated.   SKIN: No rashes, lesions.     Labs:  Lab Results   Component Value Date/Time    WBC 7.4 03/22/2021 12:05 PM    RBC 3.86 (L) 03/22/2021 12:05 PM    HEMOGLOBIN 12.9 03/22/2021 12:05 PM    .7 (H) 03/22/2021 12:05 PM    MCH 33.4 (H) 03/22/2021 12:05 PM    MCHC 31.3 (L) 03/22/2021 12:05 PM    RDW 54.2 (H) 03/22/2021 12:05 PM    MPV 9.3 03/22/2021 12:05 PM       Lab Results   Component Value Date/Time    SODIUM 140 04/15/2021 11:16 AM    POTASSIUM 4.1 04/15/2021 11:16 AM    CHLORIDE 104 04/15/2021 11:16 AM    CO2 28 04/15/2021 11:16 AM    ANION 8.0 04/15/2021 11:16 AM    GLUCOSE 74 04/15/2021 11:16 AM    BUN 20 04/15/2021 11:16 AM    CREATININE 0.69 04/15/2021 11:16 AM    CALCIUM 9.6 04/15/2021 11:16 AM    ASTSGOT 21 04/15/2021 11:16 AM    ALTSGPT 28 04/15/2021 11:16 AM    TBILIRUBIN 0.3 04/15/2021 11:16 AM    ALBUMIN 4.4 04/15/2021 11:16 AM    TOTPROTEIN 7.0 04/15/2021 11:16 AM    GLOBULIN 2.6 04/15/2021 11:16 AM    AGRATIO 1.7 04/15/2021 11:16 AM       Lab Results   Component Value Date/Time    TSHULTRASEN 0.540 01/22/2020 1402     Lab Results   Component Value Date/Time    FREET4 0.97 01/22/2020 1402     Lab Results   Component Value Date/Time    FREET3 3.71 01/22/2020 1402     No results found for: THYSTIMIG      Imaging: see DEXA date 9/2018, see thyroid uptake and scan 10/2018      ASSESSMENT/PLAN:     1. Hyperthyroidism  Well-controlled  Continue methimazole 2.5 mg EVERY OTHER DAY  Reviewed importance of adherence  We will plan for follow-up in 6 months with repeat of TSH, free T4 and free T3 levels      2. Multinodular goiter  Stable  Multiple benign dominant solid nodules measuring more than 1.5 cm with TR 5 and TR 6 scores noted  Biopsies are benign  Recommend observation  I am scheduling her for repeat thyroid ultrasound      3. Osteoporosis, unspecified osteoporosis type, unspecified pathological fracture presence  Stable  Last bone density showed improvement with no  evidence of bone loss  Continue alendronate 70 mg weekly I reordered her medication  Reviewed importance of adherence  Advised patient to ensure adequate intake of calcium and vitamin D from diet  Recommend daily weightbearing exercise  Her bone density should be repeated October 2022      4. Vitamin D deficiency  Uncontrolled  Start ergocalciferol 50,000 units weekly  Recommend that she take over-the-counter vitamin D3 5000 units daily  Recommend that she take calcium 600 mg once or twice daily  We will repeat calcium and 25-hydroxy vitamin D levels in 6 months      5. High risk medication use  Patient is taking methimazole which is high risk medication      Return in about 6 months (around 10/27/2021).      Thank you kindly for allowing me to participate in the thyroid care plan for this patient.    Abhijit Joseph MD, FACE, ECNU  01/28/20    CC:   LORRIE FryPMickiRRAKESH

## 2021-05-02 NOTE — H&P
Hospital Medicine History & Physical Note    Date of Service  12/20/2020    Primary Care Physician  SONIA Fry    Consultants  None    Code Status  Full Code    Chief Complaint  Chief Complaint   Patient presents with   • Shortness of Breath       History of Presenting Illness  80 y.o. female who presented 12/20/2020 with complaints of agnosia, ageusia, cough without production, fevers, ZAMARRIPA presents to ED for the above symptoms. Patient states he has a long smoking history and quit several years ago however wears nocturnal O2. She states that she has noticed she has been more short of breath the past few days and with her current symptoms decided to seek medical attention. She was noted to have SpO2 in 70's on R/A requiring continuous O2 supplementation. She states she is compliant with all of her other home medication regimen. She states she did have a few episodes of diarrhea earlier in the week which have since been resolved.     In the ED, patient had the following vitals on presentation: 101.5, 67, 26, 125/60, 94% 4L NC.     CBC was performed and revealed leukopenia at 3.7, macrocytosis of 101.3, lymphocytopenia at 12.1% otherwise relatively unremarkable. Lactic acid was measured at 1.2 and Pt 15.1 and INR 1.15. Trop T negative and Pro  on admission. COVID testing performed and resulted positive.     CXR was performed and revealed mild hazy peripheral bilateral pulmonary opacities.     Patient was given 10mg IV Decadron x 1 in ED. She is in agreement for inpatient hospital admission for hypoxic respiratory failure 2.2 covid PNA. She wishes to be full code at this time and will be optimized for medical management prior to transfer to medical mariscal floor/ACS.     Review of Systems  Review of Systems   Constitutional: Positive for fever and malaise/fatigue. Negative for chills.   HENT: Negative for congestion and sore throat.         Admits to agnosia and ageusia    Eyes: Negative for blurred  "vision and double vision.   Respiratory: Positive for cough and shortness of breath. Negative for wheezing.    Cardiovascular: Positive for palpitations. Negative for chest pain.   Gastrointestinal: Positive for diarrhea. Negative for abdominal pain, blood in stool, constipation, heartburn, melena, nausea and vomiting.   Genitourinary: Negative for dysuria and frequency.   Musculoskeletal: Negative for back pain and neck pain.   Skin: Negative for itching and rash.   Neurological: Positive for headaches. Negative for focal weakness, loss of consciousness and weakness.   Endo/Heme/Allergies: Negative for environmental allergies. Does not bruise/bleed easily.   Psychiatric/Behavioral: Negative for depression. The patient does not have insomnia.        Past Medical History   has a past medical history of Breast cancer (HCC), Cancer (HCC), Chickenpox, [place] measles, Healthcare maintenance (7/23/2018), Influenza, Joint pain, Lung cancer (HCC), Mumps, Need for vaccination (1/17/2019), Radionecrosis (4/14/2018), Sick sinus syndrome (HCC), Thyroid disease, and Tonsillitis.    Surgical History   has a past surgical history that includes craniotomy stealth (Right, 11/23/2015); other; appendectomy; knee arthroscopy; craniotomy; tonsillectomy; pacemaker insertion; and lumpectomy.     Family History  family history includes Arthritis in her father and mother; Autoimmune Disease in her father; Cancer in her brother; Dementia in her mother; Diabetes in her mother; Other in her mother.     Social History   reports that she quit smoking about 40 years ago. Her smoking use included cigarettes. She has a 40.00 pack-year smoking history. She has never used smokeless tobacco. She reports current alcohol use of about 1.8 oz of alcohol per week. She reports that she does not use drugs.    Allergies  Allergies   Allergen Reactions   • Penicillins Rash and Itching     RXN \"a long time ago\"  Other reaction(s): Rash   • Atorvastatin      " Causes low quality       Medications  Prior to Admission Medications   Prescriptions Last Dose Informant Patient Reported? Taking?   KLOR-CON 10 10 MEQ tablet 2020 at 0930 Patient No No   Sig: TAKE 1 TABLET BY MOUTH EVERY DAY   Misc. Devices ([employer] PULSE OXIMETER) Misc Not Taking at Unknown time Patient No No   Si Each by Does not apply route 4 times a day as needed.   Patient not taking: Reported on 2020   albuterol 108 (90 Base) MCG/ACT Aero Soln inhalation aerosol 2020 at 1100 Patient No No   Sig: INHALE 2 PUFFS BY MOUTH EVERY 4 HOURS AS NEEDED FOR SHORTNESS OF BREATH   alendronate (FOSAMAX) 70 MG Tab 2020 at 0930 Patient No No   Sig: Take 1 Tab by mouth every 7 days.   Patient taking differently: Take 70 mg by mouth every Monday.   flecainide (TAMBOCOR) 150 MG Tab 2020 at 1900 Patient No No   Sig: TAKE 1 TABLET BY MOUTH EVERY 12 HOURS   furosemide (LASIX) 20 MG Tab 2020 at 0930 Patient No No   Sig: TAKE 1 TABLET BY MOUTH EVERY DAY IN THE MORNING   levETIRAcetam (KEPPRA) 500 MG Tab 2020 at 1900 Patient No No   Sig: Take 1 Tab by mouth 2 Times a Day.   methimazole (TAPAZOLE) 5 MG Tab 2020 at 0930 Patient No No   Sig: Take 0.5 Tabs by mouth every day.   metoprolol SR (TOPROL XL) 25 MG TABLET SR 24 HR 2020 at 0930 Patient Yes No   Sig: Take 25 mg by mouth every day.      Facility-Administered Medications: None       Physical Exam  Temp:  [38.6 °C (101.5 °F)] 38.6 °C (101.5 °F)  Pulse:  [67] 67  Resp:  [26] 26  BP: (125)/(60) 125/60  SpO2:  [94 %] 94 %    Physical Exam  Constitutional:       General: She is not in acute distress.     Appearance: Normal appearance. She is not ill-appearing, toxic-appearing or diaphoretic.   HENT:      Head: Normocephalic and atraumatic.      Mouth/Throat:      Mouth: Mucous membranes are moist.      Pharynx: Oropharynx is clear. No posterior oropharyngeal erythema.   Eyes:      General: No scleral icterus.     Extraocular  Movements: Extraocular movements intact.      Conjunctiva/sclera: Conjunctivae normal.      Pupils: Pupils are equal, round, and reactive to light.   Neck:      Musculoskeletal: Normal range of motion and neck supple.      Vascular: No carotid bruit.   Cardiovascular:      Rate and Rhythm: Normal rate and regular rhythm.      Pulses: Normal pulses.      Heart sounds: Normal heart sounds. No murmur. No friction rub. No gallop.    Pulmonary:      Effort: Pulmonary effort is normal.      Breath sounds: Wheezing and rales present. No rhonchi.   Abdominal:      General: Abdomen is flat. Bowel sounds are normal. There is no distension.      Palpations: There is no mass.      Tenderness: There is no abdominal tenderness. There is no rebound.   Musculoskeletal: Normal range of motion.         General: No swelling.      Right lower leg: No edema.      Left lower leg: No edema.   Lymphadenopathy:      Cervical: No cervical adenopathy.   Skin:     General: Skin is warm and dry.      Capillary Refill: Capillary refill takes less than 2 seconds.      Findings: No erythema or rash.   Neurological:      General: No focal deficit present.      Mental Status: She is alert and oriented to person, place, and time. Mental status is at baseline.      Cranial Nerves: No cranial nerve deficit.      Sensory: No sensory deficit.      Motor: No weakness.   Psychiatric:         Mood and Affect: Mood normal.         Behavior: Behavior normal.         Thought Content: Thought content normal.         Judgment: Judgment normal.         Laboratory:  Recent Labs     12/20/20 2200   WBC 3.7*   RBC 3.81*   HEMOGLOBIN 12.6   HEMATOCRIT 38.6   .3*   MCH 33.1*   MCHC 32.6*   RDW 50.2*   PLATELETCT 180   MPV 9.4     Recent Labs     12/20/20 2200   SODIUM 131*   POTASSIUM 3.5*   CHLORIDE 100   CO2 24   GLUCOSE 105*   BUN 14   CREATININE 0.64   CALCIUM 8.3*     Recent Labs     12/20/20  2200   ALTSGPT 39   ASTSGOT 38   ALKPHOSPHAT 40   TBILIRUBIN  0.6   LIPASE 44   GLUCOSE 105*     Recent Labs     12/20/20  2200   APTT 32.7   INR 1.15*     Recent Labs     12/20/20 2200   NTPROBNP 481*         Recent Labs     12/20/20 2200   TROPONINT 11       Imaging:  DX-CHEST-PORTABLE (1 VIEW)   Final Result      Mild hazy peripheral bilateral pulmonary opacities most suggestive of Covid pneumonia.          I have reviewed the above chest x-ray interpreted imaging myself.  I appreciate minimal pulmonary infiltrative process bilaterally and somewhat flattened diaphragm on the right.  There is some blunting bilateral costophrenic angle.      Assessment/Plan:  I anticipate this patient will require at least two midnights for appropriate medical management, necessitating inpatient admission.    * Pneumonia due to COVID-19 virus- (present on admission)  Assessment & Plan  COVID-19 test positive  LDH/ferritin/interleukin-6/CRP ordered and pending  Decadron 10 mg IV x1 given in ED  Decadron 6 mg p.o. daily x10 days  Consider infectious disease consultation if symptoms persist or worsen  Chest x-ray reveals radiographic interpretation of bilateral pulmonary infiltrates suggestive of COVID-19 pneumonia.  Continue O2 administration with goal of SPO2 between 88 to 92%.  Procalcitonin ordered and pending    Acute respiratory failure with hypoxia (HCC)- (present on admission)  Assessment & Plan  Continue Lasix 20 mg p.o. daily  Maintain O2 saturations between 88 to 92%      Sick sinus syndrome (HCC)- (present on admission)  Assessment & Plan  Status post pacemaker placement  On flecainide 150 mg p.o. twice daily  Continue metoprolol succinate 25 mg p.o. daily    Hyperthyroidism- (present on admission)  Assessment & Plan  Continue methimazole 2.5 mg p.o. daily  TSH ordered and pending    GI prophylaxis: Not indicated  DVT prophylaxis: Lovenox  CODE STATUS: Full code  Diet: Regular cardiac    Electronically signed:  Bradley Restrepo DO   no

## 2021-05-06 ENCOUNTER — TELEMEDICINE (OUTPATIENT)
Dept: MEDICAL GROUP | Facility: IMAGING CENTER | Age: 81
End: 2021-05-06
Payer: MEDICARE

## 2021-05-06 VITALS
TEMPERATURE: 97.6 F | WEIGHT: 143 LBS | HEIGHT: 61 IN | HEART RATE: 93 BPM | BODY MASS INDEX: 27 KG/M2 | OXYGEN SATURATION: 96 %

## 2021-05-06 DIAGNOSIS — Z86.16 HISTORY OF COVID-19: ICD-10-CM

## 2021-05-06 DIAGNOSIS — Z99.81 ON HOME O2: ICD-10-CM

## 2021-05-06 PROCEDURE — 99213 OFFICE O/P EST LOW 20 MIN: CPT | Mod: 95 | Performed by: NURSE PRACTITIONER

## 2021-05-06 ASSESSMENT — FIBROSIS 4 INDEX: FIB4 SCORE: 1.4

## 2021-05-06 ASSESSMENT — PAIN SCALES - GENERAL: PAINLEVEL: NO PAIN

## 2021-05-06 NOTE — PROGRESS NOTES
Virtual Visit: Established Patient   This visit was conducted via Zoom using secure and encrypted videoconferencing technology. The patient was in a private location in the state of [place].    The patient's identity was confirmed and verbal consent was obtained for this virtual visit.  Patient is aware that this is a billable encounter.  Subjective:   CC:   Chief Complaint   Patient presents with   • Oxygen Dependency     has good and bad days, staying in the 90s      Ml Chavira is a 81 y.o. female presenting for evaluation and management of:    Reports that she continues to use home oxygen since having COVID-19 in December 2020.  States that she goes between 1 L and 2 L of oxygen during the daytime.  States that she has seen pulmonology and they want her to keep her SPO2 above 94%.  States that she continues to attempt weaning plan.  States if she desaturates on 1 L she returns to 2 L for minimum 24 hours before attempting again 1 L.  States pulmonology would like her to be able to complete a 6-minute walk test without desaturating before considering going on room air.  States that she also was told to complete a PFT.  States that she has been working with physical therapy in her home to attempt to achieve 6-minute walk without oxygen.  States that she has not been able to complete this and she is considering changing her follow-up appointment with pulmonology until she is able to complete a 6-minute walk without oxygen.  States that she is using albuterol inhaler before physical therapy to decrease overall shortness of breath.  Denies any chest pain, increase work of breathing, and/or cough at this time. Denies any skin breakdown at nares.  States that she is feeling less fatigue and stronger at this time.    ROS:  Constitutional: Denies fever, chills, night sweats, weight loss/gain, and/or malaise. Improving fatigue, see HPI.  HENT: Denies nasal congestion, sore throat, hearing loss, enlarged thyroid, or  "difficulty swallowing.   Eyes: Denies changes in vision, pain. Wears corrective wear. Since removal brain tumor in 2015, pt. reports that she has had decreased peripheral vision, worse in left eye. Does not drive.   Respiratory: Denies cough, SOB at rest or activity. Home O2 after COVID-19, see HPI.  Cardiovascular: Denies tachycardia, chest pain, or palpitations. History of lower leg swelling, managed with intermittent use of Lasix. Pt. has a pace maker due to the history of sick sinus syndrome.  Gastrointestinal: Denies N/V/C/D, abdominal pain, loss appetite, reflux, or hematochezia.  Genitourinary: Denies difficulty voiding, dysuria, nocturia, or hematuria.   Skin: Negative for rash or worrisome moles.   Neurological: Negative for dizziness, focal weakness and headaches.   Endo/Heme/Allergies: Denies bruise/bleed easily, allergies.   Psychiatric/Behavioral: Denies depression, nervous/anxious, difficulty sleeping.    Allergies   Allergen Reactions   • Penicillins Rash and Itching     RXN \"a long time ago\"  Other reaction(s): Rash   • Atorvastatin      Causes low quality     Current medicines (including changes today)  Current Outpatient Medications   Medication Sig Dispense Refill   • alendronate (FOSAMAX) 70 MG Tab Take 1 tablet by mouth every 7 days. 12 tablet 3   • vitamin D, Ergocalciferol, (DRISDOL) 1.25 MG (14564 UT) Cap capsule Take 1 capsule by mouth every 7 days. 12 capsule 3   • methimazole (TAPAZOLE) 5 MG Tab Take 0.5 Tablets by mouth every day. (Patient taking differently: Take 2.5 mg by mouth every day. Every other day) 90 tablet 1   • Zinc 10 MG Lozenge Dissolve 1 Each in the mouth 2 times a day.     • Fluticasone Furoate-Vilanterol (BREO ELLIPTA) 100-25 MCG/INH AEROSOL POWDER, BREATH ACTIVATED Inhale 1 Puff every day. Rinse mouth after use. 3 Each 3   • albuterol 108 (90 Base) MCG/ACT Aero Soln inhalation aerosol INHALE 2 PUFFS BY MOUTH EVERY 4 HOURS AS NEEDED FOR SHORTNESS OF BREATH 1 Each 11   • " furosemide (LASIX) 20 MG Tab TAKE 1 TABLET BY MOUTH EVERY DAY IN THE MORNING (Patient taking differently: Take 20 mg by mouth. Every other day) 90 tablet 1   • KLOR-CON 10 10 MEQ tablet TAKE 1 TABLET BY MOUTH EVERY DAY (Patient taking differently: Take 10 mEq by mouth. Every other day with lasix) 90 tablet 1   • CALCIUM CARBONATE-VITAMIN D PO Take  by mouth.     • metronidazole (METROCREAM) 0.75 % cream APPLY TO FACE EVERYDAY ONCE TO TWICE A DAY FOR ROSACEA     • flecainide (TAMBOCOR) 150 MG Tab Take 1 Tab by mouth 2 times a day. 180 Tab 3   • metoprolol SR (TOPROL XL) 25 MG TABLET SR 24 HR Take 1 Tab by mouth every day. 90 Tab 3   • levETIRAcetam (KEPPRA) 500 MG Tab Take 1 Tab by mouth 2 Times a Day. 180 Tab 3   • zinc sulfate (ZINCATE) 220 (50 Zn) MG Cap Take 220 mg by mouth every day.       No current facility-administered medications for this visit.     Patient Active Problem List    Diagnosis Date Noted   • Sick sinus syndrome (HCC) 05/31/2018     Priority: Medium   • On home O2 04/10/2021   • History of COVID-19 01/09/2021   • Lung cancer ([facility]) 12/23/2020   • Paroxysmal atrial fibrillation ([facility]) 12/21/2020   • Seizure ([facility]) 12/21/2020   • Abnormal CBC measurement 02/06/2020   • At risk for falling 12/27/2019   • Use of cane as ambulatory aid 12/27/2019   • Lower extremity edema 12/27/2019   • High risk medication use 08/27/2019   • Multinodular goiter 08/15/2019   • Vitamin D deficiency 08/15/2019   • Osteoporosis 01/17/2019   • Hyperthyroidism 01/17/2019   • Chronic atrial fibrillation (HCC) 01/17/2019   • Postmenopausal 08/27/2018   • 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   • Balance disorder 06/21/2018   • Radionecrosis 04/14/2018   • Malignant neoplasm of upper lobe of right lung (HCC) 03/05/2018   • CVA (cerebral vascular accident) (HCC) 11/29/2017   • Allergic rhinitis 01/12/2016   • Hilar adenopathy 01/12/2016   • History of breast cancer 01/12/2016   • Lung mass 11/19/2015   • Blurry vision, left eye  "11/19/2015   • Metastasis to brain (HCC) 11/18/2015   • Metastatic cancer (CMS-HCC) 11/18/2015     Family History   Problem Relation Age of Onset   • Dementia Mother    • Diabetes Mother    • Other Mother         osteoarthritis   • Arthritis Mother    • Autoimmune Disease Father         Rheumatoid arthritis   • Arthritis Father    • Cancer Brother         prostate      She  has a past medical history of Acute respiratory failure with hypoxia (HCC) (12/20/2020), Breast cancer (HCC), Cancer (HCC), Chickenpox, [place] measles, Healthcare maintenance (7/23/2018), Influenza, Joint pain, Lung cancer (HCC), Mumps, Need for vaccination (1/17/2019), Pneumonia due to COVID-19 virus (12/20/2020), Radionecrosis (4/14/2018), Sick sinus syndrome (HCC), Thyroid disease, and Tonsillitis.  She  has a past surgical history that includes craniotomy stealth (Right, 11/23/2015); other; appendectomy; knee arthroscopy; craniotomy; tonsillectomy; pacemaker insertion; and lumpectomy.     Objective:   Pulse 93   Temp 36.4 °C (97.6 °F)   Ht 1.549 m (5' 1\")   Wt 64.9 kg (143 lb)   SpO2 96% Comment: 2 L O2  BMI 27.02 kg/m²   Respiratory rate observed during visit: 14 bpm    Physical Exam:  Constitutional: Alert, no distress, well-groomed.  Skin: No rashes in visible areas.  Eye: Round. Conjunctiva clear, lids normal. No icterus.   ENMT: Lips pink without lesions, good dentition, moist mucous membranes. Phonation normal.  Neck: No masses, no thyromegaly. Moves freely without pain.  Respiratory: Unlabored respiratory effort, no cough or audible wheeze. NC  Psych: Alert and oriented x3, normal affect and mood.     Assessment and Plan:   1. History of COVID-19  2. On home O2  This is a chronic stable condition.  Instructed patient to continue plan of care of pulmonology, verbalized understanding and willingness.  Discussed continuing working with physical therapy to increase her endurance.  Discussed seeking emergency services if her " experiences worse symptoms.    Follow-up: Return in about 3 months (around 8/6/2021).

## 2021-05-10 ENCOUNTER — TELEPHONE (OUTPATIENT)
Dept: SLEEP MEDICINE | Facility: MEDICAL CENTER | Age: 81
End: 2021-05-10

## 2021-05-10 NOTE — ASSESSMENT & PLAN NOTE
Reports that she continues to use home oxygen since having COVID-19 in December 2020.  States that she goes between 1 L and 2 L of oxygen during the daytime.  States that she has seen pulmonology and they want her to keep her SPO2 above 94%.  States that she continues to attempt weaning plan.  States if she desaturates on 1 L she returns to 2 L for minimum 24 hours before attempting again 1 L.  States pulmonology would like her to be able to complete a 6-minute walk test without desaturating before considering going on room air.  States that she also was told to complete a PFT.  States that she has been working with physical therapy in her home to attempt to achieve 6-minute walk without oxygen.  States that she has not been able to complete this and she is considering changing her follow-up appointment with pulmonology until she is able to complete a 6-minute walk without oxygen.  States that she is using albuterol inhaler before physical therapy to decrease overall shortness of breath.  Denies any chest pain, increase work of breathing, and/or cough at this time. Denies any skin breakdown at nares.  States that she is feeling less fatigue and stronger at this time.

## 2021-05-10 NOTE — TELEPHONE ENCOUNTER
Patient calling [facility] on [address] states they mailed most recent refill request for Breo to patient. We order 3 (90 day supply) on 4/19/21. Patient states she never did received it from the pharmacy. They always mail her medications to her as she does not drive.    The pharmacy will not re-mail Breo to her as they have already ran this through the insurance and argue they mailed it already. But will fill A DIFFERENT INHALER IF WE ORDER ONE.  I am not sure what can be done. I explained we want her to be on Breo as it works well for her and that we don't just switch to something else because the  pharmacy's medication did not get to her by mail.     Patient has appointment on 5/21/21 with us for 6 MW, PFT and follow up with Jose PÉREZ. Please advise. A sample won't help as she can not come to pick one up.

## 2021-05-11 NOTE — TELEPHONE ENCOUNTER
Spoke to pharmacy and there was an insurance issue which has now been resolved. The Pharmacy will be mailing a Breo inhaler out to patient in tomorrows mail. Insurance only pays for a certain amount in 90 days.     No other inhaler needed at this time. Patient aware Breo medication issue at [facility] is resolved.

## 2021-05-20 ENCOUNTER — TELEPHONE (OUTPATIENT)
Dept: SLEEP MEDICINE | Facility: MEDICAL CENTER | Age: 81
End: 2021-05-20

## 2021-05-20 DIAGNOSIS — G47.33 OSA (OBSTRUCTIVE SLEEP APNEA): ICD-10-CM

## 2021-05-20 DIAGNOSIS — R05.9 COUGH IN ADULT: ICD-10-CM

## 2021-05-21 ENCOUNTER — NON-PROVIDER VISIT (OUTPATIENT)
Dept: SLEEP MEDICINE | Facility: MEDICAL CENTER | Age: 81
End: 2021-05-21
Payer: MEDICARE

## 2021-05-21 ENCOUNTER — NON-PROVIDER VISIT (OUTPATIENT)
Dept: CARDIOLOGY | Facility: MEDICAL CENTER | Age: 81
End: 2021-05-21
Payer: MEDICARE

## 2021-05-21 ENCOUNTER — NON-PROVIDER VISIT (OUTPATIENT)
Dept: SLEEP MEDICINE | Facility: MEDICAL CENTER | Age: 81
End: 2021-05-21
Attending: NURSE PRACTITIONER
Payer: MEDICARE

## 2021-05-21 VITALS — BODY MASS INDEX: 27.19 KG/M2 | HEIGHT: 61 IN | WEIGHT: 144 LBS

## 2021-05-21 VITALS — BODY MASS INDEX: 27.19 KG/M2 | WEIGHT: 144 LBS | HEIGHT: 61 IN

## 2021-05-21 DIAGNOSIS — J96.21 ACUTE ON CHRONIC RESPIRATORY FAILURE WITH HYPOXIA (HCC): ICD-10-CM

## 2021-05-21 DIAGNOSIS — Z99.81 ON HOME O2: ICD-10-CM

## 2021-05-21 DIAGNOSIS — Z95.0 CARDIAC PACEMAKER IN SITU: ICD-10-CM

## 2021-05-21 DIAGNOSIS — Z86.16 HISTORY OF COVID-19: ICD-10-CM

## 2021-05-21 DIAGNOSIS — R05.9 COUGH IN ADULT: ICD-10-CM

## 2021-05-21 DIAGNOSIS — I49.5 SICK SINUS SYNDROME (HCC): ICD-10-CM

## 2021-05-21 PROCEDURE — 94060 EVALUATION OF WHEEZING: CPT | Performed by: INTERNAL MEDICINE

## 2021-05-21 PROCEDURE — 94729 DIFFUSING CAPACITY: CPT | Performed by: INTERNAL MEDICINE

## 2021-05-21 PROCEDURE — 94618 PULMONARY STRESS TESTING: CPT | Performed by: INTERNAL MEDICINE

## 2021-05-21 PROCEDURE — 93294 REM INTERROG EVL PM/LDLS PM: CPT | Performed by: INTERNAL MEDICINE

## 2021-05-21 PROCEDURE — 94726 PLETHYSMOGRAPHY LUNG VOLUMES: CPT | Performed by: INTERNAL MEDICINE

## 2021-05-21 ASSESSMENT — 6 MINUTE WALK TEST (6MWT)
PERCEIVED BREATHLESSNESS AT 6 MIN: 1
COMMENTS: DONE ON ROOM AIR.
HEART RATE AT 5 MIN: 72
HEART RATE AT 6 MIN: 72
PERCEIVED FATIGUE AT 2 MIN: 1
PERCENT OF NORMAL WALKED: 46
BLOOD PRESSURE AT 1 MIN: 130/70
PERCEIVED BREATHLESSNESS AT 2 MIN: 1
O2 SAT PERCENT ROOM AIR: 98
PERCEIVED FATIGUE AT 1 MIN: 0
PERCEIVED BREATHLESSNESS AT 3 MIN: 1
PERCEIVED FATIGUE AT 2 MIN: 1
HEART RATE AT 2 MIN: 64
HEART RATE AT 2 MIN: 68
HEART RATE AT 1 MIN: 76
SAO2 AT 1 MIN: 90
PERCEIVED BREATHLESSNESS AT 1 MIN: 1
SAO2 AT 2 MIN: 96
PERCEIVED BREATHLESSNESS AT 5 MIN: 1
SAO2 AT 2 MIN: 88
PERCEIVED FATIGUE AT 1 MIN: 1
PERCEIVED FATIGUE AT 4 MIN: 1
COMMENTS: DONE ON ROOM AIR.
PERCEIVED FATIGUE AT 3 MIN: 1
HEART RATE AT 1 MIN: 70
SITTING BLOOD PRESSURE: 110/70
TOTAL REST TIME: 0
COMMENTS: DONE ON ROOM AIR.
COMMENTS: DONE ON ROOM AIR.
PERCEIVED FATIGUE AT 6 MIN: 1
COMMENTS: DONE ON ROOM AIR.
NUMBER OF RESTS: 0
PERCEIVED BREATHLESSNESS AT 1 MIN: 2
SAO2 AT 6 MIN: 96
BLOOD PRESSURE AT 2 MIN: 120/70
HEART RATE: 72
SAO2 AT 1 MIN: 96
PERCEIVED FATIGUE AT 5 MIN: 1
BLOOD PRESSURE: LEFT ARM
COMMENTS: DONE ON ROOM AIR.
SAO2 AT 5 MIN: 96
HEART RATE AT 3 MIN: 77
PERCEIVED BREATHLESSNESS AT 2 MIN: 2
PERCEIVED BREATHLESSNESS AT 4 MIN: 1
COMMENTS: DONE ON ROOM AIR.
SAO2 AT 4 MIN: 95
SAO2 AT 3 MIN: 96
HEART RATE AT 4 MIN: 74
COMMENTS: DONE ON ROOM AIR.

## 2021-05-21 ASSESSMENT — PULMONARY FUNCTION TESTS
FEV1: 1.62
FEV1/FVC_PERCENT_CHANGE: -100
FEV1_LLN: 1.38
FVC: 2.1
FVC_LLN: 1.81
FVC: 2.08
FEV1/FVC: 79
FEV1_PERCENT_CHANGE: -1
FEV1_PERCENT_PREDICTED: 98
FEV1/FVC_PERCENT_PREDICTED: 99
FEV1_PERCENT_CHANGE: 1
FEV1: 1.64
FEV1/FVC_PERCENT_PREDICTED: 76
FEV1_PERCENT_PREDICTED: 99
FEV1/FVC: 77
FEV1/FVC_PREDICTED: 77
FEV1/FVC: 79
FEV1/FVC: 77.14
FEV1/FVC_PERCENT_PREDICTED: 101
FEV1_PREDICTED: 1.65
FVC_PERCENT_PREDICTED: 96
FEV1/FVC_PERCENT_PREDICTED: 101
FVC_PREDICTED: 2.17
FEV1/FVC_PERCENT_CHANGE: -2
FEV1/FVC_PERCENT_LLN: 64
FVC_PERCENT_PREDICTED: 97
FEV1/FVC_PERCENT_PREDICTED: 103

## 2021-05-21 ASSESSMENT — FIBROSIS 4 INDEX
FIB4 SCORE: 1.4
FIB4 SCORE: 1.4

## 2021-05-21 NOTE — PROCEDURES
Tech: Sharon Freeman, RRT, CPFT  Tech notes: Good patient effort & cooperation. 6MWT done on room air, as pt walked with both hands on walker.  540 feet walked, with no stops.  46% predicted walked.    Interpretation:  No significant desaturation with ambulation.  Distance walked is 540 feet or 46% predicted.  This test does not support a need for supplemental oxygen but does show reduced exercise tolerance.

## 2021-05-24 ENCOUNTER — OFFICE VISIT (OUTPATIENT)
Dept: SLEEP MEDICINE | Facility: MEDICAL CENTER | Age: 81
End: 2021-05-24
Payer: MEDICARE

## 2021-05-24 VITALS
HEIGHT: 62 IN | SYSTOLIC BLOOD PRESSURE: 120 MMHG | WEIGHT: 148.6 LBS | DIASTOLIC BLOOD PRESSURE: 62 MMHG | BODY MASS INDEX: 27.34 KG/M2 | HEART RATE: 69 BPM | RESPIRATION RATE: 16 BRPM | OXYGEN SATURATION: 96 %

## 2021-05-24 DIAGNOSIS — Z86.16 HISTORY OF COVID-19: ICD-10-CM

## 2021-05-24 DIAGNOSIS — Z99.81 ON HOME O2: ICD-10-CM

## 2021-05-24 DIAGNOSIS — G47.33 OSA (OBSTRUCTIVE SLEEP APNEA): ICD-10-CM

## 2021-05-24 DIAGNOSIS — C34.90 MALIGNANT NEOPLASM OF LUNG, UNSPECIFIED LATERALITY, UNSPECIFIED PART OF LUNG (HCC): ICD-10-CM

## 2021-05-24 PROCEDURE — 99213 OFFICE O/P EST LOW 20 MIN: CPT | Performed by: NURSE PRACTITIONER

## 2021-05-24 ASSESSMENT — FIBROSIS 4 INDEX: FIB4 SCORE: 1.4

## 2021-05-24 NOTE — PATIENT INSTRUCTIONS
1.  Reviewed recent 6-minute walk and PFTs with patient.  Patient was advised that she can come off of oxygen during the daytime, but continue monitoring pulse oximeter and if drops below 90 then to add supplemental oxygen via nasal cannula.  2.  Continue using BiPAP machine at 18/13 cm/H2O.  Order placed for new mask and supplies will see patient for sleep follow-up in 1 year.  Compliance reviewed with patient which shows average usage of 5 hours 51 minutes with a resultant AHI of 3.4 and you to clean mask and supplies frequently with mild soap and water  3.  Reach out via MyChart with any questions or concerns before next appointment.  Next appointment should be in 6 months for pulmonary follow-up in 1 year for sleep compliance follow-up.

## 2021-05-24 NOTE — PROGRESS NOTES
Chief Complaint   Patient presents with   • Follow-Up     PFT,6 min walk results       HPI:  Ml Chavira is a 81 y.o. year old female here today for follow-up on Covid pneumonia, respiratory failure, and lung cancer with brain metastases.  Last seen April 19, 2021 by myself.  She has a previous hospital admission for COVID-19 on December 20, 2020.  Pertinent history includes chronic respiratory failure, stage IV right upper lobe lung cancer with history of brain metastases status post craniotomy, radiation and chemotherapy.    Last visit, she was hoping to come off of home oxygen during the day.  She still uses bleed in oxygen with a BiPAP machine at nighttime.  I ordered 6-minute walk and PFTs to reevaluate.  BiPAP compliance was done at last visit.    Patient states she feels a lot better than December.  She states she infrequently uses her albuterol.  She does notice a difference after using it with exercise tolerance.  She is also using Breo daily.  She denies any worsening shortness of breath, cough or sputum production, increased dyspnea, increased orthopnea, or decrease in activity tolerance.    Previous imaging:    X-ray (12/20/2020):   stable cardiomediastinal silhouette, mild to moderate peripheral hazy bilateral pulmonary opacities left worse than right.  Changes are nonspecific but most suggestive of Covid pneumonia.  Right axillary surgical clips.     Chest CT (9/10/2020):  stable masslike opacity right upper lobe not significantly changed in size measuring approximately 1.8 x 4.1 cm previously 4 x 2 cm.  Emphysematous changes.  No new pulmonary nodules.  Bilateral thyroid nodules again noted.  Pacemaker present.  Compression deformity T12 vertebral body unchanged.    Pulmonary function testing (5/21/2021):  Baseline spirometry shows normal airflows.  No significant bronchodilator response is seen.  Lung volumes are within normal limits.  Diffusion capacity is just below the lower limits of  normal at 77% predicted.  Pulmonary function testing shows mild isolated reduction in DLCO.  Query pulmonary vascular disease.     Pulmonary function testing (2/26/2020):   FEV1 of 1.31 L or 78% predicted, FVC of 1.44 L or 65% predicted, FEV1/FVC ratio of 91, no significant bronchodilator response, residual volume 88% predicted, total lung capacity 82% predicted, DLCO 74% predicted.  Per pulmonologist interpretation mild restriction with mildly reduced DLCO, could be seen with early ILD.    6-minute walk test (5/21/2021):    Compliance report was also reviewed at this visit.  Patient currently on BiPAP at 18/13 centimeters/H2O.  Compliance shows average use time of 5 hours and 51 minutes with a resultant AHI of 3.4.  Patient states she uses her machine nightly and benefits from the therapy.  She denies excessive daytime sleepiness, snoring, headaches, palpitations or memory or concentration problems.  He states she recently switched from a fullface mask to a nasal pillow.    ROS: As per HPI and otherwise negative if not stated.    Past Medical History:   Diagnosis Date   • Acute respiratory failure with hypoxia (HCC) 12/20/2020   • Breast cancer (HCC)    • Cancer (HCC)     lung cancer with brain mts   • Chickenpox    • [place] measles    • Healthcare maintenance 7/23/2018   • Influenza    • Joint pain    • Lung cancer (HCC)    • Mumps    • Need for vaccination 1/17/2019   • Pneumonia due to COVID-19 virus 12/20/2020   • Radionecrosis 4/14/2018   • Sick sinus syndrome (HCC)     with AFIB, s/p pacemaker   • Thyroid disease    • Tonsillitis        Past Surgical History:   Procedure Laterality Date   • CRANIOTOMY STEALTH Right 11/23/2015    Procedure: CRANIOTOMY STEALTH-right occipital;  Surgeon: Suyapa Schumacher M.D.;  Location: SURGERY [facility];  Service:    • APPENDECTOMY     • CRANIOTOMY     • KNEE ARTHROSCOPY      left    • LUMPECTOMY     • OTHER      left knee surgery   • PACEMAKER INSERTION     •  TONSILLECTOMY         Family History   Problem Relation Age of Onset   • Dementia Mother    • Diabetes Mother    • Other Mother         osteoarthritis   • Arthritis Mother    • Autoimmune Disease Father         Rheumatoid arthritis   • Arthritis Father    • Cancer Brother         prostate        Social History     Socioeconomic History   • Marital status:      Spouse name: Not on file   • Number of children: Not on file   • Years of education: Not on file   • Highest education level: Not on file   Occupational History   • Not on file   Tobacco Use   • Smoking status: Former Smoker     Packs/day: 2.00     Years: 20.00     Pack years: 40.00     Types: Cigarettes     Quit date:      Years since quittin.4   • Smokeless tobacco: Never Used   Vaping Use   • Vaping Use: Never used   Substance and Sexual Activity   • Alcohol use: Yes     Alcohol/week: 1.8 oz     Types: 3 Glasses of wine per week     Comment: occasionally    • Drug use: No   • Sexual activity: Not Currently   Other Topics Concern   • Not on file   Social History Narrative   • Not on file     Social Determinants of Health     Financial Resource Strain:    • Difficulty of Paying Living Expenses:    Food Insecurity:    • Worried About Running Out of Food in the Last Year:    • Ran Out of Food in the Last Year:    Transportation Needs:    • Lack of Transportation (Medical):    • Lack of Transportation (Non-Medical):    Physical Activity:    • Days of Exercise per Week:    • Minutes of Exercise per Session:    Stress:    • Feeling of Stress :    Social Connections:    • Frequency of Communication with Friends and Family:    • Frequency of Social Gatherings with Friends and Family:    • Attends [employer] Services:    • Active Member of Clubs or Organizations:    • Attends Club or Organization Meetings:    • Marital Status:    Intimate Partner Violence:    • Fear of Current or Ex-Partner:    • Emotionally Abused:    • Physically Abused:    •  "Sexually Abused:        Allergies as of 05/24/2021 - Reviewed 05/24/2021   Allergen Reaction Noted   • Penicillins Rash and Itching 08/12/2012   • Atorvastatin  04/06/2020        Vitals:  /62 (BP Location: Left arm, Patient Position: Sitting, BP Cuff Size: Adult)   Pulse 69   Resp 16   Ht 1.575 m (5' 2\")   Wt 67.4 kg (148 lb 9.6 oz)   SpO2 96%     Current medications as of today   Current Outpatient Medications   Medication Sig Dispense Refill   • levETIRAcetam (KEPPRA) 500 MG Tab TAKE 1 TABLET BY MOUTH TWICE A  tablet 0   • alendronate (FOSAMAX) 70 MG Tab Take 1 tablet by mouth every 7 days. 12 tablet 3   • vitamin D, Ergocalciferol, (DRISDOL) 1.25 MG (97587 UT) Cap capsule Take 1 capsule by mouth every 7 days. 12 capsule 3   • methimazole (TAPAZOLE) 5 MG Tab Take 0.5 Tablets by mouth every day. (Patient taking differently: Take 2.5 mg by mouth every day. Every other day) 90 tablet 1   • Zinc 10 MG Lozenge Dissolve 1 Each in the mouth 2 times a day.     • Fluticasone Furoate-Vilanterol (BREO ELLIPTA) 100-25 MCG/INH AEROSOL POWDER, BREATH ACTIVATED Inhale 1 Puff every day. Rinse mouth after use. 3 Each 3   • albuterol 108 (90 Base) MCG/ACT Aero Soln inhalation aerosol INHALE 2 PUFFS BY MOUTH EVERY 4 HOURS AS NEEDED FOR SHORTNESS OF BREATH 1 Each 11   • furosemide (LASIX) 20 MG Tab TAKE 1 TABLET BY MOUTH EVERY DAY IN THE MORNING (Patient taking differently: Take 20 mg by mouth. Every other day) 90 tablet 1   • KLOR-CON 10 10 MEQ tablet TAKE 1 TABLET BY MOUTH EVERY DAY (Patient taking differently: Take 10 mEq by mouth. Every other day with lasix) 90 tablet 1   • zinc sulfate (ZINCATE) 220 (50 Zn) MG Cap Take 220 mg by mouth every day.     • CALCIUM CARBONATE-VITAMIN D PO Take  by mouth.     • metronidazole (METROCREAM) 0.75 % cream APPLY TO FACE EVERYDAY ONCE TO TWICE A DAY FOR ROSACEA     • flecainide (TAMBOCOR) 150 MG Tab Take 1 Tab by mouth 2 times a day. 180 Tab 3   • metoprolol SR (TOPROL XL) 25 " MG TABLET SR 24 HR Take 1 Tab by mouth every day. 90 Tab 3     No current facility-administered medications for this visit.         Physical Exam:   Gen:           Alert and oriented, No apparent distress. Mood and affect appropriate, normal interaction with examiner.  Eyes:          PERRL, EOM intact, sclere white, conjunctive moist.  Ears:          Not examined. No lesions or deformities.  Hearing:     Grossly intact.  Nose:          Normal, no lesions or deformities.  Dentition:    Good dentition.  Oropharynx:   Tongue normal, posterior pharynx without erythema or exudate.  Neck:        Supple, trachea midline, no masses.  Respiratory Effort: No intercostal retractions or use of accessory muscles.   Lung Auscultation:      Clear to auscultation bilaterally; no rales, rhonchi or wheezing.  CV:            Regular rate and rhythm. No murmurs, rubs or gallops.  Abd:           Not examined. Soft non tender, non distended. Normal active bowel sounds. No masses.  Lymphadenopathy: No palpable nodes or edema.  Gait and Station: Normal.  Digits and Nails: No clubbing, cyanosis, petechiae, or nodes.   Cranial Nerves: II-XII grossly intact.  Skin:        No rashes, lesions or ulcers noted.               Ext:           No cyanosis or edema.      Assessment:  1. History of COVID-19     2. On home O2     3. Malignant neoplasm of lung, unspecified laterality, unspecified part of lung (HCC)     4. VITO (obstructive sleep apnea)  DME Mask and Supplies           Plan:  1.  Reviewed recent 6-minute walk and PFTs with patient.  Patient was advised that she can come off of oxygen during the daytime, but continue monitoring pulse oximeter and if drops below 90 then to add supplemental oxygen via nasal cannula.  2.  Continue using BiPAP machine at 18/13 cm/H2O.  Order placed for new mask and supplies will see patient for sleep follow-up in 1 year.  Compliance reviewed with patient which shows average usage of 5 hours 51 minutes with a  resultant AHI of 3.4 and you to clean mask and supplies frequently with mild soap and water  3.  Reach out via MyChart with any questions or concerns before next appointment.  Next appointment should be in 6 months for pulmonary follow-up in 1 year for sleep compliance follow-up.    Please note that this dictation was created using voice recognition software. I have made every reasonable attempt to correct obvious errors, but it is possible there are errors of grammar and possibly content that I did not discover before finalizing the note.

## 2021-06-13 NOTE — PROGRESS NOTES
Monitor Summary: .14/.08/.36. Rhythm: A.Fib-Partially paced-SR. Rate: 60's-160.       12 hour chart check.    Statement Selected

## 2021-06-21 ENCOUNTER — TELEPHONE (OUTPATIENT)
Dept: CARDIOLOGY | Facility: MEDICAL CENTER | Age: 81
End: 2021-06-21

## 2021-06-21 DIAGNOSIS — I49.5 SICK SINUS SYNDROME (HCC): ICD-10-CM

## 2021-06-21 DIAGNOSIS — I48.0 AF (PAROXYSMAL ATRIAL FIBRILLATION) (HCC): ICD-10-CM

## 2021-06-21 DIAGNOSIS — C79.31 METASTASIS TO BRAIN (HCC): ICD-10-CM

## 2021-06-21 DIAGNOSIS — I62.9 INTRACRANIAL HEMORRHAGE (HCC): ICD-10-CM

## 2021-06-22 RX ORDER — METOPROLOL SUCCINATE 25 MG/1
25 TABLET, EXTENDED RELEASE ORAL DAILY
Qty: 7 TABLET | Refills: 0 | Status: SHIPPED | OUTPATIENT
Start: 2021-06-22 | End: 2021-07-01

## 2021-06-22 RX ORDER — FLECAINIDE ACETATE 150 MG/1
150 TABLET ORAL 2 TIMES DAILY
Qty: 14 TABLET | Refills: 0 | Status: SHIPPED | OUTPATIENT
Start: 2021-06-22 | End: 2021-07-01 | Stop reason: SDUPTHER

## 2021-06-22 RX ORDER — LEVETIRACETAM 500 MG/1
TABLET ORAL
Qty: 180 TABLET | Refills: 0 | Status: SHIPPED | OUTPATIENT
Start: 2021-06-22 | End: 2021-07-13 | Stop reason: SDUPTHER

## 2021-06-22 NOTE — TELEPHONE ENCOUNTER
RO    Pt called regarding Rx's metoprolol SR (TOPROL XL) 25 MG TABLET  And flecainide (TAMBOCOR) 150 MG Tab. Pt states that she is out of town and has miscounted and will be 3 days short on both Rx's. Pt is wondering what her options are, if she should not take them, if she should skip every other day or if there is a way for her to get them filled at a pharmacy close to her. Please call pt back to further discuss at [phone]    Pt has enough till Friday.     Thank you

## 2021-06-22 NOTE — TELEPHONE ENCOUNTER
7 day supply sent to [facility], patient aware there may be out of pocket costs associated with it.

## 2021-07-01 ENCOUNTER — TELEMEDICINE (OUTPATIENT)
Dept: CARDIOLOGY | Facility: MEDICAL CENTER | Age: 81
End: 2021-07-01
Payer: MEDICARE

## 2021-07-01 VITALS — HEIGHT: 61 IN | BODY MASS INDEX: 26.81 KG/M2 | WEIGHT: 142 LBS

## 2021-07-01 DIAGNOSIS — R60.0 BILATERAL LEG EDEMA: ICD-10-CM

## 2021-07-01 DIAGNOSIS — I48.0 AF (PAROXYSMAL ATRIAL FIBRILLATION) (HCC): ICD-10-CM

## 2021-07-01 DIAGNOSIS — I63.10 CEREBROVASCULAR ACCIDENT (CVA) DUE TO EMBOLISM OF PRECEREBRAL ARTERY (HCC): ICD-10-CM

## 2021-07-01 DIAGNOSIS — R60.0 LOWER EXTREMITY EDEMA: ICD-10-CM

## 2021-07-01 DIAGNOSIS — I49.5 SICK SINUS SYNDROME (HCC): ICD-10-CM

## 2021-07-01 DIAGNOSIS — I48.20 CHRONIC ATRIAL FIBRILLATION (HCC): ICD-10-CM

## 2021-07-01 DIAGNOSIS — H53.47 BITEMPORAL HEMIANOPIA: ICD-10-CM

## 2021-07-01 DIAGNOSIS — I48.0 PAROXYSMAL ATRIAL FIBRILLATION (HCC): ICD-10-CM

## 2021-07-01 DIAGNOSIS — R79.89 ABNORMAL CBC MEASUREMENT: ICD-10-CM

## 2021-07-01 DIAGNOSIS — C79.31 METASTASIS TO BRAIN (HCC): ICD-10-CM

## 2021-07-01 DIAGNOSIS — M81.0 OSTEOPOROSIS, UNSPECIFIED OSTEOPOROSIS TYPE, UNSPECIFIED PATHOLOGICAL FRACTURE PRESENCE: ICD-10-CM

## 2021-07-01 DIAGNOSIS — C79.9 METASTATIC MALIGNANT NEOPLASM, UNSPECIFIED SITE (HCC): ICD-10-CM

## 2021-07-01 DIAGNOSIS — Z79.899 HIGH RISK MEDICATION USE: ICD-10-CM

## 2021-07-01 PROCEDURE — 99214 OFFICE O/P EST MOD 30 MIN: CPT | Mod: 95 | Performed by: INTERNAL MEDICINE

## 2021-07-01 RX ORDER — POTASSIUM CHLORIDE 750 MG/1
10 TABLET, FILM COATED, EXTENDED RELEASE ORAL
Qty: 90 TABLET | Refills: 3 | Status: SHIPPED | OUTPATIENT
Start: 2021-07-01 | End: 2022-02-11 | Stop reason: SDUPTHER

## 2021-07-01 RX ORDER — FLECAINIDE ACETATE 150 MG/1
150 TABLET ORAL 2 TIMES DAILY
Qty: 180 TABLET | Refills: 3 | Status: SHIPPED | OUTPATIENT
Start: 2021-07-01 | End: 2022-02-11 | Stop reason: SDUPTHER

## 2021-07-01 RX ORDER — METOPROLOL SUCCINATE 25 MG/1
25 TABLET, EXTENDED RELEASE ORAL
Qty: 90 TABLET | Refills: 3 | Status: SHIPPED | OUTPATIENT
Start: 2021-07-01 | End: 2022-02-11 | Stop reason: SDUPTHER

## 2021-07-01 RX ORDER — FUROSEMIDE 20 MG/1
20 TABLET ORAL DAILY
Qty: 90 TABLET | Refills: 3 | Status: SHIPPED | OUTPATIENT
Start: 2021-07-01 | End: 2021-11-04

## 2021-07-01 ASSESSMENT — ENCOUNTER SYMPTOMS
MUSCULOSKELETAL NEGATIVE: 1
PALPITATIONS: 0
NEUROLOGICAL NEGATIVE: 1
WHEEZING: 0
CONSTITUTIONAL NEGATIVE: 1
EYES NEGATIVE: 1
PND: 0
CHILLS: 0
DIZZINESS: 0
LOSS OF CONSCIOUSNESS: 0
HEMOPTYSIS: 0
COUGH: 0
RESPIRATORY NEGATIVE: 1
ORTHOPNEA: 0
WEAKNESS: 0
CLAUDICATION: 0
STRIDOR: 0
FEVER: 0
CARDIOVASCULAR NEGATIVE: 1
SPUTUM PRODUCTION: 0
SHORTNESS OF BREATH: 0
SORE THROAT: 0
GASTROINTESTINAL NEGATIVE: 1
BRUISES/BLEEDS EASILY: 0

## 2021-07-01 ASSESSMENT — FIBROSIS 4 INDEX: FIB4 SCORE: 1.4

## 2021-07-01 NOTE — PROGRESS NOTES
Chief Complaint   Patient presents with   • Atrial Fibrillation       Subjective:   Ml Chavira is a 81 y.o. female who presents today as a follow-up for her atrial fibrillation brain metastasis brain bleed and high risk medication usage.  Since she was last seen again she continues on the flecainide 150 twice daily with no recurrence of her atrial fibrillation otherwise symptoms are by pacemaker interrogation.  Her blood pressures been controlled.  She continues to take the Lasix as needed.    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   • Sick sinus syndrome (HCC)     with AFIB, s/p pacemaker   • Thyroid disease    • Tonsillitis      Past Surgical History:   Procedure Laterality Date   • CRANIOTOMY STEALTH Right 11/23/2015    Procedure: CRANIOTOMY STEALTH-right occipital;  Surgeon: Suyapa Schumacher M.D.;  Location: SURGERY [facility];  Service:    • APPENDECTOMY     • CRANIOTOMY     • KNEE ARTHROSCOPY      left    • LUMPECTOMY     • OTHER      left knee surgery   • PACEMAKER INSERTION     • TONSILLECTOMY       Family History   Problem Relation Age of Onset   • Dementia Mother    • Diabetes Mother    • Other Mother         osteoarthritis   • Arthritis Mother    • Autoimmune Disease Father         Rheumatoid arthritis   • Arthritis Father    • Cancer Brother         prostate      Social History     Socioeconomic History   • Marital status:      Spouse name: Not on file   • Number of children: Not on file   • Years of education: Not on file   • Highest education level: Not on file   Occupational History   • Not on file  "  Tobacco Use   • Smoking status: Former Smoker     Packs/day: 2.00     Years: 20.00     Pack years: 40.00     Types: Cigarettes     Quit date:      Years since quittin.5   • Smokeless tobacco: Never Used   Vaping Use   • Vaping Use: Never used   Substance and Sexual Activity   • Alcohol use: Yes     Alcohol/week: 1.8 oz     Types: 3 Glasses of wine per week     Comment: occasionally    • Drug use: No   • Sexual activity: Not Currently   Other Topics Concern   • Not on file   Social History Narrative   • Not on file     Social Determinants of Health     Financial Resource Strain:    • Difficulty of Paying Living Expenses:    Food Insecurity:    • Worried About Running Out of Food in the Last Year:    • Ran Out of Food in the Last Year:    Transportation Needs:    • Lack of Transportation (Medical):    • Lack of Transportation (Non-Medical):    Physical Activity:    • Days of Exercise per Week:    • Minutes of Exercise per Session:    Stress:    • Feeling of Stress :    Social Connections:    • Frequency of Communication with Friends and Family:    • Frequency of Social Gatherings with Friends and Family:    • Attends Anabaptism Services:    • Active Member of Clubs or Organizations:    • Attends Club or Organization Meetings:    • Marital Status:    Intimate Partner Violence:    • Fear of Current or Ex-Partner:    • Emotionally Abused:    • Physically Abused:    • Sexually Abused:      Allergies   Allergen Reactions   • Penicillins Rash and Itching     RXN \"a long time ago\"  Other reaction(s): Rash   • Atorvastatin      Causes low quality     Outpatient Encounter Medications as of 2021   Medication Sig Dispense Refill   • levETIRAcetam (KEPPRA) 500 MG Tab TAKE 1 TABLET BY MOUTH TWICE A  tablet 0   • flecainide (TAMBOCOR) 150 MG Tab Take 1 tablet by mouth 2 times a day. 14 tablet 0   • alendronate (FOSAMAX) 70 MG Tab Take 1 tablet by mouth every 7 days. 12 tablet 3   • vitamin D, Ergocalciferol, " (DRISDOL) 1.25 MG (59006 UT) Cap capsule Take 1 capsule by mouth every 7 days. 12 capsule 3   • methimazole (TAPAZOLE) 5 MG Tab Take 0.5 Tablets by mouth every day. (Patient taking differently: Take 2.5 mg by mouth every day. Every other day) 90 tablet 1   • Zinc 10 MG Lozenge Dissolve 1 Each in the mouth 2 times a day.     • Fluticasone Furoate-Vilanterol (BREO ELLIPTA) 100-25 MCG/INH AEROSOL POWDER, BREATH ACTIVATED Inhale 1 Puff every day. Rinse mouth after use. 3 Each 3   • albuterol 108 (90 Base) MCG/ACT Aero Soln inhalation aerosol INHALE 2 PUFFS BY MOUTH EVERY 4 HOURS AS NEEDED FOR SHORTNESS OF BREATH 1 Each 11   • furosemide (LASIX) 20 MG Tab TAKE 1 TABLET BY MOUTH EVERY DAY IN THE MORNING (Patient taking differently: Take 20 mg by mouth. Every other day) 90 tablet 1   • KLOR-CON 10 10 MEQ tablet TAKE 1 TABLET BY MOUTH EVERY DAY (Patient taking differently: Take 10 mEq by mouth. Every other day with lasix) 90 tablet 1   • zinc sulfate (ZINCATE) 220 (50 Zn) MG Cap Take 220 mg by mouth every day.     • CALCIUM CARBONATE-VITAMIN D PO Take  by mouth.     • metronidazole (METROCREAM) 0.75 % cream APPLY TO FACE EVERYDAY ONCE TO TWICE A DAY FOR ROSACEA     • metoprolol SR (TOPROL XL) 25 MG TABLET SR 24 HR Take 1 Tab by mouth every day. 90 Tab 3   • [DISCONTINUED] metoprolol SR (TOPROL XL) 25 MG TABLET SR 24 HR Take 1 tablet by mouth every day. 7 tablet 0   • [DISCONTINUED] flecainide (TAMBOCOR) 150 MG Tab Take 1 Tab by mouth 2 times a day. 180 Tab 3     No facility-administered encounter medications on file as of 7/1/2021.     Review of Systems   Constitutional: Negative.  Negative for chills, fever and malaise/fatigue.   HENT: Negative.  Negative for sore throat.    Eyes: Negative.    Respiratory: Negative.  Negative for cough, hemoptysis, sputum production, shortness of breath, wheezing and stridor.    Cardiovascular: Negative.  Negative for chest pain, palpitations, orthopnea, claudication, leg swelling and PND.  "  Gastrointestinal: Negative.    Genitourinary: Negative.    Musculoskeletal: Negative.    Skin: Negative.    Neurological: Negative.  Negative for dizziness, loss of consciousness and weakness.   Endo/Heme/Allergies: Negative.  Does not bruise/bleed easily.   All other systems reviewed and are negative.       Objective:   Ht 1.537 m (5' 0.5\")   Wt 64.4 kg (142 lb)   BMI 27.28 kg/m²     Physical Exam   Constitutional: She is oriented to person, place, and time. She appears well-developed. No distress.   HENT:   Head: Normocephalic and atraumatic.   Right Ear: External ear normal.   Left Ear: External ear normal.   Nose: Nose normal.   Mouth/Throat: No oropharyngeal exudate.   Eyes: Pupils are equal, round, and reactive to light. Conjunctivae are normal. Right eye exhibits no discharge. Left eye exhibits no discharge. No scleral icterus.   Neck: No JVD present. No tracheal deviation present.   Cardiovascular: Normal rate.   Pulmonary/Chest: Effort normal and breath sounds normal. No stridor. No respiratory distress.   Abdominal: Soft. Bowel sounds are normal.   Musculoskeletal:         General: No tenderness or deformity. Normal range of motion.      Cervical back: Normal range of motion and neck supple.   Neurological: She is alert and oriented to person, place, and time. No cranial nerve deficit. Coordination normal.   Skin: Skin is warm and dry. No rash noted. She is not diaphoretic. No erythema. No pallor.   Psychiatric: Her behavior is normal. Judgment and thought content normal.   Nursing note reviewed.      Assessment:     1. Abnormal CBC measurement     2. Bitemporal hemianopia     3. Chronic atrial fibrillation (HCC)     4. Cerebrovascular accident (CVA) due to embolism of precerebral artery (HCC)     5. High risk medication use     6. Lower extremity edema     7. Metastasis to brain (HCC)     8. Metastatic malignant neoplasm, unspecified site (HCC)     9. Paroxysmal atrial fibrillation (HCC)     10. Sick " sinus syndrome (HCC)     11. Osteoporosis, unspecified osteoporosis type, unspecified pathological fracture presence     12. AF (paroxysmal atrial fibrillation) (HCC)     13. Bilateral leg edema         Medical Decision Making:  Today's Assessment / Status / Plan:     81-year-old female with atrial fibrillation and a CVA with the setting of a brain bleed.  Again we will keep her off the anticoagulation since we see no recurrence of the atrial fibrillation and clearly this is high risk given her previous neurosurgeries.  I refilled her flecainide and metoprolol.  She will stay on the Lasix and potassium as needed.  We will see her back in 6 months.    Start time: 10:45  Stop time: 11:00    This evaluation was conducted via Zoom using secure and encrypted videoconferencing technology. The patient was in a private location in the [facility].    The patient's identity was confirmed and verbal consent was obtained for this virtual visit.

## 2021-07-13 ENCOUNTER — TELEMEDICINE (OUTPATIENT)
Dept: NEUROLOGY | Facility: MEDICAL CENTER | Age: 81
End: 2021-07-13
Attending: PSYCHIATRY & NEUROLOGY
Payer: MEDICARE

## 2021-07-13 VITALS — OXYGEN SATURATION: 96 % | HEIGHT: 60 IN | BODY MASS INDEX: 27.88 KG/M2 | WEIGHT: 142 LBS

## 2021-07-13 DIAGNOSIS — C79.31 METASTASIS TO BRAIN (HCC): ICD-10-CM

## 2021-07-13 DIAGNOSIS — I48.20 CHRONIC ATRIAL FIBRILLATION (HCC): ICD-10-CM

## 2021-07-13 DIAGNOSIS — I62.9 INTRACRANIAL HEMORRHAGE (HCC): ICD-10-CM

## 2021-07-13 PROCEDURE — 99214 OFFICE O/P EST MOD 30 MIN: CPT | Mod: 95 | Performed by: PSYCHIATRY & NEUROLOGY

## 2021-07-13 RX ORDER — LEVETIRACETAM 500 MG/1
500 TABLET ORAL 2 TIMES DAILY
Qty: 180 TABLET | Refills: 3 | Status: SHIPPED | OUTPATIENT
Start: 2021-07-13 | End: 2022-01-17

## 2021-07-13 ASSESSMENT — FIBROSIS 4 INDEX: FIB4 SCORE: 1.4

## 2021-07-13 NOTE — PROGRESS NOTES
Chief Complaint   Patient presents with   • Follow-Up     Intracranial hemorrhage      This evaluation was conducted via Zoom using secure and encrypted videoconferencing technology. The patient was in a private location in the [facility].    The patient's identity was confirmed and verbal consent was obtained for this virtual visit.    History of present illness:  Ml Chavira 79 y.o. female presents today for seizures and ICH.  History is obtained from patient.  and Patient is accompanied by self.  She lives alone and has a medical alert device at home.       Duration/timing: Winter/November 2017  Context: Brain metastasis/spell: patient remembers feeling faint and couldn't get herself back up, found that her left side was weak when she woke up (no incontinence or tongue biting). Brain radiation 2015. Brain tumor resection 2015. She possibly had the left-sided weakness in 2015 but she is not sure.  Despite that the weakness she is experienced in the winter 2017 was far from her baseline.  That episode was occurring after trip to [place] and she was otherwise not acutely ill. She does not drive because of hemianopsia. She hs a hx of breast cancer without metastasis.  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  She is referred today for reevaluation on whether or not she needs Keppra.  Location: Brain  Quality: Weakness/swelling  Severity: Moderate  Modifying factors: Improved with time and steroid  Associated signs/symptoms: hx of bell palsy of uncertain side; occasional migraines  Denies: bladder incontinence, bowel incontinence, dizziness, headaches, vision changes, other weakness, numbness/tingling, swallowing difficulties, speech disturbance, depression, anxiety, memory loss, loss of consciousness, hallucinations, abnormal movements, diplopia and falls     Summary of problem:   Intracranial hemorrhage, incidental finding on brain imaging for monitoring of  prior brain mets by oncology.  She is status post surgical resection and radiation therapy.    Duration/timing: Subacute on imaging, December 2019  Context: In the setting of anticoagulation with Eliquis for A. fib  Location: Right parietal, and prior surgical bed  Quality: Hemorrhage  Severity: Mild, patient asymptomatic  Modifying factors: Improved with time  Associated signs/symptoms: None  dizziness, headaches, vision changes/loss or diplopia, head trauma , weakness, numbness/tingling, swallowing difficulties, speech disturbance, incoordination, memory loss, loss of consciousness, seizures and falls       Patient has tried:  -Keppra 500 mg p.o. twice daily, started 2017 in patient, no side effects      Subjective: Patient was last seen in neurology via virtual in 5/2020.     Intracranial hemorrhage: no repeat and no new headaches/weakness/numbness/speech change, pending repeat MRI brain via oncology ~10/2021    Brain mets: stable to her knowledge    Chronic afib: No watchman procedure - not a candidate. She is taking medications for A. fib. She is not on anticoagulation anymore due to bleeding risk.      She called and ran out of medication for three days. She got back on the medication. No seizure.        Past medical history:   Past Medical History:   Diagnosis Date   • Acute respiratory failure with hypoxia (HCC) 12/20/2020   • Breast cancer (HCC)    • Cancer (HCC)     lung cancer with brain mts   • Chickenpox    • [place] measles    • Healthcare maintenance 7/23/2018   • Influenza    • Joint pain    • Lung cancer (HCC)    • Mumps    • Need for vaccination 1/17/2019   • Pneumonia due to COVID-19 virus 12/20/2020   • Radionecrosis 4/14/2018   • Sick sinus syndrome (HCC)     with AFIB, s/p pacemaker   • Thyroid disease    • Tonsillitis        Past surgical history:   Past Surgical History:   Procedure Laterality Date   • CRANIOTOMY STEALTH Right 11/23/2015    Procedure: CRANIOTOMY STEALTH-right occipital;   "Surgeon: Suyapa Schumacher M.D.;  Location: SURGERY [facility];  Service:    • APPENDECTOMY     • CRANIOTOMY     • KNEE ARTHROSCOPY      left    • LUMPECTOMY     • OTHER      left knee surgery   • PACEMAKER INSERTION     • TONSILLECTOMY         Family history:   Family History   Problem Relation Age of Onset   • Dementia Mother    • Diabetes Mother    • Other Mother         osteoarthritis   • Arthritis Mother    • Autoimmune Disease Father         Rheumatoid arthritis   • Arthritis Father    • Cancer Brother         prostate        Social history:   Tobacco Use   • Smoking status: Former Smoker     Packs/day: 2.00     Years: 20.00     Pack years: 40.00     Last attempt to quit: 1960     Years since quittin.6   • Smokeless tobacco: Never Used   Substance and Sexual Activity   • Alcohol use: Yes     Alcohol/week: 1.8 oz     Types: 3 Glasses of wine per week   • Drug use: No       Current medications:   Current Outpatient Medications   Medication   • levETIRAcetam (KEPPRA) 500 MG Tab   • metoprolol SR (TOPROL XL) 25 MG TABLET SR 24 HR   • flecainide (TAMBOCOR) 150 MG Tab   • furosemide (LASIX) 20 MG Tab   • potassium chloride ER (KLOR-CON 10) 10 MEQ tablet   • alendronate (FOSAMAX) 70 MG Tab   • vitamin D, Ergocalciferol, (DRISDOL) 1.25 MG (35488 UT) Cap capsule   • Zinc 10 MG Lozenge   • albuterol 108 (90 Base) MCG/ACT Aero Soln inhalation aerosol   • zinc sulfate (ZINCATE) 220 (50 Zn) MG Cap   • CALCIUM CARBONATE-VITAMIN D PO   • metronidazole (METROCREAM) 0.75 % cream   • methimazole (TAPAZOLE) 5 MG Tab   • Fluticasone Furoate-Vilanterol (BREO ELLIPTA) 100-25 MCG/INH AEROSOL POWDER, BREATH ACTIVATED     No current facility-administered medications for this visit.       Medication Allergy:  Allergies   Allergen Reactions   • Penicillins Rash and Itching     RXN \"a long time ago\"  Other reaction(s): Rash   • Atorvastatin      Causes low quality       Review of Systems   Neurological:        As per HPI "       Physical examination:   Vitals:    07/13/21 0813       Weight: 64.4 kg (142 lb)   Height: 1.524 m (5')        Prior exam as detailed below. On today's exam, patient was Oriented to person, place, time, and purpose, Demonstrated normal attention and Speech is fluent without errors.    General: Patient in well nourished in no apparent distress. Elevated BMI.   Eyes: Ophthalmoscopic examination performed but discs cannot be visualized well enough to characterize bilaterally. Prior exam  HENT: Normocephalic, atraumatic.   Cardiovascular: No lower extremity edema.  Respiratory: Normal respiratory effort.   Skin: No appreciable signs of acute rashes or bruising.   Musculoskeletal: No signs of joint or muscle swelling.   Psychiatric: Pleasant.     NEUROLOGICAL EXAM:   Mental status: Awake, alert and fully oriented to person, place, time and situation. Normal attention, concentration and fund of knowledge for education level.   Speech and language: Speech is fluent without errors and clear.  Cranial nerve exam:  II: Pupils are equally round and reactive to light.  Left homonymous hemianopsia.  III, IV, VI: EOMI, no diplopia, mild right ptosis nonobstructive  V: Sensation to light touch is normal over V1-3 distributions bilaterally.    VII: Very mild blunting of the right nasolabial fold.  VIII: Hearing intact to soft speech  IX: Dysarthria is not present.  XI: Shoulder shrug are symmetrical and strong. Prior exam  XII: Tongue protrudes midline. Prior exam    Motor exam:  Muscle tone is normal in all 4 limbs.    Muscle strength: 5 out of 5 proximal distal right upper and lower extremity strength.  5- out of 5 proximal left upper and lower extremity strength.  Distally she is 5- as well.    Sensory exam:  Intact to Light touch in bilateral upper and lower extremity. No sensory extinction.    Deep tendon reflexes:       Right  Left  Biceps   0/4  0/4  Triceps  0/4  0/4  Brachioradialis 0/4  0/4  Knee  jerk  0/4  0/4  Ankle jerk  0/4  0/4   bilateral toes are downgoing to plantar stimulation..    Coordination: shows a normal finger-nose-finger   Gait: Casual gait is with cane.       ANCILLARY DATA REVIEWED:   Lab Data Review:  Lab Results   Component Value Date/Time    WBC 7.4 03/22/2021 12:05 PM    RBC 3.86 (L) 03/22/2021 12:05 PM    HEMOGLOBIN 12.9 03/22/2021 12:05 PM    HEMATOCRIT 41.2 03/22/2021 12:05 PM    .7 (H) 03/22/2021 12:05 PM    MCH 33.4 (H) 03/22/2021 12:05 PM    MCHC 31.3 (L) 03/22/2021 12:05 PM    MPV 9.3 03/22/2021 12:05 PM    NEUTSPOLYS 71.60 03/22/2021 12:05 PM    LYMPHOCYTES 17.00 (L) 03/22/2021 12:05 PM    MONOCYTES 7.30 03/22/2021 12:05 PM    EOSINOPHILS 3.30 03/22/2021 12:05 PM    BASOPHILS 0.50 03/22/2021 12:05 PM    ANISOCYTOSIS 1+ 01/27/2020 10:33 AM      Lab Results   Component Value Date/Time    SODIUM 140 04/15/2021 11:16 AM    POTASSIUM 4.1 04/15/2021 11:16 AM    CHLORIDE 104 04/15/2021 11:16 AM    CO2 28 04/15/2021 11:16 AM    GLUCOSE 74 04/15/2021 11:16 AM    BUN 20 04/15/2021 11:16 AM    CREATININE 0.69 04/15/2021 11:16 AM     Lab Results   Component Value Date/Time    ASTSGOT 22 06/04/2019 1531    ALTSGPT 22 06/04/2019 1531    ALKPHOSPHAT 55 06/04/2019 1531    ALBUMIN 4.4 06/04/2019 1531     Lab Results   Component Value Date/Time    HBA1C 5.7 (H) 12/22/2020 09:20 AM        Imaging:   MRI brain with/without 2/2021:  1.  There is surgical cavity in the right parietal lobe. Post gadolinium sequences demonstrates enhancement within the surgical bed. Mild amount of hemorrhage is also noted within the surgical bed. When compared with the previous MRI there has been no significant interval change. These findings likely represent radiation induced changes. Continued follow-up is recommended.  2.  No new lesion.    Records reviewed: Chart reviewed.  Was seen twice in ED since last visit. 10/2020 for eye pain. 12/2020 for SOB, admitted for hypoxia and +COVID19 testing.     Cardiology  note reviewed dates 7/2021 - patient placed on flecainide without recurrence of Afib. Anticoagulation was stopped.       ASSESSMENT AND PLAN:    1. Hx of Intracranial hemorrhage: Incidental finding on oncology follow-up MRI on December 17 2019 with a new 12 millimeter round focus in the right parietal lobe confirmed as blood on follow-up CT head 12/19/2019.  Suspected to be subacute in nature and stable based on repeat imaging findings in 3/2020.  Patient was anticoagulated on Eliquis 5 mg twice daily at the time.  Possible mild headache but otherwise asymptomatic. Platelets and INR have been in normal limit. MRI brain 2/2021 without evidence of new lesion with stable findings likely representing radiation-induced changes.  She was anticoagulated for atrial fibrillation which is currently controlled with oral medication.  Not a candidate for watchman procedure.  No longer anticoagulated.  -Monitor clinically    2. Metastasis to brain (HCC): Patient with a history of lung cancer metastasis to the brain status post resection with radiation in 2015 complicated by radiation necrosis and vasogenic edema in 2017 status post Decadron IV.  At that time patient was started on Keppra by inpatient neurology.  She has been on the medication since that time without issue.  It is unclear since she woke up with her left-sided weakness if she had any seizure activity and Oneil's paralysis.  MRI brain in June 2016 with right temporal parietal and occipital lobe signal intensity.  Prior risks and benefits discussion with patient today regarding risk for seizure given the postoperative/radiation changes in the brain involving the temporal lobes, seizure threshold, risks and benefits of medications.  Given that she is tolerating medication very well, she has extensive changes on MRI, and independent - high functioning baseline, I believe the risk of injury and to her lifestyle is more detrimental than staying on the Keppra.  She is  higher risk compared to the general population to have a seizure due to the amount of scarring in her brain.  I personally believe the benefits outweigh the risks of being on Keppra. Patient agrees.   -Continue levETIRAcetam (KEPPRA) 500 MG Tab, 1 tablet twice daily  -She does not drive    3. Malignant neoplasm of upper lobe of right lung (HCC): Doing well    3. Atrial fibrillation (HCC), resolved: With pacemaker.  Established with Dr. Obregon cardiology.  -Patient following cardiology     4. Spell: Possible seizure in the setting of abnormal MRI brain and history of brain mets resolved per communication with oncology Dr. Mariano Aguilar 12/19/2019. Abnormal MRI changes thought to be secondary to post surgical/radiation necrosis . See HPI for documentation.     5. History of Bell's palsy: Suspect right side given clinical exam.     FOLLOW-UP: Return in about 6 months (around 1/13/2022).  For clinical monitoring  EDUCATION AND COUNSELING:  -I personally discussed the following with the patient:   Medical reasoning as above     The patient understands and agrees that due to the complexity of his/her diagnosis, results of any testing and further recommendations will typically be discussed/made during a face to face encounter in my office. The patient and/or family further understands it is their responsibility to keep proper follow up.     Disclaimer  This dictation was created using voice recognition software. I have made every reasonable attempt to avoid dictation errors, but this document may contain an error not identified before finalizing. If the error changes the accuracy of the document, I would appreciate it being brought to my attention. Thank you very much.     Irina Rodriguez MD  Neurology, Neurophysiology

## 2021-08-04 ENCOUNTER — TELEMEDICINE (OUTPATIENT)
Dept: MEDICAL GROUP | Facility: IMAGING CENTER | Age: 81
End: 2021-08-04
Payer: MEDICARE

## 2021-08-04 VITALS — HEART RATE: 69 BPM | WEIGHT: 143 LBS | OXYGEN SATURATION: 94 % | BODY MASS INDEX: 28.07 KG/M2 | HEIGHT: 60 IN

## 2021-08-04 DIAGNOSIS — R56.9 SEIZURE (HCC): ICD-10-CM

## 2021-08-04 DIAGNOSIS — G47.30 SLEEP APNEA TREATED WITH NOCTURNAL BIPAP: ICD-10-CM

## 2021-08-04 DIAGNOSIS — R60.0 LOWER EXTREMITY EDEMA: ICD-10-CM

## 2021-08-04 DIAGNOSIS — C79.31 METASTASIS TO BRAIN (HCC): ICD-10-CM

## 2021-08-04 DIAGNOSIS — I48.0 PAROXYSMAL ATRIAL FIBRILLATION (HCC): ICD-10-CM

## 2021-08-04 DIAGNOSIS — J96.10 CHRONIC RESPIRATORY FAILURE, UNSPECIFIED WHETHER WITH HYPOXIA OR HYPERCAPNIA (HCC): ICD-10-CM

## 2021-08-04 DIAGNOSIS — Z86.16 HISTORY OF COVID-19: ICD-10-CM

## 2021-08-04 DIAGNOSIS — Z99.81 ON HOME O2: ICD-10-CM

## 2021-08-04 PROCEDURE — 99214 OFFICE O/P EST MOD 30 MIN: CPT | Mod: 95 | Performed by: NURSE PRACTITIONER

## 2021-08-04 ASSESSMENT — FIBROSIS 4 INDEX: FIB4 SCORE: 1.4

## 2021-08-04 ASSESSMENT — PAIN SCALES - GENERAL: PAINLEVEL: NO PAIN

## 2021-08-04 NOTE — PROGRESS NOTES
Virtual Visit: Established Patient   This visit was conducted via Zoom using secure and encrypted videoconferencing technology. The patient was in a private location in the state of [place].    The patient's identity was confirmed and verbal consent was obtained for this virtual visit.  Patient is aware that this is a billable encounter.  Subjective:   CC:   Chief Complaint   Patient presents with   • Oxygen Dependency     currently only at night, or if oxygen is low during day      Ml Chavira is a 81 y.o. female presenting for evaluation and management of:    History of Covid-19:  On home oxygen:  Sleep apnea treated with nocturnal BiPAP:  Chronic respiratory failure:  Reports overall she is doing much better since December when she was diagnosed with Covid.  States that she is only using daytime oxygen if she dips below 90%.  Reports that she notices this more in the evening and late afternoon.  Reports that she will notice desaturation at rest and with activity.  Reports that she has recently seen pulmonology in May.  Reports that she will continue to be seen every 6 months.  States that she is using 2 L of oxygen with her BiPAP at night.  States that she is using albuterol inhaler intermittently.  Denies daily use.  States that she intermittently will have a cough and this is when she uses albuterol inhaler.  Reports that she continues to use Breo inhaler every a.m.  States that she notices when she is desatting she commonly is holding her breath.  States that she knows that she does this throughout the day when she is intently thinking.  States that she has been attempting to be more conscious of this holding pattern.  Reports that she is trying to increase her deep breathing throughout her day.  States that she will intermittently need to use her oxygen and she readily does.  Reports when she has desaturated she will use oxygen for minimum of 1 hour before removing.  States that she immediately returns  to SPO2 above 92%.  Denies any chest pain, shortness of breath, dizziness, and LOC.    Metastasis to Brain:  Seizure:  Reports that she continues to see neurology every 6 months.  States that her scans continue to be negative.  States that she continues to take Keppra 500 mg twice daily. Reports that she continues to not use anticoagulant therapy due to previously noted bleed.  No new bleeding noted.  States that neurology will continue to evaluate.  Denies any concerns at this time.    Proximal atrial fibrillation:  Lower extremity edema:  Reports that she continues to see cardiology every 6 months.  States that she continues to use reported medication regimen.  States that she continues to use Lasix with good results every other day.  States that she does not have edema when she is due to use Lasix she will skip that day.  States that she also takes potassium chloride ER 10 mEq with her Lasix.  Denies any side effects to medication and or concerns at this time.    ROS:  Constitutional: Denies fever, chills, night sweats, weight loss/gain, and/or malaise. Improving fatigue, see HPI.  HENT: Denies nasal congestion, sore throat, hearing loss, enlarged thyroid, or difficulty swallowing.   Eyes: Denies changes in vision, pain. Wears corrective wear. Since removal brain tumor in 2015, pt. reports that she has had decreased peripheral vision, worse in left eye. Does not drive.   Respiratory: Denies cough, SOB at rest or activity. Home O2 after COVID-19, see HPI.  Cardiovascular: Denies tachycardia, chest pain, or palpitations. History of lower leg swelling, managed with intermittent use of Lasix. Pt. has a pace maker due to the history of sick sinus syndrome.  Gastrointestinal: Denies N/V/C/D, abdominal pain, loss appetite, reflux, or hematochezia.  Genitourinary: Denies difficulty voiding, dysuria, nocturia, or hematuria.   Skin: Negative for rash or worrisome moles.   Neurological: Negative for dizziness, focal  "weakness and headaches.   Endo/Heme/Allergies: Denies bruise/bleed easily, allergies.   Psychiatric/Behavioral: Denies depression, nervous/anxious, difficulty sleeping.    Allergies   Allergen Reactions   • Penicillins Rash and Itching     RXN \"a long time ago\"  Other reaction(s): Rash   • Atorvastatin      Causes low quality     Current medicines (including changes today)  Current Outpatient Medications   Medication Sig Dispense Refill   • levETIRAcetam (KEPPRA) 500 MG Tab Take 1 tablet by mouth 2 times a day. 180 tablet 3   • metoprolol SR (TOPROL XL) 25 MG TABLET SR 24 HR Take 1 tablet by mouth every day. 90 tablet 3   • flecainide (TAMBOCOR) 150 MG Tab Take 1 tablet by mouth 2 times a day. 180 tablet 3   • furosemide (LASIX) 20 MG Tab Take 1 tablet by mouth every day. 90 tablet 3   • potassium chloride ER (KLOR-CON 10) 10 MEQ tablet Take 1 tablet by mouth every day. 90 tablet 3   • alendronate (FOSAMAX) 70 MG Tab Take 1 tablet by mouth every 7 days. 12 tablet 3   • vitamin D, Ergocalciferol, (DRISDOL) 1.25 MG (27648 UT) Cap capsule Take 1 capsule by mouth every 7 days. 12 capsule 3   • methimazole (TAPAZOLE) 5 MG Tab Take 0.5 Tablets by mouth every day. (Patient taking differently: Take 2.5 mg by mouth every day. Every other day) 90 tablet 1   • Zinc 10 MG Lozenge Dissolve 1 Each in the mouth 2 times a day.     • Fluticasone Furoate-Vilanterol (BREO ELLIPTA) 100-25 MCG/INH AEROSOL POWDER, BREATH ACTIVATED Inhale 1 Puff every day. Rinse mouth after use. 3 Each 3   • albuterol 108 (90 Base) MCG/ACT Aero Soln inhalation aerosol INHALE 2 PUFFS BY MOUTH EVERY 4 HOURS AS NEEDED FOR SHORTNESS OF BREATH 1 Each 11   • zinc sulfate (ZINCATE) 220 (50 Zn) MG Cap Take 220 mg by mouth every day.     • CALCIUM CARBONATE-VITAMIN D PO Take  by mouth.     • metronidazole (METROCREAM) 0.75 % cream APPLY TO FACE EVERYDAY ONCE TO TWICE A DAY FOR ROSACEA       No current facility-administered medications for this visit.     Patient " Active Problem List    Diagnosis Date Noted   • Sleep apnea treated with nocturnal BiPAP 08/07/2021   • Chronic respiratory failure (HCC) 08/07/2021   • On home O2 04/10/2021   • History of COVID-19 01/09/2021   • Lung cancer (HCC) 12/23/2020   • Paroxysmal atrial fibrillation (HCC) 12/21/2020   • Seizure (HCC) 12/21/2020   • Abnormal CBC measurement 02/06/2020   • At risk for falling 12/27/2019   • Use of cane as ambulatory aid 12/27/2019   • Lower extremity edema 12/27/2019   • High risk medication use 08/27/2019   • Multinodular goiter 08/15/2019   • Vitamin D deficiency 08/15/2019   • Osteoporosis 01/17/2019   • Hyperthyroidism 01/17/2019   • Chronic atrial fibrillation (HCC) 01/17/2019   • Postmenopausal 08/27/2018   • 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   • Balance disorder 06/21/2018   • Sick sinus syndrome (HCC) 05/31/2018   • Radionecrosis 04/14/2018   • Malignant neoplasm of upper lobe of right lung (HCC) 03/05/2018   • CVA (cerebral vascular accident) (HCC) 11/29/2017   • Allergic rhinitis 01/12/2016   • Hilar adenopathy 01/12/2016   • History of breast cancer 01/12/2016   • Lung mass 11/19/2015   • Blurry vision, left eye 11/19/2015   • Metastasis to brain (HCC) 11/18/2015   • Metastatic cancer (CMS-HCC) 11/18/2015     Family History   Problem Relation Age of Onset   • Dementia Mother    • Diabetes Mother    • Other Mother         osteoarthritis   • Arthritis Mother    • Autoimmune Disease Father         Rheumatoid arthritis   • Arthritis Father    • Cancer Brother         prostate      She  has a past medical history of Acute respiratory failure with hypoxia (HCC) (12/20/2020), Breast cancer (HCC), Cancer (HCC), Chickenpox, Indonesian measles, Healthcare maintenance (7/23/2018), Influenza, Joint pain, Lung cancer (HCC), Mumps, Need for vaccination (1/17/2019), Pneumonia due to COVID-19 virus (12/20/2020), Radionecrosis (4/14/2018), Sick sinus syndrome (HCC), Thyroid disease, and Tonsillitis.  She  has a past  surgical history that includes craniotomy stealth (Right, 11/23/2015); other; appendectomy; knee arthroscopy; craniotomy; tonsillectomy; pacemaker insertion; and lumpectomy.     Objective:   Pulse 69   Ht 1.524 m (5')   Wt 64.9 kg (143 lb)   SpO2 94%   BMI 27.93 kg/m²   Respiratory rate observed during visit: 14 bpm    Physical Exam:  Constitutional: Alert, no distress, well-groomed.  Skin: No rashes in visible areas.  Eye: Round. Conjunctiva clear, lids normal. No icterus.   ENMT: Lips pink without lesions, good dentition, moist mucous membranes. Phonation normal.  Neck: No masses, no thyromegaly. Moves freely without pain.  Respiratory: Unlabored respiratory effort, no cough or audible wheeze  Psych: Alert and oriented x3, normal affect and mood.     Assessment and Plan:   1. Sleep apnea treated with nocturnal BiPAP  2. Chronic respiratory failure, unspecified whether with hypoxia or hypercapnia (HCC)  3. On home O2  4. History of COVID-19  This is a chronic stable condition.  Instructed to follow plan of care with pulmonology.  Instructed to continue medication regimen.  Aware of possible side effects.  Instructed to seek emergency services if she experiences any shortness of breath and/or unable to improve SPO2 when using oxygen, verbalized understanding and willingness.    5. Metastasis to brain (HCC)  6. Seizure (HCC)  This is a chronic stable condition.  Instructed to continue following plan of care of neurology.  Instructed to continue medication regimen.  Aware of possible side effects.    7. Lower extremity edema  8. Paroxysmal atrial fibrillation (HCC)  This is a chronic stable condition.  Instructed to continue cardiology plan of care.  Instructed to continue medication regimen as prescribed.  Aware of possible side effects.  Instructed to seek emergency services if she has any concerns and/or experiences any heart palpitations and/or chest pain, verbalized understanding and  willingness.    Follow-up: Return in about 6 months (around 2/4/2022).

## 2021-08-07 PROBLEM — G47.30 SLEEP APNEA TREATED WITH NOCTURNAL BIPAP: Status: ACTIVE | Noted: 2021-08-07

## 2021-08-07 PROBLEM — J96.10 CHRONIC RESPIRATORY FAILURE (HCC): Status: ACTIVE | Noted: 2021-08-07

## 2021-08-07 NOTE — ASSESSMENT & PLAN NOTE
Reports overall she is doing much better since December when she was diagnosed with Covid.  States that she is only using daytime oxygen if she dips below 90%.  Reports that she notices this more in the evening and late afternoon.  Reports that she will notice desaturation at rest and with activity.  Reports that she has recently seen pulmonology in May.  Reports that she will continue to be seen every 6 months.  States that she is using 2 L of oxygen with her BiPAP at night.  States that she is using albuterol inhaler intermittently.  Denies daily use.  States that she intermittently will have a cough and this is when she uses albuterol inhaler.  States that she notices when she is desatting she commonly is holding her breath.  States that she knows that she does this throughout the day when she is intently thinking.  States that she has been attempting to be more conscious of this holding pattern.  Reports that she is trying to increase her deep breathing throughout her day.  States that she will intermittently need to use her oxygen and she readily does.  Reports when she has desaturated she will use oxygen for minimum of 1 hour before removing.  States that she immediately returns to SPO2 above 92%.  Denies any chest pain, shortness of breath, dizziness, and LOC.

## 2021-08-20 ENCOUNTER — NON-PROVIDER VISIT (OUTPATIENT)
Dept: CARDIOLOGY | Facility: MEDICAL CENTER | Age: 81
End: 2021-08-20
Payer: MEDICARE

## 2021-08-20 PROCEDURE — 99999 PR NO CHARGE: CPT | Performed by: INTERNAL MEDICINE

## 2021-09-07 ENCOUNTER — HOSPITAL ENCOUNTER (OUTPATIENT)
Dept: LAB | Facility: MEDICAL CENTER | Age: 81
End: 2021-09-07
Attending: INTERNAL MEDICINE
Payer: MEDICARE

## 2021-09-07 LAB
ALBUMIN SERPL BCP-MCNC: 4.4 G/DL (ref 3.2–4.9)
ALBUMIN/GLOB SERPL: 1.6 G/DL
ALP SERPL-CCNC: 60 U/L (ref 30–99)
ALT SERPL-CCNC: 47 U/L (ref 2–50)
ANION GAP SERPL CALC-SCNC: 9 MMOL/L (ref 7–16)
AST SERPL-CCNC: 28 U/L (ref 12–45)
BASOPHILS # BLD AUTO: 0.6 % (ref 0–1.8)
BASOPHILS # BLD: 0.04 K/UL (ref 0–0.12)
BILIRUB SERPL-MCNC: 0.4 MG/DL (ref 0.1–1.5)
BUN SERPL-MCNC: 15 MG/DL (ref 8–22)
CALCIUM SERPL-MCNC: 9.8 MG/DL (ref 8.5–10.5)
CEA SERPL-MCNC: 3.8 NG/ML (ref 0–3)
CHLORIDE SERPL-SCNC: 107 MMOL/L (ref 96–112)
CO2 SERPL-SCNC: 26 MMOL/L (ref 20–33)
CREAT SERPL-MCNC: 0.69 MG/DL (ref 0.5–1.4)
EOSINOPHIL # BLD AUTO: 0.15 K/UL (ref 0–0.51)
EOSINOPHIL NFR BLD: 2.2 % (ref 0–6.9)
ERYTHROCYTE [DISTWIDTH] IN BLOOD BY AUTOMATED COUNT: 54.4 FL (ref 35.9–50)
GLOBULIN SER CALC-MCNC: 2.7 G/DL (ref 1.9–3.5)
GLUCOSE SERPL-MCNC: 97 MG/DL (ref 65–99)
HCT VFR BLD AUTO: 44.7 % (ref 37–47)
HGB BLD-MCNC: 14.1 G/DL (ref 12–16)
IMM GRANULOCYTES # BLD AUTO: 0.04 K/UL (ref 0–0.11)
IMM GRANULOCYTES NFR BLD AUTO: 0.6 % (ref 0–0.9)
LYMPHOCYTES # BLD AUTO: 1.38 K/UL (ref 1–4.8)
LYMPHOCYTES NFR BLD: 20.1 % (ref 22–41)
MCH RBC QN AUTO: 33.5 PG (ref 27–33)
MCHC RBC AUTO-ENTMCNC: 31.5 G/DL (ref 33.6–35)
MCV RBC AUTO: 106.2 FL (ref 81.4–97.8)
MONOCYTES # BLD AUTO: 0.72 K/UL (ref 0–0.85)
MONOCYTES NFR BLD AUTO: 10.5 % (ref 0–13.4)
NEUTROPHILS # BLD AUTO: 4.54 K/UL (ref 2–7.15)
NEUTROPHILS NFR BLD: 66 % (ref 44–72)
NRBC # BLD AUTO: 0 K/UL
NRBC BLD-RTO: 0 /100 WBC
PLATELET # BLD AUTO: 219 K/UL (ref 164–446)
PMV BLD AUTO: 9.5 FL (ref 9–12.9)
POTASSIUM SERPL-SCNC: 4.8 MMOL/L (ref 3.6–5.5)
PROT SERPL-MCNC: 7.1 G/DL (ref 6–8.2)
RBC # BLD AUTO: 4.21 M/UL (ref 4.2–5.4)
SODIUM SERPL-SCNC: 142 MMOL/L (ref 135–145)
WBC # BLD AUTO: 6.9 K/UL (ref 4.8–10.8)

## 2021-09-07 PROCEDURE — 36415 COLL VENOUS BLD VENIPUNCTURE: CPT

## 2021-09-07 PROCEDURE — 80053 COMPREHEN METABOLIC PANEL: CPT

## 2021-09-07 PROCEDURE — 85025 COMPLETE CBC W/AUTO DIFF WBC: CPT

## 2021-09-07 PROCEDURE — 82378 CARCINOEMBRYONIC ANTIGEN: CPT

## 2021-09-08 ENCOUNTER — TELEPHONE (OUTPATIENT)
Dept: ENDOCRINOLOGY | Facility: MEDICAL CENTER | Age: 81
End: 2021-09-08

## 2021-09-08 NOTE — TELEPHONE ENCOUNTER
VOICEMAIL  1. Caller Name: Ml Chavira                        Call Back Number: [phone]      2. Message: Patient called to know because she wanted to schedule an appointment for 6 mo FV    3. Patient approves office to leave a detailed voicemail/MyChart message: yes    I called patient and let her know she that she had an merary with Dr. Joseph on 11/02/21 and gave her the time. I reminded patient to do labs and imagining.

## 2021-09-21 ENCOUNTER — TELEPHONE (OUTPATIENT)
Dept: MEDICAL GROUP | Facility: IMAGING CENTER | Age: 81
End: 2021-09-21

## 2021-09-22 NOTE — TELEPHONE ENCOUNTER
Yes, I think she should get the flu shot. She is also a candidate for a third Moderna shot due to her age. I would prefer her get that and then in 2-4 weeks from getting booster shot get a flu shot.  Jahaira Santos, APRN-C

## 2021-09-22 NOTE — TELEPHONE ENCOUNTER
Patient called left message with question regarding Flu shot.     She would like to find out from [facility] whether she should get the flu shot.     She received the Moderna Covid Vaccine both doses in March/April.    Ph. [phone]

## 2021-09-23 NOTE — TELEPHONE ENCOUNTER
Returned patient's call, spoke with patient and conveyed providers message below. Patient confirmed understanding.

## 2021-09-29 ENCOUNTER — TELEPHONE (OUTPATIENT)
Dept: ENDOCRINOLOGY | Facility: MEDICAL CENTER | Age: 81
End: 2021-09-29

## 2021-09-29 NOTE — TELEPHONE ENCOUNTER
VOICEMAIL  1. Caller Name: Ml Chavira                        Call Back Number: [phone] (home)      2. Message: Patient called and left a message stating that she wanted to make sure that the lab orders were in her chart. I have called her back and spoke with her letting her know that the lab orders are in her chart for her to get done.    She understood and would get them done.     3. Patient approves office to leave a detailed voicemail/Apportablehart message: N\A

## 2021-10-04 ENCOUNTER — TELEPHONE (OUTPATIENT)
Dept: HEALTH INFORMATION MANAGEMENT | Facility: OTHER | Age: 81
End: 2021-10-04

## 2021-10-04 ENCOUNTER — TELEPHONE (OUTPATIENT)
Dept: ENDOCRINOLOGY | Facility: MEDICAL CENTER | Age: 81
End: 2021-10-04

## 2021-10-04 NOTE — TELEPHONE ENCOUNTER
VOICEMAIL  1. Caller Name: Ml Chavira                        Call Back Number: [phone] (home)      2. Message: Patient called and left a message stating she wanted us to call her back. I have called her back and we talked about her medication and that she needs to fast for her lab work. She understood and would do so.     3. Patient approves office to leave a detailed voicemail/MyChart message: N\A

## 2021-10-04 NOTE — TELEPHONE ENCOUNTER
Patient requested call back.. Informed lab requested by endo non-fasting and also for u/s no prep needed according to notes. No further assistance needed.

## 2021-10-06 ENCOUNTER — HOSPITAL ENCOUNTER (OUTPATIENT)
Dept: LAB | Facility: MEDICAL CENTER | Age: 81
End: 2021-10-06
Attending: INTERNAL MEDICINE
Payer: MEDICARE

## 2021-10-06 ENCOUNTER — HOSPITAL ENCOUNTER (OUTPATIENT)
Dept: RADIOLOGY | Facility: MEDICAL CENTER | Age: 81
End: 2021-10-06
Attending: INTERNAL MEDICINE
Payer: MEDICARE

## 2021-10-06 DIAGNOSIS — M81.0 OSTEOPOROSIS, UNSPECIFIED OSTEOPOROSIS TYPE, UNSPECIFIED PATHOLOGICAL FRACTURE PRESENCE: ICD-10-CM

## 2021-10-06 DIAGNOSIS — E04.2 MULTINODULAR GOITER: ICD-10-CM

## 2021-10-06 DIAGNOSIS — E05.90 HYPERTHYROIDISM: ICD-10-CM

## 2021-10-06 DIAGNOSIS — E55.9 VITAMIN D DEFICIENCY: ICD-10-CM

## 2021-10-06 LAB
25(OH)D3 SERPL-MCNC: 40 NG/ML (ref 30–100)
ALBUMIN SERPL BCP-MCNC: 4.8 G/DL (ref 3.2–4.9)
ALBUMIN/GLOB SERPL: 2 G/DL
ALP SERPL-CCNC: 58 U/L (ref 30–99)
ALT SERPL-CCNC: 24 U/L (ref 2–50)
ANION GAP SERPL CALC-SCNC: 10 MMOL/L (ref 7–16)
AST SERPL-CCNC: 20 U/L (ref 12–45)
BILIRUB SERPL-MCNC: 0.3 MG/DL (ref 0.1–1.5)
BUN SERPL-MCNC: 22 MG/DL (ref 8–22)
CALCIUM SERPL-MCNC: 9.8 MG/DL (ref 8.5–10.5)
CHLORIDE SERPL-SCNC: 105 MMOL/L (ref 96–112)
CO2 SERPL-SCNC: 27 MMOL/L (ref 20–33)
CREAT SERPL-MCNC: 0.67 MG/DL (ref 0.5–1.4)
GLOBULIN SER CALC-MCNC: 2.4 G/DL (ref 1.9–3.5)
GLUCOSE SERPL-MCNC: 94 MG/DL (ref 65–99)
PHOSPHATE SERPL-MCNC: 3.4 MG/DL (ref 2.5–4.5)
POTASSIUM SERPL-SCNC: 4.4 MMOL/L (ref 3.6–5.5)
PROT SERPL-MCNC: 7.2 G/DL (ref 6–8.2)
PTH-INTACT SERPL-MCNC: 36.3 PG/ML (ref 14–72)
SODIUM SERPL-SCNC: 142 MMOL/L (ref 135–145)
T3FREE SERPL-MCNC: 2.92 PG/ML (ref 2–4.4)
T4 FREE SERPL-MCNC: 0.98 NG/DL (ref 0.93–1.7)
TSH SERPL DL<=0.005 MIU/L-ACNC: 0.91 UIU/ML (ref 0.38–5.33)

## 2021-10-06 PROCEDURE — 84443 ASSAY THYROID STIM HORMONE: CPT

## 2021-10-06 PROCEDURE — 76536 US EXAM OF HEAD AND NECK: CPT

## 2021-10-06 PROCEDURE — 80053 COMPREHEN METABOLIC PANEL: CPT

## 2021-10-06 PROCEDURE — 84481 FREE ASSAY (FT-3): CPT

## 2021-10-06 PROCEDURE — 82306 VITAMIN D 25 HYDROXY: CPT

## 2021-10-06 PROCEDURE — 36415 COLL VENOUS BLD VENIPUNCTURE: CPT

## 2021-10-06 PROCEDURE — 83970 ASSAY OF PARATHORMONE: CPT

## 2021-10-06 PROCEDURE — 84100 ASSAY OF PHOSPHORUS: CPT

## 2021-10-06 PROCEDURE — 84439 ASSAY OF FREE THYROXINE: CPT

## 2021-10-07 ENCOUNTER — APPOINTMENT (RX ONLY)
Dept: URBAN - METROPOLITAN AREA CLINIC 4 | Facility: CLINIC | Age: 81
Setting detail: DERMATOLOGY
End: 2021-10-07

## 2021-10-07 DIAGNOSIS — L85.3 XEROSIS CUTIS: ICD-10-CM

## 2021-10-07 DIAGNOSIS — D18.0 HEMANGIOMA: ICD-10-CM

## 2021-10-07 DIAGNOSIS — D22 MELANOCYTIC NEVI: ICD-10-CM

## 2021-10-07 DIAGNOSIS — L81.4 OTHER MELANIN HYPERPIGMENTATION: ICD-10-CM

## 2021-10-07 DIAGNOSIS — L82.1 OTHER SEBORRHEIC KERATOSIS: ICD-10-CM

## 2021-10-07 DIAGNOSIS — I78.8 OTHER DISEASES OF CAPILLARIES: ICD-10-CM

## 2021-10-07 PROBLEM — D18.01 HEMANGIOMA OF SKIN AND SUBCUTANEOUS TISSUE: Status: ACTIVE | Noted: 2021-10-07

## 2021-10-07 PROBLEM — D48.5 NEOPLASM OF UNCERTAIN BEHAVIOR OF SKIN: Status: ACTIVE | Noted: 2021-10-07

## 2021-10-07 PROBLEM — D22.5 MELANOCYTIC NEVI OF TRUNK: Status: ACTIVE | Noted: 2021-10-07

## 2021-10-07 PROBLEM — D22.61 MELANOCYTIC NEVI OF RIGHT UPPER LIMB, INCLUDING SHOULDER: Status: ACTIVE | Noted: 2021-10-07

## 2021-10-07 PROBLEM — D22.62 MELANOCYTIC NEVI OF LEFT UPPER LIMB, INCLUDING SHOULDER: Status: ACTIVE | Noted: 2021-10-07

## 2021-10-07 PROCEDURE — ? BIOPSY BY SHAVE METHOD

## 2021-10-07 PROCEDURE — ? COUNSELING

## 2021-10-07 PROCEDURE — 11102 TANGNTL BX SKIN SINGLE LES: CPT

## 2021-10-07 PROCEDURE — 99213 OFFICE O/P EST LOW 20 MIN: CPT | Mod: 25

## 2021-10-07 PROCEDURE — ? LIQUID NITROGEN

## 2021-10-07 ASSESSMENT — LOCATION DETAILED DESCRIPTION DERM
LOCATION DETAILED: RIGHT LATERAL ZYGOMA
LOCATION DETAILED: RIGHT MEDIAL FOREHEAD
LOCATION DETAILED: NASAL DORSUM
LOCATION DETAILED: LEFT INFERIOR POSTAURICULAR SKIN
LOCATION DETAILED: RIGHT DISTAL DORSAL FOREARM
LOCATION DETAILED: LEFT VENTRAL PROXIMAL FOREARM
LOCATION DETAILED: RIGHT ANTERIOR DISTAL UPPER ARM
LOCATION DETAILED: MIDDLE STERNUM
LOCATION DETAILED: RIGHT INFERIOR UPPER BACK
LOCATION DETAILED: LEFT VENTRAL DISTAL FOREARM
LOCATION DETAILED: RIGHT SUPERIOR LATERAL NECK
LOCATION DETAILED: SUPERIOR LUMBAR SPINE
LOCATION DETAILED: LEFT LATERAL SUPERIOR CHEST
LOCATION DETAILED: LEFT POSTERIOR SHOULDER
LOCATION DETAILED: SUBXIPHOID
LOCATION DETAILED: RIGHT DISTAL PRETIBIAL REGION
LOCATION DETAILED: RIGHT VENTRAL PROXIMAL FOREARM
LOCATION DETAILED: LEFT RIB CAGE
LOCATION DETAILED: RIGHT MEDIAL UPPER BACK
LOCATION DETAILED: LEFT INFERIOR MEDIAL FOREHEAD
LOCATION DETAILED: RIGHT MEDIAL SUPERIOR CHEST
LOCATION DETAILED: LOWER STERNUM
LOCATION DETAILED: PERIUMBILICAL SKIN
LOCATION DETAILED: LEFT DISTAL PRETIBIAL REGION
LOCATION DETAILED: RIGHT INFERIOR MEDIAL UPPER BACK
LOCATION DETAILED: LEFT ANTECUBITAL SKIN
LOCATION DETAILED: RIGHT ANTECUBITAL SKIN
LOCATION DETAILED: RIGHT MID-UPPER BACK
LOCATION DETAILED: LEFT VENTRAL LATERAL PROXIMAL FOREARM
LOCATION DETAILED: RIGHT RIB CAGE
LOCATION DETAILED: LEFT VENTRAL LATERAL DISTAL FOREARM
LOCATION DETAILED: RIGHT PROXIMAL RADIAL DORSAL FOREARM

## 2021-10-07 ASSESSMENT — LOCATION ZONE DERM
LOCATION ZONE: SCALP
LOCATION ZONE: TRUNK
LOCATION ZONE: NOSE
LOCATION ZONE: ARM
LOCATION ZONE: FACE
LOCATION ZONE: NECK
LOCATION ZONE: LEG

## 2021-10-07 ASSESSMENT — LOCATION SIMPLE DESCRIPTION DERM
LOCATION SIMPLE: LEFT FOREARM
LOCATION SIMPLE: LEFT SHOULDER
LOCATION SIMPLE: LEFT UPPER ARM
LOCATION SIMPLE: LOWER BACK
LOCATION SIMPLE: LEFT PRETIBIAL REGION
LOCATION SIMPLE: SCALP
LOCATION SIMPLE: RIGHT ZYGOMA
LOCATION SIMPLE: ABDOMEN
LOCATION SIMPLE: RIGHT FOREHEAD
LOCATION SIMPLE: NECK
LOCATION SIMPLE: NOSE
LOCATION SIMPLE: RIGHT UPPER BACK
LOCATION SIMPLE: CHEST
LOCATION SIMPLE: LEFT FOREHEAD
LOCATION SIMPLE: RIGHT PRETIBIAL REGION
LOCATION SIMPLE: RIGHT UPPER ARM
LOCATION SIMPLE: RIGHT FOREARM

## 2021-10-07 NOTE — PROCEDURE: LIQUID NITROGEN
Post-Care Instructions: I reviewed with the patient in detail post-care instructions. Patient is to wear sunprotection, and avoid picking at any of the treated lesions. Pt may apply Vaseline to crusted or scabbing areas.
Bill Insurance (You Assume Risk Of Denial Or Audit By Selecting Yes): No
Detail Level: Detailed
Medical Necessity Information: It is in your best interest to select a reason for this procedure from the list below. All of these items fulfill various CMS LCD requirements except the new and changing color options.
Consent: The patient's consent was obtained including but not limited to risks of crusting, scabbing, blistering, scarring, darker or lighter pigmentary change, recurrence, incomplete removal and infection.
Medical Necessity Clause: This procedure was medically necessary because the lesions that were treated were:  If lesion does not resolve, bx is needed.
Duration Of Freeze Thaw-Cycle (Seconds): 0

## 2021-10-08 ENCOUNTER — TELEPHONE (OUTPATIENT)
Dept: MEDICAL GROUP | Facility: IMAGING CENTER | Age: 81
End: 2021-10-08

## 2021-10-08 NOTE — TELEPHONE ENCOUNTER
Message received from pt inquiring if ok to get flu shot now since moderna booster is not yet approved.     Also, pt would like to drop off a DMV handicap sticker form. Inquiring if armani will sign this for her.

## 2021-10-15 ENCOUNTER — HOSPITAL ENCOUNTER (OUTPATIENT)
Dept: RADIOLOGY | Facility: MEDICAL CENTER | Age: 81
End: 2021-10-15
Attending: INTERNAL MEDICINE
Payer: MEDICARE

## 2021-10-15 ENCOUNTER — TELEPHONE (OUTPATIENT)
Dept: MEDICAL GROUP | Facility: IMAGING CENTER | Age: 81
End: 2021-10-15

## 2021-10-15 VITALS — OXYGEN SATURATION: 97 % | HEART RATE: 75 BPM | DIASTOLIC BLOOD PRESSURE: 53 MMHG | SYSTOLIC BLOOD PRESSURE: 114 MMHG

## 2021-10-15 DIAGNOSIS — C79.31 SECONDARY MALIGNANT NEOPLASM OF BRAIN (HCC): ICD-10-CM

## 2021-10-15 DIAGNOSIS — C34.11 MALIGNANT NEOPLASM OF UPPER LOBE, RIGHT BRONCHUS OR LUNG (HCC): ICD-10-CM

## 2021-10-15 DIAGNOSIS — Z85.3 PERSONAL HISTORY OF MALIGNANT NEOPLASM OF BREAST: ICD-10-CM

## 2021-10-15 PROCEDURE — 700117 HCHG RX CONTRAST REV CODE 255: Performed by: INTERNAL MEDICINE

## 2021-10-15 PROCEDURE — 70553 MRI BRAIN STEM W/O & W/DYE: CPT | Mod: MH

## 2021-10-15 PROCEDURE — 71260 CT THORAX DX C+: CPT | Mod: MH

## 2021-10-15 PROCEDURE — A9576 INJ PROHANCE MULTIPACK: HCPCS | Performed by: INTERNAL MEDICINE

## 2021-10-15 RX ADMIN — GADOTERIDOL 15 ML: 279.3 INJECTION, SOLUTION INTRAVENOUS at 10:17

## 2021-10-15 RX ADMIN — IOHEXOL 75 ML: 350 INJECTION, SOLUTION INTRAVENOUS at 11:47

## 2021-10-15 NOTE — TELEPHONE ENCOUNTER
Patient calling in. Patient states she received the letter that Jahaira is leaving. She would like to know if Jahaira is going anywhere else or if she should establish with a new provider. Patient scheduled visit with Sweetie Harris per Jahaira's recommendation.     Patient also inquiring about the covid booster vaccine and the flu shot. Patient states she received pfizer and her 2nd dose was 04/23/21. Notified patient the current recommendation is 6 months after the 2nd dose. Notified patient she can get her flu shot now and then wait 2 weeks to receive her covid booster vaccine. Patient understood recommendation and no further questions at this time.

## 2021-10-15 NOTE — PROGRESS NOTES
MRI RN note: pt placed on MRI pace mode by rep remotely. Pt's vital signs monitored throughout MRI test, tolerated well, no c/o any discomfort, VSS. Pt returned to permanent pacing mode after MRI test.

## 2021-11-02 ENCOUNTER — OFFICE VISIT (OUTPATIENT)
Dept: ENDOCRINOLOGY | Facility: MEDICAL CENTER | Age: 81
End: 2021-11-02
Attending: INTERNAL MEDICINE
Payer: MEDICARE

## 2021-11-02 VITALS
RESPIRATION RATE: 16 BRPM | OXYGEN SATURATION: 92 % | HEART RATE: 80 BPM | BODY MASS INDEX: 28.29 KG/M2 | WEIGHT: 153.7 LBS | DIASTOLIC BLOOD PRESSURE: 60 MMHG | SYSTOLIC BLOOD PRESSURE: 100 MMHG | HEIGHT: 62 IN

## 2021-11-02 DIAGNOSIS — Z79.899 HIGH RISK MEDICATION USE: ICD-10-CM

## 2021-11-02 DIAGNOSIS — M81.0 OSTEOPOROSIS, UNSPECIFIED OSTEOPOROSIS TYPE, UNSPECIFIED PATHOLOGICAL FRACTURE PRESENCE: ICD-10-CM

## 2021-11-02 DIAGNOSIS — E05.90 HYPERTHYROIDISM: ICD-10-CM

## 2021-11-02 DIAGNOSIS — E04.2 MULTINODULAR GOITER: ICD-10-CM

## 2021-11-02 DIAGNOSIS — E55.9 VITAMIN D DEFICIENCY: ICD-10-CM

## 2021-11-02 PROCEDURE — 99214 OFFICE O/P EST MOD 30 MIN: CPT | Performed by: INTERNAL MEDICINE

## 2021-11-02 PROCEDURE — 99211 OFF/OP EST MAY X REQ PHY/QHP: CPT | Performed by: INTERNAL MEDICINE

## 2021-11-02 RX ORDER — ALENDRONATE SODIUM 70 MG/1
70 TABLET ORAL
Qty: 12 TABLET | Refills: 3 | Status: SHIPPED | OUTPATIENT
Start: 2021-11-02 | End: 2022-02-02 | Stop reason: SDUPTHER

## 2021-11-02 RX ORDER — ERGOCALCIFEROL 1.25 MG/1
50000 CAPSULE ORAL
Qty: 12 CAPSULE | Refills: 3 | Status: SHIPPED | OUTPATIENT
Start: 2021-11-02 | End: 2022-02-02 | Stop reason: SDUPTHER

## 2021-11-02 RX ORDER — METHIMAZOLE 5 MG/1
2.5 TABLET ORAL DAILY
Qty: 90 TABLET | Refills: 1 | Status: SHIPPED | OUTPATIENT
Start: 2021-11-02 | End: 2022-02-02 | Stop reason: SDUPTHER

## 2021-11-02 ASSESSMENT — FIBROSIS 4 INDEX: FIB4 SCORE: 1.55

## 2021-11-02 NOTE — PROGRESS NOTES
Chief Complaint: Follow up for Hyperthyroidism secondary to Grave's disease, history of multinodular goiter, history of osteoporosis.      HPI:     Ml Chavira is a 80 y.o. female here for follow up of the above medical issues.    She has a history of hyperthyroidism secondary to probable Graves' disease based on high normal uptake on her thyroid uptake and scan from 2018 and also from elevated thyrotropin receptor antibodies.  There is also possibility that she has autonomous thyroid nodules.      She remains on Methimazole 2.5 mg every day which has been her dose for over 12  months.   She reports excellent compliance and denies missing any daily doses.       She denies symptoms of thyrotoxicosis such as palpitations, tremors, insomnia and diarrhea    TSH is normal 0.910 with a free T4 of 0.98 and free T3 of 2.92 on October 6, 2021      She previously had a formal thyroid ultrasound on July 2019 which showed:   hypoechoic solid nodule 2.1 cm in the right lower lobe TR 5   which was biopsied and proven benign on August 22, 2019    1.7 cm hypoechoic solid nodule on the right lower lobe TR 5   which was biopsied and proven benign on August 22, 2019    1.9 cm hypoechoic solid nodule on the left mid lobe TR 6   which was biopsied and proven benign on August 22, 2019    Repeat ultrasound on October 6, 2021 showed stability of the previously biopsied solid nodules on the right lower lobe and left mid lobe    There were new nodules detected that were highly suspicious located on the right upper lobe measuring 1.7 cm and left upper lobe measuring 1.7 cm with TR scores of 9 and 6 respectively          She also has osteoporosis.   Her DEXA on 10/16/2020 showed the lowest T score of -2.4 for the left hip which is better compared to the DEXA in 2018 showing significant improvement in bone mineral density for the lumbar spine and hip  She has been taking alendronate 70 mg weekly for the past 4 years and has tolerated  the medication well.   She takes calcium 1200mg daily with Vitamin D3 5000u daily    Recently she has complained of mild intermittent diarrhea but we discussed that diarrhea is not an expected side effect of alendronate   she denies interval falls and fractures.    Her vitamin D is 40 with a calcium of 9.8   PTH of 36 on October 6, 2021        Patient's medications, allergies, and social histories were reviewed and updated as appropriate.      ROS:     CONS:     No fever, no chills   EYES:     No diplopia, no blurry vision   CV:           No chest pain, no palpitations   PULM:     No SOB, no cough, no hemoptysis.   GI:            No nausea, no vomiting, no diarrhea, no constipation   ENDO:     No polyuria, no polydipsia, no heat intolerance, no cold intolerance       Past Medical History:  Problem List:  2021-08: Sleep apnea treated with nocturnal BiPAP  2021-08: Chronic respiratory failure (HCC)  2021-04: On home O2  2021-01: History of COVID-19  2020-12: Lung cancer ([facility])  2020-12: Hypokalemia  2020-12: Paroxysmal atrial fibrillation ([facility])  2020-12: Seizure ([facility])  2020-12: Macrocytosis  2020-12: Leukopenia  2020-12: Acute respiratory failure with hypoxia ([facility])  2020-12: Pneumonia due to COVID-19 virus  2020-02: Abnormal CBC measurement  2019-12: At risk for falling  2019-12: Use of cane as ambulatory aid  2019-12: Lower extremity edema  2019-08: High risk medication use  2019-08: Multinodular goiter  2019-08: Vitamin D deficiency  2019-01: Osteoporosis  2019-01: Need for vaccination  2019-01: Hyperthyroidism  2019-01: Chronic atrial fibrillation (HCC)  2018-08: Postmenopausal  2018-08: Low TSH level  2018-07: High serum thyroid stimulating hormone (TSH)  2018-07: Healthcare maintenance  2018-07: Screening for osteoporosis  2018-07: Thyroid nodule  2018-07: Low vitamin D level  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  2018-06: Arthralgia of both knees  2018-06: Finger pain, right  2018-06: Balance disorder  2018-06: Acute  "cystitis  2018: Acute alcoholic gastritis without hemorrhage  2018: Pleuritic chest pain  2018: Sick sinus syndrome (HCC)  2018: Radionecrosis  2018: Malignant neoplasm of upper lobe of right lung (HCC)  2017: CVA (cerebral vascular accident) (HCC)  2016: Allergic rhinitis  2016: Hilar adenopathy  2016: History of breast cancer  2015: Lung mass  2015: Blurry vision, left eye  2015: Metastasis to brain (HCC)  2015: Metastatic cancer (CMS-HCC)      Past Surgical History:  Past Surgical History:   Procedure Laterality Date   • CRANIOTOMY STEALTH Right 2015    Procedure: CRANIOTOMY STEALTH-right occipital;  Surgeon: Suyapa Schumacher M.D.;  Location: SURGERY [facility];  Service:    • APPENDECTOMY     • CRANIOTOMY     • KNEE ARTHROSCOPY      left    • LUMPECTOMY     • OTHER      left knee surgery   • PACEMAKER INSERTION     • TONSILLECTOMY          Allergies:  Penicillins     Social History:  Social History     Tobacco Use   • Smoking status: Former Smoker     Packs/day: 2.00     Years: 20.00     Pack years: 40.00     Types: Cigarettes     Quit date:      Years since quittin.8   • Smokeless tobacco: Never Used   Vaping Use   • Vaping Use: Never used   Substance Use Topics   • Alcohol use: Yes     Alcohol/week: 1.8 oz     Types: 3 Glasses of wine per week     Comment: occasionally    • Drug use: No        Family History:   family history includes Arthritis in her father and mother; Autoimmune Disease in her father; Cancer in her brother; Dementia in her mother; Diabetes in her mother; Other in her mother.      PHYSICAL EXAM:   Vital signs: /60 (BP Location: Left arm, Patient Position: Sitting, BP Cuff Size: Adult)   Pulse 80   Resp 16   Ht 1.575 m (5' 2\")   Wt 69.7 kg (153 lb 11.2 oz)   SpO2 92%   BMI 28.11 kg/m²   GENERAL: Well-developed, well-nourished in no apparent distress.   EYE:  No ocular asymmetry, PERRLA, No exophthalmos or lid lag  HENT: " Pink, moist mucous membranes.    NECK: Thyroid is slightly enlarged and feels bosselated  CARDIOVASCULAR:  No murmurs  LUNGS: Clear breath sounds  ABDOMEN: Soft, nontender   EXTREMITIES: No clubbing, cyanosis, or edema.   NEUROLOGICAL: No gross focal motor abnormalities, No visible tremors with both hands  LYMPH: No cervical adenopathy palpated.   SKIN: No rashes, lesions.     Labs:  Lab Results   Component Value Date/Time    WBC 6.9 09/07/2021 09:04 AM    RBC 4.21 09/07/2021 09:04 AM    HEMOGLOBIN 14.1 09/07/2021 09:04 AM    .2 (H) 09/07/2021 09:04 AM    MCH 33.5 (H) 09/07/2021 09:04 AM    MCHC 31.5 (L) 09/07/2021 09:04 AM    RDW 54.4 (H) 09/07/2021 09:04 AM    MPV 9.5 09/07/2021 09:04 AM       Lab Results   Component Value Date/Time    SODIUM 142 10/06/2021 09:53 AM    POTASSIUM 4.4 10/06/2021 09:53 AM    CHLORIDE 105 10/06/2021 09:53 AM    CO2 27 10/06/2021 09:53 AM    ANION 10.0 10/06/2021 09:53 AM    GLUCOSE 94 10/06/2021 09:53 AM    BUN 22 10/06/2021 09:53 AM    CREATININE 0.67 10/06/2021 09:53 AM    CALCIUM 9.8 10/06/2021 09:53 AM    ASTSGOT 20 10/06/2021 09:53 AM    ALTSGPT 24 10/06/2021 09:53 AM    TBILIRUBIN 0.3 10/06/2021 09:53 AM    ALBUMIN 4.8 10/06/2021 09:53 AM    TOTPROTEIN 7.2 10/06/2021 09:53 AM    GLOBULIN 2.4 10/06/2021 09:53 AM    AGRATIO 2.0 10/06/2021 09:53 AM       Lab Results   Component Value Date/Time    TSHULTRASEN 0.540 01/22/2020 1402     Lab Results   Component Value Date/Time    FREET4 0.97 01/22/2020 1402     Lab Results   Component Value Date/Time    FREET3 3.71 01/22/2020 1402     No results found for: THYSTIMIG      Imaging: see DEXA date 9/2018, see thyroid uptake and scan 10/2018      ASSESSMENT/PLAN:     1. Hyperthyroidism  Well-controlled  Continue methimazole 2.5 mg EVERY DAY  Reviewed importance of adherence  We will plan for follow-up in 6 months with repeat of TSH, free T4 and free T3 levels      2. Multinodular goiter  Previously biopsied nodule seen on right lower  lobe and left lower lobe  She has new nodule seen on the left upper lobe and right upper lobe with suspicious features  I am scheduling her for a biopsy of those nodules with [facility]own with molecular testing by [facility]  Recommend observation of her multinodular goiter  Recommend repeating ultrasound in 1 to 2 years      3. Osteoporosis, unspecified osteoporosis type, unspecified pathological fracture presence  Stable  Continue alendronate 70 mg weekly   continue calcium and vitamin D supplementation  Recommend daily weightbearing exercise  Her bone density should be repeated October 2022      4. Vitamin D deficiency  Controlled  Continue ergocalciferol 50,000 units weekly  We will repeat calcium and 25-hydroxy vitamin D levels in 6 months      5. High risk medication use  Patient is taking methimazole which is high risk medication      Return in about 6 months (around 5/2/2022).      Thank you kindly for allowing me to participate in the thyroid care plan for this patient.    Abhijit Joseph MD, LUTHER, NU  01/28/20    CC:   SONIA Fry

## 2021-11-04 ENCOUNTER — OFFICE VISIT (OUTPATIENT)
Dept: MEDICAL GROUP | Facility: IMAGING CENTER | Age: 81
End: 2021-11-04
Payer: MEDICARE

## 2021-11-04 VITALS
SYSTOLIC BLOOD PRESSURE: 106 MMHG | TEMPERATURE: 97.3 F | HEART RATE: 85 BPM | BODY MASS INDEX: 29.45 KG/M2 | HEIGHT: 60 IN | OXYGEN SATURATION: 96 % | WEIGHT: 150 LBS | DIASTOLIC BLOOD PRESSURE: 54 MMHG

## 2021-11-04 DIAGNOSIS — I48.0 PAROXYSMAL A-FIB (HCC): ICD-10-CM

## 2021-11-04 DIAGNOSIS — Z85.3 HISTORY OF BREAST CANCER: ICD-10-CM

## 2021-11-04 DIAGNOSIS — M81.0 OSTEOPOROSIS, UNSPECIFIED OSTEOPOROSIS TYPE, UNSPECIFIED PATHOLOGICAL FRACTURE PRESENCE: ICD-10-CM

## 2021-11-04 DIAGNOSIS — Z87.891 HISTORY OF TOBACCO USE: ICD-10-CM

## 2021-11-04 DIAGNOSIS — C34.11 MALIGNANT NEOPLASM OF UPPER LOBE OF RIGHT LUNG (HCC): ICD-10-CM

## 2021-11-04 DIAGNOSIS — R56.9 SEIZURE (HCC): ICD-10-CM

## 2021-11-04 DIAGNOSIS — C79.31 METASTASIS TO BRAIN (HCC): ICD-10-CM

## 2021-11-04 DIAGNOSIS — Z76.89 ENCOUNTER TO ESTABLISH CARE: ICD-10-CM

## 2021-11-04 DIAGNOSIS — Z23 NEED FOR VACCINATION: ICD-10-CM

## 2021-11-04 DIAGNOSIS — R60.0 BILATERAL LEG EDEMA: ICD-10-CM

## 2021-11-04 DIAGNOSIS — R06.02 SHORTNESS OF BREATH: ICD-10-CM

## 2021-11-04 DIAGNOSIS — E05.90 HYPERTHYROIDISM: ICD-10-CM

## 2021-11-04 DIAGNOSIS — E04.2 MULTINODULAR GOITER: ICD-10-CM

## 2021-11-04 DIAGNOSIS — I49.5 SICK SINUS SYNDROME (HCC): ICD-10-CM

## 2021-11-04 PROBLEM — Z91.81 AT RISK FOR FALLING: Status: RESOLVED | Noted: 2019-12-27 | Resolved: 2021-11-04

## 2021-11-04 PROBLEM — R79.89 ABNORMAL CBC MEASUREMENT: Status: RESOLVED | Noted: 2020-02-06 | Resolved: 2021-11-04

## 2021-11-04 PROBLEM — Z79.899 HIGH RISK MEDICATION USE: Status: RESOLVED | Noted: 2019-08-27 | Resolved: 2021-11-04

## 2021-11-04 PROBLEM — R26.89 BALANCE DISORDER: Status: RESOLVED | Noted: 2018-06-21 | Resolved: 2021-11-04

## 2021-11-04 PROBLEM — J96.10 CHRONIC RESPIRATORY FAILURE (HCC): Status: RESOLVED | Noted: 2021-08-07 | Resolved: 2021-11-04

## 2021-11-04 PROCEDURE — 99215 OFFICE O/P EST HI 40 MIN: CPT | Mod: 25 | Performed by: PHYSICIAN ASSISTANT

## 2021-11-04 PROCEDURE — 90471 IMMUNIZATION ADMIN: CPT | Performed by: PHYSICIAN ASSISTANT

## 2021-11-04 PROCEDURE — 90750 HZV VACC RECOMBINANT IM: CPT | Performed by: PHYSICIAN ASSISTANT

## 2021-11-04 RX ORDER — FUROSEMIDE 20 MG/1
20 TABLET ORAL
Qty: 30 TABLET | Refills: 0
Start: 2021-11-04 | End: 2022-02-11 | Stop reason: SDUPTHER

## 2021-11-04 RX ORDER — TIOTROPIUM BROMIDE 18 UG/1
18 CAPSULE ORAL; RESPIRATORY (INHALATION) DAILY
Qty: 30 CAPSULE | Refills: 3 | Status: SHIPPED | OUTPATIENT
Start: 2021-11-04 | End: 2021-12-13

## 2021-11-04 ASSESSMENT — PAIN SCALES - GENERAL: PAINLEVEL: NO PAIN

## 2021-11-04 ASSESSMENT — FIBROSIS 4 INDEX: FIB4 SCORE: 1.55

## 2021-11-04 NOTE — PROGRESS NOTES
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 Status post concurrent chemoradiotherapy to the right upper lobe mass in 2016.  Currently being managed by Dr. Aguilar.    History of breast cancer  Pt with a history of breast cancer s/p right sided lumpectomy and radiation around 1987 in [place]. Was treated in [place].  She would like to defer breast cancer screening this year.    Hyperthyroidism  Patient with a history of hyperthyroidism with thyroid nodules.  Managed by Dr. Abhijit Watkins, endocrinology.  On methimazole 2.5 mg daily and metoprolol 25 mg daily.  Patient states that she has an upcoming thyroid biopsy.    Metastasis to brain (HCC)  Status post resection and stereotactic radiotherapy in 2015    Osteoporosis  Managed by Dr. Watkins.  On Fosamax 70 mg weekly and calcium/vitamin D.  Has been on Fosamax for 4 years.  Due for bone density in October 2022.  Patient states that she walks 2 to 3 miles a day and has been trying to increase her strength training.    Paroxysmal A-fib (HCC)  Managed by cardiology.  On flecainide and metoprolol.  Status post pacemaker in 2018.    Seizure (HCC)  Patient with history of questionable seizure like activity (in 2017) status post brain surgery and stereotactic radiotherapy in 2015.  History of subacute intracranial hemorrhage while on Eliquis in 2019.  Has been maintained on Keppra and saw neurology recently who recommended continuing this medication.    Sick sinus syndrome (HCC)  Status post pacemaker placement in 2018.  Managed by Dr. Obregon, cardiology.    History of tobacco use  Patient with a 40-pack-year history of previous tobacco abuse.   She is no longer smoking.  She does see pulmonology who had her on home O2, as she also had COVID-19 in 2020.  She is currently on Breo 1 puff daily.  She uses nocturnal oxygen and has been weaned off daily oxygen.  She also use the BiPAP at night.      Past Medical History:   Diagnosis Date   • Abnormal CBC measurement 2020   • Acute respiratory failure with hypoxia (HCC) 2020   • Allergic rhinitis 2016   • At risk for falling 2019   • Balance disorder 2018   • Blurry vision, left eye 2015   • Breast cancer (HCC)    • Cancer (HCC)     lung cancer with brain mts   • Chickenpox    • Chronic respiratory failure (HCC) 2021   • Cymro measles    • Healthcare maintenance 2018   • High risk medication use 2019   • Hilar adenopathy 2016   • Influenza    • Joint pain    • Lung cancer (HCC)    • Mumps    • Need for vaccination 2019   • Pneumonia due to COVID-19 virus 2020   • Radionecrosis 2018   • Sick sinus syndrome (HCC)     with AFIB, s/p pacemaker   • Thyroid disease    • Tonsillitis      Social History     Tobacco Use   • Smoking status: Former Smoker     Packs/day: 2.00     Years: 20.00     Pack years: 40.00     Types: Cigarettes     Quit date:      Years since quittin.8   • Smokeless tobacco: Never Used   Vaping Use   • Vaping Use: Never used   Substance Use Topics   • Alcohol use: Yes     Alcohol/week: 1.8 oz     Types: 3 Glasses of wine per week     Comment: occasionally    • Drug use: No     Current Outpatient Medications Ordered in Epic   Medication Sig Dispense Refill   • furosemide (LASIX) 20 MG Tab Take 1 Tablet by mouth every 48 hours. 30 Tablet 0   • tiotropium (SPIRIVA HANDIHALER) 18 MCG Cap Place 1 Capsule into inhaler and inhale every day. 30 Capsule 3   • methimazole (TAPAZOLE) 5 MG Tab Take 0.5 Tablets by mouth every day. 90 Tablet 1   • alendronate (FOSAMAX) 70 MG Tab Take 1 Tablet by mouth every 7 days. 12 Tablet 3    • vitamin D2, Ergocalciferol, (DRISDOL) 1.25 MG (98762 UT) Cap capsule Take 1 Capsule by mouth every 7 days. 12 Capsule 3   • levETIRAcetam (KEPPRA) 500 MG Tab Take 1 tablet by mouth 2 times a day. 180 tablet 3   • metoprolol SR (TOPROL XL) 25 MG TABLET SR 24 HR Take 1 tablet by mouth every day. 90 tablet 3   • flecainide (TAMBOCOR) 150 MG Tab Take 1 tablet by mouth 2 times a day. 180 tablet 3   • potassium chloride ER (KLOR-CON 10) 10 MEQ tablet Take 1 tablet by mouth every day. 90 tablet 3   • Fluticasone Furoate-Vilanterol (BREO ELLIPTA) 100-25 MCG/INH AEROSOL POWDER, BREATH ACTIVATED Inhale 1 Puff every day. Rinse mouth after use. 3 Each 3   • albuterol 108 (90 Base) MCG/ACT Aero Soln inhalation aerosol INHALE 2 PUFFS BY MOUTH EVERY 4 HOURS AS NEEDED FOR SHORTNESS OF BREATH 1 Each 11   • zinc sulfate (ZINCATE) 220 (50 Zn) MG Cap Take 220 mg by mouth every day.     • CALCIUM CARBONATE-VITAMIN D PO Take  by mouth.     • metronidazole (METROCREAM) 0.75 % cream APPLY TO FACE EVERYDAY ONCE TO TWICE A DAY FOR ROSACEA       No current [facility]-ordered facility-administered medications on file.     Penicillins and Atorvastatin    Health Maintenance: Shingrix today.  Refusing mammogram.    ROS:  Gen: no fevers/chills  Pulm: + sob, no cough  CV: no chest pain, no palpitations, no edema  GI: no nausea/vomiting, no diarrhea  Skin: no rash, no lesions  Neuro: no headaches, no numbness/tingling  Heme/Lymph: no easy bruising or bleeding    Objective:   Exam:  /54 (BP Location: Left arm, Patient Position: Sitting, BP Cuff Size: Adult)   Pulse 85   Temp 36.3 °C (97.3 °F) (Temporal)   Ht 1.524 m (5')   Wt 68 kg (150 lb)   SpO2 96%   BMI 29.29 kg/m²    Body mass index is 29.29 kg/m².    Gen: Alert and oriented, No apparent distress.  HEENT: Head atraumatic, normocephalic. Pupils equal and round.  Neck: Neck is supple without lymphadenopathy.   Lungs: Normal effort, bibasilar crackles noted.  No wheeze.  CV: Regular rate  and rhythm. No murmurs, rubs, or gallops.  Ext: No clubbing, cyanosis, edema.    Assessment & Plan:     81 y.o. female with the following -     1. Encounter to establish care  Pleasant 81-year-old female here to establish care.  History reviewed in depth.  Previous records and office notes reviewed.    2. Shortness of breath  Patient with chronic intermittent shortness of breath likely multifactorial from previous lung cancer, smoking history, radiation, COVID-19.  Bibasilar crackles noted on exam today.  Previous pulmonology notes suggest possible underlying ILD.  She is currently on Breo 1 puff daily.  I will trial her on Spiriva 1 capsule daily in addition to her Breo to optimize her lung function.  Imaging if symptoms worsen.  - tiotropium (SPIRIVA HANDIHALER) 18 MCG Cap; Place 1 Capsule into inhaler and inhale every day.  Dispense: 30 Capsule; Refill: 3    3. Malignant neoplasm of upper lobe of right lung (HCC)  History of right upper lobe squamous cell carcinoma with brain mets status post resection of right occipital lobe brain mets with stereotactic radiotherapy in 2015.  Status post chemo and radiation therapy to the right upper lobe mass in 2016, managed by Dr. Aguilar.    4. Paroxysmal A-fib (HCC)  Managed by cardiology, history of pacemaker.  On flecainide and metoprolol.    5. Osteoporosis, unspecified osteoporosis type, unspecified pathological fracture presence  On calcium, vitamin D, Fosamax.  Managed by endocrinology.  Refer to physical therapy for continued strength training to optimize bone density.  - Referral to Physical Therapy    6. Hyperthyroidism  On methimazole and metoprolol.  Managed by endocrinology.    7. Multinodular goiter  Await thyroid biopsies.    8. Metastasis to brain (HCC)  Status post resection and stereotactic radiotherapy.  On Keppra.    9. Seizure (HCC)  History of questionable seizure-like activity as well as history of subacute intracranial hemorrhage.  Maintain on Keppra  per neurology.    10. Sick sinus syndrome (HCC)  Status post pacemaker    11. BMI 29.0-29.9,adult  - Referral to Nutrition Services    12. Bilateral leg edema  No evidence of edema on exam today.  Patient states she has been taking Lasix 20 mg every other day.  Follow-up with cardiology as scheduled.  - furosemide (LASIX) 20 MG Tab; Take 1 Tablet by mouth every 48 hours.  Dispense: 30 Tablet; Refill: 0    13. History of breast cancer  Patient like to defer mammogram this year.  No history of breast cancer metastases.     14. History of tobacco use  Add Spiriva 1 capsule daily.    15. Need for vaccination  - Shingrix Vaccine    Return in about 3 months (around 2/4/2022) for Follow-up, Shingrix.     Greater than 45 minutes spent on direct patient care in chart review.    Sweetie Harris PA-C ([place])  Physician Assistant Certified  [facility]    Please note that this dictation was created using voice recognition software. I have made every reasonable attempt to correct obvious errors, but I expect that there are errors of grammar and possibly content that I did not discover before finalizing the note.

## 2021-11-17 ENCOUNTER — TELEPHONE (OUTPATIENT)
Dept: SLEEP MEDICINE | Facility: MEDICAL CENTER | Age: 81
End: 2021-11-17

## 2021-11-17 NOTE — TELEPHONE ENCOUNTER
Pt called and left vm stating that she is going to [place] for x mas for about 4 days and she is wanting to know if while she's there she can go without her O2 bleed in and just use BIPAP. Please advise.

## 2021-11-19 ENCOUNTER — NON-PROVIDER VISIT (OUTPATIENT)
Dept: CARDIOLOGY | Facility: MEDICAL CENTER | Age: 81
End: 2021-11-19
Payer: MEDICARE

## 2021-11-19 PROCEDURE — 99999 PR NO CHARGE: CPT | Performed by: INTERNAL MEDICINE

## 2021-11-22 ENCOUNTER — TELEPHONE (OUTPATIENT)
Dept: CARDIOLOGY | Facility: MEDICAL CENTER | Age: 81
End: 2021-11-22

## 2021-11-22 ENCOUNTER — TELEPHONE (OUTPATIENT)
Dept: MEDICAL GROUP | Facility: IMAGING CENTER | Age: 81
End: 2021-11-22

## 2021-11-22 NOTE — TELEPHONE ENCOUNTER
PT called as she forgot to send in her results last week. Wanted to make everyone aware that she is sending them today.     Thank you,    Joanna WEIR

## 2021-11-22 NOTE — TELEPHONE ENCOUNTER
Received message from patient. Patient states she has been having some issues with her throat. Patient states she has been having trouble with swallowing leafy greens vegtables. Patient states starting yesterday, she was having a lot of issues with mucous coming up. Patient states she is wondering who she should contact regarding this (Sweetie, GI, or pulmonology). Patient states she has an appointment coming up to biopsy the nodules on her thyroid. She is not sure if this is related.

## 2021-11-23 ENCOUNTER — OFFICE VISIT (OUTPATIENT)
Dept: MEDICAL GROUP | Facility: IMAGING CENTER | Age: 81
End: 2021-11-23
Payer: MEDICARE

## 2021-11-23 VITALS
SYSTOLIC BLOOD PRESSURE: 115 MMHG | TEMPERATURE: 97.5 F | WEIGHT: 149 LBS | DIASTOLIC BLOOD PRESSURE: 50 MMHG | HEIGHT: 60 IN | OXYGEN SATURATION: 94 % | BODY MASS INDEX: 29.25 KG/M2 | HEART RATE: 65 BPM

## 2021-11-23 DIAGNOSIS — Z92.3 HISTORY OF RADIATION THERAPY: ICD-10-CM

## 2021-11-23 DIAGNOSIS — R49.0 HOARSENESS: ICD-10-CM

## 2021-11-23 DIAGNOSIS — R13.19 OTHER DYSPHAGIA: ICD-10-CM

## 2021-11-23 PROCEDURE — 99214 OFFICE O/P EST MOD 30 MIN: CPT | Performed by: PHYSICIAN ASSISTANT

## 2021-11-23 RX ORDER — OMEPRAZOLE 20 MG/1
20 CAPSULE, DELAYED RELEASE ORAL DAILY
Qty: 30 CAPSULE | Refills: 0 | Status: SHIPPED | OUTPATIENT
Start: 2021-11-23 | End: 2021-12-13

## 2021-11-23 ASSESSMENT — FIBROSIS 4 INDEX: FIB4 SCORE: 1.55

## 2021-11-23 ASSESSMENT — PAIN SCALES - GENERAL: PAINLEVEL: 2=MINIMAL-SLIGHT

## 2021-11-23 NOTE — PROGRESS NOTES
Subjective:     CC:   Chief Complaint   Patient presents with   • Other     throat concern, trouble swallowing        HPI:   Ml presents today to discuss:    Other dysphagia  Patient states that over the past few weeks she has noticed difficulty swallowing when she eats vegetables.  She has occasional difficulty swallowing with liquids.  Her symptoms are not progressing.  She feels like she is spitting up mucus and often feels that food gets stuck in her upper esophagus.  She has noticed increased acid reflux, as well as hoarseness.  She does have a history of thyroid goiter and has a biopsy pending for December 10, 2021 per Dr. Watkins.  She does have a history of chest radiation due to right upper lobe lung cancer.      Past Medical History:   Diagnosis Date   • Abnormal CBC measurement 2020   • Acute respiratory failure with hypoxia (HCC) 2020   • Allergic rhinitis 2016   • At risk for falling 2019   • Balance disorder 2018   • Blurry vision, left eye 2015   • Breast cancer (HCC)    • Cancer (HCC)     lung cancer with brain mts   • Chickenpox    • Chronic respiratory failure (HCC) 2021   • [place] measles    • Healthcare maintenance 2018   • High risk medication use 2019   • Hilar adenopathy 2016   • Influenza    • Joint pain    • Lung cancer (HCC)    • Mumps    • Need for vaccination 2019   • Pneumonia due to COVID-19 virus 2020   • Radionecrosis 2018   • Sick sinus syndrome (HCC)     with AFIB, s/p pacemaker   • Thyroid disease    • Tonsillitis      Social History     Tobacco Use   • Smoking status: Former Smoker     Packs/day: 2.00     Years: 20.00     Pack years: 40.00     Types: Cigarettes     Quit date:      Years since quittin.9   • Smokeless tobacco: Never Used   Vaping Use   • Vaping Use: Never used   Substance Use Topics   • Alcohol use: Yes     Alcohol/week: 1.8 oz     Types: 3 Glasses of wine per week     Comment:  occasionally    • Drug use: No     Current Outpatient Medications Ordered in Epic   Medication Sig Dispense Refill   • omeprazole (PRILOSEC) 20 MG delayed-release capsule Take 1 Capsule by mouth every day. 30 Capsule 0   • furosemide (LASIX) 20 MG Tab Take 1 Tablet by mouth every 48 hours. 30 Tablet 0   • tiotropium (SPIRIVA HANDIHALER) 18 MCG Cap Place 1 Capsule into inhaler and inhale every day. 30 Capsule 3   • methimazole (TAPAZOLE) 5 MG Tab Take 0.5 Tablets by mouth every day. 90 Tablet 1   • alendronate (FOSAMAX) 70 MG Tab Take 1 Tablet by mouth every 7 days. 12 Tablet 3   • vitamin D2, Ergocalciferol, (DRISDOL) 1.25 MG (63577 UT) Cap capsule Take 1 Capsule by mouth every 7 days. 12 Capsule 3   • levETIRAcetam (KEPPRA) 500 MG Tab Take 1 tablet by mouth 2 times a day. 180 tablet 3   • metoprolol SR (TOPROL XL) 25 MG TABLET SR 24 HR Take 1 tablet by mouth every day. 90 tablet 3   • flecainide (TAMBOCOR) 150 MG Tab Take 1 tablet by mouth 2 times a day. 180 tablet 3   • potassium chloride ER (KLOR-CON 10) 10 MEQ tablet Take 1 tablet by mouth every day. 90 tablet 3   • Fluticasone Furoate-Vilanterol (BREO ELLIPTA) 100-25 MCG/INH AEROSOL POWDER, BREATH ACTIVATED Inhale 1 Puff every day. Rinse mouth after use. 3 Each 3   • albuterol 108 (90 Base) MCG/ACT Aero Soln inhalation aerosol INHALE 2 PUFFS BY MOUTH EVERY 4 HOURS AS NEEDED FOR SHORTNESS OF BREATH 1 Each 11   • zinc sulfate (ZINCATE) 220 (50 Zn) MG Cap Take 220 mg by mouth every day.     • CALCIUM CARBONATE-VITAMIN D PO Take  by mouth.     • metronidazole (METROCREAM) 0.75 % cream APPLY TO FACE EVERYDAY ONCE TO TWICE A DAY FOR ROSACEA       No current [facility]-ordered facility-administered medications on file.     Penicillins and Atorvastatin    ROS: see hpi  Gen: no fevers/chills  Pulm: no sob, no cough  CV: no chest pain, no palpitations, no edema  GI: no nausea/vomiting, no diarrhea  Skin: no rash    Objective:   Exam:  /50 (BP Location: Left arm, Patient  Position: Sitting, BP Cuff Size: Small adult)   Pulse 65   Temp 36.4 °C (97.5 °F) (Temporal)   Ht 1.524 m (5')   Wt 67.6 kg (149 lb)   SpO2 94%   BMI 29.10 kg/m²    Body mass index is 29.1 kg/m².    Gen: Alert and oriented, No apparent distress.  HEENT: Head atraumatic, normocephalic. Pupils equal and round.  Neck: Neck is supple without lymphadenopathy.   Lungs: Normal effort, CTA bilaterally, no wheezes, rhonchi, or rales  CV: Regular rate and rhythm. No murmurs, rubs, or gallops.  ABD: +BS. Non-tender, non-distended. No rebound, rigidity, or guarding.  Ext: No clubbing, cyanosis, edema.    Assessment & Plan:     81 y.o. female with the following -     1. Other dysphagia  New onset, intermittent.  Will order barium swallow study and CT of the neck, there may be a compression component from her goiter.  Will refer to speech therapy for swallow evaluation and refer to GI to discuss possible EGD.  In the interim we will start her on omeprazole 20 mg daily as this could be related to uncontrolled acid reflux.  Discussed taking Fosamax with a big glass of water and avoiding lying down after.  - DX-ESOPHAGUS BARIUM SWALLOW SINGLE CONTRAST; Future  - Referral to Gastroenterology  - CT-SOFT TISSUE NECK WITH; Future  - Referral to Speech Therapy  - omeprazole (PRILOSEC) 20 MG delayed-release capsule; Take 1 Capsule by mouth every day.  Dispense: 30 Capsule; Refill: 0    2. Hoarseness  - DX-ESOPHAGUS BARIUM SWALLOW SINGLE CONTRAST; Future  - CT-SOFT TISSUE NECK WITH; Future  - Referral to Speech Therapy    3. History of radiation therapy  - DX-ESOPHAGUS BARIUM SWALLOW SINGLE CONTRAST; Future  - Referral to Gastroenterology  - CT-SOFT TISSUE NECK WITH; Future  - Referral to Speech Therapy  - omeprazole (PRILOSEC) 20 MG delayed-release capsule; Take 1 Capsule by mouth every day.  Dispense: 30 Capsule; Refill: 0    Return for Will notify patient to follow-up pending tests.    Sweetie Lozano) Steven ERICKSON  Physician Assistant  Certified  [facility]    Please note that this dictation was created using voice recognition software. I have made every reasonable attempt to correct obvious errors, but I expect that there are errors of grammar and possibly content that I did not discover before finalizing the note.

## 2021-11-23 NOTE — TELEPHONE ENCOUNTER
Called and spoke with patient and relayed Sweetie's message below.    Please call patient and let her know to follow-up with GI regarding this.  She may need a swallow study.  ER if symptoms worsen in the interim.  There could also be a relation with her thyroid nodules.  We will await the biopsy    Patient states she does not have a GI, only an endo and pulmonologist. Notified patient I misheard her voicemail. Notified patient I would speak with Sweetie and get back to her regarding any new recommendation.

## 2021-11-23 NOTE — ASSESSMENT & PLAN NOTE
Patient states that over the past few weeks she has noticed difficulty swallowing when she eats vegetables.  She has occasional difficulty swallowing with liquids.  Her symptoms are not progressing.  She feels like she is spitting up mucus and often feels that food gets stuck in her upper esophagus.  She has noticed increased acid reflux, as well as hoarseness.  She does have a history of thyroid goiter and has a biopsy pending for December 10, 2021 per Dr. Watkins.  She does have a history of chest radiation due to right upper lobe lung cancer.

## 2021-12-06 DIAGNOSIS — R13.19 OTHER DYSPHAGIA: ICD-10-CM

## 2021-12-10 ENCOUNTER — HOSPITAL ENCOUNTER (OUTPATIENT)
Dept: RADIOLOGY | Facility: MEDICAL CENTER | Age: 81
End: 2021-12-10
Attending: INTERNAL MEDICINE
Payer: MEDICARE

## 2021-12-10 DIAGNOSIS — E04.2 MULTINODULAR GOITER: ICD-10-CM

## 2021-12-10 LAB — CYTOLOGY REG CYTOL: NORMAL

## 2021-12-10 PROCEDURE — 10006 FNA BX W/US GDN EA ADDL: CPT

## 2021-12-10 PROCEDURE — 88112 CYTOPATH CELL ENHANCE TECH: CPT | Mod: 91

## 2021-12-10 ASSESSMENT — PAIN DESCRIPTION - PAIN TYPE: TYPE: ACUTE PAIN

## 2021-12-11 NOTE — PROGRESS NOTES
"Thyroid     Patient given [facility] \"Preventing the Spread of Infection\" brochure upon arrival.   Procedure DAMI Gonzalez    US guided Bilateral thyroid nodule fine needle aspiration done by Dr. Pederson; Right and Left anterior aspect of neck access site; 1 jar of cytolyt obtained and sent to pathology lab; pt tolerated the procedure well; pt hemodynamically stable pre/intra/post procedure; all questions and concerns answered prior to being d/c; patient provided with appropriate education for procedure; pt d/c home.  "

## 2021-12-12 DIAGNOSIS — Z92.3 HISTORY OF RADIATION THERAPY: ICD-10-CM

## 2021-12-12 DIAGNOSIS — R06.02 SHORTNESS OF BREATH: ICD-10-CM

## 2021-12-12 DIAGNOSIS — R13.19 OTHER DYSPHAGIA: ICD-10-CM

## 2021-12-13 RX ORDER — OMEPRAZOLE 20 MG/1
20 CAPSULE, DELAYED RELEASE ORAL DAILY
Qty: 30 CAPSULE | Refills: 0 | Status: SHIPPED | OUTPATIENT
Start: 2021-12-13 | End: 2021-12-30

## 2021-12-13 RX ORDER — TIOTROPIUM BROMIDE 18 UG/1
18 CAPSULE ORAL; RESPIRATORY (INHALATION) DAILY
Qty: 90 CAPSULE | Refills: 1 | Status: SHIPPED | OUTPATIENT
Start: 2021-12-13 | End: 2022-06-21

## 2021-12-17 ENCOUNTER — APPOINTMENT (OUTPATIENT)
Dept: LAB | Facility: MEDICAL CENTER | Age: 81
End: 2021-12-17
Attending: INTERNAL MEDICINE
Payer: MEDICARE

## 2021-12-20 DIAGNOSIS — R13.19 OTHER DYSPHAGIA: ICD-10-CM

## 2021-12-20 DIAGNOSIS — C34.11 MALIGNANT NEOPLASM OF UPPER LOBE OF RIGHT LUNG (HCC): ICD-10-CM

## 2021-12-21 ENCOUNTER — HOSPITAL ENCOUNTER (OUTPATIENT)
Dept: RADIOLOGY | Facility: MEDICAL CENTER | Age: 81
End: 2021-12-21
Attending: PHYSICIAN ASSISTANT
Payer: MEDICARE

## 2021-12-21 ENCOUNTER — HOSPITAL ENCOUNTER (OUTPATIENT)
Dept: LAB | Facility: MEDICAL CENTER | Age: 81
End: 2021-12-21
Attending: PHYSICIAN ASSISTANT
Payer: MEDICARE

## 2021-12-21 DIAGNOSIS — Z92.3 HISTORY OF RADIATION THERAPY: ICD-10-CM

## 2021-12-21 DIAGNOSIS — R49.0 HOARSENESS: ICD-10-CM

## 2021-12-21 DIAGNOSIS — C34.11 MALIGNANT NEOPLASM OF UPPER LOBE OF RIGHT LUNG (HCC): ICD-10-CM

## 2021-12-21 DIAGNOSIS — R13.19 OTHER DYSPHAGIA: ICD-10-CM

## 2021-12-21 LAB
BUN SERPL-MCNC: 20 MG/DL (ref 8–22)
CREAT SERPL-MCNC: 0.85 MG/DL (ref 0.5–1.4)

## 2021-12-21 PROCEDURE — 700117 HCHG RX CONTRAST REV CODE 255: Performed by: PHYSICIAN ASSISTANT

## 2021-12-21 PROCEDURE — 36415 COLL VENOUS BLD VENIPUNCTURE: CPT

## 2021-12-21 PROCEDURE — 84520 ASSAY OF UREA NITROGEN: CPT

## 2021-12-21 PROCEDURE — 82565 ASSAY OF CREATININE: CPT

## 2021-12-21 PROCEDURE — 74220 X-RAY XM ESOPHAGUS 1CNTRST: CPT

## 2021-12-21 PROCEDURE — 70491 CT SOFT TISSUE NECK W/DYE: CPT | Mod: ME

## 2021-12-21 RX ADMIN — BARIUM SULFATE 700 MG: 700 TABLET ORAL at 14:30

## 2021-12-21 RX ADMIN — IOHEXOL 80 ML: 350 INJECTION, SOLUTION INTRAVENOUS at 13:47

## 2021-12-22 ENCOUNTER — TELEPHONE (OUTPATIENT)
Dept: ENDOCRINOLOGY | Facility: MEDICAL CENTER | Age: 81
End: 2021-12-22

## 2021-12-22 NOTE — TELEPHONE ENCOUNTER
----- Message from Abhijit Joseph M.D. sent at 12/21/2021  5:02 PM PST -----  Please call patient please let her know molecular test from biopsy returned as was benign I want her to make sure she has a follow up appointment with us so we can just watch her thyroid nodules and labs

## 2022-01-05 ENCOUNTER — TELEPHONE (OUTPATIENT)
Dept: CARDIOLOGY | Facility: MEDICAL CENTER | Age: 82
End: 2022-01-05

## 2022-01-05 NOTE — TELEPHONE ENCOUNTER
PARKER Roberson,    This patient called and stated she was moving out of our area toward the end of February. She wanted someone to contact her back if not able to make the 02/11 appt with PARKER about who he recommends to where she's moving. She also wants to know where to do with her monitoring devices. Can you please call her back at [phone].      Thank you,    BRENNEN

## 2022-01-10 ENCOUNTER — TELEPHONE (OUTPATIENT)
Dept: MEDICAL GROUP | Facility: IMAGING CENTER | Age: 82
End: 2022-01-10
Payer: COMMERCIAL

## 2022-01-10 NOTE — TELEPHONE ENCOUNTER
Received message from patient. She would like to know if Sweetie would like to see her after having all of her tests completed.   Patient states she is moving to [place] the 2nd week of February.

## 2022-01-14 ENCOUNTER — HOSPITAL ENCOUNTER (OUTPATIENT)
Dept: LAB | Facility: MEDICAL CENTER | Age: 82
End: 2022-01-14
Attending: INTERNAL MEDICINE
Payer: MEDICARE

## 2022-01-14 LAB
ALBUMIN SERPL BCP-MCNC: 4.4 G/DL (ref 3.2–4.9)
ALBUMIN/GLOB SERPL: 1.6 G/DL
ALP SERPL-CCNC: 66 U/L (ref 30–99)
ALT SERPL-CCNC: 24 U/L (ref 2–50)
ANION GAP SERPL CALC-SCNC: 10 MMOL/L (ref 7–16)
AST SERPL-CCNC: 21 U/L (ref 12–45)
BASOPHILS # BLD AUTO: 0.6 % (ref 0–1.8)
BASOPHILS # BLD: 0.04 K/UL (ref 0–0.12)
BILIRUB SERPL-MCNC: 0.3 MG/DL (ref 0.1–1.5)
BUN SERPL-MCNC: 17 MG/DL (ref 8–22)
CALCIUM SERPL-MCNC: 9.7 MG/DL (ref 8.5–10.5)
CEA SERPL-MCNC: 3.2 NG/ML (ref 0–3)
CHLORIDE SERPL-SCNC: 106 MMOL/L (ref 96–112)
CO2 SERPL-SCNC: 26 MMOL/L (ref 20–33)
CREAT SERPL-MCNC: 0.7 MG/DL (ref 0.5–1.4)
EOSINOPHIL # BLD AUTO: 0.13 K/UL (ref 0–0.51)
EOSINOPHIL NFR BLD: 1.8 % (ref 0–6.9)
ERYTHROCYTE [DISTWIDTH] IN BLOOD BY AUTOMATED COUNT: 52.5 FL (ref 35.9–50)
GLOBULIN SER CALC-MCNC: 2.7 G/DL (ref 1.9–3.5)
GLUCOSE SERPL-MCNC: 98 MG/DL (ref 65–99)
HCT VFR BLD AUTO: 41.3 % (ref 37–47)
HGB BLD-MCNC: 13.5 G/DL (ref 12–16)
IMM GRANULOCYTES # BLD AUTO: 0.03 K/UL (ref 0–0.11)
IMM GRANULOCYTES NFR BLD AUTO: 0.4 % (ref 0–0.9)
LYMPHOCYTES # BLD AUTO: 1.5 K/UL (ref 1–4.8)
LYMPHOCYTES NFR BLD: 21.2 % (ref 22–41)
MCH RBC QN AUTO: 34.4 PG (ref 27–33)
MCHC RBC AUTO-ENTMCNC: 32.7 G/DL (ref 33.6–35)
MCV RBC AUTO: 105.4 FL (ref 81.4–97.8)
MONOCYTES # BLD AUTO: 0.59 K/UL (ref 0–0.85)
MONOCYTES NFR BLD AUTO: 8.3 % (ref 0–13.4)
NEUTROPHILS # BLD AUTO: 4.78 K/UL (ref 2–7.15)
NEUTROPHILS NFR BLD: 67.7 % (ref 44–72)
NRBC # BLD AUTO: 0 K/UL
NRBC BLD-RTO: 0 /100 WBC
PLATELET # BLD AUTO: 231 K/UL (ref 164–446)
PMV BLD AUTO: 9.4 FL (ref 9–12.9)
POTASSIUM SERPL-SCNC: 4.4 MMOL/L (ref 3.6–5.5)
PROT SERPL-MCNC: 7.1 G/DL (ref 6–8.2)
RBC # BLD AUTO: 3.92 M/UL (ref 4.2–5.4)
SODIUM SERPL-SCNC: 142 MMOL/L (ref 135–145)
WBC # BLD AUTO: 7.1 K/UL (ref 4.8–10.8)

## 2022-01-14 PROCEDURE — 36415 COLL VENOUS BLD VENIPUNCTURE: CPT

## 2022-01-14 PROCEDURE — 82378 CARCINOEMBRYONIC ANTIGEN: CPT

## 2022-01-14 PROCEDURE — 80053 COMPREHEN METABOLIC PANEL: CPT

## 2022-01-14 PROCEDURE — 85025 COMPLETE CBC W/AUTO DIFF WBC: CPT

## 2022-01-14 NOTE — TELEPHONE ENCOUNTER
Patient scheduled for two follow-up visits prior to moving.    Sweetie requested to follow-up with radiology regarding comparing patient's most recent CT scans.     Called and spoke with film room. Message was sent over to Dr. Dang regarding adding an addendum to compare. They will send a follow-up visit and check back in with him.

## 2022-01-18 ENCOUNTER — PATIENT OUTREACH (OUTPATIENT)
Dept: HEALTH INFORMATION MANAGEMENT | Facility: OTHER | Age: 82
End: 2022-01-18

## 2022-01-18 ENCOUNTER — OFFICE VISIT (OUTPATIENT)
Dept: MEDICAL GROUP | Facility: IMAGING CENTER | Age: 82
End: 2022-01-18
Payer: MEDICARE

## 2022-01-18 VITALS
DIASTOLIC BLOOD PRESSURE: 54 MMHG | HEIGHT: 60 IN | HEART RATE: 68 BPM | TEMPERATURE: 98.1 F | BODY MASS INDEX: 29.64 KG/M2 | OXYGEN SATURATION: 98 % | WEIGHT: 151 LBS | SYSTOLIC BLOOD PRESSURE: 114 MMHG

## 2022-01-18 DIAGNOSIS — C34.11 MALIGNANT NEOPLASM OF UPPER LOBE OF RIGHT LUNG (HCC): ICD-10-CM

## 2022-01-18 DIAGNOSIS — R56.9 SEIZURE (HCC): ICD-10-CM

## 2022-01-18 DIAGNOSIS — R13.19 OTHER DYSPHAGIA: ICD-10-CM

## 2022-01-18 DIAGNOSIS — G47.30 SLEEP APNEA TREATED WITH NOCTURNAL BIPAP: ICD-10-CM

## 2022-01-18 DIAGNOSIS — Z92.3 HISTORY OF RADIATION THERAPY: ICD-10-CM

## 2022-01-18 DIAGNOSIS — I48.0 PAROXYSMAL A-FIB (HCC): ICD-10-CM

## 2022-01-18 DIAGNOSIS — K22.89 PRESBYESOPHAGUS: ICD-10-CM

## 2022-01-18 PROCEDURE — 99214 OFFICE O/P EST MOD 30 MIN: CPT | Performed by: PHYSICIAN ASSISTANT

## 2022-01-18 RX ORDER — OMEPRAZOLE 40 MG/1
40 CAPSULE, DELAYED RELEASE ORAL DAILY
Qty: 90 CAPSULE | Refills: 1
Start: 2022-01-18

## 2022-01-18 ASSESSMENT — FIBROSIS 4 INDEX: FIB4 SCORE: 1.54

## 2022-01-18 ASSESSMENT — PAIN SCALES - GENERAL: PAINLEVEL: NO PAIN

## 2022-01-18 ASSESSMENT — PATIENT HEALTH QUESTIONNAIRE - PHQ9: CLINICAL INTERPRETATION OF PHQ2 SCORE: 0

## 2022-01-18 NOTE — ASSESSMENT & PLAN NOTE
Patient with history of sleep apnea on BiPAP.  She is currently try to coordinate transitioning to [employer] and a company in [place] to supply her BiPAP.  She will be following up with pulmonology and also requests a referral to care management to help coordinate these changes.

## 2022-01-18 NOTE — ASSESSMENT & PLAN NOTE
Chronic, on flecainide and metoprolol.  Patient also with pacemaker due to sick sinus syndrome placed in 2018.

## 2022-01-18 NOTE — PROGRESS NOTES
[facility] SW call to pt.  Pt states that her primary concern is that she will not have her Bipap device when she moves over to [place] as the medical equipment company is a [place] company and she will have to return it before she leaves.  Pt not as concerned about the oxygen she uses and M.D explained that when she moves [place] to much lower [place] she will likely not need it. Pt will plan to go to her last pulmonologist appointment with [facility] on 2/11/2022, just to have last exam.  She will follow up with [facility] as soon as possible once she gets to [place].  Pt very happy about move to [place] where she will be close to her family.  SW will close [facility] referral.

## 2022-01-18 NOTE — ASSESSMENT & PLAN NOTE
Patient with history of questionable seizure-like activity in 2017 status post brain surgery and stereotactic radiotherapy in 2015.  She also has a history of subacute intracranial hemorrhage while on Eliquis in 2019.  She is currently on Keppra per neurology's recommendations.  No seizure-like activity that patient reports.

## 2022-01-18 NOTE — ASSESSMENT & PLAN NOTE
Patient with a history of difficulty swallowing with a sensation of spasms in her chest.  She did have work-up including CT neck and barium esophagram showing concern for presbyesophagus.  An E[facility] was [facility] 1/[facility].  No report available for review yet.  Patient states that her omeprazole was increased to 40 mg daily after the EGD.  Patient states that she has noticed mild improvement in her difficulty swallowing with the increase of the dose.

## 2022-01-18 NOTE — ASSESSMENT & PLAN NOTE
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  Patient also status post chemoradiotherapy to the right upper lobe mass in 2016.  Managed by Dr. Aguilar.  Patient did have recent CT neck which showed her previous right upper lobe spiculated mass.  Message sent to radiology x2 to compare to previous imaging.  Awaiting final read.

## 2022-01-18 NOTE — PROGRESS NOTES
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  Patient also status post chemoradiotherapy to the right upper lobe mass in 2016.  Managed by Dr. Aguilar.  Patient did have recent CT neck which showed her previous right upper lobe spiculated mass.  Message sent to radiology x2 to compare to previous imaging.  Awaiting final read.    Paroxysmal A-fib (HCC)  Chronic, on flecainide and metoprolol.  Patient also with pacemaker due to sick sinus syndrome placed in 2018.    Seizure (HCC)  Patient with history of questionable seizure-like activity in 2017 status post brain surgery and stereotactic radiotherapy in 2015.  She also has a history of subacute intracranial hemorrhage while on Eliquis in 2019.  She is currently on Keppra per neurology's recommendations.  No seizure-like activity that patient reports.    Presbyesophagus  Patient with a history of difficulty swallowing with a sensation of spasms in her chest.  She did have work-up including CT neck and barium esophagram showing concern for presbyesophagus.  An E[facility] was [facility]22.  No report available for review yet.  Patient states that her omeprazole was increased to 40 mg daily after the EGD.  Patient states that she has noticed mild improvement in her difficulty swallowing with the increase of the dose.    Sleep apnea treated with nocturnal BiPAP  Patient with history of sleep apnea on BiPAP.  She is currently try to coordinate transitioning to [email or web address] and a company in [place] to supply her BiPAP.  She will be following up with pulmonology and also requests a  referral to care management to help coordinate these changes.      Past Medical History:   Diagnosis Date   • Abnormal CBC measurement 2020   • Acute respiratory failure with hypoxia (HCC) 2020   • Allergic rhinitis 2016   • At risk for falling 2019   • Balance disorder 2018   • Blurry vision, left eye 2015   • Breast cancer (HCC)    • Cancer (HCC)     lung cancer with brain mts   • Chickenpox    • Chronic respiratory failure (HCC) 2021   • French measles    • Healthcare maintenance 2018   • High risk medication use 2019   • Hilar adenopathy 2016   • Influenza    • Joint pain    • Lung cancer (HCC)    • Metastasis to brain (HCC) 2015   • Mumps    • Need for vaccination 2019   • Pneumonia due to COVID-19 virus 2020   • Radionecrosis 2018   • Sick sinus syndrome (HCC)     with AFIB, s/p pacemaker   • Thyroid disease    • Tonsillitis      Social History     Tobacco Use   • Smoking status: Former Smoker     Packs/day: 2.00     Years: 20.00     Pack years: 40.00     Types: Cigarettes     Quit date:      Years since quittin.0   • Smokeless tobacco: Never Used   Vaping Use   • Vaping Use: Never used   Substance Use Topics   • Alcohol use: Yes     Alcohol/week: 1.8 oz     Types: 3 Glasses of wine per week     Comment: occasionally    • Drug use: No     Current Outpatient Medications Ordered in Epic   Medication Sig Dispense Refill   • omeprazole (PRILOSEC) 40 MG delayed-release capsule Take 1 Capsule by mouth every day. 90 Capsule 1   • levETIRAcetam (KEPPRA) 500 MG Tab TAKE 1 TABLET BY MOUTH TWICE A  Tablet 0   • SPIRIVA HANDIHALER 18 MCG Cap PLACE 1 CAPSULE INTO INHALER AND INHALE EVERY DAY. 90 Capsule 1   • furosemide (LASIX) 20 MG Tab Take 1 Tablet by mouth every 48 hours. 30 Tablet 0   • methimazole (TAPAZOLE) 5 MG Tab Take 0.5 Tablets by mouth every day. 90 Tablet 1   • alendronate (FOSAMAX) 70 MG Tab Take 1 Tablet by mouth every  7 days. 12 Tablet 3   • vitamin D2, Ergocalciferol, (DRISDOL) 1.25 MG (23668 UT) Cap capsule Take 1 Capsule by mouth every 7 days. 12 Capsule 3   • metoprolol SR (TOPROL XL) 25 MG TABLET SR 24 HR Take 1 tablet by mouth every day. 90 tablet 3   • flecainide (TAMBOCOR) 150 MG Tab Take 1 tablet by mouth 2 times a day. 180 tablet 3   • potassium chloride ER (KLOR-CON 10) 10 MEQ tablet Take 1 tablet by mouth every day. 90 tablet 3   • Fluticasone Furoate-Vilanterol (BREO ELLIPTA) 100-25 MCG/INH AEROSOL POWDER, BREATH ACTIVATED Inhale 1 Puff every day. Rinse mouth after use. 3 Each 3   • albuterol 108 (90 Base) MCG/ACT Aero Soln inhalation aerosol INHALE 2 PUFFS BY MOUTH EVERY 4 HOURS AS NEEDED FOR SHORTNESS OF BREATH 1 Each 11   • zinc sulfate (ZINCATE) 220 (50 Zn) MG Cap Take 220 mg by mouth every day.     • CALCIUM CARBONATE-VITAMIN D PO Take  by mouth.     • metronidazole (METROCREAM) 0.75 % cream APPLY TO FACE EVERYDAY ONCE TO TWICE A DAY FOR ROSACEA       No current [facility]-ordered facility-administered medications on file.     Penicillins and Atorvastatin    Health Maintenance: Shingrix treatment completed at the pharmacy    ROS: see hpi  Gen: no fevers/chills  Pulm: no sob, no cough  CV: no chest pain, no palpitations, no edema  GI: no nausea/vomiting, no diarrhea  Skin: no rash    Objective:   Exam:  /54 (BP Location: Left arm, Patient Position: Sitting, BP Cuff Size: Small adult)   Pulse 68   Temp 36.7 °C (98.1 °F) (Temporal)   Ht 1.524 m (5')   Wt 68.5 kg (151 lb)   SpO2 98%   BMI 29.49 kg/m²    Body mass index is 29.49 kg/m².    Gen: Alert and oriented, No apparent distress.  HEENT: Head atraumatic, normocephalic. Pupils equal and round.  Neck: Neck is supple without lymphadenopathy.   Lungs: Normal effort, CTA bilaterally, no wheezes, rhonchi, or rales  CV: Regular rate and rhythm. No murmurs, rubs, or gallops.  Ext: No clubbing, cyanosis, edema.    Assessment & Plan:     81 y.o. female with the  following -     1. Presbyesophagus  2. Other dysphagia  S/p EGD.  We will request report.  Continue omeprazole.  - omeprazole (PRILOSEC) 40 MG delayed-release capsule; Take 1 Capsule by mouth every day.  Dispense: 90 Capsule; Refill: 1    3. Sleep apnea treated with nocturnal BiPAP  Chronic, on BiPAP.  Will refer to sleep medicine through [facility] and care management to coordinate transition to medical supply company in [place].  - Referral to Pulmonary and Sleep Medicine  - REFERRAL TO CARE MANAGEMENT    4. Malignant neoplasm of upper lobe of right lung (HCC)  S/p chemoradiotherapy.  Follow-up with oncology as scheduled.  Await radiology imaging comparison.  - REFERRAL TO CARE MANAGEMENT    5. Seizure (HCC)  Managed on Keppra per neurology  - REFERRAL TO CARE MANAGEMENT    6. History of radiation therapy  - omeprazole (PRILOSEC) 40 MG delayed-release capsule; Take 1 Capsule by mouth every day.  Dispense: 90 Capsule; Refill: 1    7. Paroxysmal A-fib (HCC)  Chronic, Managed by cardiology.  On flecainide and metoprolol.    Return for As scheduled.  Would recommend that she follow-up with all of her specialist prior to her move to ensure smooth transition to [facility] provider in [place], [place].    My total time spent caring for the patient on the day of the encounter was 35 minutes.   This does not include time spent on separately billable procedures/tests.      Sweetie Harris PA-C ([place])  Physician Assistant Certified  [facility]    Please note that this dictation was created using voice recognition software. I have made every reasonable attempt to correct obvious errors, but I expect that there are errors of grammar and possibly content that I did not discover before finalizing the note.

## 2022-01-19 ENCOUNTER — HOSPITAL ENCOUNTER (OUTPATIENT)
Dept: LAB | Facility: MEDICAL CENTER | Age: 82
End: 2022-01-19
Attending: INTERNAL MEDICINE
Payer: MEDICARE

## 2022-01-19 DIAGNOSIS — E55.9 VITAMIN D DEFICIENCY: ICD-10-CM

## 2022-01-19 DIAGNOSIS — E05.90 HYPERTHYROIDISM: ICD-10-CM

## 2022-01-19 DIAGNOSIS — E04.2 MULTINODULAR GOITER: ICD-10-CM

## 2022-01-19 DIAGNOSIS — Z79.899 HIGH RISK MEDICATION USE: ICD-10-CM

## 2022-01-19 DIAGNOSIS — M81.0 OSTEOPOROSIS, UNSPECIFIED OSTEOPOROSIS TYPE, UNSPECIFIED PATHOLOGICAL FRACTURE PRESENCE: ICD-10-CM

## 2022-01-19 LAB
25(OH)D3 SERPL-MCNC: 45 NG/ML (ref 30–100)
ALBUMIN SERPL BCP-MCNC: 4.3 G/DL (ref 3.2–4.9)
ALBUMIN/GLOB SERPL: 1.6 G/DL
ALP SERPL-CCNC: 62 U/L (ref 30–99)
ALT SERPL-CCNC: 25 U/L (ref 2–50)
ANION GAP SERPL CALC-SCNC: 12 MMOL/L (ref 7–16)
AST SERPL-CCNC: 20 U/L (ref 12–45)
BILIRUB SERPL-MCNC: 0.2 MG/DL (ref 0.1–1.5)
BUN SERPL-MCNC: 22 MG/DL (ref 8–22)
CALCIUM SERPL-MCNC: 9.6 MG/DL (ref 8.5–10.5)
CHLORIDE SERPL-SCNC: 104 MMOL/L (ref 96–112)
CO2 SERPL-SCNC: 24 MMOL/L (ref 20–33)
CREAT SERPL-MCNC: 1.07 MG/DL (ref 0.5–1.4)
GLOBULIN SER CALC-MCNC: 2.7 G/DL (ref 1.9–3.5)
GLUCOSE SERPL-MCNC: 111 MG/DL (ref 65–99)
PHOSPHATE SERPL-MCNC: 3.5 MG/DL (ref 2.5–4.5)
POTASSIUM SERPL-SCNC: 4 MMOL/L (ref 3.6–5.5)
PROT SERPL-MCNC: 7 G/DL (ref 6–8.2)
PTH-INTACT SERPL-MCNC: 44.5 PG/ML (ref 14–72)
SODIUM SERPL-SCNC: 140 MMOL/L (ref 135–145)
T3FREE SERPL-MCNC: 2.71 PG/ML (ref 2–4.4)
T4 FREE SERPL-MCNC: 1.08 NG/DL (ref 0.93–1.7)
TSH SERPL DL<=0.005 MIU/L-ACNC: 0.98 UIU/ML (ref 0.38–5.33)

## 2022-01-19 PROCEDURE — 84443 ASSAY THYROID STIM HORMONE: CPT

## 2022-01-19 PROCEDURE — 84481 FREE ASSAY (FT-3): CPT

## 2022-01-19 PROCEDURE — 36415 COLL VENOUS BLD VENIPUNCTURE: CPT

## 2022-01-19 PROCEDURE — 84100 ASSAY OF PHOSPHORUS: CPT

## 2022-01-19 PROCEDURE — 83970 ASSAY OF PARATHORMONE: CPT

## 2022-01-19 PROCEDURE — 80053 COMPREHEN METABOLIC PANEL: CPT

## 2022-01-19 PROCEDURE — 84439 ASSAY OF FREE THYROXINE: CPT

## 2022-01-19 PROCEDURE — 82306 VITAMIN D 25 HYDROXY: CPT

## 2022-01-20 NOTE — TELEPHONE ENCOUNTER
Pt returned call. Tried reaching nurse but unavailable. Please call Pt back at [phone].    Thank you

## 2022-01-21 NOTE — TELEPHONE ENCOUNTER
S/W pt, she is moving to [place], [place]-  Officially moving 2/14/22- wants to know if RO could recommend someone, will ask RO but not sure if he knows anyone.     Pt is wanting to know about her home monitoring device and what to do    Advised pt to come in on 2/11/22 with RO, allow us to renew medications, will continue to send remote transmissions to us until she establishes with new provider in [place]. Advised that she should try to set up with someone within 90 days. Once she is established, the new office, they should be able to provide her with the information to update [employer] to change provider for future remote transmissions.

## 2022-01-26 ENCOUNTER — TELEPHONE (OUTPATIENT)
Dept: MEDICAL GROUP | Facility: IMAGING CENTER | Age: 82
End: 2022-01-26

## 2022-01-26 DIAGNOSIS — G47.30 SLEEP APNEA TREATED WITH NOCTURNAL BIPAP: ICD-10-CM

## 2022-01-27 NOTE — TELEPHONE ENCOUNTER
Received message from patient. Patient states she is moving to [place], [place]. She is requesting a referral be sent for a new pulmonologist down there. She states she cannot schedule until a referral is sent. She is requesting a referral be sent to Dr. Suhail Davis, phone number [phone].

## 2022-02-01 ENCOUNTER — TELEMEDICINE (OUTPATIENT)
Dept: NEUROLOGY | Facility: MEDICAL CENTER | Age: 82
End: 2022-02-01
Attending: PSYCHIATRY & NEUROLOGY
Payer: MEDICARE

## 2022-02-01 VITALS — HEIGHT: 60 IN | WEIGHT: 150 LBS | BODY MASS INDEX: 29.45 KG/M2

## 2022-02-01 DIAGNOSIS — I62.9 INTRACRANIAL HEMORRHAGE (HCC): ICD-10-CM

## 2022-02-01 DIAGNOSIS — C79.31 METASTASIS TO BRAIN (HCC): ICD-10-CM

## 2022-02-01 PROCEDURE — 99214 OFFICE O/P EST MOD 30 MIN: CPT | Mod: 95 | Performed by: PSYCHIATRY & NEUROLOGY

## 2022-02-01 RX ORDER — LEVETIRACETAM 500 MG/1
500 TABLET ORAL 2 TIMES DAILY
Qty: 180 TABLET | Refills: 1 | Status: SHIPPED | OUTPATIENT
Start: 2022-02-01 | End: 2022-11-07

## 2022-02-01 ASSESSMENT — FIBROSIS 4 INDEX: FIB4 SCORE: 1.454545454545454545

## 2022-02-01 NOTE — PROGRESS NOTES
Chief Complaint   Patient presents with   • Follow-Up     Intracranial hemorhage     This evaluation was conducted via Zoom using secure and encrypted videoconferencing technology. The patient was in their home in the [facility].    The patient's identity was confirmed and verbal consent was obtained for this virtual visit.    History of present illness:  Ml Chavira 79 y.o. female presents today for seizures and ICH.  History is obtained from patient.  and Patient is accompanied by self.  She lives alone and has a medical alert device at home.       Duration/timing: Winter/November 2017  Context: Brain metastasis/spell: patient remembers feeling faint and couldn't get herself back up, found that her left side was weak when she woke up (no incontinence or tongue biting). Brain radiation 2015. Brain tumor resection 2015. She possibly had the left-sided weakness in 2015 but she is not sure.  Despite that the weakness she is experienced in the winter 2017 was far from her baseline.  That episode was occurring after trip to [place] and she was otherwise not acutely ill. She does not drive because of hemianopsia. She hs a hx of breast cancer without metastasis.  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  She is referred today for reevaluation on whether or not she needs Keppra.  Location: Brain  Quality: Weakness/swelling  Severity: Moderate  Modifying factors: Improved with time and steroid  Associated signs/symptoms: hx of bell palsy of uncertain side; occasional migraines  Denies: bladder incontinence, bowel incontinence, dizziness, headaches, vision changes, other weakness, numbness/tingling, swallowing difficulties, speech disturbance, depression, anxiety, memory loss, loss of consciousness, hallucinations, abnormal movements, diplopia and falls     Summary of problem:   Intracranial hemorrhage, incidental finding on brain imaging for monitoring of prior brain  mets by oncology.  She is status post surgical resection and radiation therapy.    Duration/timing: Subacute on imaging, December 2019  Context: In the setting of anticoagulation with Eliquis for A. fib  Location: Right parietal, and prior surgical bed  Quality: Hemorrhage  Severity: Mild, patient asymptomatic  Modifying factors: Improved with time  Associated signs/symptoms: None  dizziness, headaches, vision changes/loss or diplopia, head trauma , weakness, numbness/tingling, swallowing difficulties, speech disturbance, incoordination, memory loss, loss of consciousness, seizures and falls       Patient has tried:  -Keppra 500 mg p.o. twice daily, started 2017 in patient, no side effects      Subjective: Patient was last seen in neurology via virtual in 7/2021..    ICH: None since last visit    Brain mets: no new mets. Likely radiation change but per oncology will continue her brain scans.    No spells or seizure. No LOC, unexplained incontinence, unexplained injuries, gaps in time.     She is doing well. She is moving on [employer] to [place] - she has sons and grandchildren there. Pending follow up with cardiology regarding her history of atrial fibrillation. She will be living in independent apartment - meals.     She has not driven for 6-7 years due to vision.       Past medical history:   Past Medical History:   Diagnosis Date   • Abnormal CBC measurement 2/6/2020   • Acute respiratory failure with hypoxia (HCC) 12/20/2020   • Allergic rhinitis 1/12/2016   • At risk for falling 12/27/2019   • Balance disorder 6/21/2018   • Blurry vision, left eye 11/19/2015   • Breast cancer (HCC)    • Cancer (HCC)     lung cancer with brain mts   • Chickenpox    • Chronic respiratory failure (HCC) 8/7/2021   • Citizen of the [place] measles    • Healthcare maintenance 7/23/2018   • High risk medication use 8/27/2019   • Hilar adenopathy 1/12/2016   • Influenza    • Joint pain    • Lung cancer (HCC)    • Metastasis to brain (HCC)  2015   • Mumps    • Need for vaccination 2019   • Pneumonia due to COVID-19 virus 2020   • Radionecrosis 2018   • Sick sinus syndrome (HCC)     with AFIB, s/p pacemaker   • Thyroid disease    • Tonsillitis        Past surgical history:   Past Surgical History:   Procedure Laterality Date   • CRANIOTOMY STEALTH Right 2015    Procedure: CRANIOTOMY STEALTH-right occipital;  Surgeon: Suyapa Schumacher M.D.;  Location: SURGERY [facility];  Service:    • APPENDECTOMY     • CRANIOTOMY     • KNEE ARTHROSCOPY      left    • LUMPECTOMY     • OTHER      left knee surgery   • PACEMAKER INSERTION     • TONSILLECTOMY         Family history:   Family History   Problem Relation Age of Onset   • Dementia Mother    • Diabetes Mother    • Other Mother         osteoarthritis   • Arthritis Mother    • Autoimmune Disease Father         Rheumatoid arthritis   • Arthritis Father    • Cancer Brother         prostate        Social history:   Tobacco Use   • Smoking status: Former Smoker     Packs/day: 2.00     Years: 20.00     Pack years: 40.00     Last attempt to quit: 1960     Years since quittin.6   • Smokeless tobacco: Never Used   Substance and Sexual Activity   • Alcohol use: Yes     Alcohol/week: 1.8 oz     Types: 3 Glasses of wine per week   • Drug use: No       Current medications:   Current Outpatient Medications   Medication   • levETIRAcetam (KEPPRA) 500 MG Tab   • omeprazole (PRILOSEC) 40 MG delayed-release capsule   • SPIRIVA HANDIHALER 18 MCG Cap   • furosemide (LASIX) 20 MG Tab   • methimazole (TAPAZOLE) 5 MG Tab   • alendronate (FOSAMAX) 70 MG Tab   • vitamin D2, Ergocalciferol, (DRISDOL) 1.25 MG (65436 UT) Cap capsule   • metoprolol SR (TOPROL XL) 25 MG TABLET SR 24 HR   • flecainide (TAMBOCOR) 150 MG Tab   • potassium chloride ER (KLOR-CON 10) 10 MEQ tablet   • Fluticasone Furoate-Vilanterol (BREO ELLIPTA) 100-25 MCG/INH AEROSOL POWDER, BREATH ACTIVATED   • albuterol 108 (90 Base)  "MCG/ACT Aero Soln inhalation aerosol   • zinc sulfate (ZINCATE) 220 (50 Zn) MG Cap   • CALCIUM CARBONATE-VITAMIN D PO   • metronidazole (METROCREAM) 0.75 % cream     No current facility-administered medications for this visit.       Medication Allergy:  Allergies   Allergen Reactions   • Penicillins Rash and Itching     RXN \"a long time ago\"  Other reaction(s): Rash   • Atorvastatin      Causes low quality       Review of Systems   Neurological:        As per HPI       Physical examination:   Prior exam as detailed below. On today's exam, patient was Oriented to person, place, time, and purpose, Demonstrated normal attention and Speech is fluent without errors.    General: Patient in well nourished in no apparent distress. Elevated BMI.   Eyes: Ophthalmoscopic examination performed but discs cannot be visualized well enough to characterize bilaterally. Prior exam  HENT: Normocephalic, atraumatic.   Cardiovascular: No lower extremity edema.  Respiratory: Normal respiratory effort.   Skin: No appreciable signs of acute rashes or bruising.   Musculoskeletal: No signs of joint or muscle swelling.   Psychiatric: Pleasant.     NEUROLOGICAL EXAM:   Mental status: Awake, alert and fully oriented to person, place, time and situation. Normal attention, concentration and fund of knowledge for education level.   Speech and language: Speech is fluent without errors and clear.  Cranial nerve exam:  II: Pupils are equally round and reactive to light.  Left homonymous hemianopsia.  III, IV, VI: EOMI, no diplopia, mild right ptosis nonobstructive  V: Sensation to light touch is normal over V1-3 distributions bilaterally.    VII: Very mild blunting of the right nasolabial fold.  VIII: Hearing intact to soft speech  IX: Dysarthria is not present.  XI: Shoulder shrug are symmetrical and strong. Prior exam  XII: Tongue protrudes midline. Prior exam    Motor exam:  Muscle tone is normal in all 4 limbs.    Muscle strength: 5 out of 5 " proximal distal right upper and lower extremity strength.  5- out of 5 proximal left upper and lower extremity strength.  Distally she is 5- as well.    Sensory exam:  Intact to Light touch in bilateral upper and lower extremity. No sensory extinction.    Deep tendon reflexes:       Right  Left  Biceps   0/4  0/4  Triceps  0/4  0/4  Brachioradialis 0/4  0/4  Knee jerk  0/4  0/4  Ankle jerk  0/4  0/4   bilateral toes are downgoing to plantar stimulation..    Coordination: shows a normal finger-nose-finger   Gait: Casual gait is with cane.       ANCILLARY DATA REVIEWED:   Lab Data Review:  Lab Results   Component Value Date/Time    WBC 7.1 01/14/2022 11:54 AM    RBC 3.92 (L) 01/14/2022 11:54 AM    HEMOGLOBIN 13.5 01/14/2022 11:54 AM    HEMATOCRIT 41.3 01/14/2022 11:54 AM    .4 (H) 01/14/2022 11:54 AM    MCH 34.4 (H) 01/14/2022 11:54 AM    MCHC 32.7 (L) 01/14/2022 11:54 AM    MPV 9.4 01/14/2022 11:54 AM    NEUTSPOLYS 67.70 01/14/2022 11:54 AM    LYMPHOCYTES 21.20 (L) 01/14/2022 11:54 AM    MONOCYTES 8.30 01/14/2022 11:54 AM    EOSINOPHILS 1.80 01/14/2022 11:54 AM    BASOPHILS 0.60 01/14/2022 11:54 AM    ANISOCYTOSIS 1+ 01/27/2020 10:33 AM      Lab Results   Component Value Date/Time    SODIUM 140 01/19/2022 03:50 PM    POTASSIUM 4.0 01/19/2022 03:50 PM    CHLORIDE 104 01/19/2022 03:50 PM    CO2 24 01/19/2022 03:50 PM    GLUCOSE 111 (H) 01/19/2022 03:50 PM    BUN 22 01/19/2022 03:50 PM    CREATININE 1.07 01/19/2022 03:50 PM     Lab Results   Component Value Date/Time    ASTSGOT 22 06/04/2019 1531    ALTSGPT 22 06/04/2019 1531    ALKPHOSPHAT 55 06/04/2019 1531    ALBUMIN 4.4 06/04/2019 1531     Lab Results   Component Value Date/Time    HBA1C 5.7 (H) 12/22/2020 09:20 AM        Imaging:   MRI brain with/without 10/2021:  Stable appearance of nonenhancing T2 signal abnormality and irregular enhancement along the margins of the operative cavity in the right parieto-occipital/posterior temporal region. Given the  stability of these changes over time, these most likely represent postradiation changes.       CT soft tissues neck with 12/2021:  1.  Large irregular 5.3 x 2.2 cm spiculated mass in the right upper lobe which extends into the right hilum consistent with the patient's known bronchogenic carcinoma.  2.  Multiple bilateral thyroid nodules without significant interval change since previous exam.  3.  Stable encephalomalacia posteriorly in the right temporal lobe.  4.  No other evidence of significant neck mass and/or adenopathy.    Records reviewed: None      ASSESSMENT AND PLAN:    1. Hx of Intracranial hemorrhage, resolved: Incidental finding on oncology follow-up MRI on December 17 2019 with a new 12 millimeter round focus in the right parietal lobe confirmed as blood on follow-up CT head 12/19/2019.  Suspected to be subacute in nature and stable based on repeat imaging findings in 3/2020.  Patient was anticoagulated on Eliquis 5 mg twice daily at the time.  Possible mild headache but otherwise asymptomatic. Platelets and INR have been in normal limit. MRI brain 2/2021 without evidence of new lesion with stable findings likely representing radiation-induced changes.  She was anticoagulated for atrial fibrillation which is currently controlled with oral medication.  Not a candidate for watchman procedure.  No longer anticoagulated.  -Monitor clinically    2. Metastasis to brain (HCC): Patient with a history of lung cancer metastasis to the brain status post resection with radiation in 2015 complicated by radiation necrosis and vasogenic edema in 2017 status post Decadron IV.  At that time patient was started on Keppra by inpatient neurology.  She has been on the medication since that time without issue.  It is unclear since she woke up with her left-sided weakness if she had any seizure activity and Oneil's paralysis.  MRI brain in June 2016 with right temporal parietal and occipital lobe signal intensity.  Prior  risks and benefits discussion with patient today regarding risk for seizure given the postoperative/radiation changes in the brain involving the temporal lobes, seizure threshold, risks and benefits of medications.  Given that she is tolerating medication very well, she has extensive changes on MRI, and independent - high functioning baseline, I believe the risk of injury and to her lifestyle is more detrimental than staying on the Keppra.  She is higher risk compared to the general population to have a seizure due to the amount of scarring in her brain.  I personally believe the benefits outweigh the risks of being on Keppra. Patient agrees.   -Continue levETIRAcetam (KEPPRA) 500 MG Tab, 1 tablet twice daily  -She does not drive    3. Atrial fibrillation (HCC) history of: Recommend follow-up with cardiology    4. History of Bell's palsy: Suspect right side given clinical exam.      FOLLOW-UP: Return for With neurology in [place] as soon as possible.   EDUCATION AND COUNSELING:  -I personally discussed the following with the patient:   Patient/family educated on SUDEP (Sudden Death in Epilepsy). Counseling was provided on the importance of strict medication and follow up compliance. The patient understands the risks associated with non-adherence with the medical plan as outlined, including but not limited to an increased risk for breakthrough seizures, which may contribute to injuries, disability, status epilepticus, and even death.  , Other seizure precautions were discussed at length, including but not limited to no diving, no skydiving, no unsupervised swimming, no Jacuzzi or bathing in bathtubs.  and Medical reasoning as above     The patient understands and agrees that due to the complexity of his/her diagnosis, results of any testing and further recommendations will typically be discussed/made during a face to face encounter in my office. The patient and/or family further understands it is their  responsibility to keep proper follow up.     Disclaimer  This dictation was created using voice recognition software. I have made every reasonable attempt to avoid dictation errors, but this document may contain an error not identified before finalizing. If the error changes the accuracy of the document, I would appreciate it being brought to my attention. Thank you very much.     Irina Rodriguez MD  Neurology, Neurophysiology

## 2022-02-02 ENCOUNTER — OFFICE VISIT (OUTPATIENT)
Dept: ENDOCRINOLOGY | Facility: MEDICAL CENTER | Age: 82
End: 2022-02-02
Attending: INTERNAL MEDICINE
Payer: MEDICARE

## 2022-02-02 VITALS
WEIGHT: 152.6 LBS | HEART RATE: 76 BPM | SYSTOLIC BLOOD PRESSURE: 124 MMHG | OXYGEN SATURATION: 93 % | DIASTOLIC BLOOD PRESSURE: 72 MMHG | HEIGHT: 62 IN | BODY MASS INDEX: 28.08 KG/M2

## 2022-02-02 DIAGNOSIS — Z79.899 HIGH RISK MEDICATION USE: ICD-10-CM

## 2022-02-02 DIAGNOSIS — E55.9 VITAMIN D DEFICIENCY: ICD-10-CM

## 2022-02-02 DIAGNOSIS — E04.2 MULTINODULAR GOITER: ICD-10-CM

## 2022-02-02 DIAGNOSIS — M81.0 OSTEOPOROSIS, UNSPECIFIED OSTEOPOROSIS TYPE, UNSPECIFIED PATHOLOGICAL FRACTURE PRESENCE: ICD-10-CM

## 2022-02-02 DIAGNOSIS — E05.90 HYPERTHYROIDISM: ICD-10-CM

## 2022-02-02 PROCEDURE — 99214 OFFICE O/P EST MOD 30 MIN: CPT | Performed by: INTERNAL MEDICINE

## 2022-02-02 PROCEDURE — 99211 OFF/OP EST MAY X REQ PHY/QHP: CPT | Performed by: INTERNAL MEDICINE

## 2022-02-02 RX ORDER — METHIMAZOLE 5 MG/1
2.5 TABLET ORAL DAILY
Qty: 90 TABLET | Refills: 1 | Status: SHIPPED | OUTPATIENT
Start: 2022-02-02 | End: 2022-12-28

## 2022-02-02 RX ORDER — ERGOCALCIFEROL 1.25 MG/1
50000 CAPSULE ORAL
Qty: 12 CAPSULE | Refills: 3 | Status: SHIPPED | OUTPATIENT
Start: 2022-02-02 | End: 2022-12-01

## 2022-02-02 RX ORDER — ALENDRONATE SODIUM 70 MG/1
70 TABLET ORAL
Qty: 12 TABLET | Refills: 3 | Status: SHIPPED | OUTPATIENT
Start: 2022-02-02

## 2022-02-02 ASSESSMENT — FIBROSIS 4 INDEX: FIB4 SCORE: 1.454545454545454545

## 2022-02-02 NOTE — PROGRESS NOTES
Chief Complaint: Follow up for Hyperthyroidism secondary to Grave's disease, history of multinodular goiter, history of osteoporosis.      HPI:     Ml Chavira is a 80 y.o. female here for follow up of the above medical issues.    She has a history of hyperthyroidism secondary to probable Graves' disease based on high normal uptake on her thyroid uptake and scan from 2018 and also from elevated thyrotropin receptor antibodies (2019).  There is also possibility that she has autonomous thyroid nodules.      She remains on Methimazole 2.5 mg every day which has been her dose for over 12  months.   She reports excellent compliance and denies missing any daily doses.       Her weight is stable  She denies symptoms of thyrotoxicosis such as palpitations, tremors, insomnia and diarrhea      TSH was 0.980 with a free T4 of 1.08 and free T3 is 2.71 on Jan 2022  TSH was 0.910 with a free T4 of 0.98 and free T3 of 2.92 on Oct 2021        She previously had a formal thyroid ultrasound on July 2019 which showed:   hypoechoic solid nodule 2.1 cm in the right lower lobe TR 5   which was biopsied and proven benign on August 22, 2019    1.7 cm hypoechoic solid nodule on the right lower lobe TR 5   which was biopsied and proven benign on August 22, 2019    1.9 cm hypoechoic solid nodule on the left mid lobe TR 6   which was biopsied and proven benign on August 22, 2019    Repeat ultrasound on October 6, 2021 showed stability of the previously biopsied solid nodules on the right lower lobe and left mid lobe    There were new nodules detected that were highly suspicious located on the right mid upper lobe measuring 1.7 cm and left upper lobe measuring 1.7 cm with TR scores of 9 and 6 respectively.  These were biopsied on 12/2021 at [facility] with indeterminate cytology for both nodules but Affirma molecular testing came back as benign.           She also has osteoporosis with baseline T score of -3.1 (left hip in 2018)  Her DEXA  on 10/16/2020 showed the lowest T score of -2.4 for the left hip which is better compared to the DEXA in 2018 showing significant improvement in bone mineral density for the lumbar spine and hip      She has been taking alendronate 70 mg weekly for the past 4.5 years   and has tolerated the medication well.   She takes calcium 1200mg daily with Vitamin D3 5000u daily    She denies interval falls and fractures  She exercises 4 x a weem and walks daily    Her vitamin D is 45 with a calcium of 9.6  Crea was 1.07  PTH of 44.5 on Jan 2022        Patient's medications, allergies, and social histories were reviewed and updated as appropriate.      ROS:     CONS:     No fever, no chills   EYES:     No diplopia, no blurry vision   CV:           No chest pain, no palpitations   PULM:     No SOB, no cough, no hemoptysis.   GI:            No nausea, no vomiting, no diarrhea, no constipation   ENDO:     No polyuria, no polydipsia, no heat intolerance, no cold intolerance       Past Medical History:  Problem List:  2021-11: Presbyesophagus  2021-11: Hoarseness  2021-11: History of radiation therapy  2021-11: Shortness of breath  2021-11: History of tobacco use  2021-11: BMI 29.0-29.9,adult  2021-08: Sleep apnea treated with nocturnal BiPAP  2021-08: Chronic respiratory failure (HCC)  2021-04: On home O2  2021-01: History of COVID-19  2020-12: Hypokalemia  2020-12: Seizure ([facility])  2020-12: Macrocytosis  2020-12: Leukopenia  2020-12: Acute respiratory failure with hypoxia ([facility])  2020-12: Pneumonia due to COVID-19 virus  2020-02: Abnormal CBC measurement  2019-12: At risk for falling  2019-12: Lower extremity edema  2019-08: High risk medication use  2019-08: Multinodular goiter  2019-08: Vitamin D deficiency  2019-01: Osteoporosis  2019-01: Need for vaccination  2019-01: Hyperthyroidism  2019-01: Paroxysmal A-fib ([facility])  2018-08: Low TSH level  2018-07: High serum thyroid stimulating hormone (TSH)  2018-07: Healthcare  "maintenance  2018: Screening for osteoporosis  2018: Thyroid nodule  2018: Low vitamin D level  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  2018: Arthralgia of both knees  2018-: Finger pain, right  2018: Balance disorder  2018: Acute cystitis  2018: Acute alcoholic gastritis without hemorrhage  2018: Pleuritic chest pain  2018: Sick sinus syndrome (HCC)  2018: Radionecrosis  2018: Malignant neoplasm of upper lobe of right lung (HCC)  2016: Allergic rhinitis  2016: Hilar adenopathy  2016: History of breast cancer  2015: Blurry vision, left eye  2015: Metastasis to brain (HCC)      Past Surgical History:  Past Surgical History:   Procedure Laterality Date   • CRANIOTOMY STEALTH Right 2015    Procedure: CRANIOTOMY STEALTH-right occipital;  Surgeon: Suyapa Schumacher M.D.;  Location: SURGERY [facility];  Service:    • APPENDECTOMY     • CRANIOTOMY     • KNEE ARTHROSCOPY      left    • LUMPECTOMY     • OTHER      left knee surgery   • PACEMAKER INSERTION     • TONSILLECTOMY          Allergies:  Penicillins     Social History:  Social History     Tobacco Use   • Smoking status: Former Smoker     Packs/day: 2.00     Years: 20.00     Pack years: 40.00     Types: Cigarettes     Quit date:      Years since quittin.1   • Smokeless tobacco: Never Used   Vaping Use   • Vaping Use: Never used   Substance Use Topics   • Alcohol use: Yes     Alcohol/week: 1.8 oz     Types: 3 Glasses of wine per week     Comment: occasionally    • Drug use: No        Family History:   family history includes Arthritis in her father and mother; Autoimmune Disease in her father; Cancer in her brother; Dementia in her mother; Diabetes in her mother; Other in her mother.      PHYSICAL EXAM:   Vital signs: /72 (BP Location: Left arm, Patient Position: Sitting, BP Cuff Size: Adult)   Pulse 76   Ht 1.562 m (5' 1.5\")   Wt 69.2 kg (152 lb 9.6 oz)   SpO2 93%   BMI 28.37 kg/m²   GENERAL: " Well-developed, well-nourished in no apparent distress.   EYE:  No ocular asymmetry, PERRLA, No exophthalmos or lid lag  HENT: Pink, moist mucous membranes.    NECK: Thyroid is slightly enlarged and feels bosselated  CARDIOVASCULAR:  No murmurs  LUNGS: Clear breath sounds  ABDOMEN: Soft, nontender   EXTREMITIES: No clubbing, cyanosis, or edema.   NEUROLOGICAL: No gross focal motor abnormalities, No visible tremors with both hands  LYMPH: No cervical adenopathy palpated.   SKIN: No rashes, lesions.     Labs:  Lab Results   Component Value Date/Time    WBC 7.1 01/14/2022 11:54 AM    RBC 3.92 (L) 01/14/2022 11:54 AM    HEMOGLOBIN 13.5 01/14/2022 11:54 AM    .4 (H) 01/14/2022 11:54 AM    MCH 34.4 (H) 01/14/2022 11:54 AM    MCHC 32.7 (L) 01/14/2022 11:54 AM    RDW 52.5 (H) 01/14/2022 11:54 AM    MPV 9.4 01/14/2022 11:54 AM       Lab Results   Component Value Date/Time    SODIUM 140 01/19/2022 03:50 PM    POTASSIUM 4.0 01/19/2022 03:50 PM    CHLORIDE 104 01/19/2022 03:50 PM    CO2 24 01/19/2022 03:50 PM    ANION 12.0 01/19/2022 03:50 PM    GLUCOSE 111 (H) 01/19/2022 03:50 PM    BUN 22 01/19/2022 03:50 PM    CREATININE 1.07 01/19/2022 03:50 PM    CALCIUM 9.6 01/19/2022 03:50 PM    ASTSGOT 20 01/19/2022 03:50 PM    ALTSGPT 25 01/19/2022 03:50 PM    TBILIRUBIN 0.2 01/19/2022 03:50 PM    ALBUMIN 4.3 01/19/2022 03:50 PM    TOTPROTEIN 7.0 01/19/2022 03:50 PM    GLOBULIN 2.7 01/19/2022 03:50 PM    AGRATIO 1.6 01/19/2022 03:50 PM       Lab Results   Component Value Date/Time    TSHULTRASEN 0.540 01/22/2020 1402     Lab Results   Component Value Date/Time    FREET4 0.97 01/22/2020 1402     Lab Results   Component Value Date/Time    FREET3 3.71 01/22/2020 1402     No results found for: THYSTIMIG      Imaging: see DEXA date 9/2018, see thyroid uptake and scan 10/2018      ASSESSMENT/PLAN:     1. Hyperthyroidism  Well-controlled  Adjust methimazole to 2.5 every other day  Reviewed importance of adherence  She is moving to  [facility] is therefore I recommended her thyroid labs to be repeated again in 6 months      2. Multinodular goiter  Previously biopsied nodule seen on right lower lobe and left lower lobe  She has new nodule seen on the left upper lobe and right upper lobe with suspicious features but they were both biopsied and proven benign  She has no compressive symptoms  Recommend observation of her multinodular goiter  Recommend repeating ultrasound in 1 to 2 years      3. Osteoporosis, unspecified osteoporosis type, unspecified pathological fracture presence  Stable  She denies interval falls and fractures  Continue alendronate 70 mg weekly   continue calcium and vitamin D supplementation  Recommend daily weightbearing exercise  Her bone density should be repeated October 2022      4. Vitamin D deficiency  Controlled  Continue ergocalciferol 50,000 units weekly  We will repeat calcium and 25-hydroxy vitamin D levels in 6 months      5. High risk medication use  Patient is taking methimazole which is high risk medication      Return in about 6 months (around 8/2/2022).      Thank you kindly for allowing me to participate in the thyroid care plan for this patient.    Abhijit Joseph MD, FACE, ECNU  01/28/20    CC:   [email or web address]

## 2022-02-03 ENCOUNTER — TELEMEDICINE (OUTPATIENT)
Dept: MEDICAL GROUP | Facility: IMAGING CENTER | Age: 82
End: 2022-02-03
Payer: MEDICARE

## 2022-02-03 VITALS — OXYGEN SATURATION: 96 % | BODY MASS INDEX: 29.8 KG/M2 | HEIGHT: 60 IN

## 2022-02-03 DIAGNOSIS — Z85.3 HISTORY OF BREAST CANCER: ICD-10-CM

## 2022-02-03 DIAGNOSIS — I48.0 PAROXYSMAL A-FIB (HCC): ICD-10-CM

## 2022-02-03 DIAGNOSIS — M81.0 OSTEOPOROSIS, UNSPECIFIED OSTEOPOROSIS TYPE, UNSPECIFIED PATHOLOGICAL FRACTURE PRESENCE: ICD-10-CM

## 2022-02-03 DIAGNOSIS — C34.11 MALIGNANT NEOPLASM OF UPPER LOBE OF RIGHT LUNG (HCC): ICD-10-CM

## 2022-02-03 DIAGNOSIS — E05.90 HYPERTHYROIDISM: ICD-10-CM

## 2022-02-03 PROCEDURE — 99213 OFFICE O/P EST LOW 20 MIN: CPT | Mod: 95 | Performed by: PHYSICIAN ASSISTANT

## 2022-02-03 ASSESSMENT — PAIN SCALES - GENERAL: PAINLEVEL: NO PAIN

## 2022-02-03 NOTE — PROGRESS NOTES
"Virtual Visit: Established Patient   This visit was conducted via Zoom using secure and encrypted videoconferencing technology. The patient was in a private location in the state of [place].    The patient's identity was confirmed and verbal consent was obtained for this virtual visit.    Subjective:   CC:   Chief Complaint   Patient presents with   • Follow-Up   • Referral Needed     [place]       Ml Chavira is a 82 y.o. female presenting for referral for internal medicine provider in [place].  She is moving to [place] and trying to establish with new providers.  Referral for pulmonology has already been approved.  She denies any refills for today.  No new complaints.    ROS   Denies any recent fevers or chills. No nausea or vomiting. No chest pains or shortness of breath.     Allergies   Allergen Reactions   • Penicillins Rash and Itching     RXN \"a long time ago\"  Other reaction(s): Rash   • Atorvastatin      Causes low quality       Current medicines (including changes today)  Current Outpatient Medications   Medication Sig Dispense Refill   • CALCIUM PO Take  by mouth.     • methimazole (TAPAZOLE) 5 MG Tab Take 0.5 Tablets by mouth every day. (Patient taking differently: Take 2.5 mg by mouth every day. Every other day) 90 Tablet 1   • vitamin D2, Ergocalciferol, (DRISDOL) 1.25 MG (61261 UT) Cap capsule Take 1 Capsule by mouth every 7 days. 12 Capsule 3   • alendronate (FOSAMAX) 70 MG Tab Take 1 Tablet by mouth every 7 days. 12 Tablet 3   • levETIRAcetam (KEPPRA) 500 MG Tab Take 1 Tablet by mouth 2 times a day. 180 Tablet 1   • omeprazole (PRILOSEC) 40 MG delayed-release capsule Take 1 Capsule by mouth every day. 90 Capsule 1   • SPIRIVA HANDIHALER 18 MCG Cap PLACE 1 CAPSULE INTO INHALER AND INHALE EVERY DAY. 90 Capsule 1   • furosemide (LASIX) 20 MG Tab Take 1 Tablet by mouth every 48 hours. 30 Tablet 0   • metoprolol SR (TOPROL XL) 25 MG TABLET SR 24 HR Take 1 tablet by mouth every day. 90 " tablet 3   • flecainide (TAMBOCOR) 150 MG Tab Take 1 tablet by mouth 2 times a day. 180 tablet 3   • Fluticasone Furoate-Vilanterol (BREO ELLIPTA) 100-25 MCG/INH AEROSOL POWDER, BREATH ACTIVATED Inhale 1 Puff every day. Rinse mouth after use. 3 Each 3   • albuterol 108 (90 Base) MCG/ACT Aero Soln inhalation aerosol INHALE 2 PUFFS BY MOUTH EVERY 4 HOURS AS NEEDED FOR SHORTNESS OF BREATH 1 Each 11   • zinc sulfate (ZINCATE) 220 (50 Zn) MG Cap Take 220 mg by mouth every day.     • metronidazole (METROCREAM) 0.75 % cream APPLY TO FACE EVERYDAY ONCE TO TWICE A DAY FOR ROSACEA     • potassium chloride ER (KLOR-CON 10) 10 MEQ tablet Take 1 tablet by mouth every day. (Patient not taking: Reported on 2/3/2022) 90 tablet 3     No current facility-administered medications for this visit.       Patient Active Problem List    Diagnosis Date Noted   • Presbyesophagus 11/23/2021   • Hoarseness 11/23/2021   • History of radiation therapy 11/23/2021   • Shortness of breath 11/04/2021   • History of tobacco use 11/04/2021   • BMI 29.0-29.9,adult 11/04/2021   • Sleep apnea treated with nocturnal BiPAP 08/07/2021   • On home O2 04/10/2021   • History of COVID-19 01/09/2021   • Seizure (HCC) 12/21/2020   • Macrocytosis 12/21/2020   • Lower extremity edema 12/27/2019   • Multinodular goiter 08/15/2019   • Vitamin D deficiency 08/15/2019   • Osteoporosis 01/17/2019   • Hyperthyroidism 01/17/2019   • Paroxysmal A-fib (HCC) 01/17/2019   • 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   • Sick sinus syndrome (HCC) 05/31/2018   • Radionecrosis 04/14/2018   • Malignant neoplasm of upper lobe of right lung (HCC) 03/05/2018   • History of breast cancer 01/12/2016       Family History   Problem Relation Age of Onset   • Dementia Mother    • Diabetes Mother    • Other Mother         osteoarthritis   • Arthritis Mother    • Autoimmune Disease Father         Rheumatoid arthritis   • Arthritis Father    • Cancer Brother         prostate        She  has a past  medical history of Abnormal CBC measurement (2/6/2020), Acute respiratory failure with hypoxia (HCC) (12/20/2020), Allergic rhinitis (1/12/2016), At risk for falling (12/27/2019), Balance disorder (6/21/2018), Blurry vision, left eye (11/19/2015), Breast cancer (HCC), Cancer (HCC), Chickenpox, Chronic respiratory failure (HCC) (8/7/2021), [place] measles, Healthcare maintenance (7/23/2018), High risk medication use (8/27/2019), Hilar adenopathy (1/12/2016), Influenza, Joint pain, Lung cancer (HCC), Metastasis to brain (HCC) (11/18/2015), Mumps, Need for vaccination (1/17/2019), Pneumonia due to COVID-19 virus (12/20/2020), Radionecrosis (4/14/2018), Sick sinus syndrome (HCC), Thyroid disease, and Tonsillitis.  She  has a past surgical history that includes craniotomy stealth (Right, 11/23/2015); other; appendectomy; knee arthroscopy; craniotomy; tonsillectomy; pacemaker insertion; and lumpectomy.         Objective:   Ht 1.524 m (5')   SpO2 96%   BMI 29.80 kg/m²   RR 12    Physical Exam:  Constitutional: Alert, no distress, well-groomed.  Skin: No rashes in visible areas.  Eye: Round. Conjunctiva clear, lids normal. No icterus.   ENMT: Lips pink without lesions, good dentition, moist mucous membranes. Phonation normal.  Neck: No masses, no thyromegaly. Moves freely without pain.  Respiratory: Unlabored respiratory effort, no cough or audible wheeze  Psych: Alert and oriented x3, normal affect and mood.     Assessment and Plan:   The following treatment plan was discussed:     1. Osteoporosis, unspecified osteoporosis type, unspecified pathological fracture presence  Chronic, on Fosamax, vitamin D, calcium.  Patient to establish with new endocrinologist in [place].  - Referral to Internal Medicine    2. History of breast cancer  Status post right lumpectomy and radiation in 1987.  - Referral to Internal Medicine    3. 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  Status post chemoradiotherapy to the right upper lobe mass in 2016.  - Referral to Internal Medicine    4. Paroxysmal A-fib (HCC)  Chronic, on flecainide and metoprolol.  Continue current regimen.  - Referral to Internal Medicine    5. Hyperthyroidism  Chronic, on methimazole per endocrinology.  History of thyroid nodules, as well.      Follow-up: Return for As needed.         Sweetie Harris PA-C ([place])  Physician Assistant Certified  [facility]    Please note that this dictation was created using voice recognition software. I have made every reasonable attempt to correct obvious errors, but I expect that there are errors of grammar and possibly content that I did not discover before finalizing the note.

## 2022-02-11 ENCOUNTER — OFFICE VISIT (OUTPATIENT)
Dept: CARDIOLOGY | Facility: MEDICAL CENTER | Age: 82
End: 2022-02-11
Payer: MEDICARE

## 2022-02-11 VITALS
SYSTOLIC BLOOD PRESSURE: 112 MMHG | WEIGHT: 152 LBS | HEIGHT: 60 IN | DIASTOLIC BLOOD PRESSURE: 64 MMHG | RESPIRATION RATE: 16 BRPM | HEART RATE: 73 BPM | OXYGEN SATURATION: 98 % | BODY MASS INDEX: 29.84 KG/M2

## 2022-02-11 DIAGNOSIS — Z87.891 HISTORY OF TOBACCO USE: ICD-10-CM

## 2022-02-11 DIAGNOSIS — I48.20 CHRONIC ATRIAL FIBRILLATION (HCC): ICD-10-CM

## 2022-02-11 DIAGNOSIS — I48.0 PAROXYSMAL A-FIB (HCC): ICD-10-CM

## 2022-02-11 DIAGNOSIS — R60.0 BILATERAL LEG EDEMA: ICD-10-CM

## 2022-02-11 DIAGNOSIS — R60.0 LOWER EXTREMITY EDEMA: ICD-10-CM

## 2022-02-11 DIAGNOSIS — R49.0 HOARSENESS: ICD-10-CM

## 2022-02-11 DIAGNOSIS — I49.5 SICK SINUS SYNDROME (HCC): ICD-10-CM

## 2022-02-11 DIAGNOSIS — Z92.3 HISTORY OF RADIATION THERAPY: ICD-10-CM

## 2022-02-11 DIAGNOSIS — I48.0 AF (PAROXYSMAL ATRIAL FIBRILLATION) (HCC): ICD-10-CM

## 2022-02-11 DIAGNOSIS — R06.02 SHORTNESS OF BREATH: ICD-10-CM

## 2022-02-11 DIAGNOSIS — Z86.16 HISTORY OF COVID-19: ICD-10-CM

## 2022-02-11 DIAGNOSIS — R56.9 SEIZURE (HCC): ICD-10-CM

## 2022-02-11 DIAGNOSIS — G47.30 SLEEP APNEA TREATED WITH NOCTURNAL BIPAP: ICD-10-CM

## 2022-02-11 DIAGNOSIS — D75.89 MACROCYTOSIS: ICD-10-CM

## 2022-02-11 DIAGNOSIS — I48.0 PAROXYSMAL ATRIAL FIBRILLATION (HCC): ICD-10-CM

## 2022-02-11 PROCEDURE — 99214 OFFICE O/P EST MOD 30 MIN: CPT | Mod: CS | Performed by: INTERNAL MEDICINE

## 2022-02-11 RX ORDER — POTASSIUM CHLORIDE 750 MG/1
10 TABLET, FILM COATED, EXTENDED RELEASE ORAL
Qty: 90 TABLET | Refills: 3 | Status: SHIPPED | OUTPATIENT
Start: 2022-02-11

## 2022-02-11 RX ORDER — METOPROLOL SUCCINATE 25 MG/1
25 TABLET, EXTENDED RELEASE ORAL
Qty: 90 TABLET | Refills: 3 | Status: SHIPPED | OUTPATIENT
Start: 2022-02-11 | End: 2023-02-23

## 2022-02-11 RX ORDER — FUROSEMIDE 20 MG/1
20 TABLET ORAL
Qty: 30 TABLET | Refills: 11 | Status: SHIPPED | OUTPATIENT
Start: 2022-02-11 | End: 2022-05-09 | Stop reason: SDUPTHER

## 2022-02-11 RX ORDER — FLECAINIDE ACETATE 150 MG/1
150 TABLET ORAL 2 TIMES DAILY
Qty: 180 TABLET | Refills: 3 | Status: SHIPPED | OUTPATIENT
Start: 2022-02-11

## 2022-02-11 ASSESSMENT — ENCOUNTER SYMPTOMS
MUSCULOSKELETAL NEGATIVE: 1
SPUTUM PRODUCTION: 0
STRIDOR: 0
WEAKNESS: 0
COUGH: 0
SORE THROAT: 0
NEUROLOGICAL NEGATIVE: 1
PALPITATIONS: 0
CLAUDICATION: 0
CHILLS: 0
CONSTITUTIONAL NEGATIVE: 1
LOSS OF CONSCIOUSNESS: 0
CARDIOVASCULAR NEGATIVE: 1
BRUISES/BLEEDS EASILY: 0
PND: 0
ORTHOPNEA: 0
DIZZINESS: 0
SHORTNESS OF BREATH: 0
RESPIRATORY NEGATIVE: 1
FEVER: 0
EYES NEGATIVE: 1
HEMOPTYSIS: 0
WHEEZING: 0
GASTROINTESTINAL NEGATIVE: 1

## 2022-02-11 ASSESSMENT — FIBROSIS 4 INDEX: FIB4 SCORE: 1.454545454545454545

## 2022-02-11 NOTE — PROGRESS NOTES
Chief Complaint   Patient presents with   • Atrial Fibrillation       Subjective:   Ml Chavira is a 81 y.o. female who presents today as a follow-up for her atrial fibrillation brain metastasis brain bleed and high risk medication usage.  Since she was last seen again she continues on the flecainide 150 twice daily with no recurrence of her atrial fibrillation otherwise symptoms are by pacemaker interrogation.  Her blood pressures been controlled.  She continues to take the Lasix as needed.  Since he was last seen she was evaluated for a watchman device but was turned down.  She continues to be out of atrial fibrillation on flecainide.  She is relocating to the [facility].  She is otherwise been well.    Past Medical History:   Diagnosis Date   • Abnormal CBC measurement 2/6/2020   • Acute respiratory failure with hypoxia (HCC) 12/20/2020   • Allergic rhinitis 1/12/2016   • At risk for falling 12/27/2019   • Balance disorder 6/21/2018   • Blurry vision, left eye 11/19/2015   • Breast cancer (HCC)    • Cancer (HCC)     lung cancer with brain mts   • Chickenpox    • Chronic respiratory failure (HCC) 8/7/2021   • [place] measles    • Healthcare maintenance 7/23/2018   • High risk medication use 8/27/2019   • Hilar adenopathy 1/12/2016   • Influenza    • Joint pain    • Lung cancer (HCC)    • Metastasis to brain (HCC) 11/18/2015   • Mumps    • Need for vaccination 1/17/2019   • Pneumonia due to COVID-19 virus 12/20/2020   • Radionecrosis 4/14/2018   • Sick sinus syndrome (HCC)     with AFIB, s/p pacemaker   • Thyroid disease    • Tonsillitis      Past Surgical History:   Procedure Laterality Date   • CRANIOTOMY STEALTH Right 11/23/2015    Procedure: CRANIOTOMY STEALTH-right occipital;  Surgeon: Suyapa Schumacher M.D.;  Location: SURGERY [facility];  Service:    • APPENDECTOMY     • CRANIOTOMY     • KNEE ARTHROSCOPY      left    • LUMPECTOMY     • OTHER      left knee surgery   • PACEMAKER INSERTION     •  TONSILLECTOMY       Family History   Problem Relation Age of Onset   • Dementia Mother    • Diabetes Mother    • Other Mother         osteoarthritis   • Arthritis Mother    • Autoimmune Disease Father         Rheumatoid arthritis   • Arthritis Father    • Cancer Brother         prostate      Social History     Socioeconomic History   • Marital status:      Spouse name: Not on file   • Number of children: Not on file   • Years of education: Not on file   • Highest education level: Not on file   Occupational History   • Not on file   Tobacco Use   • Smoking status: Former Smoker     Packs/day: 2.00     Years: 20.00     Pack years: 40.00     Types: Cigarettes     Quit date:      Years since quittin.1   • Smokeless tobacco: Never Used   Vaping Use   • Vaping Use: Never used   Substance and Sexual Activity   • Alcohol use: Yes     Alcohol/week: 1.8 oz     Types: 3 Glasses of wine per week     Comment: occasionally    • Drug use: No   • Sexual activity: Not Currently   Other Topics Concern   • Not on file   Social History Narrative    Originally from [place].     Has lived in [facility] and [facility].    Worked as a  on  [employer].         Three children    No pets    Lives alone, Senior Living    Has a lot of friends and stays active and social     Social Determinants of Health     Financial Resource Strain:    • Difficulty of Paying Living Expenses: Not on file   Food Insecurity:    • Worried About Running Out of Food in the Last Year: Not on file   • Ran Out of Food in the Last Year: Not on file   Transportation Needs:    • Lack of Transportation (Medical): Not on file   • Lack of Transportation (Non-Medical): Not on file   Physical Activity:    • Days of Exercise per Week: Not on file   • Minutes of Exercise per Session: Not on file   Stress:    • Feeling of Stress : Not on file   Social Connections:    • Frequency of Communication with Friends and Family: Not on file   •  "Frequency of Social Gatherings with Friends and Family: Not on file   • Attends Jainism Services: Not on file   • Active Member of Clubs or Organizations: Not on file   • Attends Club or Organization Meetings: Not on file   • Marital Status: Not on file   Intimate Partner Violence:    • Fear of Current or Ex-Partner: Not on file   • Emotionally Abused: Not on file   • Physically Abused: Not on file   • Sexually Abused: Not on file   Housing Stability:    • Unable to Pay for Housing in the Last Year: Not on file   • Number of Places Lived in the Last Year: Not on file   • Unstable Housing in the Last Year: Not on file     Allergies   Allergen Reactions   • Penicillins Rash and Itching     RXN \"a long time ago\"  Other reaction(s): Rash   • Atorvastatin      Causes low quality     Outpatient Encounter Medications as of 2/11/2022   Medication Sig Dispense Refill   • metoprolol SR (TOPROL XL) 25 MG TABLET SR 24 HR Take 1 Tablet by mouth every day. 90 Tablet 3   • flecainide (TAMBOCOR) 150 MG Tab Take 1 Tablet by mouth 2 times a day. 180 Tablet 3   • furosemide (LASIX) 20 MG Tab Take 1 Tablet by mouth every 48 hours. 30 Tablet 11   • potassium chloride ER (KLOR-CON 10) 10 MEQ tablet Take 1 Tablet by mouth every day. 90 Tablet 3   • CALCIUM PO Take  by mouth.     • methimazole (TAPAZOLE) 5 MG Tab Take 0.5 Tablets by mouth every day. (Patient taking differently: Take 2.5 mg by mouth every day. Every other day) 90 Tablet 1   • vitamin D2, Ergocalciferol, (DRISDOL) 1.25 MG (19159 UT) Cap capsule Take 1 Capsule by mouth every 7 days. 12 Capsule 3   • alendronate (FOSAMAX) 70 MG Tab Take 1 Tablet by mouth every 7 days. 12 Tablet 3   • levETIRAcetam (KEPPRA) 500 MG Tab Take 1 Tablet by mouth 2 times a day. 180 Tablet 1   • omeprazole (PRILOSEC) 40 MG delayed-release capsule Take 1 Capsule by mouth every day. 90 Capsule 1   • SPIRIVA HANDIHALER 18 MCG Cap PLACE 1 CAPSULE INTO INHALER AND INHALE EVERY DAY. 90 Capsule 1   • " Fluticasone Furoate-Vilanterol (BREO ELLIPTA) 100-25 MCG/INH AEROSOL POWDER, BREATH ACTIVATED Inhale 1 Puff every day. Rinse mouth after use. 3 Each 3   • albuterol 108 (90 Base) MCG/ACT Aero Soln inhalation aerosol INHALE 2 PUFFS BY MOUTH EVERY 4 HOURS AS NEEDED FOR SHORTNESS OF BREATH 1 Each 11   • zinc sulfate (ZINCATE) 220 (50 Zn) MG Cap Take 220 mg by mouth every day.     • metronidazole (METROCREAM) 0.75 % cream APPLY TO FACE EVERYDAY ONCE TO TWICE A DAY FOR ROSACEA     • [DISCONTINUED] furosemide (LASIX) 20 MG Tab Take 1 Tablet by mouth every 48 hours. 30 Tablet 0   • [DISCONTINUED] metoprolol SR (TOPROL XL) 25 MG TABLET SR 24 HR Take 1 tablet by mouth every day. 90 tablet 3   • [DISCONTINUED] flecainide (TAMBOCOR) 150 MG Tab Take 1 tablet by mouth 2 times a day. 180 tablet 3   • [DISCONTINUED] potassium chloride ER (KLOR-CON 10) 10 MEQ tablet Take 1 tablet by mouth every day. 90 tablet 3     No facility-administered encounter medications on file as of 2/11/2022.     Review of Systems   Constitutional: Negative.  Negative for chills, fever and malaise/fatigue.   HENT: Negative.  Negative for sore throat.    Eyes: Negative.    Respiratory: Negative.  Negative for cough, hemoptysis, sputum production, shortness of breath, wheezing and stridor.    Cardiovascular: Negative.  Negative for chest pain, palpitations, orthopnea, claudication, leg swelling and PND.   Gastrointestinal: Negative.    Genitourinary: Negative.    Musculoskeletal: Negative.    Skin: Negative.    Neurological: Negative.  Negative for dizziness, loss of consciousness and weakness.   Endo/Heme/Allergies: Negative.  Does not bruise/bleed easily.   All other systems reviewed and are negative.       Objective:   /64 (BP Location: Left arm, Patient Position: Sitting, BP Cuff Size: Adult)   Pulse 73   Resp 16   Ht 1.524 m (5')   Wt 68.9 kg (152 lb)   SpO2 98%   BMI 29.69 kg/m²     Physical Exam  Nursing note reviewed.   Constitutional:        General: She is not in acute distress.     Appearance: She is well-developed. She is not diaphoretic.   HENT:      Head: Normocephalic and atraumatic.      Right Ear: External ear normal.      Left Ear: External ear normal.      Nose: Nose normal.      Mouth/Throat:      Pharynx: No oropharyngeal exudate.   Eyes:      General: No scleral icterus.        Right eye: No discharge.         Left eye: No discharge.      Conjunctiva/sclera: Conjunctivae normal.      Pupils: Pupils are equal, round, and reactive to light.   Neck:      Vascular: No JVD.      Trachea: No tracheal deviation.   Cardiovascular:      Rate and Rhythm: Normal rate.   Pulmonary:      Effort: Pulmonary effort is normal. No respiratory distress.      Breath sounds: Normal breath sounds. No stridor.   Abdominal:      General: Bowel sounds are normal.      Palpations: Abdomen is soft.   Musculoskeletal:         General: No tenderness or deformity. Normal range of motion.      Cervical back: Normal range of motion and neck supple.   Skin:     General: Skin is warm and dry.      Coloration: Skin is not pale.      Findings: No erythema or rash.   Neurological:      Mental Status: She is alert and oriented to person, place, and time.      Cranial Nerves: No cranial nerve deficit.      Coordination: Coordination normal.   Psychiatric:         Behavior: Behavior normal.         Thought Content: Thought content normal.         Judgment: Judgment normal.         Assessment:     1. Sick sinus syndrome (HCC)  flecainide (TAMBOCOR) 150 MG Tab   2. Paroxysmal A-fib (HCC)     3. Lower extremity edema     4. Seizure (HCC)     5. Macrocytosis     6. History of COVID-19     7. Sleep apnea treated with nocturnal BiPAP     8. Shortness of breath     9. History of tobacco use     10. Hoarseness     11. History of radiation therapy     12. Chronic atrial fibrillation (HCC)  metoprolol SR (TOPROL XL) 25 MG TABLET SR 24 HR   13. Paroxysmal atrial fibrillation (HCC)   metoprolol SR (TOPROL XL) 25 MG TABLET SR 24 HR   14. AF (paroxysmal atrial fibrillation) (HCC)  flecainide (TAMBOCOR) 150 MG Tab   15. Bilateral leg edema  furosemide (LASIX) 20 MG Tab    potassium chloride ER (KLOR-CON 10) 10 MEQ tablet       Medical Decision Making:  Today's Assessment / Status / Plan:     82-year-old female with atrial fibrillation and a CVA with the setting of a brain bleed.  We will attempt to keep her in sinus rhythm with the flecainide.  Again she was turned down for the watchman.  She is a not a candidate for oral anticoagulation given her history of brain bleed.  She will follow-up with her cardiologist in [place].  I did offer her a telemedicine visit.  I refilled all her medications.  I will see her back as needed.

## 2022-03-26 DIAGNOSIS — R05.9 COUGH IN ADULT: ICD-10-CM

## 2022-03-28 RX ORDER — ALBUTEROL SULFATE 90 UG/1
AEROSOL, METERED RESPIRATORY (INHALATION)
Qty: 6.7 EACH | Refills: 6 | Status: SHIPPED | OUTPATIENT
Start: 2022-03-28 | End: 2022-12-28

## 2022-03-28 NOTE — TELEPHONE ENCOUNTER
Have we ever prescribed this med? Yes.  If yes, what date? 03/23/21    Last OV: 05/24/21 with Jose SAUNDERS     Next OV: No Pending appt.     DX: Cough in adult (R05)    Medications:   Requested Prescriptions     Pending Prescriptions Disp Refills   • albuterol 108 (90 Base) MCG/ACT Aero Soln inhalation aerosol [Pharmacy Med Name: ALBUTEROL HFA (PROVENTIL) INH] 6.7 Each 6     Sig: INHALE 2 PUFFS BY MOUTH EVERY 4 HOURS AS NEEDED FOR SHORTNESS OF BREATH

## 2022-05-09 DIAGNOSIS — R60.0 BILATERAL LEG EDEMA: ICD-10-CM

## 2022-05-11 NOTE — PROGRESS NOTES
This evaluation was conducted via Zoom using secure and encrypted videoconferencing technology. The patient was in a private location in the state of [place].    The patient's identity was confirmed and verbal consent was obtained for this virtual visit.  CC: home monitoring.  Home Monitoring Patient Triage  COVID-19 Monitoring Level: Home with basic monitoring       [facility] Flowsheet   1. Record COVID-19 Severity Index (qCSI):  2  2.Confirm appropriate patient and chart with two identifiers:   yes  3. Days Since Onset of Symptoms:  15  4. Does patient have another adult at home to help take care of you:  no but lives in assisted living so help is around.  5. Confirm O2 supply is working and there are no cannula problems:  no  6. Is your breathing today better or worse?  same  7. Are you able to tolerate fluids?  yes  8. Any vomiting or diarrhea in the last 24 hours?  no  9. Are you able to control your fevers?  n/a  10. Are you able to control your cough?   she denies cough  11. Current O2 Flow Rate:  2, she thinks  12. Review ER precautions: present to ED or call 911 if experiencing severe shortness of breath:   yes  13. Confirm next VV:   yes14. Final disposition:  stable  15. Time Spent:  10 minutes  Questions answered.    Penicillins and Atorvastatin  Current Outpatient Medications on File Prior to Visit   Medication Sig Dispense Refill   • flecainide (TAMBOCOR) 150 MG Tab Take 1 Tab by mouth 2 times a day. 180 Tab 3   • metoprolol SR (TOPROL XL) 25 MG TABLET SR 24 HR Take 1 Tab by mouth every day. 90 Tab 3   • furosemide (LASIX) 20 MG Tab TAKE 1 TABLET BY MOUTH EVERY DAY IN THE MORNING 90 Tab 1   • KLOR-CON 10 10 MEQ tablet TAKE 1 TABLET BY MOUTH EVERY DAY 90 Tab 1   • methimazole (TAPAZOLE) 5 MG Tab Take 0.5 Tabs by mouth every day. 90 Tab 1   • albuterol 108 (90 Base) MCG/ACT Aero Soln inhalation aerosol INHALE 2 PUFFS BY MOUTH EVERY 4 HOURS AS NEEDED FOR SHORTNESS OF BREATH 1 Each 1   •  levETIRAcetam (KEPPRA) 500 MG Tab Take 1 Tab by mouth 2 Times a Day. 180 Tab 3     No current facility-administered medications on file prior to visit.      Social History     Socioeconomic History   • Marital status:      Spouse name: Not on file   • Number of children: Not on file   • Years of education: Not on file   • Highest education level: Not on file   Occupational History   • Not on file   Social Needs   • Financial resource strain: Not on file   • Food insecurity     Worry: Not on file     Inability: Not on file   • Transportation needs     Medical: Not on file     Non-medical: Not on file   Tobacco Use   • Smoking status: Former Smoker     Packs/day: 2.00     Years: 20.00     Pack years: 40.00     Types: Cigarettes     Quit date:      Years since quittin.0   • Smokeless tobacco: Never Used   Substance and Sexual Activity   • Alcohol use: Yes     Alcohol/week: 1.8 oz     Types: 3 Glasses of wine per week     Frequency: 2-3 times a week     Drinks per session: 1 or 2     Binge frequency: Never     Comment: occasionally    • Drug use: No   • Sexual activity: Not Currently   Lifestyle   • Physical activity     Days per week: 4 days     Minutes per session: 60 min   • Stress: Not on file   Relationships   • Social connections     Talks on phone: More than three times a week     Gets together: More than three times a week     Attends Latter day service: Never     Active member of club or organization: Yes     Attends meetings of clubs or organizations: More than 4 times per year     Relationship status:    • Intimate partner violence     Fear of current or ex partner: No     Emotionally abused: No     Physically abused: No     Forced sexual activity: No   Other Topics Concern   • Not on file   Social History Narrative   • Not on file     Breast Cancer-related family history is not on file.      Review of Systems   Constitutional: Positive for malaise/fatigue. Negative for chills and  fever.   HENT: Negative for congestion and sore throat.    Respiratory: Positive for shortness of breath. Negative for cough.    Gastrointestinal: Negative for abdominal pain and nausea.   Musculoskeletal: Negative for myalgias.   Neurological: Negative for headaches.     Physical Exam   Vital signs noted and nursing note reviewed  Constitutional: Patient is oriented to person, place, and time. Patient appears well-developed and well-nourished. No distress.   HENT:   Head: Normocephalic and atraumatic.   Right Ear: External ear normal.   Left Ear: External ear normal.   Nose: Nose normal. No visible drainage or bleeding  Eyes: Conjunctivae normal and EOM are normal. Pupils are equal  Neck: Normal range of motion. Neck supple.   Musculoskeletal: Normal range of motion.   Neurological:Patient is alert and oriented and cooperative. No gross deficit.  Skin: No rash noted.   Psychiatric: Patient has a normal mood and affect,  behavior is normal.       1. Pneumonia due to COVID-19 virus     2. Dependence on supplemental oxygen       Stable at this time.  Follow up tomorrow at 1430.    Yes

## 2022-05-12 RX ORDER — FUROSEMIDE 20 MG/1
20 TABLET ORAL
Qty: 45 TABLET | Refills: 3 | Status: SHIPPED | OUTPATIENT
Start: 2022-05-12

## 2022-05-23 ENCOUNTER — NON-PROVIDER VISIT (OUTPATIENT)
Dept: CARDIOLOGY | Facility: MEDICAL CENTER | Age: 82
End: 2022-05-23
Payer: MEDICARE

## 2022-05-23 PROCEDURE — 93294 REM INTERROG EVL PM/LDLS PM: CPT | Performed by: INTERNAL MEDICINE

## 2022-05-28 NOTE — CARDIAC REMOTE MONITOR - SCAN
Device transmission reviewed. Device demonstrated appropriate function.       Electronically Signed by: Eric Levy M.D.    6/1/2022  7:54 AM

## 2022-06-17 DIAGNOSIS — R06.02 SHORTNESS OF BREATH: ICD-10-CM

## 2022-06-21 RX ORDER — TIOTROPIUM BROMIDE 18 UG/1
CAPSULE ORAL; RESPIRATORY (INHALATION)
Qty: 90 CAPSULE | Refills: 1 | Status: SHIPPED | OUTPATIENT
Start: 2022-06-21 | End: 2023-03-30

## 2022-07-20 NOTE — PROCEDURES
Tech: Sharon Freeman, RRT, CPFT  Tech notes: Good patient effort & cooperation.  The results of this test meet the ATS/ERS standards for acceptability & reproducibility.  Test was performed on the [email or web address] Body Plethysmograph-Elite DX system.  Predicted values were GLI-2012 for spirometry, GLI- 2017 for DLCO, ITS for Lung Volumes.  The DLCO was uncorrected for Hgb.  A bronchodilator of Ventolin HFA -2puffs via spacer administered.  DLCO performed during dilation period.    Interpretation:  Baseline spirometry shows normal airflows.  No significant bronchodilator response is seen.  Lung volumes are within normal limits.  Diffusion capacity is just below the lower limits of normal at 77% predicted.  Pulmonary function testing shows mild isolated reduction in DLCO.  Query pulmonary vascular disease.   normal...

## 2022-07-25 NOTE — PROGRESS NOTES
Ml Chavira is a 78 y.o. female here for a non-provider visit for: Labs on 4/23/2018 at 9:29 AM    Procedure Performed: Peripheral IV start with labs    Anatomical site: Right Antecubital Area (AC)    Equipment used: 24g    Labs drawn: CBC w/diff and CMP   Urinalysis collected     Ordering Provider: NIKCY Hobbs By: Praveen Belle RRAKESH     IV remains in place for infusion services appt. Patient tolerated well.    No

## 2022-10-26 NOTE — ASSESSMENT & PLAN NOTE
Managed by Dr. Watkins.  On Fosamax 70 mg weekly and calcium/vitamin D.  Has been on Fosamax for 4 years.  Due for bone density in October 2022.  Patient states that she walks 2 to 3 miles a day and has been trying to increase her strength training.  
Managed by cardiology.  On flecainide and metoprolol.  Status post pacemaker in 2018.  
Patient with a 40-pack-year history of previous tobacco abuse.  She is no longer smoking.  She does see pulmonology who had her on home O2, as she also had COVID-19 in December 2020.  She is currently on Breo 1 puff daily.  She uses nocturnal oxygen and has been weaned off daily oxygen.  She also use the BiPAP at night.  
Patient with a history of hyperthyroidism with thyroid nodules.  Managed by Dr. Abhijit Watkins, endocrinology.  On methimazole 2.5 mg daily and metoprolol 25 mg daily.  Patient states that she has an upcoming thyroid biopsy.  
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 Status post concurrent chemoradiotherapy to the right upper lobe mass in 2016.  Currently being managed by Dr. Aguilar.  
Patient with history of questionable seizure like activity (in 2017) status post brain surgery and stereotactic radiotherapy in 2015.  History of subacute intracranial hemorrhage while on Eliquis in 2019.  Has been maintained on Keppra and saw neurology recently who recommended continuing this medication.  
Pt with a history of breast cancer s/p right sided lumpectomy and radiation around 1987 in [place]. Was treated in [place].  She would like to defer breast cancer screening this year.  
Status post pacemaker placement in 2018.  Managed by Dr. Obregon, cardiology.  
Status post resection and stereotactic radiotherapy in 2015  
[Negative] : Heme/Lymph

## 2022-12-24 DIAGNOSIS — R05.9 COUGH IN ADULT: ICD-10-CM

## 2022-12-28 DIAGNOSIS — E05.90 HYPERTHYROIDISM: ICD-10-CM

## 2022-12-28 RX ORDER — ALBUTEROL SULFATE 90 UG/1
AEROSOL, METERED RESPIRATORY (INHALATION)
Qty: 6.7 EACH | Refills: 6 | Status: SHIPPED | OUTPATIENT
Start: 2022-12-28

## 2022-12-28 NOTE — TELEPHONE ENCOUNTER
Have we ever prescribed this med? Yes.  If yes, what date? 03/28/2022    Last OV: 05/24/2021- SHARRON Nolan APRN     Next OV: No appointment on file-   Next appointment should be in 6 months for pulmonary follow-up in 1 year for sleep compliance follow-up.    DX: Cough in adult (R05.9)    Medications:   Requested Prescriptions     Pending Prescriptions Disp Refills    albuterol 108 (90 Base) MCG/ACT Aero Soln inhalation aerosol [Pharmacy Med Name: ALBUTEROL HFA (PROVENTIL) INH] 6.7 Each 6     Sig: INHALE 2 PUFFS BY MOUTH EVERY 4 HOURS AS NEEDED FOR SHORTNESS OF BREATH

## 2023-02-20 DIAGNOSIS — I48.20 CHRONIC ATRIAL FIBRILLATION (HCC): ICD-10-CM

## 2023-02-20 DIAGNOSIS — I48.0 PAROXYSMAL ATRIAL FIBRILLATION (HCC): ICD-10-CM

## 2023-02-22 NOTE — TELEPHONE ENCOUNTER
Is the patient due for a refill? Yes    Was the patient seen the past year? No    Date of last office visit: 2/11/2022    Does the patient have an upcoming appointment?  No   If yes, When?     Provider to refill:RO    Does the patients insurance require a 100 day supply?  No

## 2023-02-23 RX ORDER — METOPROLOL SUCCINATE 25 MG/1
25 TABLET, EXTENDED RELEASE ORAL
Qty: 90 TABLET | Refills: 0 | Status: SHIPPED | OUTPATIENT
Start: 2023-02-23

## 2023-03-24 NOTE — TELEPHONE ENCOUNTER
LVM and sent [employer] message. Per TT/ANGELO discussion, AA is now in [place]   Yes please fax the order

## 2024-10-28 NOTE — PROGRESS NOTES
[facility] RADIATION ONCOLOGY  ON-TREATMENT VISIT NOTE    NAME: Kandi Fuentes  YOB: 1963  MRN #: [id number]  DATE OF SERVICE: 10/28/2024  PRESCRIBING PHYSICIAN: Tereso Abarca MD     DIAGNOSIS:      ICD-10-CM ICD-9-CM   1. 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  C34.11 162.3      RADIATION THERAPY VISIT:  Continue radiation therapy, Dosimetry plan remains acceptable, Films reviewed and remains acceptable, Pain assessed, Pain management planned, Radiation dose schedule reviewed and remains acceptable, Radiation technique remains acceptable, and Symptoms within expected range    Radiation Treatments       Active   Plans   FB RtLung   Most recent treatment: Dose planned: 200 cGy (fraction 11 on 10/28/2024)   Total: Dose planned: 6,000 cGy (30 fractions)   Elapsed Days: 14      Reference Points   RtLung   Most recent treatment: Dose given: 200 cGy (on 10/28/2024)   Total: Dose given: 2,200 cGy   Elapsed Days: 14                    [x] Concurrent Chemo   Regimen:  Carboplatin/ Etoposide     PHYSICAL ASSESSMENT         Vitals:    10/28/24 1235   BP: 91/59   Pulse: 89   Resp: 18   Temp: 97.4 °F (36.3 °C)   SpO2: 98%      Wt Readings from Last 3 Encounters:   10/28/24 64.2 kg (141 lb 9.6 oz)   10/23/24 63.5 kg (140 lb)   10/21/24 63 kg (138 lb 12.8 oz)     Restricted in physically strenuous activity but ambulatory and able to carry out work of a light or sedentary nature, e.g., light house work, office work = 1    We examined the relevant areas: yes  Findings are within the expected range for this stage of treatment: yes    ACTION ITEMS     Patient tolerating treatment well and as expected for this stage in their treatment and Continue radiation therapy as planned    Estimated Completion Date: 11/25/2024    Anticipatory guidance provided.    Referring to Dr. Mcmahon for breathing tightness at night.     Starting viscous lidocaine and carafate.     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      Identification/Prior relevant history: Please refer to my note from 2/14/18 for details regarding her initial oncological history. She developed symptomatic radionecrosis after undergoing brain radiation and was started on Avastin is a 60-year-old sparing strategy.    Interim history- she appears to have clinical improvement after starting Avastin. She was weaned off of steroids. Her balance and strength is overall improved. She is undergoing physical therapy.    Past Medical History:    No past medical history on file.    Past surgical history:       Past Surgical History:   Procedure Laterality Date   • CRANIOTOMY STEALTH Right 11/23/2015    Procedure: CRANIOTOMY STEALTH-right occipital;  Surgeon: Suyapa Schumacher M.D.;  Location: SURGERY [facility];  Service:        Allergies:       Penicillins    Medications:         Current Outpatient Prescriptions   Medication Sig Dispense Refill   • famotidine (PEPCID) 40 MG Tab Take 1 Tab by mouth every day. 30 Tab 1   • levetiracetam (KEPPRA) 500 MG Tab Take 1 Tab by mouth 2 Times a Day. 60 Tab    • atorvastatin (LIPITOR) 40 MG Tab Take 1 Tab by mouth every evening. 30 Tab    • aspirin (ASA) 325 MG Tab Take 1 Tab by mouth every day. 100 Tab    • vitamin B-12 CR (CYANOCOBALAMIN) 1000 MCG Tab CR tablet Take 5,000 mg by mouth.     • Omega-3 Fatty Acids (FISH OIL) 1200 MG Cap Take 1 Cap by mouth every day.     • multivitamin (THERAGRAN) Tab Take 1 Tab by mouth every day.       No current facility-administered medications for this visit.          Social History:     Social History     Social History   • Marital status:      Spouse name: N/A   • Number of children: N/A   • Years of education: N/A     Occupational History   • Not on file.     Social History Main Topics   • Smoking status: Former Smoker   • Smokeless tobacco:  "Never Used   • Alcohol use Yes   • Drug use: No   • Sexual activity: Not on file     Other Topics Concern   • Not on file     Social History Narrative   • No narrative on file       Family History:    No family history on file.    Review of Systems:  All other review of systems are negative except what was mentioned above in the HPI.    Constitutional: Negative for fever, chills, weight loss and malaise/fatigue.    HEENT: No new auditory or visual complaints. No sore throat and neck pain.     Respiratory: Negative for cough, sputum production, shortness of breath and wheezing.    Cardiovascular: Negative for chest pain, palpitations, orthopnea and leg swelling.    Gastrointestinal: Negative for heartburn, nausea, vomiting and abdominal pain.    Genitourinary: Negative for dysuria, hematuria    Musculoskeletal: No new arthralgias or myalgias   Skin: Negative for rash and itching.    Neurological: Negative for focal weakness and headaches.    Endo/Heme/Allergies: No abnormal bleed/bruise.    Psychiatric/Behavioral: No new depression/anxiety.    Physical Exam:  Vitals: /60   Pulse 78   Temp 36.3 °C (97.3 °F)   Resp 18   Ht 1.55 m (5' 1.02\")   Wt 67.1 kg (147 lb 14.9 oz)   SpO2 90%   Breastfeeding? No   BMI 27.93 kg/m²     General: Not in acute distress, alert and oriented x 3  HEENT: No pallor, icterus. Oropharynx clear.   Neck: Supple, no palpable masses.  Lymph nodes: No palpable cervical, supraclavicular, axillary or inguinal lymphadenopathy.    CVS: regular rate and rhythm, no rubs or gallops  RESP: Clear to auscultate bilaterally, no wheezing or crackles.   ABD: Soft, non tender, non distended, positive bowel sounds, no palpable organomegaly  EXT: No edema or cyanosis  CNS: Alert and oriented x3, No focal deficits.  Skin- No rash.      Labs:   Hospital Outpatient Visit on 04/09/2018   Component Date Value Ref Range Status   • Sodium 04/09/2018 138  135 - 145 mmol/L Final   • Potassium 04/09/2018 3.9  " bowel symptoms.       Tereso Abarca MD   Radiation Oncology  [facility]   3.6 - 5.5 mmol/L Final   • Chloride 04/09/2018 106  96 - 112 mmol/L Final   • Co2 04/09/2018 24  20 - 33 mmol/L Final   • Anion Gap 04/09/2018 8.0  0.0 - 11.9 Final   • Glucose 04/09/2018 97  65 - 99 mg/dL Final   • Bun 04/09/2018 18  8 - 22 mg/dL Final   • Creatinine 04/09/2018 0.48* 0.50 - 1.40 mg/dL Final   • Calcium 04/09/2018 9.3  8.5 - 10.5 mg/dL Final   • AST(SGOT) 04/09/2018 28  12 - 45 U/L Final   • ALT(SGPT) 04/09/2018 34  2 - 50 U/L Final   • Alkaline Phosphatase 04/09/2018 73  30 - 99 U/L Final   • Total Bilirubin 04/09/2018 0.5  0.1 - 1.5 mg/dL Final   • Albumin 04/09/2018 4.1  3.2 - 4.9 g/dL Final   • Total Protein 04/09/2018 6.6  6.0 - 8.2 g/dL Final   • Globulin 04/09/2018 2.5  1.9 - 3.5 g/dL Final   • A-G Ratio 04/09/2018 1.6  g/dL Final   • Color 04/09/2018 DK Yellow   Final   • Character 04/09/2018 Clear   Final   • Specific Gravity 04/09/2018 1.026  <1.035 Final   • Ph 04/09/2018 5.0  5.0 - 8.0 Final   • Glucose 04/09/2018 Negative  Negative mg/dL Final   • Ketones 04/09/2018 Trace* Negative mg/dL Final   • Protein 04/09/2018 Negative  Negative mg/dL Final   • Bilirubin 04/09/2018 Negative  Negative Final   • Urobilinogen, Urine 04/09/2018 0.2  Negative Final   • Nitrite 04/09/2018 Negative  Negative Final   • Leukocyte Esterase 04/09/2018 Trace* Negative Final   • Occult Blood 04/09/2018 Trace* Negative Final   • Micro Urine Req 04/09/2018 Microscopic   Final   • WBC 04/09/2018 6.0  4.8 - 10.8 K/uL Final   • RBC 04/09/2018 4.04* 4.20 - 5.40 M/uL Final   • Hemoglobin 04/09/2018 13.6  12.0 - 16.0 g/dL Final   • Hematocrit 04/09/2018 42.1  37.0 - 47.0 % Final   • MCV 04/09/2018 104.2* 81.4 - 97.8 fL Final   • MCH 04/09/2018 33.7* 27.0 - 33.0 pg Final   • MCHC 04/09/2018 32.3* 33.6 - 35.0 g/dL Final   • RDW 04/09/2018 52.1* 35.9 - 50.0 fL Final   • Platelet Count 04/09/2018 282  164 - 446 K/uL Final   • MPV 04/09/2018 8.9* 9.0 - 12.9 fL Final   • Neutrophils-Polys 04/09/2018 67.70  44.00 - 72.00 %  Final   • Lymphocytes 04/09/2018 17.00* 22.00 - 41.00 % Final   • Monocytes 04/09/2018 11.40  0.00 - 13.40 % Final   • Eosinophils 04/09/2018 2.80  0.00 - 6.90 % Final   • Basophils 04/09/2018 0.80  0.00 - 1.80 % Final   • Immature Granulocytes 04/09/2018 0.30  0.00 - 0.90 % Final   • Nucleated RBC 04/09/2018 0.00  /100 WBC Final   • Neutrophils (Absolute) 04/09/2018 4.05  2.00 - 7.15 K/uL Final   • Lymphs (Absolute) 04/09/2018 1.02  1.00 - 4.80 K/uL Final   • Monos (Absolute) 04/09/2018 0.68  0.00 - 0.85 K/uL Final   • Eos (Absolute) 04/09/2018 0.17  0.00 - 0.51 K/uL Final   • Baso (Absolute) 04/09/2018 0.05  0.00 - 0.12 K/uL Final   • Immature Granulocytes (abs) 04/09/2018 0.02  0.00 - 0.11 K/uL Final   • NRBC (Absolute) 04/09/2018 0.00  K/uL Final   • WBC 04/09/2018 2-5  /hpf Final    Comment: Female  <12 Yr 0-2  >12 Yr 0-5  Male   None     • RBC 04/09/2018 2-5* /hpf Final    Comment: Female  >12 Yr 0-2  Male   None     • Bacteria 04/09/2018 Negative  None /hpf Final   • Epithelial Cells 04/09/2018 Few  /hpf Final   • Hyaline Cast 04/09/2018 0-2  /lpf Final   • GFR If  04/09/2018 >60  >60 mL/min/1.73 m 2 Final   • GFR If Non  04/09/2018 >60  >60 mL/min/1.73 m 2 Final             Assessment and Plan:  Metastatic squamous cell lung carcinoma-. She was treated with carboplatin and Abraxane and received chemoradiation afterwards. She has done very well since then with no clear-cut evidence of relapse or progression. Recent images with the patient. We will continue restaging studies every 4-6 months. Hopefully, she will have long-term remission/cure.. She recently saw Dr. Thacker.        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 She is status post 4 doses. We will obtain a restaging MRI of her brain. If she has good response. I will probably give HER-2 more doses of Avastin. She is off of steroids. Hopefully, she will want have any rebound. Reviewed the recent labs  and vital signs and that she is okay to continue Avastin.    She agreed and verbalized  agreement and understanding with the current plan.  I answered all questions and concerns at this time         Please note that this dictation was created using voice recognition software. I have made every reasonable attempt to correct obvious errors, but I expect that there are errors of grammar and possibly content that I did not discover before finalizing the note.      SIGNATURES:  Mariano Aguilar    CC:  Sandee Thompson D.O.  No ref. provider found